# Patient Record
Sex: FEMALE | Race: WHITE | Employment: OTHER | URBAN - METROPOLITAN AREA
[De-identification: names, ages, dates, MRNs, and addresses within clinical notes are randomized per-mention and may not be internally consistent; named-entity substitution may affect disease eponyms.]

---

## 2017-02-08 ENCOUNTER — HOSPITAL ENCOUNTER (OUTPATIENT)
Dept: WOMENS IMAGING | Age: 82
Discharge: HOME | End: 2017-02-08
Payer: MEDICARE

## 2017-02-08 DIAGNOSIS — Z13.820 OSTEOPOROSIS SCREENING: ICD-10-CM

## 2017-02-08 PROCEDURE — 77081 DXA BONE DENSITY APPENDICULR: CPT

## 2017-02-08 PROCEDURE — 77080 DXA BONE DENSITY AXIAL: CPT | Performed by: RADIOLOGY

## 2017-02-15 ENCOUNTER — HOSPITAL ENCOUNTER (OUTPATIENT)
Age: 82
Discharge: HOME | End: 2017-02-15
Payer: MEDICARE

## 2017-02-15 DIAGNOSIS — N18.2 CKD (CHRONIC KIDNEY DISEASE) STAGE 2, GFR 60-89 ML/MIN: ICD-10-CM

## 2017-02-15 DIAGNOSIS — I12.9 BENIGN HYPERTENSION WITH CKD (CHRONIC KIDNEY DISEASE), STAGE II: ICD-10-CM

## 2017-02-15 DIAGNOSIS — E55.9 VITAMIN D DEFICIENCY: ICD-10-CM

## 2017-02-15 DIAGNOSIS — N18.2 BENIGN HYPERTENSION WITH CKD (CHRONIC KIDNEY DISEASE), STAGE II: ICD-10-CM

## 2017-02-15 DIAGNOSIS — N20.0 NEPHROLITHIASIS: ICD-10-CM

## 2017-02-15 LAB
ABSOLUTE EOS #: 0.1 K/UL (ref 0–0.4)
ABSOLUTE LYMPH #: 1.1 K/UL (ref 1–4.8)
ABSOLUTE MONO #: 0.5 K/UL (ref 0.1–1.3)
ANION GAP SERPL CALCULATED.3IONS-SCNC: 15 MMOL/L (ref 9–17)
BASOPHILS # BLD: 1 % (ref 0–2)
BASOPHILS ABSOLUTE: 0 K/UL (ref 0–0.2)
BUN BLDV-MCNC: 24 MG/DL (ref 8–23)
BUN/CREAT BLD: ABNORMAL (ref 9–20)
CALCIUM SERPL-MCNC: 9.5 MG/DL (ref 8.6–10.4)
CHLORIDE BLD-SCNC: 102 MMOL/L (ref 98–107)
CO2: 26 MMOL/L (ref 20–31)
CREAT SERPL-MCNC: 0.96 MG/DL (ref 0.5–0.9)
DIFFERENTIAL TYPE: ABNORMAL
EOSINOPHILS RELATIVE PERCENT: 2 % (ref 0–4)
GFR AFRICAN AMERICAN: >60 ML/MIN
GFR NON-AFRICAN AMERICAN: 56 ML/MIN
GFR SERPL CREATININE-BSD FRML MDRD: ABNORMAL ML/MIN/{1.73_M2}
GFR SERPL CREATININE-BSD FRML MDRD: ABNORMAL ML/MIN/{1.73_M2}
GLUCOSE BLD-MCNC: 91 MG/DL (ref 70–99)
HCT VFR BLD CALC: 39.1 % (ref 36–46)
HEMOGLOBIN: 13.4 G/DL (ref 12–16)
LYMPHOCYTES # BLD: 22 % (ref 24–44)
MAGNESIUM: 1.9 MG/DL (ref 1.6–2.6)
MCH RBC QN AUTO: 28.4 PG (ref 26–34)
MCHC RBC AUTO-ENTMCNC: 34.4 G/DL (ref 31–37)
MCV RBC AUTO: 82.6 FL (ref 80–100)
MONOCYTES # BLD: 10 % (ref 1–7)
PDW BLD-RTO: 16.4 % (ref 11.5–14.9)
PHOSPHORUS: 3.5 MG/DL (ref 2.6–4.5)
PLATELET # BLD: 259 K/UL (ref 150–450)
PLATELET ESTIMATE: ABNORMAL
PMV BLD AUTO: 8 FL (ref 6–12)
POTASSIUM SERPL-SCNC: 3.7 MMOL/L (ref 3.7–5.3)
RBC # BLD: 4.73 M/UL (ref 4–5.2)
RBC # BLD: ABNORMAL 10*6/UL
SEG NEUTROPHILS: 65 % (ref 36–66)
SEGMENTED NEUTROPHILS ABSOLUTE COUNT: 3.2 K/UL (ref 1.3–9.1)
SODIUM BLD-SCNC: 143 MMOL/L (ref 135–144)
VITAMIN D 25-HYDROXY: 32.9 NG/ML (ref 30–100)
WBC # BLD: 4.9 K/UL (ref 3.5–11)
WBC # BLD: ABNORMAL 10*3/UL

## 2017-02-15 PROCEDURE — 84100 ASSAY OF PHOSPHORUS: CPT

## 2017-02-15 PROCEDURE — 80048 BASIC METABOLIC PNL TOTAL CA: CPT

## 2017-02-15 PROCEDURE — 36415 COLL VENOUS BLD VENIPUNCTURE: CPT

## 2017-02-15 PROCEDURE — 83735 ASSAY OF MAGNESIUM: CPT

## 2017-02-15 PROCEDURE — 82306 VITAMIN D 25 HYDROXY: CPT

## 2017-02-15 PROCEDURE — 85025 COMPLETE CBC W/AUTO DIFF WBC: CPT

## 2017-03-08 ENCOUNTER — HOSPITAL ENCOUNTER (OUTPATIENT)
Age: 82
Discharge: HOME | End: 2017-03-08
Payer: MEDICARE

## 2017-03-08 LAB
ALT SERPL-CCNC: 17 U/L (ref 5–33)
AST SERPL-CCNC: 23 U/L
BUN BLDV-MCNC: 19 MG/DL (ref 8–23)
C-REACTIVE PROTEIN: <0.3 MG/L (ref 0–5)
CREAT SERPL-MCNC: 0.95 MG/DL (ref 0.5–0.9)
GFR AFRICAN AMERICAN: >60 ML/MIN
GFR NON-AFRICAN AMERICAN: 56 ML/MIN
GFR SERPL CREATININE-BSD FRML MDRD: ABNORMAL ML/MIN/{1.73_M2}
GFR SERPL CREATININE-BSD FRML MDRD: ABNORMAL ML/MIN/{1.73_M2}
HCT VFR BLD CALC: 39.5 % (ref 36–46)
HEMOGLOBIN: 13.5 G/DL (ref 12–16)
MCH RBC QN AUTO: 27.8 PG (ref 26–34)
MCHC RBC AUTO-ENTMCNC: 34.3 G/DL (ref 31–37)
MCV RBC AUTO: 81.2 FL (ref 80–100)
PDW BLD-RTO: 16 % (ref 11.5–14.9)
PLATELET # BLD: 248 K/UL (ref 150–450)
PMV BLD AUTO: 8.7 FL (ref 6–12)
RBC # BLD: 4.86 M/UL (ref 4–5.2)
SEDIMENTATION RATE, ERYTHROCYTE: 6 MM (ref 0–20)
WBC # BLD: 6.1 K/UL (ref 3.5–11)

## 2017-03-08 PROCEDURE — 82565 ASSAY OF CREATININE: CPT

## 2017-03-08 PROCEDURE — 86140 C-REACTIVE PROTEIN: CPT

## 2017-03-08 PROCEDURE — 85651 RBC SED RATE NONAUTOMATED: CPT

## 2017-03-08 PROCEDURE — 84460 ALANINE AMINO (ALT) (SGPT): CPT

## 2017-03-08 PROCEDURE — 84520 ASSAY OF UREA NITROGEN: CPT

## 2017-03-08 PROCEDURE — 36415 COLL VENOUS BLD VENIPUNCTURE: CPT

## 2017-03-08 PROCEDURE — 84450 TRANSFERASE (AST) (SGOT): CPT

## 2017-03-08 PROCEDURE — 85027 COMPLETE CBC AUTOMATED: CPT

## 2017-04-13 ENCOUNTER — HOSPITAL ENCOUNTER (OUTPATIENT)
Age: 82
Discharge: HOME | End: 2017-04-13
Payer: MEDICARE

## 2017-04-13 ENCOUNTER — HOSPITAL ENCOUNTER (OUTPATIENT)
Dept: WOMENS IMAGING | Age: 82
Discharge: HOME | End: 2017-04-13
Payer: MEDICARE

## 2017-04-13 DIAGNOSIS — Z13.9 SCREENING: ICD-10-CM

## 2017-04-13 LAB
ABSOLUTE BANDS #: 0.04 K/UL (ref 0–1)
ABSOLUTE EOS #: 0.07 K/UL (ref 0–0.4)
ABSOLUTE LYMPH #: 1.18 K/UL (ref 1–4.8)
ABSOLUTE MONO #: 0.22 K/UL (ref 0.1–1.3)
ALBUMIN SERPL-MCNC: 4.1 G/DL (ref 3.5–5.2)
ALBUMIN/GLOBULIN RATIO: ABNORMAL (ref 1–2.5)
ALP BLD-CCNC: 78 U/L (ref 35–104)
ALT SERPL-CCNC: 15 U/L (ref 5–33)
ANION GAP SERPL CALCULATED.3IONS-SCNC: 12 MMOL/L (ref 9–17)
AST SERPL-CCNC: 20 U/L
BANDS: 1 % (ref 0–10)
BASOPHILS # BLD: 1 % (ref 0–2)
BASOPHILS ABSOLUTE: 0.04 K/UL (ref 0–0.2)
BILIRUB SERPL-MCNC: 0.79 MG/DL (ref 0.3–1.2)
BUN BLDV-MCNC: 23 MG/DL (ref 8–23)
BUN/CREAT BLD: ABNORMAL (ref 9–20)
CALCIUM SERPL-MCNC: 9.6 MG/DL (ref 8.6–10.4)
CHLORIDE BLD-SCNC: 105 MMOL/L (ref 98–107)
CHOLESTEROL/HDL RATIO: 2.6
CHOLESTEROL: 191 MG/DL
CO2: 28 MMOL/L (ref 20–31)
CREAT SERPL-MCNC: 0.83 MG/DL (ref 0.5–0.9)
DIFFERENTIAL TYPE: ABNORMAL
EOSINOPHILS RELATIVE PERCENT: 2 % (ref 0–4)
GFR AFRICAN AMERICAN: >60 ML/MIN
GFR NON-AFRICAN AMERICAN: >60 ML/MIN
GFR SERPL CREATININE-BSD FRML MDRD: ABNORMAL ML/MIN/{1.73_M2}
GFR SERPL CREATININE-BSD FRML MDRD: ABNORMAL ML/MIN/{1.73_M2}
GLUCOSE BLD-MCNC: 87 MG/DL (ref 70–99)
HCT VFR BLD CALC: 41.5 % (ref 36–46)
HDLC SERPL-MCNC: 73 MG/DL
HEMOGLOBIN: 13.4 G/DL (ref 12–16)
LDL CHOLESTEROL: 99 MG/DL (ref 0–130)
LYMPHOCYTES # BLD: 32 % (ref 24–44)
MAGNESIUM: 2.1 MG/DL (ref 1.6–2.6)
MCH RBC QN AUTO: 26.5 PG (ref 26–34)
MCHC RBC AUTO-ENTMCNC: 32.4 G/DL (ref 31–37)
MCV RBC AUTO: 81.8 FL (ref 80–100)
MONOCYTES # BLD: 6 % (ref 1–7)
MORPHOLOGY: ABNORMAL
PDW BLD-RTO: 16 % (ref 11.5–14.9)
PLATELET # BLD: 214 K/UL (ref 150–450)
PLATELET ESTIMATE: ABNORMAL
PMV BLD AUTO: 8.5 FL (ref 6–12)
POTASSIUM SERPL-SCNC: 4 MMOL/L (ref 3.7–5.3)
RBC # BLD: 5.07 M/UL (ref 4–5.2)
RBC # BLD: ABNORMAL 10*6/UL
SEG NEUTROPHILS: 58 % (ref 36–66)
SEGMENTED NEUTROPHILS ABSOLUTE COUNT: 2.15 K/UL (ref 1.3–9.1)
SODIUM BLD-SCNC: 145 MMOL/L (ref 135–144)
TOTAL PROTEIN: 7.4 G/DL (ref 6.4–8.3)
TRIGL SERPL-MCNC: 94 MG/DL
VLDLC SERPL CALC-MCNC: NORMAL MG/DL (ref 1–30)
WBC # BLD: 3.7 K/UL (ref 3.5–11)
WBC # BLD: ABNORMAL 10*3/UL

## 2017-04-13 PROCEDURE — 36415 COLL VENOUS BLD VENIPUNCTURE: CPT

## 2017-04-13 PROCEDURE — 77063 BREAST TOMOSYNTHESIS BI: CPT

## 2017-04-13 PROCEDURE — 80053 COMPREHEN METABOLIC PANEL: CPT

## 2017-04-13 PROCEDURE — 85025 COMPLETE CBC W/AUTO DIFF WBC: CPT

## 2017-04-13 PROCEDURE — 80061 LIPID PANEL: CPT

## 2017-04-13 PROCEDURE — 83735 ASSAY OF MAGNESIUM: CPT

## 2017-04-19 ENCOUNTER — HOSPITAL ENCOUNTER (OUTPATIENT)
Dept: WOMENS IMAGING | Age: 82
Discharge: HOME | End: 2017-04-19
Payer: MEDICARE

## 2017-04-19 DIAGNOSIS — R92.8 ABNORMAL MAMMOGRAM: ICD-10-CM

## 2017-04-19 PROCEDURE — 76642 ULTRASOUND BREAST LIMITED: CPT

## 2017-04-19 PROCEDURE — G0279 TOMOSYNTHESIS, MAMMO: HCPCS

## 2017-05-31 ENCOUNTER — HOSPITAL ENCOUNTER (OUTPATIENT)
Age: 82
Discharge: HOME | End: 2017-05-31
Payer: MEDICARE

## 2017-05-31 DIAGNOSIS — N20.0 NEPHROLITHIASIS: ICD-10-CM

## 2017-05-31 DIAGNOSIS — N18.30 BENIGN HYPERTENSION WITH CKD (CHRONIC KIDNEY DISEASE) STAGE III (HCC): ICD-10-CM

## 2017-05-31 DIAGNOSIS — I12.9 BENIGN HYPERTENSION WITH CKD (CHRONIC KIDNEY DISEASE) STAGE III (HCC): ICD-10-CM

## 2017-05-31 DIAGNOSIS — N18.30 CKD (CHRONIC KIDNEY DISEASE) STAGE 3, GFR 30-59 ML/MIN (HCC): ICD-10-CM

## 2017-05-31 LAB
ABSOLUTE EOS #: 0.1 K/UL (ref 0–0.4)
ABSOLUTE LYMPH #: 1.4 K/UL (ref 1–4.8)
ABSOLUTE MONO #: 0.4 K/UL (ref 0.1–1.3)
ANION GAP SERPL CALCULATED.3IONS-SCNC: 14 MMOL/L (ref 9–17)
BASOPHILS # BLD: 1 %
BASOPHILS ABSOLUTE: 0 K/UL (ref 0–0.2)
BUN BLDV-MCNC: 29 MG/DL (ref 8–23)
BUN/CREAT BLD: ABNORMAL (ref 9–20)
CALCIUM SERPL-MCNC: 9.4 MG/DL (ref 8.6–10.4)
CHLORIDE BLD-SCNC: 103 MMOL/L (ref 98–107)
CO2: 25 MMOL/L (ref 20–31)
CREAT SERPL-MCNC: 0.93 MG/DL (ref 0.5–0.9)
DIFFERENTIAL TYPE: ABNORMAL
EOSINOPHILS RELATIVE PERCENT: 1 %
GFR AFRICAN AMERICAN: >60 ML/MIN
GFR NON-AFRICAN AMERICAN: 58 ML/MIN
GFR SERPL CREATININE-BSD FRML MDRD: ABNORMAL ML/MIN/{1.73_M2}
GFR SERPL CREATININE-BSD FRML MDRD: ABNORMAL ML/MIN/{1.73_M2}
GLUCOSE BLD-MCNC: 93 MG/DL (ref 70–99)
HCT VFR BLD CALC: 39.3 % (ref 36–46)
HEMOGLOBIN: 13.5 G/DL (ref 12–16)
LYMPHOCYTES # BLD: 24 %
MAGNESIUM: 1.9 MG/DL (ref 1.6–2.6)
MCH RBC QN AUTO: 28 PG (ref 26–34)
MCHC RBC AUTO-ENTMCNC: 34.3 G/DL (ref 31–37)
MCV RBC AUTO: 81.8 FL (ref 80–100)
MONOCYTES # BLD: 8 %
PDW BLD-RTO: 17.8 % (ref 11.5–14.9)
PHOSPHORUS: 3.4 MG/DL (ref 2.6–4.5)
PLATELET # BLD: 232 K/UL (ref 150–450)
PLATELET ESTIMATE: ABNORMAL
PMV BLD AUTO: 7.9 FL (ref 6–12)
POTASSIUM SERPL-SCNC: 3.6 MMOL/L (ref 3.7–5.3)
RBC # BLD: 4.81 M/UL (ref 4–5.2)
RBC # BLD: ABNORMAL 10*6/UL
SEG NEUTROPHILS: 66 %
SEGMENTED NEUTROPHILS ABSOLUTE COUNT: 3.8 K/UL (ref 1.3–9.1)
SODIUM BLD-SCNC: 142 MMOL/L (ref 135–144)
VITAMIN D 25-HYDROXY: 35 NG/ML (ref 30–100)
WBC # BLD: 5.8 K/UL (ref 3.5–11)
WBC # BLD: ABNORMAL 10*3/UL

## 2017-05-31 PROCEDURE — 80048 BASIC METABOLIC PNL TOTAL CA: CPT

## 2017-05-31 PROCEDURE — 85025 COMPLETE CBC W/AUTO DIFF WBC: CPT

## 2017-05-31 PROCEDURE — 83735 ASSAY OF MAGNESIUM: CPT

## 2017-05-31 PROCEDURE — 84100 ASSAY OF PHOSPHORUS: CPT

## 2017-05-31 PROCEDURE — 36415 COLL VENOUS BLD VENIPUNCTURE: CPT

## 2017-05-31 PROCEDURE — 82306 VITAMIN D 25 HYDROXY: CPT

## 2017-08-02 ENCOUNTER — HOSPITAL ENCOUNTER (OUTPATIENT)
Age: 82
Discharge: HOME | End: 2017-08-02
Payer: MEDICARE

## 2017-08-02 LAB
ALBUMIN SERPL-MCNC: 4.3 G/DL (ref 3.5–5.2)
ALBUMIN/GLOBULIN RATIO: ABNORMAL (ref 1–2.5)
ALP BLD-CCNC: 73 U/L (ref 35–104)
ALT SERPL-CCNC: 18 U/L (ref 5–33)
ANION GAP SERPL CALCULATED.3IONS-SCNC: 14 MMOL/L (ref 9–17)
AST SERPL-CCNC: 20 U/L
BILIRUB SERPL-MCNC: 0.8 MG/DL (ref 0.3–1.2)
BUN BLDV-MCNC: 27 MG/DL (ref 8–23)
BUN/CREAT BLD: ABNORMAL (ref 9–20)
C-REACTIVE PROTEIN: 0.3 MG/L (ref 0–5)
CALCIUM SERPL-MCNC: 9.3 MG/DL (ref 8.6–10.4)
CHLORIDE BLD-SCNC: 102 MMOL/L (ref 98–107)
CO2: 27 MMOL/L (ref 20–31)
CREAT SERPL-MCNC: 0.91 MG/DL (ref 0.5–0.9)
GFR AFRICAN AMERICAN: >60 ML/MIN
GFR NON-AFRICAN AMERICAN: 59 ML/MIN
GFR SERPL CREATININE-BSD FRML MDRD: ABNORMAL ML/MIN/{1.73_M2}
GFR SERPL CREATININE-BSD FRML MDRD: ABNORMAL ML/MIN/{1.73_M2}
GLUCOSE BLD-MCNC: 94 MG/DL (ref 70–99)
HCT VFR BLD CALC: 39.9 % (ref 36–46)
HEMOGLOBIN: 13.4 G/DL (ref 12–16)
MCH RBC QN AUTO: 29.5 PG (ref 26–34)
MCHC RBC AUTO-ENTMCNC: 33.6 G/DL (ref 31–37)
MCV RBC AUTO: 87.8 FL (ref 80–100)
PDW BLD-RTO: 19.9 % (ref 11.5–14.9)
PLATELET # BLD: 238 K/UL (ref 150–450)
PMV BLD AUTO: 8.4 FL (ref 6–12)
POTASSIUM SERPL-SCNC: 3.6 MMOL/L (ref 3.7–5.3)
RBC # BLD: 4.55 M/UL (ref 4–5.2)
SODIUM BLD-SCNC: 143 MMOL/L (ref 135–144)
TOTAL PROTEIN: 7.4 G/DL (ref 6.4–8.3)
WBC # BLD: 5.2 K/UL (ref 3.5–11)

## 2017-08-02 PROCEDURE — 80053 COMPREHEN METABOLIC PANEL: CPT

## 2017-08-02 PROCEDURE — 85027 COMPLETE CBC AUTOMATED: CPT

## 2017-08-02 PROCEDURE — 86140 C-REACTIVE PROTEIN: CPT

## 2017-08-02 PROCEDURE — 36415 COLL VENOUS BLD VENIPUNCTURE: CPT

## 2017-08-21 ENCOUNTER — HOSPITAL ENCOUNTER (OUTPATIENT)
Dept: MRI IMAGING | Age: 82
Discharge: HOME | End: 2017-08-21
Payer: MEDICARE

## 2017-08-21 DIAGNOSIS — M54.2 NECK PAIN: ICD-10-CM

## 2017-08-21 DIAGNOSIS — M47.812 ARTHROPATHY OF CERVICAL SPINE: ICD-10-CM

## 2017-08-21 PROCEDURE — 72141 MRI NECK SPINE W/O DYE: CPT

## 2017-08-31 ENCOUNTER — HOSPITAL ENCOUNTER (OUTPATIENT)
Age: 82
Discharge: HOME | End: 2017-08-31
Payer: MEDICARE

## 2017-08-31 DIAGNOSIS — N20.0 NEPHROLITHIASIS: ICD-10-CM

## 2017-08-31 DIAGNOSIS — I12.9 BENIGN HYPERTENSION WITH CKD (CHRONIC KIDNEY DISEASE) STAGE III (HCC): ICD-10-CM

## 2017-08-31 DIAGNOSIS — N18.30 CKD (CHRONIC KIDNEY DISEASE), STAGE III (HCC): ICD-10-CM

## 2017-08-31 DIAGNOSIS — N18.30 BENIGN HYPERTENSION WITH CKD (CHRONIC KIDNEY DISEASE) STAGE III (HCC): ICD-10-CM

## 2017-08-31 DIAGNOSIS — E55.9 VITAMIN D DEFICIENCY: ICD-10-CM

## 2017-08-31 LAB
ANION GAP SERPL CALCULATED.3IONS-SCNC: 13 MMOL/L (ref 9–17)
CHLORIDE BLD-SCNC: 102 MMOL/L (ref 98–107)
CO2: 27 MMOL/L (ref 20–31)
POTASSIUM SERPL-SCNC: 3.9 MMOL/L (ref 3.7–5.3)
SODIUM BLD-SCNC: 142 MMOL/L (ref 135–144)

## 2017-08-31 PROCEDURE — 36415 COLL VENOUS BLD VENIPUNCTURE: CPT

## 2017-08-31 PROCEDURE — 80051 ELECTROLYTE PANEL: CPT

## 2018-02-13 ENCOUNTER — HOSPITAL ENCOUNTER (OUTPATIENT)
Dept: GENERAL RADIOLOGY | Age: 83
Discharge: HOME | End: 2018-02-15
Payer: MEDICARE

## 2018-02-13 ENCOUNTER — HOSPITAL ENCOUNTER (OUTPATIENT)
Age: 83
Discharge: HOME | End: 2018-02-15
Payer: MEDICARE

## 2018-02-13 DIAGNOSIS — Z77.090 ASBESTOS EXPOSURE: ICD-10-CM

## 2018-02-13 PROCEDURE — 71046 X-RAY EXAM CHEST 2 VIEWS: CPT

## 2018-03-14 ENCOUNTER — HOSPITAL ENCOUNTER (OUTPATIENT)
Age: 83
Discharge: HOME | End: 2018-03-14
Payer: MEDICARE

## 2018-03-14 DIAGNOSIS — N18.30 BENIGN HYPERTENSION WITH CKD (CHRONIC KIDNEY DISEASE) STAGE III (HCC): ICD-10-CM

## 2018-03-14 DIAGNOSIS — I12.9 BENIGN HYPERTENSION WITH CKD (CHRONIC KIDNEY DISEASE) STAGE III (HCC): ICD-10-CM

## 2018-03-14 DIAGNOSIS — N18.30 CKD (CHRONIC KIDNEY DISEASE), STAGE III (HCC): ICD-10-CM

## 2018-03-14 DIAGNOSIS — E55.9 VITAMIN D DEFICIENCY: ICD-10-CM

## 2018-03-14 DIAGNOSIS — N20.0 NEPHROLITHIASIS: ICD-10-CM

## 2018-03-14 LAB
ALBUMIN SERPL-MCNC: 4.2 G/DL (ref 3.5–5.2)
ALBUMIN/GLOBULIN RATIO: ABNORMAL (ref 1–2.5)
ALP BLD-CCNC: 74 U/L (ref 35–104)
ALT SERPL-CCNC: 19 U/L (ref 5–33)
ANION GAP SERPL CALCULATED.3IONS-SCNC: 12 MMOL/L (ref 9–17)
ANION GAP SERPL CALCULATED.3IONS-SCNC: 12 MMOL/L (ref 9–17)
AST SERPL-CCNC: 23 U/L
BILIRUB SERPL-MCNC: 0.69 MG/DL (ref 0.3–1.2)
BUN BLDV-MCNC: 24 MG/DL (ref 8–23)
BUN BLDV-MCNC: 24 MG/DL (ref 8–23)
BUN/CREAT BLD: ABNORMAL (ref 9–20)
BUN/CREAT BLD: ABNORMAL (ref 9–20)
C-REACTIVE PROTEIN: <0.3 MG/L (ref 0–5)
CALCIUM SERPL-MCNC: 9.5 MG/DL (ref 8.6–10.4)
CALCIUM SERPL-MCNC: 9.5 MG/DL (ref 8.6–10.4)
CHLORIDE BLD-SCNC: 101 MMOL/L (ref 98–107)
CHLORIDE BLD-SCNC: 101 MMOL/L (ref 98–107)
CO2: 28 MMOL/L (ref 20–31)
CO2: 28 MMOL/L (ref 20–31)
CREAT SERPL-MCNC: 0.9 MG/DL (ref 0.5–0.9)
CREAT SERPL-MCNC: 0.9 MG/DL (ref 0.5–0.9)
GFR AFRICAN AMERICAN: >60 ML/MIN
GFR AFRICAN AMERICAN: >60 ML/MIN
GFR NON-AFRICAN AMERICAN: 60 ML/MIN
GFR NON-AFRICAN AMERICAN: 60 ML/MIN
GFR SERPL CREATININE-BSD FRML MDRD: ABNORMAL ML/MIN/{1.73_M2}
GLUCOSE BLD-MCNC: 105 MG/DL (ref 70–99)
GLUCOSE BLD-MCNC: 105 MG/DL (ref 70–99)
HCT VFR BLD CALC: 40.4 % (ref 36–46)
HEMOGLOBIN: 13.3 G/DL (ref 12–16)
MAGNESIUM: 2.1 MG/DL (ref 1.6–2.6)
MCH RBC QN AUTO: 26.8 PG (ref 26–34)
MCHC RBC AUTO-ENTMCNC: 33.1 G/DL (ref 31–37)
MCV RBC AUTO: 81.1 FL (ref 80–100)
NRBC AUTOMATED: ABNORMAL PER 100 WBC
PDW BLD-RTO: 17 % (ref 11.5–14.9)
PHOSPHORUS: 3.7 MG/DL (ref 2.6–4.5)
PLATELET # BLD: 232 K/UL (ref 150–450)
PMV BLD AUTO: 8.6 FL (ref 6–12)
POTASSIUM SERPL-SCNC: 3.9 MMOL/L (ref 3.7–5.3)
POTASSIUM SERPL-SCNC: 3.9 MMOL/L (ref 3.7–5.3)
RBC # BLD: 4.98 M/UL (ref 4–5.2)
SEDIMENTATION RATE, ERYTHROCYTE: 5 MM (ref 0–20)
SODIUM BLD-SCNC: 141 MMOL/L (ref 135–144)
SODIUM BLD-SCNC: 141 MMOL/L (ref 135–144)
TOTAL PROTEIN: 7.3 G/DL (ref 6.4–8.3)
VITAMIN D 25-HYDROXY: 32.7 NG/ML (ref 30–100)
WBC # BLD: 8 K/UL (ref 3.5–11)

## 2018-03-14 PROCEDURE — 36415 COLL VENOUS BLD VENIPUNCTURE: CPT

## 2018-03-14 PROCEDURE — 83735 ASSAY OF MAGNESIUM: CPT

## 2018-03-14 PROCEDURE — 84100 ASSAY OF PHOSPHORUS: CPT

## 2018-03-14 PROCEDURE — 85651 RBC SED RATE NONAUTOMATED: CPT

## 2018-03-14 PROCEDURE — 80053 COMPREHEN METABOLIC PANEL: CPT

## 2018-03-14 PROCEDURE — 85027 COMPLETE CBC AUTOMATED: CPT

## 2018-03-14 PROCEDURE — 80048 BASIC METABOLIC PNL TOTAL CA: CPT

## 2018-03-14 PROCEDURE — 82306 VITAMIN D 25 HYDROXY: CPT

## 2018-03-14 PROCEDURE — 86140 C-REACTIVE PROTEIN: CPT

## 2018-04-05 ENCOUNTER — HOSPITAL ENCOUNTER (OUTPATIENT)
Dept: CT IMAGING | Age: 83
Discharge: HOME | End: 2018-04-07
Payer: MEDICARE

## 2018-04-05 ENCOUNTER — HOSPITAL ENCOUNTER (OUTPATIENT)
Age: 83
Discharge: HOME | End: 2018-04-05
Payer: MEDICARE

## 2018-04-05 DIAGNOSIS — N18.30 BENIGN HYPERTENSION WITH CKD (CHRONIC KIDNEY DISEASE) STAGE III (HCC): ICD-10-CM

## 2018-04-05 DIAGNOSIS — C45.1 MESOTHELIOMA OF PERITONEUM (HCC): ICD-10-CM

## 2018-04-05 DIAGNOSIS — E55.9 VITAMIN D DEFICIENCY: ICD-10-CM

## 2018-04-05 DIAGNOSIS — I12.9 BENIGN HYPERTENSION WITH CKD (CHRONIC KIDNEY DISEASE) STAGE III (HCC): ICD-10-CM

## 2018-04-05 DIAGNOSIS — N18.30 CKD (CHRONIC KIDNEY DISEASE) STAGE 3, GFR 30-59 ML/MIN (HCC): ICD-10-CM

## 2018-04-05 DIAGNOSIS — N20.0 NEPHROLITHIASIS: ICD-10-CM

## 2018-04-05 LAB
ABSOLUTE EOS #: 0 K/UL (ref 0–0.4)
ABSOLUTE IMMATURE GRANULOCYTE: ABNORMAL K/UL (ref 0–0.3)
ABSOLUTE LYMPH #: 1.7 K/UL (ref 1–4.8)
ABSOLUTE MONO #: 0.4 K/UL (ref 0.1–1.3)
ALBUMIN SERPL-MCNC: 4.3 G/DL (ref 3.5–5.2)
ALBUMIN/GLOBULIN RATIO: ABNORMAL (ref 1–2.5)
ALP BLD-CCNC: 78 U/L (ref 35–104)
ALT SERPL-CCNC: 17 U/L (ref 5–33)
ANION GAP SERPL CALCULATED.3IONS-SCNC: 11 MMOL/L (ref 9–17)
ANION GAP SERPL CALCULATED.3IONS-SCNC: 11 MMOL/L (ref 9–17)
AST SERPL-CCNC: 19 U/L
BASOPHILS # BLD: 1 % (ref 0–2)
BASOPHILS ABSOLUTE: 0 K/UL (ref 0–0.2)
BILIRUB SERPL-MCNC: 0.8 MG/DL (ref 0.3–1.2)
BUN BLDV-MCNC: 26 MG/DL (ref 8–23)
BUN BLDV-MCNC: 26 MG/DL (ref 8–23)
BUN/CREAT BLD: ABNORMAL (ref 9–20)
BUN/CREAT BLD: ABNORMAL (ref 9–20)
CALCIUM SERPL-MCNC: 9.4 MG/DL (ref 8.6–10.4)
CALCIUM SERPL-MCNC: 9.4 MG/DL (ref 8.6–10.4)
CHLORIDE BLD-SCNC: 101 MMOL/L (ref 98–107)
CHLORIDE BLD-SCNC: 101 MMOL/L (ref 98–107)
CO2: 30 MMOL/L (ref 20–31)
CO2: 30 MMOL/L (ref 20–31)
CREAT SERPL-MCNC: 0.94 MG/DL (ref 0.5–0.9)
CREAT SERPL-MCNC: 0.94 MG/DL (ref 0.5–0.9)
DIFFERENTIAL TYPE: ABNORMAL
EOSINOPHILS RELATIVE PERCENT: 1 % (ref 0–4)
GFR AFRICAN AMERICAN: >60 ML/MIN
GFR AFRICAN AMERICAN: >60 ML/MIN
GFR NON-AFRICAN AMERICAN: 57 ML/MIN
GFR NON-AFRICAN AMERICAN: 57 ML/MIN
GFR SERPL CREATININE-BSD FRML MDRD: ABNORMAL ML/MIN/{1.73_M2}
GLUCOSE BLD-MCNC: 93 MG/DL (ref 70–99)
GLUCOSE BLD-MCNC: 93 MG/DL (ref 70–99)
HCT VFR BLD CALC: 40.7 % (ref 36–46)
HEMOGLOBIN: 13.7 G/DL (ref 12–16)
IMMATURE GRANULOCYTES: ABNORMAL %
LYMPHOCYTES # BLD: 35 % (ref 24–44)
MCH RBC QN AUTO: 27.1 PG (ref 26–34)
MCHC RBC AUTO-ENTMCNC: 33.5 G/DL (ref 31–37)
MCV RBC AUTO: 80.9 FL (ref 80–100)
MONOCYTES # BLD: 8 % (ref 1–7)
NRBC AUTOMATED: ABNORMAL PER 100 WBC
PDW BLD-RTO: 17.3 % (ref 11.5–14.9)
PLATELET # BLD: 223 K/UL (ref 150–450)
PLATELET ESTIMATE: ABNORMAL
PMV BLD AUTO: 8.3 FL (ref 6–12)
POTASSIUM SERPL-SCNC: 3.7 MMOL/L (ref 3.7–5.3)
POTASSIUM SERPL-SCNC: 3.7 MMOL/L (ref 3.7–5.3)
RBC # BLD: 5.03 M/UL (ref 4–5.2)
RBC # BLD: ABNORMAL 10*6/UL
SEG NEUTROPHILS: 55 % (ref 36–66)
SEGMENTED NEUTROPHILS ABSOLUTE COUNT: 2.7 K/UL (ref 1.3–9.1)
SODIUM BLD-SCNC: 142 MMOL/L (ref 135–144)
SODIUM BLD-SCNC: 142 MMOL/L (ref 135–144)
TOTAL PROTEIN: 7.3 G/DL (ref 6.4–8.3)
WBC # BLD: 4.9 K/UL (ref 3.5–11)
WBC # BLD: ABNORMAL 10*3/UL

## 2018-04-05 PROCEDURE — 74177 CT ABD & PELVIS W/CONTRAST: CPT

## 2018-04-05 PROCEDURE — 36415 COLL VENOUS BLD VENIPUNCTURE: CPT

## 2018-04-05 PROCEDURE — 71260 CT THORAX DX C+: CPT

## 2018-04-05 PROCEDURE — 80053 COMPREHEN METABOLIC PANEL: CPT

## 2018-04-05 PROCEDURE — 6360000004 HC RX CONTRAST MEDICATION: Performed by: INTERNAL MEDICINE

## 2018-04-05 PROCEDURE — 2580000003 HC RX 258: Performed by: INTERNAL MEDICINE

## 2018-04-05 PROCEDURE — 80048 BASIC METABOLIC PNL TOTAL CA: CPT

## 2018-04-05 PROCEDURE — 85025 COMPLETE CBC W/AUTO DIFF WBC: CPT

## 2018-04-05 RX ORDER — 0.9 % SODIUM CHLORIDE 0.9 %
100 INTRAVENOUS SOLUTION INTRAVENOUS ONCE
Status: COMPLETED | OUTPATIENT
Start: 2018-04-05 | End: 2018-04-05

## 2018-04-05 RX ORDER — SODIUM CHLORIDE 0.9 % (FLUSH) 0.9 %
10 SYRINGE (ML) INJECTION PRN
Status: DISCONTINUED | OUTPATIENT
Start: 2018-04-05 | End: 2018-04-08 | Stop reason: HOSPADM

## 2018-04-05 RX ADMIN — IOHEXOL 50 ML: 240 INJECTION, SOLUTION INTRATHECAL; INTRAVASCULAR; INTRAVENOUS; ORAL at 09:55

## 2018-04-05 RX ADMIN — Medication 10 ML: at 09:55

## 2018-04-05 RX ADMIN — IOPAMIDOL 75 ML: 755 INJECTION, SOLUTION INTRAVENOUS at 09:55

## 2018-04-05 RX ADMIN — SODIUM CHLORIDE 100 ML: 9 INJECTION, SOLUTION INTRAVENOUS at 09:55

## 2018-05-09 ENCOUNTER — HOSPITAL ENCOUNTER (OUTPATIENT)
Dept: ULTRASOUND IMAGING | Age: 83
Discharge: HOME | End: 2018-05-11
Payer: MEDICARE

## 2018-05-09 DIAGNOSIS — C45.1 MESOTHELIOMA OF PERITONEUM (HCC): ICD-10-CM

## 2018-05-09 PROCEDURE — 76536 US EXAM OF HEAD AND NECK: CPT

## 2018-05-29 ENCOUNTER — HOSPITAL ENCOUNTER (OUTPATIENT)
Age: 83
Discharge: HOME | End: 2018-05-29
Payer: MEDICARE

## 2018-05-29 LAB
C-REACTIVE PROTEIN: 0.4 MG/L (ref 0–5)
SEDIMENTATION RATE, ERYTHROCYTE: 9 MM (ref 0–20)

## 2018-05-29 PROCEDURE — 85651 RBC SED RATE NONAUTOMATED: CPT

## 2018-05-29 PROCEDURE — 36415 COLL VENOUS BLD VENIPUNCTURE: CPT

## 2018-05-29 PROCEDURE — 86140 C-REACTIVE PROTEIN: CPT

## 2018-06-20 ENCOUNTER — HOSPITAL ENCOUNTER (OUTPATIENT)
Age: 83
Discharge: HOME | End: 2018-06-20
Payer: MEDICARE

## 2018-06-20 LAB
ABSOLUTE EOS #: 0 K/UL (ref 0–0.4)
ABSOLUTE IMMATURE GRANULOCYTE: ABNORMAL K/UL (ref 0–0.3)
ABSOLUTE LYMPH #: 0.8 K/UL (ref 1–4.8)
ABSOLUTE MONO #: 0.5 K/UL (ref 0.1–1.3)
ANION GAP SERPL CALCULATED.3IONS-SCNC: 13 MMOL/L (ref 9–17)
BASOPHILS # BLD: 1 % (ref 0–2)
BASOPHILS ABSOLUTE: 0 K/UL (ref 0–0.2)
BUN BLDV-MCNC: 23 MG/DL (ref 8–23)
BUN/CREAT BLD: ABNORMAL (ref 9–20)
CALCIUM SERPL-MCNC: 9.4 MG/DL (ref 8.6–10.4)
CHLORIDE BLD-SCNC: 101 MMOL/L (ref 98–107)
CO2: 28 MMOL/L (ref 20–31)
CREAT SERPL-MCNC: 0.94 MG/DL (ref 0.5–0.9)
DIFFERENTIAL TYPE: ABNORMAL
EOSINOPHILS RELATIVE PERCENT: 1 % (ref 0–4)
GFR AFRICAN AMERICAN: >60 ML/MIN
GFR NON-AFRICAN AMERICAN: 57 ML/MIN
GFR SERPL CREATININE-BSD FRML MDRD: ABNORMAL ML/MIN/{1.73_M2}
GFR SERPL CREATININE-BSD FRML MDRD: ABNORMAL ML/MIN/{1.73_M2}
GLUCOSE BLD-MCNC: 96 MG/DL (ref 70–99)
HCT VFR BLD CALC: 42.8 % (ref 36–46)
HEMOGLOBIN: 14.2 G/DL (ref 12–16)
IMMATURE GRANULOCYTES: ABNORMAL %
LYMPHOCYTES # BLD: 10 % (ref 24–44)
MAGNESIUM: 1.9 MG/DL (ref 1.6–2.6)
MCH RBC QN AUTO: 27.3 PG (ref 26–34)
MCHC RBC AUTO-ENTMCNC: 33.2 G/DL (ref 31–37)
MCV RBC AUTO: 82.2 FL (ref 80–100)
MONOCYTES # BLD: 6 % (ref 1–7)
NRBC AUTOMATED: ABNORMAL PER 100 WBC
PDW BLD-RTO: 17.3 % (ref 11.5–14.9)
PHOSPHORUS: 3.1 MG/DL (ref 2.6–4.5)
PLATELET # BLD: 237 K/UL (ref 150–450)
PLATELET ESTIMATE: ABNORMAL
PMV BLD AUTO: 8.4 FL (ref 6–12)
POTASSIUM SERPL-SCNC: 3.5 MMOL/L (ref 3.7–5.3)
RBC # BLD: 5.2 M/UL (ref 4–5.2)
RBC # BLD: ABNORMAL 10*6/UL
SEG NEUTROPHILS: 82 % (ref 36–66)
SEGMENTED NEUTROPHILS ABSOLUTE COUNT: 6.7 K/UL (ref 1.3–9.1)
SODIUM BLD-SCNC: 142 MMOL/L (ref 135–144)
VITAMIN D 25-HYDROXY: 36.3 NG/ML (ref 30–100)
WBC # BLD: 8.1 K/UL (ref 3.5–11)
WBC # BLD: ABNORMAL 10*3/UL

## 2018-06-20 PROCEDURE — 82306 VITAMIN D 25 HYDROXY: CPT

## 2018-06-20 PROCEDURE — 36415 COLL VENOUS BLD VENIPUNCTURE: CPT

## 2018-06-20 PROCEDURE — 83735 ASSAY OF MAGNESIUM: CPT

## 2018-06-20 PROCEDURE — 80048 BASIC METABOLIC PNL TOTAL CA: CPT

## 2018-06-20 PROCEDURE — 85025 COMPLETE CBC W/AUTO DIFF WBC: CPT

## 2018-06-20 PROCEDURE — 84100 ASSAY OF PHOSPHORUS: CPT

## 2019-03-18 ENCOUNTER — HOSPITAL ENCOUNTER (OUTPATIENT)
Dept: CT IMAGING | Age: 84
End: 2019-03-18
Payer: MEDICARE

## 2019-03-18 ENCOUNTER — HOSPITAL ENCOUNTER (OUTPATIENT)
Dept: CT IMAGING | Age: 84
Discharge: HOME | End: 2019-03-20
Payer: MEDICARE

## 2019-03-18 ENCOUNTER — HOSPITAL ENCOUNTER (OUTPATIENT)
Age: 84
Discharge: HOME | End: 2019-03-18
Payer: MEDICARE

## 2019-03-18 DIAGNOSIS — C45.1 MALIGNANT MESOTHELIOMA OF PERITONEUM (HCC): ICD-10-CM

## 2019-03-18 LAB
ABSOLUTE EOS #: 0.1 K/UL (ref 0–0.4)
ABSOLUTE IMMATURE GRANULOCYTE: ABNORMAL K/UL (ref 0–0.3)
ABSOLUTE LYMPH #: 1.3 K/UL (ref 1–4.8)
ABSOLUTE MONO #: 0.4 K/UL (ref 0.1–1.3)
ALBUMIN SERPL-MCNC: 3.9 G/DL (ref 3.5–5.2)
ALBUMIN/GLOBULIN RATIO: ABNORMAL (ref 1–2.5)
ALP BLD-CCNC: 117 U/L (ref 35–104)
ALT SERPL-CCNC: 17 U/L (ref 5–33)
ANION GAP SERPL CALCULATED.3IONS-SCNC: 9 MMOL/L (ref 9–17)
AST SERPL-CCNC: 24 U/L
BASOPHILS # BLD: 1 % (ref 0–2)
BASOPHILS ABSOLUTE: 0 K/UL (ref 0–0.2)
BILIRUB SERPL-MCNC: 0.63 MG/DL (ref 0.3–1.2)
BUN BLDV-MCNC: 22 MG/DL (ref 8–23)
BUN/CREAT BLD: ABNORMAL (ref 9–20)
CALCIUM SERPL-MCNC: 9.6 MG/DL (ref 8.6–10.4)
CHLORIDE BLD-SCNC: 104 MMOL/L (ref 98–107)
CO2: 32 MMOL/L (ref 20–31)
CREAT SERPL-MCNC: 0.92 MG/DL (ref 0.5–0.9)
DIFFERENTIAL TYPE: ABNORMAL
EOSINOPHILS RELATIVE PERCENT: 2 % (ref 0–4)
GFR AFRICAN AMERICAN: >60 ML/MIN
GFR NON-AFRICAN AMERICAN: 58 ML/MIN
GFR SERPL CREATININE-BSD FRML MDRD: ABNORMAL ML/MIN/{1.73_M2}
GFR SERPL CREATININE-BSD FRML MDRD: ABNORMAL ML/MIN/{1.73_M2}
GLUCOSE BLD-MCNC: 99 MG/DL (ref 70–99)
HCT VFR BLD CALC: 40.7 % (ref 36–46)
HEMOGLOBIN: 13.7 G/DL (ref 12–16)
IMMATURE GRANULOCYTES: ABNORMAL %
LYMPHOCYTES # BLD: 34 % (ref 24–44)
MCH RBC QN AUTO: 29.9 PG (ref 26–34)
MCHC RBC AUTO-ENTMCNC: 33.7 G/DL (ref 31–37)
MCV RBC AUTO: 88.7 FL (ref 80–100)
MONOCYTES # BLD: 11 % (ref 1–7)
NRBC AUTOMATED: ABNORMAL PER 100 WBC
PDW BLD-RTO: 16.4 % (ref 11.5–14.9)
PLATELET # BLD: 224 K/UL (ref 150–450)
PLATELET ESTIMATE: ABNORMAL
PMV BLD AUTO: 8.1 FL (ref 6–12)
POTASSIUM SERPL-SCNC: 3.2 MMOL/L (ref 3.7–5.3)
RBC # BLD: 4.59 M/UL (ref 4–5.2)
RBC # BLD: ABNORMAL 10*6/UL
SEG NEUTROPHILS: 52 % (ref 36–66)
SEGMENTED NEUTROPHILS ABSOLUTE COUNT: 2 K/UL (ref 1.3–9.1)
SODIUM BLD-SCNC: 145 MMOL/L (ref 135–144)
TOTAL PROTEIN: 7.1 G/DL (ref 6.4–8.3)
WBC # BLD: 3.9 K/UL (ref 3.5–11)
WBC # BLD: ABNORMAL 10*3/UL

## 2019-03-18 PROCEDURE — 2580000003 HC RX 258: Performed by: INTERNAL MEDICINE

## 2019-03-18 PROCEDURE — 6360000004 HC RX CONTRAST MEDICATION: Performed by: INTERNAL MEDICINE

## 2019-03-18 PROCEDURE — 36415 COLL VENOUS BLD VENIPUNCTURE: CPT

## 2019-03-18 PROCEDURE — 85025 COMPLETE CBC W/AUTO DIFF WBC: CPT

## 2019-03-18 PROCEDURE — 80053 COMPREHEN METABOLIC PANEL: CPT

## 2019-03-18 PROCEDURE — 74177 CT ABD & PELVIS W/CONTRAST: CPT

## 2019-03-18 RX ORDER — 0.9 % SODIUM CHLORIDE 0.9 %
80 INTRAVENOUS SOLUTION INTRAVENOUS ONCE
Status: COMPLETED | OUTPATIENT
Start: 2019-03-18 | End: 2019-03-18

## 2019-03-18 RX ORDER — SODIUM CHLORIDE 0.9 % (FLUSH) 0.9 %
10 SYRINGE (ML) INJECTION PRN
Status: DISCONTINUED | OUTPATIENT
Start: 2019-03-18 | End: 2019-03-21 | Stop reason: HOSPADM

## 2019-03-18 RX ADMIN — SODIUM CHLORIDE 80 ML: 9 INJECTION, SOLUTION INTRAVENOUS at 11:04

## 2019-03-18 RX ADMIN — IOVERSOL 100 ML: 741 INJECTION INTRA-ARTERIAL; INTRAVENOUS at 11:04

## 2019-03-18 RX ADMIN — Medication 10 ML: at 11:04

## 2019-05-10 ENCOUNTER — HOSPITAL ENCOUNTER (OUTPATIENT)
Age: 84
Discharge: HOME | End: 2019-05-10
Payer: MEDICARE

## 2019-05-10 LAB
ABSOLUTE EOS #: 0 K/UL (ref 0–0.4)
ABSOLUTE IMMATURE GRANULOCYTE: ABNORMAL K/UL (ref 0–0.3)
ABSOLUTE LYMPH #: 0.9 K/UL (ref 1–4.8)
ABSOLUTE MONO #: 0.3 K/UL (ref 0.1–1.3)
BASOPHILS # BLD: 0 % (ref 0–2)
BASOPHILS ABSOLUTE: 0 K/UL (ref 0–0.2)
DIFFERENTIAL TYPE: ABNORMAL
EOSINOPHILS RELATIVE PERCENT: 0 % (ref 0–4)
HCT VFR BLD CALC: 42.1 % (ref 36–46)
HEMOGLOBIN: 14.7 G/DL (ref 12–16)
IMMATURE GRANULOCYTES: ABNORMAL %
IRON SATURATION: 46 % (ref 20–55)
IRON: 120 UG/DL (ref 37–145)
LYMPHOCYTES # BLD: 13 % (ref 24–44)
MCH RBC QN AUTO: 31.4 PG (ref 26–34)
MCHC RBC AUTO-ENTMCNC: 34.9 G/DL (ref 31–37)
MCV RBC AUTO: 90.1 FL (ref 80–100)
MONOCYTES # BLD: 4 % (ref 1–7)
NRBC AUTOMATED: ABNORMAL PER 100 WBC
PDW BLD-RTO: 15.8 % (ref 11.5–14.9)
PLATELET # BLD: 228 K/UL (ref 150–450)
PLATELET ESTIMATE: ABNORMAL
PMV BLD AUTO: 8 FL (ref 6–12)
RBC # BLD: 4.67 M/UL (ref 4–5.2)
RBC # BLD: ABNORMAL 10*6/UL
SEG NEUTROPHILS: 83 % (ref 36–66)
SEGMENTED NEUTROPHILS ABSOLUTE COUNT: 5.9 K/UL (ref 1.3–9.1)
TOTAL IRON BINDING CAPACITY: 259 UG/DL (ref 250–450)
UNSATURATED IRON BINDING CAPACITY: 139 UG/DL (ref 112–347)
WBC # BLD: 7.2 K/UL (ref 3.5–11)
WBC # BLD: ABNORMAL 10*3/UL

## 2019-05-10 PROCEDURE — 85025 COMPLETE CBC W/AUTO DIFF WBC: CPT

## 2019-05-10 PROCEDURE — 83550 IRON BINDING TEST: CPT

## 2019-05-10 PROCEDURE — 83036 HEMOGLOBIN GLYCOSYLATED A1C: CPT

## 2019-05-10 PROCEDURE — 83540 ASSAY OF IRON: CPT

## 2019-05-10 PROCEDURE — 36415 COLL VENOUS BLD VENIPUNCTURE: CPT

## 2019-05-12 LAB
ESTIMATED AVERAGE GLUCOSE: 100 MG/DL
HBA1C MFR BLD: 5.1 % (ref 4–6)

## 2019-05-22 ENCOUNTER — HOSPITAL ENCOUNTER (OUTPATIENT)
Age: 84
Discharge: HOME | End: 2019-05-22
Payer: MEDICARE

## 2019-05-22 DIAGNOSIS — I12.9 BENIGN HYPERTENSION WITH CKD (CHRONIC KIDNEY DISEASE) STAGE III (HCC): ICD-10-CM

## 2019-05-22 DIAGNOSIS — N18.30 BENIGN HYPERTENSION WITH CKD (CHRONIC KIDNEY DISEASE) STAGE III (HCC): ICD-10-CM

## 2019-05-22 DIAGNOSIS — N18.30 CKD (CHRONIC KIDNEY DISEASE) STAGE 3, GFR 30-59 ML/MIN (HCC): ICD-10-CM

## 2019-05-22 DIAGNOSIS — N20.0 NEPHROLITHIASIS: ICD-10-CM

## 2019-05-22 LAB
ABSOLUTE EOS #: 0.1 K/UL (ref 0–0.4)
ABSOLUTE IMMATURE GRANULOCYTE: ABNORMAL K/UL (ref 0–0.3)
ABSOLUTE LYMPH #: 1.4 K/UL (ref 1–4.8)
ABSOLUTE MONO #: 0.6 K/UL (ref 0.1–1.3)
ANION GAP SERPL CALCULATED.3IONS-SCNC: 13 MMOL/L (ref 9–17)
BASOPHILS # BLD: 1 % (ref 0–2)
BASOPHILS ABSOLUTE: 0 K/UL (ref 0–0.2)
BUN BLDV-MCNC: 27 MG/DL (ref 8–23)
CALCIUM SERPL-MCNC: 9.6 MG/DL (ref 8.6–10.4)
CHLORIDE BLD-SCNC: 102 MMOL/L (ref 98–107)
CHOLESTEROL/HDL RATIO: 3.1
CHOLESTEROL: 172 MG/DL
CO2: 27 MMOL/L (ref 20–31)
CREAT SERPL-MCNC: 0.88 MG/DL (ref 0.5–0.9)
DIFFERENTIAL TYPE: ABNORMAL
EOSINOPHILS RELATIVE PERCENT: 2 % (ref 0–4)
GFR AFRICAN AMERICAN: >60 ML/MIN
GFR NON-AFRICAN AMERICAN: >60 ML/MIN
GFR SERPL CREATININE-BSD FRML MDRD: ABNORMAL ML/MIN/{1.73_M2}
GFR SERPL CREATININE-BSD FRML MDRD: ABNORMAL ML/MIN/{1.73_M2}
HCT VFR BLD CALC: 42.6 % (ref 36–46)
HDLC SERPL-MCNC: 56 MG/DL
HEMOGLOBIN: 14.7 G/DL (ref 12–16)
IMMATURE GRANULOCYTES: ABNORMAL %
LDL CHOLESTEROL: 99 MG/DL (ref 0–130)
LYMPHOCYTES # BLD: 26 % (ref 24–44)
MAGNESIUM: 2 MG/DL (ref 1.6–2.6)
MCH RBC QN AUTO: 31.4 PG (ref 26–34)
MCHC RBC AUTO-ENTMCNC: 34.5 G/DL (ref 31–37)
MCV RBC AUTO: 91.2 FL (ref 80–100)
MONOCYTES # BLD: 11 % (ref 1–7)
NRBC AUTOMATED: ABNORMAL PER 100 WBC
PDW BLD-RTO: 15.8 % (ref 11.5–14.9)
PHOSPHORUS: 4 MG/DL (ref 2.6–4.5)
PLATELET # BLD: 220 K/UL (ref 150–450)
PLATELET ESTIMATE: ABNORMAL
PMV BLD AUTO: 8.6 FL (ref 6–12)
POTASSIUM SERPL-SCNC: 3.5 MMOL/L (ref 3.7–5.3)
RBC # BLD: 4.67 M/UL (ref 4–5.2)
RBC # BLD: ABNORMAL 10*6/UL
SEG NEUTROPHILS: 60 % (ref 36–66)
SEGMENTED NEUTROPHILS ABSOLUTE COUNT: 3.3 K/UL (ref 1.3–9.1)
SODIUM BLD-SCNC: 142 MMOL/L (ref 135–144)
TRIGL SERPL-MCNC: 87 MG/DL
VLDLC SERPL CALC-MCNC: NORMAL MG/DL (ref 1–30)
WBC # BLD: 5.4 K/UL (ref 3.5–11)
WBC # BLD: ABNORMAL 10*3/UL

## 2019-05-22 PROCEDURE — 80051 ELECTROLYTE PANEL: CPT

## 2019-05-22 PROCEDURE — 80061 LIPID PANEL: CPT

## 2019-05-22 PROCEDURE — 36415 COLL VENOUS BLD VENIPUNCTURE: CPT

## 2019-05-22 PROCEDURE — 84520 ASSAY OF UREA NITROGEN: CPT

## 2019-05-22 PROCEDURE — 82310 ASSAY OF CALCIUM: CPT

## 2019-05-22 PROCEDURE — 84100 ASSAY OF PHOSPHORUS: CPT

## 2019-05-22 PROCEDURE — 82565 ASSAY OF CREATININE: CPT

## 2019-05-22 PROCEDURE — 83735 ASSAY OF MAGNESIUM: CPT

## 2019-05-22 PROCEDURE — 85025 COMPLETE CBC W/AUTO DIFF WBC: CPT

## 2019-05-29 ENCOUNTER — HOSPITAL ENCOUNTER (OUTPATIENT)
Age: 84
Discharge: HOME | End: 2019-05-29
Payer: MEDICARE

## 2019-05-29 LAB — POTASSIUM SERPL-SCNC: 3.6 MMOL/L (ref 3.7–5.3)

## 2019-05-29 PROCEDURE — 36415 COLL VENOUS BLD VENIPUNCTURE: CPT

## 2019-05-29 PROCEDURE — 84132 ASSAY OF SERUM POTASSIUM: CPT

## 2019-06-19 ENCOUNTER — HOSPITAL ENCOUNTER (OUTPATIENT)
Age: 84
Discharge: HOME | End: 2019-06-19
Payer: MEDICARE

## 2019-06-19 DIAGNOSIS — N18.30 CKD (CHRONIC KIDNEY DISEASE) STAGE 3, GFR 30-59 ML/MIN (HCC): ICD-10-CM

## 2019-06-19 DIAGNOSIS — E87.6 HYPOKALEMIA: ICD-10-CM

## 2019-06-19 LAB — POTASSIUM SERPL-SCNC: 3.8 MMOL/L (ref 3.7–5.3)

## 2019-06-19 PROCEDURE — 36415 COLL VENOUS BLD VENIPUNCTURE: CPT

## 2019-06-19 PROCEDURE — 84132 ASSAY OF SERUM POTASSIUM: CPT

## 2020-01-01 ENCOUNTER — TELEPHONE (OUTPATIENT)
Dept: PAIN MANAGEMENT | Age: 85
End: 2020-01-01

## 2020-01-01 ENCOUNTER — HOSPITAL ENCOUNTER (OUTPATIENT)
Dept: PAIN MANAGEMENT | Age: 85
Discharge: HOME | End: 2020-09-08
Payer: MEDICARE

## 2020-01-01 ENCOUNTER — TELEPHONE (OUTPATIENT)
Dept: PRIMARY CARE CLINIC | Age: 85
End: 2020-01-01

## 2020-01-01 ENCOUNTER — HOSPITAL ENCOUNTER (OUTPATIENT)
Dept: PAIN MANAGEMENT | Age: 85
Discharge: HOME | End: 2020-06-24
Payer: MEDICARE

## 2020-01-01 ENCOUNTER — HOSPITAL ENCOUNTER (OUTPATIENT)
Dept: PAIN MANAGEMENT | Age: 85
Discharge: HOME | End: 2020-12-07
Payer: MEDICARE

## 2020-01-01 ENCOUNTER — HOSPITAL ENCOUNTER (OUTPATIENT)
Age: 85
Discharge: HOME | End: 2020-10-31
Payer: MEDICARE

## 2020-01-01 ENCOUNTER — HOSPITAL ENCOUNTER (OUTPATIENT)
Dept: GENERAL RADIOLOGY | Age: 85
Discharge: HOME | End: 2020-07-23
Payer: MEDICARE

## 2020-01-01 ENCOUNTER — HOSPITAL ENCOUNTER (OUTPATIENT)
Dept: PAIN MANAGEMENT | Age: 85
Discharge: HOME | End: 2020-08-04
Payer: MEDICARE

## 2020-01-01 ENCOUNTER — HOSPITAL ENCOUNTER (OUTPATIENT)
Dept: PAIN MANAGEMENT | Age: 85
Discharge: HOME | End: 2020-07-21
Payer: MEDICARE

## 2020-01-01 ENCOUNTER — HOSPITAL ENCOUNTER (OUTPATIENT)
Dept: PREADMISSION TESTING | Age: 85
Discharge: HOME | End: 2020-07-21
Payer: MEDICARE

## 2020-01-01 ENCOUNTER — HOSPITAL ENCOUNTER (OUTPATIENT)
Dept: PAIN MANAGEMENT | Age: 85
Discharge: HOME | End: 2020-11-19
Payer: MEDICARE

## 2020-01-01 ENCOUNTER — HOSPITAL ENCOUNTER (OUTPATIENT)
Dept: CT IMAGING | Age: 85
Discharge: HOME | End: 2020-10-25
Payer: MEDICARE

## 2020-01-01 ENCOUNTER — HOSPITAL ENCOUNTER (OUTPATIENT)
Dept: GENERAL RADIOLOGY | Age: 85
Discharge: HOME | End: 2020-11-21
Payer: MEDICARE

## 2020-01-01 ENCOUNTER — HOSPITAL ENCOUNTER (OUTPATIENT)
Dept: PAIN MANAGEMENT | Age: 85
Discharge: HOME | End: 2020-08-24
Payer: MEDICARE

## 2020-01-01 ENCOUNTER — HOSPITAL ENCOUNTER (OUTPATIENT)
Dept: PAIN MANAGEMENT | Age: 85
Discharge: HOME | End: 2020-11-10
Payer: MEDICARE

## 2020-01-01 ENCOUNTER — HOSPITAL ENCOUNTER (OUTPATIENT)
Dept: GENERAL RADIOLOGY | Age: 85
Discharge: HOME | End: 2020-08-26
Payer: MEDICARE

## 2020-01-01 VITALS
SYSTOLIC BLOOD PRESSURE: 136 MMHG | DIASTOLIC BLOOD PRESSURE: 75 MMHG | WEIGHT: 160 LBS | OXYGEN SATURATION: 98 % | HEART RATE: 75 BPM | BODY MASS INDEX: 30.21 KG/M2 | RESPIRATION RATE: 18 BRPM | TEMPERATURE: 99.4 F | HEIGHT: 61 IN

## 2020-01-01 VITALS
BODY MASS INDEX: 30.21 KG/M2 | DIASTOLIC BLOOD PRESSURE: 75 MMHG | RESPIRATION RATE: 14 BRPM | HEART RATE: 68 BPM | WEIGHT: 160 LBS | OXYGEN SATURATION: 94 % | SYSTOLIC BLOOD PRESSURE: 136 MMHG | HEIGHT: 61 IN | TEMPERATURE: 98.4 F

## 2020-01-01 VITALS
HEART RATE: 61 BPM | RESPIRATION RATE: 16 BRPM | DIASTOLIC BLOOD PRESSURE: 70 MMHG | SYSTOLIC BLOOD PRESSURE: 134 MMHG | OXYGEN SATURATION: 96 %

## 2020-01-01 LAB
ABSOLUTE EOS #: 0.1 K/UL (ref 0–0.4)
ABSOLUTE IMMATURE GRANULOCYTE: ABNORMAL K/UL (ref 0–0.3)
ABSOLUTE LYMPH #: 1.2 K/UL (ref 1–4.8)
ABSOLUTE MONO #: 0.4 K/UL (ref 0.1–1.3)
ALBUMIN SERPL-MCNC: 4.1 G/DL (ref 3.5–5.2)
ALBUMIN/GLOBULIN RATIO: NORMAL (ref 1–2.5)
ALP BLD-CCNC: 102 U/L (ref 35–104)
ALT SERPL-CCNC: 19 U/L (ref 5–33)
ANION GAP SERPL CALCULATED.3IONS-SCNC: 10 MMOL/L (ref 9–17)
AST SERPL-CCNC: 27 U/L
BASOPHILS # BLD: 1 % (ref 0–2)
BASOPHILS ABSOLUTE: 0 K/UL (ref 0–0.2)
BILIRUB SERPL-MCNC: 0.85 MG/DL (ref 0.3–1.2)
BNP INTERPRETATION: NORMAL
BUN BLDV-MCNC: 22 MG/DL (ref 8–23)
BUN/CREAT BLD: NORMAL (ref 9–20)
C-REACTIVE PROTEIN: <0.3 MG/L (ref 0–5)
CALCIUM SERPL-MCNC: 9.8 MG/DL (ref 8.6–10.4)
CHLORIDE BLD-SCNC: 105 MMOL/L (ref 98–107)
CO2: 26 MMOL/L (ref 20–31)
CREAT SERPL-MCNC: 0.8 MG/DL (ref 0.5–0.9)
DIFFERENTIAL TYPE: ABNORMAL
EOSINOPHILS RELATIVE PERCENT: 3 % (ref 0–4)
GFR AFRICAN AMERICAN: >60 ML/MIN
GFR NON-AFRICAN AMERICAN: >60 ML/MIN
GFR SERPL CREATININE-BSD FRML MDRD: NORMAL ML/MIN/{1.73_M2}
GFR SERPL CREATININE-BSD FRML MDRD: NORMAL ML/MIN/{1.73_M2}
GLUCOSE BLD-MCNC: 98 MG/DL (ref 70–99)
HCT VFR BLD CALC: 42.9 % (ref 36–46)
HEMOGLOBIN: 14.9 G/DL (ref 12–16)
IMMATURE GRANULOCYTES: ABNORMAL %
IRON SATURATION: 37 % (ref 20–55)
IRON: 88 UG/DL (ref 37–145)
LYMPHOCYTES # BLD: 30 % (ref 24–44)
MCH RBC QN AUTO: 33.1 PG (ref 26–34)
MCHC RBC AUTO-ENTMCNC: 34.8 G/DL (ref 31–37)
MCV RBC AUTO: 95 FL (ref 80–100)
MONOCYTES # BLD: 10 % (ref 1–7)
NRBC AUTOMATED: ABNORMAL PER 100 WBC
PDW BLD-RTO: 14.4 % (ref 11.5–14.9)
PLATELET # BLD: 221 K/UL (ref 150–450)
PLATELET ESTIMATE: ABNORMAL
PMV BLD AUTO: 8.6 FL (ref 6–12)
POTASSIUM SERPL-SCNC: 4.4 MMOL/L (ref 3.7–5.3)
PRO-BNP: 80 PG/ML
RBC # BLD: 4.52 M/UL (ref 4–5.2)
RBC # BLD: ABNORMAL 10*6/UL
SARS-COV-2, PCR: NOT DETECTED
SARS-COV-2, RAPID: NORMAL
SARS-COV-2: NORMAL
SEDIMENTATION RATE, ERYTHROCYTE: 13 MM (ref 0–30)
SEG NEUTROPHILS: 56 % (ref 36–66)
SEGMENTED NEUTROPHILS ABSOLUTE COUNT: 2.2 K/UL (ref 1.3–9.1)
SODIUM BLD-SCNC: 141 MMOL/L (ref 135–144)
SOURCE: NORMAL
T3 FREE: 2.89 PG/ML (ref 2.02–4.43)
THYROXINE, FREE: 1.12 NG/DL (ref 0.93–1.7)
TOTAL IRON BINDING CAPACITY: 240 UG/DL (ref 250–450)
TOTAL PROTEIN: 7.1 G/DL (ref 6.4–8.3)
TSH SERPL DL<=0.05 MIU/L-ACNC: 1.47 MIU/L (ref 0.3–5)
UNSATURATED IRON BINDING CAPACITY: 152 UG/DL (ref 112–347)
VITAMIN B-12: 846 PG/ML (ref 232–1245)
WBC # BLD: 3.9 K/UL (ref 3.5–11)
WBC # BLD: ABNORMAL 10*3/UL

## 2020-01-01 PROCEDURE — 64491 INJ PARAVERT F JNT C/T 2 LEV: CPT | Performed by: PAIN MEDICINE

## 2020-01-01 PROCEDURE — 84439 ASSAY OF FREE THYROXINE: CPT

## 2020-01-01 PROCEDURE — 64491 INJ PARAVERT F JNT C/T 2 LEV: CPT

## 2020-01-01 PROCEDURE — 99213 OFFICE O/P EST LOW 20 MIN: CPT

## 2020-01-01 PROCEDURE — 99441 PR PHYS/QHP TELEPHONE EVALUATION 5-10 MIN: CPT | Performed by: PAIN MEDICINE

## 2020-01-01 PROCEDURE — 71250 CT THORAX DX C-: CPT

## 2020-01-01 PROCEDURE — 85652 RBC SED RATE AUTOMATED: CPT

## 2020-01-01 PROCEDURE — 6360000002 HC RX W HCPCS: Performed by: PAIN MEDICINE

## 2020-01-01 PROCEDURE — 64492 INJ PARAVERT F JNT C/T 3 LEV: CPT | Performed by: PAIN MEDICINE

## 2020-01-01 PROCEDURE — 83550 IRON BINDING TEST: CPT

## 2020-01-01 PROCEDURE — 6360000004 HC RX CONTRAST MEDICATION: Performed by: PAIN MEDICINE

## 2020-01-01 PROCEDURE — 64490 INJ PARAVERT F JNT C/T 1 LEV: CPT

## 2020-01-01 PROCEDURE — 99441 PR PHYS/QHP TELEPHONE EVALUATION 5-10 MIN: CPT | Performed by: NURSE PRACTITIONER

## 2020-01-01 PROCEDURE — 3209999900 FLUORO FOR SURGICAL PROCEDURES

## 2020-01-01 PROCEDURE — 99443 PR PHYS/QHP TELEPHONE EVALUATION 21-30 MIN: CPT | Performed by: PAIN MEDICINE

## 2020-01-01 PROCEDURE — 83880 ASSAY OF NATRIURETIC PEPTIDE: CPT

## 2020-01-01 PROCEDURE — 36415 COLL VENOUS BLD VENIPUNCTURE: CPT

## 2020-01-01 PROCEDURE — U0004 COV-19 TEST NON-CDC HGH THRU: HCPCS

## 2020-01-01 PROCEDURE — 80053 COMPREHEN METABOLIC PANEL: CPT

## 2020-01-01 PROCEDURE — 82607 VITAMIN B-12: CPT

## 2020-01-01 PROCEDURE — 85025 COMPLETE CBC W/AUTO DIFF WBC: CPT

## 2020-01-01 PROCEDURE — 64490 INJ PARAVERT F JNT C/T 1 LEV: CPT | Performed by: PAIN MEDICINE

## 2020-01-01 PROCEDURE — 64492 INJ PARAVERT F JNT C/T 3 LEV: CPT

## 2020-01-01 PROCEDURE — 99203 OFFICE O/P NEW LOW 30 MIN: CPT

## 2020-01-01 PROCEDURE — 84443 ASSAY THYROID STIM HORMONE: CPT

## 2020-01-01 PROCEDURE — 83540 ASSAY OF IRON: CPT

## 2020-01-01 PROCEDURE — 2500000003 HC RX 250 WO HCPCS: Performed by: PAIN MEDICINE

## 2020-01-01 PROCEDURE — 86140 C-REACTIVE PROTEIN: CPT

## 2020-01-01 PROCEDURE — 84481 FREE ASSAY (FT-3): CPT

## 2020-01-01 RX ORDER — AMOXICILLIN 500 MG/1
2000 CAPSULE ORAL PRN
COMMUNITY
Start: 2020-01-01

## 2020-01-01 RX ORDER — ALBUTEROL SULFATE 2.5 MG/3ML
2.5 SOLUTION RESPIRATORY (INHALATION) PRN
Status: ON HOLD | COMMUNITY
Start: 2020-01-01 | End: 2021-01-01

## 2020-01-01 RX ORDER — POLYETHYLENE GLYCOL 3350 17 G/17G
17 POWDER, FOR SOLUTION ORAL DAILY PRN
COMMUNITY
Start: 2020-01-01

## 2020-01-01 RX ORDER — OXYBUTYNIN CHLORIDE 15 MG/1
15 TABLET, EXTENDED RELEASE ORAL DAILY
COMMUNITY
Start: 2020-01-01

## 2020-01-01 RX ORDER — MIDAZOLAM HYDROCHLORIDE 1 MG/ML
INJECTION INTRAMUSCULAR; INTRAVENOUS
Status: COMPLETED | OUTPATIENT
Start: 2020-01-01 | End: 2020-01-01

## 2020-01-01 RX ORDER — BUPIVACAINE HYDROCHLORIDE 5 MG/ML
INJECTION, SOLUTION EPIDURAL; INTRACAUDAL
Status: COMPLETED | OUTPATIENT
Start: 2020-01-01 | End: 2020-01-01

## 2020-01-01 RX ORDER — LOSARTAN POTASSIUM 100 MG/1
100 TABLET ORAL DAILY
Status: ON HOLD | COMMUNITY
Start: 2020-01-01 | End: 2021-01-01 | Stop reason: HOSPADM

## 2020-01-01 RX ORDER — CELECOXIB 200 MG/1
200 CAPSULE ORAL DAILY
Qty: 60 CAPSULE | Refills: 3 | Status: SHIPPED | OUTPATIENT
Start: 2020-01-01 | End: 2021-01-01 | Stop reason: SDUPTHER

## 2020-01-01 RX ORDER — TRIAMCINOLONE ACETONIDE 40 MG/ML
INJECTION, SUSPENSION INTRA-ARTICULAR; INTRAMUSCULAR
Status: COMPLETED | OUTPATIENT
Start: 2020-01-01 | End: 2020-01-01

## 2020-01-01 RX ORDER — ALBUTEROL SULFATE 90 UG/1
AEROSOL, METERED RESPIRATORY (INHALATION)
Status: ON HOLD | COMMUNITY
Start: 2020-01-01 | End: 2021-01-01

## 2020-01-01 RX ADMIN — TRIAMCINOLONE ACETONIDE 80 MG: 40 INJECTION, SUSPENSION INTRA-ARTICULAR; INTRAMUSCULAR at 11:27

## 2020-01-01 RX ADMIN — IOHEXOL 3 ML: 180 INJECTION INTRAVENOUS at 10:39

## 2020-01-01 RX ADMIN — MIDAZOLAM 1 MG: 1 INJECTION INTRAMUSCULAR; INTRAVENOUS at 11:12

## 2020-01-01 RX ADMIN — MIDAZOLAM 1 MG: 1 INJECTION INTRAMUSCULAR; INTRAVENOUS at 10:25

## 2020-01-01 RX ADMIN — IOHEXOL 3 ML: 180 INJECTION INTRAVENOUS at 10:54

## 2020-01-01 RX ADMIN — TRIAMCINOLONE ACETONIDE 80 MG: 40 INJECTION, SUSPENSION INTRA-ARTICULAR; INTRAMUSCULAR at 10:54

## 2020-01-01 RX ADMIN — TRIAMCINOLONE ACETONIDE 80 MG: 40 INJECTION, SUSPENSION INTRA-ARTICULAR; INTRAMUSCULAR at 10:40

## 2020-01-01 RX ADMIN — BUPIVACAINE HYDROCHLORIDE 12 ML: 5 INJECTION, SOLUTION EPIDURAL; INTRACAUDAL; PERINEURAL at 11:26

## 2020-01-01 RX ADMIN — BUPIVACAINE HYDROCHLORIDE 12 ML: 5 INJECTION, SOLUTION EPIDURAL; INTRACAUDAL; PERINEURAL at 10:39

## 2020-01-01 RX ADMIN — IOHEXOL 3 ML: 180 INJECTION INTRAVENOUS at 11:27

## 2020-01-01 RX ADMIN — BUPIVACAINE HYDROCHLORIDE 12 ML: 5 INJECTION, SOLUTION EPIDURAL; INTRACAUDAL; PERINEURAL at 10:54

## 2020-01-01 RX ADMIN — MIDAZOLAM 1 MG: 1 INJECTION INTRAMUSCULAR; INTRAVENOUS at 10:37

## 2020-01-01 ASSESSMENT — PAIN DESCRIPTION - LOCATION
LOCATION: NECK
LOCATION: ARM;NECK;SHOULDER

## 2020-01-01 ASSESSMENT — ENCOUNTER SYMPTOMS
RECTAL PAIN: 0
TROUBLE SWALLOWING: 0
SHORTNESS OF BREATH: 1
RHINORRHEA: 0
VOICE CHANGE: 0
ALLERGIC/IMMUNOLOGIC NEGATIVE: 1
CONSTIPATION: 0
SHORTNESS OF BREATH: 1
COLOR CHANGE: 0
APNEA: 0
APNEA: 0
ABDOMINAL DISTENTION: 0
DIARRHEA: 0
SINUS PAIN: 1
SHORTNESS OF BREATH: 1
COUGH: 1
GASTROINTESTINAL NEGATIVE: 1
CONSTIPATION: 0
WHEEZING: 0
CHOKING: 0
EYE REDNESS: 0
PHOTOPHOBIA: 0
EYES NEGATIVE: 1
EYE PAIN: 0
VOMITING: 0
EYE REDNESS: 0
EYES NEGATIVE: 1
PHOTOPHOBIA: 0
EYES NEGATIVE: 1
EYE DISCHARGE: 0
ABDOMINAL PAIN: 0
NAUSEA: 0
RHINORRHEA: 0
SHORTNESS OF BREATH: 1
STRIDOR: 0
COUGH: 0
SINUS PRESSURE: 0
BACK PAIN: 0
CHEST TIGHTNESS: 0
SORE THROAT: 0
FACIAL SWELLING: 0
BLOOD IN STOOL: 0
GASTROINTESTINAL NEGATIVE: 1
BACK PAIN: 0
CONSTIPATION: 1
CHEST TIGHTNESS: 0
ABDOMINAL PAIN: 0
STRIDOR: 0
SINUS PAIN: 0
COLOR CHANGE: 0
SINUS PRESSURE: 1
CHOKING: 0
FACIAL SWELLING: 0
VOMITING: 0
COUGH: 0
WHEEZING: 0
EYE ITCHING: 0
SORE THROAT: 0
NAUSEA: 0
ALLERGIC/IMMUNOLOGIC NEGATIVE: 1
HEARTBURN: 1
ANAL BLEEDING: 0
EYES NEGATIVE: 1

## 2020-01-01 ASSESSMENT — PAIN DESCRIPTION - DIRECTION: RADIATING_TOWARDS: BILAT UPPER ARMS

## 2020-01-01 ASSESSMENT — PAIN - FUNCTIONAL ASSESSMENT
PAIN_FUNCTIONAL_ASSESSMENT: 0-10
PAIN_FUNCTIONAL_ASSESSMENT: PREVENTS OR INTERFERES SOME ACTIVE ACTIVITIES AND ADLS
PAIN_FUNCTIONAL_ASSESSMENT: 0-10
PAIN_FUNCTIONAL_ASSESSMENT: PREVENTS OR INTERFERES SOME ACTIVE ACTIVITIES AND ADLS

## 2020-01-01 ASSESSMENT — PAIN DESCRIPTION - DESCRIPTORS
DESCRIPTORS: ACHING;CONSTANT
DESCRIPTORS: ACHING;CONSTANT

## 2020-01-01 ASSESSMENT — PAIN DESCRIPTION - ONSET
ONSET: ON-GOING
ONSET: ON-GOING

## 2020-01-01 ASSESSMENT — PAIN DESCRIPTION - PAIN TYPE
TYPE: CHRONIC PAIN
TYPE: CHRONIC PAIN

## 2020-01-01 ASSESSMENT — PAIN DESCRIPTION - PROGRESSION
CLINICAL_PROGRESSION: GRADUALLY WORSENING
CLINICAL_PROGRESSION: GRADUALLY WORSENING

## 2020-01-01 ASSESSMENT — PAIN DESCRIPTION - ORIENTATION
ORIENTATION: RIGHT
ORIENTATION: RIGHT;LEFT

## 2020-01-01 ASSESSMENT — PAIN SCALES - GENERAL
PAINLEVEL_OUTOF10: 3
PAINLEVEL_OUTOF10: 2

## 2020-01-01 ASSESSMENT — PAIN DESCRIPTION - FREQUENCY
FREQUENCY: CONTINUOUS
FREQUENCY: CONTINUOUS

## 2020-02-12 ENCOUNTER — HOSPITAL ENCOUNTER (OUTPATIENT)
Age: 85
Discharge: HOME | End: 2020-02-12
Payer: MEDICARE

## 2020-02-12 ENCOUNTER — HOSPITAL ENCOUNTER (OUTPATIENT)
Age: 85
Discharge: HOME | End: 2020-02-14
Payer: MEDICARE

## 2020-02-12 ENCOUNTER — HOSPITAL ENCOUNTER (OUTPATIENT)
Dept: GENERAL RADIOLOGY | Age: 85
Discharge: HOME | End: 2020-02-14
Payer: MEDICARE

## 2020-02-12 LAB
ABSOLUTE EOS #: 0.1 K/UL (ref 0–0.4)
ABSOLUTE IMMATURE GRANULOCYTE: ABNORMAL K/UL (ref 0–0.3)
ABSOLUTE LYMPH #: 1.5 K/UL (ref 1–4.8)
ABSOLUTE MONO #: 0.5 K/UL (ref 0.1–1.3)
ALBUMIN SERPL-MCNC: 4.5 G/DL (ref 3.5–5.2)
ALBUMIN/GLOBULIN RATIO: ABNORMAL (ref 1–2.5)
ALP BLD-CCNC: 84 U/L (ref 35–104)
ALT SERPL-CCNC: 17 U/L (ref 5–33)
ANION GAP SERPL CALCULATED.3IONS-SCNC: 14 MMOL/L (ref 9–17)
AST SERPL-CCNC: 23 U/L
BASOPHILS # BLD: 1 % (ref 0–2)
BASOPHILS ABSOLUTE: 0 K/UL (ref 0–0.2)
BILIRUB SERPL-MCNC: 0.79 MG/DL (ref 0.3–1.2)
BUN BLDV-MCNC: 25 MG/DL (ref 8–23)
BUN/CREAT BLD: ABNORMAL (ref 9–20)
C-REACTIVE PROTEIN: <0.3 MG/L (ref 0–5)
CALCIUM SERPL-MCNC: 9.6 MG/DL (ref 8.6–10.4)
CHLORIDE BLD-SCNC: 105 MMOL/L (ref 98–107)
CO2: 23 MMOL/L (ref 20–31)
CREAT SERPL-MCNC: 0.89 MG/DL (ref 0.5–0.9)
DIFFERENTIAL TYPE: ABNORMAL
EOSINOPHILS RELATIVE PERCENT: 1 % (ref 0–4)
GFR AFRICAN AMERICAN: >60 ML/MIN
GFR NON-AFRICAN AMERICAN: >60 ML/MIN
GFR SERPL CREATININE-BSD FRML MDRD: ABNORMAL ML/MIN/{1.73_M2}
GFR SERPL CREATININE-BSD FRML MDRD: ABNORMAL ML/MIN/{1.73_M2}
GLUCOSE BLD-MCNC: 128 MG/DL (ref 70–99)
HCT VFR BLD CALC: 43.6 % (ref 36–46)
HEMOGLOBIN: 14.8 G/DL (ref 12–16)
IMMATURE GRANULOCYTES: ABNORMAL %
LYMPHOCYTES # BLD: 29 % (ref 24–44)
MCH RBC QN AUTO: 32.3 PG (ref 26–34)
MCHC RBC AUTO-ENTMCNC: 34 G/DL (ref 31–37)
MCV RBC AUTO: 95.2 FL (ref 80–100)
MONOCYTES # BLD: 10 % (ref 1–7)
NRBC AUTOMATED: ABNORMAL PER 100 WBC
PDW BLD-RTO: 14.5 % (ref 11.5–14.9)
PLATELET # BLD: 206 K/UL (ref 150–450)
PLATELET ESTIMATE: ABNORMAL
PMV BLD AUTO: 8.5 FL (ref 6–12)
POTASSIUM SERPL-SCNC: 3.7 MMOL/L (ref 3.7–5.3)
RBC # BLD: 4.58 M/UL (ref 4–5.2)
RBC # BLD: ABNORMAL 10*6/UL
SEDIMENTATION RATE, ERYTHROCYTE: 9 MM (ref 0–20)
SEG NEUTROPHILS: 59 % (ref 36–66)
SEGMENTED NEUTROPHILS ABSOLUTE COUNT: 3 K/UL (ref 1.3–9.1)
SODIUM BLD-SCNC: 142 MMOL/L (ref 135–144)
TOTAL PROTEIN: 7.6 G/DL (ref 6.4–8.3)
WBC # BLD: 5.2 K/UL (ref 3.5–11)
WBC # BLD: ABNORMAL 10*3/UL

## 2020-02-12 PROCEDURE — 36415 COLL VENOUS BLD VENIPUNCTURE: CPT

## 2020-02-12 PROCEDURE — 85025 COMPLETE CBC W/AUTO DIFF WBC: CPT

## 2020-02-12 PROCEDURE — 85651 RBC SED RATE NONAUTOMATED: CPT

## 2020-02-12 PROCEDURE — 80053 COMPREHEN METABOLIC PANEL: CPT

## 2020-02-12 PROCEDURE — 71046 X-RAY EXAM CHEST 2 VIEWS: CPT

## 2020-02-12 PROCEDURE — 86140 C-REACTIVE PROTEIN: CPT

## 2020-02-24 ENCOUNTER — HOSPITAL ENCOUNTER (OUTPATIENT)
Dept: WOMENS IMAGING | Age: 85
Discharge: HOME | End: 2020-02-26
Payer: MEDICARE

## 2020-02-24 PROCEDURE — 77080 DXA BONE DENSITY AXIAL: CPT

## 2020-04-24 ENCOUNTER — HOSPITAL ENCOUNTER (OUTPATIENT)
Dept: CT IMAGING | Age: 85
Discharge: HOME | End: 2020-04-26
Payer: MEDICARE

## 2020-04-24 ENCOUNTER — HOSPITAL ENCOUNTER (OUTPATIENT)
Age: 85
Discharge: HOME | End: 2020-04-24
Payer: MEDICARE

## 2020-04-24 LAB
ABSOLUTE EOS #: 0 K/UL (ref 0–0.4)
ABSOLUTE IMMATURE GRANULOCYTE: ABNORMAL K/UL (ref 0–0.3)
ABSOLUTE LYMPH #: 1.5 K/UL (ref 1–4.8)
ABSOLUTE MONO #: 0.5 K/UL (ref 0.1–1.3)
ALBUMIN SERPL-MCNC: 4.1 G/DL (ref 3.5–5.2)
ALBUMIN/GLOBULIN RATIO: ABNORMAL (ref 1–2.5)
ALP BLD-CCNC: 74 U/L (ref 35–104)
ALT SERPL-CCNC: 23 U/L (ref 5–33)
ANION GAP SERPL CALCULATED.3IONS-SCNC: 13 MMOL/L (ref 9–17)
AST SERPL-CCNC: 23 U/L
BASOPHILS # BLD: 1 % (ref 0–2)
BASOPHILS ABSOLUTE: 0 K/UL (ref 0–0.2)
BILIRUB SERPL-MCNC: 0.89 MG/DL (ref 0.3–1.2)
BUN BLDV-MCNC: 29 MG/DL (ref 8–23)
BUN/CREAT BLD: ABNORMAL (ref 9–20)
CALCIUM SERPL-MCNC: 9.7 MG/DL (ref 8.6–10.4)
CHLORIDE BLD-SCNC: 106 MMOL/L (ref 98–107)
CO2: 25 MMOL/L (ref 20–31)
CREAT SERPL-MCNC: 0.92 MG/DL (ref 0.5–0.9)
DIFFERENTIAL TYPE: ABNORMAL
EOSINOPHILS RELATIVE PERCENT: 1 % (ref 0–4)
GFR AFRICAN AMERICAN: >60 ML/MIN
GFR NON-AFRICAN AMERICAN: 58 ML/MIN
GFR SERPL CREATININE-BSD FRML MDRD: ABNORMAL ML/MIN/{1.73_M2}
GFR SERPL CREATININE-BSD FRML MDRD: ABNORMAL ML/MIN/{1.73_M2}
GLUCOSE BLD-MCNC: 90 MG/DL (ref 70–99)
HCT VFR BLD CALC: 43.9 % (ref 36–46)
HEMOGLOBIN: 15.3 G/DL (ref 12–16)
IMMATURE GRANULOCYTES: ABNORMAL %
LYMPHOCYTES # BLD: 34 % (ref 24–44)
MCH RBC QN AUTO: 33.3 PG (ref 26–34)
MCHC RBC AUTO-ENTMCNC: 34.9 G/DL (ref 31–37)
MCV RBC AUTO: 95.6 FL (ref 80–100)
MONOCYTES # BLD: 12 % (ref 1–7)
NRBC AUTOMATED: ABNORMAL PER 100 WBC
PDW BLD-RTO: 14.7 % (ref 11.5–14.9)
PLATELET # BLD: 201 K/UL (ref 150–450)
PLATELET ESTIMATE: ABNORMAL
PMV BLD AUTO: 8.1 FL (ref 6–12)
POTASSIUM SERPL-SCNC: 4.3 MMOL/L (ref 3.7–5.3)
RBC # BLD: 4.59 M/UL (ref 4–5.2)
RBC # BLD: ABNORMAL 10*6/UL
SEG NEUTROPHILS: 52 % (ref 36–66)
SEGMENTED NEUTROPHILS ABSOLUTE COUNT: 2.4 K/UL (ref 1.3–9.1)
SODIUM BLD-SCNC: 144 MMOL/L (ref 135–144)
TOTAL PROTEIN: 7.4 G/DL (ref 6.4–8.3)
WBC # BLD: 4.6 K/UL (ref 3.5–11)
WBC # BLD: ABNORMAL 10*3/UL

## 2020-04-24 PROCEDURE — 6360000004 HC RX CONTRAST MEDICATION: Performed by: INTERNAL MEDICINE

## 2020-04-24 PROCEDURE — 85025 COMPLETE CBC W/AUTO DIFF WBC: CPT

## 2020-04-24 PROCEDURE — 74177 CT ABD & PELVIS W/CONTRAST: CPT

## 2020-04-24 PROCEDURE — 2580000003 HC RX 258: Performed by: INTERNAL MEDICINE

## 2020-04-24 PROCEDURE — 80053 COMPREHEN METABOLIC PANEL: CPT

## 2020-04-24 PROCEDURE — 36415 COLL VENOUS BLD VENIPUNCTURE: CPT

## 2020-04-24 RX ORDER — 0.9 % SODIUM CHLORIDE 0.9 %
80 INTRAVENOUS SOLUTION INTRAVENOUS ONCE
Status: COMPLETED | OUTPATIENT
Start: 2020-04-24 | End: 2020-04-24

## 2020-04-24 RX ORDER — SODIUM CHLORIDE 0.9 % (FLUSH) 0.9 %
10 SYRINGE (ML) INJECTION PRN
Status: DISCONTINUED | OUTPATIENT
Start: 2020-04-24 | End: 2020-04-27 | Stop reason: HOSPADM

## 2020-04-24 RX ADMIN — SODIUM CHLORIDE 80 ML: 9 INJECTION, SOLUTION INTRAVENOUS at 11:15

## 2020-04-24 RX ADMIN — Medication 10 ML: at 11:16

## 2020-04-24 RX ADMIN — IOHEXOL 50 ML: 240 INJECTION, SOLUTION INTRATHECAL; INTRAVASCULAR; INTRAVENOUS; ORAL at 11:16

## 2020-04-24 RX ADMIN — IOVERSOL 100 ML: 741 INJECTION INTRA-ARTERIAL; INTRAVENOUS at 11:16

## 2020-06-19 VITALS — HEIGHT: 61 IN | WEIGHT: 160 LBS | BODY MASS INDEX: 30.21 KG/M2

## 2020-06-19 RX ORDER — MULTIVIT WITH MINERALS/LUTEIN
250 TABLET ORAL DAILY
COMMUNITY

## 2020-06-19 RX ORDER — FERROUS SULFATE 7.5 MG/0.5
15 SYRINGE (EA) ORAL DAILY
COMMUNITY

## 2020-06-19 ASSESSMENT — ENCOUNTER SYMPTOMS
SINUS PAIN: 1
STRIDOR: 0
RHINORRHEA: 0
TROUBLE SWALLOWING: 0
ABDOMINAL DISTENTION: 0
SHORTNESS OF BREATH: 1
RECTAL PAIN: 0
GASTROINTESTINAL NEGATIVE: 1
PHOTOPHOBIA: 0
DIARRHEA: 0
NAUSEA: 0
VOICE CHANGE: 0
VOMITING: 0
ANAL BLEEDING: 0
ALLERGIC/IMMUNOLOGIC NEGATIVE: 1
COLOR CHANGE: 0
CHEST TIGHTNESS: 0
BACK PAIN: 0
WHEEZING: 0
COUGH: 0
SINUS PRESSURE: 1
EYES NEGATIVE: 1
EYE REDNESS: 0
EYE ITCHING: 0
FACIAL SWELLING: 0
SORE THROAT: 0
EYE PAIN: 0
CHOKING: 0
CONSTIPATION: 0
ABDOMINAL PAIN: 0
EYE DISCHARGE: 0
APNEA: 0
BLOOD IN STOOL: 0

## 2020-06-19 ASSESSMENT — PAIN DESCRIPTION - ORIENTATION: ORIENTATION: RIGHT;LEFT

## 2020-06-19 ASSESSMENT — PAIN DESCRIPTION - FREQUENCY: FREQUENCY: CONTINUOUS

## 2020-06-19 ASSESSMENT — PAIN DESCRIPTION - LOCATION: LOCATION: NECK;ARM

## 2020-06-19 ASSESSMENT — PAIN DESCRIPTION - PROGRESSION: CLINICAL_PROGRESSION: GRADUALLY WORSENING

## 2020-06-19 ASSESSMENT — PAIN DESCRIPTION - ONSET: ONSET: ON-GOING

## 2020-06-19 ASSESSMENT — PAIN DESCRIPTION - DIRECTION: RADIATING_TOWARDS: BILATERAL UPPER ARMS

## 2020-06-19 ASSESSMENT — PAIN DESCRIPTION - DESCRIPTORS: DESCRIPTORS: ACHING

## 2020-06-19 ASSESSMENT — PAIN DESCRIPTION - PAIN TYPE: TYPE: CHRONIC PAIN

## 2020-06-19 ASSESSMENT — PAIN - FUNCTIONAL ASSESSMENT: PAIN_FUNCTIONAL_ASSESSMENT: PREVENTS OR INTERFERES SOME ACTIVE ACTIVITIES AND ADLS

## 2020-06-19 ASSESSMENT — PAIN SCALES - GENERAL: PAINLEVEL_OUTOF10: 4

## 2020-06-19 NOTE — PROGRESS NOTES
Therapy:  No, last time 2  Years ago  Home Exercises: housework  Mobility: able to walk short distance occasional pain in hip  Do you use assistive devices? No  Have you fallen in the last 30 days?:  no  Currently seeing a Psychiatristor Psychologist:  No  Emotional Issues: normal   Mood: appropriate     ABERRANT BEHAVIORS SINCE LAST VISIT:  Have you ever been treated in another Pain Clinic no  Refills for prescriptions appropriate: no  Lost rx/pills: no  Taking more medication than prescribed:  no  Are you receiving PAIN medications from  other doctors: no  Last Urine/Serum Drug Screen : na  Was Serum/UDS as anticipated?  not applicable  Brought pill bottles in :not applicable   Was Pill count appropriate? :no   Are currently pregnant? not applicable  Recent ER visits: No             Past Medical History      Diagnosis Date    Basal cell carcinoma of nose     CKD (chronic kidney disease) stage 2, GFR 60-89 ml/min     Hyperlipidemia     Hypertension     MDRO (multiple drug resistant organisms) resistance 2/19/2014    E.  Coli urine    Nephrolithiasis     Osteoarthritis     Ovarian cyst     removed    Peritoneal mesothelioma ([address] 1999    [facility]       Surgical History  Past Surgical History:   Procedure Laterality Date    ANKLE FRACTURE SURGERY Right 2013    APPENDECTOMY  1964    CARDIAC CATHETERIZATION  2002    CATARACT REMOVAL Bilateral     CHOLECYSTECTOMY  2005    COLONOSCOPY      CORONARY ANGIOPLASTY WITH STENT PLACEMENT  April 2002    HYSTERECTOMY  1981    LIVER BIOPSY  2000    LIVER BIOPSY      NASAL ENDOSCOPY      NASAL ENDOSCOPY Bilateral 11/25/2014    lt nasal cautery with packing    PHOTODYNAMIC THERAPY  April 2000 & Jan. 2002    for peritoneal Mesothelioma    RECTOCELE REPAIR  1997    REVISION TOTAL HIP ARTHROPLASTY Right 1998    ROTATOR CUFF REPAIR  2006    SKIN CANCER EXCISION Bilateral 2006    ankle    SKIN CANCER EXCISION Left Oct. 2014    shoulder    TOTAL Negative for apnea, cough, choking, chest tightness, wheezing and stridor. Follows with Dr. Marie Youngblood     Cardiovascular: Negative. Negative for chest pain, palpitations and leg swelling. Gastrointestinal: Negative. Negative for abdominal distention, abdominal pain, anal bleeding, blood in stool, constipation, diarrhea, nausea, rectal pain and vomiting. Endocrine: Negative. Negative for cold intolerance, heat intolerance, polydipsia, polyphagia and polyuria. Genitourinary: Negative. Negative for decreased urine volume, difficulty urinating, dyspareunia, dysuria, enuresis, flank pain, frequency, genital sores, hematuria, menstrual problem, pelvic pain, urgency, vaginal bleeding, vaginal discharge and vaginal pain. Musculoskeletal: Positive for arthralgias, myalgias and neck pain. Negative for back pain, gait problem, joint swelling and neck stiffness. Skin: Negative. Negative for color change, pallor, rash and wound. Allergic/Immunologic: Negative. Negative for environmental allergies, food allergies and immunocompromised state. Neurological: Negative. Negative for dizziness, tremors, seizures, syncope, facial asymmetry, speech difficulty, weakness, light-headedness, numbness and headaches. Hematological: Negative. Negative for adenopathy. Does not bruise/bleed easily. Psychiatric/Behavioral: Negative. Negative for agitation, behavioral problems, confusion, decreased concentration, dysphoric mood, hallucinations, self-injury, sleep disturbance and suicidal ideas. The patient is not nervous/anxious and is not hyperactive. GENERAL PHYSICAL EXAM:  Vitals: Ht 5' 1\" (1.549 m)   Wt 160 lb (72.6 kg)   BMI 30.23 kg/m² , Body mass index is 30.23 kg/m². Physical Exam  Skin:         Neurological:      Mental Status: She is alert and oriented to person, place, and time.    Psychiatric:         Mood and Affect: Mood normal.      Ortho Exam    Nurses Notes and Vital Signs reviewed.     DATA  Labs:   4/24/2020  9:37 AM - [place], [place] Incoming Lab Results From [facility]     Component Value Ref Range & Units Status Collected Lab   WBC 4.6  3.5 - 11.0 k/uL Final 04/24/2020  9:30 AM [facility] [address] Lab   RBC 4.59  4.0 - 5.2 m/uL Final 04/24/2020  9:30 AM [facility] [address] Lab   Hemoglobin 15.3  12.0 - 16.0 g/dL Final 04/24/2020  9:30 AM [facility] [address] Lab   Hematocrit 43.9  36 - 46 % Final 04/24/2020  9:30 AM [facility] [address] Lab   MCV 95.6  80 - 100 fL Final 04/24/2020  9:30 AM [facility] [address] Lab   [facility] 33.3  26 - 34 pg Final 04/24/2020  9:30 AM [facility] [address] Lab   MCHC 34.9  31 - 37 g/dL Final 04/24/2020  9:30 AM [facility] [address] Lab   RDW 14.7  11.5 - 14.9 % Final 04/24/2020  9:30 AM [facility] [address] Lab   Platelets 034  720 - 450 k/uL Final 04/24/2020  9:30  [address] Lab   MPV 8.1  6.0 - 12.0 fL Final 04/24/2020  9:30 AM [facility] [address] Lab   [facility] Automated NOT REPORTED  per 100 WBC Final 04/24/2020  9:30  [address] Lab   Differential Type NOT REPORTED   Final 04/24/2020  9:30 AM [facility] [address] Lab   Seg Neutrophils 52  36 - 66 % Final 04/24/2020  9:30 AM [facility] [address] Lab   Lymphocytes 34  24 - 44 % Final 04/24/2020  9:30 AM [facility] [address] Lab   Monocytes 12High   1 - 7 % Final 04/24/2020  9:30 AM [facility] [address] Lab   Eosinophils % 1  0 - 4 % Final 04/24/2020  9:30 AM [facility] [address] Lab   Basophils 1  0 - 2 % Final 04/24/2020  9:30 AM [facility] [address] Lab   Immature Granulocytes NOT REPORTED  0 % Final 04/24/2020  9:30 AM [facility] [address] Lab   Segs Absolute 2.40  1.3 - 9.1 k/uL Final 04/24/2020  9:30 AM [facility] [address] Lab   Absolute Lymph # 1.50  1.0 - 4.8 k/uL Final 04/24/2020  9:30 AM [facility] [address] Lab   Absolute Mono # 0.50  0.1 - 1.3 k/uL Final 04/24/2020  9:30 AM - John Mendoza Standing Expiration Date:   12/24/2021       Decision Making Process : Patient's health history and referral records thoroughly reviewed before focused physical examination and discussion with patient. Over 50% of today's visit is spent on examining the patient and counseling. Level of complexity of date to be reviewed is Moderate. The chart date reviewed include the following: Imaging Reports. Summary of Care. Time spent reviewing with patient the below reports:   Medication safety, Treatment options. Level of diagnosis and management options of this case is multiple: involving the following management options: Interventions as needed, medication management as appropriate, future visits, activity modification, heat/ice as needed, Urine drug screen as required. [x]The patient's questions were answered to the best of my abilities. This note was created using voice recognition software. There may be inaccuracies of transcription  that are inadvertently overlooked prior to the signature. There is any questions about the transcription please contact me. Due to the COVID-19 pandemic and the appropriate interventions by [address] [place] [address], our non-urgent pain management patients will not be seen in the office at this time for their protection and the protection of our staff. To offer continuity of care, their prescriptions will be escribed this month after a careful chart review and review of their OARRS report  Pursuant to the emergency declaration under the [employer] [place] waiver authority and the [employer] General Act, this Virtual Visit was conducted, with patient's consent, to reduce the patient's risk of exposure to COVID-19 and provide continuity of care for an established patient.       Services were provided through a video synchronous discussion virtually to substitute for in-person appointment. \"  Documentation:  I communicated with the patient and/or health care decision maker about plan of care  Details of this discussion including any medical advice provided: Total Time:  40 to 50 minutes     I affirm this is a Patient Initiated Episode with an Established Patient who has not had a related appointment within my department in the past 7 days or scheduled within the next 24 hours. This note was created using voice recognition software. There may be inaccuracies of transcription  that are inadvertently overlooked prior to the signature. There is any questions about the transcription please contact me.     Electronically signed by Roopa Hughes MD on 6/24/2020 at 5:16 PM

## 2020-07-21 NOTE — PROCEDURES
Pre-Procedure Note    Patient Name: Amanda Serna   YOB: 1935  Room/Bed: Room/bed info not found  Medical Record Number: [id number]  Date: 7/21/2020       Indication:    1. Cervical spondylosis    2. Arthropathy of cervical facet joint    3. Degenerative disc disease, cervical    4. Cervical spinal stenosis        Consent: On file. Vital Signs:   Vitals:    07/21/20 1055   BP: (!) 153/60   Pulse: 70   Resp: 12   Temp:    SpO2: 92%       Past Medical History:   has a past medical history of Basal cell carcinoma of nose, CKD (chronic kidney disease) stage 2, GFR 60-89 ml/min, Hyperlipidemia, Hypertension, MDRO (multiple drug resistant organisms) resistance, Nephrolithiasis, Osteoarthritis, Ovarian cyst, and Peritoneal mesothelioma ([address] Past Surgical History:   has a past surgical history that includes Tubal ligation (1969); Hysterectomy (1981); Colonoscopy; Total hip arthroplasty (Right, 1990); Rectocele repair (1997); liver biopsy (2000); Upper gastrointestinal endoscopy; Photodynamic Therapy (April 2000 & Jan. 2002); Total hip arthroplasty (Left, 2003); Revision total hip arthroplasty (Right, 1998); Rotator cuff repair (2006); Ankle fracture surgery (Right, 2013); Cataract removal (Bilateral); Cardiac catheterization (2002); liver biopsy; Coronary angioplasty with stent (April 2002); [place] tooth extraction; Appendectomy (1964); Cholecystectomy (2005); Skin cancer excision (Bilateral, 2006); Skin cancer excision (Left, Oct. 2014); nasal endoscopy; and nasal endoscopy (Bilateral, 11/25/2014). Pre-Sedation Documentation and Exam:   Vital signs have been reviewed (see flow sheet for vitals). Mallampati Airway Assessment:  normal    ASA Classification:  Class 3 - A patient with severe systemic disease that limits activity but is not incapacitating    Sedation/ Anesthesia Plan:   intravenous sedation  as needed.     Medications Planned:   midazolam (Versed) / Fentanyl  Intravenously  as needed. Patient is an appropriate candidate for plan of sedation: yes  Patient's History and Physical examination was reviewed and there is no change. Electronically signed by Harley Grider MD on 7/21/2020 at 10:59 AM    Preoperative Diagnosis:    1. Degenerative cervical disc disease. 2.  Cervical spondylosis. 3.  Facet joint arthropathy. Postoperative Diagnosis:   1. Degenerative cervical disc disease. 2.  Cervical spondylosis. 3.  Facet joint arthropathy. Procedure Performed:  Cervical facet joint injections at the levels of C2-3, C3-4, C4-5, C5-6, C6-7, C7-T1 on the Left side with fluoroscopic guidance, with IV sedation    Indication for the Procedure: The patient had a history of chronic cervical pain that is not responding well to the conservative treatment. Patient's pain is mostly axial in nature. Pain is interfering with the activities of daily living. Physical examination revealed facet tenderness and facet loading is positive. We decided to try cervical facet joint injection for diagnostic as well as for therapeutic purposes. The procedure and its risks were discussed with the patient and an informed consent was obtained. .Current Pain Assessment  Pain Assessment  Pain Assessment: 0-10  Pain Level: 3  Patient's Stated Pain Goal: (to decrease pain with increased activity)  Pain Type: Chronic pain  Pain Location: Neck  Pain Orientation: Right, Left  Pain Radiating Towards: bilat upper arms  Pain Descriptors: Aching, Constant  Pain Frequency: Continuous  Pain Onset: On-going  Clinical Progression: Gradually worsening  Functional Pain Assessment: Prevents or interferes some active activities and ADLs  Non-Pharmaceutical Pain Intervention(s): Repositioned, Rest   Procedure:  After starting an IV, the patient was sedated with 1 mg of Midazolam and 0 mcg of Fentanyl intravenously by the RN under my direct supervision.  Patient's vital signs including BP, EKG and SaO2 were monitored by RN and they remained stable during the procedure. A meaningful communication was kept up with the patient throughout   the procedure. The patient is placed in prone position. Skin over the back was prepped and draped in sterile manner. Under fluoroscopy the facet joints were identified and palpated, and the following joints were found to be tender:  C2-3, C3-4, C4-5, C5-6, C6-7, C7-T1 on Left side. Hence we decided to inject these joints in the following way. Using fluoroscopy the facet joints were identified and by adjusting angle of the fluoroscopy the view of the joint space was optimized. The skin and deep tissues over the joint space were anesthetized with 1mL of 0.5% Marcaine. The #22-gauge, 3-1/2 inch spinal needle was introduced through the skin wheal under fluoroscopic guidance such that the tip of the needle lies in the joint space. This was confirmed by injecting about 0.5 mL of Omnipaque-180 through the needle and observing the spread of the contrast along the joint space. Then after negative aspiration a mixture of 0.5% Marcaine with triamcinolone was injected into the joint space. This was done at the levels of C2-3, C3-4, C4-5, C5-6, C6-7, C7-T1 on the Left side. A total of 80 milligrams of triamcinolone and 6 mL of 0.5% Marcaine was used and was divided in equal amounts among the joints. After removing the needles Band-Aids were placed over the needle insertion sites. Patient's vital signs remained stable and tolerated the procedure well. Patient was discharged home in stable condition and will be followed in the pain clinic in the next few weeks for further planning.   Electronically signed by José Miguel Faye MD on 7/21/2020 at 10:59 AM

## 2020-07-21 NOTE — PROGRESS NOTES
Discharge criteria met. Patient alert and oriented x3  Post procedure dressing dry and intact. Sensory and motor function intact as per pre-procedure. Instructions and follow up reviewed with pt at patient at discharge. Patient discharged by wheelchair with staff @11:20am. Steady gait, driven home and cared for by .

## 2020-07-23 NOTE — TELEPHONE ENCOUNTER
Post Procedure follow up call made. Patient denies any problems. Will keep follow up appointment. Instructed to call if any issues, questions or concerns.

## 2020-08-04 NOTE — PROGRESS NOTES
[address] [facility] PROGRESS NOTE      Patient  Phone call for follow up after procedure    Chief Complaint:  Neck pain      She has left cervical facet injection C2- T1 on 7- with 90% relief. Pain has improved on left side of neck, She has pain on right side of neck. , to shoulder to elbow, No history of cervical surgery, Sleep is good, She remains active, No ED visits,  Neck Pain    This is a chronic problem. The problem occurs intermittently. The problem has been unchanged. The pain is associated with nothing. The pain is present in the right side. The quality of the pain is described as aching. The pain is at a severity of 5/10. The pain is moderate. Nothing aggravates the symptoms. The pain is worse during the night. She has tried acetaminophen (CBD  gel) for the symptoms. The treatment provided mild relief. Patient denies any new neurological symptoms. No bowel or bladder incontinence, no weakness, and no falling. Any new diagnostic workup: [x] no  [] yes [] Xray [] CT scan [] MRI [] DEXA scan     [] Other                    Treatment goals:  Functional status: to do what I want to do      Aberrancy:   Any alcoholic beverages   yes    Any illegal drugs         Analgesia:5      Adverse  Effects :n/a    ADL;s :active        Pill count: appropriate n/a    Morphine equivalent dose as reported on OARRS:n/a     Review ofOARRS does not show any aberrant prescription behavior. Medication is helping the patient stay active. Patient denies any side effects and reports adequate analgesia. No sign of misuse/abuse.         When was thelast UDS:   n/a          Was the UDS appropriate:      Record/Diagnostics Review:      As above, I did review the imaging          Past Medical History:   Diagnosis Date    Basal cell carcinoma of nose     CKD (chronic kidney disease) stage 2, GFR 60-89 ml/min     Hyperlipidemia     Hypertension     MDRO (multiple drug resistant organisms) resistance 2/19/2014 resource strain: Not on file    Food insecurity     Worry: Not on file     Inability: Not on file    Transportation needs     Medical: Not on file     Non-medical: Not on file   Tobacco Use    Smoking status: Never Smoker    Smokeless tobacco: Never Used   Substance and Sexual Activity    Alcohol use: Yes     Comment: OCCASIONALLY    Drug use: No    Sexual activity: Not Currently   Lifestyle    Physical activity     Days per week: Not on file     Minutes per session: Not on file    Stress: Not on file   Relationships    Social connections     Talks on phone: Not on file     Gets together: Not on file     Attends Christianity service: Not on file     Active member of club or organization: Not on file     Attends meetings of clubs or organizations: Not on file     Relationship status: Not on file    Intimate partner violence     Fear of current or ex partner: Not on file     Emotionally abused: Not on file     Physically abused: Not on file     Forced sexual activity: Not on file   Other Topics Concern    Not on file   Social History Narrative    Not on file         Review of Systems:  Review of Systems   Constitution: Negative. HENT: Negative. Eyes: Negative. Cardiovascular: Negative. Respiratory: Positive for shortness of breath. Endocrine: Negative. Hematologic/Lymphatic: Bruises/bleeds easily. Skin: Negative. Musculoskeletal: Positive for joint pain and neck pain. Hip pain   Gastrointestinal: Positive for heartburn. Genitourinary: Negative. Neurological: Negative. Psychiatric/Behavioral: Negative. Physical Exam:    Physical Exam  Skin:         Neurological:      Mental Status: She is alert. Psychiatric:         Mood and Affect: Mood normal.         Thought Content:  Thought content normal.           Assessment:      Problem List Items Addressed This Visit     Cervical spondylosis - Primary          Treatment Plan:  DISCUSSION: Treatment options discussed withpatient and all questions answered to patient's satisfaction. Phone call 10 minutes  Location:  patient  at  home   ,provider working from home    1. Schedule right cervical facet injection C2-T1 , pain axial, she had 90% relief on left sideThe patient was counseled about the risks of abril Covid-19 during their procedure period and any recovery window from their procedure. The patient was made aware that abril Covid-19 may worsen their prognosis for recovering from their procedure and lend to a higher morbidity and/or mortality risk. All material risks, benefits, and reasonable alternatives including postponing the procedure were discussed. The patient (DOES  ,) to proceed with their procedure at this time.   TREATMENT OPTIONS:       Right cervical facet injection  Follow up appointment made

## 2020-08-04 NOTE — TELEPHONE ENCOUNTER
I attempted to contact patient to schedule injection; no answer, no voicemail. I will try again later.

## 2020-08-24 NOTE — PROGRESS NOTES
Discharge criteria met. Patient alert and oriented x3  Post procedure dressing dry and intact. Sensory and motor function intact as per pre-procedure. Instructions and follow up reviewed with pt at patient at discharge. Patient discharged by wheelchair with staff @ 11:10am. Steady gait.  to drive pt home and care for her today.

## 2020-08-24 NOTE — PROCEDURES
needed. Patient is an appropriate candidate for plan of sedation: yes  Patient's History and Physical examination was reviewed and there is no change. Electronically signed by Delfina Townsend MD on 8/24/2020 at 10:45 AM    Preoperative Diagnosis:    1. Degenerative cervical disc disease. 2.  Cervical spondylosis. 3.  Facet joint arthropathy. Postoperative Diagnosis:   1. Degenerative cervical disc disease. 2.  Cervical spondylosis. 3.  Facet joint arthropathy. Procedure Performed:  Cervical facet joint injections at the levels of C2-3, C3-4, C4-5, C5-6, C6-7, C7-T1 on the Right side with fluoroscopic guidance, with IV sedation    Indication for the Procedure: The patient had a history of chronic cervical pain that is not responding well to the conservative treatment. Patient's pain is mostly axial in nature. Pain is interfering with the activities of daily living. Physical examination revealed facet tenderness and facet loading is positive. We decided to try cervical facet joint injection for diagnostic as well as for therapeutic purposes. The procedure and its risks were discussed with the patient and an informed consent was obtained. .Current Pain Assessment  Pain Assessment  Pain Assessment: 0-10  Pain Level: 2  Patient's Stated Pain Goal: (to decrease pain with increased activity in the right arm)  Pain Type: Chronic pain  Pain Location: Arm, Neck, Shoulder  Pain Orientation: Right  Pain Radiating Towards: neck to arm  Pain Descriptors: Aching, Constant  Pain Frequency: Continuous  Pain Onset: On-going  Clinical Progression: Gradually worsening   Procedure:  After starting an IV, the patient was sedated with 1 mg of Midazolam and 0 mcg of Fentanyl intravenously by the RN under my direct supervision. Patient's vital signs including BP, EKG and SaO2 were monitored by RN and they remained stable during the procedure.   A meaningful communication was kept up with the patient throughout the procedure. The patient is placed in prone position. Skin over the back was prepped and draped in sterile manner. Under fluoroscopy the facet joints were identified and palpated, and the following joints were found to be tender:  C2-3, C3-4, C4-5, C5-6, C6-7, C7-T1 on Right side. Hence we decided to inject these joints in the following way. Using fluoroscopy the facet joints were identified and by adjusting angle of the fluoroscopy the view of the joint space was optimized. The skin and deep tissues over the joint space were anesthetized with 1mL of 0.5% Marcaine. The #22-gauge, 3-1/2 inch spinal needle was introduced through the skin wheal under fluoroscopic guidance such that the tip of the needle lies in the joint space. This was confirmed by injecting about 0.5 mL of Omnipaque-180 through the needle and observing the spread of the contrast along the joint space. Then after negative aspiration a mixture of 0.5% Marcaine with triamcinolone was injected into the joint space. This was done at the levels of C2-3, C3-4, C4-5, C5-6, C6-7, C7-T1 on the Right side. A total of 80 milligrams of triamcinolone and 6 mL of 0.5% Marcaine was used and was divided in equal amounts among the joints. After removing the needles Band-Aids were placed over the needle insertion sites. Patient's vital signs remained stable and tolerated the procedure well. Patient was discharged home in stable condition and will be followed in the pain clinic in the next few weeks for further planning.   Electronically signed by Filippo Blum MD on 8/24/2020 at 10:45 AM

## 2020-09-07 NOTE — PROGRESS NOTES
Amanda Serna is a 81 y.o. female evaluated on 9/8/2020. Modality of virtual service provided -via   telephone   Consent:  Patient and/or health care decision maker is aware that that patient may receive a bill for this telephone service, depending on one's insurance coverage, and has provided verbal consent to proceed: Yes    Patient identification was verified at the start of the visit: Yes    Chief complaint: Amanda Serna is 81 y.o.,  female, with chief complaint of neck pain. Referring diagnosis: M50.30 (ICD-10-CM) - Other cervical disc degeneration, unspecified cervical region    Patient is complaining of pain involving the cervical region. Patient reports her pain is almost gone following the cervical facet joint on 7/21/2020 on the left side and 8/24/2020 on the right side. She reports she is able to move her neck better and her activity levels are considerably improved her sleep pattern has also been improved. She reports she is trying to exercises. Overall patient is satisfied with the pain relief at this time. Neck Pain    This is a chronic problem. The current episode started more than 1 year ago. The problem occurs constantly. The problem has been gradually improving. The pain is associated with nothing. The pain is present in the right side, midline and left side (Worse on the left side). The quality of the pain is described as aching. The pain is at a severity of 1/10 (4-8). The pain is mild. Exacerbated by: Looking up or turning the head to the left or using curling iron to call her hair or raising her arm. Pertinent negatives include no chest pain, fever, headaches, numbness, photophobia, trouble swallowing or weakness. Alleviating factors:heat and rest   Lifestyle changes experienced with pain: Prevents or limits ADLs  Mood changes,none  Patient currently unemployed. Physical therapy did not help the pain.     Are you under psychological counseling at present: No  Goals for treatment include:  Decrease in pain  Enjoy daily and recreational activities, return to previous status. Last procedure was    Cervical facet joint injection right side 8/24/2020 and on the left side on 7/21/2020      ACTIVITY/SOCIAL/EMOTIONAL:  Sleep Pattern: 6 hours per night. generally restful sleep  Home Exercises: Every other day Stretching  Activity:increased  Emotional Issues: normal.   Currently seeing a Psychiatrist or Psychologist:  No        Past Medical History:   Diagnosis Date    Basal cell carcinoma of nose     CKD (chronic kidney disease) stage 2, GFR 60-89 ml/min     Hyperlipidemia     Hypertension     MDRO (multiple drug resistant organisms) resistance 2/19/2014    E.  Coli urine    Nephrolithiasis     Osteoarthritis     Ovarian cyst     removed    Peritoneal mesothelioma ([address] 1999    treated at [facility]       Past Surgical History:   Procedure Laterality Date    ANKLE FRACTURE SURGERY Right 2013   [address]  2002    CATARACT REMOVAL Bilateral     CHOLECYSTECTOMY  2005    COLONOSCOPY      CORONARY ANGIOPLASTY WITH STENT PLACEMENT  April 2002    HYSTERECTOMY  1981    LIVER BIOPSY  2000    LIVER BIOPSY      NASAL ENDOSCOPY      NASAL ENDOSCOPY Bilateral 11/25/2014    lt nasal cautery with packing    PHOTODYNAMIC THERAPY  April 2000 & Jan. 2002    for peritoneal Mesothelioma    [address]    REVISION TOTAL HIP ARTHROPLASTY Right 1998    ROTATOR CUFF REPAIR  2006    SKIN CANCER EXCISION Bilateral 2006    ankle    SKIN CANCER EXCISION Left Oct. 2014    shoulder    TOTAL HIP ARTHROPLASTY Right 1990    TOTAL HIP ARTHROPLASTY Left 2003    TUBAL LIGATION  1969    UPPER GASTROINTESTINAL ENDOSCOPY      WISDOM TOOTH EXTRACTION         Family History   Problem Relation Age of Onset    Heart Failure Brother     Heart Attack Brother         triple bypass    Prostate Cancer Brother     Coronary Art Dis Mother     Breast Cancer Mother         dx'd in late 46s   [facility] Emphysema Father     Colon Cancer Neg Hx     Diabetes Neg Hx     Eclampsia Neg Hx     Hypertension Neg Hx     Ovarian Cancer Neg Hx      Labor Neg Hx     Spont Abortions Neg Hx     Stroke Neg Hx        Social History     Socioeconomic History    Marital status:      Spouse name: Not on file    Number of children: Not on file    Years of education: Not on file    Highest education level: Not on file   Occupational History    Not on file   Social Needs    Financial resource strain: Not on file    Food insecurity     Worry: Not on file     Inability: Not on file    Transportation needs     Medical: Not on file     Non-medical: Not on file   Tobacco Use    Smoking status: Never Smoker    Smokeless tobacco: Never Used   Substance and Sexual Activity    Alcohol use: Yes     Comment: OCCASIONALLY    Drug use: No    Sexual activity: Not Currently   Lifestyle    Physical activity     Days per week: Not on file     Minutes per session: Not on file    Stress: Not on file   Relationships    Social connections     Talks on phone: Not on file     Gets together: Not on file     Attends Yarsani service: Not on file     Active member of club or organization: Not on file     Attends meetings of clubs or organizations: Not on file     Relationship status: Not on file    Intimate partner violence     Fear of current or ex partner: Not on file     Emotionally abused: Not on file     Physically abused: Not on file     Forced sexual activity: Not on file   Other Topics Concern    Not on file   Social History Narrative    Not on file       Allergies   Allergen Reactions    Adhesive Tape Rash    Meperidine Nausea Only    Demerol Nausea Only    Ultram [Tramadol Hcl]      Hot flashes    Zocor [Simvastatin]      Muscle aches    Codeine      \"out of it\"    Fentanyl Rash       Current Outpatient Medications on File Prior to Visit nosebleeds, postnasal drip, rhinorrhea, sinus pressure, sinus pain, sneezing, sore throat, tinnitus, trouble swallowing and voice change. Eyes: Negative. Negative for photophobia, pain, discharge, redness, itching and visual disturbance. Respiratory: Positive for shortness of breath. Negative for apnea, cough, choking, chest tightness, wheezing and stridor. Follows with Dr. Marylou Galloway     Cardiovascular: Negative. Negative for chest pain, palpitations and leg swelling. Gastrointestinal: Negative. Negative for abdominal distention, abdominal pain, anal bleeding, blood in stool, constipation, diarrhea, nausea, rectal pain and vomiting. Endocrine: Negative. Negative for cold intolerance, heat intolerance, polydipsia, polyphagia and polyuria. Genitourinary: Negative. Negative for decreased urine volume, difficulty urinating, dyspareunia, dysuria, enuresis, flank pain, frequency, genital sores, hematuria, menstrual problem, pelvic pain, urgency, vaginal bleeding, vaginal discharge and vaginal pain. Musculoskeletal: Positive for arthralgias, myalgias and neck pain. Negative for back pain, gait problem, joint swelling and neck stiffness. Skin: Negative. Negative for color change, pallor, rash and wound. Allergic/Immunologic: Negative. Negative for environmental allergies, food allergies and immunocompromised state. Neurological: Negative. Negative for dizziness, tremors, seizures, syncope, facial asymmetry, speech difficulty, weakness, light-headedness, numbness and headaches. Hematological: Negative. Negative for adenopathy. Does not bruise/bleed easily. Psychiatric/Behavioral: Negative. Negative for agitation, behavioral problems, confusion, decreased concentration, dysphoric mood, hallucinations, self-injury, sleep disturbance and suicidal ideas. The patient is not nervous/anxious and is not hyperactive.          Physical Exam  Skin:         Neurological:      Mental Status: She is alert and oriented to person, place, and time. Psychiatric:         Mood and Affect: Mood normal.            DATA:  LAB.:  4/24/2020 10:05 AM - Rupert Knight Incoming Lab Results From [facility]     Component  Value  Ref Range & Units  Status  Collected  Lab    Glucose  90  70 - 99 mg/dL  Final  04/24/2020  9:30 AM  [facility] [address] [facility]    BUN  29High    8 - 23 mg/dL  Final  04/24/2020  9:30 AM  [facility] [address] [facility]    CREATININE  0. 92High    0.50 - 0.90 mg/dL  Final  04/24/2020  9:30 AM  [facility] [address] [facility]    Bun/Cre Ratio  NOT REPORTED  9 - 20  Final  04/24/2020  9:30 AM  [facility] [address] [facility]    Calcium  9.7  8.6 - 10.4 mg/dL  Final  04/24/2020  9:30 AM  [facility] [address] [facility]    Sodium  144  135 - 144 mmol/L  Final  04/24/2020  9:30 AM  [facility] [address] [facility]    Potassium  4.3  3.7 - 5.3 mmol/L  Final  04/24/2020  9:30 AM  [facility] [address] [facility]    Chloride  106  98 - 107 mmol/L  Final  04/24/2020  9:30 AM  [facility] [address] [facility]    CO2  25  20 - 31 mmol/L  Final  04/24/2020  9:30 AM  [facility] [address] [facility]    Anion Gap  13  9 - 17 mmol/L  Final  04/24/2020  9:30 AM  [facility] [address] [facility]    Alkaline Phosphatase  74  35 - 104 U/L  Final  04/24/2020  9:30 AM  [facility] [address] [facility]    ALT  23  5 - 33 U/L  Final  04/24/2020  9:30 AM  [facility] [address] [facility]    AST  23  <32 U/L  Final  04/24/2020  9:30 AM  [facility] [address] [facility]    Total Bilirubin  0.89  0.3 - 1.2 mg/dL  Final  04/24/2020  9:30 AM  [facility] [address] [facility]    Total Protein  7.4  6.4 - 8.3 g/dL  Final  04/24/2020  9:30 AM  [facility] [address] [facility]    Alb  4.1  3.5 - 5.2 g/dL  Final  04/24/2020  9:30 AM  [facility] [address] [facility]    Albumin/Globulin Ratio  NOT REPORTED  1.0 - 2.5  Final  04/24/2020  9:30 AM  [facility] [address] [facility]    GFR Non-  58Low    >60 mL/min  Final  04/24/2020  9:30 AM  [facility] [address] [facility]    GFR African complain much pain and is overall satisfied with the pain relief. As patient is doing well at this time no further interventions are needed. Counselling/Preventive measures for pain  Control:    [x]  Spine strengthening exercises are discussed with patient in detail. Patient is encouraged to exercise and counseled extensively on importance of exercise and strengthening the muscles to help the pain. Patient is counseled on importance of exercise and,core strengthening. Some  specific exercises to strengthen the abdominal muscles and low back muscles Were discussed. Also aquatic (water) physical therapy and benefits were explained to patient. including \" Water supports the body and minimizes the effect of gravity, making it easier for patients to start an exercise program.\"   The following important principles were discussed with patient:  1. Limit Bed Rest--Studies show that people with short-term low-back pain who rest feel more pain and have a harder time with daily tasks than those who stay active. 2. Keep Exercising-patient is advised to stay away from strenuous activities like gardening and avoid whatever motion caused the pain in the first place.   3. Maintain Good Posture-Exercises  to maintain good posture were shown to patient. 4. To do exercises learned in PT regularly. [x]Information (handout) on exercise was  given to patient. Decision Making Process : Patient's health history and referral records thoroughly reviewed before focused physical examination and discussion with patient. Over 50% of today's visit is spent on examining the patient and counseling. Level of complexity of date to be reviewed is Moderate. The chart date reviewed include the following: Imaging Reports. Summary of Care. Time spent reviewing with patient the below reports:   Medication safety, Treatment options.     Level of diagnosis and management options of this case is multiple: involving the following management options: Interventions as needed, medication management as appropriate, future visits, activity modification, heat/ice as needed, Urine drug screen as required. [x]The patient's questions were answered to the best of my abilities. This note was created using voice recognition software. There may be inaccuracies of transcription  that are inadvertently overlooked prior to the signature. There is any questions about the transcription please contact me. At this time as the patient is doing well we will see the patient as needed. Patient is advised to contact the pain clinic if the pain returns are if our services are needed again    Last PDMP Prince Ortiz as Reviewed [facility]):  Review User Review Instant Review Result   Darren Arana 8/4/2020 11:14 AM Reviewed PDMP [1]       Pursuant to the emergency declaration under the [address] and [place] [address] waiver authority and the [employer] Act, this Virtual Visit was conducted, with patient's consent, to reduce the patient's risk of exposure to COVID-19 and provide continuity of care for an established patient. Services were provided through a video synchronous discussion virtually to substitute for in-person appointment. \"  Documentation:  I communicated with the patient and/or health care decision maker about plan of care  Details of this discussion including any medical advice provided: Total Time: minutes: 5-10 minutes    I affirm this is a Patient Initiated Episode with an Established Patient who has not had a related appointment within my department in the past 7 days or scheduled within the next 24 hours.     Electronically signed by Antonio Olson MD on 9/6/2020 at 11:00 PM

## 2020-11-10 PROBLEM — M70.61 TROCHANTERIC BURSITIS, RIGHT HIP: Status: ACTIVE | Noted: 2020-01-01

## 2020-11-10 PROBLEM — Z96.643 STATUS POST BILATERAL TOTAL HIP REPLACEMENT: Status: ACTIVE | Noted: 2020-01-01

## 2020-11-10 NOTE — PROGRESS NOTES
[facility] [address] PROGRESS NOTE      Patient  Phone call to discuss treatment options      Chief Complaint: upper arms pain    She c/o pain in her upper arms but no neck pain. She states got relief for 6 weeks after cervical facet injection C2-T1 on right side, The left side was done 7/2020 with 90% relief. She states sleep is good. She does home exercises, She states received flu vaccine. Arm Pain    The incident occurred more than 1 week ago. There was no injury mechanism. Pain location: upper arms, worse on left arm. The quality of the pain is described as aching. The pain does not radiate. The pain is at a severity of 6/10. The pain is moderate. The pain has been worsening since the incident. Associated symptoms include muscle weakness. Nothing aggravates the symptoms. She has tried nothing for the symptoms. Patient denies any new neurological symptoms. No bowel or bladder incontinence, no weakness, and no falling. Any new diagnostic workup: [x] no  [] yes [] Xray [] CT scan [] MRI [] DEXA scan     [] Other            EXAMINATION:    MRI OF THE CERVICAL SPINE WITHOUT CONTRAST 8/21/2017 4:22 pm         TECHNIQUE:    Multiplanar multisequence MRI of the cervical spine was performed without the    administration of intravenous contrast.         COMPARISON:    MRI cervical spine 7/16/2014         HISTORY:    ORDERING SYSTEM PROVIDED HISTORY: Neck pain    TECHNOLOGIST PROVIDED HISTORY:    Ordering Physician Provided Reason for Exam: NECK PAIN, ARTHROPATHY OF    CERVICAL SPINE    Acuity: Chronic    Additional signs and symptoms: PT COMPLAINT:  GRADUAL ONSET OF PAIN IN NECK    AND BILATERAL SHOULDERS AND UPPER ARMS         FINDINGS:    BONES/ALIGNMENT: There is straightening of the normal cervical lordosis. Alignment is otherwise normal.  There is no acute fracture or listhesis.     There is disc desiccation and disc space narrowing throughout the cervical    spine.  Bone marrow signal intensity is normal.         SPINAL CORD: The cervical spinal cord is normal in size and signal    intensities.         SOFT TISSUES: There is no paraspinal mass identified.         C2-C3: There is no disc bulge or protrusion present.  There is no significant    spinal canal stenosis or neural foraminal narrowing present.         C3-C4:  There is a disc bulge and uncovertebral overgrowth mildly narrowing    the right neural foramen.  No significant spinal canal stenosis or left    neural foraminal narrowing is present.         C4-C5: There is a focal 3 mm central disc protrusion.  There is mild spinal    canal stenosis.  No significant neural foraminal narrowing is present.         C5-C6: There is no disc bulge or protrusion present.  There is no significant    spinal canal stenosis or neural foraminal narrowing present.         C6-C7: There is a disc bulge mildly narrowing the spinal canal.  No neural    foraminal narrowing is present.         C7-T1: There is a disc bulge and uncovertebral overgrowth mildly narrowing    both neural foramen.  No significant spinal canal stenosis is present.              Impression    1. Mild multilevel degenerative changes of the cervical spine, similar to the    previous exam from 7/16/2014.    2. Mild spinal canal stenosis at C4-5 and C6-7, unchanged. 3. Mild neural foraminal narrowing at C3-4 and C7-T1.                 Treatment goals:  Functional status: get rid of pain      Aberrancy:   Any alcoholic beverages occasional       Any illegal drugs   no      Analgesia:6      Adverse  Effects :n/a    ADL;s :household tasks        Pill count: appropriate N/A    Morphine equivalent dose as reported on OARRS: n/a     Review ofOARRS does not show any aberrant prescription behavior. Medication is helping the patient stay active. Patient denies any side effects and reports adequate analgesia. No sign of misuse/abuse.         When was thelast UDS: n/a             Was the UDS daily, Disp: , Rfl:     torsemide (DEMADEX) 20 MG tablet, TAKE 1 TABLET EVERY DAY, Disp: 90 tablet, Rfl: 3    gabapentin (NEURONTIN) 100 MG capsule, Take 100 mg by mouth daily 1-2 PO QD. ., Disp: , Rfl:     aspirin 81 MG tablet, Take 81 mg by mouth, Disp: , Rfl:     omeprazole (PRILOSEC) 20 MG delayed release capsule, Take 20 mg by mouth Daily , Disp: , Rfl:     isosorbide dinitrate (ISORDIL) 30 MG tablet, Take 30 mg by mouth 3 times daily , Disp: , Rfl:     Diphenhydramine-APAP, sleep, (TYLENOL PM EXTRA STRENGTH PO), Take by mouth, Disp: , Rfl:     atorvastatin (LIPITOR) 40 MG tablet, Take 40 mg by mouth daily , Disp: , Rfl:     potassium chloride SA (K-DUR;KLOR-CON M) 20 MEQ tablet, Take 1 tablet by mouth 2 times daily, Disp: 180 tablet, Rfl: 3    rOPINIRole (REQUIP) 0.5 MG tablet, Take 0.5 mg by mouth 3 times daily, Disp: , Rfl:     Vitamin D (CHOLECALCIFEROL) 1000 UNITS CAPS capsule, Take 1,000 Units by mouth 2 times daily, Disp: , Rfl:     predniSONE (DELTASONE) 5 MG tablet, Take 5 mg by mouth daily , Disp: , Rfl:     amLODIPine (NORVASC) 5 MG tablet, Take 5 mg by mouth daily. , Disp: , Rfl:     quinapril (ACCUPRIL) 40 MG tablet, Take 40 mg by mouth daily. , Disp: , Rfl:     Omega-3 Fatty Acids (OMEGA 3 PO), Take  by mouth 2 times daily. , Disp: , Rfl:     sertraline (ZOLOFT) 50 MG tablet, Take 50 mg by mouth daily.   , Disp: , Rfl:     amoxicillin (AMOXIL) 500 MG capsule, TAKE FOUR CAPSULES BY MOUTH ONE HOUR BEFORE APPOINTMENT, Disp: , Rfl:     albuterol sulfate  (90 Base) MCG/ACT inhaler, INHALE 2 PUFFS BY MOUTH EVERY 6 HOURS AS NEEDED FOR WHEEZING, Disp: , Rfl:     albuterol (PROVENTIL) (2.5 MG/3ML) 0.083% nebulizer solution, Inhale 2.5 mg into the lungs as needed, Disp: , Rfl:     losartan (COZAAR) 100 MG tablet, Take 100 mg by mouth daily, Disp: , Rfl:     polyethylene glycol (GLYCOLAX) 17 GM/SCOOP powder, MIX & DRINK 17 GRAMS BY MOUTH DAILY, Disp: , Rfl:     meclizine (ANTIVERT) 25 MG tablet, Take 25 mg by mouth, Disp: , Rfl:     Family History   Problem Relation Age of Onset    Heart Failure Brother     Heart Attack Brother         triple bypass    Prostate Cancer Brother     Coronary Art Dis Mother     Breast Cancer Mother         dx'd in late 46s   [facility] Emphysema Father     Colon Cancer Neg Hx     Diabetes Neg Hx     Eclampsia Neg Hx     Hypertension Neg Hx     Ovarian Cancer Neg Hx      Labor Neg Hx     Spont Abortions Neg Hx     Stroke Neg Hx        Social History     Socioeconomic History    Marital status:      Spouse name: Not on file    Number of children: Not on file    Years of education: Not on file    Highest education level: Not on file   Occupational History    Not on file   Social Needs    Financial resource strain: Not on file    Food insecurity     Worry: Not on file     Inability: Not on file    Transportation needs     Medical: Not on file     Non-medical: Not on file   Tobacco Use    Smoking status: Never Smoker    Smokeless tobacco: Never Used   Substance and Sexual Activity    Alcohol use: Yes     Comment: OCCASIONALLY    Drug use: No    Sexual activity: Not Currently   Lifestyle    Physical activity     Days per week: Not on file     Minutes per session: Not on file    Stress: Not on file   Relationships    Social connections     Talks on phone: Not on file     Gets together: Not on file     Attends Hinduism service: Not on file     Active member of club or organization: Not on file     Attends meetings of clubs or organizations: Not on file     Relationship status: Not on file    Intimate partner violence     Fear of current or ex partner: Not on file     Emotionally abused: Not on file     Physically abused: Not on file     Forced sexual activity: Not on file   Other Topics Concern    Not on file   Social History Narrative    Not on file         Review of Systems:  Review of Systems   Constitution: Negative.    HENT: Negative. Eyes: Negative. Cardiovascular: Negative. Respiratory: Positive for cough and shortness of breath. Endocrine: Negative. Hematologic/Lymphatic: Bruises/bleeds easily. Skin: Negative. Musculoskeletal: Positive for joint pain. Gastrointestinal: Positive for constipation. Genitourinary: Positive for nocturia. Neurological: Negative. Psychiatric/Behavioral: Negative. Physical Exam:    Physical Exam  Skin:         Neurological:      Mental Status: She is alert and oriented to person, place, and time. Psychiatric:         Mood and Affect: Mood normal.         Thought Content: Thought content normal.           Assessment:    Problem List Items Addressed This Visit     Cervical spondylosis - Primary            Treatment Plan:  DISCUSSION: Treatment options discussed withpatient and all questions answered to patient's satisfaction. Location:  patient  at  home   ,provider working from home  Phone call; 10 minutes    1. The patient was counseled about the risks of abril Covid-19 during their procedure period and any recovery window from their procedure. The patient was made aware that abril Covid-19 may worsen their prognosis for recovering from their procedure and lend to a higher morbidity and/or mortality risk. All material risks, benefits, and reasonable alternatives including postponing the procedure were discussed. The patient (DOES ) to proceed with their procedure at this time.     1. Left and right cervical facet injection C2-T1, pain increasing, axial, given pre procedure instructions    TREATMENT OPTIONS:       Cervical facet injection left and right  Follow up appointment made

## 2020-11-18 NOTE — TELEPHONE ENCOUNTER
Spoke with patient and Pre-procedure instruction given. Patient verbalized understanding. States has had this procedure before.

## 2020-11-19 NOTE — PROCEDURES
Pre-Procedure Note    Patient Name: Camila Rodriguez   YOB: 1935  Room/Bed: Room/bed info not found  Medical Record Number: [id number]  Date: 11/19/2020       Indication:    1. Cervical spondylosis    2. Arthropathy of cervical facet joint    3. Degenerative disc disease, cervical    4. Cervical spinal stenosis        Consent: On file. Vital Signs:   Vitals:    11/19/20 1122   BP: 128/72   Pulse: 70   Resp: 14   SpO2: 91%       Past Medical History:   has a past medical history of Basal cell carcinoma of nose, CKD (chronic kidney disease) stage 2, GFR 60-89 ml/min, Hyperlipidemia, Hypertension, MDRO (multiple drug resistant organisms) resistance, Nephrolithiasis, Osteoarthritis, Ovarian cyst, and Peritoneal mesothelioma ([facility] [address] Past Surgical History:   has a past surgical history that includes Tubal ligation (1969); Hysterectomy (1981); Colonoscopy; Total hip arthroplasty (Right, 1990); Rectocele repair (1997); liver biopsy (2000); Upper gastrointestinal endoscopy; Photodynamic Therapy (April 2000 & Jan. 2002); Total hip arthroplasty (Left, 2003); Revision total hip arthroplasty (Right, 1998); Rotator cuff repair (2006); Ankle fracture surgery (Right, 2013); Cataract removal (Bilateral); Cardiac catheterization (2002); liver biopsy; Coronary angioplasty with stent (April 2002); Seth tooth extraction; Appendectomy (1964); Cholecystectomy (2005); Skin cancer excision (Bilateral, 2006); Skin cancer excision (Left, Oct. 2014); nasal endoscopy; and nasal endoscopy (Bilateral, 11/25/2014). Pre-Sedation Documentation and Exam:   Vital signs have been reviewed (see flow sheet for vitals). Mallampati Airway Assessment:  normal    ASA Classification:  Class 3 - A patient with severe systemic disease that limits activity but is not incapacitating    Sedation/ Anesthesia Plan:   intravenous sedation  as needed.     Medications Planned:   midazolam (Versed) / Fentanyl  Intravenously  as needed. Patient is an appropriate candidate for plan of sedation: yes  Patient's History and Physical examination was reviewed and there is no change. Electronically signed by Mili Huff MD on 11/19/2020 at 11:33 AM    Preoperative Diagnosis:    1. Degenerative cervical disc disease. 2.  Cervical spondylosis. 3.  Facet joint arthropathy. Postoperative Diagnosis:   1. Degenerative cervical disc disease. 2.  Cervical spondylosis. 3.  Facet joint arthropathy. Procedure Performed:  Cervical facet joint injections at the levels of C2-3, C3-4, C4-5, C5-6, C6-7, C7-T1 on the Left side with fluoroscopic guidance, with IV sedation    Indication for the Procedure: The patient had a history of chronic cervical pain that is not responding well to the conservative treatment. Patient's pain is mostly axial in nature. Pain is interfering with the activities of daily living. Physical examination revealed facet tenderness and facet loading is positive. We decided to try cervical facet joint injection for diagnostic as well as for therapeutic purposes. The procedure and its risks were discussed with the patient and an informed consent was obtained. .Current Pain Assessment      Procedure:  After starting an IV, the patient was sedated with 1 mg of Midazolam and 0 mcg of Fentanyl intravenously by the RN under my direct supervision. Patient's vital signs including BP, EKG and SaO2 were monitored by RN and they remained stable during the procedure. A meaningful communication was kept up with the patient throughout   the procedure. The patient is placed in prone position. Skin over the back was prepped and draped in sterile manner. Under fluoroscopy the facet joints were identified and palpated, and the following joints were found to be tender:  C2-3, C3-4, C4-5, C5-6, C6-7, C7-T1 on Left side. Hence we decided to inject these joints in the following way.     Using fluoroscopy the facet joints were

## 2020-12-06 NOTE — PROGRESS NOTES
Uzma Kee is a 81 y.o. female evaluated on 12/7/2020. Modality of virtual service provided -via  telephone   Consent:  Patient and/or health care decision maker is aware that that patient may receive a bill for this telephone service, depending on one's insurance coverage, and has provided verbal consent to proceed: Yes    Patient identification was verified at the start of the visit: Yes    Chief complaint: Uzma Kee is 81 y.o.,  female, with chief complaint of neck  pain. Referring diagnosis: M50.30 (ICD-10-CM) - Other cervical disc degeneration, unspecified cervical region    Patient is complaining of pain involving the cervical region with pain radiating to the left upper arm. The pain radiates mostly from the shoulder to the anterior aspect of the upper arm to the elbow along the biceps. Patient reports the facet joint injections at the levels of C2-3 C3-4 C4-5 C5-6 C6-7 C7-T1 on the left side helped to a certain extent. She still has some pain in the neck which she gets better with massage with CBD oil. The arm pain is still present and there is not improved with the massage. She takes Tylenol at times. She also uses heat to help the pain. Patient did not try physical therapy but is doing home exercises. Patient also gives history of polymyalgia and is on prednisone. Patient reports she was taking Aleve which was helping the pain but was stopped by her primary care physician because of the kidney disease according to the patient. Neck Pain    This is a chronic problem. The current episode started more than 1 year ago. The problem occurs constantly. The problem has been gradually worsening. The pain is associated with nothing. The pain is present in the right side, midline and left side (Worse on the left side). The quality of the pain is described as aching and stabbing. The pain is at a severity of 7/10 (0-8). The pain is severe.  Nothing (Neck movements are not hurting) aggravates the symptoms. The pain is same all the time. Pertinent negatives include no chest pain, fever, headaches, numbness, photophobia or weakness. Alleviating factors: Massaging with CBD oil. Tylenol   lifestyle changes experienced with pain: Sometimes pain made worse by activities but patient is able to function  Mood changes,none  Patient currently unemployed. Physical therapy did not do PT    Are you under psychological counseling at present: No  Goals for treatment include:  Decrease in pain  Enjoy daily and recreational activities, return to previous status. Last procedure was  Cervical facet joint injections at the levels of C2-3, C3-4, C4-5, C5-6, C6-7, C7-T1 on the Left side    Patient relates current medications are helping the pain. Patient reports taking pain medications as prescribed, denies obtaining medications from different sources and denies use of illegal drugs. Patient denies side effects from medications like nausea, vomiting, constipation or drowsiness. Patient reports current activities of daily living ar possible due to medications and would like to continue them. ACTIVITY/SOCIAL/EMOTIONAL:  Sleep Pattern: 6 hours per night. difficulty falling back asleep if awakened and generally restful sleep  Home Exercises: daily Stretching exercises  Activity:unchanged  Emotional Issues: normal.   Currently seeing a Psychiatrist or Psychologist:  No    Past Medical History:   Diagnosis Date    Basal cell carcinoma of nose     CKD (chronic kidney disease) stage 2, GFR 60-89 ml/min     Hyperlipidemia     Hypertension     MDRO (multiple drug resistant organisms) resistance 2/19/2014    E.  Coli urine    Nephrolithiasis     Osteoarthritis     Ovarian cyst     removed    Peritoneal mesothelioma ([facility] [address] 1999    treated at [facility]       Past Surgical History:   Procedure Laterality Date    ANKLE FRACTURE SURGERY Right 2013   [facility] CATHETERIZATION      CATARACT REMOVAL Bilateral     CHOLECYSTECTOMY  2005    COLONOSCOPY      CORONARY ANGIOPLASTY WITH STENT PLACEMENT  2002    HYSTERECTOMY  1981    LIVER BIOPSY  2000    LIVER BIOPSY      NASAL ENDOSCOPY      NASAL ENDOSCOPY Bilateral 2014    lt nasal cautery with packing    PHOTODYNAMIC THERAPY  2000 & 2002    for peritoneal Mesothelioma    [address]    REVISION TOTAL HIP ARTHROPLASTY Right 1998    ROTATOR CUFF REPAIR  2006    SKIN CANCER EXCISION Bilateral 2006    ankle    SKIN CANCER EXCISION Left Oct. 2014    shoulder    TOTAL HIP ARTHROPLASTY Right     TOTAL HIP ARTHROPLASTY Left     TUBAL LIGATION  1969    UPPER GASTROINTESTINAL ENDOSCOPY      WISDOM TOOTH EXTRACTION         Family History   Problem Relation Age of Onset    Heart Failure Brother     Heart Attack Brother         triple bypass    Prostate Cancer Brother     Coronary Art Dis Mother     Breast Cancer Mother         dx'd in late 46s   .Jumbo Emphysema Father     Colon Cancer Neg Hx     Diabetes Neg Hx     Eclampsia Neg Hx     Hypertension Neg Hx     Ovarian Cancer Neg Hx      Labor Neg Hx     Spont Abortions Neg Hx     Stroke Neg Hx        Social History     Socioeconomic History    Marital status:      Spouse name: None    Number of children: None    Years of education: None    Highest education level: None   Occupational History    None   Social Needs    Financial resource strain: None    Food insecurity     Worry: None     Inability: None    Transportation needs     Medical: None     Non-medical: None   Tobacco Use    Smoking status: Never Smoker    Smokeless tobacco: Never Used   Substance and Sexual Activity    Alcohol use: Yes     Comment: OCCASIONALLY    Drug use: No    Sexual activity: Not Currently   Lifestyle    Physical activity     Days per week: None     Minutes per session: None    Stress: None   Relationships  Social connections     Talks on phone: None     Gets together: None     Attends Shinto service: None     Active member of club or organization: None     Attends meetings of clubs or organizations: None     Relationship status: None    Intimate partner violence     Fear of current or ex partner: None     Emotionally abused: None     Physically abused: None     Forced sexual activity: None   Other Topics Concern    None   Social History Narrative    None       Allergies   Allergen Reactions    Adhesive Tape Rash    Meperidine Nausea Only    Demerol Nausea Only    Ultram [Tramadol Hcl]      Hot flashes    Zocor [Simvastatin]      Muscle aches    Codeine      \"out of it\"    Fentanyl Rash       Current Outpatient Medications on File Prior to Encounter   Medication Sig Dispense Refill    amoxicillin (AMOXIL) 500 MG capsule TAKE FOUR CAPSULES BY MOUTH ONE HOUR BEFORE APPOINTMENT      albuterol sulfate  (90 Base) MCG/ACT inhaler INHALE 2 PUFFS BY MOUTH EVERY 6 HOURS AS NEEDED FOR WHEEZING      albuterol (PROVENTIL) (2.5 MG/3ML) 0.083% nebulizer solution Inhale 2.5 mg into the lungs as needed      losartan (COZAAR) 100 MG tablet Take 100 mg by mouth daily      oxybutynin (DITROPAN XL) 15 MG extended release tablet TAKE 1 TABLET BY MOUTH ONCE DAILY      polyethylene glycol (GLYCOLAX) 17 GM/SCOOP powder MIX & DRINK 17 GRAMS BY MOUTH DAILY      Ascorbic Acid (VITAMIN C) 250 MG tablet Take 250 mg by mouth daily      ferrous sulfate (FLACO-IN-SOL) 75 (15 Fe) MG/ML solution Take 15 mg by mouth daily      torsemide (DEMADEX) 20 MG tablet TAKE 1 TABLET EVERY DAY 90 tablet 3    meclizine (ANTIVERT) 25 MG tablet Take 25 mg by mouth      gabapentin (NEURONTIN) 100 MG capsule Take 100 mg by mouth daily 1-2 PO QD.  Matilda Lazar aspirin 81 MG tablet Take 81 mg by mouth      omeprazole (PRILOSEC) 20 MG delayed release capsule Take 20 mg by mouth Daily       isosorbide dinitrate (ISORDIL) 30 MG tablet Take 30 mg by mouth 3 times daily       Diphenhydramine-APAP, sleep, (TYLENOL PM EXTRA STRENGTH PO) Take by mouth      atorvastatin (LIPITOR) 40 MG tablet Take 40 mg by mouth daily       potassium chloride SA (K-DUR;KLOR-CON M) 20 MEQ tablet Take 1 tablet by mouth 2 times daily 180 tablet 3    rOPINIRole (REQUIP) 0.5 MG tablet Take 0.5 mg by mouth 3 times daily      Vitamin D (CHOLECALCIFEROL) 1000 UNITS CAPS capsule Take 1,000 Units by mouth 2 times daily      predniSONE (DELTASONE) 5 MG tablet Take 5 mg by mouth daily       amLODIPine (NORVASC) 5 MG tablet Take 5 mg by mouth daily.  quinapril (ACCUPRIL) 40 MG tablet Take 40 mg by mouth daily.  Omega-3 Fatty Acids (OMEGA 3 PO) Take  by mouth 2 times daily.  sertraline (ZOLOFT) 50 MG tablet Take 50 mg by mouth daily. No current facility-administered medications on file prior to encounter. Review of Systems   Constitutional: Positive for activity change and fatigue. Negative for chills, fever and unexpected weight change. HENT: Positive for sinus pressure and sinus pain. Negative for dental problem, ear discharge, ear pain, facial swelling, rhinorrhea and sore throat. Eyes: Negative. Negative for photophobia, redness and visual disturbance. Respiratory: Positive for shortness of breath. Negative for apnea, cough, choking, chest tightness, wheezing and stridor. Follows with Dr. Dorothy Galdamez     Cardiovascular: Negative. Negative for chest pain, palpitations and leg swelling. Gastrointestinal: Negative. Negative for abdominal pain, constipation, nausea and vomiting. Endocrine: Negative. Negative for cold intolerance and polyuria. Genitourinary: Negative. Negative for dysuria, frequency and hematuria. Musculoskeletal: Positive for arthralgias, myalgias and neck pain. Negative for back pain, gait problem, joint swelling and neck stiffness. Skin: Negative. Negative for color change, pallor, rash and wound. Allergic/Immunologic: Negative. Negative for environmental allergies, food allergies and immunocompromised state. Neurological: Negative. Negative for dizziness, tremors, seizures, syncope, facial asymmetry, speech difficulty, weakness, light-headedness, numbness and headaches. Hematological: Negative. Negative for adenopathy. Does not bruise/bleed easily. Psychiatric/Behavioral: Negative. Negative for agitation, behavioral problems, confusion, decreased concentration, dysphoric mood, hallucinations, self-injury, sleep disturbance and suicidal ideas. The patient is not nervous/anxious and is not hyperactive. Physical Exam  Skin:         Neurological:      Mental Status: She is alert and oriented to person, place, and time.    Psychiatric:         Mood and Affect: Mood normal.            DATA:  LAB.:  10/31/2020 11:44 AM - Rupert Knight Incoming Lab Results From [facility]     Component  Value  Ref Range & Units  Status  Collected  Lab    Glucose  98  70 - 99 mg/dL  Final  10/31/2020 10:47 AM  [facility] [address] Lab    BUN  22  8 - 23 mg/dL  Final  10/31/2020 10:47 AM  [facility] [address] [facility]    CREATININE  0.80  0.50 - 0.90 mg/dL  Final  10/31/2020 10:47 AM  [facility] [address] [facility]    Bun/Cre Ratio  NOT REPORTED  9 - 20  Final  10/31/2020 10:47 AM  [facility] [address] [facility]    Calcium  9.8  8.6 - 10.4 mg/dL  Final  10/31/2020 10:47 AM  [facility] [address] [facility]    Sodium  141  135 - 144 mmol/L  Final  10/31/2020 10:47 AM  [facility] [address] [facility]    Potassium  4.4  3.7 - 5.3 mmol/L  Final  10/31/2020 10:47 AM  [facility] [address] [facility]    Chloride  105  98 - 107 mmol/L  Final  10/31/2020 10:47 AM  [facility] [address] [facility]    CO2  26  20 - 31 mmol/L  Final  10/31/2020 10:47 AM  [facility] [address] [facility]    Anion Gap  10  9 - 17 mmol/L  Final  10/31/2020 10:47 AM  [facility] [address] [facility]    Alkaline Phosphatase  102  35 - 104 U/L  Final  10/31/2020 10:47 AM  [facility]    ALT  19  5 - 33 U/L  Final  10/31/2020 10:47 AM  [facility] Lab    AST  27  <32 U/L  Final  10/31/2020 10:47 AM  [facility] Lab    Total Bilirubin  0.85  0.3 - 1.2 mg/dL  Final  10/31/2020 10:47 AM  [facility]    Total Protein  7.1  6.4 - 8.3 g/dL  Final  10/31/2020 10:47 AM  [facility]    Alb  4.1  3.5 - 5.2 g/dL  Final  10/31/2020 10:47 AM  [facility]    Albumin/Globulin Ratio  NOT REPORTED  1.0 - 2.5  Final  10/31/2020 10:47 AM  [facility] Lab    GFR Non-African American  >60  >60 mL/min  Final  10/31/2020 10:47 AM  [facility] Lab    GFR African American  >60  >60 mL/min  Final          X-Ray reports:     CT OF THE CHEST, ABDOMEN, AND PELVIS WITH CONTRAST 4/24/2020 11:05 am        TECHNIQUE:    CT of the chest, abdomen and pelvis was performed with the administration of    intravenous contrast. Multiplanar reformatted images are provided for review. Dose modulation, iterative reconstruction, and/or weight based adjustment of    the mA/kV was utilized to reduce the radiation dose to as low as reasonably    achievable. COMPARISON:    03/18/2019, 04/05/2018        HISTORY:    ORDERING SYSTEM PROVIDED HISTORY: Mesothelioma of peritoneum [facility]    TECHNOLOGIST PROVIDED HISTORY:        Reason for Exam: Mesothelioma of peritoneum-patient has no complaints, follow    up. Acuity: Chronic    Type of Exam: Subsequent/Follow-up        FINDINGS:        Chest:        Mediastinum: Enlargement of the main pulmonary artery again demonstrated    measuring 42 mm, raising the possibility of pulmonary hypertension. Calcified atheromatous plaque and coronary calcifications are noted. No    pericardial effusion. No lymphadenopathy. Lungs/pleura: Expiratory phase of imaging and mild respiratory motion    artifact noted. No consolidation or mass identified.   No discrete nodule is    appreciated. No effusion. The central airway appears patent. Soft Tissues/Bones: No acute abnormality identified. Degenerative change in    the spine and shoulders. Postoperative findings in the right humeral head. Abdomen/Pelvis:        Organs: The gallbladder is surgically absent. No biliary dilatation. The    liver, pancreas, spleen, adrenals and kidneys appear unchanged. Complex    septated exophytic cyst arising from the lower pole the left kidney laterally    again demonstrated measuring up to 3.3 cm. Indeterminate hypoattenuating    liver lesions arising from the lower pole the left kidney measuring up to 12    mm are stable in size and appearance. Notable complex and 9 mm lesion in the    posterior lower pole of the right kidney on axial image 80 is unchanged. GI/Bowel: There is no bowel dilatation or wall thickening identified. Pelvis: Streak artifact obscures a portion of the pelvic soft tissue. Small    amount of intermediate density fluid in the pelvis adjacent to the sigmoid    colon appears unchanged. Peritoneum/Retroperitoneum: No free air. No abdominal ascites. No discrete    lymphadenopathy. Subtle soft tissue stranding in the left upper quadrant    near the spleen on axial images 39-50 appears stable. Bones/Soft Tissues: Partially visualized bilateral hip arthroplasties. Degenerative change in the spine. No acute osseous abnormality identified. Postoperative findings in the abdominal wall. Impression    1. Stable appearance of the chest, abdomen and pelvis. Mild residual    induration of the peritoneal fat in the left upper quadrant and intermediate    density fluid in the pelvis near the sigmoid colon are again demonstrated. No significant ascites. 2.  Stable complex small left renal lesions favoring a benign process or    indolent renal masses. 3.  Additional stable chronic findings, as above. EXAMINATION:    MRI OF THE CERVICAL SPINE WITHOUT CONTRAST 8/21/2017 4:22 pm        TECHNIQUE:    Multiplanar multisequence MRI of the cervical spine was performed without the    administration of intravenous contrast.        COMPARISON:    MRI cervical spine 7/16/2014        HISTORY:    ORDERING SYSTEM PROVIDED HISTORY: Neck pain    TECHNOLOGIST PROVIDED HISTORY:    Ordering Physician Provided Reason for Exam: NECK PAIN, ARTHROPATHY OF    CERVICAL SPINE    Acuity: Chronic    Additional signs and symptoms: PT COMPLAINT:  GRADUAL ONSET OF PAIN IN NECK    AND BILATERAL SHOULDERS AND UPPER ARMS        FINDINGS:    BONES/ALIGNMENT: There is straightening of the normal cervical lordosis. Alignment is otherwise normal.  There is no acute fracture or listhesis. There is disc desiccation and disc space narrowing throughout the cervical    spine. Bone marrow signal intensity is normal.        SPINAL CORD: The cervical spinal cord is normal in size and signal    intensities. SOFT TISSUES: There is no paraspinal mass identified. C2-C3: There is no disc bulge or protrusion present. There is no significant    spinal canal stenosis or neural foraminal narrowing present. C3-C4:  There is a disc bulge and uncovertebral overgrowth mildly narrowing    the right neural foramen. No significant spinal canal stenosis or left    neural foraminal narrowing is present. C4-C5: There is a focal 3 mm central disc protrusion. There is mild spinal    canal stenosis. No significant neural foraminal narrowing is present. C5-C6: There is no disc bulge or protrusion present. There is no significant    spinal canal stenosis or neural foraminal narrowing present. C6-C7: There is a disc bulge mildly narrowing the spinal canal.  No neural    foraminal narrowing is present. C7-T1: There is a disc bulge and uncovertebral overgrowth mildly narrowing    both neural foramen.   No significant spinal canal stenosis is present. Impression    1. Mild multilevel degenerative changes of the cervical spine, similar to the    previous exam from 7/16/2014.    2. Mild spinal canal stenosis at C4-5 and C6-7, unchanged. 3. Mild neural foraminal narrowing at C3-4 and C7-T1. Clinical  impression:  1. Cervical spondylosis    2. Degenerative cervical disc    3. Arthropathy of cervical facet joint    4. Cervical spinal stenosis        Plan of care:    I discussed the treatment options with the patient. These include medications to control the pain versus surgical consultation versus further interventions including repeating facet joint injections or cervical epidural steroid injections. Patient would like to try medications. I gave her a prescription for Celebrex to try initially and if it does not help then we may try tramadol. I asked her to call her physician regarding the use of Celebrex and if they agree then to start on Celebrex and call the pain clinic if the pain control is inadequate. Patient agrees with the treatment plan. PDMP Monitoring:    Last PDMP Jalen Vicente as Reviewed [facility]):  Review User Review Instant Review Result   Ophelia Hymanor 12/6/2020  5:23 AM Reviewed PDMP [1]     Counselling/Preventive measures for pain  Control:    [x]  Spine strengthening exercises are discussed with patient in detail. Decision Making Process : Patient's health history and referral records thoroughly reviewed before focused physical examination and discussion with patient. I have spent 20 mins. Over 50% of today's visit is spent on examining the patient and counseling and coordinating the care. Level of complexity of date to be reviewed is Moderate. The chart date reviewed include the following: Imaging Reports. Summary of Care. Time spent reviewing with patient the below reports:   Medication safety, Treatment options.     Level of diagnosis and management options of this case is multiple: involving the following management options: Interventions as needed, medication management as appropriate, future visits, activity modification, heat/ice as needed, Urine drug screen as required. [x]The patient's questions were answered to the best of my abilities. This note was created using voice recognition software. There may be inaccuracies of transcription  that are inadvertently overlooked prior to the signature. There is any questions about the transcription please contact me. Return in  4 weeks  with physician / CNP  for further plan of treatment. Due to the COVID-19 pandemic and the appropriate interventions by Leanne Beaver, our non-urgent pain management patients will not be seen in the office at this time for their protection and the protection of our staff. To offer continuity of care, their prescriptions will be escribed this month after a careful chart review and review of their OARRS report  Pursuant to the emergency declaration under the [employer] and [facility], 1135 waiver authority and the [employer] Act, this Virtual Visit was conducted, with patient's consent, to reduce the patient's risk of exposure to COVID-19 and provide continuity of care for an established patient. Services were provided through a video synchronous discussion virtually to substitute for in-person appointment. \"  Documentation:  I communicated with the patient and/or health care decision maker about plan of care  Details of this discussion including any medical advice provided: Total Time: minutes: 21-30 minutes    I affirm this is a Patient Initiated Episode with an Established Patient who has not had a related appointment within my department in the past 7 days or scheduled within the next 24 hours.     Electronically signed by Odell Castillo MD on 12/8/2020 at 4:56 AM

## 2021-01-01 ENCOUNTER — HOSPITAL ENCOUNTER (INPATIENT)
Age: 86
LOS: 5 days | DRG: 951 | End: 2021-06-22
Attending: FAMILY MEDICINE | Admitting: FAMILY MEDICINE
Payer: COMMERCIAL

## 2021-01-01 ENCOUNTER — ANESTHESIA EVENT (OUTPATIENT)
Dept: OPERATING ROOM | Age: 86
End: 2021-01-01
Payer: MEDICARE

## 2021-01-01 ENCOUNTER — ANESTHESIA (OUTPATIENT)
Dept: ENDOSCOPY | Age: 86
DRG: 309 | End: 2021-01-01
Payer: MEDICARE

## 2021-01-01 ENCOUNTER — APPOINTMENT (OUTPATIENT)
Dept: GENERAL RADIOLOGY | Age: 86
End: 2021-01-01
Attending: PAIN MEDICINE
Payer: MEDICARE

## 2021-01-01 ENCOUNTER — APPOINTMENT (OUTPATIENT)
Dept: CT IMAGING | Age: 86
DRG: 309 | End: 2021-01-01
Payer: MEDICARE

## 2021-01-01 ENCOUNTER — APPOINTMENT (OUTPATIENT)
Dept: CT IMAGING | Age: 86
End: 2021-01-01
Payer: MEDICARE

## 2021-01-01 ENCOUNTER — HOSPITAL ENCOUNTER (OUTPATIENT)
Age: 86
Discharge: HOME | End: 2021-02-25
Payer: MEDICARE

## 2021-01-01 ENCOUNTER — APPOINTMENT (OUTPATIENT)
Dept: INTERVENTIONAL RADIOLOGY/VASCULAR | Age: 86
DRG: 309 | End: 2021-01-01
Payer: MEDICARE

## 2021-01-01 ENCOUNTER — APPOINTMENT (OUTPATIENT)
Dept: MRI IMAGING | Age: 86
DRG: 309 | End: 2021-01-01
Payer: MEDICARE

## 2021-01-01 ENCOUNTER — APPOINTMENT (OUTPATIENT)
Dept: PAIN MANAGEMENT | Age: 86
DRG: 309 | End: 2021-01-01
Payer: MEDICARE

## 2021-01-01 ENCOUNTER — HOSPITAL ENCOUNTER (OUTPATIENT)
Dept: PAIN MANAGEMENT | Age: 86
Discharge: HOME | DRG: 309 | End: 2021-05-18
Payer: MEDICARE

## 2021-01-01 ENCOUNTER — APPOINTMENT (OUTPATIENT)
Dept: GENERAL RADIOLOGY | Age: 86
DRG: 871 | End: 2021-01-01
Attending: FAMILY MEDICINE
Payer: MEDICARE

## 2021-01-01 ENCOUNTER — HOSPITAL ENCOUNTER (OUTPATIENT)
Dept: GENERAL RADIOLOGY | Age: 86
Setting detail: OUTPATIENT SURGERY
Discharge: HOME | End: 2021-04-17
Attending: PAIN MEDICINE
Payer: MEDICARE

## 2021-01-01 ENCOUNTER — HOSPITAL ENCOUNTER (INPATIENT)
Age: 86
LOS: 7 days | Discharge: ANOTHER HOSPITAL | DRG: 552 | End: 2021-06-15
Attending: PHYSICAL MEDICINE & REHABILITATION | Admitting: PHYSICAL MEDICINE & REHABILITATION
Payer: MEDICARE

## 2021-01-01 ENCOUNTER — HOSPITAL ENCOUNTER (OUTPATIENT)
Dept: PAIN MANAGEMENT | Age: 86
Discharge: HOME | End: 2021-02-16
Payer: MEDICARE

## 2021-01-01 ENCOUNTER — ANESTHESIA (OUTPATIENT)
Dept: OPERATING ROOM | Age: 86
End: 2021-01-01

## 2021-01-01 ENCOUNTER — HOSPITAL ENCOUNTER (OUTPATIENT)
Dept: PAIN MANAGEMENT | Age: 86
Discharge: HOME | End: 2021-04-07
Payer: MEDICARE

## 2021-01-01 ENCOUNTER — ANESTHESIA (OUTPATIENT)
Dept: OPERATING ROOM | Age: 86
End: 2021-01-01
Payer: MEDICARE

## 2021-01-01 ENCOUNTER — APPOINTMENT (OUTPATIENT)
Dept: GENERAL RADIOLOGY | Age: 86
DRG: 309 | End: 2021-01-01
Payer: MEDICARE

## 2021-01-01 ENCOUNTER — ANESTHESIA EVENT (OUTPATIENT)
Dept: OPERATING ROOM | Age: 86
End: 2021-01-01

## 2021-01-01 ENCOUNTER — ANESTHESIA EVENT (OUTPATIENT)
Dept: ENDOSCOPY | Age: 86
DRG: 309 | End: 2021-01-01
Payer: MEDICARE

## 2021-01-01 ENCOUNTER — HOSPITAL ENCOUNTER (OUTPATIENT)
Age: 86
Setting detail: SPECIMEN
Discharge: HOME | End: 2021-05-14
Payer: MEDICARE

## 2021-01-01 ENCOUNTER — TELEPHONE (OUTPATIENT)
Dept: PRIMARY CARE CLINIC | Age: 86
End: 2021-01-01

## 2021-01-01 ENCOUNTER — TELEPHONE (OUTPATIENT)
Dept: PAIN MANAGEMENT | Age: 86
End: 2021-01-01

## 2021-01-01 ENCOUNTER — HOSPITAL ENCOUNTER (OUTPATIENT)
Age: 86
Discharge: HOME | End: 2021-01-21
Payer: MEDICARE

## 2021-01-01 ENCOUNTER — APPOINTMENT (OUTPATIENT)
Dept: ULTRASOUND IMAGING | Age: 86
DRG: 871 | End: 2021-01-01
Attending: FAMILY MEDICINE
Payer: MEDICARE

## 2021-01-01 ENCOUNTER — HOSPITAL ENCOUNTER (EMERGENCY)
Age: 86
Discharge: HOME | End: 2021-04-04
Attending: EMERGENCY MEDICINE
Payer: MEDICARE

## 2021-01-01 ENCOUNTER — HOSPITAL ENCOUNTER (OUTPATIENT)
Age: 86
Setting detail: OUTPATIENT SURGERY
Discharge: HOME HEALTH | End: 2021-02-12
Attending: SURGERY | Admitting: SURGERY
Payer: MEDICARE

## 2021-01-01 ENCOUNTER — HOSPITAL ENCOUNTER (INPATIENT)
Age: 86
LOS: 2 days | Discharge: HOSPICE | DRG: 871 | End: 2021-06-17
Attending: FAMILY MEDICINE | Admitting: FAMILY MEDICINE
Payer: MEDICARE

## 2021-01-01 ENCOUNTER — HOSPITAL ENCOUNTER (OUTPATIENT)
Age: 86
Setting detail: SPECIMEN
Discharge: HOME | End: 2021-04-08
Payer: MEDICARE

## 2021-01-01 ENCOUNTER — HOSPITAL ENCOUNTER (INPATIENT)
Age: 86
LOS: 2 days | Discharge: HOME | DRG: 309 | End: 2021-04-23
Attending: EMERGENCY MEDICINE | Admitting: FAMILY MEDICINE
Payer: MEDICARE

## 2021-01-01 ENCOUNTER — HOSPITAL ENCOUNTER (OUTPATIENT)
Dept: PREADMISSION TESTING | Age: 86
Setting detail: OUTPATIENT SURGERY
Discharge: HOME | End: 2021-04-18
Payer: MEDICARE

## 2021-01-01 ENCOUNTER — HOSPITAL ENCOUNTER (OUTPATIENT)
Dept: GENERAL RADIOLOGY | Age: 86
Discharge: HOME | DRG: 309 | End: 2021-04-21
Payer: MEDICARE

## 2021-01-01 ENCOUNTER — HOSPITAL ENCOUNTER (OUTPATIENT)
Dept: PAIN MANAGEMENT | Age: 86
Discharge: HOME | DRG: 309 | End: 2021-04-21
Payer: MEDICARE

## 2021-01-01 ENCOUNTER — TELEPHONE (OUTPATIENT)
Dept: PHARMACY | Age: 86
End: 2021-01-01

## 2021-01-01 ENCOUNTER — HOSPITAL ENCOUNTER (OUTPATIENT)
Dept: CT IMAGING | Facility: CLINIC | Age: 86
Discharge: HOME | End: 2021-05-16
Payer: MEDICARE

## 2021-01-01 ENCOUNTER — HOSPITAL ENCOUNTER (OUTPATIENT)
Age: 86
Discharge: HOME | DRG: 309 | End: 2021-04-21
Payer: MEDICARE

## 2021-01-01 ENCOUNTER — HOSPITAL ENCOUNTER (OUTPATIENT)
Age: 86
Setting detail: OUTPATIENT SURGERY
Discharge: HOME | End: 2021-04-15
Attending: PAIN MEDICINE | Admitting: PAIN MEDICINE
Payer: MEDICARE

## 2021-01-01 ENCOUNTER — HOSPITAL ENCOUNTER (INPATIENT)
Age: 86
LOS: 20 days | Discharge: NURSING, REHAB OR LONG-TERM CARE | DRG: 309 | End: 2021-06-08
Attending: EMERGENCY MEDICINE | Admitting: FAMILY MEDICINE
Payer: MEDICARE

## 2021-01-01 ENCOUNTER — HOSPITAL ENCOUNTER (OUTPATIENT)
Dept: LAB | Age: 86
Setting detail: SPECIMEN
Discharge: HOME | End: 2021-02-08
Payer: MEDICARE

## 2021-01-01 ENCOUNTER — HOSPITAL ENCOUNTER (OUTPATIENT)
Dept: LAB | Age: 86
Setting detail: SPECIMEN
Discharge: HOME | End: 2021-05-22
Payer: MEDICARE

## 2021-01-01 VITALS
HEART RATE: 106 BPM | HEIGHT: 65 IN | OXYGEN SATURATION: 97 % | TEMPERATURE: 98.4 F | BODY MASS INDEX: 27.84 KG/M2 | WEIGHT: 167.11 LBS | RESPIRATION RATE: 18 BRPM | DIASTOLIC BLOOD PRESSURE: 75 MMHG | SYSTOLIC BLOOD PRESSURE: 103 MMHG

## 2021-01-01 VITALS
RESPIRATION RATE: 16 BRPM | OXYGEN SATURATION: 94 % | DIASTOLIC BLOOD PRESSURE: 63 MMHG | BODY MASS INDEX: 28.52 KG/M2 | HEIGHT: 62 IN | WEIGHT: 155 LBS | HEART RATE: 90 BPM | SYSTOLIC BLOOD PRESSURE: 104 MMHG | TEMPERATURE: 97.3 F

## 2021-01-01 VITALS
OXYGEN SATURATION: 100 % | OXYGEN SATURATION: 95 % | SYSTOLIC BLOOD PRESSURE: 104 MMHG | BODY MASS INDEX: 28.32 KG/M2 | DIASTOLIC BLOOD PRESSURE: 63 MMHG | DIASTOLIC BLOOD PRESSURE: 68 MMHG | TEMPERATURE: 97.9 F | WEIGHT: 153.88 LBS | RESPIRATION RATE: 18 BRPM | HEIGHT: 62 IN | SYSTOLIC BLOOD PRESSURE: 98 MMHG | HEART RATE: 65 BPM

## 2021-01-01 VITALS
HEIGHT: 61 IN | OXYGEN SATURATION: 97 % | WEIGHT: 160 LBS | BODY MASS INDEX: 30.21 KG/M2 | RESPIRATION RATE: 25 BRPM | HEART RATE: 82 BPM | TEMPERATURE: 98.3 F | DIASTOLIC BLOOD PRESSURE: 64 MMHG | SYSTOLIC BLOOD PRESSURE: 140 MMHG

## 2021-01-01 VITALS
RESPIRATION RATE: 14 BRPM | OXYGEN SATURATION: 78 % | SYSTOLIC BLOOD PRESSURE: 70 MMHG | BODY MASS INDEX: 29.06 KG/M2 | DIASTOLIC BLOOD PRESSURE: 33 MMHG | WEIGHT: 174.6 LBS | HEART RATE: 115 BPM | TEMPERATURE: 97.6 F

## 2021-01-01 VITALS
HEIGHT: 65 IN | TEMPERATURE: 98.5 F | HEART RATE: 145 BPM | SYSTOLIC BLOOD PRESSURE: 71 MMHG | OXYGEN SATURATION: 98 % | DIASTOLIC BLOOD PRESSURE: 45 MMHG | RESPIRATION RATE: 20 BRPM | WEIGHT: 167.11 LBS | BODY MASS INDEX: 27.84 KG/M2

## 2021-01-01 VITALS
HEART RATE: 72 BPM | SYSTOLIC BLOOD PRESSURE: 133 MMHG | HEIGHT: 62 IN | BODY MASS INDEX: 28.52 KG/M2 | DIASTOLIC BLOOD PRESSURE: 67 MMHG | RESPIRATION RATE: 12 BRPM | WEIGHT: 155 LBS | OXYGEN SATURATION: 92 % | TEMPERATURE: 98.2 F

## 2021-01-01 VITALS
HEART RATE: 51 BPM | DIASTOLIC BLOOD PRESSURE: 59 MMHG | OXYGEN SATURATION: 91 % | TEMPERATURE: 97.2 F | WEIGHT: 157.19 LBS | RESPIRATION RATE: 12 BRPM | SYSTOLIC BLOOD PRESSURE: 95 MMHG | BODY MASS INDEX: 26.19 KG/M2 | HEIGHT: 65 IN

## 2021-01-01 VITALS
SYSTOLIC BLOOD PRESSURE: 92 MMHG | RESPIRATION RATE: 10 BRPM | TEMPERATURE: 97.5 F | OXYGEN SATURATION: 96 % | DIASTOLIC BLOOD PRESSURE: 51 MMHG

## 2021-01-01 VITALS
OXYGEN SATURATION: 94 % | DIASTOLIC BLOOD PRESSURE: 61 MMHG | RESPIRATION RATE: 16 BRPM | HEIGHT: 62 IN | HEART RATE: 136 BPM | SYSTOLIC BLOOD PRESSURE: 130 MMHG | WEIGHT: 155 LBS | BODY MASS INDEX: 28.52 KG/M2 | TEMPERATURE: 98.2 F

## 2021-01-01 VITALS — WEIGHT: 155 LBS | HEIGHT: 62 IN | BODY MASS INDEX: 28.52 KG/M2

## 2021-01-01 VITALS — DIASTOLIC BLOOD PRESSURE: 75 MMHG | OXYGEN SATURATION: 100 % | SYSTOLIC BLOOD PRESSURE: 101 MMHG

## 2021-01-01 DIAGNOSIS — S32.010S COMPRESSION FRACTURE OF L1 VERTEBRA, SEQUELA: ICD-10-CM

## 2021-01-01 DIAGNOSIS — Z01.818 PREOP TESTING: Primary | ICD-10-CM

## 2021-01-01 DIAGNOSIS — N18.2 CKD (CHRONIC KIDNEY DISEASE), STAGE II: ICD-10-CM

## 2021-01-01 DIAGNOSIS — I48.91 ATRIAL FIBRILLATION WITH RVR (HCC): Primary | ICD-10-CM

## 2021-01-01 DIAGNOSIS — M51.36 DDD (DEGENERATIVE DISC DISEASE), LUMBAR: ICD-10-CM

## 2021-01-01 DIAGNOSIS — M48.54XA: ICD-10-CM

## 2021-01-01 DIAGNOSIS — S32.010A CLOSED COMPRESSION FRACTURE OF BODY OF L1 VERTEBRA (HCC): ICD-10-CM

## 2021-01-01 DIAGNOSIS — I48.0 PAROXYSMAL ATRIAL FIBRILLATION (HCC): ICD-10-CM

## 2021-01-01 DIAGNOSIS — M80.00XA AGE-RELATED OSTEOPOROSIS WITH CURRENT PATHOLOGICAL FRACTURE, INITIAL ENCOUNTER: ICD-10-CM

## 2021-01-01 DIAGNOSIS — M25.552 LEFT HIP PAIN: Primary | ICD-10-CM

## 2021-01-01 DIAGNOSIS — M54.50 CHRONIC MIDLINE LOW BACK PAIN WITHOUT SCIATICA: ICD-10-CM

## 2021-01-01 DIAGNOSIS — S32.010A COMPRESSION FRACTURE OF L1 VERTEBRA, INITIAL ENCOUNTER (HCC): ICD-10-CM

## 2021-01-01 DIAGNOSIS — M80.00XG AGE-RELATED OSTEOPOROSIS WITH CURRENT PATHOLOGICAL FRACTURE WITH DELAYED HEALING, SUBSEQUENT ENCOUNTER: Primary | ICD-10-CM

## 2021-01-01 DIAGNOSIS — M47.812 ARTHROPATHY OF CERVICAL FACET JOINT: ICD-10-CM

## 2021-01-01 DIAGNOSIS — M46.24 OSTEOMYELITIS OF THORACIC SPINE (HCC): ICD-10-CM

## 2021-01-01 DIAGNOSIS — M47.816 LUMBAR SPONDYLOSIS: ICD-10-CM

## 2021-01-01 DIAGNOSIS — N28.1 RENAL CYST: ICD-10-CM

## 2021-01-01 DIAGNOSIS — M47.817 LUMBOSACRAL SPONDYLOSIS WITHOUT MYELOPATHY: Primary | ICD-10-CM

## 2021-01-01 DIAGNOSIS — M47.812 CERVICAL SPONDYLOSIS: ICD-10-CM

## 2021-01-01 DIAGNOSIS — M50.30 DEGENERATIVE CERVICAL DISC: ICD-10-CM

## 2021-01-01 DIAGNOSIS — N18.2 BENIGN HYPERTENSION WITH CKD (CHRONIC KIDNEY DISEASE), STAGE II: ICD-10-CM

## 2021-01-01 DIAGNOSIS — M80.00XS AGE-RELATED OSTEOPOROSIS WITH CURRENT PATHOLOGICAL FRACTURE, SEQUELA: ICD-10-CM

## 2021-01-01 DIAGNOSIS — M46.45 DISCITIS OF THORACOLUMBAR REGION: ICD-10-CM

## 2021-01-01 DIAGNOSIS — M54.50 ACUTE EXACERBATION OF CHRONIC LOW BACK PAIN: Primary | ICD-10-CM

## 2021-01-01 DIAGNOSIS — S32.010G COMPRESSION FRACTURE OF L1 VERTEBRA WITH DELAYED HEALING, SUBSEQUENT ENCOUNTER: ICD-10-CM

## 2021-01-01 DIAGNOSIS — M80.00XA AGE-RELATED OSTEOPOROSIS WITH CURRENT PATHOLOGICAL FRACTURE, INITIAL ENCOUNTER: Primary | ICD-10-CM

## 2021-01-01 DIAGNOSIS — M48.54XA NON-TRAUMATIC COMPRESSION FRACTURE OF T10 THORACIC VERTEBRA, INITIAL ENCOUNTER (HCC): ICD-10-CM

## 2021-01-01 DIAGNOSIS — M54.16 LUMBAR RADICULOPATHY: ICD-10-CM

## 2021-01-01 DIAGNOSIS — Z51.81 MEDICATION MONITORING ENCOUNTER: Primary | ICD-10-CM

## 2021-01-01 DIAGNOSIS — M54.50 CHRONIC MIDLINE LOW BACK PAIN WITHOUT SCIATICA: Primary | ICD-10-CM

## 2021-01-01 DIAGNOSIS — Z51.81 MEDICATION MONITORING ENCOUNTER: ICD-10-CM

## 2021-01-01 DIAGNOSIS — G89.29 CHRONIC MIDLINE LOW BACK PAIN WITHOUT SCIATICA: ICD-10-CM

## 2021-01-01 DIAGNOSIS — C45.1 MESOTHELIOMA OF PERITONEUM (HCC): ICD-10-CM

## 2021-01-01 DIAGNOSIS — M46.1 SACROILIITIS (HCC): ICD-10-CM

## 2021-01-01 DIAGNOSIS — G89.29 CHRONIC MIDLINE LOW BACK PAIN WITHOUT SCIATICA: Primary | ICD-10-CM

## 2021-01-01 DIAGNOSIS — M47.817 LUMBOSACRAL SPONDYLOSIS WITHOUT MYELOPATHY: ICD-10-CM

## 2021-01-01 DIAGNOSIS — N20.0 NEPHROLITHIASIS: ICD-10-CM

## 2021-01-01 DIAGNOSIS — M48.54XA NONTRAUMATIC COMPRESSION FRACTURE OF T5 VERTEBRA, INITIAL ENCOUNTER (HCC): ICD-10-CM

## 2021-01-01 DIAGNOSIS — I12.9 BENIGN HYPERTENSION WITH CKD (CHRONIC KIDNEY DISEASE), STAGE II: ICD-10-CM

## 2021-01-01 DIAGNOSIS — E04.1 NONTOXIC SINGLE THYROID NODULE: ICD-10-CM

## 2021-01-01 DIAGNOSIS — M48.54XA NON-TRAUMATIC COMPRESSION FRACTURE OF T6 THORACIC VERTEBRA, INITIAL ENCOUNTER (HCC): ICD-10-CM

## 2021-01-01 DIAGNOSIS — G89.29 ACUTE EXACERBATION OF CHRONIC LOW BACK PAIN: Primary | ICD-10-CM

## 2021-01-01 LAB
-: ABNORMAL
ABO/RH: NORMAL
ABSOLUTE BANDS #: 0.18 K/UL (ref 0–1)
ABSOLUTE BANDS #: 0.89 K/UL (ref 0–1)
ABSOLUTE BANDS #: 0.89 K/UL (ref 0–1)
ABSOLUTE BANDS #: 2.55 K/UL (ref 0–1)
ABSOLUTE EOS #: 0 K/UL (ref 0–0.4)
ABSOLUTE EOS #: 0.03 K/UL (ref 0–0.44)
ABSOLUTE EOS #: <0.03 K/UL (ref 0–0.44)
ABSOLUTE IMMATURE GRANULOCYTE: 0.06 K/UL (ref 0–0.3)
ABSOLUTE IMMATURE GRANULOCYTE: 0.2 K/UL (ref 0–0.3)
ABSOLUTE IMMATURE GRANULOCYTE: ABNORMAL K/UL (ref 0–0.3)
ABSOLUTE LYMPH #: 0.35 K/UL (ref 1–4.8)
ABSOLUTE LYMPH #: 0.4 K/UL (ref 1–4.8)
ABSOLUTE LYMPH #: 0.44 K/UL (ref 1–4.8)
ABSOLUTE LYMPH #: 0.5 K/UL (ref 1–4.8)
ABSOLUTE LYMPH #: 0.53 K/UL (ref 1–4.8)
ABSOLUTE LYMPH #: 0.56 K/UL (ref 1–4.8)
ABSOLUTE LYMPH #: 0.58 K/UL (ref 1–4.8)
ABSOLUTE LYMPH #: 0.6 K/UL (ref 1–4.8)
ABSOLUTE LYMPH #: 0.7 K/UL (ref 1–4.8)
ABSOLUTE LYMPH #: 0.8 K/UL (ref 1–4.8)
ABSOLUTE LYMPH #: 0.81 K/UL (ref 1.1–3.7)
ABSOLUTE LYMPH #: 1.1 K/UL (ref 1–4.8)
ABSOLUTE LYMPH #: 1.2 K/UL (ref 1.1–3.7)
ABSOLUTE LYMPH #: 1.4 K/UL (ref 1–4.8)
ABSOLUTE MONO #: 0.18 K/UL (ref 0.1–1.3)
ABSOLUTE MONO #: 0.2 K/UL (ref 0.1–1.3)
ABSOLUTE MONO #: 0.3 K/UL (ref 0.1–1.3)
ABSOLUTE MONO #: 0.33 K/UL (ref 0.1–1.3)
ABSOLUTE MONO #: 0.33 K/UL (ref 0.1–1.3)
ABSOLUTE MONO #: 0.4 K/UL (ref 0.1–1.3)
ABSOLUTE MONO #: 0.41 K/UL (ref 0.1–1.3)
ABSOLUTE MONO #: 0.45 K/UL (ref 0.1–1.2)
ABSOLUTE MONO #: 0.5 K/UL (ref 0.1–1.3)
ABSOLUTE MONO #: 0.6 K/UL (ref 0.1–1.3)
ABSOLUTE MONO #: 0.64 K/UL (ref 0.1–1.3)
ABSOLUTE MONO #: 0.7 K/UL (ref 0.1–1.3)
ABSOLUTE MONO #: 0.77 K/UL (ref 0.1–1.2)
ABSOLUTE MONO #: 0.8 K/UL (ref 0.1–1.3)
ABSOLUTE MONO #: 0.8 K/UL (ref 0.1–1.3)
ABSOLUTE MONO #: 0.9 K/UL (ref 0.1–1.3)
ALBUMIN (CALCULATED): 3.4 G/DL (ref 3.2–5.2)
ALBUMIN PERCENT: 70 % (ref 45–65)
ALBUMIN SERPL-MCNC: 1.8 G/DL (ref 3.5–5.2)
ALBUMIN SERPL-MCNC: 2.3 G/DL (ref 3.5–5.2)
ALBUMIN SERPL-MCNC: 2.4 G/DL (ref 3.5–5.2)
ALBUMIN SERPL-MCNC: 2.5 G/DL (ref 3.5–5.2)
ALBUMIN SERPL-MCNC: 2.5 G/DL (ref 3.5–5.2)
ALBUMIN SERPL-MCNC: 2.6 G/DL (ref 3.5–5.2)
ALBUMIN SERPL-MCNC: 2.7 G/DL (ref 3.5–5.2)
ALBUMIN SERPL-MCNC: 2.8 G/DL (ref 3.5–5.2)
ALBUMIN SERPL-MCNC: 2.9 G/DL (ref 3.5–5.2)
ALBUMIN SERPL-MCNC: 3 G/DL (ref 3.5–5.2)
ALBUMIN SERPL-MCNC: 3.2 G/DL (ref 3.5–5.2)
ALBUMIN SERPL-MCNC: 3.5 G/DL (ref 3.5–5.2)
ALBUMIN SERPL-MCNC: 3.6 G/DL (ref 3.5–5.2)
ALBUMIN SERPL-MCNC: 3.6 G/DL (ref 3.5–5.2)
ALBUMIN/GLOBULIN RATIO: 1.2 (ref 1–2.5)
ALBUMIN/GLOBULIN RATIO: 1.7 (ref 1–2.5)
ALBUMIN/GLOBULIN RATIO: ABNORMAL (ref 1–2.5)
ALK PHOS BONE SPECIFIC: 37 U/L (ref 0–55)
ALK PHOS OTHER CALC: 0 U/L
ALK PHOSPHATASE: 127 U/L (ref 40–120)
ALKALINE PHOSPHATASE LIVER FRACTION: 90 U/L (ref 0–94)
ALLEN TEST: ABNORMAL
ALP BLD-CCNC: 100 U/L (ref 35–104)
ALP BLD-CCNC: 103 U/L (ref 35–104)
ALP BLD-CCNC: 105 U/L (ref 35–104)
ALP BLD-CCNC: 106 U/L (ref 35–104)
ALP BLD-CCNC: 107 U/L (ref 35–104)
ALP BLD-CCNC: 115 U/L (ref 35–104)
ALP BLD-CCNC: 118 U/L (ref 35–104)
ALP BLD-CCNC: 119 U/L (ref 35–104)
ALP BLD-CCNC: 121 U/L (ref 35–104)
ALP BLD-CCNC: 122 U/L (ref 35–104)
ALP BLD-CCNC: 124 U/L (ref 35–104)
ALP BLD-CCNC: 124 U/L (ref 35–104)
ALP BLD-CCNC: 127 U/L (ref 35–104)
ALP BLD-CCNC: 133 U/L (ref 35–104)
ALP BLD-CCNC: 136 U/L (ref 35–104)
ALP BLD-CCNC: 138 U/L (ref 35–104)
ALP BLD-CCNC: 139 U/L (ref 35–104)
ALP BLD-CCNC: 141 U/L (ref 35–104)
ALP BLD-CCNC: 144 U/L (ref 35–104)
ALP BLD-CCNC: 150 U/L (ref 35–104)
ALP BLD-CCNC: 154 U/L (ref 35–104)
ALP BLD-CCNC: 180 U/L (ref 35–104)
ALP BLD-CCNC: 384 U/L (ref 35–104)
ALP BLD-CCNC: 86 U/L (ref 35–104)
ALP BLD-CCNC: 90 U/L (ref 35–104)
ALP BLD-CCNC: 95 U/L (ref 35–104)
ALP BLD-CCNC: 96 U/L (ref 35–104)
ALP BLD-CCNC: 98 U/L (ref 35–104)
ALP BLD-CCNC: 98 U/L (ref 35–104)
ALP BLD-CCNC: 99 U/L (ref 35–104)
ALP BLD-CCNC: 99 U/L (ref 35–104)
ALPHA 1 PERCENT: 3 % (ref 3–6)
ALPHA 2 PERCENT: 8 % (ref 6–13)
ALPHA-1-GLOBULIN: 0.1 G/DL (ref 0.1–0.4)
ALPHA-2-GLOBULIN: 0.4 G/DL (ref 0.5–0.9)
ALT SERPL-CCNC: 103 U/L (ref 5–33)
ALT SERPL-CCNC: 21 U/L (ref 5–33)
ALT SERPL-CCNC: 22 U/L (ref 5–33)
ALT SERPL-CCNC: 22 U/L (ref 5–33)
ALT SERPL-CCNC: 23 U/L (ref 5–33)
ALT SERPL-CCNC: 23 U/L (ref 5–33)
ALT SERPL-CCNC: 25 U/L (ref 5–33)
ALT SERPL-CCNC: 26 U/L (ref 5–33)
ALT SERPL-CCNC: 27 U/L (ref 5–33)
ALT SERPL-CCNC: 28 U/L (ref 5–33)
ALT SERPL-CCNC: 30 U/L (ref 5–33)
ALT SERPL-CCNC: 32 U/L (ref 5–33)
ALT SERPL-CCNC: 32 U/L (ref 5–33)
ALT SERPL-CCNC: 34 U/L (ref 5–33)
ALT SERPL-CCNC: 41 U/L (ref 5–33)
ALT SERPL-CCNC: 41 U/L (ref 5–33)
ALT SERPL-CCNC: 46 U/L (ref 5–33)
ALT SERPL-CCNC: 52 U/L (ref 5–33)
ALT SERPL-CCNC: 59 U/L (ref 5–33)
ALT SERPL-CCNC: 61 U/L (ref 5–33)
ALT SERPL-CCNC: 62 U/L (ref 5–33)
ALT SERPL-CCNC: 85 U/L (ref 5–33)
AMMONIA: 137 UMOL/L (ref 11–51)
AMMONIA: 137 UMOL/L (ref 11–51)
AMMONIA: 54 UMOL/L (ref 11–51)
AMMONIA: 76 UMOL/L (ref 11–51)
AMORPHOUS: ABNORMAL
ANCA MYELOPEROXIDASE: <0.3 AU/ML (ref 0–3.5)
ANCA PROTEINASE 3: <0.7 AU/ML (ref 0–2)
ANION GAP SERPL CALCULATED.3IONS-SCNC: 10 MMOL/L (ref 9–17)
ANION GAP SERPL CALCULATED.3IONS-SCNC: 11 MMOL/L (ref 9–17)
ANION GAP SERPL CALCULATED.3IONS-SCNC: 12 MMOL/L (ref 9–17)
ANION GAP SERPL CALCULATED.3IONS-SCNC: 14 MMOL/L (ref 9–17)
ANION GAP SERPL CALCULATED.3IONS-SCNC: 15 MMOL/L (ref 9–17)
ANION GAP SERPL CALCULATED.3IONS-SCNC: 4 MMOL/L (ref 9–17)
ANION GAP SERPL CALCULATED.3IONS-SCNC: 5 MMOL/L (ref 9–17)
ANION GAP SERPL CALCULATED.3IONS-SCNC: 6 MMOL/L (ref 9–17)
ANION GAP SERPL CALCULATED.3IONS-SCNC: 7 MMOL/L (ref 9–17)
ANION GAP SERPL CALCULATED.3IONS-SCNC: 8 MMOL/L (ref 9–17)
ANION GAP SERPL CALCULATED.3IONS-SCNC: 9 MMOL/L (ref 9–17)
ANTI DNA DOUBLE STRANDED: 0.8 IU/ML
ANTI-NUCLEAR ANTIBODY (ANA): NEGATIVE
ANTI-XA UNFRAC HEPARIN: 0.16 IU/L (ref 0.3–0.7)
ANTI-XA UNFRAC HEPARIN: 0.41 IU/L (ref 0.3–0.7)
ANTI-XA UNFRAC HEPARIN: 0.74 IU/L (ref 0.3–0.7)
ANTI-XA UNFRAC HEPARIN: 0.81 IU/L (ref 0.3–0.7)
ANTIBODY SCREEN: NEGATIVE
ARM BAND NUMBER: NORMAL
AST SERPL-CCNC: 16 U/L
AST SERPL-CCNC: 16 U/L
AST SERPL-CCNC: 17 U/L
AST SERPL-CCNC: 18 U/L
AST SERPL-CCNC: 19 U/L
AST SERPL-CCNC: 19 U/L
AST SERPL-CCNC: 20 U/L
AST SERPL-CCNC: 21 U/L
AST SERPL-CCNC: 21 U/L
AST SERPL-CCNC: 25 U/L
AST SERPL-CCNC: 26 U/L
AST SERPL-CCNC: 26 U/L
AST SERPL-CCNC: 33 U/L
AST SERPL-CCNC: 35 U/L
AST SERPL-CCNC: 37 U/L
AST SERPL-CCNC: 37 U/L
AST SERPL-CCNC: 54 U/L
AST SERPL-CCNC: 70 U/L
AST SERPL-CCNC: 90 U/L
BACTERIA: ABNORMAL
BANDS: 15 % (ref 0–10)
BANDS: 23 % (ref 0–10)
BANDS: 3 % (ref 0–10)
BANDS: 8 % (ref 0–10)
BASOPHILS # BLD: 0 % (ref 0–2)
BASOPHILS # BLD: 1 % (ref 0–2)
BASOPHILS # BLD: 1 % (ref 0–2)
BASOPHILS ABSOLUTE: 0 K/UL (ref 0–0.2)
BASOPHILS ABSOLUTE: 0.1 K/UL (ref 0–0.2)
BASOPHILS ABSOLUTE: <0.03 K/UL (ref 0–0.2)
BASOPHILS ABSOLUTE: <0.03 K/UL (ref 0–0.2)
BETA GLOBULIN: 0.4 G/DL (ref 0.5–1.1)
BETA PERCENT: 7 % (ref 11–19)
BILIRUB SERPL-MCNC: 0.27 MG/DL (ref 0.3–1.2)
BILIRUB SERPL-MCNC: 0.32 MG/DL (ref 0.3–1.2)
BILIRUB SERPL-MCNC: 0.32 MG/DL (ref 0.3–1.2)
BILIRUB SERPL-MCNC: 0.33 MG/DL (ref 0.3–1.2)
BILIRUB SERPL-MCNC: 0.35 MG/DL (ref 0.3–1.2)
BILIRUB SERPL-MCNC: 0.36 MG/DL (ref 0.3–1.2)
BILIRUB SERPL-MCNC: 0.36 MG/DL (ref 0.3–1.2)
BILIRUB SERPL-MCNC: 0.39 MG/DL (ref 0.3–1.2)
BILIRUB SERPL-MCNC: 0.4 MG/DL (ref 0.3–1.2)
BILIRUB SERPL-MCNC: 0.42 MG/DL (ref 0.3–1.2)
BILIRUB SERPL-MCNC: 0.44 MG/DL (ref 0.3–1.2)
BILIRUB SERPL-MCNC: 0.44 MG/DL (ref 0.3–1.2)
BILIRUB SERPL-MCNC: 0.46 MG/DL (ref 0.3–1.2)
BILIRUB SERPL-MCNC: 0.46 MG/DL (ref 0.3–1.2)
BILIRUB SERPL-MCNC: 0.49 MG/DL (ref 0.3–1.2)
BILIRUB SERPL-MCNC: 0.51 MG/DL (ref 0.3–1.2)
BILIRUB SERPL-MCNC: 0.51 MG/DL (ref 0.3–1.2)
BILIRUB SERPL-MCNC: 0.56 MG/DL (ref 0.3–1.2)
BILIRUB SERPL-MCNC: 0.58 MG/DL (ref 0.3–1.2)
BILIRUB SERPL-MCNC: 0.6 MG/DL (ref 0.3–1.2)
BILIRUB SERPL-MCNC: 0.6 MG/DL (ref 0.3–1.2)
BILIRUB SERPL-MCNC: 0.61 MG/DL (ref 0.3–1.2)
BILIRUB SERPL-MCNC: 0.63 MG/DL (ref 0.3–1.2)
BILIRUB SERPL-MCNC: 0.68 MG/DL (ref 0.3–1.2)
BILIRUB SERPL-MCNC: 0.71 MG/DL (ref 0.3–1.2)
BILIRUB SERPL-MCNC: 0.74 MG/DL (ref 0.3–1.2)
BILIRUB SERPL-MCNC: 1.34 MG/DL (ref 0.3–1.2)
BILIRUBIN DIRECT: 0.18 MG/DL
BILIRUBIN URINE: ABNORMAL
BILIRUBIN URINE: NEGATIVE
BNP INTERPRETATION: ABNORMAL
BNP INTERPRETATION: ABNORMAL
BUN BLDV-MCNC: 18 MG/DL (ref 8–23)
BUN BLDV-MCNC: 19 MG/DL (ref 8–23)
BUN BLDV-MCNC: 20 MG/DL (ref 8–23)
BUN BLDV-MCNC: 21 MG/DL (ref 8–23)
BUN BLDV-MCNC: 21 MG/DL (ref 8–23)
BUN BLDV-MCNC: 22 MG/DL (ref 8–23)
BUN BLDV-MCNC: 23 MG/DL (ref 8–23)
BUN BLDV-MCNC: 24 MG/DL (ref 8–23)
BUN BLDV-MCNC: 24 MG/DL (ref 8–23)
BUN BLDV-MCNC: 25 MG/DL (ref 8–23)
BUN BLDV-MCNC: 26 MG/DL (ref 8–23)
BUN BLDV-MCNC: 27 MG/DL (ref 8–23)
BUN BLDV-MCNC: 28 MG/DL (ref 8–23)
BUN BLDV-MCNC: 30 MG/DL (ref 8–23)
BUN BLDV-MCNC: 32 MG/DL (ref 8–23)
BUN BLDV-MCNC: 35 MG/DL (ref 8–23)
BUN BLDV-MCNC: 36 MG/DL (ref 8–23)
BUN BLDV-MCNC: 45 MG/DL (ref 8–23)
BUN BLDV-MCNC: 50 MG/DL (ref 8–23)
BUN BLDV-MCNC: 55 MG/DL (ref 8–23)
BUN BLDV-MCNC: 60 MG/DL (ref 8–23)
BUN BLDV-MCNC: 61 MG/DL (ref 8–23)
BUN/CREAT BLD: ABNORMAL (ref 9–20)
C-REACTIVE PROTEIN: 21.5 MG/L (ref 0–5)
C-REACTIVE PROTEIN: <3 MG/L (ref 0–5)
CALCIUM SERPL-MCNC: 7.6 MG/DL (ref 8.6–10.4)
CALCIUM SERPL-MCNC: 7.6 MG/DL (ref 8.6–10.4)
CALCIUM SERPL-MCNC: 7.9 MG/DL (ref 8.6–10.4)
CALCIUM SERPL-MCNC: 7.9 MG/DL (ref 8.6–10.4)
CALCIUM SERPL-MCNC: 8 MG/DL (ref 8.6–10.4)
CALCIUM SERPL-MCNC: 8.1 MG/DL (ref 8.6–10.4)
CALCIUM SERPL-MCNC: 8.2 MG/DL (ref 8.6–10.4)
CALCIUM SERPL-MCNC: 8.4 MG/DL (ref 8.6–10.4)
CALCIUM SERPL-MCNC: 8.5 MG/DL (ref 8.6–10.4)
CALCIUM SERPL-MCNC: 8.6 MG/DL (ref 8.6–10.4)
CALCIUM SERPL-MCNC: 8.7 MG/DL (ref 8.6–10.4)
CALCIUM SERPL-MCNC: 8.7 MG/DL (ref 8.6–10.4)
CALCIUM SERPL-MCNC: 8.8 MG/DL (ref 8.6–10.4)
CALCIUM SERPL-MCNC: 9 MG/DL (ref 8.6–10.4)
CALCIUM SERPL-MCNC: 9.1 MG/DL (ref 8.6–10.4)
CALCIUM SERPL-MCNC: 9.3 MG/DL (ref 8.6–10.4)
CALCIUM SERPL-MCNC: 9.4 MG/DL (ref 8.6–10.4)
CALCIUM SERPL-MCNC: 9.6 MG/DL (ref 8.6–10.4)
CARBOXYHEMOGLOBIN: 1.6 % (ref 0–5)
CASTS UA: ABNORMAL /LPF
CHLORIDE BLD-SCNC: 102 MMOL/L (ref 98–107)
CHLORIDE BLD-SCNC: 102 MMOL/L (ref 98–107)
CHLORIDE BLD-SCNC: 103 MMOL/L (ref 98–107)
CHLORIDE BLD-SCNC: 104 MMOL/L (ref 98–107)
CHLORIDE BLD-SCNC: 105 MMOL/L (ref 98–107)
CHLORIDE BLD-SCNC: 105 MMOL/L (ref 98–107)
CHLORIDE BLD-SCNC: 106 MMOL/L (ref 98–107)
CHLORIDE BLD-SCNC: 107 MMOL/L (ref 98–107)
CHLORIDE BLD-SCNC: 108 MMOL/L (ref 98–107)
CHLORIDE BLD-SCNC: 109 MMOL/L (ref 98–107)
CHLORIDE BLD-SCNC: 110 MMOL/L (ref 98–107)
CHOLESTEROL/HDL RATIO: 4.6
CHOLESTEROL: 198 MG/DL
CO2: 22 MMOL/L (ref 20–31)
CO2: 23 MMOL/L (ref 20–31)
CO2: 24 MMOL/L (ref 20–31)
CO2: 25 MMOL/L (ref 20–31)
CO2: 26 MMOL/L (ref 20–31)
CO2: 27 MMOL/L (ref 20–31)
CO2: 28 MMOL/L (ref 20–31)
CO2: 29 MMOL/L (ref 20–31)
CO2: 29 MMOL/L (ref 20–31)
CO2: 30 MMOL/L (ref 20–31)
CO2: 31 MMOL/L (ref 20–31)
COLOR: YELLOW
COMMENT UA: ABNORMAL
COMMENT UA: NORMAL
COMPLEMENT C3: 48 MG/DL (ref 90–180)
COMPLEMENT C4: 11 MG/DL (ref 10–40)
CREAT SERPL-MCNC: 0.62 MG/DL (ref 0.5–0.9)
CREAT SERPL-MCNC: 0.66 MG/DL (ref 0.5–0.9)
CREAT SERPL-MCNC: 0.69 MG/DL (ref 0.5–0.9)
CREAT SERPL-MCNC: 0.71 MG/DL (ref 0.5–0.9)
CREAT SERPL-MCNC: 0.74 MG/DL (ref 0.5–0.9)
CREAT SERPL-MCNC: 0.74 MG/DL (ref 0.5–0.9)
CREAT SERPL-MCNC: 0.75 MG/DL (ref 0.5–0.9)
CREAT SERPL-MCNC: 0.76 MG/DL (ref 0.5–0.9)
CREAT SERPL-MCNC: 0.77 MG/DL (ref 0.5–0.9)
CREAT SERPL-MCNC: 0.78 MG/DL (ref 0.5–0.9)
CREAT SERPL-MCNC: 0.79 MG/DL (ref 0.5–0.9)
CREAT SERPL-MCNC: 0.79 MG/DL (ref 0.5–0.9)
CREAT SERPL-MCNC: 0.85 MG/DL (ref 0.5–0.9)
CREAT SERPL-MCNC: 0.88 MG/DL (ref 0.5–0.9)
CREAT SERPL-MCNC: 0.91 MG/DL (ref 0.5–0.9)
CREAT SERPL-MCNC: 0.91 MG/DL (ref 0.5–0.9)
CREAT SERPL-MCNC: 0.92 MG/DL (ref 0.5–0.9)
CREAT SERPL-MCNC: 0.97 MG/DL (ref 0.5–0.9)
CREAT SERPL-MCNC: 0.98 MG/DL (ref 0.5–0.9)
CREAT SERPL-MCNC: 1 MG/DL (ref 0.5–0.9)
CREAT SERPL-MCNC: 1.14 MG/DL (ref 0.5–0.9)
CREAT SERPL-MCNC: 1.19 MG/DL (ref 0.5–0.9)
CREAT SERPL-MCNC: 1.33 MG/DL (ref 0.5–0.9)
CREAT SERPL-MCNC: 1.42 MG/DL (ref 0.5–0.9)
CREAT SERPL-MCNC: 1.43 MG/DL (ref 0.5–0.9)
CREAT SERPL-MCNC: 1.48 MG/DL (ref 0.5–0.9)
CREAT SERPL-MCNC: 1.6 MG/DL (ref 0.5–0.9)
CREAT SERPL-MCNC: 1.73 MG/DL (ref 0.5–0.9)
CREAT SERPL-MCNC: 1.77 MG/DL (ref 0.5–0.9)
CREAT SERPL-MCNC: 1.9 MG/DL (ref 0.5–0.9)
CREAT SERPL-MCNC: 1.91 MG/DL (ref 0.5–0.9)
CREATININE URINE: 128.8 MG/DL (ref 28–217)
CREATININE URINE: 60.1 MG/DL (ref 28–217)
CRYSTALS, UA: ABNORMAL /HPF
CULTURE: ABNORMAL
CULTURE: NO GROWTH
CULTURE: NORMAL
DIFFERENTIAL TYPE: ABNORMAL
DIRECT EXAM: ABNORMAL
DIRECT EXAM: ABNORMAL
DIRECT EXAM: NORMAL
DIRECT EXAM: NORMAL
EKG ATRIAL RATE: 100 BPM
EKG ATRIAL RATE: 150 BPM
EKG ATRIAL RATE: 337 BPM
EKG ATRIAL RATE: 58 BPM
EKG P AXIS: 76 DEGREES
EKG P-R INTERVAL: 182 MS
EKG Q-T INTERVAL: 296 MS
EKG Q-T INTERVAL: 304 MS
EKG Q-T INTERVAL: 342 MS
EKG Q-T INTERVAL: 424 MS
EKG QRS DURATION: 74 MS
EKG QRS DURATION: 80 MS
EKG QRS DURATION: 82 MS
EKG QRS DURATION: 90 MS
EKG QTC CALCULATION (BAZETT): 413 MS
EKG QTC CALCULATION (BAZETT): 416 MS
EKG QTC CALCULATION (BAZETT): 481 MS
EKG QTC CALCULATION (BAZETT): 503 MS
EKG R AXIS: -6 DEGREES
EKG R AXIS: 1 DEGREES
EKG R AXIS: 16 DEGREES
EKG R AXIS: 31 DEGREES
EKG T AXIS: -134 DEGREES
EKG T AXIS: 12 DEGREES
EKG T AXIS: 24 DEGREES
EKG T AXIS: 26 DEGREES
EKG VENTRICULAR RATE: 111 BPM
EKG VENTRICULAR RATE: 130 BPM
EKG VENTRICULAR RATE: 159 BPM
EKG VENTRICULAR RATE: 58 BPM
ENA ANTIBODIES SCREEN: 0.4 U/ML
EOSINOPHILS RELATIVE PERCENT: 0 % (ref 0–4)
EOSINOPHILS RELATIVE PERCENT: 0 % (ref 1–4)
EOSINOPHILS RELATIVE PERCENT: 1 % (ref 0–4)
EOSINOPHILS RELATIVE PERCENT: 1 % (ref 1–4)
EPITHELIAL CELLS UA: ABNORMAL /HPF
EXPIRATION DATE: NORMAL
FERRITIN: 329 UG/L (ref 13–150)
FIO2: ABNORMAL
FOLATE: 17.6 NG/ML
FREE KAPPA/LAMBDA RATIO: 1.13 (ref 0.26–1.65)
GAMMA GLOBULIN %: 12 % (ref 9–20)
GAMMA GLOBULIN: 0.6 G/DL (ref 0.5–1.5)
GFR AFRICAN AMERICAN: 30 ML/MIN
GFR AFRICAN AMERICAN: 30 ML/MIN
GFR AFRICAN AMERICAN: 33 ML/MIN
GFR AFRICAN AMERICAN: 34 ML/MIN
GFR AFRICAN AMERICAN: 37 ML/MIN
GFR AFRICAN AMERICAN: 41 ML/MIN
GFR AFRICAN AMERICAN: 42 ML/MIN
GFR AFRICAN AMERICAN: 43 ML/MIN
GFR AFRICAN AMERICAN: 46 ML/MIN
GFR AFRICAN AMERICAN: 52 ML/MIN
GFR AFRICAN AMERICAN: 55 ML/MIN
GFR AFRICAN AMERICAN: >60 ML/MIN
GFR NON-AFRICAN AMERICAN: 25 ML/MIN
GFR NON-AFRICAN AMERICAN: 25 ML/MIN
GFR NON-AFRICAN AMERICAN: 27 ML/MIN
GFR NON-AFRICAN AMERICAN: 28 ML/MIN
GFR NON-AFRICAN AMERICAN: 31 ML/MIN
GFR NON-AFRICAN AMERICAN: 34 ML/MIN
GFR NON-AFRICAN AMERICAN: 35 ML/MIN
GFR NON-AFRICAN AMERICAN: 35 ML/MIN
GFR NON-AFRICAN AMERICAN: 38 ML/MIN
GFR NON-AFRICAN AMERICAN: 43 ML/MIN
GFR NON-AFRICAN AMERICAN: 45 ML/MIN
GFR NON-AFRICAN AMERICAN: 50 ML/MIN
GFR NON-AFRICAN AMERICAN: 53 ML/MIN
GFR NON-AFRICAN AMERICAN: 54 ML/MIN
GFR NON-AFRICAN AMERICAN: 55 ML/MIN
GFR NON-AFRICAN AMERICAN: 58 ML/MIN
GFR NON-AFRICAN AMERICAN: 59 ML/MIN
GFR NON-AFRICAN AMERICAN: 59 ML/MIN
GFR NON-AFRICAN AMERICAN: >60 ML/MIN
GFR SERPL CREATININE-BSD FRML MDRD: >60 ML/MIN
GFR SERPL CREATININE-BSD FRML MDRD: ABNORMAL ML/MIN/{1.73_M2}
GLUCOSE BLD-MCNC: 105 MG/DL (ref 70–99)
GLUCOSE BLD-MCNC: 108 MG/DL (ref 70–99)
GLUCOSE BLD-MCNC: 110 MG/DL (ref 74–100)
GLUCOSE BLD-MCNC: 111 MG/DL (ref 70–99)
GLUCOSE BLD-MCNC: 113 MG/DL (ref 70–99)
GLUCOSE BLD-MCNC: 115 MG/DL (ref 70–99)
GLUCOSE BLD-MCNC: 116 MG/DL (ref 70–99)
GLUCOSE BLD-MCNC: 119 MG/DL (ref 70–99)
GLUCOSE BLD-MCNC: 120 MG/DL (ref 70–99)
GLUCOSE BLD-MCNC: 121 MG/DL (ref 70–99)
GLUCOSE BLD-MCNC: 122 MG/DL (ref 70–99)
GLUCOSE BLD-MCNC: 123 MG/DL (ref 70–99)
GLUCOSE BLD-MCNC: 123 MG/DL (ref 70–99)
GLUCOSE BLD-MCNC: 124 MG/DL (ref 70–99)
GLUCOSE BLD-MCNC: 124 MG/DL (ref 70–99)
GLUCOSE BLD-MCNC: 126 MG/DL (ref 70–99)
GLUCOSE BLD-MCNC: 128 MG/DL (ref 70–99)
GLUCOSE BLD-MCNC: 128 MG/DL (ref 70–99)
GLUCOSE BLD-MCNC: 132 MG/DL (ref 70–99)
GLUCOSE BLD-MCNC: 134 MG/DL (ref 70–99)
GLUCOSE BLD-MCNC: 144 MG/DL (ref 70–99)
GLUCOSE BLD-MCNC: 146 MG/DL (ref 65–105)
GLUCOSE BLD-MCNC: 147 MG/DL (ref 70–99)
GLUCOSE BLD-MCNC: 151 MG/DL (ref 70–99)
GLUCOSE BLD-MCNC: 153 MG/DL (ref 70–99)
GLUCOSE BLD-MCNC: 154 MG/DL (ref 70–99)
GLUCOSE BLD-MCNC: 155 MG/DL (ref 70–99)
GLUCOSE BLD-MCNC: 158 MG/DL (ref 70–99)
GLUCOSE BLD-MCNC: 159 MG/DL (ref 70–99)
GLUCOSE BLD-MCNC: 168 MG/DL (ref 70–99)
GLUCOSE BLD-MCNC: 175 MG/DL (ref 70–99)
GLUCOSE BLD-MCNC: 184 MG/DL (ref 70–99)
GLUCOSE BLD-MCNC: 193 MG/DL (ref 70–99)
GLUCOSE BLD-MCNC: 225 MG/DL (ref 70–99)
GLUCOSE BLD-MCNC: 256 MG/DL (ref 65–105)
GLUCOSE BLD-MCNC: 85 MG/DL (ref 70–99)
GLUCOSE BLD-MCNC: 98 MG/DL (ref 70–99)
GLUCOSE URINE: NEGATIVE
HAV IGM SER IA-ACNC: NONREACTIVE
HCO3 VENOUS: 23.4 MMOL/L (ref 24–30)
HCT VFR BLD CALC: 18.9 % (ref 36–46)
HCT VFR BLD CALC: 19.9 % (ref 36–46)
HCT VFR BLD CALC: 22.1 % (ref 36–46)
HCT VFR BLD CALC: 24.1 % (ref 36–46)
HCT VFR BLD CALC: 24.7 % (ref 36–46)
HCT VFR BLD CALC: 25.1 % (ref 36–46)
HCT VFR BLD CALC: 25.2 % (ref 36–46)
HCT VFR BLD CALC: 25.8 % (ref 36–46)
HCT VFR BLD CALC: 25.8 % (ref 36–46)
HCT VFR BLD CALC: 25.9 % (ref 36–46)
HCT VFR BLD CALC: 26.4 % (ref 36–46)
HCT VFR BLD CALC: 26.5 % (ref 36–46)
HCT VFR BLD CALC: 26.5 % (ref 36–46)
HCT VFR BLD CALC: 27 % (ref 36–46)
HCT VFR BLD CALC: 27.1 % (ref 36–46)
HCT VFR BLD CALC: 27.3 % (ref 36–46)
HCT VFR BLD CALC: 27.4 % (ref 36–46)
HCT VFR BLD CALC: 27.8 % (ref 36–46)
HCT VFR BLD CALC: 28.2 % (ref 36–46)
HCT VFR BLD CALC: 28.2 % (ref 36–46)
HCT VFR BLD CALC: 28.6 % (ref 36–46)
HCT VFR BLD CALC: 28.7 % (ref 36–46)
HCT VFR BLD CALC: 28.8 % (ref 36–46)
HCT VFR BLD CALC: 29.8 % (ref 36–46)
HCT VFR BLD CALC: 30.4 % (ref 36–46)
HCT VFR BLD CALC: 30.6 % (ref 36–46)
HCT VFR BLD CALC: 31.3 % (ref 36.3–47.1)
HCT VFR BLD CALC: 31.5 % (ref 36–46)
HCT VFR BLD CALC: 31.6 % (ref 36–46)
HCT VFR BLD CALC: 32.3 % (ref 36–46)
HCT VFR BLD CALC: 33 % (ref 36–46)
HCT VFR BLD CALC: 37.5 % (ref 36.3–47.1)
HCT VFR BLD CALC: 42.1 % (ref 36–46)
HDLC SERPL-MCNC: 43 MG/DL
HEMOGLOBIN: 10.1 G/DL (ref 12–16)
HEMOGLOBIN: 10.2 G/DL (ref 12–16)
HEMOGLOBIN: 10.3 G/DL (ref 12–16)
HEMOGLOBIN: 10.5 G/DL (ref 12–16)
HEMOGLOBIN: 10.9 G/DL (ref 12–16)
HEMOGLOBIN: 11 G/DL (ref 11.9–15.1)
HEMOGLOBIN: 14 G/DL (ref 12–16)
HEMOGLOBIN: 6 G/DL (ref 12–16)
HEMOGLOBIN: 6.4 G/DL (ref 12–16)
HEMOGLOBIN: 7.2 G/DL (ref 12–16)
HEMOGLOBIN: 7.8 G/DL (ref 12–16)
HEMOGLOBIN: 8 G/DL (ref 12–16)
HEMOGLOBIN: 8.1 G/DL (ref 12–16)
HEMOGLOBIN: 8.3 G/DL (ref 12–16)
HEMOGLOBIN: 8.3 G/DL (ref 12–16)
HEMOGLOBIN: 8.4 G/DL (ref 12–16)
HEMOGLOBIN: 8.6 G/DL (ref 12–16)
HEMOGLOBIN: 8.8 G/DL (ref 12–16)
HEMOGLOBIN: 8.9 G/DL (ref 12–16)
HEMOGLOBIN: 8.9 G/DL (ref 12–16)
HEMOGLOBIN: 9.2 G/DL (ref 12–16)
HEMOGLOBIN: 9.2 G/DL (ref 12–16)
HEMOGLOBIN: 9.3 G/DL (ref 12–16)
HEMOGLOBIN: 9.5 G/DL (ref 12–16)
HEMOGLOBIN: 9.5 G/DL (ref 12–16)
HEMOGLOBIN: 9.7 G/DL (ref 11.9–15.1)
HEMOGLOBIN: 9.7 G/DL (ref 12–16)
HEMOGLOBIN: 9.9 G/DL (ref 12–16)
HEPATITIS B CORE IGM ANTIBODY: NONREACTIVE
HEPATITIS B CORE TOTAL ANTIBODY: NONREACTIVE
HEPATITIS B SURFACE ANTIGEN: NONREACTIVE
HEPATITIS B SURFACE ANTIGEN: NONREACTIVE
HEPATITIS BE ANTIGEN: NEGATIVE
HEPATITIS C ANTIBODY: NONREACTIVE
IMMATURE GRANULOCYTES: 1 %
IMMATURE GRANULOCYTES: 2 %
IMMATURE GRANULOCYTES: ABNORMAL %
INR BLD: 1
INR BLD: 1.1
INR BLD: 1.2
INR BLD: 1.2
INR BLD: 1.4
INR BLD: 1.4
INR BLD: 1.5
INR BLD: 1.7
INR BLD: 2
INR BLD: 2.4
INR BLD: 2.6
INR BLD: 2.6
INR BLD: 3
INR BLD: 3.3
INR BLD: 3.7
INR BLD: 4.2
INR BLD: 4.4
INR BLD: 4.5
INR BLD: 4.6
INR BLD: 4.7
INR BLD: 4.9
IRON SATURATION: 21 % (ref 20–55)
IRON: 49 UG/DL (ref 37–145)
KAPPA FREE LIGHT CHAINS QNT: 3.55 MG/DL (ref 0.37–1.94)
KETONES, URINE: NEGATIVE
LACTATE DEHYDROGENASE: 383 U/L (ref 135–214)
LACTIC ACID: 1.9 MMOL/L (ref 0.5–2.2)
LACTIC ACID: 2 MMOL/L (ref 0.5–2.2)
LACTIC ACID: 2.8 MMOL/L (ref 0.5–2.2)
LACTIC ACID: 3.7 MMOL/L (ref 0.5–2.2)
LACTIC ACID: 4.1 MMOL/L (ref 0.5–2.2)
LAMBDA FREE LIGHT CHAINS QNT: 3.15 MG/DL (ref 0.57–2.63)
LDL CHOLESTEROL: 109 MG/DL (ref 0–130)
LEUKOCYTE ESTERASE, URINE: ABNORMAL
LEUKOCYTE ESTERASE, URINE: ABNORMAL
LEUKOCYTE ESTERASE, URINE: NEGATIVE
LEUKOCYTE ESTERASE, URINE: NEGATIVE
LIPASE: 54 U/L (ref 13–60)
LV EF: 55 %
LVEF MODALITY: NORMAL
LYMPHOCYTES # BLD: 10 % (ref 24–44)
LYMPHOCYTES # BLD: 10 % (ref 24–44)
LYMPHOCYTES # BLD: 11 % (ref 24–44)
LYMPHOCYTES # BLD: 11 % (ref 24–44)
LYMPHOCYTES # BLD: 14 % (ref 24–44)
LYMPHOCYTES # BLD: 15 % (ref 24–44)
LYMPHOCYTES # BLD: 18 % (ref 24–44)
LYMPHOCYTES # BLD: 25 % (ref 24–43)
LYMPHOCYTES # BLD: 4 % (ref 24–44)
LYMPHOCYTES # BLD: 5 % (ref 24–44)
LYMPHOCYTES # BLD: 6 % (ref 24–44)
LYMPHOCYTES # BLD: 7 % (ref 24–44)
LYMPHOCYTES # BLD: 8 % (ref 24–43)
LYMPHOCYTES # BLD: 8 % (ref 24–44)
LYMPHOCYTES # BLD: 9 % (ref 24–44)
LYMPHOCYTES # BLD: 9 % (ref 24–44)
Lab: ABNORMAL
Lab: NORMAL
MAGNESIUM: 1.8 MG/DL (ref 1.6–2.6)
MAGNESIUM: 1.9 MG/DL (ref 1.6–2.6)
MAGNESIUM: 2.2 MG/DL (ref 1.6–2.6)
MAGNESIUM: 2.4 MG/DL (ref 1.6–2.6)
MAGNESIUM: 2.4 MG/DL (ref 1.6–2.6)
MCH RBC QN AUTO: 28.6 PG (ref 26–34)
MCH RBC QN AUTO: 28.8 PG (ref 26–34)
MCH RBC QN AUTO: 28.8 PG (ref 26–34)
MCH RBC QN AUTO: 28.9 PG (ref 26–34)
MCH RBC QN AUTO: 29 PG (ref 26–34)
MCH RBC QN AUTO: 29 PG (ref 26–34)
MCH RBC QN AUTO: 29.2 PG (ref 26–34)
MCH RBC QN AUTO: 29.2 PG (ref 26–34)
MCH RBC QN AUTO: 29.4 PG (ref 26–34)
MCH RBC QN AUTO: 29.5 PG (ref 26–34)
MCH RBC QN AUTO: 29.6 PG (ref 26–34)
MCH RBC QN AUTO: 29.7 PG (ref 26–34)
MCH RBC QN AUTO: 29.7 PG (ref 26–34)
MCH RBC QN AUTO: 29.8 PG (ref 26–34)
MCH RBC QN AUTO: 29.8 PG (ref 26–34)
MCH RBC QN AUTO: 29.9 PG (ref 26–34)
MCH RBC QN AUTO: 30 PG (ref 26–34)
MCH RBC QN AUTO: 30 PG (ref 26–34)
MCH RBC QN AUTO: 30.2 PG (ref 26–34)
MCH RBC QN AUTO: 30.3 PG (ref 25.2–33.5)
MCH RBC QN AUTO: 30.7 PG (ref 26–34)
MCH RBC QN AUTO: 31 PG (ref 26–34)
MCH RBC QN AUTO: 31.6 PG (ref 26–34)
MCH RBC QN AUTO: 31.9 PG (ref 26–34)
MCH RBC QN AUTO: 32.4 PG (ref 26–34)
MCH RBC QN AUTO: 32.5 PG (ref 26–34)
MCH RBC QN AUTO: 33.4 PG (ref 25.2–33.5)
MCHC RBC AUTO-ENTMCNC: 29.3 G/DL (ref 28.4–34.8)
MCHC RBC AUTO-ENTMCNC: 31 G/DL (ref 28.4–34.8)
MCHC RBC AUTO-ENTMCNC: 31.1 G/DL (ref 31–37)
MCHC RBC AUTO-ENTMCNC: 31.6 G/DL (ref 31–37)
MCHC RBC AUTO-ENTMCNC: 31.7 G/DL (ref 31–37)
MCHC RBC AUTO-ENTMCNC: 32 G/DL (ref 31–37)
MCHC RBC AUTO-ENTMCNC: 32 G/DL (ref 31–37)
MCHC RBC AUTO-ENTMCNC: 32.1 G/DL (ref 31–37)
MCHC RBC AUTO-ENTMCNC: 32.3 G/DL (ref 31–37)
MCHC RBC AUTO-ENTMCNC: 32.4 G/DL (ref 31–37)
MCHC RBC AUTO-ENTMCNC: 32.4 G/DL (ref 31–37)
MCHC RBC AUTO-ENTMCNC: 32.5 G/DL (ref 31–37)
MCHC RBC AUTO-ENTMCNC: 32.6 G/DL (ref 31–37)
MCHC RBC AUTO-ENTMCNC: 32.9 G/DL (ref 31–37)
MCHC RBC AUTO-ENTMCNC: 32.9 G/DL (ref 31–37)
MCHC RBC AUTO-ENTMCNC: 33.1 G/DL (ref 31–37)
MCHC RBC AUTO-ENTMCNC: 33.3 G/DL (ref 31–37)
MCHC RBC AUTO-ENTMCNC: 33.3 G/DL (ref 31–37)
MCHC RBC AUTO-ENTMCNC: 33.5 G/DL (ref 31–37)
MCHC RBC AUTO-ENTMCNC: 33.6 G/DL (ref 31–37)
MCHC RBC AUTO-ENTMCNC: 33.8 G/DL (ref 31–37)
MCV RBC AUTO: 103.3 FL (ref 82.6–102.9)
MCV RBC AUTO: 107.9 FL (ref 82.6–102.9)
MCV RBC AUTO: 87.6 FL (ref 80–100)
MCV RBC AUTO: 88.5 FL (ref 80–100)
MCV RBC AUTO: 88.5 FL (ref 80–100)
MCV RBC AUTO: 89 FL (ref 80–100)
MCV RBC AUTO: 89.1 FL (ref 80–100)
MCV RBC AUTO: 89.5 FL (ref 80–100)
MCV RBC AUTO: 89.8 FL (ref 80–100)
MCV RBC AUTO: 89.9 FL (ref 80–100)
MCV RBC AUTO: 90.1 FL (ref 80–100)
MCV RBC AUTO: 90.4 FL (ref 80–100)
MCV RBC AUTO: 90.4 FL (ref 80–100)
MCV RBC AUTO: 90.5 FL (ref 80–100)
MCV RBC AUTO: 90.6 FL (ref 80–100)
MCV RBC AUTO: 90.7 FL (ref 80–100)
MCV RBC AUTO: 91.2 FL (ref 80–100)
MCV RBC AUTO: 91.2 FL (ref 80–100)
MCV RBC AUTO: 91.4 FL (ref 80–100)
MCV RBC AUTO: 91.4 FL (ref 80–100)
MCV RBC AUTO: 91.5 FL (ref 80–100)
MCV RBC AUTO: 92 FL (ref 80–100)
MCV RBC AUTO: 92 FL (ref 80–100)
MCV RBC AUTO: 92.6 FL (ref 80–100)
MCV RBC AUTO: 92.9 FL (ref 80–100)
MCV RBC AUTO: 93.2 FL (ref 80–100)
MCV RBC AUTO: 93.7 FL (ref 80–100)
MCV RBC AUTO: 94.7 FL (ref 80–100)
MCV RBC AUTO: 96.1 FL (ref 80–100)
MCV RBC AUTO: 96.8 FL (ref 80–100)
MCV RBC AUTO: 97.6 FL (ref 80–100)
MCV RBC AUTO: 98.2 FL (ref 80–100)
METAMYELOCYTES ABSOLUTE COUNT: 0.06 K/UL
METAMYELOCYTES: 1 %
METHEMOGLOBIN: 1.2 % (ref 0–1.9)
MICROALBUMIN/CREAT 24H UR: 21 MG/L
MICROALBUMIN/CREAT UR-RTO: 16 MCG/MG CREAT
MODE: ABNORMAL
MONOCYTES # BLD: 10 % (ref 1–7)
MONOCYTES # BLD: 11 % (ref 1–7)
MONOCYTES # BLD: 3 % (ref 1–7)
MONOCYTES # BLD: 4 % (ref 1–7)
MONOCYTES # BLD: 6 % (ref 1–7)
MONOCYTES # BLD: 7 % (ref 1–7)
MONOCYTES # BLD: 8 % (ref 1–7)
MONOCYTES # BLD: 8 % (ref 3–12)
MONOCYTES # BLD: 9 % (ref 1–7)
MONOCYTES # BLD: 9 % (ref 1–7)
MONOCYTES # BLD: 9 % (ref 3–12)
MORPHOLOGY: ABNORMAL
MUCUS: ABNORMAL
MYELOCYTES ABSOLUTE COUNT: 0.06 K/UL
MYELOCYTES: 1 %
MYOGLOBIN: 28 NG/ML (ref 25–58)
MYOGLOBIN: 29 NG/ML (ref 25–58)
NEGATIVE BASE EXCESS, VEN: 0.4 MMOL/L (ref 0–2)
NITRITE, URINE: NEGATIVE
NITRITE, URINE: POSITIVE
NOTIFICATION TIME: ABNORMAL
NOTIFICATION: ABNORMAL
NRBC AUTOMATED: 0 PER 100 WBC
NRBC AUTOMATED: 0.2 PER 100 WBC
NRBC AUTOMATED: ABNORMAL PER 100 WBC
O2 DEVICE/FLOW/%: ABNORMAL
O2 SAT, VEN: 75.5 % (ref 60–85)
OSMOLALITY URINE: 492 MOSM/KG (ref 80–1300)
OTHER OBSERVATIONS UA: ABNORMAL
OXYHEMOGLOBIN: ABNORMAL % (ref 95–98)
PARTIAL THROMBOPLASTIN TIME: 120.1 SEC (ref 24–36)
PARTIAL THROMBOPLASTIN TIME: 29 SEC (ref 24–36)
PARTIAL THROMBOPLASTIN TIME: 29.9 SEC (ref 24–36)
PARTIAL THROMBOPLASTIN TIME: 31 SEC (ref 24–36)
PARTIAL THROMBOPLASTIN TIME: 37.3 SEC (ref 24–36)
PARTIAL THROMBOPLASTIN TIME: 38.7 SEC (ref 24–36)
PARTIAL THROMBOPLASTIN TIME: 39.5 SEC (ref 24–36)
PARTIAL THROMBOPLASTIN TIME: 40.8 SEC (ref 24–36)
PARTIAL THROMBOPLASTIN TIME: 57.7 SEC (ref 24–36)
PARTIAL THROMBOPLASTIN TIME: 69.7 SEC (ref 24–36)
PARTIAL THROMBOPLASTIN TIME: 87.1 SEC (ref 24–36)
PARTIAL THROMBOPLASTIN TIME: >150 SEC (ref 24–36)
PATHOLOGIST: ABNORMAL
PATHOLOGIST: NORMAL
PATIENT TEMP: 37
PCO2, VEN, TEMP ADJ: ABNORMAL MMHG (ref 39–55)
PCO2, VEN: 32.8 (ref 39–55)
PDW BLD-RTO: 15.2 % (ref 11.5–14.9)
PDW BLD-RTO: 16.7 % (ref 11.5–14.9)
PDW BLD-RTO: 16.9 % (ref 11.5–14.9)
PDW BLD-RTO: 17 % (ref 11.5–14.9)
PDW BLD-RTO: 17.1 % (ref 11.5–14.9)
PDW BLD-RTO: 17.1 % (ref 11.5–14.9)
PDW BLD-RTO: 17.2 % (ref 11.5–14.9)
PDW BLD-RTO: 17.3 % (ref 11.5–14.9)
PDW BLD-RTO: 17.3 % (ref 11.8–14.4)
PDW BLD-RTO: 17.4 % (ref 11.5–14.9)
PDW BLD-RTO: 17.5 % (ref 11.5–14.9)
PDW BLD-RTO: 17.5 % (ref 11.5–14.9)
PDW BLD-RTO: 17.6 % (ref 11.5–14.9)
PDW BLD-RTO: 17.7 % (ref 11.5–14.9)
PDW BLD-RTO: 17.7 % (ref 11.5–14.9)
PDW BLD-RTO: 17.8 % (ref 11.5–14.9)
PDW BLD-RTO: 18 % (ref 11.5–14.9)
PDW BLD-RTO: 18.1 % (ref 11.5–14.9)
PDW BLD-RTO: 18.3 % (ref 11.5–14.9)
PDW BLD-RTO: 18.4 % (ref 11.8–14.4)
PDW BLD-RTO: 18.6 % (ref 11.5–14.9)
PEEP/CPAP: ABNORMAL
PH UA: 5.5 (ref 5–8)
PH UA: 6 (ref 5–8)
PH VENOUS: 7.46 (ref 7.32–7.42)
PH, VEN, TEMP ADJ: ABNORMAL (ref 7.32–7.42)
PHOSPHORUS: 3.1 MG/DL (ref 2.6–4.5)
PHOSPHORUS: 3.8 MG/DL (ref 2.6–4.5)
PLATELET # BLD: 132 K/UL (ref 150–450)
PLATELET # BLD: 181 K/UL (ref 150–450)
PLATELET # BLD: 183 K/UL (ref 150–450)
PLATELET # BLD: 184 K/UL (ref 150–450)
PLATELET # BLD: 201 K/UL (ref 150–450)
PLATELET # BLD: 204 K/UL (ref 150–450)
PLATELET # BLD: 206 K/UL (ref 150–450)
PLATELET # BLD: 210 K/UL (ref 150–450)
PLATELET # BLD: 212 K/UL (ref 150–450)
PLATELET # BLD: 213 K/UL (ref 150–450)
PLATELET # BLD: 215 K/UL (ref 150–450)
PLATELET # BLD: 222 K/UL (ref 150–450)
PLATELET # BLD: 224 K/UL (ref 150–450)
PLATELET # BLD: 236 K/UL (ref 150–450)
PLATELET # BLD: 236 K/UL (ref 150–450)
PLATELET # BLD: 239 K/UL (ref 150–450)
PLATELET # BLD: 240 K/UL (ref 150–450)
PLATELET # BLD: 241 K/UL (ref 150–450)
PLATELET # BLD: 242 K/UL (ref 150–450)
PLATELET # BLD: 243 K/UL (ref 138–453)
PLATELET # BLD: 243 K/UL (ref 150–450)
PLATELET # BLD: 247 K/UL (ref 150–450)
PLATELET # BLD: 252 K/UL (ref 150–450)
PLATELET # BLD: 254 K/UL (ref 150–450)
PLATELET # BLD: 262 K/UL (ref 150–450)
PLATELET # BLD: 263 K/UL (ref 150–450)
PLATELET # BLD: 265 K/UL (ref 150–450)
PLATELET # BLD: 279 K/UL (ref 138–453)
PLATELET # BLD: 295 K/UL (ref 150–450)
PLATELET # BLD: 307 K/UL (ref 150–450)
PLATELET ESTIMATE: ABNORMAL
PMV BLD AUTO: 10.8 FL (ref 8.1–13.5)
PMV BLD AUTO: 10.9 FL (ref 8.1–13.5)
PMV BLD AUTO: 7.5 FL (ref 6–12)
PMV BLD AUTO: 7.6 FL (ref 6–12)
PMV BLD AUTO: 7.7 FL (ref 6–12)
PMV BLD AUTO: 7.8 FL (ref 6–12)
PMV BLD AUTO: 7.9 FL (ref 6–12)
PMV BLD AUTO: 7.9 FL (ref 6–12)
PMV BLD AUTO: 8 FL (ref 6–12)
PMV BLD AUTO: 8 FL (ref 6–12)
PMV BLD AUTO: 8.1 FL (ref 6–12)
PMV BLD AUTO: 8.2 FL (ref 6–12)
PMV BLD AUTO: 8.3 FL (ref 6–12)
PMV BLD AUTO: 8.4 FL (ref 6–12)
PMV BLD AUTO: 8.4 FL (ref 6–12)
PMV BLD AUTO: 8.5 FL (ref 6–12)
PMV BLD AUTO: 8.6 FL (ref 6–12)
PMV BLD AUTO: 8.7 FL (ref 6–12)
PMV BLD AUTO: 8.7 FL (ref 6–12)
PO2, VEN, TEMP ADJ: ABNORMAL MMHG (ref 30–50)
PO2, VEN: 41.2 (ref 30–50)
POC CHLORIDE: 105 MMOL/L (ref 98–107)
POC CREATININE: 1.06 MG/DL (ref 0.51–1.19)
POC CREATININE: 1.8 MG/DL (ref 0.6–1.4)
POC HEMATOCRIT: 43 % (ref 36–46)
POC HEMOGLOBIN: 14.5 G/DL (ref 12–16)
POC POTASSIUM: 3.4 MMOL/L (ref 3.5–4.5)
POC SODIUM: 144 MMOL/L (ref 138–146)
POSITIVE BASE EXCESS, VEN: ABNORMAL MMOL/L (ref 0–2)
POTASSIUM SERPL-SCNC: 3.4 MMOL/L (ref 3.7–5.3)
POTASSIUM SERPL-SCNC: 3.5 MMOL/L (ref 3.7–5.3)
POTASSIUM SERPL-SCNC: 3.6 MMOL/L (ref 3.7–5.3)
POTASSIUM SERPL-SCNC: 3.6 MMOL/L (ref 3.7–5.3)
POTASSIUM SERPL-SCNC: 3.8 MMOL/L (ref 3.7–5.3)
POTASSIUM SERPL-SCNC: 3.9 MMOL/L (ref 3.7–5.3)
POTASSIUM SERPL-SCNC: 3.9 MMOL/L (ref 3.7–5.3)
POTASSIUM SERPL-SCNC: 4 MMOL/L (ref 3.7–5.3)
POTASSIUM SERPL-SCNC: 4.1 MMOL/L (ref 3.7–5.3)
POTASSIUM SERPL-SCNC: 4.2 MMOL/L (ref 3.7–5.3)
POTASSIUM SERPL-SCNC: 4.2 MMOL/L (ref 3.7–5.3)
POTASSIUM SERPL-SCNC: 4.3 MMOL/L (ref 3.7–5.3)
POTASSIUM SERPL-SCNC: 4.4 MMOL/L (ref 3.7–5.3)
POTASSIUM SERPL-SCNC: 4.4 MMOL/L (ref 3.7–5.3)
POTASSIUM SERPL-SCNC: 4.7 MMOL/L (ref 3.7–5.3)
POTASSIUM SERPL-SCNC: 4.7 MMOL/L (ref 3.7–5.3)
POTASSIUM SERPL-SCNC: 4.8 MMOL/L (ref 3.7–5.3)
POTASSIUM SERPL-SCNC: 4.9 MMOL/L (ref 3.7–5.3)
POTASSIUM SERPL-SCNC: 5 MMOL/L (ref 3.7–5.3)
POTASSIUM SERPL-SCNC: 5.2 MMOL/L (ref 3.7–5.3)
POTASSIUM SERPL-SCNC: 5.3 MMOL/L (ref 3.7–5.3)
POTASSIUM SERPL-SCNC: 5.4 MMOL/L (ref 3.7–5.3)
POTASSIUM SERPL-SCNC: 5.4 MMOL/L (ref 3.7–5.3)
POTASSIUM SERPL-SCNC: 5.7 MMOL/L (ref 3.7–5.3)
POTASSIUM SERPL-SCNC: 6.2 MMOL/L (ref 3.7–5.3)
PRO-BNP: 1324 PG/ML
PRO-BNP: 5761 PG/ML
PROCALCITONIN: 5.47 NG/ML
PROTEIN ELECTROPHORESIS, SERUM: ABNORMAL
PROTEIN UA: ABNORMAL
PROTEIN UA: NEGATIVE
PROTHROMBIN TIME: 13.4 SEC (ref 11.8–14.6)
PROTHROMBIN TIME: 14.1 SEC (ref 11.8–14.6)
PROTHROMBIN TIME: 15.2 SEC (ref 11.8–14.6)
PROTHROMBIN TIME: 15.4 SEC (ref 11.8–14.6)
PROTHROMBIN TIME: 16.9 SEC (ref 11.8–14.6)
PROTHROMBIN TIME: 17.4 SEC (ref 11.8–14.6)
PROTHROMBIN TIME: 17.8 SEC (ref 11.8–14.6)
PROTHROMBIN TIME: 18 SEC (ref 11.8–14.6)
PROTHROMBIN TIME: 18.5 SEC (ref 11.8–14.6)
PROTHROMBIN TIME: 19.9 SEC (ref 11.8–14.6)
PROTHROMBIN TIME: 20.1 SEC (ref 11.8–14.6)
PROTHROMBIN TIME: 20.2 SEC (ref 11.8–14.6)
PROTHROMBIN TIME: 22.1 SEC (ref 11.8–14.6)
PROTHROMBIN TIME: 22.2 SEC (ref 11.8–14.6)
PROTHROMBIN TIME: 22.4 SEC (ref 11.8–14.6)
PROTHROMBIN TIME: 25.9 SEC (ref 11.8–14.6)
PROTHROMBIN TIME: 27.6 SEC (ref 11.8–14.6)
PROTHROMBIN TIME: 27.8 SEC (ref 11.8–14.6)
PROTHROMBIN TIME: 31 SEC (ref 11.8–14.6)
PROTHROMBIN TIME: 33 SEC (ref 11.8–14.6)
PROTHROMBIN TIME: 35.7 SEC (ref 11.8–14.6)
PROTHROMBIN TIME: 35.9 SEC (ref 11.8–14.6)
PROTHROMBIN TIME: 36 SEC (ref 11.8–14.6)
PROTHROMBIN TIME: 39.4 SEC (ref 11.8–14.6)
PROTHROMBIN TIME: 40.9 SEC (ref 11.8–14.6)
PROTHROMBIN TIME: 42.2 SEC (ref 11.8–14.6)
PROTHROMBIN TIME: 42.6 SEC (ref 11.8–14.6)
PROTHROMBIN TIME: 43.1 SEC (ref 11.8–14.6)
PROTHROMBIN TIME: 44.9 SEC (ref 11.8–14.6)
PSV: ABNORMAL
PT. POSITION: ABNORMAL
RBC # BLD: 2.22 M/UL (ref 4–5.2)
RBC # BLD: 2.5 M/UL (ref 4–5.2)
RBC # BLD: 2.66 M/UL (ref 4–5.2)
RBC # BLD: 2.67 M/UL (ref 4–5.2)
RBC # BLD: 2.75 M/UL (ref 4–5.2)
RBC # BLD: 2.8 M/UL (ref 4–5.2)
RBC # BLD: 2.82 M/UL (ref 4–5.2)
RBC # BLD: 2.83 M/UL (ref 4–5.2)
RBC # BLD: 2.83 M/UL (ref 4–5.2)
RBC # BLD: 2.87 M/UL (ref 4–5.2)
RBC # BLD: 2.89 M/UL (ref 4–5.2)
RBC # BLD: 2.9 M/UL (ref 3.95–5.11)
RBC # BLD: 2.93 M/UL (ref 4–5.2)
RBC # BLD: 2.97 M/UL (ref 4–5.2)
RBC # BLD: 2.97 M/UL (ref 4–5.2)
RBC # BLD: 2.99 M/UL (ref 4–5.2)
RBC # BLD: 3.02 M/UL (ref 4–5.2)
RBC # BLD: 3.02 M/UL (ref 4–5.2)
RBC # BLD: 3.08 M/UL (ref 4–5.2)
RBC # BLD: 3.12 M/UL (ref 4–5.2)
RBC # BLD: 3.13 M/UL (ref 4–5.2)
RBC # BLD: 3.18 M/UL (ref 4–5.2)
RBC # BLD: 3.21 M/UL (ref 4–5.2)
RBC # BLD: 3.26 M/UL (ref 4–5.2)
RBC # BLD: 3.27 M/UL (ref 4–5.2)
RBC # BLD: 3.29 M/UL (ref 4–5.2)
RBC # BLD: 3.33 M/UL (ref 4–5.2)
RBC # BLD: 3.4 M/UL (ref 4–5.2)
RBC # BLD: 3.4 M/UL (ref 4–5.2)
RBC # BLD: 3.54 M/UL (ref 4–5.2)
RBC # BLD: 3.63 M/UL (ref 3.95–5.11)
RBC # BLD: 4.31 M/UL (ref 4–5.2)
RBC # BLD: ABNORMAL 10*6/UL
RBC UA: ABNORMAL /HPF
RENAL EPITHELIAL, UA: ABNORMAL /HPF
RESPIRATORY RATE: 24
SAMPLE SITE: ABNORMAL
SARS-COV-2, RAPID: NORMAL
SARS-COV-2, RAPID: NOT DETECTED
SARS-COV-2: NORMAL
SARS-COV-2: NOT DETECTED
SEDIMENTATION RATE, ERYTHROCYTE: 1 MM (ref 0–30)
SEDIMENTATION RATE, ERYTHROCYTE: 5 MM (ref 0–30)
SEG NEUTROPHILS: 64 % (ref 36–65)
SEG NEUTROPHILS: 70 % (ref 36–66)
SEG NEUTROPHILS: 71 % (ref 36–66)
SEG NEUTROPHILS: 73 % (ref 36–66)
SEG NEUTROPHILS: 75 % (ref 36–66)
SEG NEUTROPHILS: 78 % (ref 36–66)
SEG NEUTROPHILS: 81 % (ref 36–66)
SEG NEUTROPHILS: 81 % (ref 36–66)
SEG NEUTROPHILS: 82 % (ref 36–65)
SEG NEUTROPHILS: 82 % (ref 36–66)
SEG NEUTROPHILS: 83 % (ref 36–66)
SEG NEUTROPHILS: 84 % (ref 36–66)
SEG NEUTROPHILS: 85 % (ref 36–66)
SEG NEUTROPHILS: 86 % (ref 36–66)
SEG NEUTROPHILS: 87 % (ref 36–66)
SEG NEUTROPHILS: 87 % (ref 36–66)
SEG NEUTROPHILS: 89 % (ref 36–66)
SEG NEUTROPHILS: 89 % (ref 36–66)
SEG NEUTROPHILS: 90 % (ref 36–66)
SEGMENTED NEUTROPHILS ABSOLUTE COUNT: 3.13 K/UL (ref 1.5–8.1)
SEGMENTED NEUTROPHILS ABSOLUTE COUNT: 3.9 K/UL (ref 1.3–9.1)
SEGMENTED NEUTROPHILS ABSOLUTE COUNT: 4.18 K/UL (ref 1.3–9.1)
SEGMENTED NEUTROPHILS ABSOLUTE COUNT: 4.5 K/UL (ref 1.3–9.1)
SEGMENTED NEUTROPHILS ABSOLUTE COUNT: 4.9 K/UL (ref 1.3–9.1)
SEGMENTED NEUTROPHILS ABSOLUTE COUNT: 4.96 K/UL (ref 1.3–9.1)
SEGMENTED NEUTROPHILS ABSOLUTE COUNT: 5.1 K/UL (ref 1.3–9.1)
SEGMENTED NEUTROPHILS ABSOLUTE COUNT: 5.18 K/UL (ref 1.3–9.1)
SEGMENTED NEUTROPHILS ABSOLUTE COUNT: 5.2 K/UL (ref 1.3–9.1)
SEGMENTED NEUTROPHILS ABSOLUTE COUNT: 5.4 K/UL (ref 1.3–9.1)
SEGMENTED NEUTROPHILS ABSOLUTE COUNT: 5.4 K/UL (ref 1.3–9.1)
SEGMENTED NEUTROPHILS ABSOLUTE COUNT: 5.6 K/UL (ref 1.3–9.1)
SEGMENTED NEUTROPHILS ABSOLUTE COUNT: 5.7 K/UL (ref 1.3–9.1)
SEGMENTED NEUTROPHILS ABSOLUTE COUNT: 5.9 K/UL (ref 1.3–9.1)
SEGMENTED NEUTROPHILS ABSOLUTE COUNT: 6.1 K/UL (ref 1.3–9.1)
SEGMENTED NEUTROPHILS ABSOLUTE COUNT: 6.1 K/UL (ref 1.3–9.1)
SEGMENTED NEUTROPHILS ABSOLUTE COUNT: 6.3 K/UL (ref 1.3–9.1)
SEGMENTED NEUTROPHILS ABSOLUTE COUNT: 6.6 K/UL (ref 1.3–9.1)
SEGMENTED NEUTROPHILS ABSOLUTE COUNT: 6.7 K/UL (ref 1.3–9.1)
SEGMENTED NEUTROPHILS ABSOLUTE COUNT: 6.9 K/UL (ref 1.3–9.1)
SEGMENTED NEUTROPHILS ABSOLUTE COUNT: 6.9 K/UL (ref 1.3–9.1)
SEGMENTED NEUTROPHILS ABSOLUTE COUNT: 7.2 K/UL (ref 1.3–9.1)
SEGMENTED NEUTROPHILS ABSOLUTE COUNT: 7.3 K/UL (ref 1.3–9.1)
SEGMENTED NEUTROPHILS ABSOLUTE COUNT: 7.78 K/UL (ref 1.3–9.1)
SEGMENTED NEUTROPHILS ABSOLUTE COUNT: 8.06 K/UL (ref 1.5–8.1)
SEGMENTED NEUTROPHILS ABSOLUTE COUNT: 8.3 K/UL (ref 1.3–9.1)
SEGMENTED NEUTROPHILS ABSOLUTE COUNT: 9.3 K/UL (ref 1.3–9.1)
SEGMENTED NEUTROPHILS ABSOLUTE COUNT: 9.32 K/UL (ref 1.3–9.1)
SERUM IFX INTERP: NORMAL
SET RATE: ABNORMAL
SODIUM BLD-SCNC: 135 MMOL/L (ref 135–144)
SODIUM BLD-SCNC: 136 MMOL/L (ref 135–144)
SODIUM BLD-SCNC: 136 MMOL/L (ref 135–144)
SODIUM BLD-SCNC: 137 MMOL/L (ref 135–144)
SODIUM BLD-SCNC: 138 MMOL/L (ref 135–144)
SODIUM BLD-SCNC: 139 MMOL/L (ref 135–144)
SODIUM BLD-SCNC: 140 MMOL/L (ref 135–144)
SODIUM BLD-SCNC: 140 MMOL/L (ref 135–144)
SODIUM BLD-SCNC: 141 MMOL/L (ref 135–144)
SODIUM BLD-SCNC: 142 MMOL/L (ref 135–144)
SODIUM BLD-SCNC: 143 MMOL/L (ref 135–144)
SODIUM BLD-SCNC: 144 MMOL/L (ref 135–144)
SODIUM BLD-SCNC: 145 MMOL/L (ref 135–144)
SODIUM,UR: <20 MMOL/L
SODIUM,UR: <20 MMOL/L
SOURCE: NORMAL
SPECIFIC GRAVITY UA: 1.02 (ref 1–1.03)
SPECIFIC GRAVITY UA: 1.03 (ref 1–1.03)
SPECIMEN DESCRIPTION: ABNORMAL
SPECIMEN DESCRIPTION: NORMAL
SURGICAL PATHOLOGY REPORT: NORMAL
TEXT FOR RESPIRATORY: ABNORMAL
TOTAL CK: 22 U/L (ref 26–192)
TOTAL HB: ABNORMAL G/DL (ref 12–16)
TOTAL IRON BINDING CAPACITY: 229 UG/DL (ref 250–450)
TOTAL PROT. SUM,%: 100 % (ref 98–102)
TOTAL PROT. SUM: 4.9 G/DL (ref 6.3–8.2)
TOTAL PROTEIN: 4.3 G/DL (ref 6.4–8.3)
TOTAL PROTEIN: 4.4 G/DL (ref 6.4–8.3)
TOTAL PROTEIN: 4.4 G/DL (ref 6.4–8.3)
TOTAL PROTEIN: 4.5 G/DL (ref 6.4–8.3)
TOTAL PROTEIN: 4.7 G/DL (ref 6.4–8.3)
TOTAL PROTEIN: 4.7 G/DL (ref 6.4–8.3)
TOTAL PROTEIN: 4.8 G/DL (ref 6.4–8.3)
TOTAL PROTEIN: 4.9 G/DL (ref 6.4–8.3)
TOTAL PROTEIN: 5 G/DL (ref 6.4–8.3)
TOTAL PROTEIN: 5.1 G/DL (ref 6.4–8.3)
TOTAL PROTEIN: 5.2 G/DL (ref 6.4–8.3)
TOTAL PROTEIN: 5.3 G/DL (ref 6.4–8.3)
TOTAL PROTEIN: 5.5 G/DL (ref 6.4–8.3)
TOTAL PROTEIN: 5.6 G/DL (ref 6.4–8.3)
TOTAL PROTEIN: 6.1 G/DL (ref 6.4–8.3)
TOTAL PROTEIN: 6.1 G/DL (ref 6.4–8.3)
TOTAL PROTEIN: 6.4 G/DL (ref 6.4–8.3)
TOTAL RATE: ABNORMAL
TRICHOMONAS: ABNORMAL
TRIGL SERPL-MCNC: 112 MG/DL
TRIGL SERPL-MCNC: 231 MG/DL
TROPONIN INTERP: ABNORMAL
TROPONIN T: ABNORMAL NG/ML
TROPONIN, HIGH SENSITIVITY: 17 NG/L (ref 0–14)
TROPONIN, HIGH SENSITIVITY: 18 NG/L (ref 0–14)
TROPONIN, HIGH SENSITIVITY: 21 NG/L (ref 0–14)
TROPONIN, HIGH SENSITIVITY: 23 NG/L (ref 0–14)
TROPONIN, HIGH SENSITIVITY: 30 NG/L (ref 0–14)
TROPONIN, HIGH SENSITIVITY: 30 NG/L (ref 0–14)
TSH SERPL DL<=0.05 MIU/L-ACNC: 0.55 MIU/L (ref 0.3–5)
TSH SERPL DL<=0.05 MIU/L-ACNC: 0.76 MIU/L (ref 0.3–5)
TURBIDITY: ABNORMAL
TURBIDITY: ABNORMAL
TURBIDITY: CLEAR
TURBIDITY: CLEAR
UNSATURATED IRON BINDING CAPACITY: 180 UG/DL (ref 112–347)
URINE HGB: NEGATIVE
UROBILINOGEN, URINE: NORMAL
VITAMIN B-12: 1644 PG/ML (ref 232–1245)
VITAMIN D 25-HYDROXY: 46.8 NG/ML (ref 30–100)
VLDLC SERPL CALC-MCNC: ABNORMAL MG/DL (ref 1–30)
VT: ABNORMAL
WBC # BLD: 10.7 K/UL (ref 3.5–11)
WBC # BLD: 11.1 K/UL (ref 3.5–11)
WBC # BLD: 11.1 K/UL (ref 3.5–11)
WBC # BLD: 4.9 K/UL (ref 3.5–11.3)
WBC # BLD: 5.3 K/UL (ref 3.5–11)
WBC # BLD: 5.5 K/UL (ref 3.5–11)
WBC # BLD: 5.9 K/UL (ref 3.5–11)
WBC # BLD: 6.1 K/UL (ref 3.5–11)
WBC # BLD: 6.3 K/UL (ref 3.5–11)
WBC # BLD: 6.3 K/UL (ref 3.5–11)
WBC # BLD: 6.4 K/UL (ref 3.5–11)
WBC # BLD: 6.5 K/UL (ref 3.5–11)
WBC # BLD: 6.8 K/UL (ref 3.5–11)
WBC # BLD: 6.8 K/UL (ref 3.5–11)
WBC # BLD: 6.9 K/UL (ref 3.5–11)
WBC # BLD: 6.9 K/UL (ref 3.5–11)
WBC # BLD: 7.5 K/UL (ref 3.5–11)
WBC # BLD: 7.6 K/UL (ref 3.5–11)
WBC # BLD: 7.6 K/UL (ref 3.5–11)
WBC # BLD: 7.7 K/UL (ref 3.5–11)
WBC # BLD: 7.7 K/UL (ref 3.5–11)
WBC # BLD: 7.8 K/UL (ref 3.5–11)
WBC # BLD: 8.3 K/UL (ref 3.5–11)
WBC # BLD: 8.4 K/UL (ref 3.5–11)
WBC # BLD: 8.4 K/UL (ref 3.5–11)
WBC # BLD: 8.6 K/UL (ref 3.5–11)
WBC # BLD: 9.4 K/UL (ref 3.5–11)
WBC # BLD: 9.9 K/UL (ref 3.5–11.3)
WBC # BLD: ABNORMAL 10*3/UL
WBC UA: ABNORMAL /HPF
YEAST: ABNORMAL

## 2021-01-01 PROCEDURE — 97116 GAIT TRAINING THERAPY: CPT

## 2021-01-01 PROCEDURE — 2580000003 HC RX 258: Performed by: FAMILY MEDICINE

## 2021-01-01 PROCEDURE — 6360000002 HC RX W HCPCS: Performed by: FAMILY MEDICINE

## 2021-01-01 PROCEDURE — 87086 URINE CULTURE/COLONY COUNT: CPT

## 2021-01-01 PROCEDURE — 99223 1ST HOSP IP/OBS HIGH 75: CPT | Performed by: PHYSICAL MEDICINE & REHABILITATION

## 2021-01-01 PROCEDURE — 6370000000 HC RX 637 (ALT 250 FOR IP): Performed by: FAMILY MEDICINE

## 2021-01-01 PROCEDURE — 84300 ASSAY OF URINE SODIUM: CPT

## 2021-01-01 PROCEDURE — 86140 C-REACTIVE PROTEIN: CPT

## 2021-01-01 PROCEDURE — 97542 WHEELCHAIR MNGMENT TRAINING: CPT

## 2021-01-01 PROCEDURE — 2580000003 HC RX 258: Performed by: INTERNAL MEDICINE

## 2021-01-01 PROCEDURE — 2700000000 HC OXYGEN THERAPY PER DAY

## 2021-01-01 PROCEDURE — 97162 PT EVAL MOD COMPLEX 30 MIN: CPT

## 2021-01-01 PROCEDURE — 6370000000 HC RX 637 (ALT 250 FOR IP): Performed by: INTERNAL MEDICINE

## 2021-01-01 PROCEDURE — 36415 COLL VENOUS BLD VENIPUNCTURE: CPT

## 2021-01-01 PROCEDURE — 99213 OFFICE O/P EST LOW 20 MIN: CPT

## 2021-01-01 PROCEDURE — 82800 BLOOD PH: CPT

## 2021-01-01 PROCEDURE — 94761 N-INVAS EAR/PLS OXIMETRY MLT: CPT

## 2021-01-01 PROCEDURE — 2060000000 HC ICU INTERMEDIATE R&B

## 2021-01-01 PROCEDURE — 85730 THROMBOPLASTIN TIME PARTIAL: CPT

## 2021-01-01 PROCEDURE — 80053 COMPREHEN METABOLIC PANEL: CPT

## 2021-01-01 PROCEDURE — C9113 INJ PANTOPRAZOLE SODIUM, VIA: HCPCS | Performed by: FAMILY MEDICINE

## 2021-01-01 PROCEDURE — 51798 US URINE CAPACITY MEASURE: CPT

## 2021-01-01 PROCEDURE — U0003 INFECTIOUS AGENT DETECTION BY NUCLEIC ACID (DNA OR RNA); SEVERE ACUTE RESPIRATORY SYNDROME CORONAVIRUS 2 (SARS-COV-2) (CORONAVIRUS DISEASE [COVID-19]), AMPLIFIED PROBE TECHNIQUE, MAKING USE OF HIGH THROUGHPUT TECHNOLOGIES AS DESCRIBED BY CMS-2020-01-R: HCPCS

## 2021-01-01 PROCEDURE — 84484 ASSAY OF TROPONIN QUANT: CPT

## 2021-01-01 PROCEDURE — 85610 PROTHROMBIN TIME: CPT

## 2021-01-01 PROCEDURE — 85025 COMPLETE CBC W/AUTO DIFF WBC: CPT

## 2021-01-01 PROCEDURE — 6360000002 HC RX W HCPCS: Performed by: INTERNAL MEDICINE

## 2021-01-01 PROCEDURE — 3609010600 HC COLONOSCOPY POLYPECTOMY SNARE/COLD BIOPSY: Performed by: INTERNAL MEDICINE

## 2021-01-01 PROCEDURE — 93010 ELECTROCARDIOGRAM REPORT: CPT | Performed by: INTERNAL MEDICINE

## 2021-01-01 PROCEDURE — 80048 BASIC METABOLIC PNL TOTAL CA: CPT

## 2021-01-01 PROCEDURE — 87040 BLOOD CULTURE FOR BACTERIA: CPT

## 2021-01-01 PROCEDURE — 97530 THERAPEUTIC ACTIVITIES: CPT

## 2021-01-01 PROCEDURE — 2580000003 HC RX 258: Performed by: SURGERY

## 2021-01-01 PROCEDURE — 6360000004 HC RX CONTRAST MEDICATION: Performed by: PAIN MEDICINE

## 2021-01-01 PROCEDURE — 83690 ASSAY OF LIPASE: CPT

## 2021-01-01 PROCEDURE — 72072 X-RAY EXAM THORAC SPINE 3VWS: CPT

## 2021-01-01 PROCEDURE — 2500000003 HC RX 250 WO HCPCS: Performed by: SURGERY

## 2021-01-01 PROCEDURE — P9047 ALBUMIN (HUMAN), 25%, 50ML: HCPCS | Performed by: INTERNAL MEDICINE

## 2021-01-01 PROCEDURE — 97166 OT EVAL MOD COMPLEX 45 MIN: CPT

## 2021-01-01 PROCEDURE — 97535 SELF CARE MNGMENT TRAINING: CPT

## 2021-01-01 PROCEDURE — 97110 THERAPEUTIC EXERCISES: CPT

## 2021-01-01 PROCEDURE — 62323 NJX INTERLAMINAR LMBR/SAC: CPT | Performed by: PAIN MEDICINE

## 2021-01-01 PROCEDURE — 82043 UR ALBUMIN QUANTITATIVE: CPT

## 2021-01-01 PROCEDURE — 82805 BLOOD GASES W/O2 SATURATION: CPT

## 2021-01-01 PROCEDURE — 99231 SBSQ HOSP IP/OBS SF/LOW 25: CPT | Performed by: PHYSICAL MEDICINE & REHABILITATION

## 2021-01-01 PROCEDURE — 3700000000 HC ANESTHESIA ATTENDED CARE: Performed by: PAIN MEDICINE

## 2021-01-01 PROCEDURE — 82570 ASSAY OF URINE CREATININE: CPT

## 2021-01-01 PROCEDURE — 2500000003 HC RX 250 WO HCPCS: Performed by: FAMILY MEDICINE

## 2021-01-01 PROCEDURE — 99221 1ST HOSP IP/OBS SF/LOW 40: CPT | Performed by: ORTHOPAEDIC SURGERY

## 2021-01-01 PROCEDURE — 85027 COMPLETE CBC AUTOMATED: CPT

## 2021-01-01 PROCEDURE — 99232 SBSQ HOSP IP/OBS MODERATE 35: CPT | Performed by: INTERNAL MEDICINE

## 2021-01-01 PROCEDURE — 6370000000 HC RX 637 (ALT 250 FOR IP): Performed by: EMERGENCY MEDICINE

## 2021-01-01 PROCEDURE — 6360000002 HC RX W HCPCS: Performed by: NURSE ANESTHETIST, CERTIFIED REGISTERED

## 2021-01-01 PROCEDURE — 99285 EMERGENCY DEPT VISIT HI MDM: CPT

## 2021-01-01 PROCEDURE — 2580000003 HC RX 258: Performed by: RADIOLOGY

## 2021-01-01 PROCEDURE — 86225 DNA ANTIBODY NATIVE: CPT

## 2021-01-01 PROCEDURE — 83735 ASSAY OF MAGNESIUM: CPT

## 2021-01-01 PROCEDURE — 72148 MRI LUMBAR SPINE W/O DYE: CPT

## 2021-01-01 PROCEDURE — 7100000010 HC PHASE II RECOVERY - FIRST 15 MIN: Performed by: PAIN MEDICINE

## 2021-01-01 PROCEDURE — 93005 ELECTROCARDIOGRAM TRACING: CPT | Performed by: ANESTHESIOLOGY

## 2021-01-01 PROCEDURE — 99233 SBSQ HOSP IP/OBS HIGH 50: CPT | Performed by: INTERNAL MEDICINE

## 2021-01-01 PROCEDURE — 2709999900 HC NON-CHARGEABLE SUPPLY: Performed by: INTERNAL MEDICINE

## 2021-01-01 PROCEDURE — 2580000003 HC RX 258: Performed by: ANESTHESIOLOGY

## 2021-01-01 PROCEDURE — 6370000000 HC RX 637 (ALT 250 FOR IP): Performed by: PHYSICAL MEDICINE & REHABILITATION

## 2021-01-01 PROCEDURE — 99442 PR PHYS/QHP TELEPHONE EVALUATION 11-20 MIN: CPT | Performed by: NURSE PRACTITIONER

## 2021-01-01 PROCEDURE — 0DJ08ZZ INSPECTION OF UPPER INTESTINAL TRACT, VIA NATURAL OR ARTIFICIAL OPENING ENDOSCOPIC: ICD-10-PCS | Performed by: INTERNAL MEDICINE

## 2021-01-01 PROCEDURE — 83605 ASSAY OF LACTIC ACID: CPT

## 2021-01-01 PROCEDURE — 93306 TTE W/DOPPLER COMPLETE: CPT

## 2021-01-01 PROCEDURE — 86803 HEPATITIS C AB TEST: CPT

## 2021-01-01 PROCEDURE — 88342 IMHCHEM/IMCYTCHM 1ST ANTB: CPT

## 2021-01-01 PROCEDURE — 87205 SMEAR GRAM STAIN: CPT

## 2021-01-01 PROCEDURE — 83935 ASSAY OF URINE OSMOLALITY: CPT

## 2021-01-01 PROCEDURE — 85018 HEMOGLOBIN: CPT

## 2021-01-01 PROCEDURE — 97164 PT RE-EVAL EST PLAN CARE: CPT

## 2021-01-01 PROCEDURE — 96372 THER/PROPH/DIAG INJ SC/IM: CPT

## 2021-01-01 PROCEDURE — 80074 ACUTE HEPATITIS PANEL: CPT

## 2021-01-01 PROCEDURE — 84075 ASSAY ALKALINE PHOSPHATASE: CPT

## 2021-01-01 PROCEDURE — 2580000003 HC RX 258: Performed by: EMERGENCY MEDICINE

## 2021-01-01 PROCEDURE — 86704 HEP B CORE ANTIBODY TOTAL: CPT

## 2021-01-01 PROCEDURE — 99284 EMERGENCY DEPT VISIT MOD MDM: CPT

## 2021-01-01 PROCEDURE — 87186 SC STD MICRODIL/AGAR DIL: CPT

## 2021-01-01 PROCEDURE — 86038 ANTINUCLEAR ANTIBODIES: CPT

## 2021-01-01 PROCEDURE — 84443 ASSAY THYROID STIM HORMONE: CPT

## 2021-01-01 PROCEDURE — 99215 OFFICE O/P EST HI 40 MIN: CPT | Performed by: PAIN MEDICINE

## 2021-01-01 PROCEDURE — 88305 TISSUE EXAM BY PATHOLOGIST: CPT

## 2021-01-01 PROCEDURE — 99238 HOSP IP/OBS DSCHRG MGMT 30/<: CPT | Performed by: STUDENT IN AN ORGANIZED HEALTH CARE EDUCATION/TRAINING PROGRAM

## 2021-01-01 PROCEDURE — 83615 LACTATE (LD) (LDH) ENZYME: CPT

## 2021-01-01 PROCEDURE — 86901 BLOOD TYPING SEROLOGIC RH(D): CPT

## 2021-01-01 PROCEDURE — 7100000011 HC PHASE II RECOVERY - ADDTL 15 MIN: Performed by: SURGERY

## 2021-01-01 PROCEDURE — 6360000002 HC RX W HCPCS: Performed by: PAIN MEDICINE

## 2021-01-01 PROCEDURE — 81001 URINALYSIS AUTO W/SCOPE: CPT

## 2021-01-01 PROCEDURE — 1180000000 HC REHAB R&B

## 2021-01-01 PROCEDURE — 83880 ASSAY OF NATRIURETIC PEPTIDE: CPT

## 2021-01-01 PROCEDURE — 86850 RBC ANTIBODY SCREEN: CPT

## 2021-01-01 PROCEDURE — 2500000003 HC RX 250 WO HCPCS: Performed by: NURSE ANESTHETIST, CERTIFIED REGISTERED

## 2021-01-01 PROCEDURE — 84100 ASSAY OF PHOSPHORUS: CPT

## 2021-01-01 PROCEDURE — 82607 VITAMIN B-12: CPT

## 2021-01-01 PROCEDURE — 71045 X-RAY EXAM CHEST 1 VIEW: CPT

## 2021-01-01 PROCEDURE — 84132 ASSAY OF SERUM POTASSIUM: CPT

## 2021-01-01 PROCEDURE — 3609017100 HC EGD: Performed by: INTERNAL MEDICINE

## 2021-01-01 PROCEDURE — 87075 CULTR BACTERIA EXCEPT BLOOD: CPT

## 2021-01-01 PROCEDURE — 85520 HEPARIN ASSAY: CPT

## 2021-01-01 PROCEDURE — 72146 MRI CHEST SPINE W/O DYE: CPT

## 2021-01-01 PROCEDURE — 84155 ASSAY OF PROTEIN SERUM: CPT

## 2021-01-01 PROCEDURE — 88307 TISSUE EXAM BY PATHOLOGIST: CPT

## 2021-01-01 PROCEDURE — 94640 AIRWAY INHALATION TREATMENT: CPT

## 2021-01-01 PROCEDURE — 82306 VITAMIN D 25 HYDROXY: CPT

## 2021-01-01 PROCEDURE — 88311 DECALCIFY TISSUE: CPT

## 2021-01-01 PROCEDURE — 6360000004 HC RX CONTRAST MEDICATION: Performed by: NURSE PRACTITIONER

## 2021-01-01 PROCEDURE — 3700000001 HC ADD 15 MINUTES (ANESTHESIA): Performed by: SURGERY

## 2021-01-01 PROCEDURE — 99233 SBSQ HOSP IP/OBS HIGH 50: CPT | Performed by: PAIN MEDICINE

## 2021-01-01 PROCEDURE — 82947 ASSAY GLUCOSE BLOOD QUANT: CPT

## 2021-01-01 PROCEDURE — 7100000000 HC PACU RECOVERY - FIRST 15 MIN: Performed by: PAIN MEDICINE

## 2021-01-01 PROCEDURE — 86160 COMPLEMENT ANTIGEN: CPT

## 2021-01-01 PROCEDURE — 2709999900 IR FLUORO GUIDED CVA DEVICE PLMT/REPLACE/REMOVAL

## 2021-01-01 PROCEDURE — 87070 CULTURE OTHR SPECIMN AEROBIC: CPT

## 2021-01-01 PROCEDURE — 1250000000 HC SEMI PRIVATE HOSPICE R&B

## 2021-01-01 PROCEDURE — 3700000000 HC ANESTHESIA ATTENDED CARE: Performed by: INTERNAL MEDICINE

## 2021-01-01 PROCEDURE — 45385 COLONOSCOPY W/LESION REMOVAL: CPT | Performed by: INTERNAL MEDICINE

## 2021-01-01 PROCEDURE — 82310 ASSAY OF CALCIUM: CPT

## 2021-01-01 PROCEDURE — 82728 ASSAY OF FERRITIN: CPT

## 2021-01-01 PROCEDURE — 64999 UNLISTED PX NERVOUS SYSTEM: CPT

## 2021-01-01 PROCEDURE — 6360000002 HC RX W HCPCS: Performed by: RADIOLOGY

## 2021-01-01 PROCEDURE — 2709999900 HC NON-CHARGEABLE SUPPLY: Performed by: PAIN MEDICINE

## 2021-01-01 PROCEDURE — 85652 RBC SED RATE AUTOMATED: CPT

## 2021-01-01 PROCEDURE — 72100 X-RAY EXAM L-S SPINE 2/3 VWS: CPT

## 2021-01-01 PROCEDURE — 2500000003 HC RX 250 WO HCPCS: Performed by: INTERNAL MEDICINE

## 2021-01-01 PROCEDURE — 99232 SBSQ HOSP IP/OBS MODERATE 35: CPT | Performed by: PHYSICAL MEDICINE & REHABILITATION

## 2021-01-01 PROCEDURE — 6360000004 HC RX CONTRAST MEDICATION: Performed by: EMERGENCY MEDICINE

## 2021-01-01 PROCEDURE — 7100000011 HC PHASE II RECOVERY - ADDTL 15 MIN: Performed by: PAIN MEDICINE

## 2021-01-01 PROCEDURE — 99232 SBSQ HOSP IP/OBS MODERATE 35: CPT | Performed by: PAIN MEDICINE

## 2021-01-01 PROCEDURE — 72131 CT LUMBAR SPINE W/O DYE: CPT

## 2021-01-01 PROCEDURE — 86900 BLOOD TYPING SEROLOGIC ABO: CPT

## 2021-01-01 PROCEDURE — 87088 URINE BACTERIA CULTURE: CPT

## 2021-01-01 PROCEDURE — 85014 HEMATOCRIT: CPT

## 2021-01-01 PROCEDURE — 02HV33Z INSERTION OF INFUSION DEVICE INTO SUPERIOR VENA CAVA, PERCUTANEOUS APPROACH: ICD-10-PCS | Performed by: RADIOLOGY

## 2021-01-01 PROCEDURE — 93005 ELECTROCARDIOGRAM TRACING: CPT | Performed by: EMERGENCY MEDICINE

## 2021-01-01 PROCEDURE — 83550 IRON BINDING TEST: CPT

## 2021-01-01 PROCEDURE — 3209999900 FLUORO FOR SURGICAL PROCEDURES

## 2021-01-01 PROCEDURE — 6360000002 HC RX W HCPCS: Performed by: EMERGENCY MEDICINE

## 2021-01-01 PROCEDURE — 6370000000 HC RX 637 (ALT 250 FOR IP): Performed by: NURSE PRACTITIONER

## 2021-01-01 PROCEDURE — 7100000031 HC ASPR PHASE II RECOVERY - ADDTL 15 MIN: Performed by: PAIN MEDICINE

## 2021-01-01 PROCEDURE — 82140 ASSAY OF AMMONIA: CPT

## 2021-01-01 PROCEDURE — 99214 OFFICE O/P EST MOD 30 MIN: CPT | Performed by: PAIN MEDICINE

## 2021-01-01 PROCEDURE — 36573 INSJ PICC RS&I 5 YR+: CPT | Performed by: RADIOLOGY

## 2021-01-01 PROCEDURE — 2709999900 HC NON-CHARGEABLE SUPPLY: Performed by: SURGERY

## 2021-01-01 PROCEDURE — 3700000001 HC ADD 15 MINUTES (ANESTHESIA): Performed by: PAIN MEDICINE

## 2021-01-01 PROCEDURE — 84080 ASSAY ALKALINE PHOSPHATASES: CPT

## 2021-01-01 PROCEDURE — 87076 CULTURE ANAEROBE IDENT EACH: CPT

## 2021-01-01 PROCEDURE — 6370000000 HC RX 637 (ALT 250 FOR IP): Performed by: STUDENT IN AN ORGANIZED HEALTH CARE EDUCATION/TRAINING PROGRAM

## 2021-01-01 PROCEDURE — 81003 URINALYSIS AUTO W/O SCOPE: CPT

## 2021-01-01 PROCEDURE — 74018 RADEX ABDOMEN 1 VIEW: CPT

## 2021-01-01 PROCEDURE — 99232 SBSQ HOSP IP/OBS MODERATE 35: CPT | Performed by: STUDENT IN AN ORGANIZED HEALTH CARE EDUCATION/TRAINING PROGRAM

## 2021-01-01 PROCEDURE — 82746 ASSAY OF FOLIC ACID SERUM: CPT

## 2021-01-01 PROCEDURE — 94660 CPAP INITIATION&MGMT: CPT

## 2021-01-01 PROCEDURE — 2720000010 HC SURG SUPPLY STERILE: Performed by: PAIN MEDICINE

## 2021-01-01 PROCEDURE — 43235 EGD DIAGNOSTIC BRUSH WASH: CPT | Performed by: INTERNAL MEDICINE

## 2021-01-01 PROCEDURE — C1894 INTRO/SHEATH, NON-LASER: HCPCS | Performed by: PAIN MEDICINE

## 2021-01-01 PROCEDURE — 82248 BILIRUBIN DIRECT: CPT

## 2021-01-01 PROCEDURE — 6370000000 HC RX 637 (ALT 250 FOR IP): Performed by: ANESTHESIOLOGY

## 2021-01-01 PROCEDURE — 99222 1ST HOSP IP/OBS MODERATE 55: CPT | Performed by: PAIN MEDICINE

## 2021-01-01 PROCEDURE — 3600000002 HC SURGERY LEVEL 2 BASE: Performed by: PAIN MEDICINE

## 2021-01-01 PROCEDURE — 73700 CT LOWER EXTREMITY W/O DYE: CPT

## 2021-01-01 PROCEDURE — 6370000000 HC RX 637 (ALT 250 FOR IP): Performed by: SURGERY

## 2021-01-01 PROCEDURE — 74176 CT ABD & PELVIS W/O CONTRAST: CPT

## 2021-01-01 PROCEDURE — 83516 IMMUNOASSAY NONANTIBODY: CPT

## 2021-01-01 PROCEDURE — 88360 TUMOR IMMUNOHISTOCHEM/MANUAL: CPT

## 2021-01-01 PROCEDURE — 82565 ASSAY OF CREATININE: CPT

## 2021-01-01 PROCEDURE — 99232 SBSQ HOSP IP/OBS MODERATE 35: CPT | Performed by: NURSE PRACTITIONER

## 2021-01-01 PROCEDURE — 96374 THER/PROPH/DIAG INJ IV PUSH: CPT

## 2021-01-01 PROCEDURE — 84165 PROTEIN E-PHORESIS SERUM: CPT

## 2021-01-01 PROCEDURE — 7100000000 HC PACU RECOVERY - FIRST 15 MIN

## 2021-01-01 PROCEDURE — 3600000012 HC SURGERY LEVEL 2 ADDTL 15MIN: Performed by: SURGERY

## 2021-01-01 PROCEDURE — U0005 INFEC AGEN DETEC AMPLI PROBE: HCPCS

## 2021-01-01 PROCEDURE — 3E0R33Z INTRODUCTION OF ANTI-INFLAMMATORY INTO SPINAL CANAL, PERCUTANEOUS APPROACH: ICD-10-PCS | Performed by: PAIN MEDICINE

## 2021-01-01 PROCEDURE — 3700000001 HC ADD 15 MINUTES (ANESTHESIA): Performed by: INTERNAL MEDICINE

## 2021-01-01 PROCEDURE — 80061 LIPID PANEL: CPT

## 2021-01-01 PROCEDURE — 84295 ASSAY OF SERUM SODIUM: CPT

## 2021-01-01 PROCEDURE — 3600000002 HC SURGERY LEVEL 2 BASE: Performed by: SURGERY

## 2021-01-01 PROCEDURE — APPSS30 APP SPLIT SHARED TIME 16-30 MINUTES: Performed by: NURSE PRACTITIONER

## 2021-01-01 PROCEDURE — 7100000001 HC PACU RECOVERY - ADDTL 15 MIN: Performed by: PAIN MEDICINE

## 2021-01-01 PROCEDURE — 88112 CYTOPATH CELL ENHANCE TECH: CPT

## 2021-01-01 PROCEDURE — C1713 ANCHOR/SCREW BN/BN,TIS/BN: HCPCS | Performed by: PAIN MEDICINE

## 2021-01-01 PROCEDURE — 0DBL8ZZ EXCISION OF TRANSVERSE COLON, VIA NATURAL OR ARTIFICIAL OPENING ENDOSCOPIC: ICD-10-PCS | Performed by: INTERNAL MEDICINE

## 2021-01-01 PROCEDURE — 6370000000 HC RX 637 (ALT 250 FOR IP): Performed by: PAIN MEDICINE

## 2021-01-01 PROCEDURE — 99215 OFFICE O/P EST HI 40 MIN: CPT

## 2021-01-01 PROCEDURE — 99222 1ST HOSP IP/OBS MODERATE 55: CPT | Performed by: INTERNAL MEDICINE

## 2021-01-01 PROCEDURE — 87635 SARS-COV-2 COVID-19 AMP PRB: CPT

## 2021-01-01 PROCEDURE — 80051 ELECTROLYTE PANEL: CPT

## 2021-01-01 PROCEDURE — 88313 SPECIAL STAINS GROUP 2: CPT

## 2021-01-01 PROCEDURE — 3700000000 HC ANESTHESIA ATTENDED CARE: Performed by: SURGERY

## 2021-01-01 PROCEDURE — 88341 IMHCHEM/IMCYTCHM EA ADD ANTB: CPT

## 2021-01-01 PROCEDURE — 7100000010 HC PHASE II RECOVERY - FIRST 15 MIN: Performed by: SURGERY

## 2021-01-01 PROCEDURE — 82550 ASSAY OF CK (CPK): CPT

## 2021-01-01 PROCEDURE — 96375 TX/PRO/DX INJ NEW DRUG ADDON: CPT

## 2021-01-01 PROCEDURE — 84520 ASSAY OF UREA NITROGEN: CPT

## 2021-01-01 PROCEDURE — 99283 EMERGENCY DEPT VISIT LOW MDM: CPT

## 2021-01-01 PROCEDURE — 51702 INSERT TEMP BLADDER CATH: CPT

## 2021-01-01 PROCEDURE — 82435 ASSAY OF BLOOD CHLORIDE: CPT

## 2021-01-01 PROCEDURE — 76770 US EXAM ABDO BACK WALL COMP: CPT

## 2021-01-01 PROCEDURE — 93005 ELECTROCARDIOGRAM TRACING: CPT | Performed by: FAMILY MEDICINE

## 2021-01-01 PROCEDURE — 83883 ASSAY NEPHELOMETRY NOT SPEC: CPT

## 2021-01-01 PROCEDURE — 7100000001 HC PACU RECOVERY - ADDTL 15 MIN: Performed by: INTERNAL MEDICINE

## 2021-01-01 PROCEDURE — 87340 HEPATITIS B SURFACE AG IA: CPT

## 2021-01-01 PROCEDURE — 7100000001 HC PACU RECOVERY - ADDTL 15 MIN

## 2021-01-01 PROCEDURE — 83540 ASSAY OF IRON: CPT

## 2021-01-01 PROCEDURE — 2000000000 HC ICU R&B

## 2021-01-01 PROCEDURE — 87077 CULTURE AEROBIC IDENTIFY: CPT

## 2021-01-01 PROCEDURE — 84145 PROCALCITONIN (PCT): CPT

## 2021-01-01 PROCEDURE — 83874 ASSAY OF MYOGLOBIN: CPT

## 2021-01-01 PROCEDURE — 2500000003 HC RX 250 WO HCPCS: Performed by: PAIN MEDICINE

## 2021-01-01 PROCEDURE — 86334 IMMUNOFIX E-PHORESIS SERUM: CPT

## 2021-01-01 PROCEDURE — 3600000012 HC SURGERY LEVEL 2 ADDTL 15MIN: Performed by: PAIN MEDICINE

## 2021-01-01 PROCEDURE — 72110 X-RAY EXAM L-2 SPINE 4/>VWS: CPT

## 2021-01-01 PROCEDURE — 7100000000 HC PACU RECOVERY - FIRST 15 MIN: Performed by: INTERNAL MEDICINE

## 2021-01-01 PROCEDURE — 62323 NJX INTERLAMINAR LMBR/SAC: CPT

## 2021-01-01 PROCEDURE — 7100000030 HC ASPR PHASE II RECOVERY - FIRST 15 MIN: Performed by: PAIN MEDICINE

## 2021-01-01 PROCEDURE — 2500000003 HC RX 250 WO HCPCS: Performed by: EMERGENCY MEDICINE

## 2021-01-01 PROCEDURE — 87350 HEPATITIS BE AG IA: CPT

## 2021-01-01 PROCEDURE — 84478 ASSAY OF TRIGLYCERIDES: CPT

## 2021-01-01 PROCEDURE — 71260 CT THORAX DX C+: CPT

## 2021-01-01 DEVICE — CEMENT C01A KYPHX HV-R BONE CEMENT EN
Type: IMPLANTABLE DEVICE | Site: SPINE LUMBAR | Status: FUNCTIONAL
Brand: KYPHON® HV-R® BONE CEMENT

## 2021-01-01 RX ORDER — ATORVASTATIN CALCIUM 40 MG/1
40 TABLET, FILM COATED ORAL DAILY
Status: DISCONTINUED | OUTPATIENT
Start: 2021-01-01 | End: 2021-01-01 | Stop reason: HOSPADM

## 2021-01-01 RX ORDER — ONDANSETRON 2 MG/ML
INJECTION INTRAMUSCULAR; INTRAVENOUS PRN
Status: DISCONTINUED | OUTPATIENT
Start: 2021-01-01 | End: 2021-01-01 | Stop reason: SDUPTHER

## 2021-01-01 RX ORDER — BISACODYL 10 MG
10 SUPPOSITORY, RECTAL RECTAL DAILY PRN
Status: CANCELLED | OUTPATIENT
Start: 2021-01-01

## 2021-01-01 RX ORDER — DIGOXIN 0.25 MG/ML
125 INJECTION INTRAMUSCULAR; INTRAVENOUS DAILY
Status: DISCONTINUED | OUTPATIENT
Start: 2021-01-01 | End: 2021-01-01 | Stop reason: HOSPADM

## 2021-01-01 RX ORDER — LIDOCAINE HYDROCHLORIDE 20 MG/ML
5 SOLUTION OROPHARYNGEAL
Status: DISPENSED | OUTPATIENT
Start: 2021-01-01 | End: 2021-01-01

## 2021-01-01 RX ORDER — ACETAMINOPHEN 325 MG/1
650 TABLET ORAL EVERY 4 HOURS PRN
Status: CANCELLED | OUTPATIENT
Start: 2021-01-01

## 2021-01-01 RX ORDER — HEPARIN SODIUM 1000 [USP'U]/ML
2000 INJECTION, SOLUTION INTRAVENOUS; SUBCUTANEOUS PRN
Status: DISCONTINUED | OUTPATIENT
Start: 2021-01-01 | End: 2021-01-01

## 2021-01-01 RX ORDER — ACETAMINOPHEN 325 MG/1
650 TABLET ORAL EVERY 6 HOURS PRN
Status: CANCELLED | OUTPATIENT
Start: 2021-01-01

## 2021-01-01 RX ORDER — MAGNESIUM SULFATE 1 G/100ML
1000 INJECTION INTRAVENOUS PRN
Status: DISCONTINUED | OUTPATIENT
Start: 2021-01-01 | End: 2021-01-01 | Stop reason: HOSPADM

## 2021-01-01 RX ORDER — MORPHINE SULFATE 2 MG/ML
2 INJECTION, SOLUTION INTRAMUSCULAR; INTRAVENOUS
Status: DISCONTINUED | OUTPATIENT
Start: 2021-01-01 | End: 2021-01-01 | Stop reason: HOSPADM

## 2021-01-01 RX ORDER — PROPOFOL 10 MG/ML
INJECTION, EMULSION INTRAVENOUS CONTINUOUS PRN
Status: DISCONTINUED | OUTPATIENT
Start: 2021-01-01 | End: 2021-01-01 | Stop reason: SDUPTHER

## 2021-01-01 RX ORDER — 0.9 % SODIUM CHLORIDE 0.9 %
1000 INTRAVENOUS SOLUTION INTRAVENOUS ONCE
Status: COMPLETED | OUTPATIENT
Start: 2021-01-01 | End: 2021-01-01

## 2021-01-01 RX ORDER — POLYETHYLENE GLYCOL 3350 17 G/17G
17 POWDER, FOR SOLUTION ORAL DAILY
Status: CANCELLED | OUTPATIENT
Start: 2021-01-01

## 2021-01-01 RX ORDER — ONDANSETRON 2 MG/ML
4 INJECTION INTRAMUSCULAR; INTRAVENOUS EVERY 6 HOURS PRN
Status: DISCONTINUED | OUTPATIENT
Start: 2021-01-01 | End: 2021-01-01 | Stop reason: HOSPADM

## 2021-01-01 RX ORDER — DILTIAZEM HYDROCHLORIDE 5 MG/ML
10 INJECTION INTRAVENOUS ONCE
Status: COMPLETED | OUTPATIENT
Start: 2021-01-01 | End: 2021-01-01

## 2021-01-01 RX ORDER — LEVALBUTEROL 1.25 MG/.5ML
1.25 SOLUTION, CONCENTRATE RESPIRATORY (INHALATION) EVERY 8 HOURS PRN
Status: DISCONTINUED | OUTPATIENT
Start: 2021-01-01 | End: 2021-01-01 | Stop reason: HOSPADM

## 2021-01-01 RX ORDER — LORAZEPAM 2 MG/ML
1 INJECTION INTRAMUSCULAR
Status: DISCONTINUED | OUTPATIENT
Start: 2021-01-01 | End: 2021-01-01

## 2021-01-01 RX ORDER — OXYCODONE HYDROCHLORIDE AND ACETAMINOPHEN 5; 325 MG/1; MG/1
1 TABLET ORAL EVERY 4 HOURS PRN
COMMUNITY
End: 2021-01-01 | Stop reason: SDUPTHER

## 2021-01-01 RX ORDER — MIDODRINE HYDROCHLORIDE 10 MG/1
10 TABLET ORAL
Status: CANCELLED | OUTPATIENT
Start: 2021-01-01

## 2021-01-01 RX ORDER — CELECOXIB 200 MG/1
200 CAPSULE ORAL DAILY
Qty: 90 CAPSULE | Refills: 0 | Status: SHIPPED | OUTPATIENT
Start: 2021-01-01 | End: 2021-01-01

## 2021-01-01 RX ORDER — SODIUM CHLORIDE FOR INHALATION 0.9 %
3 VIAL, NEBULIZER (ML) INHALATION EVERY 4 HOURS PRN
Status: DISCONTINUED | OUTPATIENT
Start: 2021-01-01 | End: 2021-01-01 | Stop reason: HOSPADM

## 2021-01-01 RX ORDER — HEPARIN SODIUM 10000 [USP'U]/100ML
356-1000 INJECTION, SOLUTION INTRAVENOUS CONTINUOUS
Status: DISCONTINUED | OUTPATIENT
Start: 2021-01-01 | End: 2021-01-01

## 2021-01-01 RX ORDER — FAMOTIDINE 20 MG/1
10 TABLET, FILM COATED ORAL NIGHTLY
Status: DISCONTINUED | OUTPATIENT
Start: 2021-01-01 | End: 2021-01-01 | Stop reason: HOSPADM

## 2021-01-01 RX ORDER — DIGOXIN 0.25 MG/ML
125 INJECTION INTRAMUSCULAR; INTRAVENOUS DAILY
Status: CANCELLED | OUTPATIENT
Start: 2021-01-01

## 2021-01-01 RX ORDER — ACETAMINOPHEN 325 MG/1
650 TABLET ORAL EVERY 6 HOURS PRN
Status: DISCONTINUED | OUTPATIENT
Start: 2021-01-01 | End: 2021-01-01 | Stop reason: HOSPADM

## 2021-01-01 RX ORDER — SODIUM CHLORIDE 9 MG/ML
INJECTION, SOLUTION INTRAVENOUS CONTINUOUS
Status: CANCELLED | OUTPATIENT
Start: 2021-01-01

## 2021-01-01 RX ORDER — ROPINIROLE 0.5 MG/1
0.5 TABLET, FILM COATED ORAL 4 TIMES DAILY PRN
Status: DISCONTINUED | OUTPATIENT
Start: 2021-01-01 | End: 2021-01-01 | Stop reason: HOSPADM

## 2021-01-01 RX ORDER — FUROSEMIDE 10 MG/ML
20 INJECTION INTRAMUSCULAR; INTRAVENOUS ONCE
Status: COMPLETED | OUTPATIENT
Start: 2021-01-01 | End: 2021-01-01

## 2021-01-01 RX ORDER — OXYCODONE HYDROCHLORIDE AND ACETAMINOPHEN 5; 325 MG/1; MG/1
1 TABLET ORAL EVERY 4 HOURS PRN
Status: CANCELLED | OUTPATIENT
Start: 2021-01-01

## 2021-01-01 RX ORDER — POLYETHYLENE GLYCOL 3350 17 G/17G
17 POWDER, FOR SOLUTION ORAL DAILY PRN
Status: DISCONTINUED | OUTPATIENT
Start: 2021-01-01 | End: 2021-01-01 | Stop reason: HOSPADM

## 2021-01-01 RX ORDER — FUROSEMIDE 10 MG/ML
20 INJECTION INTRAMUSCULAR; INTRAVENOUS DAILY
Status: DISCONTINUED | OUTPATIENT
Start: 2021-01-01 | End: 2021-01-01

## 2021-01-01 RX ORDER — LIDOCAINE HYDROCHLORIDE 20 MG/ML
5 SOLUTION OROPHARYNGEAL
Status: DISCONTINUED | OUTPATIENT
Start: 2021-01-01 | End: 2021-01-01

## 2021-01-01 RX ORDER — PANTOPRAZOLE SODIUM 40 MG/1
40 TABLET, DELAYED RELEASE ORAL
Status: DISCONTINUED | OUTPATIENT
Start: 2021-01-01 | End: 2021-01-01 | Stop reason: HOSPADM

## 2021-01-01 RX ORDER — CALCIUM CARBONATE 200(500)MG
500 TABLET,CHEWABLE ORAL 2 TIMES DAILY
Status: DISCONTINUED | OUTPATIENT
Start: 2021-01-01 | End: 2021-01-01 | Stop reason: HOSPADM

## 2021-01-01 RX ORDER — POLYETHYLENE GLYCOL 3350 17 G/17G
17 POWDER, FOR SOLUTION ORAL DAILY
Status: DISCONTINUED | OUTPATIENT
Start: 2021-01-01 | End: 2021-01-01 | Stop reason: HOSPADM

## 2021-01-01 RX ORDER — HYDRALAZINE HYDROCHLORIDE 20 MG/ML
5 INJECTION INTRAMUSCULAR; INTRAVENOUS EVERY 10 MIN PRN
Status: DISCONTINUED | OUTPATIENT
Start: 2021-01-01 | End: 2021-01-01 | Stop reason: HOSPADM

## 2021-01-01 RX ORDER — AMIODARONE HYDROCHLORIDE 200 MG/1
100 TABLET ORAL 2 TIMES DAILY
Status: CANCELLED | OUTPATIENT
Start: 2021-01-01

## 2021-01-01 RX ORDER — LIDOCAINE HYDROCHLORIDE 20 MG/ML
INJECTION, SOLUTION EPIDURAL; INFILTRATION; INTRACAUDAL; PERINEURAL PRN
Status: DISCONTINUED | OUTPATIENT
Start: 2021-01-01 | End: 2021-01-01 | Stop reason: SDUPTHER

## 2021-01-01 RX ORDER — FERROUS SULFATE 300 MG/5ML
300 LIQUID (ML) ORAL DAILY
Status: DISCONTINUED | OUTPATIENT
Start: 2021-01-01 | End: 2021-01-01 | Stop reason: HOSPADM

## 2021-01-01 RX ORDER — CARVEDILOL 6.25 MG/1
6.25 TABLET ORAL 2 TIMES DAILY
Status: CANCELLED | OUTPATIENT
Start: 2021-01-01

## 2021-01-01 RX ORDER — SODIUM CHLORIDE 0.9 % (FLUSH) 0.9 %
10 SYRINGE (ML) INJECTION PRN
Status: DISCONTINUED | OUTPATIENT
Start: 2021-01-01 | End: 2021-01-01 | Stop reason: HOSPADM

## 2021-01-01 RX ORDER — POTASSIUM CHLORIDE 20 MEQ/1
40 TABLET, EXTENDED RELEASE ORAL PRN
Status: DISCONTINUED | OUTPATIENT
Start: 2021-01-01 | End: 2021-01-01 | Stop reason: CLARIF

## 2021-01-01 RX ORDER — POTASSIUM CHLORIDE 20 MEQ/1
20 TABLET, EXTENDED RELEASE ORAL 2 TIMES DAILY
Status: DISCONTINUED | OUTPATIENT
Start: 2021-01-01 | End: 2021-01-01

## 2021-01-01 RX ORDER — SODIUM CHLORIDE, SODIUM LACTATE, POTASSIUM CHLORIDE, CALCIUM CHLORIDE 600; 310; 30; 20 MG/100ML; MG/100ML; MG/100ML; MG/100ML
INJECTION, SOLUTION INTRAVENOUS CONTINUOUS
Status: DISCONTINUED | OUTPATIENT
Start: 2021-01-01 | End: 2021-01-01 | Stop reason: HOSPADM

## 2021-01-01 RX ORDER — MAGNESIUM SULFATE 1 G/100ML
1000 INJECTION INTRAVENOUS PRN
Status: DISCONTINUED | OUTPATIENT
Start: 2021-01-01 | End: 2021-01-01 | Stop reason: CLARIF

## 2021-01-01 RX ORDER — OXYCODONE HYDROCHLORIDE AND ACETAMINOPHEN 5; 325 MG/1; MG/1
2 TABLET ORAL PRN
Status: DISCONTINUED | OUTPATIENT
Start: 2021-01-01 | End: 2021-01-01 | Stop reason: HOSPADM

## 2021-01-01 RX ORDER — CYCLOBENZAPRINE HCL 10 MG
10 TABLET ORAL 3 TIMES DAILY PRN
Qty: 10 TABLET | Refills: 0 | Status: SHIPPED | OUTPATIENT
Start: 2021-01-01 | End: 2021-01-01

## 2021-01-01 RX ORDER — ACETAMINOPHEN 325 MG/1
650 TABLET ORAL EVERY 4 HOURS PRN
Status: DISCONTINUED | OUTPATIENT
Start: 2021-01-01 | End: 2021-01-01 | Stop reason: HOSPADM

## 2021-01-01 RX ORDER — ACETAMINOPHEN 650 MG/1
650 SUPPOSITORY RECTAL EVERY 6 HOURS PRN
Status: DISCONTINUED | OUTPATIENT
Start: 2021-01-01 | End: 2021-01-01 | Stop reason: HOSPADM

## 2021-01-01 RX ORDER — FUROSEMIDE 20 MG/1
20 TABLET ORAL DAILY
Status: DISCONTINUED | OUTPATIENT
Start: 2021-01-01 | End: 2021-01-01

## 2021-01-01 RX ORDER — ALBUMIN (HUMAN) 12.5 G/50ML
25 SOLUTION INTRAVENOUS ONCE
Status: COMPLETED | OUTPATIENT
Start: 2021-01-01 | End: 2021-01-01

## 2021-01-01 RX ORDER — EPINEPHRINE 1 MG/ML
0.3 INJECTION, SOLUTION, CONCENTRATE INTRAVENOUS PRN
Status: CANCELLED | OUTPATIENT
Start: 2021-01-01

## 2021-01-01 RX ORDER — SODIUM CHLORIDE 0.9 % (FLUSH) 0.9 %
10 SYRINGE (ML) INJECTION EVERY 12 HOURS SCHEDULED
Status: DISCONTINUED | OUTPATIENT
Start: 2021-01-01 | End: 2021-01-01 | Stop reason: HOSPADM

## 2021-01-01 RX ORDER — DEXAMETHASONE SODIUM PHOSPHATE 4 MG/ML
INJECTION, SOLUTION INTRA-ARTICULAR; INTRALESIONAL; INTRAMUSCULAR; INTRAVENOUS; SOFT TISSUE PRN
Status: DISCONTINUED | OUTPATIENT
Start: 2021-01-01 | End: 2021-01-01 | Stop reason: SDUPTHER

## 2021-01-01 RX ORDER — LACTULOSE 10 G/15ML
10 SOLUTION ORAL 3 TIMES DAILY
Status: DISCONTINUED | OUTPATIENT
Start: 2021-01-01 | End: 2021-01-01

## 2021-01-01 RX ORDER — PANTOPRAZOLE SODIUM 40 MG/10ML
40 INJECTION, POWDER, LYOPHILIZED, FOR SOLUTION INTRAVENOUS DAILY
Status: DISCONTINUED | OUTPATIENT
Start: 2021-01-01 | End: 2021-01-01 | Stop reason: HOSPADM

## 2021-01-01 RX ORDER — PHYTONADIONE 5 MG/1
2.5 TABLET ORAL ONCE
Status: COMPLETED | OUTPATIENT
Start: 2021-01-01 | End: 2021-01-01

## 2021-01-01 RX ORDER — OXYCODONE HYDROCHLORIDE AND ACETAMINOPHEN 5; 325 MG/1; MG/1
1 TABLET ORAL PRN
Status: DISCONTINUED | OUTPATIENT
Start: 2021-01-01 | End: 2021-01-01 | Stop reason: HOSPADM

## 2021-01-01 RX ORDER — PREDNISONE 10 MG/1
10 TABLET ORAL DAILY
Status: CANCELLED | OUTPATIENT
Start: 2021-01-01

## 2021-01-01 RX ORDER — WARFARIN SODIUM 2 MG/1
2 TABLET ORAL
Status: COMPLETED | OUTPATIENT
Start: 2021-01-01 | End: 2021-01-01

## 2021-01-01 RX ORDER — MAGNESIUM HYDROXIDE 1200 MG/15ML
LIQUID ORAL CONTINUOUS PRN
Status: COMPLETED | OUTPATIENT
Start: 2021-01-01 | End: 2021-01-01

## 2021-01-01 RX ORDER — PROMETHAZINE HYDROCHLORIDE 25 MG/1
12.5 TABLET ORAL EVERY 6 HOURS PRN
Status: DISCONTINUED | OUTPATIENT
Start: 2021-01-01 | End: 2021-01-01 | Stop reason: HOSPADM

## 2021-01-01 RX ORDER — EPHEDRINE SULFATE/0.9% NACL/PF 50 MG/5 ML
SYRINGE (ML) INTRAVENOUS PRN
Status: DISCONTINUED | OUTPATIENT
Start: 2021-01-01 | End: 2021-01-01 | Stop reason: SDUPTHER

## 2021-01-01 RX ORDER — AMIODARONE HYDROCHLORIDE 200 MG/1
200 TABLET ORAL 2 TIMES DAILY
Qty: 30 TABLET | Refills: 0 | Status: SHIPPED | OUTPATIENT
Start: 2021-01-01

## 2021-01-01 RX ORDER — BENZOCAINE/MENTHOL 6 MG-10 MG
LOZENGE MUCOUS MEMBRANE 2 TIMES DAILY
Status: CANCELLED | OUTPATIENT
Start: 2021-01-01

## 2021-01-01 RX ORDER — HYDROMORPHONE HCL 110MG/55ML
PATIENT CONTROLLED ANALGESIA SYRINGE INTRAVENOUS PRN
Status: DISCONTINUED | OUTPATIENT
Start: 2021-01-01 | End: 2021-01-01 | Stop reason: SDUPTHER

## 2021-01-01 RX ORDER — FUROSEMIDE 20 MG/1
20 TABLET ORAL 2 TIMES DAILY
Status: DISCONTINUED | OUTPATIENT
Start: 2021-01-01 | End: 2021-01-01

## 2021-01-01 RX ORDER — SODIUM CHLORIDE 0.9 % (FLUSH) 0.9 %
5-40 SYRINGE (ML) INJECTION EVERY 12 HOURS SCHEDULED
Status: DISCONTINUED | OUTPATIENT
Start: 2021-01-01 | End: 2021-01-01 | Stop reason: HOSPADM

## 2021-01-01 RX ORDER — SODIUM CHLORIDE 9 MG/ML
25 INJECTION, SOLUTION INTRAVENOUS PRN
Status: CANCELLED | OUTPATIENT
Start: 2021-01-01

## 2021-01-01 RX ORDER — MORPHINE SULFATE 4 MG/ML
4 INJECTION, SOLUTION INTRAMUSCULAR; INTRAVENOUS ONCE
Status: COMPLETED | OUTPATIENT
Start: 2021-01-01 | End: 2021-01-01

## 2021-01-01 RX ORDER — 0.9 % SODIUM CHLORIDE 0.9 %
1000 INTRAVENOUS SOLUTION INTRAVENOUS ONCE
Status: DISCONTINUED | OUTPATIENT
Start: 2021-01-01 | End: 2021-01-01 | Stop reason: HOSPADM

## 2021-01-01 RX ORDER — WARFARIN SODIUM 3 MG/1
3 TABLET ORAL
Status: COMPLETED | OUTPATIENT
Start: 2021-01-01 | End: 2021-01-01

## 2021-01-01 RX ORDER — OXYCODONE HYDROCHLORIDE AND ACETAMINOPHEN 5; 325 MG/1; MG/1
1 TABLET ORAL EVERY 4 HOURS PRN
Status: DISCONTINUED | OUTPATIENT
Start: 2021-01-01 | End: 2021-01-01 | Stop reason: HOSPADM

## 2021-01-01 RX ORDER — MORPHINE SULFATE 2 MG/ML
1 INJECTION, SOLUTION INTRAMUSCULAR; INTRAVENOUS
Status: DISCONTINUED | OUTPATIENT
Start: 2021-01-01 | End: 2021-01-01

## 2021-01-01 RX ORDER — SODIUM CHLORIDE 9 MG/ML
INJECTION, SOLUTION INTRAVENOUS CONTINUOUS
Status: DISCONTINUED | OUTPATIENT
Start: 2021-01-01 | End: 2021-01-01 | Stop reason: HOSPADM

## 2021-01-01 RX ORDER — CARVEDILOL 6.25 MG/1
6.25 TABLET ORAL 2 TIMES DAILY
Status: DISCONTINUED | OUTPATIENT
Start: 2021-01-01 | End: 2021-01-01 | Stop reason: HOSPADM

## 2021-01-01 RX ORDER — BACLOFEN 10 MG/1
5 TABLET ORAL 3 TIMES DAILY PRN
Status: DISCONTINUED | OUTPATIENT
Start: 2021-01-01 | End: 2021-01-01 | Stop reason: HOSPADM

## 2021-01-01 RX ORDER — 0.9 % SODIUM CHLORIDE 0.9 %
500 INTRAVENOUS SOLUTION INTRAVENOUS ONCE
Status: COMPLETED | OUTPATIENT
Start: 2021-01-01 | End: 2021-01-01

## 2021-01-01 RX ORDER — VITAMIN B COMPLEX
2000 TABLET ORAL DAILY
Status: CANCELLED | OUTPATIENT
Start: 2021-01-01

## 2021-01-01 RX ORDER — SODIUM CHLORIDE 0.9 % (FLUSH) 0.9 %
5-40 SYRINGE (ML) INJECTION PRN
Status: DISCONTINUED | OUTPATIENT
Start: 2021-01-01 | End: 2021-01-01 | Stop reason: HOSPADM

## 2021-01-01 RX ORDER — PREDNISONE 10 MG/1
5 TABLET ORAL DAILY
Status: DISCONTINUED | OUTPATIENT
Start: 2021-01-01 | End: 2021-01-01 | Stop reason: HOSPADM

## 2021-01-01 RX ORDER — MORPHINE SULFATE 2 MG/ML
1 INJECTION, SOLUTION INTRAMUSCULAR; INTRAVENOUS
Status: CANCELLED | OUTPATIENT
Start: 2021-01-01

## 2021-01-01 RX ORDER — DEXTROSE MONOHYDRATE 25 G/50ML
25 INJECTION, SOLUTION INTRAVENOUS PRN
Status: DISCONTINUED | OUTPATIENT
Start: 2021-01-01 | End: 2021-01-01 | Stop reason: HOSPADM

## 2021-01-01 RX ORDER — OXYCODONE HYDROCHLORIDE AND ACETAMINOPHEN 5; 325 MG/1; MG/1
1 TABLET ORAL
Status: DISCONTINUED | OUTPATIENT
Start: 2021-01-01 | End: 2021-01-01 | Stop reason: HOSPADM

## 2021-01-01 RX ORDER — MIDAZOLAM HYDROCHLORIDE 1 MG/ML
INJECTION INTRAMUSCULAR; INTRAVENOUS
Status: COMPLETED | OUTPATIENT
Start: 2021-01-01 | End: 2021-01-01

## 2021-01-01 RX ORDER — CYCLOBENZAPRINE HCL 10 MG
10 TABLET ORAL ONCE
Status: COMPLETED | OUTPATIENT
Start: 2021-01-01 | End: 2021-01-01

## 2021-01-01 RX ORDER — SODIUM CHLORIDE 9 MG/ML
25 INJECTION, SOLUTION INTRAVENOUS PRN
Status: DISCONTINUED | OUTPATIENT
Start: 2021-01-01 | End: 2021-01-01 | Stop reason: HOSPADM

## 2021-01-01 RX ORDER — MORPHINE SULFATE 2 MG/ML
2 INJECTION, SOLUTION INTRAMUSCULAR; INTRAVENOUS
Status: DISCONTINUED | OUTPATIENT
Start: 2021-01-01 | End: 2021-06-23 | Stop reason: HOSPADM

## 2021-01-01 RX ORDER — DIPHENHYDRAMINE HYDROCHLORIDE 50 MG/ML
50 INJECTION INTRAMUSCULAR; INTRAVENOUS ONCE
Status: CANCELLED | OUTPATIENT
Start: 2021-01-01 | End: 2021-01-01

## 2021-01-01 RX ORDER — MIDODRINE HYDROCHLORIDE 5 MG/1
5 TABLET ORAL
Status: DISCONTINUED | OUTPATIENT
Start: 2021-01-01 | End: 2021-01-01 | Stop reason: HOSPADM

## 2021-01-01 RX ORDER — DIGOXIN 0.25 MG/ML
250 INJECTION INTRAMUSCULAR; INTRAVENOUS DAILY
Status: DISCONTINUED | OUTPATIENT
Start: 2021-01-01 | End: 2021-01-01

## 2021-01-01 RX ORDER — OXYCODONE HYDROCHLORIDE AND ACETAMINOPHEN 5; 325 MG/1; MG/1
1 TABLET ORAL EVERY 6 HOURS PRN
Qty: 10 TABLET | Refills: 0 | Status: SHIPPED | OUTPATIENT
Start: 2021-01-01 | End: 2021-01-01

## 2021-01-01 RX ORDER — LACTULOSE 10 G/15ML
20 SOLUTION ORAL 3 TIMES DAILY
Status: DISCONTINUED | OUTPATIENT
Start: 2021-01-01 | End: 2021-01-01 | Stop reason: HOSPADM

## 2021-01-01 RX ORDER — PROPOFOL 10 MG/ML
INJECTION, EMULSION INTRAVENOUS PRN
Status: DISCONTINUED | OUTPATIENT
Start: 2021-01-01 | End: 2021-01-01 | Stop reason: SDUPTHER

## 2021-01-01 RX ORDER — POTASSIUM CHLORIDE 7.45 MG/ML
10 INJECTION INTRAVENOUS PRN
Status: DISCONTINUED | OUTPATIENT
Start: 2021-01-01 | End: 2021-01-01 | Stop reason: HOSPADM

## 2021-01-01 RX ORDER — LIDOCAINE HYDROCHLORIDE 10 MG/ML
1 INJECTION, SOLUTION EPIDURAL; INFILTRATION; INTRACAUDAL; PERINEURAL
Status: CANCELLED | OUTPATIENT
Start: 2021-01-01 | End: 2021-01-01

## 2021-01-01 RX ORDER — DEXTROSE MONOHYDRATE 25 G/50ML
25 INJECTION, SOLUTION INTRAVENOUS PRN
Status: CANCELLED | OUTPATIENT
Start: 2021-01-01

## 2021-01-01 RX ORDER — PROMETHAZINE HYDROCHLORIDE 25 MG/ML
6.25 INJECTION, SOLUTION INTRAMUSCULAR; INTRAVENOUS
Status: DISCONTINUED | OUTPATIENT
Start: 2021-01-01 | End: 2021-01-01 | Stop reason: CLARIF

## 2021-01-01 RX ORDER — AMIODARONE HYDROCHLORIDE 200 MG/1
200 TABLET ORAL 2 TIMES DAILY
Status: DISCONTINUED | OUTPATIENT
Start: 2021-01-01 | End: 2021-01-01 | Stop reason: HOSPADM

## 2021-01-01 RX ORDER — BISACODYL 10 MG
10 SUPPOSITORY, RECTAL RECTAL DAILY PRN
Status: DISCONTINUED | OUTPATIENT
Start: 2021-01-01 | End: 2021-01-01 | Stop reason: HOSPADM

## 2021-01-01 RX ORDER — WARFARIN SODIUM 2 MG/1
2 TABLET ORAL ONCE
Status: COMPLETED | OUTPATIENT
Start: 2021-01-01 | End: 2021-01-01

## 2021-01-01 RX ORDER — SODIUM CHLORIDE 9 MG/ML
INJECTION, SOLUTION INTRAVENOUS PRN
Status: DISCONTINUED | OUTPATIENT
Start: 2021-01-01 | End: 2021-01-01 | Stop reason: HOSPADM

## 2021-01-01 RX ORDER — TORSEMIDE 20 MG/1
20 TABLET ORAL DAILY
Status: DISCONTINUED | OUTPATIENT
Start: 2021-01-01 | End: 2021-01-01 | Stop reason: HOSPADM

## 2021-01-01 RX ORDER — ISOSORBIDE MONONITRATE 30 MG/1
30 TABLET, EXTENDED RELEASE ORAL DAILY
Status: DISCONTINUED | OUTPATIENT
Start: 2021-01-01 | End: 2021-01-01 | Stop reason: HOSPADM

## 2021-01-01 RX ORDER — MIDODRINE HYDROCHLORIDE 10 MG/1
10 TABLET ORAL
Status: DISCONTINUED | OUTPATIENT
Start: 2021-01-01 | End: 2021-01-01 | Stop reason: HOSPADM

## 2021-01-01 RX ORDER — METHYLPREDNISOLONE SODIUM SUCCINATE 40 MG/ML
40 INJECTION, POWDER, LYOPHILIZED, FOR SOLUTION INTRAMUSCULAR; INTRAVENOUS EVERY 12 HOURS
Status: DISCONTINUED | OUTPATIENT
Start: 2021-01-01 | End: 2021-01-01 | Stop reason: HOSPADM

## 2021-01-01 RX ORDER — FUROSEMIDE 40 MG/1
40 TABLET ORAL DAILY
Status: DISCONTINUED | OUTPATIENT
Start: 2021-01-01 | End: 2021-01-01 | Stop reason: HOSPADM

## 2021-01-01 RX ORDER — FUROSEMIDE 10 MG/ML
20 INJECTION INTRAMUSCULAR; INTRAVENOUS 2 TIMES DAILY
Status: DISCONTINUED | OUTPATIENT
Start: 2021-01-01 | End: 2021-01-01 | Stop reason: HOSPADM

## 2021-01-01 RX ORDER — PANTOPRAZOLE SODIUM 40 MG/1
40 TABLET, DELAYED RELEASE ORAL
Status: CANCELLED | OUTPATIENT
Start: 2021-01-01

## 2021-01-01 RX ORDER — LIDOCAINE HYDROCHLORIDE 10 MG/ML
INJECTION, SOLUTION EPIDURAL; INFILTRATION; INTRACAUDAL; PERINEURAL PRN
Status: DISCONTINUED | OUTPATIENT
Start: 2021-01-01 | End: 2021-01-01 | Stop reason: SDUPTHER

## 2021-01-01 RX ORDER — WARFARIN SODIUM 2 MG/1
4 TABLET ORAL
Status: DISCONTINUED | OUTPATIENT
Start: 2021-01-01 | End: 2021-01-01

## 2021-01-01 RX ORDER — PREDNISONE 10 MG/1
10 TABLET ORAL DAILY
Status: DISCONTINUED | OUTPATIENT
Start: 2021-01-01 | End: 2021-01-01 | Stop reason: HOSPADM

## 2021-01-01 RX ORDER — BACLOFEN 10 MG/1
5 TABLET ORAL 3 TIMES DAILY
Status: DISCONTINUED | OUTPATIENT
Start: 2021-01-01 | End: 2021-01-01

## 2021-01-01 RX ORDER — HEPARIN SODIUM 1000 [USP'U]/ML
4000 INJECTION, SOLUTION INTRAVENOUS; SUBCUTANEOUS ONCE
Status: COMPLETED | OUTPATIENT
Start: 2021-01-01 | End: 2021-01-01

## 2021-01-01 RX ORDER — FUROSEMIDE 20 MG/1
20 TABLET ORAL ONCE
Status: COMPLETED | OUTPATIENT
Start: 2021-01-01 | End: 2021-01-01

## 2021-01-01 RX ORDER — AMIODARONE HYDROCHLORIDE 200 MG/1
100 TABLET ORAL 2 TIMES DAILY
Status: DISCONTINUED | OUTPATIENT
Start: 2021-01-01 | End: 2021-01-01 | Stop reason: HOSPADM

## 2021-01-01 RX ORDER — CYCLOBENZAPRINE HCL 10 MG
10 TABLET ORAL EVERY 8 HOURS PRN
COMMUNITY
Start: 2021-01-01 | End: 2021-01-01

## 2021-01-01 RX ORDER — 0.9 % SODIUM CHLORIDE 0.9 %
80 INTRAVENOUS SOLUTION INTRAVENOUS ONCE
Status: COMPLETED | OUTPATIENT
Start: 2021-01-01 | End: 2021-01-01

## 2021-01-01 RX ORDER — MIDODRINE HYDROCHLORIDE 5 MG/1
5 TABLET ORAL
Status: DISCONTINUED | OUTPATIENT
Start: 2021-01-01 | End: 2021-01-01

## 2021-01-01 RX ORDER — DIGOXIN 0.25 MG/ML
125 INJECTION INTRAMUSCULAR; INTRAVENOUS ONCE
Status: COMPLETED | OUTPATIENT
Start: 2021-01-01 | End: 2021-01-01

## 2021-01-01 RX ORDER — DEXTROSE AND SODIUM CHLORIDE 5; .9 G/100ML; G/100ML
INJECTION, SOLUTION INTRAVENOUS CONTINUOUS
Status: DISCONTINUED | OUTPATIENT
Start: 2021-01-01 | End: 2021-01-01

## 2021-01-01 RX ORDER — LABETALOL HYDROCHLORIDE 5 MG/ML
5 INJECTION, SOLUTION INTRAVENOUS EVERY 10 MIN PRN
Status: DISCONTINUED | OUTPATIENT
Start: 2021-01-01 | End: 2021-01-01 | Stop reason: HOSPADM

## 2021-01-01 RX ORDER — SODIUM CHLORIDE 0.9 % (FLUSH) 0.9 %
10 SYRINGE (ML) INJECTION PRN
Status: CANCELLED | OUTPATIENT
Start: 2021-01-01

## 2021-01-01 RX ORDER — SODIUM CHLORIDE, SODIUM LACTATE, POTASSIUM CHLORIDE, CALCIUM CHLORIDE 600; 310; 30; 20 MG/100ML; MG/100ML; MG/100ML; MG/100ML
INJECTION, SOLUTION INTRAVENOUS CONTINUOUS
Status: CANCELLED | OUTPATIENT
Start: 2021-01-01

## 2021-01-01 RX ORDER — ROPINIROLE 0.5 MG/1
0.5 TABLET, FILM COATED ORAL 4 TIMES DAILY
Status: DISCONTINUED | OUTPATIENT
Start: 2021-01-01 | End: 2021-01-01

## 2021-01-01 RX ORDER — WARFARIN SODIUM 3 MG/1
3 TABLET ORAL ONCE
Status: DISCONTINUED | OUTPATIENT
Start: 2021-01-01 | End: 2021-01-01

## 2021-01-01 RX ORDER — NALOXONE HYDROCHLORIDE 1 MG/ML
0.4 INJECTION INTRAMUSCULAR; INTRAVENOUS; SUBCUTANEOUS PRN
Status: DISCONTINUED | OUTPATIENT
Start: 2021-01-01 | End: 2021-01-01 | Stop reason: HOSPADM

## 2021-01-01 RX ORDER — ACETAMINOPHEN 650 MG/1
650 SUPPOSITORY RECTAL EVERY 6 HOURS PRN
Status: CANCELLED | OUTPATIENT
Start: 2021-01-01

## 2021-01-01 RX ORDER — GLYCOPYRROLATE 1 MG/5 ML
0.1 SYRINGE (ML) INTRAVENOUS
Status: DISCONTINUED | OUTPATIENT
Start: 2021-01-01 | End: 2021-06-23 | Stop reason: HOSPADM

## 2021-01-01 RX ORDER — HEPARIN SODIUM 1000 [USP'U]/ML
4000 INJECTION, SOLUTION INTRAVENOUS; SUBCUTANEOUS ONCE
Status: DISCONTINUED | OUTPATIENT
Start: 2021-01-01 | End: 2021-01-01

## 2021-01-01 RX ORDER — CHLORHEXIDINE GLUCONATE 0.12 MG/ML
15 RINSE ORAL 2 TIMES DAILY
Status: CANCELLED | OUTPATIENT
Start: 2021-01-01

## 2021-01-01 RX ORDER — MORPHINE SULFATE 4 MG/ML
2 INJECTION, SOLUTION INTRAMUSCULAR; INTRAVENOUS ONCE
Status: COMPLETED | OUTPATIENT
Start: 2021-01-01 | End: 2021-01-01

## 2021-01-01 RX ORDER — METOPROLOL SUCCINATE 25 MG/1
25 TABLET, EXTENDED RELEASE ORAL DAILY
Status: DISCONTINUED | OUTPATIENT
Start: 2021-01-01 | End: 2021-01-01

## 2021-01-01 RX ORDER — FAMOTIDINE 20 MG/1
10 TABLET, FILM COATED ORAL NIGHTLY
Status: CANCELLED | OUTPATIENT
Start: 2021-01-01

## 2021-01-01 RX ORDER — MIDODRINE HYDROCHLORIDE 2.5 MG/1
2.5 TABLET ORAL
Status: DISCONTINUED | OUTPATIENT
Start: 2021-01-01 | End: 2021-01-01

## 2021-01-01 RX ORDER — WARFARIN SODIUM 2 MG/1
4 TABLET ORAL ONCE
Status: COMPLETED | OUTPATIENT
Start: 2021-01-01 | End: 2021-01-01

## 2021-01-01 RX ORDER — CHLORHEXIDINE GLUCONATE 0.12 MG/ML
15 RINSE ORAL 2 TIMES DAILY
Status: DISCONTINUED | OUTPATIENT
Start: 2021-01-01 | End: 2021-01-01 | Stop reason: HOSPADM

## 2021-01-01 RX ORDER — ROPINIROLE 0.5 MG/1
0.5 TABLET, FILM COATED ORAL 4 TIMES DAILY
Status: DISCONTINUED | OUTPATIENT
Start: 2021-01-01 | End: 2021-01-01 | Stop reason: HOSPADM

## 2021-01-01 RX ORDER — FERROUS SULFATE 7.5 MG/0.5
15 SYRINGE (EA) ORAL DAILY
Status: DISCONTINUED | OUTPATIENT
Start: 2021-01-01 | End: 2021-01-01 | Stop reason: RX

## 2021-01-01 RX ORDER — POLYETHYLENE GLYCOL 3350 17 G/17G
17 POWDER, FOR SOLUTION ORAL DAILY PRN
Status: CANCELLED | OUTPATIENT
Start: 2021-01-01

## 2021-01-01 RX ORDER — LORAZEPAM 2 MG/ML
1 INJECTION INTRAMUSCULAR
Status: DISCONTINUED | OUTPATIENT
Start: 2021-01-01 | End: 2021-06-23 | Stop reason: HOSPADM

## 2021-01-01 RX ORDER — PREDNISONE 20 MG/1
20 TABLET ORAL DAILY
Status: DISCONTINUED | OUTPATIENT
Start: 2021-01-01 | End: 2021-01-01

## 2021-01-01 RX ORDER — FERROUS SULFATE 300 MG/5ML
300 LIQUID (ML) ORAL DAILY
Status: CANCELLED | OUTPATIENT
Start: 2021-01-01

## 2021-01-01 RX ORDER — PHENYLEPHRINE HYDROCHLORIDE 10 MG/ML
INJECTION INTRAVENOUS PRN
Status: DISCONTINUED | OUTPATIENT
Start: 2021-01-01 | End: 2021-01-01 | Stop reason: SDUPTHER

## 2021-01-01 RX ORDER — CALCIUM CARBONATE 200(500)MG
500 TABLET,CHEWABLE ORAL 2 TIMES DAILY
Status: CANCELLED | OUTPATIENT
Start: 2021-01-01

## 2021-01-01 RX ORDER — MORPHINE SULFATE/0.9% NACL/PF 1 MG/ML
SYRINGE (ML) INJECTION CONTINUOUS
Status: DISCONTINUED | OUTPATIENT
Start: 2021-01-01 | End: 2021-01-01

## 2021-01-01 RX ORDER — ATORVASTATIN CALCIUM 40 MG/1
40 TABLET, FILM COATED ORAL DAILY
Status: CANCELLED | OUTPATIENT
Start: 2021-01-01

## 2021-01-01 RX ORDER — POTASSIUM CHLORIDE 20 MEQ/1
20 TABLET, EXTENDED RELEASE ORAL 2 TIMES DAILY
Status: DISCONTINUED | OUTPATIENT
Start: 2021-01-01 | End: 2021-01-01 | Stop reason: HOSPADM

## 2021-01-01 RX ORDER — LIDOCAINE HYDROCHLORIDE 10 MG/ML
INJECTION, SOLUTION INFILTRATION; PERINEURAL PRN
Status: DISCONTINUED | OUTPATIENT
Start: 2021-01-01 | End: 2021-01-01 | Stop reason: ALTCHOICE

## 2021-01-01 RX ORDER — ALBUMIN (HUMAN) 12.5 G/50ML
50 SOLUTION INTRAVENOUS 2 TIMES DAILY
Status: DISCONTINUED | OUTPATIENT
Start: 2021-01-01 | End: 2021-01-01 | Stop reason: HOSPADM

## 2021-01-01 RX ORDER — ONDANSETRON 2 MG/ML
4 INJECTION INTRAMUSCULAR; INTRAVENOUS EVERY 6 HOURS PRN
Status: CANCELLED | OUTPATIENT
Start: 2021-01-01

## 2021-01-01 RX ORDER — ROPINIROLE 0.5 MG/1
0.5 TABLET, FILM COATED ORAL 3 TIMES DAILY
Status: DISCONTINUED | OUTPATIENT
Start: 2021-01-01 | End: 2021-01-01 | Stop reason: HOSPADM

## 2021-01-01 RX ORDER — SENNA PLUS 8.6 MG/1
2 TABLET ORAL DAILY PRN
Status: CANCELLED | OUTPATIENT
Start: 2021-01-01

## 2021-01-01 RX ORDER — POTASSIUM CHLORIDE 20 MEQ/1
40 TABLET, EXTENDED RELEASE ORAL PRN
Status: DISCONTINUED | OUTPATIENT
Start: 2021-01-01 | End: 2021-01-01 | Stop reason: HOSPADM

## 2021-01-01 RX ORDER — DIPHENHYDRAMINE HCL 25 MG
25 TABLET ORAL NIGHTLY PRN
Status: DISCONTINUED | OUTPATIENT
Start: 2021-01-01 | End: 2021-01-01 | Stop reason: HOSPADM

## 2021-01-01 RX ORDER — BUPIVACAINE HYDROCHLORIDE AND EPINEPHRINE 5; 5 MG/ML; UG/ML
INJECTION, SOLUTION EPIDURAL; INTRACAUDAL; PERINEURAL PRN
Status: DISCONTINUED | OUTPATIENT
Start: 2021-01-01 | End: 2021-01-01 | Stop reason: ALTCHOICE

## 2021-01-01 RX ORDER — ONDANSETRON 4 MG/1
4 TABLET, ORALLY DISINTEGRATING ORAL EVERY 8 HOURS PRN
Status: DISCONTINUED | OUTPATIENT
Start: 2021-01-01 | End: 2021-01-01 | Stop reason: HOSPADM

## 2021-01-01 RX ORDER — SCOLOPAMINE TRANSDERMAL SYSTEM 1 MG/1
1 PATCH, EXTENDED RELEASE TRANSDERMAL ONCE
Status: DISCONTINUED | OUTPATIENT
Start: 2021-01-01 | End: 2021-01-01 | Stop reason: HOSPADM

## 2021-01-01 RX ORDER — ACETAMINOPHEN 500 MG
500 TABLET ORAL NIGHTLY PRN
Status: DISCONTINUED | OUTPATIENT
Start: 2021-01-01 | End: 2021-01-01 | Stop reason: HOSPADM

## 2021-01-01 RX ORDER — OXYBUTYNIN CHLORIDE 5 MG/1
15 TABLET, EXTENDED RELEASE ORAL DAILY
Status: DISCONTINUED | OUTPATIENT
Start: 2021-01-01 | End: 2021-01-01 | Stop reason: HOSPADM

## 2021-01-01 RX ORDER — NALOXONE HYDROCHLORIDE 0.4 MG/ML
0.4 INJECTION, SOLUTION INTRAMUSCULAR; INTRAVENOUS; SUBCUTANEOUS PRN
Status: CANCELLED | OUTPATIENT
Start: 2021-01-01

## 2021-01-01 RX ORDER — ROPINIROLE 0.5 MG/1
0.5 TABLET, FILM COATED ORAL 3 TIMES DAILY
Status: CANCELLED | OUTPATIENT
Start: 2021-01-01

## 2021-01-01 RX ORDER — SENNA PLUS 8.6 MG/1
2 TABLET ORAL DAILY PRN
Status: DISCONTINUED | OUTPATIENT
Start: 2021-01-01 | End: 2021-01-01 | Stop reason: HOSPADM

## 2021-01-01 RX ORDER — WARFARIN SODIUM 3 MG/1
3 TABLET ORAL ONCE
Status: COMPLETED | OUTPATIENT
Start: 2021-01-01 | End: 2021-01-01

## 2021-01-01 RX ORDER — OXYCODONE HYDROCHLORIDE AND ACETAMINOPHEN 5; 325 MG/1; MG/1
1 TABLET ORAL EVERY 6 HOURS PRN
Qty: 40 TABLET | Refills: 0 | Status: SHIPPED | OUTPATIENT
Start: 2021-01-01 | End: 2021-01-01

## 2021-01-01 RX ORDER — DIPHENHYDRAMINE HYDROCHLORIDE 50 MG/ML
12.5 INJECTION INTRAMUSCULAR; INTRAVENOUS
Status: DISCONTINUED | OUTPATIENT
Start: 2021-01-01 | End: 2021-01-01 | Stop reason: HOSPADM

## 2021-01-01 RX ORDER — MELATONIN
1000 DAILY
Status: DISCONTINUED | OUTPATIENT
Start: 2021-01-01 | End: 2021-01-01

## 2021-01-01 RX ORDER — ACETAMINOPHEN 500 MG
500 TABLET ORAL EVERY 6 HOURS PRN
COMMUNITY

## 2021-01-01 RX ORDER — 0.9 % SODIUM CHLORIDE 0.9 %
250 INTRAVENOUS SOLUTION INTRAVENOUS ONCE
Status: COMPLETED | OUTPATIENT
Start: 2021-01-01 | End: 2021-01-01

## 2021-01-01 RX ORDER — AMIODARONE HYDROCHLORIDE 200 MG/1
200 TABLET ORAL 2 TIMES DAILY
Status: DISCONTINUED | OUTPATIENT
Start: 2021-01-01 | End: 2021-01-01

## 2021-01-01 RX ORDER — GLYCOPYRROLATE 1 MG/5 ML
0.1 SYRINGE (ML) INTRAVENOUS
Status: CANCELLED | OUTPATIENT
Start: 2021-01-01

## 2021-01-01 RX ORDER — SUCCINYLCHOLINE/SOD CL,ISO/PF 200MG/10ML
SYRINGE (ML) INTRAVENOUS PRN
Status: DISCONTINUED | OUTPATIENT
Start: 2021-01-01 | End: 2021-01-01 | Stop reason: SDUPTHER

## 2021-01-01 RX ORDER — VITAMIN B COMPLEX
2000 TABLET ORAL DAILY
Status: DISCONTINUED | OUTPATIENT
Start: 2021-01-01 | End: 2021-01-01 | Stop reason: HOSPADM

## 2021-01-01 RX ORDER — POTASSIUM CHLORIDE 7.45 MG/ML
10 INJECTION INTRAVENOUS PRN
Status: CANCELLED | OUTPATIENT
Start: 2021-01-01

## 2021-01-01 RX ORDER — WARFARIN SODIUM 1 MG/1
1 TABLET ORAL
Status: COMPLETED | OUTPATIENT
Start: 2021-01-01 | End: 2021-01-01

## 2021-01-01 RX ORDER — BENZOCAINE/MENTHOL 6 MG-10 MG
LOZENGE MUCOUS MEMBRANE 2 TIMES DAILY
Status: DISCONTINUED | OUTPATIENT
Start: 2021-01-01 | End: 2021-01-01 | Stop reason: HOSPADM

## 2021-01-01 RX ORDER — SODIUM PHOSPHATE, DIBASIC AND SODIUM PHOSPHATE, MONOBASIC 7; 19 G/133ML; G/133ML
1 ENEMA RECTAL
Status: COMPLETED | OUTPATIENT
Start: 2021-01-01 | End: 2021-01-01

## 2021-01-01 RX ORDER — BACLOFEN 10 MG/1
5 TABLET ORAL 3 TIMES DAILY PRN
Status: CANCELLED | OUTPATIENT
Start: 2021-01-01

## 2021-01-01 RX ORDER — OXYCODONE HYDROCHLORIDE AND ACETAMINOPHEN 5; 325 MG/1; MG/1
1 TABLET ORAL EVERY 6 HOURS PRN
Status: DISCONTINUED | OUTPATIENT
Start: 2021-01-01 | End: 2021-01-01 | Stop reason: HOSPADM

## 2021-01-01 RX ORDER — ONDANSETRON 2 MG/ML
4 INJECTION INTRAMUSCULAR; INTRAVENOUS
Status: DISCONTINUED | OUTPATIENT
Start: 2021-01-01 | End: 2021-01-01 | Stop reason: HOSPADM

## 2021-01-01 RX ORDER — LIDOCAINE HYDROCHLORIDE 10 MG/ML
1 INJECTION, SOLUTION EPIDURAL; INFILTRATION; INTRACAUDAL; PERINEURAL
Status: DISCONTINUED | OUTPATIENT
Start: 2021-01-01 | End: 2021-01-01 | Stop reason: HOSPADM

## 2021-01-01 RX ORDER — LORAZEPAM 2 MG/ML
1 INJECTION INTRAMUSCULAR
Status: CANCELLED | OUTPATIENT
Start: 2021-01-01

## 2021-01-01 RX ORDER — HEPARIN SODIUM 1000 [USP'U]/ML
4000 INJECTION, SOLUTION INTRAVENOUS; SUBCUTANEOUS PRN
Status: DISCONTINUED | OUTPATIENT
Start: 2021-01-01 | End: 2021-01-01

## 2021-01-01 RX ORDER — VITAMIN B COMPLEX
1000 TABLET ORAL 2 TIMES DAILY
Status: DISCONTINUED | OUTPATIENT
Start: 2021-01-01 | End: 2021-01-01 | Stop reason: HOSPADM

## 2021-01-01 RX ORDER — SODIUM CHLORIDE 9 MG/ML
INJECTION, SOLUTION INTRAVENOUS CONTINUOUS
Status: DISCONTINUED | OUTPATIENT
Start: 2021-01-01 | End: 2021-01-01

## 2021-01-01 RX ORDER — LINEZOLID 2 MG/ML
600 INJECTION, SOLUTION INTRAVENOUS EVERY 12 HOURS
Status: DISCONTINUED | OUTPATIENT
Start: 2021-01-01 | End: 2021-01-01 | Stop reason: HOSPADM

## 2021-01-01 RX ORDER — WARFARIN SODIUM 1 MG/1
1 TABLET ORAL ONCE
Status: COMPLETED | OUTPATIENT
Start: 2021-01-01 | End: 2021-01-01

## 2021-01-01 RX ORDER — FUROSEMIDE 10 MG/ML
20 INJECTION INTRAMUSCULAR; INTRAVENOUS DAILY
Status: CANCELLED | OUTPATIENT
Start: 2021-01-01 | End: 2021-01-01

## 2021-01-01 RX ORDER — SODIUM CHLORIDE 9 MG/ML
INJECTION, SOLUTION INTRAVENOUS ONCE
Status: COMPLETED | OUTPATIENT
Start: 2021-01-01 | End: 2021-01-01

## 2021-01-01 RX ORDER — LORAZEPAM 2 MG/ML
1 INJECTION INTRAMUSCULAR
Status: DISCONTINUED | OUTPATIENT
Start: 2021-01-01 | End: 2021-01-01 | Stop reason: HOSPADM

## 2021-01-01 RX ORDER — FERROUS SULFATE 325(65) MG
325 TABLET ORAL
Status: DISCONTINUED | OUTPATIENT
Start: 2021-01-01 | End: 2021-01-01 | Stop reason: HOSPADM

## 2021-01-01 RX ORDER — 0.9 % SODIUM CHLORIDE 0.9 %
1000 INTRAVENOUS SOLUTION INTRAVENOUS ONCE
Status: CANCELLED | OUTPATIENT
Start: 2021-01-01

## 2021-01-01 RX ORDER — LIDOCAINE HYDROCHLORIDE 20 MG/ML
5 SOLUTION OROPHARYNGEAL
Status: CANCELLED | OUTPATIENT
Start: 2021-01-01 | End: 2021-01-01

## 2021-01-01 RX ORDER — METHYLPREDNISOLONE SODIUM SUCCINATE 125 MG/2ML
125 INJECTION, POWDER, LYOPHILIZED, FOR SOLUTION INTRAMUSCULAR; INTRAVENOUS ONCE
Status: CANCELLED | OUTPATIENT
Start: 2021-01-01 | End: 2021-01-01

## 2021-01-01 RX ORDER — METHYLPREDNISOLONE SODIUM SUCCINATE 40 MG/ML
40 INJECTION, POWDER, LYOPHILIZED, FOR SOLUTION INTRAMUSCULAR; INTRAVENOUS DAILY
Status: DISCONTINUED | OUTPATIENT
Start: 2021-01-01 | End: 2021-01-01

## 2021-01-01 RX ORDER — MORPHINE SULFATE 2 MG/ML
1 INJECTION, SOLUTION INTRAMUSCULAR; INTRAVENOUS EVERY 4 HOURS PRN
Status: DISCONTINUED | OUTPATIENT
Start: 2021-01-01 | End: 2021-01-01 | Stop reason: HOSPADM

## 2021-01-01 RX ORDER — LACTULOSE 10 G/15ML
20 SOLUTION ORAL 3 TIMES DAILY
Status: CANCELLED | OUTPATIENT
Start: 2021-01-01 | End: 2021-01-01

## 2021-01-01 RX ORDER — SODIUM CHLORIDE 9 MG/ML
10 INJECTION INTRAVENOUS DAILY
Status: DISCONTINUED | OUTPATIENT
Start: 2021-01-01 | End: 2021-01-01 | Stop reason: HOSPADM

## 2021-01-01 RX ORDER — METOCLOPRAMIDE HYDROCHLORIDE 5 MG/ML
10 INJECTION INTRAMUSCULAR; INTRAVENOUS
Status: DISCONTINUED | OUTPATIENT
Start: 2021-01-01 | End: 2021-01-01 | Stop reason: HOSPADM

## 2021-01-01 RX ORDER — HEPARIN SODIUM 10000 [USP'U]/100ML
12 INJECTION, SOLUTION INTRAVENOUS CONTINUOUS
Status: DISCONTINUED | OUTPATIENT
Start: 2021-01-01 | End: 2021-01-01

## 2021-01-01 RX ORDER — ACETAMINOPHEN 325 MG/1
650 TABLET ORAL EVERY 4 HOURS PRN
Status: DISCONTINUED | OUTPATIENT
Start: 2021-01-01 | End: 2021-01-01 | Stop reason: SDUPTHER

## 2021-01-01 RX ORDER — POTASSIUM CHLORIDE 7.45 MG/ML
10 INJECTION INTRAVENOUS PRN
Status: DISCONTINUED | OUTPATIENT
Start: 2021-01-01 | End: 2021-01-01 | Stop reason: CLARIF

## 2021-01-01 RX ORDER — TRIAMCINOLONE ACETONIDE 40 MG/ML
INJECTION, SUSPENSION INTRA-ARTICULAR; INTRAMUSCULAR
Status: COMPLETED | OUTPATIENT
Start: 2021-01-01 | End: 2021-01-01

## 2021-01-01 RX ORDER — POTASSIUM CHLORIDE 20 MEQ/1
40 TABLET, EXTENDED RELEASE ORAL PRN
Status: CANCELLED | OUTPATIENT
Start: 2021-01-01

## 2021-01-01 RX ORDER — GLYCOPYRROLATE 1 MG/5 ML
0.1 SYRINGE (ML) INTRAVENOUS EVERY 6 HOURS
Status: DISCONTINUED | OUTPATIENT
Start: 2021-01-01 | End: 2021-01-01 | Stop reason: HOSPADM

## 2021-01-01 RX ORDER — SODIUM CHLORIDE 9 MG/ML
INJECTION, SOLUTION INTRAVENOUS CONTINUOUS
Status: CANCELLED | OUTPATIENT
Start: 2021-01-01 | End: 2021-01-01

## 2021-01-01 RX ORDER — ROPINIROLE 0.5 MG/1
0.5 TABLET, FILM COATED ORAL 4 TIMES DAILY
Status: CANCELLED | OUTPATIENT
Start: 2021-01-01

## 2021-01-01 RX ORDER — ACETAMINOPHEN 650 MG/1
650 SUPPOSITORY RECTAL EVERY 6 HOURS PRN
Status: DISCONTINUED | OUTPATIENT
Start: 2021-01-01 | End: 2021-06-23 | Stop reason: HOSPADM

## 2021-01-01 RX ORDER — DILTIAZEM HYDROCHLORIDE 120 MG/1
120 CAPSULE, COATED, EXTENDED RELEASE ORAL DAILY
Status: DISCONTINUED | OUTPATIENT
Start: 2021-01-01 | End: 2021-01-01

## 2021-01-01 RX ORDER — ACETAMINOPHEN 325 MG/1
650 TABLET ORAL EVERY 6 HOURS PRN
Status: DISCONTINUED | OUTPATIENT
Start: 2021-01-01 | End: 2021-06-23 | Stop reason: HOSPADM

## 2021-01-01 RX ORDER — ISOSORBIDE MONONITRATE 60 MG/1
60 TABLET, EXTENDED RELEASE ORAL DAILY
COMMUNITY

## 2021-01-01 RX ORDER — 0.9 % SODIUM CHLORIDE 0.9 %
500 INTRAVENOUS SOLUTION INTRAVENOUS
Status: DISCONTINUED | OUTPATIENT
Start: 2021-01-01 | End: 2021-01-01 | Stop reason: HOSPADM

## 2021-01-01 RX ORDER — ACETAMINOPHEN,DIPHENHYDRAMINE HCL 500; 25 MG/1; MG/1
1 TABLET, FILM COATED ORAL NIGHTLY PRN
Status: DISCONTINUED | OUTPATIENT
Start: 2021-01-01 | End: 2021-01-01 | Stop reason: CLARIF

## 2021-01-01 RX ORDER — DILTIAZEM HYDROCHLORIDE 5 MG/ML
20 INJECTION INTRAVENOUS ONCE
Status: COMPLETED | OUTPATIENT
Start: 2021-01-01 | End: 2021-01-01

## 2021-01-01 RX ORDER — TORSEMIDE 20 MG/1
20 TABLET ORAL DAILY
Status: CANCELLED | OUTPATIENT
Start: 2021-01-01

## 2021-01-01 RX ADMIN — ATORVASTATIN CALCIUM 40 MG: 40 TABLET, FILM COATED ORAL at 08:09

## 2021-01-01 RX ADMIN — OXYBUTYNIN CHLORIDE 15 MG: 10 TABLET, EXTENDED RELEASE ORAL at 09:44

## 2021-01-01 RX ADMIN — MIDODRINE HYDROCHLORIDE 10 MG: 10 TABLET ORAL at 12:46

## 2021-01-01 RX ADMIN — AMIODARONE HYDROCHLORIDE 200 MG: 200 TABLET ORAL at 10:02

## 2021-01-01 RX ADMIN — ROPINIROLE HYDROCHLORIDE 0.5 MG: 0.5 TABLET, FILM COATED ORAL at 13:07

## 2021-01-01 RX ADMIN — NYSTATIN 500000 UNITS: 100000 SUSPENSION ORAL at 19:33

## 2021-01-01 RX ADMIN — CARVEDILOL 6.25 MG: 6.25 TABLET, FILM COATED ORAL at 09:21

## 2021-01-01 RX ADMIN — MINERAL SUPPLEMENT IRON 300 MG / 5 ML STRENGTH LIQUID 100 PER BOX UNFLAVORED 300 MG: at 13:28

## 2021-01-01 RX ADMIN — SODIUM CHLORIDE: 900 INJECTION, SOLUTION INTRAVENOUS at 14:26

## 2021-01-01 RX ADMIN — AMIODARONE HYDROCHLORIDE 100 MG: 200 TABLET ORAL at 09:38

## 2021-01-01 RX ADMIN — ROPINIROLE 0.5 MG: 0.5 TABLET, FILM COATED ORAL at 20:23

## 2021-01-01 RX ADMIN — SODIUM CHLORIDE, POTASSIUM CHLORIDE, SODIUM LACTATE AND CALCIUM CHLORIDE: 600; 310; 30; 20 INJECTION, SOLUTION INTRAVENOUS at 12:36

## 2021-01-01 RX ADMIN — Medication 1 MG: at 13:06

## 2021-01-01 RX ADMIN — CARVEDILOL 6.25 MG: 6.25 TABLET, FILM COATED ORAL at 21:41

## 2021-01-01 RX ADMIN — ROPINIROLE HYDROCHLORIDE 0.5 MG: 0.5 TABLET, FILM COATED ORAL at 10:07

## 2021-01-01 RX ADMIN — POTASSIUM CHLORIDE 20 MEQ: 1500 TABLET, EXTENDED RELEASE ORAL at 08:50

## 2021-01-01 RX ADMIN — ROPINIROLE 0.5 MG: 0.5 TABLET, FILM COATED ORAL at 18:43

## 2021-01-01 RX ADMIN — FUROSEMIDE 20 MG: 20 TABLET ORAL at 08:32

## 2021-01-01 RX ADMIN — AMIODARONE HYDROCHLORIDE 100 MG: 200 TABLET ORAL at 09:55

## 2021-01-01 RX ADMIN — MORPHINE SULFATE 30 MG: 1 INJECTION INTRAVENOUS at 05:03

## 2021-01-01 RX ADMIN — ANTACID TABLETS 500 MG: 500 TABLET, CHEWABLE ORAL at 09:23

## 2021-01-01 RX ADMIN — CARVEDILOL 6.25 MG: 6.25 TABLET, FILM COATED ORAL at 20:54

## 2021-01-01 RX ADMIN — PREDNISONE 10 MG: 20 TABLET ORAL at 12:17

## 2021-01-01 RX ADMIN — AMIODARONE HYDROCHLORIDE 100 MG: 200 TABLET ORAL at 09:09

## 2021-01-01 RX ADMIN — Medication 1 MG: at 14:32

## 2021-01-01 RX ADMIN — ROPINIROLE 0.5 MG: 0.5 TABLET, FILM COATED ORAL at 18:08

## 2021-01-01 RX ADMIN — ALBUMIN (HUMAN) 50 G: 0.25 INJECTION, SOLUTION INTRAVENOUS at 09:29

## 2021-01-01 RX ADMIN — OXYCODONE HYDROCHLORIDE AND ACETAMINOPHEN 1 TABLET: 5; 325 TABLET ORAL at 09:19

## 2021-01-01 RX ADMIN — POLYETHYLENE GLYCOL 3350 17 G: 17 POWDER, FOR SOLUTION ORAL at 09:26

## 2021-01-01 RX ADMIN — MINERAL SUPPLEMENT IRON 300 MG / 5 ML STRENGTH LIQUID 100 PER BOX UNFLAVORED 300 MG: at 09:58

## 2021-01-01 RX ADMIN — HEPARIN SODIUM AND DEXTROSE 12 UNITS/KG/HR: 10000; 5 INJECTION INTRAVENOUS at 14:10

## 2021-01-01 RX ADMIN — CEFAZOLIN 2000 MG: 10 INJECTION, POWDER, FOR SOLUTION INTRAVENOUS at 13:27

## 2021-01-01 RX ADMIN — AMIODARONE HYDROCHLORIDE 100 MG: 200 TABLET ORAL at 20:41

## 2021-01-01 RX ADMIN — Medication 1 MG: at 15:18

## 2021-01-01 RX ADMIN — HYDROCORTISONE: 0.01 CREAM TOPICAL at 09:52

## 2021-01-01 RX ADMIN — HYDROCORTISONE: 0.01 CREAM TOPICAL at 20:46

## 2021-01-01 RX ADMIN — MINERAL SUPPLEMENT IRON 300 MG / 5 ML STRENGTH LIQUID 100 PER BOX UNFLAVORED 300 MG: at 07:46

## 2021-01-01 RX ADMIN — DILTIAZEM HYDROCHLORIDE 30 MG: 30 TABLET, FILM COATED ORAL at 21:50

## 2021-01-01 RX ADMIN — MINERAL SUPPLEMENT IRON 300 MG / 5 ML STRENGTH LIQUID 100 PER BOX UNFLAVORED 300 MG: at 09:44

## 2021-01-01 RX ADMIN — CHOLECALCIFEROL TAB 25 MCG (1000 UNIT) 2000 UNITS: 25 TAB at 09:10

## 2021-01-01 RX ADMIN — CEFTRIAXONE SODIUM 2000 MG: 2 INJECTION, POWDER, FOR SOLUTION INTRAMUSCULAR; INTRAVENOUS at 14:44

## 2021-01-01 RX ADMIN — AMIODARONE HYDROCHLORIDE 100 MG: 200 TABLET ORAL at 20:18

## 2021-01-01 RX ADMIN — CARVEDILOL 6.25 MG: 6.25 TABLET, FILM COATED ORAL at 22:29

## 2021-01-01 RX ADMIN — ROPINIROLE HYDROCHLORIDE 0.5 MG: 0.5 TABLET, FILM COATED ORAL at 22:05

## 2021-01-01 RX ADMIN — LIDOCAINE HYDROCHLORIDE 50 MG: 10 INJECTION, SOLUTION EPIDURAL; INFILTRATION; INTRACAUDAL; PERINEURAL at 13:12

## 2021-01-01 RX ADMIN — PREDNISONE 20 MG: 20 TABLET ORAL at 09:31

## 2021-01-01 RX ADMIN — LORAZEPAM 1 MG: 2 INJECTION INTRAMUSCULAR; INTRAVENOUS at 11:37

## 2021-01-01 RX ADMIN — ROPINIROLE 0.5 MG: 0.5 TABLET, FILM COATED ORAL at 09:02

## 2021-01-01 RX ADMIN — MINERAL SUPPLEMENT IRON 300 MG / 5 ML STRENGTH LIQUID 100 PER BOX UNFLAVORED 300 MG: at 10:00

## 2021-01-01 RX ADMIN — SODIUM CHLORIDE, PRESERVATIVE FREE 10 ML: 5 INJECTION INTRAVENOUS at 10:08

## 2021-01-01 RX ADMIN — BACLOFEN 5 MG: 10 TABLET ORAL at 00:40

## 2021-01-01 RX ADMIN — SODIUM CHLORIDE, PRESERVATIVE FREE 10 ML: 5 INJECTION INTRAVENOUS at 10:01

## 2021-01-01 RX ADMIN — VASOPRESSIN 0.01 UNITS/MIN: 20 INJECTION INTRAVENOUS at 10:13

## 2021-01-01 RX ADMIN — AMIODARONE HYDROCHLORIDE 100 MG: 200 TABLET ORAL at 21:36

## 2021-01-01 RX ADMIN — NYSTATIN 500000 UNITS: 100000 SUSPENSION ORAL at 18:37

## 2021-01-01 RX ADMIN — PREDNISONE 20 MG: 20 TABLET ORAL at 08:46

## 2021-01-01 RX ADMIN — NYSTATIN 500000 UNITS: 100000 SUSPENSION ORAL at 17:57

## 2021-01-01 RX ADMIN — OXYBUTYNIN CHLORIDE 15 MG: 10 TABLET, EXTENDED RELEASE ORAL at 08:35

## 2021-01-01 RX ADMIN — MINERAL SUPPLEMENT IRON 300 MG / 5 ML STRENGTH LIQUID 100 PER BOX UNFLAVORED 300 MG: at 08:34

## 2021-01-01 RX ADMIN — ROPINIROLE 0.5 MG: 0.5 TABLET, FILM COATED ORAL at 19:33

## 2021-01-01 RX ADMIN — OXYBUTYNIN CHLORIDE 15 MG: 10 TABLET, EXTENDED RELEASE ORAL at 09:22

## 2021-01-01 RX ADMIN — OXYCODONE HYDROCHLORIDE AND ACETAMINOPHEN 1 TABLET: 5; 325 TABLET ORAL at 06:31

## 2021-01-01 RX ADMIN — ANTACID TABLETS 500 MG: 500 TABLET, CHEWABLE ORAL at 09:56

## 2021-01-01 RX ADMIN — SERTRALINE HYDROCHLORIDE 50 MG: 50 TABLET ORAL at 09:22

## 2021-01-01 RX ADMIN — OXYBUTYNIN CHLORIDE 15 MG: 10 TABLET, EXTENDED RELEASE ORAL at 08:46

## 2021-01-01 RX ADMIN — LORAZEPAM 1 MG: 2 INJECTION INTRAMUSCULAR; INTRAVENOUS at 02:40

## 2021-01-01 RX ADMIN — PANTOPRAZOLE SODIUM 40 MG: 40 TABLET, DELAYED RELEASE ORAL at 05:11

## 2021-01-01 RX ADMIN — LACTULOSE 10 G: 20 SOLUTION ORAL at 21:03

## 2021-01-01 RX ADMIN — ROPINIROLE HYDROCHLORIDE 0.5 MG: 0.5 TABLET, FILM COATED ORAL at 08:39

## 2021-01-01 RX ADMIN — OXYCODONE HYDROCHLORIDE AND ACETAMINOPHEN 1 TABLET: 5; 325 TABLET ORAL at 18:37

## 2021-01-01 RX ADMIN — MINERAL SUPPLEMENT IRON 300 MG / 5 ML STRENGTH LIQUID 100 PER BOX UNFLAVORED 300 MG: at 09:19

## 2021-01-01 RX ADMIN — CARVEDILOL 6.25 MG: 6.25 TABLET, FILM COATED ORAL at 19:52

## 2021-01-01 RX ADMIN — ROPINIROLE 0.5 MG: 0.5 TABLET, FILM COATED ORAL at 15:09

## 2021-01-01 RX ADMIN — AMIODARONE HYDROCHLORIDE 100 MG: 200 TABLET ORAL at 09:43

## 2021-01-01 RX ADMIN — LORAZEPAM 1 MG: 2 INJECTION INTRAMUSCULAR; INTRAVENOUS at 06:10

## 2021-01-01 RX ADMIN — LACTULOSE 10 G: 20 SOLUTION ORAL at 09:23

## 2021-01-01 RX ADMIN — ROPINIROLE 0.5 MG: 0.5 TABLET, FILM COATED ORAL at 15:34

## 2021-01-01 RX ADMIN — LORAZEPAM 1 MG: 2 INJECTION INTRAMUSCULAR; INTRAVENOUS at 15:18

## 2021-01-01 RX ADMIN — FUROSEMIDE 20 MG: 10 INJECTION, SOLUTION INTRAMUSCULAR; INTRAVENOUS at 11:51

## 2021-01-01 RX ADMIN — PANTOPRAZOLE SODIUM 40 MG: 40 TABLET, DELAYED RELEASE ORAL at 05:48

## 2021-01-01 RX ADMIN — PANTOPRAZOLE SODIUM 40 MG: 40 TABLET, DELAYED RELEASE ORAL at 05:42

## 2021-01-01 RX ADMIN — ROPINIROLE 0.5 MG: 0.5 TABLET, FILM COATED ORAL at 07:53

## 2021-01-01 RX ADMIN — OXYCODONE HYDROCHLORIDE AND ACETAMINOPHEN 1 TABLET: 5; 325 TABLET ORAL at 09:37

## 2021-01-01 RX ADMIN — ROPINIROLE 0.5 MG: 0.5 TABLET, FILM COATED ORAL at 21:02

## 2021-01-01 RX ADMIN — Medication 2000 UNITS: at 09:02

## 2021-01-01 RX ADMIN — ROPINIROLE 0.5 MG: 0.5 TABLET, FILM COATED ORAL at 21:32

## 2021-01-01 RX ADMIN — OXYCODONE HYDROCHLORIDE AND ACETAMINOPHEN 1 TABLET: 5; 325 TABLET ORAL at 16:36

## 2021-01-01 RX ADMIN — MIDODRINE HYDROCHLORIDE 10 MG: 10 TABLET ORAL at 09:13

## 2021-01-01 RX ADMIN — ROPINIROLE 0.5 MG: 0.5 TABLET, FILM COATED ORAL at 20:41

## 2021-01-01 RX ADMIN — NYSTATIN 500000 UNITS: 500000 SUSPENSION ORAL at 17:20

## 2021-01-01 RX ADMIN — CHLORHEXIDINE GLUCONATE 0.12% ORAL RINSE 15 ML: 1.2 LIQUID ORAL at 22:28

## 2021-01-01 RX ADMIN — ATORVASTATIN CALCIUM 40 MG: 40 TABLET, FILM COATED ORAL at 09:37

## 2021-01-01 RX ADMIN — MIDODRINE HYDROCHLORIDE 5 MG: 5 TABLET ORAL at 13:24

## 2021-01-01 RX ADMIN — Medication 0.1 MG: at 18:09

## 2021-01-01 RX ADMIN — LORAZEPAM 1 MG: 2 INJECTION INTRAMUSCULAR; INTRAVENOUS at 14:37

## 2021-01-01 RX ADMIN — LORAZEPAM 1 MG: 2 INJECTION INTRAMUSCULAR; INTRAVENOUS at 13:14

## 2021-01-01 RX ADMIN — Medication 2000 UNITS: at 09:21

## 2021-01-01 RX ADMIN — ROPINIROLE 0.5 MG: 0.5 TABLET, FILM COATED ORAL at 17:10

## 2021-01-01 RX ADMIN — SODIUM ZIRCONIUM CYCLOSILICATE 10 G: 5 POWDER, FOR SUSPENSION ORAL at 21:13

## 2021-01-01 RX ADMIN — CARVEDILOL 6.25 MG: 6.25 TABLET, FILM COATED ORAL at 21:02

## 2021-01-01 RX ADMIN — MORPHINE SULFATE 1 MG: 2 INJECTION, SOLUTION INTRAMUSCULAR; INTRAVENOUS at 04:08

## 2021-01-01 RX ADMIN — OXYBUTYNIN CHLORIDE 15 MG: 10 TABLET, EXTENDED RELEASE ORAL at 11:05

## 2021-01-01 RX ADMIN — AMIODARONE HYDROCHLORIDE 100 MG: 200 TABLET ORAL at 21:09

## 2021-01-01 RX ADMIN — OXYBUTYNIN CHLORIDE 15 MG: 10 TABLET, EXTENDED RELEASE ORAL at 09:02

## 2021-01-01 RX ADMIN — SODIUM CHLORIDE, PRESERVATIVE FREE 10 ML: 5 INJECTION INTRAVENOUS at 08:43

## 2021-01-01 RX ADMIN — ATORVASTATIN CALCIUM 40 MG: 40 TABLET, FILM COATED ORAL at 08:46

## 2021-01-01 RX ADMIN — OXYCODONE HYDROCHLORIDE AND ACETAMINOPHEN 1 TABLET: 5; 325 TABLET ORAL at 06:58

## 2021-01-01 RX ADMIN — AMIODARONE HYDROCHLORIDE 100 MG: 200 TABLET ORAL at 10:29

## 2021-01-01 RX ADMIN — PREDNISONE 20 MG: 20 TABLET ORAL at 11:59

## 2021-01-01 RX ADMIN — MINERAL SUPPLEMENT IRON 300 MG / 5 ML STRENGTH LIQUID 100 PER BOX UNFLAVORED 300 MG: at 12:13

## 2021-01-01 RX ADMIN — MIDAZOLAM 1 MG: 1 INJECTION INTRAMUSCULAR; INTRAVENOUS at 09:53

## 2021-01-01 RX ADMIN — HYDROCORTISONE: 0.01 CREAM TOPICAL at 22:40

## 2021-01-01 RX ADMIN — LIDOCAINE HYDROCHLORIDE 5 ML: 20 SOLUTION ORAL; TOPICAL at 17:53

## 2021-01-01 RX ADMIN — NYSTATIN 500000 UNITS: 100000 SUSPENSION ORAL at 11:09

## 2021-01-01 RX ADMIN — LINEZOLID 600 MG: 600 INJECTION, SOLUTION INTRAVENOUS at 23:09

## 2021-01-01 RX ADMIN — AMIODARONE HYDROCHLORIDE 100 MG: 200 TABLET ORAL at 09:02

## 2021-01-01 RX ADMIN — HYDROCORTISONE: 0.01 CREAM TOPICAL at 21:02

## 2021-01-01 RX ADMIN — HYDROCORTISONE: 0.01 CREAM TOPICAL at 10:24

## 2021-01-01 RX ADMIN — WARFARIN SODIUM 2 MG: 2 TABLET ORAL at 17:11

## 2021-01-01 RX ADMIN — Medication 1 MG: at 17:49

## 2021-01-01 RX ADMIN — ACETAMINOPHEN 650 MG: 325 TABLET, FILM COATED ORAL at 05:06

## 2021-01-01 RX ADMIN — NYSTATIN 500000 UNITS: 500000 SUSPENSION ORAL at 22:11

## 2021-01-01 RX ADMIN — MINERAL SUPPLEMENT IRON 300 MG / 5 ML STRENGTH LIQUID 100 PER BOX UNFLAVORED 300 MG: at 09:32

## 2021-01-01 RX ADMIN — Medication 0.1 MG: at 11:37

## 2021-01-01 RX ADMIN — MINERAL SUPPLEMENT IRON 300 MG / 5 ML STRENGTH LIQUID 100 PER BOX UNFLAVORED 300 MG: at 09:42

## 2021-01-01 RX ADMIN — SODIUM CHLORIDE, PRESERVATIVE FREE 10 ML: 5 INJECTION INTRAVENOUS at 11:58

## 2021-01-01 RX ADMIN — PREDNISONE 10 MG: 20 TABLET ORAL at 09:37

## 2021-01-01 RX ADMIN — AMIODARONE HYDROCHLORIDE 100 MG: 200 TABLET ORAL at 12:17

## 2021-01-01 RX ADMIN — Medication 60 MG: at 13:12

## 2021-01-01 RX ADMIN — ROPINIROLE HYDROCHLORIDE 0.5 MG: 0.5 TABLET, FILM COATED ORAL at 22:31

## 2021-01-01 RX ADMIN — POTASSIUM CHLORIDE 20 MEQ: 1500 TABLET, EXTENDED RELEASE ORAL at 21:11

## 2021-01-01 RX ADMIN — MINERAL SUPPLEMENT IRON 300 MG / 5 ML STRENGTH LIQUID 100 PER BOX UNFLAVORED 300 MG: at 08:49

## 2021-01-01 RX ADMIN — Medication 1 MG: at 11:19

## 2021-01-01 RX ADMIN — OXYCODONE HYDROCHLORIDE AND ACETAMINOPHEN 1 TABLET: 5; 325 TABLET ORAL at 02:41

## 2021-01-01 RX ADMIN — Medication 1 MG: at 17:27

## 2021-01-01 RX ADMIN — POTASSIUM BICARBONATE 40 MEQ: 782 TABLET, EFFERVESCENT ORAL at 09:55

## 2021-01-01 RX ADMIN — LORAZEPAM 1 MG: 2 INJECTION INTRAMUSCULAR; INTRAVENOUS at 10:07

## 2021-01-01 RX ADMIN — PANTOPRAZOLE SODIUM 40 MG: 40 TABLET, DELAYED RELEASE ORAL at 06:16

## 2021-01-01 RX ADMIN — PREDNISONE 10 MG: 20 TABLET ORAL at 08:08

## 2021-01-01 RX ADMIN — POLYETHYLENE GLYCOL 3350 17 G: 17 POWDER, FOR SOLUTION ORAL at 12:18

## 2021-01-01 RX ADMIN — ROPINIROLE 0.5 MG: 0.5 TABLET, FILM COATED ORAL at 12:17

## 2021-01-01 RX ADMIN — SODIUM CHLORIDE, PRESERVATIVE FREE 10 ML: 5 INJECTION INTRAVENOUS at 20:48

## 2021-01-01 RX ADMIN — PREDNISONE 20 MG: 20 TABLET ORAL at 09:38

## 2021-01-01 RX ADMIN — LORAZEPAM 1 MG: 2 INJECTION INTRAMUSCULAR; INTRAVENOUS at 16:15

## 2021-01-01 RX ADMIN — Medication 1 MG: at 19:41

## 2021-01-01 RX ADMIN — HYDROCORTISONE: 0.01 CREAM TOPICAL at 08:16

## 2021-01-01 RX ADMIN — CEFTRIAXONE SODIUM 2000 MG: 2 INJECTION, POWDER, FOR SOLUTION INTRAMUSCULAR; INTRAVENOUS at 15:48

## 2021-01-01 RX ADMIN — PANTOPRAZOLE SODIUM 40 MG: 40 TABLET, DELAYED RELEASE ORAL at 07:21

## 2021-01-01 RX ADMIN — PROPOFOL 40 MG: 10 INJECTION, EMULSION INTRAVENOUS at 13:40

## 2021-01-01 RX ADMIN — HYDROCORTISONE: 0.01 CREAM TOPICAL at 08:36

## 2021-01-01 RX ADMIN — METHYLPREDNISOLONE SODIUM SUCCINATE 40 MG: 40 INJECTION, POWDER, FOR SOLUTION INTRAMUSCULAR; INTRAVENOUS at 09:29

## 2021-01-01 RX ADMIN — ROPINIROLE HYDROCHLORIDE 0.5 MG: 0.5 TABLET, FILM COATED ORAL at 16:32

## 2021-01-01 RX ADMIN — SODIUM CHLORIDE, PRESERVATIVE FREE 10 ML: 5 INJECTION INTRAVENOUS at 09:29

## 2021-01-01 RX ADMIN — AMIODARONE HYDROCHLORIDE 100 MG: 200 TABLET ORAL at 20:05

## 2021-01-01 RX ADMIN — SODIUM CHLORIDE, PRESERVATIVE FREE 10 ML: 5 INJECTION INTRAVENOUS at 20:44

## 2021-01-01 RX ADMIN — ATORVASTATIN CALCIUM 40 MG: 40 TABLET, FILM COATED ORAL at 09:56

## 2021-01-01 RX ADMIN — OXYCODONE HYDROCHLORIDE AND ACETAMINOPHEN 1 TABLET: 5; 325 TABLET ORAL at 04:08

## 2021-01-01 RX ADMIN — Medication 1 MG: at 15:07

## 2021-01-01 RX ADMIN — CARVEDILOL 6.25 MG: 6.25 TABLET, FILM COATED ORAL at 09:08

## 2021-01-01 RX ADMIN — ROPINIROLE 0.5 MG: 0.5 TABLET, FILM COATED ORAL at 12:43

## 2021-01-01 RX ADMIN — HEPARIN SODIUM 2000 UNITS: 1000 INJECTION INTRAVENOUS; SUBCUTANEOUS at 19:17

## 2021-01-01 RX ADMIN — PANTOPRAZOLE SODIUM 40 MG: 40 TABLET, DELAYED RELEASE ORAL at 05:31

## 2021-01-01 RX ADMIN — LACTULOSE: 10 SOLUTION ORAL at 10:14

## 2021-01-01 RX ADMIN — SODIUM CHLORIDE, PRESERVATIVE FREE 10 ML: 5 INJECTION INTRAVENOUS at 08:12

## 2021-01-01 RX ADMIN — HYDROCORTISONE: 0.01 CREAM TOPICAL at 21:25

## 2021-01-01 RX ADMIN — NYSTATIN 500000 UNITS: 500000 SUSPENSION ORAL at 14:23

## 2021-01-01 RX ADMIN — ROPINIROLE 0.5 MG: 0.5 TABLET, FILM COATED ORAL at 14:57

## 2021-01-01 RX ADMIN — ROPINIROLE 0.5 MG: 0.5 TABLET, FILM COATED ORAL at 07:50

## 2021-01-01 RX ADMIN — POLYETHYLENE GLYCOL 3350 17 G: 17 POWDER, FOR SOLUTION ORAL at 17:27

## 2021-01-01 RX ADMIN — CHLORHEXIDINE GLUCONATE 0.12% ORAL RINSE 15 ML: 1.2 LIQUID ORAL at 22:41

## 2021-01-01 RX ADMIN — CARVEDILOL 6.25 MG: 6.25 TABLET, FILM COATED ORAL at 22:19

## 2021-01-01 RX ADMIN — FAMOTIDINE 10 MG: 20 TABLET ORAL at 20:05

## 2021-01-01 RX ADMIN — CHLORHEXIDINE GLUCONATE 0.12% ORAL RINSE 15 ML: 1.2 LIQUID ORAL at 19:56

## 2021-01-01 RX ADMIN — TRIAMCINOLONE ACETONIDE 80 MG: 40 INJECTION, SUSPENSION INTRA-ARTICULAR; INTRAMUSCULAR at 15:51

## 2021-01-01 RX ADMIN — CARVEDILOL 6.25 MG: 6.25 TABLET, FILM COATED ORAL at 08:46

## 2021-01-01 RX ADMIN — AMIODARONE HYDROCHLORIDE 100 MG: 200 TABLET ORAL at 07:50

## 2021-01-01 RX ADMIN — AMIODARONE HYDROCHLORIDE 100 MG: 200 TABLET ORAL at 20:29

## 2021-01-01 RX ADMIN — ENOXAPARIN SODIUM 70 MG: 80 INJECTION SUBCUTANEOUS at 20:46

## 2021-01-01 RX ADMIN — CEFTRIAXONE SODIUM 2000 MG: 2 INJECTION, POWDER, FOR SOLUTION INTRAMUSCULAR; INTRAVENOUS at 14:25

## 2021-01-01 RX ADMIN — ROPINIROLE 0.5 MG: 0.5 TABLET, FILM COATED ORAL at 16:31

## 2021-01-01 RX ADMIN — OXYCODONE HYDROCHLORIDE AND ACETAMINOPHEN 1 TABLET: 5; 325 TABLET ORAL at 05:50

## 2021-01-01 RX ADMIN — Medication 0.1 MG: at 22:49

## 2021-01-01 RX ADMIN — ALBUMIN (HUMAN) 50 G: 0.25 INJECTION, SOLUTION INTRAVENOUS at 08:13

## 2021-01-01 RX ADMIN — POLYETHYLENE GLYCOL 3350, SODIUM SULFATE ANHYDROUS, SODIUM BICARBONATE, SODIUM CHLORIDE, POTASSIUM CHLORIDE 4000 ML: 236; 22.74; 6.74; 5.86; 2.97 POWDER, FOR SOLUTION ORAL at 15:35

## 2021-01-01 RX ADMIN — POTASSIUM CHLORIDE 20 MEQ: 1500 TABLET, EXTENDED RELEASE ORAL at 09:38

## 2021-01-01 RX ADMIN — Medication 1 MG: at 03:24

## 2021-01-01 RX ADMIN — SODIUM PHOSPHATE 1 ENEMA: 7; 19 ENEMA RECTAL at 10:41

## 2021-01-01 RX ADMIN — DEXAMETHASONE SODIUM PHOSPHATE 4 MG: 4 INJECTION, SOLUTION INTRAMUSCULAR; INTRAVENOUS at 13:12

## 2021-01-01 RX ADMIN — LORAZEPAM 1 MG: 2 INJECTION INTRAMUSCULAR; INTRAVENOUS at 06:34

## 2021-01-01 RX ADMIN — AMIODARONE HYDROCHLORIDE 100 MG: 200 TABLET ORAL at 22:07

## 2021-01-01 RX ADMIN — FUROSEMIDE 20 MG: 10 INJECTION, SOLUTION INTRAMUSCULAR; INTRAVENOUS at 09:03

## 2021-01-01 RX ADMIN — CHOLECALCIFEROL TAB 25 MCG (1000 UNIT) 2000 UNITS: 25 TAB at 08:31

## 2021-01-01 RX ADMIN — PANTOPRAZOLE SODIUM 40 MG: 40 TABLET, DELAYED RELEASE ORAL at 05:24

## 2021-01-01 RX ADMIN — PANTOPRAZOLE SODIUM 40 MG: 40 TABLET, DELAYED RELEASE ORAL at 05:36

## 2021-01-01 RX ADMIN — PHENYLEPHRINE HYDROCHLORIDE 200 MCG/MIN: 10 INJECTION INTRAVENOUS at 05:39

## 2021-01-01 RX ADMIN — DIGOXIN 125 MCG: 0.25 INJECTION INTRAMUSCULAR; INTRAVENOUS at 09:50

## 2021-01-01 RX ADMIN — MIDODRINE HYDROCHLORIDE 10 MG: 10 TABLET ORAL at 12:23

## 2021-01-01 RX ADMIN — Medication 1 MG: at 01:16

## 2021-01-01 RX ADMIN — POLYETHYLENE GLYCOL 3350 17 G: 17 POWDER, FOR SOLUTION ORAL at 09:28

## 2021-01-01 RX ADMIN — MORPHINE SULFATE 1 MG: 2 INJECTION, SOLUTION INTRAMUSCULAR; INTRAVENOUS at 11:51

## 2021-01-01 RX ADMIN — MIDODRINE HYDROCHLORIDE 10 MG: 10 TABLET ORAL at 16:54

## 2021-01-01 RX ADMIN — SODIUM CHLORIDE, PRESERVATIVE FREE 10 ML: 5 INJECTION INTRAVENOUS at 08:07

## 2021-01-01 RX ADMIN — ISOSORBIDE DINITRATE 30 MG: 10 TABLET ORAL at 17:26

## 2021-01-01 RX ADMIN — PANTOPRAZOLE SODIUM 40 MG: 40 TABLET, DELAYED RELEASE ORAL at 09:55

## 2021-01-01 RX ADMIN — MIDODRINE HYDROCHLORIDE 10 MG: 10 TABLET ORAL at 16:32

## 2021-01-01 RX ADMIN — AMIODARONE HYDROCHLORIDE 100 MG: 200 TABLET ORAL at 20:46

## 2021-01-01 RX ADMIN — LORAZEPAM 1 MG: 2 INJECTION INTRAMUSCULAR; INTRAVENOUS at 16:12

## 2021-01-01 RX ADMIN — PHENYLEPHRINE HYDROCHLORIDE 225 MCG/MIN: 10 INJECTION INTRAVENOUS at 22:15

## 2021-01-01 RX ADMIN — LORAZEPAM 1 MG: 2 INJECTION INTRAMUSCULAR; INTRAVENOUS at 13:35

## 2021-01-01 RX ADMIN — BACLOFEN 5 MG: 10 TABLET ORAL at 17:57

## 2021-01-01 RX ADMIN — NYSTATIN 500000 UNITS: 100000 SUSPENSION ORAL at 20:29

## 2021-01-01 RX ADMIN — NYSTATIN 500000 UNITS: 500000 SUSPENSION ORAL at 13:05

## 2021-01-01 RX ADMIN — CARVEDILOL 6.25 MG: 6.25 TABLET, FILM COATED ORAL at 21:31

## 2021-01-01 RX ADMIN — FAMOTIDINE 10 MG: 20 TABLET ORAL at 20:29

## 2021-01-01 RX ADMIN — AMIODARONE HYDROCHLORIDE 100 MG: 200 TABLET ORAL at 08:50

## 2021-01-01 RX ADMIN — CARVEDILOL 6.25 MG: 6.25 TABLET, FILM COATED ORAL at 20:47

## 2021-01-01 RX ADMIN — LORAZEPAM 1 MG: 2 INJECTION INTRAMUSCULAR; INTRAVENOUS at 20:01

## 2021-01-01 RX ADMIN — LACTULOSE 10 G: 20 SOLUTION ORAL at 07:53

## 2021-01-01 RX ADMIN — ROPINIROLE 0.5 MG: 0.5 TABLET, FILM COATED ORAL at 15:18

## 2021-01-01 RX ADMIN — OXYBUTYNIN CHLORIDE 15 MG: 10 TABLET, EXTENDED RELEASE ORAL at 09:28

## 2021-01-01 RX ADMIN — RIVAROXABAN 20 MG: 20 TABLET, FILM COATED ORAL at 17:26

## 2021-01-01 RX ADMIN — MINERAL SUPPLEMENT IRON 300 MG / 5 ML STRENGTH LIQUID 100 PER BOX UNFLAVORED 300 MG: at 09:12

## 2021-01-01 RX ADMIN — LIDOCAINE HYDROCHLORIDE 5 ML: 20 SOLUTION ORAL; TOPICAL at 17:57

## 2021-01-01 RX ADMIN — CARVEDILOL 6.25 MG: 6.25 TABLET, FILM COATED ORAL at 07:46

## 2021-01-01 RX ADMIN — Medication 1 MG: at 10:28

## 2021-01-01 RX ADMIN — ROPINIROLE 0.5 MG: 0.5 TABLET, FILM COATED ORAL at 08:08

## 2021-01-01 RX ADMIN — MIDODRINE HYDROCHLORIDE 10 MG: 10 TABLET ORAL at 11:19

## 2021-01-01 RX ADMIN — MINERAL SUPPLEMENT IRON 300 MG / 5 ML STRENGTH LIQUID 100 PER BOX UNFLAVORED 300 MG: at 08:37

## 2021-01-01 RX ADMIN — FUROSEMIDE 20 MG: 10 INJECTION, SOLUTION INTRAMUSCULAR; INTRAVENOUS at 18:37

## 2021-01-01 RX ADMIN — MIDODRINE HYDROCHLORIDE 10 MG: 10 TABLET ORAL at 15:33

## 2021-01-01 RX ADMIN — Medication 1 MG: at 02:12

## 2021-01-01 RX ADMIN — PREDNISONE 10 MG: 10 TABLET ORAL at 07:54

## 2021-01-01 RX ADMIN — Medication 20 MG: at 13:35

## 2021-01-01 RX ADMIN — ENOXAPARIN SODIUM 70 MG: 80 INJECTION SUBCUTANEOUS at 17:11

## 2021-01-01 RX ADMIN — ROPINIROLE 0.5 MG: 0.5 TABLET, FILM COATED ORAL at 09:21

## 2021-01-01 RX ADMIN — CEFTRIAXONE SODIUM 2000 MG: 2 INJECTION, POWDER, FOR SOLUTION INTRAMUSCULAR; INTRAVENOUS at 14:45

## 2021-01-01 RX ADMIN — POTASSIUM CHLORIDE 20 MEQ: 1500 TABLET, EXTENDED RELEASE ORAL at 21:50

## 2021-01-01 RX ADMIN — FUROSEMIDE 20 MG: 20 TABLET ORAL at 09:42

## 2021-01-01 RX ADMIN — HYDROCORTISONE: 0.01 CREAM TOPICAL at 20:56

## 2021-01-01 RX ADMIN — OXYCODONE HYDROCHLORIDE AND ACETAMINOPHEN 1 TABLET: 5; 325 TABLET ORAL at 18:01

## 2021-01-01 RX ADMIN — LORAZEPAM 1 MG: 2 INJECTION INTRAMUSCULAR; INTRAVENOUS at 03:50

## 2021-01-01 RX ADMIN — POTASSIUM CHLORIDE 20 MEQ: 1500 TABLET, EXTENDED RELEASE ORAL at 07:47

## 2021-01-01 RX ADMIN — Medication 0.1 MG: at 16:49

## 2021-01-01 RX ADMIN — CARVEDILOL 6.25 MG: 6.25 TABLET, FILM COATED ORAL at 09:04

## 2021-01-01 RX ADMIN — DIGOXIN 125 MCG: 0.25 INJECTION INTRAMUSCULAR; INTRAVENOUS at 12:46

## 2021-01-01 RX ADMIN — ROPINIROLE 0.5 MG: 0.5 TABLET, FILM COATED ORAL at 09:29

## 2021-01-01 RX ADMIN — POLYETHYLENE GLYCOL 3350 17 G: 17 POWDER, FOR SOLUTION ORAL at 09:42

## 2021-01-01 RX ADMIN — HYDROCORTISONE: 0.01 CREAM TOPICAL at 10:44

## 2021-01-01 RX ADMIN — AMIODARONE HYDROCHLORIDE 100 MG: 200 TABLET ORAL at 08:35

## 2021-01-01 RX ADMIN — Medication 1 MG: at 05:31

## 2021-01-01 RX ADMIN — POLYETHYLENE GLYCOL 3350 17 G: 17 POWDER, FOR SOLUTION ORAL at 07:45

## 2021-01-01 RX ADMIN — AMIODARONE HYDROCHLORIDE 100 MG: 200 TABLET ORAL at 21:49

## 2021-01-01 RX ADMIN — SODIUM CHLORIDE: 9 INJECTION, SOLUTION INTRAVENOUS at 19:54

## 2021-01-01 RX ADMIN — MIDODRINE HYDROCHLORIDE 10 MG: 10 TABLET ORAL at 10:01

## 2021-01-01 RX ADMIN — ACETAMINOPHEN 650 MG: 325 TABLET, FILM COATED ORAL at 17:53

## 2021-01-01 RX ADMIN — PREDNISONE 20 MG: 20 TABLET ORAL at 08:51

## 2021-01-01 RX ADMIN — POTASSIUM CHLORIDE 20 MEQ: 1500 TABLET, EXTENDED RELEASE ORAL at 21:32

## 2021-01-01 RX ADMIN — MINERAL SUPPLEMENT IRON 300 MG / 5 ML STRENGTH LIQUID 100 PER BOX UNFLAVORED 300 MG: at 10:08

## 2021-01-01 RX ADMIN — PHENYLEPHRINE HYDROCHLORIDE 100 MCG: 10 INJECTION INTRAVENOUS at 13:37

## 2021-01-01 RX ADMIN — ROPINIROLE 0.5 MG: 0.5 TABLET, FILM COATED ORAL at 17:57

## 2021-01-01 RX ADMIN — BACLOFEN 5 MG: 10 TABLET ORAL at 13:27

## 2021-01-01 RX ADMIN — HEPARIN SODIUM 2000 UNITS: 1000 INJECTION INTRAVENOUS; SUBCUTANEOUS at 19:13

## 2021-01-01 RX ADMIN — Medication 1 MG: at 20:23

## 2021-01-01 RX ADMIN — FUROSEMIDE 20 MG: 20 TABLET ORAL at 11:39

## 2021-01-01 RX ADMIN — PREDNISONE 10 MG: 20 TABLET ORAL at 09:23

## 2021-01-01 RX ADMIN — AMIODARONE HYDROCHLORIDE 100 MG: 200 TABLET ORAL at 08:09

## 2021-01-01 RX ADMIN — PANTOPRAZOLE SODIUM 40 MG: 40 TABLET, DELAYED RELEASE ORAL at 06:30

## 2021-01-01 RX ADMIN — SERTRALINE HYDROCHLORIDE 50 MG: 50 TABLET ORAL at 08:40

## 2021-01-01 RX ADMIN — POLYETHYLENE GLYCOL 3350 17 G: 17 POWDER, FOR SOLUTION ORAL at 10:09

## 2021-01-01 RX ADMIN — DILTIAZEM HYDROCHLORIDE 30 MG: 30 TABLET, FILM COATED ORAL at 21:11

## 2021-01-01 RX ADMIN — ATORVASTATIN CALCIUM 40 MG: 40 TABLET, FILM COATED ORAL at 10:28

## 2021-01-01 RX ADMIN — PREDNISONE 5 MG: 10 TABLET ORAL at 08:16

## 2021-01-01 RX ADMIN — LORAZEPAM 1 MG: 2 INJECTION INTRAMUSCULAR; INTRAVENOUS at 05:04

## 2021-01-01 RX ADMIN — MORPHINE SULFATE 2 MG: 4 INJECTION, SOLUTION INTRAMUSCULAR; INTRAVENOUS at 10:14

## 2021-01-01 RX ADMIN — OXYCODONE HYDROCHLORIDE AND ACETAMINOPHEN 1 TABLET: 5; 325 TABLET ORAL at 04:53

## 2021-01-01 RX ADMIN — OXYBUTYNIN CHLORIDE 15 MG: 10 TABLET, EXTENDED RELEASE ORAL at 09:54

## 2021-01-01 RX ADMIN — CARVEDILOL 6.25 MG: 6.25 TABLET, FILM COATED ORAL at 20:36

## 2021-01-01 RX ADMIN — CYCLOBENZAPRINE 10 MG: 10 TABLET, FILM COATED ORAL at 14:22

## 2021-01-01 RX ADMIN — PANTOPRAZOLE SODIUM 40 MG: 40 INJECTION, POWDER, FOR SOLUTION INTRAVENOUS at 08:12

## 2021-01-01 RX ADMIN — MIDODRINE HYDROCHLORIDE 10 MG: 10 TABLET ORAL at 11:15

## 2021-01-01 RX ADMIN — Medication 1 MG: at 02:26

## 2021-01-01 RX ADMIN — Medication 2000 UNITS: at 08:07

## 2021-01-01 RX ADMIN — PANTOPRAZOLE SODIUM 40 MG: 40 TABLET, DELAYED RELEASE ORAL at 05:45

## 2021-01-01 RX ADMIN — ENOXAPARIN SODIUM 70 MG: 80 INJECTION SUBCUTANEOUS at 08:14

## 2021-01-01 RX ADMIN — AMIODARONE HYDROCHLORIDE 100 MG: 200 TABLET ORAL at 21:31

## 2021-01-01 RX ADMIN — CARVEDILOL 6.25 MG: 6.25 TABLET, FILM COATED ORAL at 20:18

## 2021-01-01 RX ADMIN — HYDROCORTISONE: 0.01 CREAM TOPICAL at 09:42

## 2021-01-01 RX ADMIN — ALBUMIN (HUMAN) 50 G: 0.25 INJECTION, SOLUTION INTRAVENOUS at 20:47

## 2021-01-01 RX ADMIN — PROPOFOL 30 MG: 10 INJECTION, EMULSION INTRAVENOUS at 13:55

## 2021-01-01 RX ADMIN — ROPINIROLE HYDROCHLORIDE 0.5 MG: 0.5 TABLET, FILM COATED ORAL at 08:35

## 2021-01-01 RX ADMIN — SODIUM CHLORIDE, PRESERVATIVE FREE 10 ML: 5 INJECTION INTRAVENOUS at 08:15

## 2021-01-01 RX ADMIN — POLYETHYLENE GLYCOL 3350 17 G: 17 POWDER, FOR SOLUTION ORAL at 08:10

## 2021-01-01 RX ADMIN — MIDODRINE HYDROCHLORIDE 10 MG: 10 TABLET ORAL at 08:38

## 2021-01-01 RX ADMIN — CARVEDILOL 6.25 MG: 6.25 TABLET, FILM COATED ORAL at 20:53

## 2021-01-01 RX ADMIN — ROPINIROLE HYDROCHLORIDE 0.5 MG: 0.5 TABLET, FILM COATED ORAL at 10:45

## 2021-01-01 RX ADMIN — PHENYLEPHRINE HYDROCHLORIDE 25 MCG/MIN: 10 INJECTION INTRAVENOUS at 12:53

## 2021-01-01 RX ADMIN — NYSTATIN 500000 UNITS: 500000 SUSPENSION ORAL at 13:35

## 2021-01-01 RX ADMIN — Medication 1 MG: at 23:25

## 2021-01-01 RX ADMIN — SERTRALINE HYDROCHLORIDE 50 MG: 50 TABLET ORAL at 09:37

## 2021-01-01 RX ADMIN — CARVEDILOL 6.25 MG: 6.25 TABLET, FILM COATED ORAL at 09:54

## 2021-01-01 RX ADMIN — ANTACID TABLETS 500 MG: 500 TABLET, CHEWABLE ORAL at 19:35

## 2021-01-01 RX ADMIN — ATORVASTATIN CALCIUM 40 MG: 40 TABLET, FILM COATED ORAL at 08:40

## 2021-01-01 RX ADMIN — OXYCODONE HYDROCHLORIDE AND ACETAMINOPHEN 1 TABLET: 5; 325 TABLET ORAL at 22:27

## 2021-01-01 RX ADMIN — FUROSEMIDE 20 MG: 20 TABLET ORAL at 15:32

## 2021-01-01 RX ADMIN — DILTIAZEM HYDROCHLORIDE 5 MG/HR: 5 INJECTION INTRAVENOUS at 13:25

## 2021-01-01 RX ADMIN — Medication 1 MG: at 10:07

## 2021-01-01 RX ADMIN — FAMOTIDINE 10 MG: 20 TABLET ORAL at 19:53

## 2021-01-01 RX ADMIN — Medication 1000 UNITS: at 09:20

## 2021-01-01 RX ADMIN — SENNOSIDES 17.2 MG: 8.6 TABLET, FILM COATED ORAL at 11:08

## 2021-01-01 RX ADMIN — MORPHINE SULFATE 2 MG: 2 INJECTION, SOLUTION INTRAMUSCULAR; INTRAVENOUS at 11:31

## 2021-01-01 RX ADMIN — MIDODRINE HYDROCHLORIDE 10 MG: 10 TABLET ORAL at 13:07

## 2021-01-01 RX ADMIN — AMIODARONE HYDROCHLORIDE 100 MG: 200 TABLET ORAL at 07:46

## 2021-01-01 RX ADMIN — LORAZEPAM 1 MG: 2 INJECTION INTRAMUSCULAR; INTRAVENOUS at 18:20

## 2021-01-01 RX ADMIN — AMIODARONE HYDROCHLORIDE 100 MG: 200 TABLET ORAL at 21:02

## 2021-01-01 RX ADMIN — LORAZEPAM 1 MG: 2 INJECTION INTRAMUSCULAR; INTRAVENOUS at 11:14

## 2021-01-01 RX ADMIN — OXYBUTYNIN CHLORIDE 15 MG: 10 TABLET, EXTENDED RELEASE ORAL at 08:08

## 2021-01-01 RX ADMIN — AMIODARONE HYDROCHLORIDE 100 MG: 200 TABLET ORAL at 21:41

## 2021-01-01 RX ADMIN — ROPINIROLE 0.5 MG: 0.5 TABLET, FILM COATED ORAL at 10:03

## 2021-01-01 RX ADMIN — POTASSIUM CHLORIDE 20 MEQ: 1500 TABLET, EXTENDED RELEASE ORAL at 20:04

## 2021-01-01 RX ADMIN — Medication 1 MG: at 16:02

## 2021-01-01 RX ADMIN — RIVAROXABAN 20 MG: 20 TABLET, FILM COATED ORAL at 18:07

## 2021-01-01 RX ADMIN — SODIUM CHLORIDE, PRESERVATIVE FREE 10 ML: 5 INJECTION INTRAVENOUS at 21:27

## 2021-01-01 RX ADMIN — SODIUM CHLORIDE, PRESERVATIVE FREE 10 ML: 5 INJECTION INTRAVENOUS at 09:41

## 2021-01-01 RX ADMIN — OXYCODONE HYDROCHLORIDE AND ACETAMINOPHEN 1 TABLET: 5; 325 TABLET ORAL at 06:37

## 2021-01-01 RX ADMIN — OXYCODONE HYDROCHLORIDE AND ACETAMINOPHEN 1 TABLET: 5; 325 TABLET ORAL at 11:41

## 2021-01-01 RX ADMIN — OXYBUTYNIN CHLORIDE 15 MG: 5 TABLET, EXTENDED RELEASE ORAL at 09:55

## 2021-01-01 RX ADMIN — SODIUM CHLORIDE, PRESERVATIVE FREE 10 ML: 5 INJECTION INTRAVENOUS at 20:08

## 2021-01-01 RX ADMIN — MORPHINE SULFATE 2 MG: 2 INJECTION, SOLUTION INTRAMUSCULAR; INTRAVENOUS at 16:12

## 2021-01-01 RX ADMIN — SODIUM CHLORIDE, PRESERVATIVE FREE 10 ML: 5 INJECTION INTRAVENOUS at 20:54

## 2021-01-01 RX ADMIN — POLYETHYLENE GLYCOL 3350 17 G: 17 POWDER, FOR SOLUTION ORAL at 08:46

## 2021-01-01 RX ADMIN — Medication 1 MG: at 18:20

## 2021-01-01 RX ADMIN — NYSTATIN 500000 UNITS: 500000 SUSPENSION ORAL at 09:43

## 2021-01-01 RX ADMIN — ROPINIROLE 0.5 MG: 0.5 TABLET, FILM COATED ORAL at 11:58

## 2021-01-01 RX ADMIN — POLYETHYLENE GLYCOL 3350, SODIUM SULFATE ANHYDROUS, SODIUM BICARBONATE, SODIUM CHLORIDE, POTASSIUM CHLORIDE 4000 ML: 236; 22.74; 6.74; 5.86; 2.97 POWDER, FOR SOLUTION ORAL at 15:18

## 2021-01-01 RX ADMIN — NYSTATIN 500000 UNITS: 100000 SUSPENSION ORAL at 18:42

## 2021-01-01 RX ADMIN — HEPARIN SODIUM 4000 UNITS: 1000 INJECTION INTRAVENOUS; SUBCUTANEOUS at 09:59

## 2021-01-01 RX ADMIN — NYSTATIN 500000 UNITS: 500000 SUSPENSION ORAL at 08:30

## 2021-01-01 RX ADMIN — LORAZEPAM 1 MG: 2 INJECTION INTRAMUSCULAR; INTRAVENOUS at 19:16

## 2021-01-01 RX ADMIN — PANTOPRAZOLE SODIUM 40 MG: 40 TABLET, DELAYED RELEASE ORAL at 06:58

## 2021-01-01 RX ADMIN — ROPINIROLE 0.5 MG: 0.5 TABLET, FILM COATED ORAL at 13:24

## 2021-01-01 RX ADMIN — SERTRALINE 50 MG: 50 TABLET, FILM COATED ORAL at 08:34

## 2021-01-01 RX ADMIN — ROPINIROLE 0.5 MG: 0.5 TABLET, FILM COATED ORAL at 09:55

## 2021-01-01 RX ADMIN — ROPINIROLE HYDROCHLORIDE 0.5 MG: 0.5 TABLET, FILM COATED ORAL at 07:56

## 2021-01-01 RX ADMIN — OXYBUTYNIN CHLORIDE 15 MG: 10 TABLET, EXTENDED RELEASE ORAL at 09:06

## 2021-01-01 RX ADMIN — OXYCODONE HYDROCHLORIDE AND ACETAMINOPHEN 1 TABLET: 5; 325 TABLET ORAL at 05:24

## 2021-01-01 RX ADMIN — OXYBUTYNIN CHLORIDE 15 MG: 10 TABLET, EXTENDED RELEASE ORAL at 08:50

## 2021-01-01 RX ADMIN — LORAZEPAM 1 MG: 2 INJECTION INTRAMUSCULAR; INTRAVENOUS at 07:08

## 2021-01-01 RX ADMIN — ROPINIROLE 0.5 MG: 0.5 TABLET, FILM COATED ORAL at 21:25

## 2021-01-01 RX ADMIN — HYDROCORTISONE: 0.01 CREAM TOPICAL at 19:34

## 2021-01-01 RX ADMIN — SERTRALINE 50 MG: 50 TABLET, FILM COATED ORAL at 09:56

## 2021-01-01 RX ADMIN — ROPINIROLE 0.5 MG: 0.5 TABLET, FILM COATED ORAL at 14:08

## 2021-01-01 RX ADMIN — OXYCODONE HYDROCHLORIDE AND ACETAMINOPHEN 1 TABLET: 5; 325 TABLET ORAL at 00:40

## 2021-01-01 RX ADMIN — NYSTATIN 500000 UNITS: 100000 SUSPENSION ORAL at 12:42

## 2021-01-01 RX ADMIN — HYDROCORTISONE: 0.01 CREAM TOPICAL at 22:31

## 2021-01-01 RX ADMIN — LINEZOLID 600 MG: 600 INJECTION, SOLUTION INTRAVENOUS at 11:26

## 2021-01-01 RX ADMIN — PROPOFOL 30 MG: 10 INJECTION, EMULSION INTRAVENOUS at 13:42

## 2021-01-01 RX ADMIN — ANTACID TABLETS 500 MG: 500 TABLET, CHEWABLE ORAL at 21:48

## 2021-01-01 RX ADMIN — Medication 1 MG: at 17:22

## 2021-01-01 RX ADMIN — SERTRALINE HYDROCHLORIDE 50 MG: 50 TABLET ORAL at 10:02

## 2021-01-01 RX ADMIN — SERTRALINE HYDROCHLORIDE 50 MG: 50 TABLET ORAL at 11:59

## 2021-01-01 RX ADMIN — PANTOPRAZOLE SODIUM 40 MG: 40 TABLET, DELAYED RELEASE ORAL at 06:19

## 2021-01-01 RX ADMIN — SODIUM CHLORIDE, PRESERVATIVE FREE 10 ML: 5 INJECTION INTRAVENOUS at 19:35

## 2021-01-01 RX ADMIN — CARVEDILOL 6.25 MG: 6.25 TABLET, FILM COATED ORAL at 21:44

## 2021-01-01 RX ADMIN — OXYBUTYNIN CHLORIDE 15 MG: 10 TABLET, EXTENDED RELEASE ORAL at 09:37

## 2021-01-01 RX ADMIN — PHENYLEPHRINE HYDROCHLORIDE 200 MCG: 10 INJECTION INTRAVENOUS at 09:43

## 2021-01-01 RX ADMIN — ROPINIROLE 0.5 MG: 0.5 TABLET, FILM COATED ORAL at 21:44

## 2021-01-01 RX ADMIN — ROPINIROLE HYDROCHLORIDE 0.5 MG: 0.5 TABLET, FILM COATED ORAL at 14:00

## 2021-01-01 RX ADMIN — NYSTATIN 500000 UNITS: 500000 SUSPENSION ORAL at 10:28

## 2021-01-01 RX ADMIN — HYDROCORTISONE: 0.01 CREAM TOPICAL at 20:30

## 2021-01-01 RX ADMIN — NYSTATIN 500000 UNITS: 100000 SUSPENSION ORAL at 19:18

## 2021-01-01 RX ADMIN — ROPINIROLE HYDROCHLORIDE 0.5 MG: 0.5 TABLET, FILM COATED ORAL at 19:52

## 2021-01-01 RX ADMIN — POTASSIUM CHLORIDE 20 MEQ: 1500 TABLET, EXTENDED RELEASE ORAL at 21:31

## 2021-01-01 RX ADMIN — PANTOPRAZOLE SODIUM 40 MG: 40 TABLET, DELAYED RELEASE ORAL at 11:59

## 2021-01-01 RX ADMIN — FAMOTIDINE 10 MG: 20 TABLET ORAL at 23:12

## 2021-01-01 RX ADMIN — ROPINIROLE 0.5 MG: 0.5 TABLET, FILM COATED ORAL at 14:48

## 2021-01-01 RX ADMIN — CEFTRIAXONE SODIUM 2000 MG: 2 INJECTION, POWDER, FOR SOLUTION INTRAMUSCULAR; INTRAVENOUS at 14:22

## 2021-01-01 RX ADMIN — PREDNISONE 10 MG: 20 TABLET ORAL at 09:08

## 2021-01-01 RX ADMIN — CARVEDILOL 6.25 MG: 6.25 TABLET, FILM COATED ORAL at 09:30

## 2021-01-01 RX ADMIN — POTASSIUM CHLORIDE 20 MEQ: 1500 TABLET, EXTENDED RELEASE ORAL at 20:41

## 2021-01-01 RX ADMIN — ROPINIROLE 0.5 MG: 0.5 TABLET, FILM COATED ORAL at 21:41

## 2021-01-01 RX ADMIN — SERTRALINE HYDROCHLORIDE 50 MG: 50 TABLET ORAL at 07:50

## 2021-01-01 RX ADMIN — CARVEDILOL 6.25 MG: 6.25 TABLET, FILM COATED ORAL at 20:45

## 2021-01-01 RX ADMIN — ROPINIROLE 0.5 MG: 0.5 TABLET, FILM COATED ORAL at 17:43

## 2021-01-01 RX ADMIN — ATORVASTATIN CALCIUM 40 MG: 40 TABLET, FILM COATED ORAL at 10:03

## 2021-01-01 RX ADMIN — CARVEDILOL 6.25 MG: 6.25 TABLET, FILM COATED ORAL at 18:04

## 2021-01-01 RX ADMIN — FAMOTIDINE 10 MG: 20 TABLET ORAL at 21:25

## 2021-01-01 RX ADMIN — HEPARIN SODIUM AND DEXTROSE 7.03 UNITS/KG/HR: 10000; 5 INJECTION INTRAVENOUS at 13:58

## 2021-01-01 RX ADMIN — Medication 1000 UNITS: at 10:19

## 2021-01-01 RX ADMIN — DILTIAZEM HYDROCHLORIDE 10 MG: 5 INJECTION INTRAVENOUS at 11:00

## 2021-01-01 RX ADMIN — PANTOPRAZOLE SODIUM 40 MG: 40 TABLET, DELAYED RELEASE ORAL at 05:28

## 2021-01-01 RX ADMIN — Medication 10 ML: at 08:17

## 2021-01-01 RX ADMIN — LORAZEPAM 1 MG: 2 INJECTION INTRAMUSCULAR; INTRAVENOUS at 11:00

## 2021-01-01 RX ADMIN — SODIUM CHLORIDE 10 ML: 9 INJECTION, SOLUTION INTRAMUSCULAR; INTRAVENOUS; SUBCUTANEOUS at 08:12

## 2021-01-01 RX ADMIN — ANTACID TABLETS 500 MG: 500 TABLET, CHEWABLE ORAL at 22:28

## 2021-01-01 RX ADMIN — Medication 1 MG: at 20:01

## 2021-01-01 RX ADMIN — HYDROCORTISONE: 0.01 CREAM TOPICAL at 14:49

## 2021-01-01 RX ADMIN — Medication 1 MG: at 07:08

## 2021-01-01 RX ADMIN — AMIODARONE HYDROCHLORIDE 100 MG: 200 TABLET ORAL at 20:54

## 2021-01-01 RX ADMIN — MORPHINE SULFATE 1 MG: 2 INJECTION, SOLUTION INTRAMUSCULAR; INTRAVENOUS at 16:02

## 2021-01-01 RX ADMIN — SODIUM CHLORIDE, PRESERVATIVE FREE 10 ML: 5 INJECTION INTRAVENOUS at 19:33

## 2021-01-01 RX ADMIN — ROPINIROLE HYDROCHLORIDE 0.5 MG: 0.5 TABLET, FILM COATED ORAL at 21:07

## 2021-01-01 RX ADMIN — PROPOFOL 20 MG: 10 INJECTION, EMULSION INTRAVENOUS at 13:49

## 2021-01-01 RX ADMIN — ROPINIROLE 0.5 MG: 0.5 TABLET, FILM COATED ORAL at 09:37

## 2021-01-01 RX ADMIN — POLYETHYLENE GLYCOL 3350 17 G: 17 POWDER, FOR SOLUTION ORAL at 08:34

## 2021-01-01 RX ADMIN — ATORVASTATIN CALCIUM 40 MG: 40 TABLET, FILM COATED ORAL at 09:02

## 2021-01-01 RX ADMIN — CARVEDILOL 6.25 MG: 6.25 TABLET, FILM COATED ORAL at 19:33

## 2021-01-01 RX ADMIN — PANTOPRAZOLE SODIUM 40 MG: 40 INJECTION, POWDER, FOR SOLUTION INTRAVENOUS at 13:08

## 2021-01-01 RX ADMIN — NYSTATIN 500000 UNITS: 100000 SUSPENSION ORAL at 20:56

## 2021-01-01 RX ADMIN — FAMOTIDINE 10 MG: 20 TABLET ORAL at 21:05

## 2021-01-01 RX ADMIN — Medication 1 MG: at 13:36

## 2021-01-01 RX ADMIN — SODIUM CHLORIDE, PRESERVATIVE FREE 10 ML: 5 INJECTION INTRAVENOUS at 14:18

## 2021-01-01 RX ADMIN — PANTOPRAZOLE SODIUM 40 MG: 40 TABLET, DELAYED RELEASE ORAL at 06:08

## 2021-01-01 RX ADMIN — LORAZEPAM 1 MG: 2 INJECTION INTRAMUSCULAR; INTRAVENOUS at 20:27

## 2021-01-01 RX ADMIN — HYDROCORTISONE: 0.01 CREAM TOPICAL at 22:30

## 2021-01-01 RX ADMIN — SODIUM CHLORIDE: 9 INJECTION, SOLUTION INTRAVENOUS at 09:23

## 2021-01-01 RX ADMIN — OXYCODONE HYDROCHLORIDE AND ACETAMINOPHEN 1 TABLET: 5; 325 TABLET ORAL at 19:21

## 2021-01-01 RX ADMIN — MIDODRINE HYDROCHLORIDE 10 MG: 10 TABLET ORAL at 09:44

## 2021-01-01 RX ADMIN — SERTRALINE HYDROCHLORIDE 50 MG: 50 TABLET ORAL at 09:59

## 2021-01-01 RX ADMIN — FERROUS SULFATE TAB 325 MG (65 MG ELEMENTAL FE) 325 MG: 325 (65 FE) TAB at 10:07

## 2021-01-01 RX ADMIN — HYDROCORTISONE: 0.01 CREAM TOPICAL at 09:21

## 2021-01-01 RX ADMIN — POTASSIUM CHLORIDE 20 MEQ: 1500 TABLET, EXTENDED RELEASE ORAL at 09:21

## 2021-01-01 RX ADMIN — OXYCODONE HYDROCHLORIDE AND ACETAMINOPHEN 1 TABLET: 5; 325 TABLET ORAL at 16:31

## 2021-01-01 RX ADMIN — AMIODARONE HYDROCHLORIDE 100 MG: 200 TABLET ORAL at 22:19

## 2021-01-01 RX ADMIN — LORAZEPAM 1 MG: 2 INJECTION INTRAMUSCULAR; INTRAVENOUS at 15:32

## 2021-01-01 RX ADMIN — ROPINIROLE HYDROCHLORIDE 0.5 MG: 0.5 TABLET, FILM COATED ORAL at 13:35

## 2021-01-01 RX ADMIN — LORAZEPAM 1 MG: 2 INJECTION INTRAMUSCULAR; INTRAVENOUS at 17:27

## 2021-01-01 RX ADMIN — NYSTATIN 500000 UNITS: 100000 SUSPENSION ORAL at 15:18

## 2021-01-01 RX ADMIN — CHLORHEXIDINE GLUCONATE 0.12% ORAL RINSE 15 ML: 1.2 LIQUID ORAL at 08:34

## 2021-01-01 RX ADMIN — LORAZEPAM 1 MG: 2 INJECTION INTRAMUSCULAR; INTRAVENOUS at 20:24

## 2021-01-01 RX ADMIN — CARVEDILOL 6.25 MG: 6.25 TABLET, FILM COATED ORAL at 09:37

## 2021-01-01 RX ADMIN — ROPINIROLE 0.5 MG: 0.5 TABLET, FILM COATED ORAL at 20:04

## 2021-01-01 RX ADMIN — CARVEDILOL 6.25 MG: 6.25 TABLET, FILM COATED ORAL at 20:40

## 2021-01-01 RX ADMIN — HYDROCORTISONE: 0.01 CREAM TOPICAL at 09:57

## 2021-01-01 RX ADMIN — OXYCODONE HYDROCHLORIDE AND ACETAMINOPHEN 1 TABLET: 5; 325 TABLET ORAL at 18:14

## 2021-01-01 RX ADMIN — POLYETHYLENE GLYCOL 3350 17 G: 17 POWDER, FOR SOLUTION ORAL at 09:10

## 2021-01-01 RX ADMIN — NYSTATIN 500000 UNITS: 100000 SUSPENSION ORAL at 09:42

## 2021-01-01 RX ADMIN — OXYCODONE HYDROCHLORIDE AND ACETAMINOPHEN 1 TABLET: 5; 325 TABLET ORAL at 06:16

## 2021-01-01 RX ADMIN — PANTOPRAZOLE SODIUM 40 MG: 40 TABLET, DELAYED RELEASE ORAL at 05:06

## 2021-01-01 RX ADMIN — FUROSEMIDE 20 MG: 20 TABLET ORAL at 16:31

## 2021-01-01 RX ADMIN — PREDNISONE 10 MG: 20 TABLET ORAL at 18:42

## 2021-01-01 RX ADMIN — MINERAL SUPPLEMENT IRON 300 MG / 5 ML STRENGTH LIQUID 100 PER BOX UNFLAVORED 300 MG: at 10:45

## 2021-01-01 RX ADMIN — MINERAL SUPPLEMENT IRON 300 MG / 5 ML STRENGTH LIQUID 100 PER BOX UNFLAVORED 300 MG: at 08:08

## 2021-01-01 RX ADMIN — SODIUM CHLORIDE, POTASSIUM CHLORIDE, SODIUM LACTATE AND CALCIUM CHLORIDE: 600; 310; 30; 20 INJECTION, SOLUTION INTRAVENOUS at 09:48

## 2021-01-01 RX ADMIN — DEXTROSE AND SODIUM CHLORIDE: 5; 900 INJECTION, SOLUTION INTRAVENOUS at 04:10

## 2021-01-01 RX ADMIN — LORAZEPAM 1 MG: 2 INJECTION INTRAMUSCULAR; INTRAVENOUS at 00:13

## 2021-01-01 RX ADMIN — OXYBUTYNIN CHLORIDE 15 MG: 10 TABLET, EXTENDED RELEASE ORAL at 07:46

## 2021-01-01 RX ADMIN — AMIODARONE HYDROCHLORIDE 100 MG: 200 TABLET ORAL at 11:59

## 2021-01-01 RX ADMIN — PREDNISONE 10 MG: 10 TABLET ORAL at 10:29

## 2021-01-01 RX ADMIN — Medication 0.1 MG: at 16:12

## 2021-01-01 RX ADMIN — SERTRALINE HYDROCHLORIDE 50 MG: 50 TABLET ORAL at 09:08

## 2021-01-01 RX ADMIN — SODIUM CHLORIDE, PRESERVATIVE FREE 10 ML: 5 INJECTION INTRAVENOUS at 08:10

## 2021-01-01 RX ADMIN — MIDODRINE HYDROCHLORIDE 10 MG: 10 TABLET ORAL at 17:20

## 2021-01-01 RX ADMIN — ROPINIROLE 0.5 MG: 0.5 TABLET, FILM COATED ORAL at 20:05

## 2021-01-01 RX ADMIN — AMIODARONE HYDROCHLORIDE 100 MG: 200 TABLET ORAL at 08:46

## 2021-01-01 RX ADMIN — TORSEMIDE 20 MG: 20 TABLET ORAL at 14:35

## 2021-01-01 RX ADMIN — ROPINIROLE HYDROCHLORIDE 0.5 MG: 0.5 TABLET, FILM COATED ORAL at 14:04

## 2021-01-01 RX ADMIN — NYSTATIN 500000 UNITS: 100000 SUSPENSION ORAL at 21:28

## 2021-01-01 RX ADMIN — NYSTATIN 500000 UNITS: 100000 SUSPENSION ORAL at 07:46

## 2021-01-01 RX ADMIN — ROPINIROLE HYDROCHLORIDE 0.5 MG: 0.5 TABLET, FILM COATED ORAL at 09:44

## 2021-01-01 RX ADMIN — HYDROCORTISONE: 0.01 CREAM TOPICAL at 08:02

## 2021-01-01 RX ADMIN — ROPINIROLE 0.5 MG: 0.5 TABLET, FILM COATED ORAL at 08:46

## 2021-01-01 RX ADMIN — ROPINIROLE HYDROCHLORIDE 0.5 MG: 0.5 TABLET, FILM COATED ORAL at 21:55

## 2021-01-01 RX ADMIN — ROPINIROLE 0.5 MG: 0.5 TABLET, FILM COATED ORAL at 18:14

## 2021-01-01 RX ADMIN — CEFTRIAXONE SODIUM 2000 MG: 2 INJECTION, POWDER, FOR SOLUTION INTRAMUSCULAR; INTRAVENOUS at 17:10

## 2021-01-01 RX ADMIN — FUROSEMIDE 20 MG: 20 TABLET ORAL at 22:35

## 2021-01-01 RX ADMIN — MORPHINE SULFATE 1 MG: 2 INJECTION, SOLUTION INTRAMUSCULAR; INTRAVENOUS at 08:12

## 2021-01-01 RX ADMIN — LIDOCAINE HYDROCHLORIDE 50 MG: 10 INJECTION, SOLUTION EPIDURAL; INFILTRATION; INTRACAUDAL; PERINEURAL at 13:42

## 2021-01-01 RX ADMIN — DEXTROSE AND SODIUM CHLORIDE: 5; 900 INJECTION, SOLUTION INTRAVENOUS at 15:28

## 2021-01-01 RX ADMIN — HEPARIN SODIUM AND DEXTROSE 12 UNITS/KG/HR: 10000; 5 INJECTION INTRAVENOUS at 20:42

## 2021-01-01 RX ADMIN — CEFTRIAXONE SODIUM 1000 MG: 1 INJECTION, POWDER, FOR SOLUTION INTRAMUSCULAR; INTRAVENOUS at 02:41

## 2021-01-01 RX ADMIN — ROPINIROLE 0.5 MG: 0.5 TABLET, FILM COATED ORAL at 13:48

## 2021-01-01 RX ADMIN — LORAZEPAM 1 MG: 2 INJECTION INTRAMUSCULAR; INTRAVENOUS at 23:25

## 2021-01-01 RX ADMIN — NYSTATIN 500000 UNITS: 100000 SUSPENSION ORAL at 13:48

## 2021-01-01 RX ADMIN — OXYBUTYNIN CHLORIDE 15 MG: 10 TABLET, EXTENDED RELEASE ORAL at 08:40

## 2021-01-01 RX ADMIN — CEFTRIAXONE SODIUM 2000 MG: 2 INJECTION, POWDER, FOR SOLUTION INTRAMUSCULAR; INTRAVENOUS at 14:55

## 2021-01-01 RX ADMIN — CARVEDILOL 6.25 MG: 6.25 TABLET, FILM COATED ORAL at 20:22

## 2021-01-01 RX ADMIN — CHOLECALCIFEROL TAB 25 MCG (1000 UNIT) 2000 UNITS: 25 TAB at 08:35

## 2021-01-01 RX ADMIN — HYDROCORTISONE: 0.01 CREAM TOPICAL at 21:03

## 2021-01-01 RX ADMIN — NYSTATIN 500000 UNITS: 500000 SUSPENSION ORAL at 22:28

## 2021-01-01 RX ADMIN — BACLOFEN 5 MG: 10 TABLET ORAL at 12:43

## 2021-01-01 RX ADMIN — MIDODRINE HYDROCHLORIDE 10 MG: 10 TABLET ORAL at 13:30

## 2021-01-01 RX ADMIN — SERTRALINE HYDROCHLORIDE 50 MG: 50 TABLET ORAL at 07:47

## 2021-01-01 RX ADMIN — PANTOPRAZOLE SODIUM 40 MG: 40 TABLET, DELAYED RELEASE ORAL at 05:37

## 2021-01-01 RX ADMIN — ROPINIROLE 0.5 MG: 0.5 TABLET, FILM COATED ORAL at 12:55

## 2021-01-01 RX ADMIN — ROPINIROLE 0.5 MG: 0.5 TABLET, FILM COATED ORAL at 16:54

## 2021-01-01 RX ADMIN — LIDOCAINE HYDROCHLORIDE 5 ML: 20 SOLUTION ORAL; TOPICAL at 22:28

## 2021-01-01 RX ADMIN — BACLOFEN 5 MG: 10 TABLET ORAL at 09:16

## 2021-01-01 RX ADMIN — ROPINIROLE 0.5 MG: 0.5 TABLET, FILM COATED ORAL at 20:47

## 2021-01-01 RX ADMIN — MIDODRINE HYDROCHLORIDE 10 MG: 10 TABLET ORAL at 17:54

## 2021-01-01 RX ADMIN — PHYTONADIONE 2.5 MG: 5 TABLET ORAL at 15:56

## 2021-01-01 RX ADMIN — Medication 10 ML: at 10:18

## 2021-01-01 RX ADMIN — OXYBUTYNIN CHLORIDE 15 MG: 10 TABLET, EXTENDED RELEASE ORAL at 12:16

## 2021-01-01 RX ADMIN — ROPINIROLE HYDROCHLORIDE 0.5 MG: 0.5 TABLET, FILM COATED ORAL at 09:13

## 2021-01-01 RX ADMIN — ROPINIROLE HYDROCHLORIDE 0.5 MG: 0.5 TABLET, FILM COATED ORAL at 15:35

## 2021-01-01 RX ADMIN — ANTACID TABLETS 500 MG: 500 TABLET, CHEWABLE ORAL at 08:30

## 2021-01-01 RX ADMIN — AMIODARONE HYDROCHLORIDE 100 MG: 200 TABLET ORAL at 21:33

## 2021-01-01 RX ADMIN — OXYCODONE HYDROCHLORIDE AND ACETAMINOPHEN 1 TABLET: 5; 325 TABLET ORAL at 07:21

## 2021-01-01 RX ADMIN — FUROSEMIDE 20 MG: 20 TABLET ORAL at 17:20

## 2021-01-01 RX ADMIN — LORAZEPAM 1 MG: 2 INJECTION INTRAMUSCULAR; INTRAVENOUS at 07:28

## 2021-01-01 RX ADMIN — LORAZEPAM 1 MG: 2 INJECTION INTRAMUSCULAR; INTRAVENOUS at 12:32

## 2021-01-01 RX ADMIN — OXYCODONE HYDROCHLORIDE AND ACETAMINOPHEN 1 TABLET: 5; 325 TABLET ORAL at 19:52

## 2021-01-01 RX ADMIN — POTASSIUM CHLORIDE 20 MEQ: 1500 TABLET, EXTENDED RELEASE ORAL at 07:50

## 2021-01-01 RX ADMIN — PANTOPRAZOLE SODIUM 40 MG: 40 TABLET, DELAYED RELEASE ORAL at 06:35

## 2021-01-01 RX ADMIN — HYDROCORTISONE: 0.01 CREAM TOPICAL at 19:57

## 2021-01-01 RX ADMIN — IOPAMIDOL 75 ML: 755 INJECTION, SOLUTION INTRAVENOUS at 14:19

## 2021-01-01 RX ADMIN — SERTRALINE HYDROCHLORIDE 50 MG: 50 TABLET ORAL at 17:26

## 2021-01-01 RX ADMIN — SERTRALINE HYDROCHLORIDE 50 MG: 50 TABLET ORAL at 09:03

## 2021-01-01 RX ADMIN — MORPHINE SULFATE 2 MG: 2 INJECTION, SOLUTION INTRAMUSCULAR; INTRAVENOUS at 12:07

## 2021-01-01 RX ADMIN — ROPINIROLE 0.5 MG: 0.5 TABLET, FILM COATED ORAL at 13:30

## 2021-01-01 RX ADMIN — Medication 1 MG: at 08:28

## 2021-01-01 RX ADMIN — NYSTATIN 500000 UNITS: 100000 SUSPENSION ORAL at 20:46

## 2021-01-01 RX ADMIN — Medication 1 MG: at 16:15

## 2021-01-01 RX ADMIN — AMIODARONE HYDROCHLORIDE 100 MG: 200 TABLET ORAL at 20:44

## 2021-01-01 RX ADMIN — WARFARIN SODIUM 2 MG: 2 TABLET ORAL at 16:58

## 2021-01-01 RX ADMIN — PREDNISONE 20 MG: 20 TABLET ORAL at 09:26

## 2021-01-01 RX ADMIN — CHOLECALCIFEROL TAB 25 MCG (1000 UNIT) 2000 UNITS: 25 TAB at 09:56

## 2021-01-01 RX ADMIN — ROPINIROLE 0.5 MG: 0.5 TABLET, FILM COATED ORAL at 08:50

## 2021-01-01 RX ADMIN — HEPARIN SODIUM AND DEXTROSE 12 UNITS/KG/HR: 10000; 5 INJECTION INTRAVENOUS at 08:53

## 2021-01-01 RX ADMIN — POLYETHYLENE GLYCOL 3350 17 G: 17 POWDER, FOR SOLUTION ORAL at 08:30

## 2021-01-01 RX ADMIN — Medication 2000 UNITS: at 09:37

## 2021-01-01 RX ADMIN — POLYETHYLENE GLYCOL 3350 17 G: 17 POWDER, FOR SOLUTION ORAL at 12:13

## 2021-01-01 RX ADMIN — ROPINIROLE 0.5 MG: 0.5 TABLET, FILM COATED ORAL at 16:35

## 2021-01-01 RX ADMIN — POLYETHYLENE GLYCOL 3350 17 G: 17 POWDER, FOR SOLUTION ORAL at 07:51

## 2021-01-01 RX ADMIN — NYSTATIN 500000 UNITS: 100000 SUSPENSION ORAL at 13:30

## 2021-01-01 RX ADMIN — HYDROCORTISONE: 0.01 CREAM TOPICAL at 14:16

## 2021-01-01 RX ADMIN — SERTRALINE 50 MG: 50 TABLET, FILM COATED ORAL at 08:32

## 2021-01-01 RX ADMIN — NYSTATIN 500000 UNITS: 100000 SUSPENSION ORAL at 09:10

## 2021-01-01 RX ADMIN — AMIODARONE HYDROCHLORIDE 100 MG: 200 TABLET ORAL at 19:33

## 2021-01-01 RX ADMIN — CHLORHEXIDINE GLUCONATE 0.12% ORAL RINSE 15 ML: 1.2 LIQUID ORAL at 21:03

## 2021-01-01 RX ADMIN — OXYCODONE HYDROCHLORIDE AND ACETAMINOPHEN 1 TABLET: 5; 325 TABLET ORAL at 19:20

## 2021-01-01 RX ADMIN — OXYCODONE HYDROCHLORIDE AND ACETAMINOPHEN 1 TABLET: 5; 325 TABLET ORAL at 09:54

## 2021-01-01 RX ADMIN — Medication 1000 UNITS: at 21:11

## 2021-01-01 RX ADMIN — MINERAL SUPPLEMENT IRON 300 MG / 5 ML STRENGTH LIQUID 100 PER BOX UNFLAVORED 300 MG: at 08:43

## 2021-01-01 RX ADMIN — ROPINIROLE HYDROCHLORIDE 0.5 MG: 0.5 TABLET, FILM COATED ORAL at 17:20

## 2021-01-01 RX ADMIN — NYSTATIN 500000 UNITS: 100000 SUSPENSION ORAL at 13:28

## 2021-01-01 RX ADMIN — ROPINIROLE HYDROCHLORIDE 0.5 MG: 0.5 TABLET, FILM COATED ORAL at 08:33

## 2021-01-01 RX ADMIN — DILTIAZEM HYDROCHLORIDE 30 MG: 30 TABLET, FILM COATED ORAL at 09:55

## 2021-01-01 RX ADMIN — LORAZEPAM 1 MG: 2 INJECTION INTRAMUSCULAR; INTRAVENOUS at 11:31

## 2021-01-01 RX ADMIN — ATORVASTATIN CALCIUM 40 MG: 40 TABLET, FILM COATED ORAL at 12:00

## 2021-01-01 RX ADMIN — AMIODARONE HYDROCHLORIDE 100 MG: 200 TABLET ORAL at 09:19

## 2021-01-01 RX ADMIN — ROPINIROLE 0.5 MG: 0.5 TABLET, FILM COATED ORAL at 19:18

## 2021-01-01 RX ADMIN — MIDODRINE HYDROCHLORIDE 10 MG: 10 TABLET ORAL at 11:57

## 2021-01-01 RX ADMIN — SERTRALINE HYDROCHLORIDE 50 MG: 50 TABLET ORAL at 09:55

## 2021-01-01 RX ADMIN — CARVEDILOL 6.25 MG: 6.25 TABLET, FILM COATED ORAL at 09:38

## 2021-01-01 RX ADMIN — SODIUM CHLORIDE 500 ML: 0.9 INJECTION, SOLUTION INTRAVENOUS at 13:11

## 2021-01-01 RX ADMIN — POLYETHYLENE GLYCOL 3350 17 G: 17 POWDER, FOR SOLUTION ORAL at 09:25

## 2021-01-01 RX ADMIN — NYSTATIN 500000 UNITS: 100000 SUSPENSION ORAL at 09:19

## 2021-01-01 RX ADMIN — LORAZEPAM 1 MG: 2 INJECTION INTRAMUSCULAR; INTRAVENOUS at 05:30

## 2021-01-01 RX ADMIN — SODIUM CHLORIDE, PRESERVATIVE FREE 10 ML: 5 INJECTION INTRAVENOUS at 19:32

## 2021-01-01 RX ADMIN — ROPINIROLE 0.5 MG: 0.5 TABLET, FILM COATED ORAL at 16:56

## 2021-01-01 RX ADMIN — Medication 1000 UNITS: at 21:50

## 2021-01-01 RX ADMIN — HYDROMORPHONE HYDROCHLORIDE 0.5 MG: 2 INJECTION, SOLUTION INTRAMUSCULAR; INTRAVENOUS; SUBCUTANEOUS at 13:27

## 2021-01-01 RX ADMIN — Medication 20 MG: at 13:33

## 2021-01-01 RX ADMIN — PREDNISONE 10 MG: 10 TABLET ORAL at 09:42

## 2021-01-01 RX ADMIN — SODIUM CHLORIDE, PRESERVATIVE FREE 10 ML: 5 INJECTION INTRAVENOUS at 20:36

## 2021-01-01 RX ADMIN — MEROPENEM 1000 MG: 1 INJECTION, POWDER, FOR SOLUTION INTRAVENOUS at 10:25

## 2021-01-01 RX ADMIN — ATORVASTATIN CALCIUM 40 MG: 40 TABLET, FILM COATED ORAL at 07:47

## 2021-01-01 RX ADMIN — NYSTATIN 500000 UNITS: 100000 SUSPENSION ORAL at 13:24

## 2021-01-01 RX ADMIN — ROPINIROLE HYDROCHLORIDE 0.5 MG: 0.5 TABLET, FILM COATED ORAL at 13:17

## 2021-01-01 RX ADMIN — ATORVASTATIN CALCIUM 40 MG: 40 TABLET, FILM COATED ORAL at 09:10

## 2021-01-01 RX ADMIN — OXYBUTYNIN CHLORIDE 15 MG: 10 TABLET, EXTENDED RELEASE ORAL at 09:21

## 2021-01-01 RX ADMIN — LIDOCAINE HYDROCHLORIDE 5 ML: 20 SOLUTION ORAL; TOPICAL at 22:31

## 2021-01-01 RX ADMIN — SODIUM CHLORIDE, PRESERVATIVE FREE 10 ML: 5 INJECTION INTRAVENOUS at 09:38

## 2021-01-01 RX ADMIN — CARVEDILOL 6.25 MG: 6.25 TABLET, FILM COATED ORAL at 07:51

## 2021-01-01 RX ADMIN — Medication 1 MG: at 08:16

## 2021-01-01 RX ADMIN — MIDODRINE HYDROCHLORIDE 5 MG: 5 TABLET ORAL at 18:07

## 2021-01-01 RX ADMIN — SERTRALINE HYDROCHLORIDE 50 MG: 50 TABLET ORAL at 09:54

## 2021-01-01 RX ADMIN — AMIODARONE HYDROCHLORIDE 100 MG: 200 TABLET ORAL at 21:44

## 2021-01-01 RX ADMIN — LORAZEPAM 1 MG: 2 INJECTION INTRAMUSCULAR; INTRAVENOUS at 09:33

## 2021-01-01 RX ADMIN — Medication 1 MG: at 12:32

## 2021-01-01 RX ADMIN — Medication 0.1 MG: at 14:06

## 2021-01-01 RX ADMIN — OXYBUTYNIN CHLORIDE 15 MG: 5 TABLET, EXTENDED RELEASE ORAL at 08:12

## 2021-01-01 RX ADMIN — SODIUM CHLORIDE, PRESERVATIVE FREE 10 ML: 5 INJECTION INTRAVENOUS at 13:35

## 2021-01-01 RX ADMIN — OXYCODONE HYDROCHLORIDE AND ACETAMINOPHEN 1 TABLET: 5; 325 TABLET ORAL at 12:20

## 2021-01-01 RX ADMIN — METOPROLOL TARTRATE 25 MG: 25 TABLET, FILM COATED ORAL at 08:13

## 2021-01-01 RX ADMIN — CARVEDILOL 6.25 MG: 6.25 TABLET, FILM COATED ORAL at 21:25

## 2021-01-01 RX ADMIN — POLYETHYLENE GLYCOL 3350 17 G: 17 POWDER, FOR SOLUTION ORAL at 09:56

## 2021-01-01 RX ADMIN — HYDROCORTISONE: 0.01 CREAM TOPICAL at 08:14

## 2021-01-01 RX ADMIN — BACLOFEN 5 MG: 10 TABLET ORAL at 18:17

## 2021-01-01 RX ADMIN — CARVEDILOL 6.25 MG: 6.25 TABLET, FILM COATED ORAL at 21:03

## 2021-01-01 RX ADMIN — ENOXAPARIN SODIUM 70 MG: 80 INJECTION SUBCUTANEOUS at 09:42

## 2021-01-01 RX ADMIN — LORAZEPAM 1 MG: 2 INJECTION INTRAMUSCULAR; INTRAVENOUS at 10:34

## 2021-01-01 RX ADMIN — ANTACID TABLETS 500 MG: 500 TABLET, CHEWABLE ORAL at 22:06

## 2021-01-01 RX ADMIN — FAMOTIDINE 10 MG: 20 TABLET ORAL at 20:53

## 2021-01-01 RX ADMIN — ATORVASTATIN CALCIUM 40 MG: 40 TABLET, FILM COATED ORAL at 08:50

## 2021-01-01 RX ADMIN — SODIUM CHLORIDE, PRESERVATIVE FREE 10 ML: 5 INJECTION INTRAVENOUS at 20:22

## 2021-01-01 RX ADMIN — PROPOFOL 120 MG: 10 INJECTION, EMULSION INTRAVENOUS at 13:12

## 2021-01-01 RX ADMIN — Medication 0.1 MG: at 11:19

## 2021-01-01 RX ADMIN — ATORVASTATIN CALCIUM 40 MG: 40 TABLET, FILM COATED ORAL at 09:31

## 2021-01-01 RX ADMIN — NYSTATIN 500000 UNITS: 100000 SUSPENSION ORAL at 21:01

## 2021-01-01 RX ADMIN — OXYCODONE HYDROCHLORIDE AND ACETAMINOPHEN 1 TABLET: 5; 325 TABLET ORAL at 21:02

## 2021-01-01 RX ADMIN — PANTOPRAZOLE SODIUM 40 MG: 40 TABLET, DELAYED RELEASE ORAL at 06:41

## 2021-01-01 RX ADMIN — LORAZEPAM 1 MG: 2 INJECTION INTRAMUSCULAR; INTRAVENOUS at 22:46

## 2021-01-01 RX ADMIN — MINERAL SUPPLEMENT IRON 300 MG / 5 ML STRENGTH LIQUID 100 PER BOX UNFLAVORED 300 MG: at 07:56

## 2021-01-01 RX ADMIN — CARVEDILOL 6.25 MG: 6.25 TABLET, FILM COATED ORAL at 22:06

## 2021-01-01 RX ADMIN — CHOLECALCIFEROL TAB 25 MCG (1000 UNIT) 2000 UNITS: 25 TAB at 09:42

## 2021-01-01 RX ADMIN — OXYCODONE HYDROCHLORIDE AND ACETAMINOPHEN 1 TABLET: 5; 325 TABLET ORAL at 08:05

## 2021-01-01 RX ADMIN — POLYETHYLENE GLYCOL 3350 17 G: 17 POWDER, FOR SOLUTION ORAL at 21:51

## 2021-01-01 RX ADMIN — NYSTATIN 500000 UNITS: 100000 SUSPENSION ORAL at 16:31

## 2021-01-01 RX ADMIN — CARVEDILOL 6.25 MG: 6.25 TABLET, FILM COATED ORAL at 08:29

## 2021-01-01 RX ADMIN — OXYBUTYNIN CHLORIDE 15 MG: 10 TABLET, EXTENDED RELEASE ORAL at 09:13

## 2021-01-01 RX ADMIN — HYDROCORTISONE: 0.01 CREAM TOPICAL at 21:43

## 2021-01-01 RX ADMIN — HYDROCORTISONE: 0.01 CREAM TOPICAL at 22:29

## 2021-01-01 RX ADMIN — LACTULOSE: 10 SOLUTION ORAL at 23:09

## 2021-01-01 RX ADMIN — OXYCODONE HYDROCHLORIDE AND ACETAMINOPHEN 1 TABLET: 5; 325 TABLET ORAL at 15:32

## 2021-01-01 RX ADMIN — Medication 1 MG: at 22:45

## 2021-01-01 RX ADMIN — MIDODRINE HYDROCHLORIDE 5 MG: 5 TABLET ORAL at 10:06

## 2021-01-01 RX ADMIN — HYDROCORTISONE: 0.01 CREAM TOPICAL at 12:22

## 2021-01-01 RX ADMIN — LORAZEPAM 1 MG: 2 INJECTION INTRAMUSCULAR; INTRAVENOUS at 16:14

## 2021-01-01 RX ADMIN — OXYCODONE HYDROCHLORIDE AND ACETAMINOPHEN 1 TABLET: 5; 325 TABLET ORAL at 22:39

## 2021-01-01 RX ADMIN — OXYCODONE HYDROCHLORIDE AND ACETAMINOPHEN 1 TABLET: 5; 325 TABLET ORAL at 13:49

## 2021-01-01 RX ADMIN — ROPINIROLE 0.5 MG: 0.5 TABLET, FILM COATED ORAL at 20:18

## 2021-01-01 RX ADMIN — HYDROCORTISONE: 0.01 CREAM TOPICAL at 13:34

## 2021-01-01 RX ADMIN — NYSTATIN 500000 UNITS: 100000 SUSPENSION ORAL at 09:54

## 2021-01-01 RX ADMIN — CARVEDILOL 6.25 MG: 6.25 TABLET, FILM COATED ORAL at 13:32

## 2021-01-01 RX ADMIN — ROPINIROLE 0.5 MG: 0.5 TABLET, FILM COATED ORAL at 07:46

## 2021-01-01 RX ADMIN — OXYCODONE HYDROCHLORIDE AND ACETAMINOPHEN 1 TABLET: 5; 325 TABLET ORAL at 15:09

## 2021-01-01 RX ADMIN — ROPINIROLE HYDROCHLORIDE 0.5 MG: 0.5 TABLET, FILM COATED ORAL at 14:35

## 2021-01-01 RX ADMIN — PANTOPRAZOLE SODIUM 40 MG: 40 TABLET, DELAYED RELEASE ORAL at 06:06

## 2021-01-01 RX ADMIN — NYSTATIN 500000 UNITS: 100000 SUSPENSION ORAL at 14:57

## 2021-01-01 RX ADMIN — ROPINIROLE HYDROCHLORIDE 0.5 MG: 0.5 TABLET, FILM COATED ORAL at 17:03

## 2021-01-01 RX ADMIN — SODIUM CHLORIDE, PRESERVATIVE FREE 10 ML: 5 INJECTION INTRAVENOUS at 10:21

## 2021-01-01 RX ADMIN — MORPHINE SULFATE 4 MG: 4 INJECTION, SOLUTION INTRAMUSCULAR; INTRAVENOUS at 12:22

## 2021-01-01 RX ADMIN — ANTACID TABLETS 500 MG: 500 TABLET, CHEWABLE ORAL at 08:09

## 2021-01-01 RX ADMIN — SODIUM CHLORIDE, PRESERVATIVE FREE 10 ML: 5 INJECTION INTRAVENOUS at 20:58

## 2021-01-01 RX ADMIN — AMIODARONE HYDROCHLORIDE 100 MG: 200 TABLET ORAL at 20:06

## 2021-01-01 RX ADMIN — Medication 10 ML: at 16:38

## 2021-01-01 RX ADMIN — FAMOTIDINE 10 MG: 20 TABLET ORAL at 20:44

## 2021-01-01 RX ADMIN — TORSEMIDE 20 MG: 20 TABLET ORAL at 10:03

## 2021-01-01 RX ADMIN — ONDANSETRON 4 MG: 2 INJECTION INTRAMUSCULAR; INTRAVENOUS at 07:51

## 2021-01-01 RX ADMIN — DILTIAZEM HYDROCHLORIDE 20 MG: 5 INJECTION INTRAVENOUS at 13:12

## 2021-01-01 RX ADMIN — SODIUM CHLORIDE 80 ML: 9 INJECTION, SOLUTION INTRAVENOUS at 14:18

## 2021-01-01 RX ADMIN — SERTRALINE 50 MG: 50 TABLET, FILM COATED ORAL at 09:43

## 2021-01-01 RX ADMIN — INSULIN HUMAN 10 UNITS: 100 INJECTION, SOLUTION PARENTERAL at 14:30

## 2021-01-01 RX ADMIN — HYDROCORTISONE: 0.01 CREAM TOPICAL at 20:37

## 2021-01-01 RX ADMIN — PREDNISONE 20 MG: 20 TABLET ORAL at 07:46

## 2021-01-01 RX ADMIN — METHYLPREDNISOLONE SODIUM SUCCINATE 40 MG: 40 INJECTION, POWDER, FOR SOLUTION INTRAMUSCULAR; INTRAVENOUS at 08:12

## 2021-01-01 RX ADMIN — AMIODARONE HYDROCHLORIDE 100 MG: 200 TABLET ORAL at 21:25

## 2021-01-01 RX ADMIN — OXYCODONE HYDROCHLORIDE AND ACETAMINOPHEN 1 TABLET: 5; 325 TABLET ORAL at 20:54

## 2021-01-01 RX ADMIN — ROPINIROLE 0.5 MG: 0.5 TABLET, FILM COATED ORAL at 16:08

## 2021-01-01 RX ADMIN — Medication 1 MG: at 06:34

## 2021-01-01 RX ADMIN — SODIUM CHLORIDE 10 ML: 9 INJECTION, SOLUTION INTRAMUSCULAR; INTRAVENOUS; SUBCUTANEOUS at 09:29

## 2021-01-01 RX ADMIN — SERTRALINE 50 MG: 50 TABLET, FILM COATED ORAL at 09:10

## 2021-01-01 RX ADMIN — HYDROCORTISONE: 0.01 CREAM TOPICAL at 21:08

## 2021-01-01 RX ADMIN — LACTULOSE: 10 SOLUTION ORAL at 11:28

## 2021-01-01 RX ADMIN — LORAZEPAM 1 MG: 2 INJECTION INTRAMUSCULAR; INTRAVENOUS at 19:53

## 2021-01-01 RX ADMIN — CHLORHEXIDINE GLUCONATE 0.12% ORAL RINSE 15 ML: 1.2 LIQUID ORAL at 09:10

## 2021-01-01 RX ADMIN — Medication 1 MG: at 06:16

## 2021-01-01 RX ADMIN — ENOXAPARIN SODIUM 70 MG: 80 INJECTION SUBCUTANEOUS at 21:52

## 2021-01-01 RX ADMIN — LORAZEPAM 1 MG: 2 INJECTION INTRAMUSCULAR; INTRAVENOUS at 01:37

## 2021-01-01 RX ADMIN — POLYETHYLENE GLYCOL 3350 17 G: 17 POWDER, FOR SOLUTION ORAL at 09:04

## 2021-01-01 RX ADMIN — CEFTRIAXONE SODIUM 2000 MG: 2 INJECTION, POWDER, FOR SOLUTION INTRAMUSCULAR; INTRAVENOUS at 13:56

## 2021-01-01 RX ADMIN — Medication 1 MG: at 04:25

## 2021-01-01 RX ADMIN — ENOXAPARIN SODIUM 70 MG: 80 INJECTION SUBCUTANEOUS at 09:24

## 2021-01-01 RX ADMIN — ANTACID TABLETS 500 MG: 500 TABLET, CHEWABLE ORAL at 21:24

## 2021-01-01 RX ADMIN — LORAZEPAM 1 MG: 2 INJECTION INTRAMUSCULAR; INTRAVENOUS at 05:33

## 2021-01-01 RX ADMIN — LIDOCAINE HYDROCHLORIDE 100 MG: 20 INJECTION, SOLUTION EPIDURAL; INFILTRATION; INTRACAUDAL at 09:30

## 2021-01-01 RX ADMIN — Medication 1 MG: at 01:24

## 2021-01-01 RX ADMIN — Medication 1 MG: at 00:13

## 2021-01-01 RX ADMIN — CARVEDILOL 6.25 MG: 6.25 TABLET, FILM COATED ORAL at 08:31

## 2021-01-01 RX ADMIN — NYSTATIN 500000 UNITS: 100000 SUSPENSION ORAL at 09:05

## 2021-01-01 RX ADMIN — PREDNISONE 5 MG: 10 TABLET ORAL at 09:56

## 2021-01-01 RX ADMIN — FUROSEMIDE 20 MG: 10 INJECTION, SOLUTION INTRAMUSCULAR; INTRAVENOUS at 13:30

## 2021-01-01 RX ADMIN — ROPINIROLE 0.5 MG: 0.5 TABLET, FILM COATED ORAL at 09:08

## 2021-01-01 RX ADMIN — PROPOFOL 250 MG: 10 INJECTION, EMULSION INTRAVENOUS at 09:30

## 2021-01-01 RX ADMIN — LORAZEPAM 1 MG: 2 INJECTION INTRAMUSCULAR; INTRAVENOUS at 21:13

## 2021-01-01 RX ADMIN — Medication 0.1 MG: at 18:24

## 2021-01-01 RX ADMIN — POLYETHYLENE GLYCOL 3350 17 G: 17 POWDER, FOR SOLUTION ORAL at 10:30

## 2021-01-01 RX ADMIN — ROPINIROLE HYDROCHLORIDE 0.5 MG: 0.5 TABLET, FILM COATED ORAL at 22:37

## 2021-01-01 RX ADMIN — ATORVASTATIN CALCIUM 40 MG: 40 TABLET, FILM COATED ORAL at 08:31

## 2021-01-01 RX ADMIN — NYSTATIN 500000 UNITS: 100000 SUSPENSION ORAL at 22:16

## 2021-01-01 RX ADMIN — PANTOPRAZOLE SODIUM 40 MG: 40 TABLET, DELAYED RELEASE ORAL at 06:14

## 2021-01-01 RX ADMIN — SODIUM CHLORIDE, PRESERVATIVE FREE 10 ML: 5 INJECTION INTRAVENOUS at 08:46

## 2021-01-01 RX ADMIN — SERTRALINE HYDROCHLORIDE 50 MG: 50 TABLET ORAL at 09:24

## 2021-01-01 RX ADMIN — ATORVASTATIN CALCIUM 40 MG: 40 TABLET, FILM COATED ORAL at 09:43

## 2021-01-01 RX ADMIN — AMIODARONE HYDROCHLORIDE 100 MG: 200 TABLET ORAL at 09:10

## 2021-01-01 RX ADMIN — LACTULOSE 10 G: 20 SOLUTION ORAL at 15:55

## 2021-01-01 RX ADMIN — FAMOTIDINE 10 MG: 20 TABLET ORAL at 22:28

## 2021-01-01 RX ADMIN — SODIUM CHLORIDE, PRESERVATIVE FREE 10 ML: 5 INJECTION INTRAVENOUS at 20:18

## 2021-01-01 RX ADMIN — SODIUM CHLORIDE, PRESERVATIVE FREE 10 ML: 5 INJECTION INTRAVENOUS at 09:04

## 2021-01-01 RX ADMIN — MIDODRINE HYDROCHLORIDE 10 MG: 10 TABLET ORAL at 17:03

## 2021-01-01 RX ADMIN — Medication 1 MG: at 04:15

## 2021-01-01 RX ADMIN — AMIODARONE HYDROCHLORIDE 100 MG: 200 TABLET ORAL at 07:53

## 2021-01-01 RX ADMIN — MIDODRINE HYDROCHLORIDE 5 MG: 5 TABLET ORAL at 16:56

## 2021-01-01 RX ADMIN — SODIUM CHLORIDE, POTASSIUM CHLORIDE, SODIUM LACTATE AND CALCIUM CHLORIDE: 600; 310; 30; 20 INJECTION, SOLUTION INTRAVENOUS at 06:00

## 2021-01-01 RX ADMIN — NYSTATIN 500000 UNITS: 100000 SUSPENSION ORAL at 14:22

## 2021-01-01 RX ADMIN — CARVEDILOL 6.25 MG: 6.25 TABLET, FILM COATED ORAL at 21:33

## 2021-01-01 RX ADMIN — PANTOPRAZOLE SODIUM 40 MG: 40 TABLET, DELAYED RELEASE ORAL at 05:57

## 2021-01-01 RX ADMIN — HYDROMORPHONE HYDROCHLORIDE 1 MG: 1 INJECTION, SOLUTION INTRAMUSCULAR; INTRAVENOUS; SUBCUTANEOUS at 14:22

## 2021-01-01 RX ADMIN — Medication 1 MG: at 12:08

## 2021-01-01 RX ADMIN — ROPINIROLE 0.5 MG: 0.5 TABLET, FILM COATED ORAL at 09:23

## 2021-01-01 RX ADMIN — OXYBUTYNIN CHLORIDE 15 MG: 10 TABLET, EXTENDED RELEASE ORAL at 09:57

## 2021-01-01 RX ADMIN — Medication 10 ML: at 18:41

## 2021-01-01 RX ADMIN — SERTRALINE HYDROCHLORIDE 50 MG: 50 TABLET ORAL at 07:46

## 2021-01-01 RX ADMIN — LORAZEPAM 1 MG: 2 INJECTION INTRAMUSCULAR; INTRAVENOUS at 00:48

## 2021-01-01 RX ADMIN — LORAZEPAM 1 MG: 2 INJECTION INTRAMUSCULAR; INTRAVENOUS at 16:17

## 2021-01-01 RX ADMIN — LORAZEPAM 1 MG: 2 INJECTION INTRAMUSCULAR; INTRAVENOUS at 17:22

## 2021-01-01 RX ADMIN — Medication 1 MG: at 07:15

## 2021-01-01 RX ADMIN — ALBUMIN (HUMAN) 25 G: 0.25 INJECTION, SOLUTION INTRAVENOUS at 16:19

## 2021-01-01 RX ADMIN — Medication 0.1 MG: at 08:27

## 2021-01-01 RX ADMIN — Medication 1 MG: at 01:37

## 2021-01-01 RX ADMIN — LORAZEPAM 1 MG: 2 INJECTION INTRAMUSCULAR; INTRAVENOUS at 22:40

## 2021-01-01 RX ADMIN — OXYBUTYNIN CHLORIDE 15 MG: 10 TABLET, EXTENDED RELEASE ORAL at 08:37

## 2021-01-01 RX ADMIN — SERTRALINE HYDROCHLORIDE 50 MG: 50 TABLET ORAL at 09:29

## 2021-01-01 RX ADMIN — FUROSEMIDE 20 MG: 10 INJECTION, SOLUTION INTRAMUSCULAR; INTRAVENOUS at 16:54

## 2021-01-01 RX ADMIN — ATORVASTATIN CALCIUM 40 MG: 40 TABLET, FILM COATED ORAL at 09:55

## 2021-01-01 RX ADMIN — SODIUM CHLORIDE, PRESERVATIVE FREE 10 ML: 5 INJECTION INTRAVENOUS at 09:03

## 2021-01-01 RX ADMIN — METHYLPREDNISOLONE SODIUM SUCCINATE 40 MG: 40 INJECTION, POWDER, FOR SOLUTION INTRAMUSCULAR; INTRAVENOUS at 13:08

## 2021-01-01 RX ADMIN — ROPINIROLE HYDROCHLORIDE 0.5 MG: 0.5 TABLET, FILM COATED ORAL at 21:15

## 2021-01-01 RX ADMIN — CARVEDILOL 6.25 MG: 6.25 TABLET, FILM COATED ORAL at 08:40

## 2021-01-01 RX ADMIN — FUROSEMIDE 20 MG: 20 TABLET ORAL at 17:52

## 2021-01-01 RX ADMIN — SERTRALINE HYDROCHLORIDE 50 MG: 50 TABLET ORAL at 09:38

## 2021-01-01 RX ADMIN — ATORVASTATIN CALCIUM 40 MG: 40 TABLET, FILM COATED ORAL at 07:46

## 2021-01-01 RX ADMIN — SODIUM CHLORIDE, POTASSIUM CHLORIDE, SODIUM LACTATE AND CALCIUM CHLORIDE: 600; 310; 30; 20 INJECTION, SOLUTION INTRAVENOUS at 20:40

## 2021-01-01 RX ADMIN — CHLORHEXIDINE GLUCONATE 0.12% ORAL RINSE 15 ML: 1.2 LIQUID ORAL at 09:56

## 2021-01-01 RX ADMIN — LEVALBUTEROL 1.25 MG: 1.25 SOLUTION, CONCENTRATE RESPIRATORY (INHALATION) at 13:39

## 2021-01-01 RX ADMIN — NYSTATIN 500000 UNITS: 500000 SUSPENSION ORAL at 22:41

## 2021-01-01 RX ADMIN — PREDNISONE 10 MG: 20 TABLET ORAL at 07:52

## 2021-01-01 RX ADMIN — ATORVASTATIN CALCIUM 40 MG: 40 TABLET, FILM COATED ORAL at 09:23

## 2021-01-01 RX ADMIN — CEFTRIAXONE SODIUM 2000 MG: 2 INJECTION, POWDER, FOR SOLUTION INTRAMUSCULAR; INTRAVENOUS at 13:43

## 2021-01-01 RX ADMIN — Medication 1 MG: at 02:40

## 2021-01-01 RX ADMIN — POLYETHYLENE GLYCOL 3350 17 G: 17 POWDER, FOR SOLUTION ORAL at 08:09

## 2021-01-01 RX ADMIN — ROPINIROLE 0.5 MG: 0.5 TABLET, FILM COATED ORAL at 20:29

## 2021-01-01 RX ADMIN — Medication 1 MG: at 06:10

## 2021-01-01 RX ADMIN — POLYETHYLENE GLYCOL 3350 17 G: 17 POWDER, FOR SOLUTION ORAL at 08:49

## 2021-01-01 RX ADMIN — NYSTATIN 500000 UNITS: 100000 SUSPENSION ORAL at 08:10

## 2021-01-01 RX ADMIN — SODIUM CHLORIDE, PRESERVATIVE FREE 10 ML: 5 INJECTION INTRAVENOUS at 09:26

## 2021-01-01 RX ADMIN — Medication 1 MG: at 09:37

## 2021-01-01 RX ADMIN — NYSTATIN 500000 UNITS: 100000 SUSPENSION ORAL at 17:12

## 2021-01-01 RX ADMIN — LORAZEPAM 1 MG: 2 INJECTION INTRAMUSCULAR; INTRAVENOUS at 09:42

## 2021-01-01 RX ADMIN — Medication 0.1 MG: at 16:16

## 2021-01-01 RX ADMIN — NYSTATIN 500000 UNITS: 100000 SUSPENSION ORAL at 17:58

## 2021-01-01 RX ADMIN — ROPINIROLE 0.5 MG: 0.5 TABLET, FILM COATED ORAL at 16:48

## 2021-01-01 RX ADMIN — SERTRALINE HYDROCHLORIDE 50 MG: 50 TABLET ORAL at 14:22

## 2021-01-01 RX ADMIN — MORPHINE SULFATE 1 MG: 2 INJECTION, SOLUTION INTRAMUSCULAR; INTRAVENOUS at 23:46

## 2021-01-01 RX ADMIN — AMIODARONE HYDROCHLORIDE 100 MG: 200 TABLET ORAL at 09:37

## 2021-01-01 RX ADMIN — WARFARIN SODIUM 4 MG: 2 TABLET ORAL at 19:10

## 2021-01-01 RX ADMIN — CARVEDILOL 6.25 MG: 6.25 TABLET, FILM COATED ORAL at 10:03

## 2021-01-01 RX ADMIN — POTASSIUM CHLORIDE 20 MEQ: 1500 TABLET, EXTENDED RELEASE ORAL at 20:47

## 2021-01-01 RX ADMIN — ROPINIROLE 0.5 MG: 0.5 TABLET, FILM COATED ORAL at 14:15

## 2021-01-01 RX ADMIN — LORAZEPAM 1 MG: 2 INJECTION INTRAMUSCULAR; INTRAVENOUS at 03:17

## 2021-01-01 RX ADMIN — DILTIAZEM HYDROCHLORIDE 120 MG: 120 CAPSULE, COATED, EXTENDED RELEASE ORAL at 12:41

## 2021-01-01 RX ADMIN — AMIODARONE HYDROCHLORIDE 100 MG: 200 TABLET ORAL at 20:53

## 2021-01-01 RX ADMIN — CEFTRIAXONE SODIUM 2000 MG: 2 INJECTION, POWDER, FOR SOLUTION INTRAMUSCULAR; INTRAVENOUS at 16:10

## 2021-01-01 RX ADMIN — PROPOFOL 50 MCG/KG/MIN: 10 INJECTION, EMULSION INTRAVENOUS at 13:42

## 2021-01-01 RX ADMIN — MINERAL SUPPLEMENT IRON 300 MG / 5 ML STRENGTH LIQUID 100 PER BOX UNFLAVORED 300 MG: at 09:40

## 2021-01-01 RX ADMIN — CARVEDILOL 6.25 MG: 6.25 TABLET, FILM COATED ORAL at 20:29

## 2021-01-01 RX ADMIN — Medication 1 MG: at 05:04

## 2021-01-01 RX ADMIN — MINERAL SUPPLEMENT IRON 300 MG / 5 ML STRENGTH LIQUID 100 PER BOX UNFLAVORED 300 MG: at 11:58

## 2021-01-01 RX ADMIN — LORAZEPAM 1 MG: 2 INJECTION INTRAMUSCULAR; INTRAVENOUS at 21:26

## 2021-01-01 RX ADMIN — Medication 1 MG: at 08:47

## 2021-01-01 RX ADMIN — Medication 0.1 MG: at 13:38

## 2021-01-01 RX ADMIN — Medication 0.1 MG: at 14:05

## 2021-01-01 RX ADMIN — LORAZEPAM 1 MG: 2 INJECTION INTRAMUSCULAR; INTRAVENOUS at 01:24

## 2021-01-01 RX ADMIN — Medication 1 MG: at 12:23

## 2021-01-01 RX ADMIN — PANTOPRAZOLE SODIUM 40 MG: 40 TABLET, DELAYED RELEASE ORAL at 07:03

## 2021-01-01 RX ADMIN — AMIODARONE HYDROCHLORIDE 100 MG: 200 TABLET ORAL at 09:22

## 2021-01-01 RX ADMIN — POTASSIUM CHLORIDE 20 MEQ: 1500 TABLET, EXTENDED RELEASE ORAL at 09:56

## 2021-01-01 RX ADMIN — ROPINIROLE 0.5 MG: 0.5 TABLET, FILM COATED ORAL at 20:45

## 2021-01-01 RX ADMIN — ONDANSETRON 4 MG: 2 INJECTION INTRAMUSCULAR; INTRAVENOUS at 02:15

## 2021-01-01 RX ADMIN — CARVEDILOL 6.25 MG: 6.25 TABLET, FILM COATED ORAL at 09:10

## 2021-01-01 RX ADMIN — SODIUM CHLORIDE, PRESERVATIVE FREE 10 ML: 5 INJECTION INTRAVENOUS at 21:42

## 2021-01-01 RX ADMIN — OXYBUTYNIN CHLORIDE 15 MG: 10 TABLET, EXTENDED RELEASE ORAL at 07:50

## 2021-01-01 RX ADMIN — ROPINIROLE 0.5 MG: 0.5 TABLET, FILM COATED ORAL at 08:40

## 2021-01-01 RX ADMIN — ANTACID TABLETS 500 MG: 500 TABLET, CHEWABLE ORAL at 09:10

## 2021-01-01 RX ADMIN — PREDNISONE 10 MG: 10 TABLET ORAL at 08:35

## 2021-01-01 RX ADMIN — POTASSIUM CHLORIDE 20 MEQ: 1500 TABLET, EXTENDED RELEASE ORAL at 08:17

## 2021-01-01 RX ADMIN — PANTOPRAZOLE SODIUM 40 MG: 40 TABLET, DELAYED RELEASE ORAL at 05:04

## 2021-01-01 RX ADMIN — LORAZEPAM 1 MG: 2 INJECTION INTRAMUSCULAR; INTRAVENOUS at 17:52

## 2021-01-01 RX ADMIN — DEXTROSE MONOHYDRATE 25 G: 25 INJECTION, SOLUTION INTRAVENOUS at 14:31

## 2021-01-01 RX ADMIN — SODIUM CHLORIDE: 9 INJECTION, SOLUTION INTRAVENOUS at 08:46

## 2021-01-01 RX ADMIN — MIDODRINE HYDROCHLORIDE 10 MG: 10 TABLET ORAL at 10:45

## 2021-01-01 RX ADMIN — BACLOFEN 5 MG: 10 TABLET ORAL at 23:39

## 2021-01-01 RX ADMIN — POLYETHYLENE GLYCOL 3350 17 G: 17 POWDER, FOR SOLUTION ORAL at 09:37

## 2021-01-01 RX ADMIN — NYSTATIN 500000 UNITS: 100000 SUSPENSION ORAL at 08:08

## 2021-01-01 RX ADMIN — MINERAL SUPPLEMENT IRON 300 MG / 5 ML STRENGTH LIQUID 100 PER BOX UNFLAVORED 300 MG: at 08:10

## 2021-01-01 RX ADMIN — FUROSEMIDE 20 MG: 20 TABLET ORAL at 09:15

## 2021-01-01 RX ADMIN — AMIODARONE HYDROCHLORIDE 100 MG: 200 TABLET ORAL at 08:30

## 2021-01-01 RX ADMIN — MINERAL SUPPLEMENT IRON 300 MG / 5 ML STRENGTH LIQUID 100 PER BOX UNFLAVORED 300 MG: at 11:09

## 2021-01-01 RX ADMIN — Medication 1000 UNITS: at 08:26

## 2021-01-01 RX ADMIN — PREDNISONE 10 MG: 10 TABLET ORAL at 09:10

## 2021-01-01 RX ADMIN — OXYCODONE HYDROCHLORIDE AND ACETAMINOPHEN 1 TABLET: 5; 325 TABLET ORAL at 12:41

## 2021-01-01 RX ADMIN — SODIUM CHLORIDE 1000 ML: 9 INJECTION, SOLUTION INTRAVENOUS at 13:16

## 2021-01-01 RX ADMIN — PHENYLEPHRINE HYDROCHLORIDE 225 MCG/MIN: 10 INJECTION INTRAVENOUS at 02:01

## 2021-01-01 RX ADMIN — ROPINIROLE 0.5 MG: 0.5 TABLET, FILM COATED ORAL at 18:37

## 2021-01-01 RX ADMIN — NYSTATIN 500000 UNITS: 100000 SUSPENSION ORAL at 16:57

## 2021-01-01 RX ADMIN — ALBUMIN (HUMAN) 25 G: 0.25 INJECTION, SOLUTION INTRAVENOUS at 22:18

## 2021-01-01 RX ADMIN — POTASSIUM CHLORIDE 20 MEQ: 1500 TABLET, EXTENDED RELEASE ORAL at 21:44

## 2021-01-01 RX ADMIN — WARFARIN SODIUM 1 MG: 1 TABLET ORAL at 18:43

## 2021-01-01 RX ADMIN — IOHEXOL 50 ML: 240 INJECTION, SOLUTION INTRATHECAL; INTRAVASCULAR; INTRAVENOUS; ORAL at 11:59

## 2021-01-01 RX ADMIN — ACETAMINOPHEN 650 MG: 325 TABLET, FILM COATED ORAL at 09:22

## 2021-01-01 RX ADMIN — PANTOPRAZOLE SODIUM 40 MG: 40 TABLET, DELAYED RELEASE ORAL at 06:33

## 2021-01-01 RX ADMIN — PANTOPRAZOLE SODIUM 40 MG: 40 INJECTION, POWDER, FOR SOLUTION INTRAVENOUS at 09:29

## 2021-01-01 RX ADMIN — ATORVASTATIN CALCIUM 40 MG: 40 TABLET, FILM COATED ORAL at 09:09

## 2021-01-01 RX ADMIN — AMIODARONE HYDROCHLORIDE 100 MG: 200 TABLET ORAL at 20:23

## 2021-01-01 RX ADMIN — WARFARIN SODIUM 2 MG: 2 TABLET ORAL at 16:31

## 2021-01-01 RX ADMIN — LORAZEPAM 1 MG: 2 INJECTION INTRAMUSCULAR; INTRAVENOUS at 13:16

## 2021-01-01 RX ADMIN — POLYETHYLENE GLYCOL 3350 17 G: 17 POWDER, FOR SOLUTION ORAL at 08:12

## 2021-01-01 RX ADMIN — ATORVASTATIN CALCIUM 40 MG: 40 TABLET, FILM COATED ORAL at 08:35

## 2021-01-01 RX ADMIN — LINEZOLID 600 MG: 600 INJECTION, SOLUTION INTRAVENOUS at 10:14

## 2021-01-01 RX ADMIN — PREDNISONE 10 MG: 20 TABLET ORAL at 09:02

## 2021-01-01 RX ADMIN — Medication 1 MG: at 18:32

## 2021-01-01 RX ADMIN — ANTACID TABLETS 500 MG: 500 TABLET, CHEWABLE ORAL at 15:28

## 2021-01-01 RX ADMIN — AMIODARONE HYDROCHLORIDE 100 MG: 200 TABLET ORAL at 19:52

## 2021-01-01 RX ADMIN — PREDNISONE 10 MG: 20 TABLET ORAL at 09:57

## 2021-01-01 RX ADMIN — ROPINIROLE 0.5 MG: 0.5 TABLET, FILM COATED ORAL at 20:36

## 2021-01-01 RX ADMIN — ATORVASTATIN CALCIUM 40 MG: 40 TABLET, FILM COATED ORAL at 07:50

## 2021-01-01 RX ADMIN — SODIUM CHLORIDE 10 ML: 9 INJECTION, SOLUTION INTRAMUSCULAR; INTRAVENOUS; SUBCUTANEOUS at 13:08

## 2021-01-01 RX ADMIN — MORPHINE SULFATE 2 MG: 2 INJECTION, SOLUTION INTRAMUSCULAR; INTRAVENOUS at 10:57

## 2021-01-01 RX ADMIN — Medication 10 MG: at 13:32

## 2021-01-01 RX ADMIN — CARVEDILOL 6.25 MG: 6.25 TABLET, FILM COATED ORAL at 08:51

## 2021-01-01 RX ADMIN — ROPINIROLE 0.5 MG: 0.5 TABLET, FILM COATED ORAL at 12:56

## 2021-01-01 RX ADMIN — CARVEDILOL 6.25 MG: 6.25 TABLET, FILM COATED ORAL at 20:06

## 2021-01-01 RX ADMIN — FAMOTIDINE 10 MG: 20 TABLET ORAL at 21:41

## 2021-01-01 RX ADMIN — SERTRALINE 50 MG: 50 TABLET, FILM COATED ORAL at 10:28

## 2021-01-01 RX ADMIN — SODIUM CHLORIDE, PRESERVATIVE FREE 10 ML: 5 INJECTION INTRAVENOUS at 07:53

## 2021-01-01 RX ADMIN — ANTACID TABLETS 500 MG: 500 TABLET, CHEWABLE ORAL at 21:03

## 2021-01-01 RX ADMIN — SODIUM CHLORIDE 250 ML: 0.9 INJECTION, SOLUTION INTRAVENOUS at 12:44

## 2021-01-01 RX ADMIN — Medication 1 MG: at 09:42

## 2021-01-01 RX ADMIN — LORAZEPAM 1 MG: 2 INJECTION INTRAMUSCULAR; INTRAVENOUS at 11:19

## 2021-01-01 RX ADMIN — ROPINIROLE 0.5 MG: 0.5 TABLET, FILM COATED ORAL at 12:46

## 2021-01-01 RX ADMIN — OXYCODONE HYDROCHLORIDE AND ACETAMINOPHEN 1 TABLET: 5; 325 TABLET ORAL at 21:53

## 2021-01-01 RX ADMIN — SERTRALINE HYDROCHLORIDE 50 MG: 50 TABLET ORAL at 08:46

## 2021-01-01 RX ADMIN — CARVEDILOL 6.25 MG: 6.25 TABLET, FILM COATED ORAL at 08:09

## 2021-01-01 RX ADMIN — CHLORHEXIDINE GLUCONATE 0.12% ORAL RINSE 15 ML: 1.2 LIQUID ORAL at 20:54

## 2021-01-01 RX ADMIN — POLYETHYLENE GLYCOL 3350 17 G: 17 POWDER, FOR SOLUTION ORAL at 11:13

## 2021-01-01 RX ADMIN — HYDROCORTISONE: 0.01 CREAM TOPICAL at 09:11

## 2021-01-01 RX ADMIN — ROPINIROLE HYDROCHLORIDE 0.5 MG: 0.5 TABLET, FILM COATED ORAL at 17:53

## 2021-01-01 RX ADMIN — PANTOPRAZOLE SODIUM 40 MG: 40 TABLET, DELAYED RELEASE ORAL at 05:58

## 2021-01-01 RX ADMIN — FUROSEMIDE 40 MG: 40 TABLET ORAL at 08:35

## 2021-01-01 RX ADMIN — PANTOPRAZOLE SODIUM 40 MG: 40 TABLET, DELAYED RELEASE ORAL at 06:42

## 2021-01-01 RX ADMIN — WARFARIN SODIUM 2 MG: 2 TABLET ORAL at 18:38

## 2021-01-01 RX ADMIN — RIVAROXABAN 15 MG: 15 TABLET, FILM COATED ORAL at 07:47

## 2021-01-01 RX ADMIN — AMIODARONE HYDROCHLORIDE 100 MG: 200 TABLET ORAL at 20:36

## 2021-01-01 RX ADMIN — CARVEDILOL 6.25 MG: 6.25 TABLET, FILM COATED ORAL at 20:04

## 2021-01-01 RX ADMIN — HYDROCORTISONE: 0.01 CREAM TOPICAL at 09:06

## 2021-01-01 RX ADMIN — Medication 1 MG: at 21:16

## 2021-01-01 RX ADMIN — ROPINIROLE 0.5 MG: 0.5 TABLET, FILM COATED ORAL at 17:35

## 2021-01-01 RX ADMIN — NYSTATIN 500000 UNITS: 500000 SUSPENSION ORAL at 19:56

## 2021-01-01 RX ADMIN — LORAZEPAM 1 MG: 2 INJECTION INTRAMUSCULAR; INTRAVENOUS at 03:24

## 2021-01-01 RX ADMIN — ATORVASTATIN CALCIUM 40 MG: 40 TABLET, FILM COATED ORAL at 19:33

## 2021-01-01 RX ADMIN — SODIUM CHLORIDE, PRESERVATIVE FREE 10 ML: 5 INJECTION INTRAVENOUS at 20:45

## 2021-01-01 RX ADMIN — PREDNISONE 20 MG: 20 TABLET ORAL at 10:03

## 2021-01-01 RX ADMIN — MIDODRINE HYDROCHLORIDE 10 MG: 10 TABLET ORAL at 09:02

## 2021-01-01 RX ADMIN — CHOLECALCIFEROL TAB 25 MCG (1000 UNIT) 2000 UNITS: 25 TAB at 10:28

## 2021-01-01 RX ADMIN — Medication 1 MG: at 03:50

## 2021-01-01 RX ADMIN — OXYBUTYNIN CHLORIDE 15 MG: 10 TABLET, EXTENDED RELEASE ORAL at 10:02

## 2021-01-01 RX ADMIN — ROPINIROLE 0.5 MG: 0.5 TABLET, FILM COATED ORAL at 13:40

## 2021-01-01 RX ADMIN — SODIUM CHLORIDE: 900 INJECTION, SOLUTION INTRAVENOUS at 10:10

## 2021-01-01 RX ADMIN — AMIODARONE HYDROCHLORIDE 100 MG: 200 TABLET ORAL at 08:08

## 2021-01-01 RX ADMIN — Medication 1 MG: at 23:21

## 2021-01-01 RX ADMIN — POLYETHYLENE GLYCOL 3350 17 G: 17 POWDER, FOR SOLUTION ORAL at 08:40

## 2021-01-01 RX ADMIN — NYSTATIN 500000 UNITS: 100000 SUSPENSION ORAL at 21:04

## 2021-01-01 RX ADMIN — WARFARIN SODIUM 3 MG: 3 TABLET ORAL at 03:43

## 2021-01-01 RX ADMIN — ROPINIROLE 0.5 MG: 0.5 TABLET, FILM COATED ORAL at 17:34

## 2021-01-01 RX ADMIN — CHLORHEXIDINE GLUCONATE 0.12% ORAL RINSE 15 ML: 1.2 LIQUID ORAL at 09:42

## 2021-01-01 RX ADMIN — ATORVASTATIN CALCIUM 40 MG: 40 TABLET, FILM COATED ORAL at 09:26

## 2021-01-01 RX ADMIN — FAMOTIDINE 10 MG: 20 TABLET ORAL at 21:48

## 2021-01-01 RX ADMIN — Medication 1 MG: at 10:59

## 2021-01-01 RX ADMIN — FAMOTIDINE 10 MG: 20 TABLET ORAL at 22:06

## 2021-01-01 RX ADMIN — SODIUM CHLORIDE, PRESERVATIVE FREE 10 ML: 5 INJECTION INTRAVENOUS at 20:30

## 2021-01-01 RX ADMIN — ATORVASTATIN CALCIUM 40 MG: 40 TABLET, FILM COATED ORAL at 09:38

## 2021-01-01 RX ADMIN — LORAZEPAM 1 MG: 2 INJECTION INTRAMUSCULAR; INTRAVENOUS at 08:47

## 2021-01-01 RX ADMIN — SODIUM CHLORIDE, PRESERVATIVE FREE 10 ML: 5 INJECTION INTRAVENOUS at 21:02

## 2021-01-01 RX ADMIN — AMIODARONE HYDROCHLORIDE 100 MG: 200 TABLET ORAL at 20:40

## 2021-01-01 RX ADMIN — ATORVASTATIN CALCIUM 40 MG: 40 TABLET, FILM COATED ORAL at 09:54

## 2021-01-01 RX ADMIN — SERTRALINE HYDROCHLORIDE 50 MG: 50 TABLET ORAL at 08:08

## 2021-01-01 RX ADMIN — LORAZEPAM 1 MG: 2 INJECTION INTRAMUSCULAR; INTRAVENOUS at 13:06

## 2021-01-01 RX ADMIN — SODIUM CHLORIDE, PRESERVATIVE FREE 10 ML: 5 INJECTION INTRAVENOUS at 21:50

## 2021-01-01 RX ADMIN — OXYBUTYNIN CHLORIDE 15 MG: 10 TABLET, EXTENDED RELEASE ORAL at 09:36

## 2021-01-01 RX ADMIN — ATORVASTATIN CALCIUM 40 MG: 40 TABLET, FILM COATED ORAL at 08:08

## 2021-01-01 RX ADMIN — ROPINIROLE HYDROCHLORIDE 0.5 MG: 0.5 TABLET, FILM COATED ORAL at 20:58

## 2021-01-01 RX ADMIN — ENOXAPARIN SODIUM 70 MG: 80 INJECTION SUBCUTANEOUS at 21:02

## 2021-01-01 RX ADMIN — SODIUM CHLORIDE, PRESERVATIVE FREE 10 ML: 5 INJECTION INTRAVENOUS at 20:41

## 2021-01-01 RX ADMIN — PANTOPRAZOLE SODIUM 40 MG: 40 TABLET, DELAYED RELEASE ORAL at 05:23

## 2021-01-01 RX ADMIN — SERTRALINE HYDROCHLORIDE 50 MG: 50 TABLET ORAL at 08:09

## 2021-01-01 RX ADMIN — CARVEDILOL 6.25 MG: 6.25 TABLET, FILM COATED ORAL at 21:36

## 2021-01-01 RX ADMIN — FUROSEMIDE 20 MG: 20 TABLET ORAL at 10:28

## 2021-01-01 RX ADMIN — IOHEXOL 2 ML: 180 INJECTION INTRAVENOUS at 15:50

## 2021-01-01 RX ADMIN — MIDODRINE HYDROCHLORIDE 10 MG: 10 TABLET ORAL at 18:38

## 2021-01-01 RX ADMIN — WARFARIN SODIUM 3 MG: 3 TABLET ORAL at 18:41

## 2021-01-01 RX ADMIN — HEPARIN SODIUM 2000 UNITS: 1000 INJECTION INTRAVENOUS; SUBCUTANEOUS at 01:14

## 2021-01-01 RX ADMIN — POLYETHYLENE GLYCOL 3350 17 G: 17 POWDER, FOR SOLUTION ORAL at 09:00

## 2021-01-01 RX ADMIN — PREDNISONE 10 MG: 10 TABLET ORAL at 08:32

## 2021-01-01 RX ADMIN — CHLORHEXIDINE GLUCONATE 0.12% ORAL RINSE 15 ML: 1.2 LIQUID ORAL at 10:29

## 2021-01-01 RX ADMIN — ROPINIROLE 0.5 MG: 0.5 TABLET, FILM COATED ORAL at 16:50

## 2021-01-01 RX ADMIN — ROPINIROLE HYDROCHLORIDE 0.5 MG: 0.5 TABLET, FILM COATED ORAL at 09:57

## 2021-01-01 RX ADMIN — LORAZEPAM 1 MG: 2 INJECTION INTRAMUSCULAR; INTRAVENOUS at 17:49

## 2021-01-01 RX ADMIN — SODIUM CHLORIDE, PRESERVATIVE FREE 10 ML: 5 INJECTION INTRAVENOUS at 12:23

## 2021-01-01 RX ADMIN — MIDODRINE HYDROCHLORIDE 10 MG: 10 TABLET ORAL at 11:48

## 2021-01-01 RX ADMIN — OXYBUTYNIN CHLORIDE 15 MG: 10 TABLET, EXTENDED RELEASE ORAL at 11:59

## 2021-01-01 RX ADMIN — BACLOFEN 5 MG: 10 TABLET ORAL at 05:47

## 2021-01-01 RX ADMIN — BACLOFEN 5 MG: 10 TABLET ORAL at 22:17

## 2021-01-01 RX ADMIN — SODIUM CHLORIDE, PRESERVATIVE FREE 10 ML: 5 INJECTION INTRAVENOUS at 21:36

## 2021-01-01 RX ADMIN — PREDNISONE 5 MG: 10 TABLET ORAL at 17:26

## 2021-01-01 RX ADMIN — SODIUM CHLORIDE, PRESERVATIVE FREE 10 ML: 5 INJECTION INTRAVENOUS at 21:33

## 2021-01-01 RX ADMIN — ONDANSETRON 4 MG: 2 INJECTION, SOLUTION INTRAMUSCULAR; INTRAVENOUS at 13:43

## 2021-01-01 RX ADMIN — CEFTRIAXONE SODIUM 2000 MG: 2 INJECTION, POWDER, FOR SOLUTION INTRAMUSCULAR; INTRAVENOUS at 15:32

## 2021-01-01 RX ADMIN — MORPHINE SULFATE 2 MG: 2 INJECTION, SOLUTION INTRAMUSCULAR; INTRAVENOUS at 14:13

## 2021-01-01 RX ADMIN — Medication 1 MG: at 23:55

## 2021-01-01 RX ADMIN — NYSTATIN 500000 UNITS: 100000 SUSPENSION ORAL at 15:10

## 2021-01-01 RX ADMIN — MIDODRINE HYDROCHLORIDE 10 MG: 10 TABLET ORAL at 08:34

## 2021-01-01 RX ADMIN — MIDODRINE HYDROCHLORIDE 5 MG: 5 TABLET ORAL at 08:08

## 2021-01-01 RX ADMIN — DILTIAZEM HYDROCHLORIDE 30 MG: 30 TABLET, FILM COATED ORAL at 04:11

## 2021-01-01 RX ADMIN — MORPHINE SULFATE 30 MG: 1 INJECTION INTRAVENOUS at 21:28

## 2021-01-01 RX ADMIN — Medication 1 MG: at 16:14

## 2021-01-01 RX ADMIN — SODIUM CHLORIDE, PRESERVATIVE FREE 10 ML: 5 INJECTION INTRAVENOUS at 09:30

## 2021-01-01 RX ADMIN — WARFARIN SODIUM 1 MG: 2 TABLET ORAL at 21:58

## 2021-01-01 RX ADMIN — LORAZEPAM 1 MG: 2 INJECTION INTRAMUSCULAR; INTRAVENOUS at 08:28

## 2021-01-01 RX ADMIN — PHENYLEPHRINE HYDROCHLORIDE 100 MCG: 10 INJECTION INTRAVENOUS at 13:36

## 2021-01-01 RX ADMIN — CARVEDILOL 6.25 MG: 6.25 TABLET, FILM COATED ORAL at 07:54

## 2021-01-01 RX ADMIN — SODIUM CHLORIDE, PRESERVATIVE FREE 10 ML: 5 INJECTION INTRAVENOUS at 07:56

## 2021-01-01 RX ADMIN — AMIODARONE HYDROCHLORIDE 100 MG: 200 TABLET ORAL at 09:30

## 2021-01-01 RX ADMIN — LORAZEPAM 1 MG: 2 INJECTION INTRAMUSCULAR; INTRAVENOUS at 14:13

## 2021-01-01 RX ADMIN — CEFTRIAXONE SODIUM 2000 MG: 2 INJECTION, POWDER, FOR SOLUTION INTRAMUSCULAR; INTRAVENOUS at 13:35

## 2021-01-01 RX ADMIN — LORAZEPAM 1 MG: 2 INJECTION INTRAMUSCULAR; INTRAVENOUS at 15:07

## 2021-01-01 RX ADMIN — MORPHINE SULFATE 2 MG: 4 INJECTION, SOLUTION INTRAMUSCULAR; INTRAVENOUS at 15:35

## 2021-01-01 RX ADMIN — MORPHINE SULFATE 2 MG: 2 INJECTION, SOLUTION INTRAMUSCULAR; INTRAVENOUS at 13:14

## 2021-01-01 RX ADMIN — SERTRALINE 50 MG: 50 TABLET, FILM COATED ORAL at 07:54

## 2021-01-01 RX ADMIN — AMIODARONE HYDROCHLORIDE 100 MG: 200 TABLET ORAL at 10:01

## 2021-01-01 RX ADMIN — MIDODRINE HYDROCHLORIDE 10 MG: 10 TABLET ORAL at 18:03

## 2021-01-01 RX ADMIN — DIGOXIN 250 MCG: 0.25 INJECTION INTRAMUSCULAR; INTRAVENOUS at 15:37

## 2021-01-01 RX ADMIN — PREDNISONE 20 MG: 20 TABLET ORAL at 07:50

## 2021-01-01 RX ADMIN — OXYCODONE HYDROCHLORIDE AND ACETAMINOPHEN 1 TABLET: 5; 325 TABLET ORAL at 14:59

## 2021-01-01 RX ADMIN — PREDNISONE 10 MG: 10 TABLET ORAL at 09:56

## 2021-01-01 RX ADMIN — PREDNISONE 20 MG: 20 TABLET ORAL at 08:40

## 2021-01-01 RX ADMIN — PHENYLEPHRINE HYDROCHLORIDE 250 MCG/MIN: 10 INJECTION INTRAVENOUS at 17:10

## 2021-01-01 RX ADMIN — HYDROCORTISONE: 0.01 CREAM TOPICAL at 09:45

## 2021-01-01 RX ADMIN — Medication 1 MG: at 20:28

## 2021-01-01 RX ADMIN — LORAZEPAM 1 MG: 2 INJECTION INTRAMUSCULAR; INTRAVENOUS at 07:15

## 2021-01-01 RX ADMIN — PHYTONADIONE 2.5 MG: 5 TABLET ORAL at 16:50

## 2021-01-01 RX ADMIN — CHLORHEXIDINE GLUCONATE 0.12% ORAL RINSE 15 ML: 1.2 LIQUID ORAL at 22:11

## 2021-01-01 RX ADMIN — MINERAL SUPPLEMENT IRON 300 MG / 5 ML STRENGTH LIQUID 100 PER BOX UNFLAVORED 300 MG: at 09:05

## 2021-01-01 RX ADMIN — CARVEDILOL 6.25 MG: 6.25 TABLET, FILM COATED ORAL at 20:41

## 2021-01-01 RX ADMIN — ANTACID TABLETS 500 MG: 500 TABLET, CHEWABLE ORAL at 08:34

## 2021-01-01 RX ADMIN — ROPINIROLE 0.5 MG: 0.5 TABLET, FILM COATED ORAL at 20:53

## 2021-01-01 RX ADMIN — POTASSIUM CHLORIDE 40 MEQ: 1500 TABLET, EXTENDED RELEASE ORAL at 16:16

## 2021-01-01 RX ADMIN — AMIODARONE HYDROCHLORIDE 100 MG: 200 TABLET ORAL at 08:40

## 2021-01-01 RX ADMIN — AMIODARONE HYDROCHLORIDE 100 MG: 200 TABLET ORAL at 22:29

## 2021-01-01 RX ADMIN — CEFTRIAXONE SODIUM 2000 MG: 2 INJECTION, POWDER, FOR SOLUTION INTRAMUSCULAR; INTRAVENOUS at 14:13

## 2021-01-01 RX ADMIN — CARVEDILOL 6.25 MG: 6.25 TABLET, FILM COATED ORAL at 21:48

## 2021-01-01 RX ADMIN — SODIUM CHLORIDE 1000 ML: 9 INJECTION, SOLUTION INTRAVENOUS at 12:46

## 2021-01-01 RX ADMIN — NYSTATIN 500000 UNITS: 100000 SUSPENSION ORAL at 21:42

## 2021-01-01 RX ADMIN — ROPINIROLE 0.5 MG: 0.5 TABLET, FILM COATED ORAL at 08:09

## 2021-01-01 RX ADMIN — Medication 1 MG: at 21:26

## 2021-01-01 RX ADMIN — HYDROCORTISONE: 0.01 CREAM TOPICAL at 13:35

## 2021-01-01 RX ADMIN — Medication 1000 UNITS: at 22:16

## 2021-01-01 RX ADMIN — LORAZEPAM 1 MG: 2 INJECTION INTRAMUSCULAR; INTRAVENOUS at 04:25

## 2021-01-01 RX ADMIN — Medication 10 ML: at 21:12

## 2021-01-01 RX ADMIN — DILTIAZEM HYDROCHLORIDE 5 MG/HR: 5 INJECTION INTRAVENOUS at 12:26

## 2021-01-01 ASSESSMENT — ENCOUNTER SYMPTOMS
ABDOMINAL PAIN: 0
EYE DISCHARGE: 0
NAUSEA: 0
VOMITING: 0
COLOR CHANGE: 0
EYES NEGATIVE: 1
EYES NEGATIVE: 1
COLOR CHANGE: 0
ALLERGIC/IMMUNOLOGIC NEGATIVE: 1
COUGH: 0
TROUBLE SWALLOWING: 0
DIARRHEA: 0
DIARRHEA: 0
SHORTNESS OF BREATH: 0
STRIDOR: 0
TROUBLE SWALLOWING: 0
TROUBLE SWALLOWING: 0
SORE THROAT: 0
RESPIRATORY NEGATIVE: 1
BLOOD IN STOOL: 0
BACK PAIN: 1
COUGH: 0
CHEST TIGHTNESS: 0
EYE REDNESS: 0
SINUS PAIN: 1
EYE ITCHING: 0
BLOOD IN STOOL: 0
TROUBLE SWALLOWING: 0
CHEST TIGHTNESS: 0
CHEST TIGHTNESS: 0
SHORTNESS OF BREATH: 0
BLOOD IN STOOL: 0
BACK PAIN: 1
CHEST TIGHTNESS: 0
FACIAL SWELLING: 0
CHOKING: 0
ABDOMINAL PAIN: 0
CHEST TIGHTNESS: 0
NAUSEA: 0
EYE ITCHING: 0
EYE PAIN: 0
ABDOMINAL PAIN: 0
COLOR CHANGE: 0
ABDOMINAL PAIN: 0
BACK PAIN: 1
NAUSEA: 0
EYE REDNESS: 0
RESPIRATORY NEGATIVE: 1
CHEST TIGHTNESS: 0
NAUSEA: 0
EYE REDNESS: 0
CHEST TIGHTNESS: 0
CHEST TIGHTNESS: 0
COUGH: 0
CHEST TIGHTNESS: 0
BLOOD IN STOOL: 0
TROUBLE SWALLOWING: 0
TROUBLE SWALLOWING: 0
EYE ITCHING: 0
CONSTIPATION: 0
ABDOMINAL PAIN: 0
SHORTNESS OF BREATH: 0
RHINORRHEA: 0
BLOOD IN STOOL: 0
COUGH: 0
VOMITING: 0
COLOR CHANGE: 0
COUGH: 0
EYE REDNESS: 0
EYE ITCHING: 0
EYE REDNESS: 0
ALLERGIC/IMMUNOLOGIC NEGATIVE: 1
BLOOD IN STOOL: 0
CHEST TIGHTNESS: 0
SORE THROAT: 0
EYE REDNESS: 0
ABDOMINAL PAIN: 0
SHORTNESS OF BREATH: 0
COUGH: 0
TROUBLE SWALLOWING: 0
TROUBLE SWALLOWING: 0
EYE REDNESS: 0
SINUS PRESSURE: 0
APNEA: 0
EYE REDNESS: 0
SHORTNESS OF BREATH: 0
GASTROINTESTINAL NEGATIVE: 1
COUGH: 0
CHEST TIGHTNESS: 0
CHEST TIGHTNESS: 0
COLOR CHANGE: 0
CHEST TIGHTNESS: 0
EYE DISCHARGE: 0
NAUSEA: 0
ABDOMINAL PAIN: 0
VOMITING: 0
NAUSEA: 0
ABDOMINAL PAIN: 0
NAUSEA: 0
NAUSEA: 0
PHOTOPHOBIA: 0
TROUBLE SWALLOWING: 0
STRIDOR: 0
ABDOMINAL PAIN: 0
BLOOD IN STOOL: 0
TROUBLE SWALLOWING: 0
VOMITING: 0
EYE ITCHING: 0
SHORTNESS OF BREATH: 1
BACK PAIN: 1
COUGH: 0
ABDOMINAL DISTENTION: 0
SHORTNESS OF BREATH: 0
CHEST TIGHTNESS: 0
CONSTIPATION: 0
EYE REDNESS: 0
WHEEZING: 0
BLOOD IN STOOL: 0
ANAL BLEEDING: 0
COUGH: 0
ABDOMINAL DISTENTION: 0
ABDOMINAL DISTENTION: 0
EYE PAIN: 1
COUGH: 0
EYE ITCHING: 0
ABDOMINAL PAIN: 0
NAUSEA: 0
EYE REDNESS: 0
NAUSEA: 0
EYE REDNESS: 0
BACK PAIN: 1
COUGH: 0
SHORTNESS OF BREATH: 0
DIARRHEA: 0
BLOOD IN STOOL: 0
CHEST TIGHTNESS: 0
SORE THROAT: 0
VOMITING: 0
TROUBLE SWALLOWING: 0
BACK PAIN: 1
VOMITING: 0
ABDOMINAL PAIN: 0
VOMITING: 0
VOMITING: 0
COLOR CHANGE: 0
VOMITING: 0
ABDOMINAL PAIN: 0
CHEST TIGHTNESS: 0
COUGH: 0
BLOOD IN STOOL: 0
EYE REDNESS: 0
VOMITING: 0
DIARRHEA: 0
ABDOMINAL DISTENTION: 0
ABDOMINAL PAIN: 0
SHORTNESS OF BREATH: 1
VOMITING: 0
SINUS PRESSURE: 1
CHEST TIGHTNESS: 0
COLOR CHANGE: 0
SORE THROAT: 0
SHORTNESS OF BREATH: 0
BACK PAIN: 1
VOMITING: 0
EYE DISCHARGE: 0
SHORTNESS OF BREATH: 0
SORE THROAT: 0
ABDOMINAL DISTENTION: 0
COLOR CHANGE: 0
BLOOD IN STOOL: 1
VOMITING: 0
TROUBLE SWALLOWING: 0
RECTAL PAIN: 0
FACIAL SWELLING: 0
BLOOD IN STOOL: 0
BACK PAIN: 1
SHORTNESS OF BREATH: 1
BLURRED VISION: 0
BLOOD IN STOOL: 0
CONSTIPATION: 0
COUGH: 0
WHEEZING: 0
TROUBLE SWALLOWING: 0
APNEA: 0
SHORTNESS OF BREATH: 1
COUGH: 0
BLOOD IN STOOL: 0
COUGH: 0
CHOKING: 0
NAUSEA: 0
SHORTNESS OF BREATH: 0
SORE THROAT: 0
ABDOMINAL PAIN: 0
COLOR CHANGE: 0
TROUBLE SWALLOWING: 0
CHEST TIGHTNESS: 0
CONSTIPATION: 0
COLOR CHANGE: 0
ABDOMINAL PAIN: 0
SHORTNESS OF BREATH: 1
EYE REDNESS: 0
NAUSEA: 0
APNEA: 0
COLOR CHANGE: 0
VOMITING: 0
EYE DISCHARGE: 0
EYE REDNESS: 0
EYE ITCHING: 0
ABDOMINAL PAIN: 0
TROUBLE SWALLOWING: 0
COLOR CHANGE: 0
EYES NEGATIVE: 1
SHORTNESS OF BREATH: 0
CHOKING: 0
BLOOD IN STOOL: 0
SHORTNESS OF BREATH: 0
COUGH: 0
CHEST TIGHTNESS: 0
BLOOD IN STOOL: 0
EYE REDNESS: 0
COUGH: 0
ABDOMINAL PAIN: 0
EYE REDNESS: 0
RHINORRHEA: 0
BLOOD IN STOOL: 0
BACK PAIN: 0
COLOR CHANGE: 0
COUGH: 0
EYE ITCHING: 0
COLOR CHANGE: 0
VOMITING: 0
VOMITING: 0
CONSTIPATION: 0
COUGH: 0
STRIDOR: 0
EYE ITCHING: 0
NAUSEA: 0
COUGH: 0
EYE ITCHING: 0
TROUBLE SWALLOWING: 0
NAUSEA: 0
EYE REDNESS: 0
CHEST TIGHTNESS: 0
EYE REDNESS: 0
VOMITING: 0
TROUBLE SWALLOWING: 0
NAUSEA: 0
RECTAL PAIN: 0
NAUSEA: 0
VOMITING: 0
GASTROINTESTINAL NEGATIVE: 1
EYE REDNESS: 0
SHORTNESS OF BREATH: 0
BLOOD IN STOOL: 1
ABDOMINAL DISTENTION: 0
ANAL BLEEDING: 0
SHORTNESS OF BREATH: 0
EYE REDNESS: 0
ABDOMINAL PAIN: 0
EYE DISCHARGE: 0
BACK PAIN: 1
SHORTNESS OF BREATH: 1
SHORTNESS OF BREATH: 0
RECTAL PAIN: 0
EYE ITCHING: 0
COUGH: 0
EYE PAIN: 0
SORE THROAT: 0
ABDOMINAL PAIN: 0
CHEST TIGHTNESS: 0
ABDOMINAL PAIN: 0
ABDOMINAL PAIN: 0
COUGH: 0
VOMITING: 0
COUGH: 0
ANAL BLEEDING: 1
NAUSEA: 0
NAUSEA: 0
VOMITING: 0
EYE ITCHING: 0
COUGH: 0
COLOR CHANGE: 0
EYE ITCHING: 0
ABDOMINAL PAIN: 0
COLOR CHANGE: 0
EYE ITCHING: 0
CONSTIPATION: 1
CHEST TIGHTNESS: 0
COUGH: 0
NAUSEA: 0
GASTROINTESTINAL NEGATIVE: 1
EYE PAIN: 0
BACK PAIN: 1
COLOR CHANGE: 0
COLOR CHANGE: 0
BACK PAIN: 1
SHORTNESS OF BREATH: 1
TROUBLE SWALLOWING: 0
STRIDOR: 0
EYE REDNESS: 0
NAUSEA: 0
EYE ITCHING: 0
BACK PAIN: 1
VOMITING: 0
DIARRHEA: 0
COUGH: 0
STRIDOR: 0
SHORTNESS OF BREATH: 0
WHEEZING: 0
SHORTNESS OF BREATH: 0
SHORTNESS OF BREATH: 0
SHORTNESS OF BREATH: 1
ABDOMINAL DISTENTION: 0
BACK PAIN: 1
ABDOMINAL PAIN: 0
COUGH: 0
EYE ITCHING: 0
WHEEZING: 0
RESPIRATORY NEGATIVE: 1
SHORTNESS OF BREATH: 0
ABDOMINAL PAIN: 0
VOMITING: 0
ABDOMINAL PAIN: 0
ABDOMINAL PAIN: 0
CHEST TIGHTNESS: 0
COLOR CHANGE: 0
BLOOD IN STOOL: 1
SHORTNESS OF BREATH: 1
APNEA: 0
SHORTNESS OF BREATH: 0
SHORTNESS OF BREATH: 1
EYE ITCHING: 0
WHEEZING: 0
ANAL BLEEDING: 0
VOMITING: 0
ABDOMINAL PAIN: 0
EYE PAIN: 0
VOMITING: 0
STRIDOR: 0
TROUBLE SWALLOWING: 0
BLOOD IN STOOL: 0
ABDOMINAL PAIN: 0
EYE DISCHARGE: 0
EYE ITCHING: 0
SHORTNESS OF BREATH: 1
ABDOMINAL PAIN: 0
CHEST TIGHTNESS: 0
SHORTNESS OF BREATH: 1
BACK PAIN: 1
EYE ITCHING: 0
APNEA: 0
EYE PAIN: 0
SHORTNESS OF BREATH: 0
NAUSEA: 0
CHEST TIGHTNESS: 0
WHEEZING: 0
VOMITING: 0
VOMITING: 0
ALLERGIC/IMMUNOLOGIC NEGATIVE: 1
APNEA: 0
GASTROINTESTINAL NEGATIVE: 1
BACK PAIN: 1
APNEA: 0
SHORTNESS OF BREATH: 0
PHOTOPHOBIA: 0
SHORTNESS OF BREATH: 0
COLOR CHANGE: 0
VOMITING: 0
ABDOMINAL PAIN: 0
SORE THROAT: 0
BLOOD IN STOOL: 0
RECTAL PAIN: 0
NAUSEA: 0
DOUBLE VISION: 0
STRIDOR: 0
ANAL BLEEDING: 0
BACK PAIN: 0
CHEST TIGHTNESS: 0
EYES NEGATIVE: 1
BACK PAIN: 1
SHORTNESS OF BREATH: 1
CONSTIPATION: 0
VOMITING: 0
COUGH: 0
CHOKING: 0
COLOR CHANGE: 0
NAUSEA: 0
EYE REDNESS: 0
CHEST TIGHTNESS: 0
BACK PAIN: 1
TROUBLE SWALLOWING: 0
ANAL BLEEDING: 0
SHORTNESS OF BREATH: 0
NAUSEA: 0
COUGH: 0
EYE ITCHING: 0
NAUSEA: 0
NAUSEA: 0
COLOR CHANGE: 0
BLOOD IN STOOL: 0
SHORTNESS OF BREATH: 0
COUGH: 0
NAUSEA: 0
CHEST TIGHTNESS: 0
WHEEZING: 0
TROUBLE SWALLOWING: 0
EYE ITCHING: 0
WHEEZING: 0
EYE REDNESS: 0
VOMITING: 1
TROUBLE SWALLOWING: 0

## 2021-01-01 ASSESSMENT — PAIN SCALES - GENERAL
PAINLEVEL_OUTOF10: 5
PAINLEVEL_OUTOF10: 0
PAINLEVEL_OUTOF10: 8
PAINLEVEL_OUTOF10: 3
PAINLEVEL_OUTOF10: 6
PAINLEVEL_OUTOF10: 8
PAINLEVEL_OUTOF10: 5
PAINLEVEL_OUTOF10: 5
PAINLEVEL_OUTOF10: 4
PAINLEVEL_OUTOF10: 5
PAINLEVEL_OUTOF10: 7
PAINLEVEL_OUTOF10: 4
PAINLEVEL_OUTOF10: 6
PAINLEVEL_OUTOF10: 5
PAINLEVEL_OUTOF10: 0
PAINLEVEL_OUTOF10: 8
PAINLEVEL_OUTOF10: 8
PAINLEVEL_OUTOF10: 5
PAINLEVEL_OUTOF10: 0
PAINLEVEL_OUTOF10: 7
PAINLEVEL_OUTOF10: 8
PAINLEVEL_OUTOF10: 0
PAINLEVEL_OUTOF10: 4
PAINLEVEL_OUTOF10: 2
PAINLEVEL_OUTOF10: 7
PAINLEVEL_OUTOF10: 6
PAINLEVEL_OUTOF10: 0
PAINLEVEL_OUTOF10: 8
PAINLEVEL_OUTOF10: 0
PAINLEVEL_OUTOF10: 6
PAINLEVEL_OUTOF10: 0
PAINLEVEL_OUTOF10: 7
PAINLEVEL_OUTOF10: 2
PAINLEVEL_OUTOF10: 0
PAINLEVEL_OUTOF10: 8
PAINLEVEL_OUTOF10: 4
PAINLEVEL_OUTOF10: 8
PAINLEVEL_OUTOF10: 9
PAINLEVEL_OUTOF10: 5
PAINLEVEL_OUTOF10: 7
PAINLEVEL_OUTOF10: 3
PAINLEVEL_OUTOF10: 7
PAINLEVEL_OUTOF10: 1
PAINLEVEL_OUTOF10: 5
PAINLEVEL_OUTOF10: 7
PAINLEVEL_OUTOF10: 5
PAINLEVEL_OUTOF10: 5
PAINLEVEL_OUTOF10: 9
PAINLEVEL_OUTOF10: 8
PAINLEVEL_OUTOF10: 0
PAINLEVEL_OUTOF10: 4
PAINLEVEL_OUTOF10: 7
PAINLEVEL_OUTOF10: 0
PAINLEVEL_OUTOF10: 4
PAINLEVEL_OUTOF10: 0
PAINLEVEL_OUTOF10: 3
PAINLEVEL_OUTOF10: 1
PAINLEVEL_OUTOF10: 0
PAINLEVEL_OUTOF10: 2
PAINLEVEL_OUTOF10: 5
PAINLEVEL_OUTOF10: 8
PAINLEVEL_OUTOF10: 4
PAINLEVEL_OUTOF10: 0
PAINLEVEL_OUTOF10: 0
PAINLEVEL_OUTOF10: 2
PAINLEVEL_OUTOF10: 0
PAINLEVEL_OUTOF10: 7
PAINLEVEL_OUTOF10: 5
PAINLEVEL_OUTOF10: 9
PAINLEVEL_OUTOF10: 10
PAINLEVEL_OUTOF10: 3
PAINLEVEL_OUTOF10: 3
PAINLEVEL_OUTOF10: 6
PAINLEVEL_OUTOF10: 0
PAINLEVEL_OUTOF10: 2
PAINLEVEL_OUTOF10: 7
PAINLEVEL_OUTOF10: 0
PAINLEVEL_OUTOF10: 0
PAINLEVEL_OUTOF10: 10
PAINLEVEL_OUTOF10: 2
PAINLEVEL_OUTOF10: 4
PAINLEVEL_OUTOF10: 0
PAINLEVEL_OUTOF10: 0
PAINLEVEL_OUTOF10: 3
PAINLEVEL_OUTOF10: 5
PAINLEVEL_OUTOF10: 4
PAINLEVEL_OUTOF10: 9
PAINLEVEL_OUTOF10: 8
PAINLEVEL_OUTOF10: 0
PAINLEVEL_OUTOF10: 4
PAINLEVEL_OUTOF10: 5
PAINLEVEL_OUTOF10: 4
PAINLEVEL_OUTOF10: 1
PAINLEVEL_OUTOF10: 7
PAINLEVEL_OUTOF10: 3
PAINLEVEL_OUTOF10: 10
PAINLEVEL_OUTOF10: 5
PAINLEVEL_OUTOF10: 4
PAINLEVEL_OUTOF10: 0
PAINLEVEL_OUTOF10: 7
PAINLEVEL_OUTOF10: 4
PAINLEVEL_OUTOF10: 6
PAINLEVEL_OUTOF10: 6
PAINLEVEL_OUTOF10: 4
PAINLEVEL_OUTOF10: 0
PAINLEVEL_OUTOF10: 9
PAINLEVEL_OUTOF10: 4
PAINLEVEL_OUTOF10: 3
PAINLEVEL_OUTOF10: 4
PAINLEVEL_OUTOF10: 6
PAINLEVEL_OUTOF10: 4
PAINLEVEL_OUTOF10: 7
PAINLEVEL_OUTOF10: 0
PAINLEVEL_OUTOF10: 5
PAINLEVEL_OUTOF10: 0
PAINLEVEL_OUTOF10: 7
PAINLEVEL_OUTOF10: 0
PAINLEVEL_OUTOF10: 4
PAINLEVEL_OUTOF10: 5
PAINLEVEL_OUTOF10: 8
PAINLEVEL_OUTOF10: 3
PAINLEVEL_OUTOF10: 0
PAINLEVEL_OUTOF10: 10
PAINLEVEL_OUTOF10: 5
PAINLEVEL_OUTOF10: 5
PAINLEVEL_OUTOF10: 6
PAINLEVEL_OUTOF10: 2
PAINLEVEL_OUTOF10: 4
PAINLEVEL_OUTOF10: 5
PAINLEVEL_OUTOF10: 5
PAINLEVEL_OUTOF10: 4
PAINLEVEL_OUTOF10: 0
PAINLEVEL_OUTOF10: 5
PAINLEVEL_OUTOF10: 4
PAINLEVEL_OUTOF10: 0
PAINLEVEL_OUTOF10: 9
PAINLEVEL_OUTOF10: 8
PAINLEVEL_OUTOF10: 6
PAINLEVEL_OUTOF10: 2
PAINLEVEL_OUTOF10: 10
PAINLEVEL_OUTOF10: 3
PAINLEVEL_OUTOF10: 5
PAINLEVEL_OUTOF10: 0
PAINLEVEL_OUTOF10: 10
PAINLEVEL_OUTOF10: 0
PAINLEVEL_OUTOF10: 5
PAINLEVEL_OUTOF10: 8
PAINLEVEL_OUTOF10: 8
PAINLEVEL_OUTOF10: 2
PAINLEVEL_OUTOF10: 8
PAINLEVEL_OUTOF10: 5
PAINLEVEL_OUTOF10: 0
PAINLEVEL_OUTOF10: 4
PAINLEVEL_OUTOF10: 9
PAINLEVEL_OUTOF10: 5
PAINLEVEL_OUTOF10: 4
PAINLEVEL_OUTOF10: 0
PAINLEVEL_OUTOF10: 5
PAINLEVEL_OUTOF10: 3
PAINLEVEL_OUTOF10: 9
PAINLEVEL_OUTOF10: 5
PAINLEVEL_OUTOF10: 1
PAINLEVEL_OUTOF10: 0
PAINLEVEL_OUTOF10: 7
PAINLEVEL_OUTOF10: 10
PAINLEVEL_OUTOF10: 6
PAINLEVEL_OUTOF10: 9
PAINLEVEL_OUTOF10: 4
PAINLEVEL_OUTOF10: 2
PAINLEVEL_OUTOF10: 5
PAINLEVEL_OUTOF10: 2
PAINLEVEL_OUTOF10: 6
PAINLEVEL_OUTOF10: 0
PAINLEVEL_OUTOF10: 5
PAINLEVEL_OUTOF10: 4
PAINLEVEL_OUTOF10: 4
PAINLEVEL_OUTOF10: 8
PAINLEVEL_OUTOF10: 2
PAINLEVEL_OUTOF10: 0
PAINLEVEL_OUTOF10: 3
PAINLEVEL_OUTOF10: 0
PAINLEVEL_OUTOF10: 10
PAINLEVEL_OUTOF10: 0
PAINLEVEL_OUTOF10: 5
PAINLEVEL_OUTOF10: 7
PAINLEVEL_OUTOF10: 0
PAINLEVEL_OUTOF10: 4
PAINLEVEL_OUTOF10: 4
PAINLEVEL_OUTOF10: 0
PAINLEVEL_OUTOF10: 2
PAINLEVEL_OUTOF10: 5
PAINLEVEL_OUTOF10: 5
PAINLEVEL_OUTOF10: 2
PAINLEVEL_OUTOF10: 4
PAINLEVEL_OUTOF10: 7
PAINLEVEL_OUTOF10: 8
PAINLEVEL_OUTOF10: 3
PAINLEVEL_OUTOF10: 8
PAINLEVEL_OUTOF10: 10
PAINLEVEL_OUTOF10: 0
PAINLEVEL_OUTOF10: 9
PAINLEVEL_OUTOF10: 0
PAINLEVEL_OUTOF10: 4
PAINLEVEL_OUTOF10: 0
PAINLEVEL_OUTOF10: 6
PAINLEVEL_OUTOF10: 0
PAINLEVEL_OUTOF10: 3
PAINLEVEL_OUTOF10: 0
PAINLEVEL_OUTOF10: 7
PAINLEVEL_OUTOF10: 5
PAINLEVEL_OUTOF10: 4
PAINLEVEL_OUTOF10: 2
PAINLEVEL_OUTOF10: 9
PAINLEVEL_OUTOF10: 5
PAINLEVEL_OUTOF10: 10
PAINLEVEL_OUTOF10: 6
PAINLEVEL_OUTOF10: 7
PAINLEVEL_OUTOF10: 7
PAINLEVEL_OUTOF10: 5
PAINLEVEL_OUTOF10: 0
PAINLEVEL_OUTOF10: 0
PAINLEVEL_OUTOF10: 7
PAINLEVEL_OUTOF10: 5
PAINLEVEL_OUTOF10: 0
PAINLEVEL_OUTOF10: 1
PAINLEVEL_OUTOF10: 4
PAINLEVEL_OUTOF10: 7
PAINLEVEL_OUTOF10: 10
PAINLEVEL_OUTOF10: 3

## 2021-01-01 ASSESSMENT — PULMONARY FUNCTION TESTS
PIF_VALUE: 0
PIF_VALUE: 19
PIF_VALUE: 20
PIF_VALUE: 19
PIF_VALUE: 0
PIF_VALUE: 0
PIF_VALUE: 1
PIF_VALUE: 1
PIF_VALUE: 19
PIF_VALUE: 20
PIF_VALUE: 0
PIF_VALUE: 1
PIF_VALUE: 2
PIF_VALUE: 0
PIF_VALUE: 18
PIF_VALUE: 0
PIF_VALUE: 18
PIF_VALUE: 0
PIF_VALUE: 0
PIF_VALUE: 1
PIF_VALUE: 19
PIF_VALUE: 17
PIF_VALUE: 1
PIF_VALUE: 1
PIF_VALUE: 20
PIF_VALUE: 1
PIF_VALUE: 20
PIF_VALUE: 1
PIF_VALUE: 19
PIF_VALUE: 0
PIF_VALUE: 1
PIF_VALUE: 0
PIF_VALUE: 1
PIF_VALUE: 24
PIF_VALUE: 22
PIF_VALUE: 0
PIF_VALUE: 20
PIF_VALUE: 22
PIF_VALUE: 0
PIF_VALUE: 1
PIF_VALUE: 1
PIF_VALUE: 18
PIF_VALUE: 0
PIF_VALUE: 19
PIF_VALUE: 19
PIF_VALUE: 1
PIF_VALUE: 18
PIF_VALUE: 19
PIF_VALUE: 0
PIF_VALUE: 0
PIF_VALUE: 4
PIF_VALUE: 0
PIF_VALUE: 1
PIF_VALUE: 0
PIF_VALUE: 19
PIF_VALUE: 20
PIF_VALUE: 1
PIF_VALUE: 0
PIF_VALUE: 19
PIF_VALUE: 1
PIF_VALUE: 1
PIF_VALUE: 20
PIF_VALUE: 3
PIF_VALUE: 1
PIF_VALUE: 20
PIF_VALUE: 0
PIF_VALUE: 1
PIF_VALUE: 0
PIF_VALUE: 19
PIF_VALUE: 19
PIF_VALUE: 29
PIF_VALUE: 20
PIF_VALUE: 20
PIF_VALUE: 2

## 2021-01-01 ASSESSMENT — PAIN DESCRIPTION - LOCATION
LOCATION: BACK
LOCATION: BACK;BUTTOCKS
LOCATION: BACK
LOCATION: HIP
LOCATION: BACK
LOCATION: BUTTOCKS
LOCATION: BACK
LOCATION: BACK;BUTTOCKS
LOCATION: BACK
LOCATION: BACK
LOCATION: BACK;BUTTOCKS
LOCATION: BACK
LOCATION: BACK;BUTTOCKS
LOCATION: BACK;BUTTOCKS
LOCATION: BACK

## 2021-01-01 ASSESSMENT — PAIN SCALES - WONG BAKER
WONGBAKER_NUMERICALRESPONSE: 2
WONGBAKER_NUMERICALRESPONSE: 4
WONGBAKER_NUMERICALRESPONSE: 6
WONGBAKER_NUMERICALRESPONSE: 2
WONGBAKER_NUMERICALRESPONSE: 6

## 2021-01-01 ASSESSMENT — PAIN DESCRIPTION - ORIENTATION
ORIENTATION: MID;LOWER
ORIENTATION: UPPER;LOWER
ORIENTATION: MID
ORIENTATION: MID
ORIENTATION: LOWER
ORIENTATION: RIGHT;LEFT
ORIENTATION: LOWER
ORIENTATION: RIGHT;LEFT
ORIENTATION: LEFT;RIGHT
ORIENTATION: RIGHT;LEFT
ORIENTATION: LOWER
ORIENTATION: LOWER
ORIENTATION: MID
ORIENTATION: LEFT

## 2021-01-01 ASSESSMENT — PAIN DESCRIPTION - PROGRESSION
CLINICAL_PROGRESSION: GRADUALLY IMPROVING
CLINICAL_PROGRESSION: NOT CHANGED
CLINICAL_PROGRESSION: GRADUALLY WORSENING
CLINICAL_PROGRESSION: GRADUALLY WORSENING

## 2021-01-01 ASSESSMENT — PAIN DESCRIPTION - ONSET
ONSET: ON-GOING

## 2021-01-01 ASSESSMENT — PAIN - FUNCTIONAL ASSESSMENT
PAIN_FUNCTIONAL_ASSESSMENT: PREVENTS OR INTERFERES SOME ACTIVE ACTIVITIES AND ADLS
PAIN_FUNCTIONAL_ASSESSMENT: 0-10
PAIN_FUNCTIONAL_ASSESSMENT: 0-10
PAIN_FUNCTIONAL_ASSESSMENT: PREVENTS OR INTERFERES WITH MANY ACTIVE NOT PASSIVE ACTIVITIES
PAIN_FUNCTIONAL_ASSESSMENT: PREVENTS OR INTERFERES SOME ACTIVE ACTIVITIES AND ADLS
PAIN_FUNCTIONAL_ASSESSMENT: PREVENTS OR INTERFERES WITH MANY ACTIVE NOT PASSIVE ACTIVITIES
PAIN_FUNCTIONAL_ASSESSMENT: 0-10
PAIN_FUNCTIONAL_ASSESSMENT: PREVENTS OR INTERFERES SOME ACTIVE ACTIVITIES AND ADLS
PAIN_FUNCTIONAL_ASSESSMENT: PREVENTS OR INTERFERES SOME ACTIVE ACTIVITIES AND ADLS

## 2021-01-01 ASSESSMENT — PAIN DESCRIPTION - PAIN TYPE
TYPE: CHRONIC PAIN
TYPE: ACUTE PAIN;CHRONIC PAIN
TYPE: SURGICAL PAIN
TYPE: SURGICAL PAIN
TYPE: ACUTE PAIN
TYPE: SURGICAL PAIN
TYPE: ACUTE PAIN;SURGICAL PAIN
TYPE: CHRONIC PAIN
TYPE: SURGICAL PAIN
TYPE: ACUTE PAIN
TYPE: SURGICAL PAIN
TYPE: SURGICAL PAIN
TYPE: ACUTE PAIN
TYPE: CHRONIC PAIN
TYPE: ACUTE PAIN
TYPE: SURGICAL PAIN;ACUTE PAIN
TYPE: SURGICAL PAIN
TYPE: ACUTE PAIN
TYPE: ACUTE PAIN
TYPE: SURGICAL PAIN
TYPE: SURGICAL PAIN
TYPE: CHRONIC PAIN
TYPE: ACUTE PAIN
TYPE: CHRONIC PAIN
TYPE: SURGICAL PAIN
TYPE: ACUTE PAIN
TYPE: ACUTE PAIN
TYPE: CHRONIC PAIN;ACUTE PAIN
TYPE: CHRONIC PAIN
TYPE: ACUTE PAIN
TYPE: CHRONIC PAIN
TYPE: SURGICAL PAIN
TYPE: CHRONIC PAIN

## 2021-01-01 ASSESSMENT — PAIN DESCRIPTION - DESCRIPTORS
DESCRIPTORS: ACHING
DESCRIPTORS: ACHING;DULL
DESCRIPTORS: ACHING
DESCRIPTORS: SHARP;STABBING;ACHING
DESCRIPTORS: ACHING;SHARP
DESCRIPTORS: ACHING;CONSTANT;JABBING;NAGGING;SHARP
DESCRIPTORS: STABBING
DESCRIPTORS: DISCOMFORT
DESCRIPTORS: ACHING;SHARP
DESCRIPTORS: RADIATING;SHARP
DESCRIPTORS: ACHING;SPASM;SHOOTING
DESCRIPTORS: ACHING
DESCRIPTORS: ACHING
DESCRIPTORS: ACHING;SHARP

## 2021-01-01 ASSESSMENT — PAIN DESCRIPTION - FREQUENCY
FREQUENCY: CONTINUOUS
FREQUENCY: INTERMITTENT
FREQUENCY: INTERMITTENT
FREQUENCY: CONTINUOUS
FREQUENCY: INTERMITTENT
FREQUENCY: CONTINUOUS

## 2021-01-01 ASSESSMENT — 9 HOLE PEG TEST
TESTTIME_SECONDS: 31.91
TEST_RESULT: IMPAIRED
TESTTIME_SECONDS: 38.55

## 2021-02-12 NOTE — BRIEF OP NOTE
Brief Postoperative Note      Patient: Stephanie Naranjo  YOB: 1935  MRN: [id number]    Date of Procedure: 2/12/2021    Pre-Op Diagnosis: GIANT CELL ARTERITIS    Post-Op Diagnosis: Same       Procedure(s):  TEMPORAL ARTERY BIOPSY    Surgeon(s):  Americo Haq MD    Assistant:  * No surgical staff found *    Anesthesia: Monitor Anesthesia Care    Estimated Blood Loss (mL): Minimal    Complications: None    Specimens:   ID Type Source Tests Collected by Time Destination   A : RIGHT TEMPORAL ARTERY  Tissue Head SURGICAL PATHOLOGY Americo Haq MD 2/12/2021 1402    B : LEFT TEMPORAL ARTERY Tissue Head SURGICAL PATHOLOGY Americo Haq MD 2/12/2021 1403        Implants:  * No implants in log *      Drains: * No LDAs found *    Findings: Bilateral temporal artery specimens were removed and sent to pathology    Electronically signed by Americo Haq MD on 2/12/2021 at 2:36 PM

## 2021-02-12 NOTE — PROGRESS NOTES
Patient admitted, consent signed and questions answered. Patient ready for procedure. Call light to reach with side rails up 2 of 2.  family at bedside with patient. History and physical needed.

## 2021-02-12 NOTE — ANESTHESIA PRE PROCEDURE
Department of Anesthesiology  Preprocedure Note       Name:  Sheridan Hoffman   Age:  81 y.o.  :  1935                                          MRN:  [id number]         Date:  2021      Surgeon: Luda Marquez):  Cierra Lorenzo MD    Procedure: Procedure(s):  TEMPORAL ARTERY BIOPSY    Medications prior to admission:   Prior to Admission medications    Medication Sig Start Date End Date Taking?  Authorizing Provider   celecoxib (CELEBREX) 200 MG capsule Take 1 capsule by mouth daily 20  Yes Kathy Hall MD   amoxicillin (AMOXIL) 500 MG capsule TAKE FOUR CAPSULES BY MOUTH ONE HOUR BEFORE APPOINTMENT 20  Yes Historical Provider, MD   losartan (COZAAR) 100 MG tablet Take 100 mg by mouth daily 10/20/20  Yes Historical Provider, MD   oxybutynin (DITROPAN XL) 15 MG extended release tablet TAKE 1 TABLET BY MOUTH ONCE DAILY 20  Yes Historical Provider, MD   polyethylene glycol (GLYCOLAX) 17 GM/SCOOP powder MIX & DRINK 17 GRAMS BY MOUTH DAILY 20  Yes Historical Provider, MD   Ascorbic Acid (VITAMIN C) 250 MG tablet Take 250 mg by mouth daily   Yes Historical Provider, MD   ferrous sulfate (FLACO-IN-SOL) 75 (15 Fe) MG/ML solution Take 15 mg by mouth daily   Yes Historical Provider, MD   torsemide (DEMADEX) 20 MG tablet TAKE 1 TABLET EVERY DAY 19  Yes Arabella Bryant MD   aspirin 81 MG tablet Take 81 mg by mouth   Yes Historical Provider, MD   omeprazole (PRILOSEC) 20 MG delayed release capsule Take 20 mg by mouth Daily  12/3/16  Yes Historical Provider, MD   isosorbide dinitrate (ISORDIL) 30 MG tablet Take 30 mg by mouth 3 times daily  17  Yes Historical Provider, MD   atorvastatin (LIPITOR) 40 MG tablet Take 40 mg by mouth daily  16  Yes Historical Provider, MD   potassium chloride SA (K-DUR;KLOR-CON M) 20 MEQ tablet Take 1 tablet by mouth 2 times daily 3/10/16  Yes Arabella Bryant MD rOPINIRole (REQUIP) 0.5 MG tablet Take 0.5 mg by mouth 3 times daily   Yes Historical Provider, MD   Vitamin D (CHOLECALCIFEROL) 1000 UNITS CAPS capsule Take 1,000 Units by mouth 2 times daily   Yes Historical Provider, MD   predniSONE (DELTASONE) 5 MG tablet Take 5 mg by mouth daily    Yes Historical Provider, MD   amLODIPine (NORVASC) 5 MG tablet Take 5 mg by mouth daily. Yes Historical Provider, MD   Omega-3 Fatty Acids (OMEGA 3 PO) Take  by mouth 2 times daily. Yes Historical Provider, MD   sertraline (ZOLOFT) 50 MG tablet Take 50 mg by mouth daily. Yes Historical Provider, MD   meclizine (ANTIVERT) 25 MG tablet Take 25 mg by mouth 2/8/18   Historical Provider, MD       Current medications:    Current Facility-Administered Medications   Medication Dose Route Frequency Provider Last Rate Last Admin    0.9 % sodium chloride infusion   Intravenous Continuous Red Pandey MD 75 mL/hr at 02/12/21 1010 New Bag at 02/12/21 1010    sodium chloride flush 0.9 % injection 10 mL  10 mL Intravenous 2 times per day Red Pandey MD        sodium chloride flush 0.9 % injection 10 mL  10 mL Intravenous PRN Red Pandey MD           Allergies:     Allergies   Allergen Reactions    Adhesive Tape Rash    Meperidine Nausea Only    Demerol Nausea Only    Ultram [Tramadol Hcl]      Hot flashes    Zocor [Simvastatin]      Muscle aches    Codeine      \"out of it\"    Fentanyl Rash       Problem List:    Patient Active Problem List   Diagnosis Code    Hypertension I10    Nephrolithiasis N20.0    Hyperlipidemia E78.5    Osteoarthritis M19.90    Peritoneal mesothelioma ([facility] [address] C45.1    Ovarian cyst N83.209    Basal cell carcinoma of nose C44.311    Bimalleolar ankle fracture S82.843A    Epistaxis, recurrent R04.0    Cervical spondylosis M47.812    Trochanteric bursitis, right hip M70.61    Status post bilateral total hip replacement O76.815       Past Medical History:        Diagnosis Date Time of last solid consumption: 2100                        Date of last liquid consumption: 02/11/21                        Date of last solid food consumption: 02/11/21    BMI:   Wt Readings from Last 3 Encounters:   02/12/21 160 lb (72.6 kg)   08/24/20 160 lb (72.6 kg)   07/21/20 160 lb (72.6 kg)     Body mass index is 30.23 kg/m².     CBC:   Lab Results   Component Value Date    WBC 3.9 10/31/2020    RBC 4.52 10/31/2020    RBC 4.17 02/08/2012    HGB 14.9 10/31/2020    HCT 42.9 10/31/2020    MCV 95.0 10/31/2020    RDW 14.4 10/31/2020     10/31/2020     02/08/2012       CMP:   Lab Results   Component Value Date     10/31/2020    K 4.4 10/31/2020     10/31/2020    CO2 26 10/31/2020    BUN 22 10/31/2020    CREATININE 1.06 02/12/2021    CREATININE 0.80 10/31/2020    GFRAA >60 10/31/2020    LABGLOM 50 02/12/2021    GLUCOSE 98 10/31/2020    GLUCOSE 135 02/08/2012    PROT 7.1 10/31/2020    CALCIUM 9.8 10/31/2020    BILITOT 0.85 10/31/2020    ALKPHOS 102 10/31/2020    AST 27 10/31/2020    ALT 19 10/31/2020       POC Tests:   Recent Labs     02/12/21  0956   POCGLU 110*   POCNA 144   POCK 3.4*   POCCL 105   POCHEMO 14.5   POCHCT 43       Coags:   Lab Results   Component Value Date    PROTIME 11.1 02/09/2016    INR 1.1 02/09/2016    APTT 27.9 02/09/2016       HCG (If Applicable): No results found for: PREGTESTUR, PREGSERUM, HCG, HCGQUANT     ABGs: No results found for: PHART, PO2ART, GEZ7PKS, VON7WZC, BEART, C0BUNDIO     Type & Screen (If Applicable):  No results found for: LABABO, LABRH    Drug/Infectious Status (If Applicable):  No results found for: HIV, HEPCAB    COVID-19 Screening (If Applicable):   Lab Results   Component Value Date    COVID19 Not Detected 02/08/2021    COVID19 Not Detected 07/17/2020         Anesthesia Evaluation  Patient summary reviewed no history of anesthetic complications:   Airway: Mallampati: II  TM distance: >3 FB   Neck ROM: full

## 2021-02-12 NOTE — PROGRESS NOTES
Cardiac monitor shows short runs of atrial fib. Patient states that she has no previous history of this . Dr. Obed Ibarra informed. Dr. Hayden Beavers also informed . He ordered an EKG.  Patient's cardiologist called and recent ECHO requested per Dr. Minal Wood

## 2021-02-12 NOTE — H&P
Vik Parson is an 81 y.o.  female. This is an 61-year-old female with a history of right sided temporal headaches. At some point she had elevated inflammatory markers and there is some concern for temporal arteritis. Is been sometime since she has had an episode of the sharp headaches which concentrate on the right side but her family doctor is concerned enough to recommend temporal artery biopsy to rule out giant cell arteritis. Past Medical History:   Diagnosis Date    Basal cell carcinoma of nose     CKD (chronic kidney disease) stage 2, GFR 60-89 ml/min     Hyperlipidemia     Hypertension     MDRO (multiple drug resistant organisms) resistance 2/19/2014    E. Coli urine    Nephrolithiasis     Osteoarthritis     Ovarian cyst     removed    Peritoneal mesothelioma ([facility] [address] 1999    treated at [address]: Allergies   Allergen Reactions    Adhesive Tape Rash    Meperidine Nausea Only    Demerol Nausea Only    Ultram [Tramadol Hcl]      Hot flashes    Zocor [Simvastatin]      Muscle aches    Codeine      \"out of it\"    Fentanyl Rash       Active Problems:    * No active hospital problems. *  Resolved Problems:    * No resolved hospital problems. *    Blood pressure (!) 156/83, pulse 86, temperature 98.9 °F (37.2 °C), temperature source Oral, resp. rate 18, height 5' 1\" (1.549 m), weight 160 lb (72.6 kg), SpO2 94 %. Review of Systems   Constitutional: Negative for activity change, appetite change, chills, fatigue and fever. Eyes: Positive for pain and visual disturbance. Gastrointestinal: Negative for abdominal distention and abdominal pain. Musculoskeletal: Negative for back pain and neck pain. Neurological: Positive for headaches. Psychiatric/Behavioral: Negative for confusion. Physical Exam  Constitutional:       General: She is not in acute distress. Appearance: Normal appearance. She is normal weight.    HENT: Head: Normocephalic and atraumatic. Nose: Nose normal.      Mouth/Throat:      Mouth: Mucous membranes are moist.   Eyes:      General: No scleral icterus. Conjunctiva/sclera: Conjunctivae normal.      Pupils: Pupils are equal, round, and reactive to light. Cardiovascular:      Rate and Rhythm: Normal rate and regular rhythm. Pulses: Normal pulses. Heart sounds: No murmur. No gallop. Pulmonary:      Effort: Pulmonary effort is normal.      Breath sounds: No rhonchi. Chest:      Chest wall: No tenderness. Skin:     Capillary Refill: Capillary refill takes less than 2 seconds. Neurological:      Mental Status: She is alert and oriented to person, place, and time. Psychiatric:         Mood and Affect: Mood normal.         Thought Content:  Thought content normal.         Assessment:  81-year-old female with temporal headaches, temporal arteritis syndrome    Plan:  Bilateral temporal artery biopsy will be performed to rule out temporal arteritis    Rosa Dawson MD  2/12/2021

## 2021-02-12 NOTE — OP NOTE
Operative Note    Patient: Felice Sepulveda  YOB: 1935  MRN: [id number]    Date of Procedure: 2/12/2021    Pre-Op Diagnosis: GIANT CELL ARTERITIS    Post-Op Diagnosis: Same       Procedure(s):  TEMPORAL ARTERY BIOPSY    Surgeon(s):  Jazmine Ragland MD    Assistant:  * No surgical staff found *    Anesthesia: Monitor Anesthesia Care    Estimated Blood Loss (mL): Minimal    Complications: None    Specimens:   ID Type Source Tests Collected by Time Destination   A : RIGHT TEMPORAL ARTERY  Tissue Head SURGICAL PATHOLOGY Jazmine Ragland MD 2/12/2021 1402    B : LEFT TEMPORAL ARTERY Tissue Head SURGICAL PATHOLOGY Jazmine Ragland MD 2/12/2021 1403        Implants:  * No implants in log *      Drains: * No LDAs found *    Findings: Bilateral temporal artery specimens were removed and sent to pathology    Detailed Description of Procedure: This is an 50-year-old female with some right-sided headaches. There is some concern for giant cell arteritis and a temporal artery biopsy was requested. After the risks benefits alternatives were discussed informed consent was obtained. The patient was brought to the operating room and placed supine. The temporal pulses were palpated and marked on the skin. Hair was clipped bilaterally to expose the operative field. She was first positioned overlooking her right shoulder exposing the left temple. The area was cleaned prepped and sterile drapes were applied. Lidocaine was used to locally anesthetize the area. Skin was incised and subcutaneous tissues were dissected with cautery. Temporal artery could clearly be seen and was circumferentially mobilized for a segment of about 2-1/2 cm. Side branch was ligated and divided. Proximally and distally clamps were placed and the specimen was removed and placed in formalin. Vicryl ties were used to obtain proximal and distal hemostasis. A running 4-0 Vicryl was used to reapproximate the skin and Dermabond was applied as a sterile dressing. In a similar fashion the patient was positioned overlooking the left shoulder exposing the right temple. Skin was anesthetized with lidocaine and the area was reprepped and draped. An incision was made and subcutaneous tissues were dissected with cautery. Once again the temporal artery was clearly visualized. Side branch was ligated and clamps were placed for proximal distal control. 4-0 Vicryl ties were used to obtain hemostasis. Specimen was sent to pathology and placed in formalin. Running 4-0 Vicryl was used to close the skin. Dermabond was applied as a sterile dressing.   The procedure was tolerated well without complication    Electronically signed by Kathleen Beebe MD on 2/12/2021 at 2:36 PM

## 2021-02-12 NOTE — PROGRESS NOTES
Patient returned to room. Post surgery recovery initiated. Patient awake and alert, denies any pain at this time. Small incisions noted to bilateral temperal areas. Areas well approximated, No drainage or hematoma noted. Side rails up times 2, call light within reach. Patient closely monitored.

## 2021-02-16 NOTE — PROGRESS NOTES
Patient completed a telephone visit today     Chief Complaint: neck pain/pain in upper arms    [facility]  Patient complains of neck pain that radiates into her shoulders and upper arms. MRI with mild multilevel degenerative changes and mild stenosis. She has had cervical facet joint injections on the left and right side in the past with moderate relief. Most recent injection was on the left side 11/2020. She has been started on Celebrex by Dr. Josh Ramirez which is helping significantly and states her kidney doctor is aware she is taking it and will monitor her closely while on it. She states he has not had neck or upper arm pain since starting the Celebrex. Neck Pain   This is a chronic problem. The current episode started more than 1 year ago. The problem occurs intermittently. The problem has been gradually improving. The pain is at a severity of 0/10. The patient is experiencing no pain. The symptoms are aggravated by position and stress. Pertinent negatives include no chest pain or fever. Patient denies any new neurological symptoms. No bowel or bladder incontinence, no weakness, and no falling. Past Medical History:   Diagnosis Date    Basal cell carcinoma of nose     CKD (chronic kidney disease) stage 2, GFR 60-89 ml/min     Hyperlipidemia     Hypertension     MDRO (multiple drug resistant organisms) resistance 2/19/2014    E.  Coli urine    Nephrolithiasis     Osteoarthritis     Ovarian cyst     removed    Peritoneal mesothelioma ([facility] [address] 1999    treated at [place]       Past Surgical History:   Procedure Laterality Date    ANKLE FRACTURE SURGERY Right 2013    APPENDECTOMY  1964    BIOPSY / [address] Bilateral 2/12/2021    TEMPORAL ARTERY BIOPSY performed by Radha Dueñas MD at [address] [place] [address] Bilateral     CHOLECYSTECTOMY  2005    COLONOSCOPY      CORONARY ANGIOPLASTY WITH STENT PLACEMENT  April 2002    HYSTERECTOMY [address]    LIVER BIOPSY      NASAL ENDOSCOPY      NASAL ENDOSCOPY Bilateral 11/25/2014    lt nasal cautery with packing    PHOTODYNAMIC THERAPY  April 2000 & Jan. 2002    for peritoneal Mesothelioma    [address]    REVISION TOTAL HIP ARTHROPLASTY Right 1998    ROTATOR CUFF REPAIR  2006    SKIN CANCER EXCISION Bilateral 2006    ankle    SKIN CANCER EXCISION Left Oct. 2014    shoulder    TOTAL HIP ARTHROPLASTY Right 1990    TOTAL HIP ARTHROPLASTY Left 2003    TUBAL LIGATION  1969    UPPER GASTROINTESTINAL ENDOSCOPY      WISDOM TOOTH EXTRACTION         Allergies   Allergen Reactions    Adhesive Tape Rash    Meperidine Nausea Only    Demerol Nausea Only    Ultram [Tramadol Hcl]      Hot flashes    Zocor [Simvastatin]      Muscle aches    Codeine      \"out of it\"    Fentanyl Rash         Current Outpatient Medications:     celecoxib (CELEBREX) 200 MG capsule, Take 1 capsule by mouth daily, Disp: 60 capsule, Rfl: 3    amoxicillin (AMOXIL) 500 MG capsule, TAKE FOUR CAPSULES BY MOUTH ONE HOUR BEFORE APPOINTMENT, Disp: , Rfl:     losartan (COZAAR) 100 MG tablet, Take 100 mg by mouth daily, Disp: , Rfl:     oxybutynin (DITROPAN XL) 15 MG extended release tablet, TAKE 1 TABLET BY MOUTH ONCE DAILY, Disp: , Rfl:     polyethylene glycol (GLYCOLAX) 17 GM/SCOOP powder, MIX & DRINK 17 GRAMS BY MOUTH DAILY, Disp: , Rfl:     Ascorbic Acid (VITAMIN C) 250 MG tablet, Take 250 mg by mouth daily, Disp: , Rfl:     ferrous sulfate (FLACO-IN-SOL) 75 (15 Fe) MG/ML solution, Take 15 mg by mouth daily, Disp: , Rfl:     torsemide (DEMADEX) 20 MG tablet, TAKE 1 TABLET EVERY DAY, Disp: 90 tablet, Rfl: 3    meclizine (ANTIVERT) 25 MG tablet, Take 25 mg by mouth, Disp: , Rfl:     aspirin 81 MG tablet, Take 81 mg by mouth, Disp: , Rfl:     omeprazole (PRILOSEC) 20 MG delayed release capsule, Take 20 mg by mouth Daily , Disp: , Rfl:     isosorbide dinitrate (ISORDIL) 30 MG tablet, Take 30 mg by mouth 3 times daily , Disp: , Rfl:     atorvastatin (LIPITOR) 40 MG tablet, Take 40 mg by mouth daily , Disp: , Rfl:     potassium chloride SA (K-DUR;KLOR-CON M) 20 MEQ tablet, Take 1 tablet by mouth 2 times daily, Disp: 180 tablet, Rfl: 3    rOPINIRole (REQUIP) 0.5 MG tablet, Take 0.5 mg by mouth 3 times daily, Disp: , Rfl:     Vitamin D (CHOLECALCIFEROL) 1000 UNITS CAPS capsule, Take 1,000 Units by mouth 2 times daily, Disp: , Rfl:     predniSONE (DELTASONE) 5 MG tablet, Take 5 mg by mouth daily , Disp: , Rfl:     amLODIPine (NORVASC) 5 MG tablet, Take 5 mg by mouth daily. , Disp: , Rfl:     Omega-3 Fatty Acids (OMEGA 3 PO), Take  by mouth 2 times daily. , Disp: , Rfl:     sertraline (ZOLOFT) 50 MG tablet, Take 50 mg by mouth daily.   , Disp: , Rfl:     Family History   Problem Relation Age of Onset    Heart Failure Brother     Heart Attack Brother         triple bypass    Prostate Cancer Brother     Coronary Art Dis Mother     Breast Cancer Mother         dx'd in late 46s   [place] Emphysema Father     Colon Cancer Neg Hx     Diabetes Neg Hx     Eclampsia Neg Hx     Hypertension Neg Hx     Ovarian Cancer Neg Hx      Labor Neg Hx     Spont Abortions Neg Hx     Stroke Neg Hx        Social History     Socioeconomic History    Marital status:      Spouse name: Not on file    Number of children: Not on file    Years of education: Not on file    Highest education level: Not on file   Occupational History    Not on file   Social Needs    Financial resource strain: Not on file    Food insecurity     Worry: Not on file     Inability: Not on file    Transportation needs     Medical: Not on file     Non-medical: Not on file   Tobacco Use    Smoking status: Never Smoker    Smokeless tobacco: Never Used   Substance and Sexual Activity    Alcohol use: Yes     Comment: OCCASIONALLY    Drug use: No    Sexual activity: Not Currently   Lifestyle    Physical activity     Days per week: Not on file     Minutes per session: Not on file    Stress: Not on file   Relationships    Social connections     Talks on phone: Not on file     Gets together: Not on file     Attends Taoism service: Not on file     Active member of club or organization: Not on file     Attends meetings of clubs or organizations: Not on file     Relationship status: Not on file    Intimate partner violence     Fear of current or ex partner: Not on file     Emotionally abused: Not on file     Physically abused: Not on file     Forced sexual activity: Not on file   Other Topics Concern    Not on file   Social History Narrative    Not on file       Review of Systems:  Review of Systems   Constitution: Negative for chills and fever. Cardiovascular: Negative for chest pain and palpitations. Respiratory: Negative for cough and shortness of breath. Musculoskeletal: Positive for myalgias and neck pain. Gastrointestinal: Negative for constipation. Neurological: Negative for disturbances in coordination and loss of balance. Physical Exam:  There were no vitals taken for this visit. Physical Exam  Neurological:      Mental Status: She is alert.    Psychiatric:         Mood and Affect: Mood normal.         Record/Diagnostics Review:    As reviewed above    Assessment:  Problem List Items Addressed This Visit     Cervical spondylosis    Relevant Medications    celecoxib (CELEBREX) 200 MG capsule      Other Visit Diagnoses     Medication monitoring encounter    -  Primary    Relevant Orders    Comprehensive Metabolic Panel             Treatment Plan:  Continue Celebrex - she has been taking for about a month with significant relief  Her nephrologist is aware that she is taking and states he will monitor her closely while on it  Will order CMP for next month to monitor  Follow up as needed    Jaelyn Renteria is a 81 y.o. female being evaluated by a Virtual Visit (telephone visit) encounter to address concerns as mentioned above. A caregiver was present when appropriate. Due to this being a TeleHealth encounter (During SIJCG-38 public health emergency), evaluation of the following organ systems was limited: Vitals/Constitutional/EENT/Resp/CV/GI//MS/Neuro/Skin/Heme-Lymph-Imm. Pursuant to the emergency declaration under the [address] [place] [address], [address] [place] [address] and the [employer] Act, this Virtual Visit was conducted with patient's (and/or legal guardian's) consent, to reduce the patient's risk of exposure to COVID-19 and provide necessary medical care. The patient (and/or legal guardian) has also been advised to contact this office for worsening conditions or problems, and seek emergency medical treatment and/or call 911 if deemed necessary. Patient identification was verified at the start of the visit: Yes    Total time spent for this encounter: 15 minutes    Services were provided through a telephone discussion virtually to substitute for in-person clinic visit. Patient and provider were located at their individual homes. --CANDIS Gonzalez - CNP on 2/16/2021 at 1:31 PM    An electronic signature was used to authenticate this note.

## 2021-03-04 PROBLEM — E55.9 VITAMIN D DEFICIENCY: Status: ACTIVE | Noted: 2021-01-01

## 2021-03-04 PROBLEM — J96.10 CHRONIC RESPIRATORY FAILURE (HCC): Status: ACTIVE | Noted: 2021-01-01

## 2021-03-04 PROBLEM — I48.0 PAROXYSMAL ATRIAL FIBRILLATION (HCC): Status: ACTIVE | Noted: 2021-01-01

## 2021-03-04 PROBLEM — M31.6 GIANT CELL ARTERITIS (HCC): Status: ACTIVE | Noted: 2021-01-01

## 2021-03-04 PROBLEM — I50.32 CHRONIC DIASTOLIC HEART FAILURE (HCC): Status: ACTIVE | Noted: 2021-01-01

## 2021-03-04 PROBLEM — R35.0 URINARY FREQUENCY: Status: ACTIVE | Noted: 2021-01-01

## 2021-04-04 NOTE — ED NOTES
Bed: 11  Expected date:   Expected time:   Means of arrival:   Comments:  [id number] Elfego Guzman RN  04/04/21 3592

## 2021-04-04 NOTE — ED NOTES
Discharge instructions reviewed with patient and patient's  with prescriptions and follow-up appointments noted. Patient verbalized understanding to return to ED if symptoms worsened. Patient transferred to wheel chair and then to awaiting transportation to transfer home.       Rekha López RN  04/04/21 1639

## 2021-04-04 NOTE — ED PROVIDER NOTES
[address]  EMERGENCY DEPARTMENT ENCOUNTER      Pt Name: Nieves Case  MRN: [id number]  [place] 1935  Date of evaluation: 4/4/21      CHIEF COMPLAINT       Chief Complaint   Patient presents with    Hip Pain     HISTORY OF PRESENT ILLNESS   81 y.o. female presents with c/o hip and low back pain. Symptoms started about 3 weeks ago. Pain is described as sharp and throbbing in character, moderate to severe in severity. The pain is located primarily in the left low back with radiation into her buttock and left hip. The pain has been constant in course, worse in the mornings. This morning she was unable to get out of bed or change her clothes because the pain was so bad. .  The patient tried Tylenol prior to arrival with no relief of symptoms. The patient denies any fevers, chills, weight loss, bowel/bladder incontinence, lower extremity weakness, or midline tenderness. Patient saw her orthopedic doctor a few weeks ago, x-rays were done that did not show any acute abnormalities. Recently diagnosed with temporal arteritis. Is on high dose steroid therapy. She wonders if the temporal arteritis could be causing her low back pain. REVIEW OF SYSTEMS     Review of Systems   Constitutional: Negative for fever. HENT: Negative for congestion. Eyes: Negative for visual disturbance. Respiratory: Negative for cough. Cardiovascular: Negative for chest pain. Gastrointestinal: Negative for abdominal pain. Genitourinary: Negative for difficulty urinating. Musculoskeletal: Positive for arthralgias, gait problem, joint swelling and myalgias. Skin: Negative for rash. Neurological: Negative for weakness. PAST MEDICAL HISTORY     Past Medical History:   Diagnosis Date    Basal cell carcinoma of nose     CKD (chronic kidney disease) stage 2, GFR 60-89 ml/min     Hyperlipidemia     Hypertension     MDRO (multiple drug resistant organisms) resistance 2/19/2014    E.  Coli urine    Nephrolithiasis     Osteoarthritis     Ovarian cyst     removed    Peritoneal mesothelioma ([address] 1999    treated at [address]       Past Surgical History:   Procedure Laterality Date    ANKLE FRACTURE SURGERY Right 2013   [address] / LIGATION TEMPORAL ARTERY Bilateral 2/12/2021    TEMPORAL ARTERY BIOPSY performed by Paola Krause MD at [facility] Bilateral     CHOLECYSTECTOMY  2005    COLONOSCOPY      CORONARY ANGIOPLASTY WITH STENT PLACEMENT  April 2002    HYSTERECTOMY  1981    LIVER BIOPSY  2000    LIVER BIOPSY      NASAL ENDOSCOPY      NASAL ENDOSCOPY Bilateral 11/25/2014    lt nasal cautery with packing    PHOTODYNAMIC THERAPY  April 2000 & Jan. 2002    for peritoneal Mesothelioma    [address]    REVISION TOTAL HIP ARTHROPLASTY Right 1998    ROTATOR CUFF REPAIR  2006    SKIN CANCER EXCISION Bilateral 2006    ankle    SKIN CANCER EXCISION Left Oct. 2014    shoulder    TOTAL HIP ARTHROPLASTY Right 1990    TOTAL HIP ARTHROPLASTY Left 2003    TUBAL LIGATION  1969    UPPER GASTROINTESTINAL ENDOSCOPY      WISDOM TOOTH EXTRACTION         CURRENT MEDICATIONS       Previous Medications    ACETAMINOPHEN (TYLENOL EXTRA STRENGTH PO)    Take by mouth prn    AMLODIPINE (NORVASC) 5 MG TABLET    Take 5 mg by mouth daily.     AMOXICILLIN (AMOXIL) 500 MG CAPSULE    TAKE FOUR CAPSULES BY MOUTH ONE HOUR BEFORE APPOINTMENT    ASCORBIC ACID (VITAMIN C) 250 MG TABLET    Take 250 mg by mouth daily    ATORVASTATIN (LIPITOR) 40 MG TABLET    Take 40 mg by mouth daily     CELECOXIB (CELEBREX) 200 MG CAPSULE    Take 1 capsule by mouth daily    DIPHENHYDRAMINE-APAP, SLEEP, (TYLENOL PM EXTRA STRENGTH PO)    Take by mouth prn    FERROUS SULFATE (FLACO-IN-SOL) 75 (15 FE) MG/ML SOLUTION    Take 15 mg by mouth daily    ISOSORBIDE DINITRATE (ISORDIL) 30 MG TABLET    Take 30 mg by mouth 3 times daily LOSARTAN (COZAAR) 100 MG TABLET    Take 100 mg by mouth daily    MECLIZINE (ANTIVERT) 25 MG TABLET    Take 25 mg by mouth    METOPROLOL SUCCINATE (TOPROL XL) 25 MG EXTENDED RELEASE TABLET    Take 25 mg by mouth daily    OMEGA-3 FATTY ACIDS (OMEGA 3 PO)    Take  by mouth 2 times daily. OMEPRAZOLE (PRILOSEC) 20 MG DELAYED RELEASE CAPSULE    Take 20 mg by mouth Daily     OXYBUTYNIN (DITROPAN XL) 15 MG EXTENDED RELEASE TABLET    TAKE 1 TABLET BY MOUTH ONCE DAILY    POLYETHYLENE GLYCOL (GLYCOLAX) 17 GM/SCOOP POWDER    MIX & DRINK 17 GRAMS BY MOUTH DAILY    POTASSIUM CHLORIDE SA (K-DUR;KLOR-CON M) 20 MEQ TABLET    Take 1 tablet by mouth 2 times daily    PREDNISONE (DELTASONE) 5 MG TABLET    Take 5 mg by mouth daily Patient is currently taking 50 mf QD    RIVAROXABAN (XARELTO) 20 MG TABS TABLET    1 tablet with food    ROPINIROLE (REQUIP) 0.5 MG TABLET    Take 0.5 mg by mouth 3 times daily    SERTRALINE (ZOLOFT) 50 MG TABLET    Take 50 mg by mouth daily. TORSEMIDE (DEMADEX) 20 MG TABLET    Take 1 tablet by mouth 2 times daily Take twice a day for 1 months then go back to 20 mg daily    VITAMIN D (CHOLECALCIFEROL) 1000 UNITS CAPS CAPSULE    Take 1,000 Units by mouth 2 times daily       ALLERGIES     is allergic to adhesive tape; meperidine; demerol; ultram [tramadol hcl]; zocor [simvastatin]; codeine; and fentanyl. FAMILY HISTORY     She indicated that the status of her mother is unknown. She indicated that the status of her father is unknown. She indicated that only one of her two brothers is alive. She indicated that the status of her neg hx is unknown. SOCIAL HISTORY      reports that she has never smoked. She has never used smokeless tobacco. She reports current alcohol use. She reports that she does not use drugs.     PHYSICAL EXAM     INITIAL VITALS: /67   Pulse 72   Temp 98.2 °F (36.8 °C) (Temporal)   Resp 12   Ht 5' 2\" (1.575 m)   Wt 155 lb (70.3 kg)   SpO2 92%   BMI 28.35 kg/m²   Gen: lesions noted and will require outpatient follow-up, this was noted to the patient. Given the persistent pain and compression fractures, I recommended placement in the hospital.  Patient declines, she states that she has a rheumatology appointment tomorrow that she wants to attend. She was able to get up and walk with assistance. I advised that she can return at any time if her symptoms worsen or she changes her mind. D/w pt the results, treatment plan, warning precautions for prompt ED return and importance of close OP FU, she verbalizes understanding and agrees with the treatment plan. DIAGNOSTIC RESULTS     RADIOLOGY:All plain film, CT, MRI, and formal ultrasound images (except ED bedside ultrasound) are read by the radiologist and the images and interpretations are directly viewed by the emergency physician. CT HIP LEFT WO CONTRAST   Final Result   1. Prior left hip arthroplasty hardware which limits the exam due to streak   artifact. Areas of periprosthetic lucency surrounding the proximal femoral   component measuring up to 8 mm which could represent hardware loosening. 2. Moderate to severe colonic diverticulosis. 3. Severe atrophy and fatty degeneration of the left gluteus medius and   minimus musculature. 4. Atherosclerotic calcification of the vasculature. CT LUMBAR SPINE WO CONTRAST   Final Result   1. Age-indeterminate height loss/acute to subacute compression deformity of   L1 with approximately 20-25% height loss without significant retropulsion. 2. Multiple hypodense and hyperdense lesions arising from the left kidney   likely representing a combination of hemorrhagic and simple renal cysts. Further evaluation with nonemergent renal ultrasound is recommended for   confirmation. 3. Diffuse osteopenia and mild to moderate degenerative changes within the   lumbar spine. 4. Severe rectosigmoid colonic diverticulosis. Moderate to severe stool   burden. LABS: All lab results were reviewed by myself, and all abnormals are listed below. Labs Reviewed   URINE RT REFLEX TO CULTURE       EMERGENCY DEPARTMENT COURSE:   Vitals:    Vitals:    04/04/21 1430 04/04/21 1502 04/04/21 1515 04/04/21 1530   BP: 133/68 135/67 136/61 133/67   Pulse:       Resp:       Temp:       TempSrc:       SpO2: 94% (!) 88% 93% 92%   Weight:       Height:           The patient was given the following medications while in the emergency department:  Orders Placed This Encounter   Medications    HYDROmorphone (DILAUDID) injection 1 mg    cyclobenzaprine (FLEXERIL) tablet 10 mg    cyclobenzaprine (FLEXERIL) 10 MG tablet     Sig: Take 1 tablet by mouth 3 times daily as needed for Muscle spasms     Dispense:  10 tablet     Refill:  0    oxyCODONE-acetaminophen (PERCOCET) 5-325 MG per tablet     Sig: Take 1 tablet by mouth every 6 hours as needed for Pain for up to 3 days. Intended supply: 3 days. Take lowest dose possible to manage pain     Dispense:  10 tablet     Refill:  0     -------------------------  CRITICAL CARE:   CONSULTS: None  PROCEDURES: Procedures     FINAL IMPRESSION      1. Left hip pain    2. Closed compression fracture of body of L1 vertebra (HCC)    3. Renal cyst          DISPOSITION/PLAN   DISPOSITION Decision To Discharge 04/04/2021 04:19:05 PM      PATIENT REFERRED TO:  DO Brandon Robles [address] [place] [address]  [phone]    In 2 days      [facility] ED  [facility] [address] [phone]    If symptoms worsen    Your Orthopedic surgeon    In 2 days        DISCHARGE MEDICATIONS:  New Prescriptions    CYCLOBENZAPRINE (FLEXERIL) 10 MG TABLET    Take 1 tablet by mouth 3 times daily as needed for Muscle spasms    OXYCODONE-ACETAMINOPHEN (PERCOCET) 5-325 MG PER TABLET    Take 1 tablet by mouth every 6 hours as needed for Pain for up to 3 days. Intended supply: 3 days.  Take lowest dose possible to manage pain         Maya Klein Kayla Owens MD  Attending Emergency Physician                      Es Alas MD  04/04/21 [id number]

## 2021-04-06 NOTE — TELEPHONE ENCOUNTER
I received a referral yesterday afternoon for comp fx; Arminda Faith LM on patient's VM to call and schedule.

## 2021-04-07 NOTE — PROGRESS NOTES
Tyesha Rosales is a 81 y.o. female evaluated on 4/7/2021. Modality of virtual service provided -via  telephone   Consent:  Patient and/or health care decision maker is aware that that patient may receive a bill for this telephone service, depending on one's insurance coverage, and has provided verbal consent to proceed: Yes    Patient identification was verified at the start of the visit: Yes    Chief complaint: Tyesha Rosales is 81 y.o.,  female, with  with chief complaint of pain involving low back. .    Referring diagnosis: M50.30 (ICD-10-CM) - Other cervical disc degeneration, unspecified cervical region    Patient is an 55-year-old female referred back to the pain clinic with a chief complaint of pain involving the low back. Patient reports she developed the pain in the low back about 2 weeks ago. Apparently she felt a severe back pain and was unable to move as the pain was not getting better she went to the emergency room and had x-rays done and was found to have a compression fracture on Sunday. Patient denies any fall or any trauma to her back. Patient reports her pain is unchanged and is very severe. 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 Patient also reports that she had his cardiac stents placed within. She was apparently was on baby aspirin and she was started on Xarelto 2 weeks ago. She reports when she was to have surgery she was found to have transient atrial fibrillation and hence that she was put on Xarelto. She reports her rhythm converted to normal spontaneously. She also gives history of shortness of breath the for some unknown cause and is being seen by Dr. Nasreen Colbert. Patient reports her pain is severe and would like to have kyphoplasty done.   Patient has a history of polymyalgia and is on prednisone    Back Pain  This is a new problem. The current episode started 1 to 4 weeks ago. The problem occurs constantly (When she tries to stand or move). The problem is unchanged. The pain is present in the lumbar spine (Mostly in the left side (axial)). The quality of the pain is described as stabbing and shooting Amandaann Solorzano). The pain does not radiate. The pain is at a severity of 8/10. The pain is severe. The pain is worse during the day. Exacerbated by: Any movement. Pertinent negatives include no abdominal pain or dysuria. Risk factors include history of osteoporosis and lack of exercise. Alleviating factors:rest   Lifestyle changes experienced with pain: Prevents or limits ADLs, Increases w/activity. Increases w/prolonged sitting/standing/walking  Mood changes,none  Patient currently unemployed. Physical therapy did not help the pain. Are you under psychological counseling at present: No  Goals for treatment include:  Decrease in pain  Enjoy daily and recreational activities, return to previous status. Last procedure was  Cervical facet joint injections at the levels of C2-3, C3-4, C4-5, C5-6, C6-7, C7-T1 on the Left side on 11/19/2020    Patient relates current medications are helping the pain. Patient reports taking pain medications as prescribed, denies obtaining medications from different sources and denies use of illegal drugs. Patient denies side effects from medications like nausea, vomiting, constipation or drowsiness. Patient reports current activities of daily living ar possible due to medications and would like to continue them. ACTIVITY/SOCIAL/EMOTIONAL:  Sleep Pattern: 7 hours per night.  generally restful sleep  Home Exercises:  no regular exercise  Activity:decreased  Emotional Issues: normal.   Currently seeing a Psychiatrist or Psychologist:  No    Past Medical History:   Diagnosis Date    Basal cell carcinoma of nose     CKD (chronic kidney disease) stage 2, GFR 60-89 ml/min     Hyperlipidemia     Hypertension     MDRO (multiple drug resistant organisms) resistance 2014    E.  Coli urine    Nephrolithiasis     Osteoarthritis     Ovarian cyst     removed    Peritoneal mesothelioma ([address]     treated at [facility]       Past Surgical History:   Procedure Laterality Date    ANKLE FRACTURE SURGERY Right    [address] / [address] [facility] Bilateral 2021    TEMPORAL ARTERY BIOPSY performed by Elie Neff MD at [address] [facility] Bilateral     CHOLECYSTECTOMY      COLONOSCOPY      CORONARY ANGIOPLASTY WITH STENT PLACEMENT  2002    HYSTERECTOMY  1981    LIVER BIOPSY      LIVER BIOPSY      NASAL ENDOSCOPY      NASAL ENDOSCOPY Bilateral 2014    lt nasal cautery with packing    PHOTODYNAMIC THERAPY  2000 & 2002    for peritoneal Mesothelioma    [address]    REVISION TOTAL HIP ARTHROPLASTY Right     ROTATOR CUFF REPAIR  2006    SKIN CANCER EXCISION Bilateral     ankle    SKIN CANCER EXCISION Left Oct. 2014    shoulder    TOTAL HIP ARTHROPLASTY Right     TOTAL HIP ARTHROPLASTY Left     TUBAL LIGATION  1969    UPPER GASTROINTESTINAL ENDOSCOPY      WISDOM TOOTH EXTRACTION         Family History   Problem Relation Age of Onset    Heart Failure Brother     Heart Attack Brother         triple bypass    Prostate Cancer Brother     Coronary Art Dis Mother     Breast Cancer Mother         dx'd in late 46s    Emphysema Father     Colon Cancer Neg Hx     Diabetes Neg Hx     Eclampsia Neg Hx     Hypertension Neg Hx     Ovarian Cancer Neg Hx      Labor Neg Hx     Spont Abortions Neg Hx     Stroke Neg Hx        Social History     Socioeconomic History    Marital status:      Spouse name: Not on file    Number of children: Not on file    Years of education: Not on file    Highest education level: Not on file   Occupational History    Not on file   Social Needs    Financial resource strain: Not on file    Food insecurity     Worry: Not on file     Inability: Not on file    Transportation needs     Medical: Not on file     Non-medical: Not on file   Tobacco Use    Smoking status: Never Smoker    Smokeless tobacco: Never Used   Substance and Sexual Activity    Alcohol use: Yes     Comment: OCCASIONALLY    Drug use: No    Sexual activity: Not Currently   Lifestyle    Physical activity     Days per week: Not on file     Minutes per session: Not on file    Stress: Not on file   Relationships    Social connections     Talks on phone: Not on file     Gets together: Not on file     Attends [employer] service: Not on file     Active member of club or organization: Not on file     Attends meetings of clubs or organizations: Not on file     Relationship status: Not on file    Intimate partner violence     Fear of current or ex partner: Not on file     Emotionally abused: Not on file     Physically abused: Not on file     Forced sexual activity: Not on file   Other Topics Concern    Not on file   Social History Narrative    Not on file       Allergies   Allergen Reactions    Adhesive Tape Rash    Meperidine Nausea Only    Demerol Nausea Only    Ultram [Tramadol Hcl]      Hot flashes    Zocor [Simvastatin]      Muscle aches    Codeine      \"out of it\"    Fentanyl Rash       Current Outpatient Medications on File Prior to Encounter   Medication Sig Dispense Refill    cyclobenzaprine (FLEXERIL) 10 MG tablet Take 1 tablet by mouth 3 times daily as needed for Muscle spasms 10 tablet 0    oxyCODONE-acetaminophen (PERCOCET) 5-325 MG per tablet Take 1 tablet by mouth every 6 hours as needed for Pain for up to 3 days. Intended supply: 3 days.  Take lowest dose possible to manage pain 10 tablet 0    rivaroxaban (XARELTO) 20 MG TABS tablet 1 tablet with food      metoprolol succinate (TOPROL Lab   GFR Non- 54Low   >60 mL/min Final 02/25/2021 12:04  [facility]   GFR African American >60  >60 mL/min Final 02/25/2021 12:04  [facility]   GFR Comment               2/25/2021 12:21 PM - [place], [facility] Incoming Lab Results From [facility]    Component Value Ref Range & Units Status Collected Lab   WBC 7.8  3.5 - 11.0 k/uL Final 02/25/2021 12:04  [facility]   RBC 4.31  4.0 - 5.2 m/uL Final 02/25/2021 12:04  [facility]   Hemoglobin 14.0  12.0 - 16.0 g/dL Final 02/25/2021 12:04  [facility]   Hematocrit 42.1  36 - 46 % Final 02/25/2021 12:04  [facility]   MCV 97.6  80 - 100 fL Final 02/25/2021 12:04  [facility]   MCH 32.5  26 - 34 pg Final 02/25/2021 12:04  [facility]   MCHC 33.3  31 - 37 g/dL Final 02/25/2021 12:04  [facility]   RDW 15.2High   11.5 - 14.9 % Final 02/25/2021 12:04  [facility]   Platelets 508  301 - 450 k/uL Final 02/25/2021 12:04  [facility]   MPV 7.8  6.0 - 12.0 fL Final 02/25/2021 12:04  [facility]   [facility] Automated NOT REPORTED  per 100 WBC Final 02/25/2021 12:04  [facility]   Differential Type NOT REPORTED   Final 02/25/2021 12:04  [facility]   Seg Neutrophils 78High   36 - 66 % Final 02/25/2021 12:04  [facility]   Lymphocytes 14Low   24 - 44 % Final 02/25/2021 12:04  [facility]   Monocytes 8High   1 - 7 % Final 02/25/2021 12:04  [facility]   Eosinophils % 0  0 - 4 % Final 02/25/2021 12:04  [facility]   Basophils 0  0 - 2 % Final 02/25/2021 12:04  [facility]   Immature Granulocytes NOT REPORTED  0 % Final 02/25/2021 12:04  [facility]   Segs Absolute 6.10  1.3 - 9.1 k/uL Final 02/25/2021 12:04  [facility]   Absolute Lymph # 1.10  1.0 - 4.8 k/uL Final 02/25/2021 12:04  [facility]   Absolute Mono # 0.60  0.1 - 1.3 k/uL Final 02/25/2021 12:04  [facility]   Absolute Eos # 0.00  0.0 - 0.4 k/uL Final 02/25/2021 12:04  [facility]   Basophils Absolute 0.00  0.0 - 0.2 k/uL Final 02/25/2021 12:04  [facility]   Absolute Immature Granulocyte NOT REPORTED  0.00 - 0.30 k/uL Final 02/25/2021 12:04  [facility]   WBC Morphology NOT REPORTED   Final 02/25/2021 12:04  [facility]   RBC Morphology NOT REPORTED   Final 02/25/2021 12:04  [facility]   Platelet Estimate NOT REPORTED   Final         X-Ray reports:  EXAMINATION:   CT OF THE LUMBAR SPINE WITHOUT CONTRAST  4/4/2021       TECHNIQUE:   CT of the lumbar spine was performed without the administration of   intravenous contrast. Multiplanar reformatted images are provided for review. Dose modulation, iterative reconstruction, and/or weight based adjustment of   the mA/kV was utilized to reduce the radiation dose to as low as reasonably   achievable.       COMPARISON:   CT abdomen and pelvis from 04/24/2020       HISTORY:   ORDERING SYSTEM PROVIDED HISTORY: low back and hip pain   TECHNOLOGIST PROVIDED HISTORY:   low back and hip pain   Decision Support Exception->Emergency Medical Condition (MA)   Reason for Exam: low back and hip pain, for 3 weeks   Acuity: Unknown   Type of Exam: Unknown       52-year-old female with lower back and hip pain for 3 weeks       FINDINGS:   BONES/ALIGNMENT: Lumbar spine is imaged from superior endplate of A92 to the   mid coccyx on the lateral views.  Diffuse osteopenia.  Age-indeterminate   height loss/acute to subacute compression deformity of L1 with approximately   20-25% height loss on image 37, series 602. No significant retropulsion.       Alignment well maintained.  Multilevel Schmorl's node deformities.       DEGENERATIVE CHANGES: Mild to moderate multilevel disc space narrowing and   hypertrophic osteophyte spurring.  Mild multilevel facet arthrosis.       SOFT TISSUES/RETROPERITONEUM: Atherosclerotic calcification of the aorta and   branch vasculature.  Multiple hypodense and hyperdense lesions arising from   the left kidney which could represent hemorrhagic and/or simple renal cysts.       Moderate to severe stool burden.  Severe rectosigmoid diverticulosis.         Impression   1. Age-indeterminate height loss/acute to subacute compression deformity of   L1 with approximately 20-25% height loss without significant retropulsion. 2. Multiple hypodense and hyperdense lesions arising from the left kidney   likely representing a combination of hemorrhagic and simple renal cysts. Further evaluation with nonemergent renal ultrasound is recommended for   confirmation. 3. Diffuse osteopenia and mild to moderate degenerative changes within the   lumbar spine. 4. Severe rectosigmoid colonic diverticulosis.  Moderate to severe stool   burden.         EXAMINATION:   CT OF THE LEFT HIP WITHOUT CONTRAST 4/4/2021 2:50 pm       TECHNIQUE:   CT of the left hip was performed without the administration of intravenous   contrast.  Multiplanar reformatted images are provided for review.  Dose   modulation, iterative reconstruction, and/or weight based adjustment of the   mA/kV was utilized to reduce the radiation dose to as low as reasonably   achievable.       COMPARISON:   Plain radiographs of the left hip from 10/14/2015       HISTORY   ORDERING SYSTEM PROVIDED HISTORY: low back and hip ain   TECHNOLOGIST PROVIDED HISTORY:   low back and hip ain   Decision Support Exception->Emergency Medical Condition (MA)   Reason for Exam: low back and hip pain, for 3 weeks   Acuity: Unknown   Type of Exam: Unknown   Relevant Medical/Surgical History: total hip replacement       81year-old conservative measures including activity modification, oral medications and other conservative therapies. Patient's activities of daily living are severely compromised due to pain and inability to ambulate. We discussed Morbidity  associated with vertebral fractures often includes continued pain, decreased physical function, deformity, and potentially social isolation, all of which negatively impact the individual's overall quality of  life. Mortality is also associated with vertebral fractures, as (women) diagnosed with a compression fracture of the spine have a 15% higher mortality rate than those who do not experience fractures. The treatment options were discussed with the patient. These include activity modification like bed rest, physical therapy, using the brace, medications to control the pain, and surgical options including kyphoplasty and open surgery. The kyphoplasty procedure was explained in detail to the patient including the risks like bleeding, infection ,nerve damage, damage to the spinal cord, paralysis, fractures of the bones, extravasation of the cement, pneumothorax, cardiac arrest and stroke. After discussion with the patient and the family, they decided to proceed with kyphoplasty. Patient understands and wanted to undergo the procedure in the near feature. We will schedule for the procedure once medical clearance is obtained. Orders Placed This Encounter   Procedures    FLUORO FOR SURGICAL PROCEDURES     Standing Status:   Future     Standing Expiration Date:   4/7/2022       Decision Making Process : Patient's health history and referral records thoroughly reviewed before focused physical examination and discussion with patient. I have spent 50 mins. Over 50% of today's visit is spent on examining the patient and counseling and coordinating the care. Level of complexity of date to be reviewed is Moderate. The chart date reviewed include the following: Imaging Reports. Summary of Care. Time spent reviewing with patient the below reports:   Medication safety, Treatment options. Level of diagnosis and management options of this case is multiple: involving the following management options: Interventions as needed, medication management as appropriate, future visits, activity modification, heat/ice as needed, Urine drug screen as required. [x]The patient's questions were answered to the best of my abilities. This note was created using voice recognition software. There may be inaccuracies of transcription  that are inadvertently overlooked prior to the signature. There is any questions about the transcription please contact me. Due to the COVID-19 pandemic and the appropriate interventions by Leanne Beaver, our non-urgent pain management patients will not be seen in the office at this time for their protection and the protection of our staff. To offer continuity of care, their prescriptions will be escribed this month after a careful chart review and review of their OARRS report  Pursuant to the emergency declaration under the [employer] and [facility], 1135 waiver authority and the [employer] and [employer] Act, this Virtual Visit was conducted, with patient's consent, to reduce the patient's risk of exposure to COVID-19 and provide continuity of care for an established patient. Services were provided through a video synchronous discussion virtually to substitute for in-person appointment. \"  Documentation:  I communicated with the patient and/or health care decision maker about plan of care  Details of this discussion including any medical advice provided:   Total Time: 45-55 minutes    I affirm this is a Patient Initiated Episode with an Established Patient who has not had a related appointment within my department in the past 7 days or scheduled within the next 24 hours.     Electronically signed by Gogo Tejeda MD on 4/7/2021 at 3:59 PM

## 2021-04-14 NOTE — PROGRESS NOTES
Pre-op Instructions For Out-Patient Surgery    Medication Instructions:  · Please stop herbs and any supplements now (includes vitamins and minerals). Please contact your surgeon and prescribing physician for pre-op instructions for any blood thinners. Already stopped Xarelto as directed    · If you have inhalers/aerosol treatments at home, please use them the morning of your surgery and bring the inhalers with you to the hospital.    · Please take the following medications the morning of your surgery with a sip of water:    Isosorbide, Losartan, Metoprolol, Amlodipine, Prednisone, Omeprazole    Surgery Instructions:  1. After midnight before surgery:  Do not eat or drink anything, including water, mints, gum, and hard candy. You may brush your teeth without swallowing. No smoking, chewing tobacco, or street drugs. 2. Please shower or bathe before surgery. If you were given Surgical Scrub Chlorhexidine Gluconate Liquid (CHG), please shower the night before and the morning of your surgery following the detailed instructions you received during your pre-admission visit. Daughter will  soap today     3. Please do not wear any cologne, lotion, powder, deodorant, jewelry, piercings, perfume, makeup, nail polish, hair accessories, or hair spray on the day of surgery. Wear loose comfortable clothing. 4. Leave your valuables at home. Bring a storage case for any glasses/contacts. 5. An adult who is responsible for you MUST drive you home and should be with you for the first 24 hours after surgery. 6. If having out-patient knee and foot surgeries, please arrange for planned crutches, walker, or wheelchair before arriving to the hospital.    The Day of Surgery:  · Arrive at [address] [place] [address] Surgery Entrance at the time directed by your surgeon and check in at the desk. · If you have a living will or healthcare power of , please bring a copy.     · You will be taken to the pre-op holding area where you will be prepared for surgery. A physical assessment will be performed by a nurse practitioner or house officer. Your IV will be started and you will meet your anesthesiologist.    · We are currently limiting visitors to only one designated person in the pre-op holding area. When you go to surgery, your family will be directed to the surgical waiting room, where the doctor should speak with them after your surgery. · After surgery, you will be taken to the recovery room then when you are awake and stable you will go to the short stay unit for preparation to be discharged. Only your one designated person is allowed to come to short stay for your discharge. · If you use a Bi-PAP or C-PAP machine, please bring it with you and leave it in the car in case it is needed in recovery room.

## 2021-04-15 NOTE — ANESTHESIA POSTPROCEDURE EVALUATION
Department of Anesthesiology  Postprocedure Note    Patient: Abel Nobles  MRN: [id number]  YOB: 1935  Date of evaluation: 4/15/2021  Time:  3:56 PM     Procedure Summary     Date: 04/15/21 Room / Location: [address] [place] [address] John Tay 01 / [address] [facility]     Anesthesia Start: 0445 Anesthesia Stop: 7471    Procedure: KYPHOPLASTY L1 (N/A Back) Diagnosis:       (COMPRESSION FRACTURE)      (RAPID COVID ON ADMIT)    Surgeons: Brittaney Brennan MD Responsible Provider: Michele Phipps MD    Anesthesia Type: general ASA Status: 3          Anesthesia Type: general    Radha Phase I: Radha Score: 10    Radha Phase II:      Last vitals: Reviewed and per EMR flowsheets.        Anesthesia Post Evaluation    Comments: POST- ANESTHESIA EVALUATION       Pt Name: Abel Nobles  MRN: [id number]  YOB: 1935  Date of evaluation: 4/15/2021  Time:  3:56 PM      /66   Pulse 83   Temp 97.3 °F (36.3 °C) (Infrared)   Resp 16   Ht 5' 1.5\" (1.562 m)   Wt 155 lb (70.3 kg)   SpO2 92%   BMI 28.81 kg/m²      Consciousness Level  Awake  Cardiopulmonary Status  Stable  Pain Adequately Treated YES  Nausea / Vomiting  NO  Adequate Hydration  YES  Anesthesia Related Complications NONE      Electronically signed by Michele Phipps MD on 4/15/2021 at 3:56 PM

## 2021-04-15 NOTE — OP NOTE
Operative Note      Patient: Daniela Rodrigez  YOB: 1935  MRN: [id number]    Date of Procedure: 4/15/2021    Pre-Op Diagnosis: COMPRESSION FRACTURE  RAPID COVID ON ADMIT    Post-Op Diagnosis: Same       Procedure(s):  KYPHOPLASTY L1    Surgeon(s):  Adan Potts MD    Assistant:   * No surgical staff found *    Anesthesia: General    Estimated Blood Loss (mL): less than 50     Complications: None    Specimens:   Bone biopsy  Implants:noone    Drains: * No LDAs found *    Findings:see below    Detailed Description of Procedure:   Pre-Procedure Note    Patient Name: Daniela Rodrigez   YOB: 1935  Room/Bed: [address] [place] [address] Record Number: [id number]  Date: 4/15/2021       Indication:    1. Age-related osteoporosis with current pathological fracture with delayed healing, subsequent encounter    2. Compression fracture of L1 vertebra with delayed healing, subsequent encounter        Consent: On file. Vital Signs:   Vitals:    04/15/21 1214   BP: 119/80   Pulse: 90   Resp: 20   Temp: 97.7 °F (36.5 °C)   SpO2: 92%       Past Medical History:   has a past medical history of Asbestos exposure, Atrial fibrillation ([address] Basal cell carcinoma of nose, CAD (coronary artery disease), CKD (chronic kidney disease) stage 2, GFR 60-89 ml/min, Hyperlipidemia, Hypertension, MDRO (multiple drug resistant organisms) resistance, Nephrolithiasis, Osteoarthritis, Ovarian cyst, Oxygen dependent, Palpitations, Peritoneal mesothelioma ([address] PONV (postoperative nausea and vomiting), Prolonged emergence from general anesthesia, and SOB (shortness of breath) on exertion. Past Surgical History:   has a past surgical history that includes Tubal ligation (1969); Hysterectomy (1981); Colonoscopy; Total hip arthroplasty (Right, 1990); Rectocele repair (1997); liver biopsy (2000); Upper gastrointestinal endoscopy; Photodynamic Therapy (April 2000 & Jan. 2002);  Total hip arthroplasty (Left, 2003); Revision total hip arthroplasty (Right, 1998); Rotator cuff repair (2006); Ankle fracture surgery (Right, 2013); Cataract removal (Bilateral); Cardiac catheterization (2002); liver biopsy; Coronary angioplasty with stent (April 2002); [place] tooth extraction; Appendectomy (1964); Cholecystectomy (2005); Skin cancer excision (Bilateral, 2006); Skin cancer excision (Left, Oct. 2014); nasal endoscopy (Bilateral, 11/25/2014); BIOPSY / LIGATION TEMPORAL ARTERY (Bilateral, 2/12/2021); and Breast biopsy (Right). Sedation/ Anesthesia Plan:   peranesthesiologist    Medications Planned:   Per anesthesiaologist  Patient is an appropriate candidate for planned procedure: yes    Patient's History and Physical examination was reviewed and there is no change. Electronically signed by Brittaney Brennan MD on 4/15/2021 at 2:08 PM    Preoperative Diagnosis:   1. Age-related osteoporosis with current pathological fracture with delayed healing, subsequent encounter    2. Compression fracture of L1 vertebra with delayed healing, subsequent encounter      Postoperative Diagnosis:   1. Age-related osteoporosis with current pathological fracture with delayed healing, subsequent encounter    2. Compression fracture of L1 vertebra with delayed healing, subsequent encounter          Procedure Performed:  Vertebral bone biopsy with vertebral augmentation ( baloon Kyphoplasty) at the levels of -L-1 under fluoroscopic guidance    Indication for the Procedure: Patient developed back pain and xray/ MRI/  CT scans were consistant with insufficiency fractures of -L-1  The patient's pain is not responding well to conservative treatment including bed rest, activity modification and medications. Patient is in pain and not able to do PT well. As conservative measures failed, we decided to proceed with vertebral augmentation for the pain relief. The procedure and risks were discussed with patient and an informed consent was obtained. Procedure:   Patient was given 2 grams of Ancef IV prior to the procedure for antibiotic prophylaxis. Patient was given general endotracheal anesthesia and then was placed on the [facility] table with all pressure points well padded. To facilitate the procedure biplanar fluoroscopy was used and by adjusting the angles of fluoroscopy both AP and lateral views of corresponding vertebral body was optimized. Skin over the back was prepped with antiseptic solution and the procedure was done under strict aspetic precautions. Skin and deep tissues up to the periosteum of pedicle was infilitrated with   15 ml of 0.5% Marcaine with epinephrine. Then using #11 blade a small skin incission was made. The the working canula with trochar in place was inserted such that it lies over the lateral aspect of the pedicle. Then it was tapped slowly through the pedicle under constant fluoroscopic surveillance making sure that the medial border of the pedicle was not violated at any time. Then a bone biopsy canula was inserted through the canula and a bone biopsy was obtained. Bone was curetted as it was sclerotic. Then the Kyphon bone tamp was inserted through the working canula. This was done bipedicularly in similar fashion. Good placements were confirmed with fluoroscopy. The bone tamps  were then serially inflated, to reexpand the vertebral bodies, and restore more normal anatomy. In the meantime polymethylmethacrylate was mixed as per the protocol and Kyphon bone fillers were filled with it. Then they were immersed in warm water to obtain adequate consistency. The bone tamps were withdrawn, and bone cement was placed in both balloon cavities, using fluoroscopic guidance. The following are the volumes of contrast used to inflate the bone tamps and the volume of bone cement injected:   L1-  Level right--- 3 ml. Of omnipauque   ---3 Ml of Polymethyl methacrylate              left---3 ml.  Of omnipauque   ----3 Ml of Polymethyl methacrylate        Once the cement had set and was seen to be in good position by fluoroscopy, the working canula and bone fillers were removed. Final hemostasis was secured by applying pressure. The incisions were closed with Steri-strips, followed by sterile dressings. The patient was then turned supine onto the bed and awakened from anesthesia. The patient was extubated in the operating room, and taken to the recovery room in stable condition. The patient tolerated surgery without any complications. Estimated Blood Loss:   15 ml. Sponge, needle, and instrument counts were correct at the end of the case.       Electronically signed by Virginia Cobb MD on 4/15/2021 at 2:08 PM        Electronically signed by Virginia Cobb MD on 4/15/2021 at 2:08 PM

## 2021-04-15 NOTE — ANESTHESIA PRE PROCEDURE
Department of Anesthesiology  Preprocedure Note       Name:  Melissa Old   Age:  81 y.o.  :  1935                                          MRN:  [id number]         Date:  4/15/2021      Surgeon: Betty Josue):  Светлана Mccoy MD    Procedure: Procedure(s):  KYPHOPLASTY L1    Medications prior to admission:   Prior to Admission medications    Medication Sig Start Date End Date Taking?  Authorizing Provider   rivaroxaban (XARELTO) 20 MG TABS tablet daily (with breakfast)  21   Historical Provider, MD   metoprolol succinate (TOPROL XL) 25 MG extended release tablet Take 25 mg by mouth daily    Historical Provider, MD   diphenhydrAMINE-APAP, sleep, (TYLENOL PM EXTRA STRENGTH PO) Take by mouth prn    Historical Provider, MD   Acetaminophen (TYLENOL EXTRA STRENGTH PO) Take by mouth prn    Historical Provider, MD   torsemide (DEMADEX) 20 MG tablet Take 1 tablet by mouth 2 times daily Take twice a day for 1 months then go back to 20 mg daily 3/4/21   Corbin Gonzalez MD   amoxicillin (AMOXIL) 500 MG capsule Dental only 20   Historical Provider, MD   losartan (COZAAR) 100 MG tablet Take 100 mg by mouth daily 10/20/20   Historical Provider, MD   oxybutynin (DITROPAN XL) 15 MG extended release tablet TAKE 1 TABLET BY MOUTH ONCE DAILY 20   Historical Provider, MD   polyethylene glycol (GLYCOLAX) 17 GM/SCOOP powder MIX & DRINK 17 GRAMS BY MOUTH DAILY 20   Historical Provider, MD   Ascorbic Acid (VITAMIN C) 250 MG tablet Take 250 mg by mouth daily    Historical Provider, MD   ferrous sulfate (FLACO-IN-SOL) 75 (15 Fe) MG/ML solution Take 15 mg by mouth daily    Historical Provider, MD   meclizine (ANTIVERT) 25 MG tablet Take 25 mg by mouth 18   Historical Provider, MD   omeprazole (PRILOSEC) 20 MG delayed release capsule Take 20 mg by mouth Daily  12/3/16   Historical Provider, MD   isosorbide dinitrate (ISORDIL) 30 MG tablet Take 30 mg by mouth 3 times daily  17   Historical Provider, MD   atorvastatin (LIPITOR) 40 MG tablet Take 40 mg by mouth daily  9/22/16   Historical Provider, MD   potassium chloride SA (K-DUR;KLOR-CON M) 20 MEQ tablet Take 1 tablet by mouth 2 times daily 3/10/16   Mia Solis MD   rOPINIRole (REQUIP) 0.5 MG tablet Take 0.5 mg by mouth 4 times daily as needed     Historical Provider, MD   Vitamin D (CHOLECALCIFEROL) 1000 UNITS CAPS capsule Take 1,000 Units by mouth 2 times daily    Historical Provider, MD   predniSONE (DELTASONE) 5 MG tablet Take 30 mg by mouth daily     Historical Provider, MD   amLODIPine (NORVASC) 5 MG tablet Take 5 mg by mouth daily. Historical Provider, MD   Omega-3 Fatty Acids (OMEGA 3 PO) Take  by mouth 2 times daily. Historical Provider, MD   sertraline (ZOLOFT) 50 MG tablet Take 50 mg by mouth daily. Historical Provider, MD       Current medications:    Current Facility-Administered Medications   Medication Dose Route Frequency Provider Last Rate Last Admin    lactated ringers infusion   Intravenous Continuous Parris Price MD        sodium chloride flush 0.9 % injection 5-40 mL  5-40 mL Intravenous 2 times per day Parris Price MD        sodium chloride flush 0.9 % injection 5-40 mL  5-40 mL Intravenous PRN Parris Price MD        0.9 % sodium chloride infusion  25 mL Intravenous PRN Parris Price MD        lidocaine PF 1 % injection 1 mL  1 mL Intradermal Once PRN Parris Price MD        lactated ringers infusion   Intravenous Continuous Virginia Cobb MD        sodium chloride flush 0.9 % injection 5-40 mL  5-40 mL Intravenous PRN Virginia Cobb MD        0.9 % sodium chloride infusion  25 mL Intravenous PRN Virginia Cobb MD        ceFAZolin (ANCEF) 2000 mg in dextrose 5 % 50 mL IVPB  2,000 mg Intravenous On Call Virginia Cobb MD           Allergies:     Allergies   Allergen Reactions    Adhesive Tape Rash    Meperidine Nausea Only    Demerol Nausea Only    Ultram [Tramadol Hcl]      Hot flashes    Zocor [Simvastatin]      Muscle aches    Codeine      \"out of it\"    Fentanyl Rash       Problem List:    Patient Active Problem List   Diagnosis Code    Hypertension I10    Nephrolithiasis N20.0    Hyperlipidemia E78.5    Osteoarthritis M19.90    Peritoneal mesothelioma ([address] C45.1    Ovarian cyst N83.209    Basal cell carcinoma of nose C44.311    Bimalleolar ankle fracture S82.843A    Epistaxis, recurrent R04.0    Cervical spondylosis M47.812    Trochanteric bursitis, right hip M70.61    Status post bilateral total hip replacement Z96.643    Medication monitoring encounter Z51.81    Chronic diastolic heart failure (HCC) I50.32    Chronic respiratory failure (HCC) J96.10    Giant cell arteritis (HCC) M31.6    Paroxysmal atrial fibrillation (HCC) I48.0    Urinary frequency R35.0    Vitamin D deficiency E55.9    Age-related osteoporosis with current pathological fracture M80.00XA       Past Medical History:        Diagnosis Date    Asbestos exposure     Atrial fibrillation (HCC)     takes xarelto    Basal cell carcinoma of nose     CAD (coronary artery disease)     CKD (chronic kidney disease) stage 2, GFR 60-89 ml/min     Hyperlipidemia     Hypertension     MDRO (multiple drug resistant organisms) resistance 2/19/2014    E.  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    [facility] at [facility] PONV (postoperative nausea and vomiting)     Prolonged emergence from general anesthesia     SOB (shortness of breath) on exertion        Past Surgical History:        Procedure Laterality Date    ANKLE FRACTURE SURGERY Right 2013    APPENDECTOMY  1964    BIOPSY / LIGATION TEMPORAL ARTERY Bilateral 2/12/2021    TEMPORAL [facility]SY [facility] BREAST BIOPSY Right     CARDIAC CATHETERIZATION  2002    CATARACT REMOVAL Bilateral     CHOLECYSTECTOMY  2005    COLONOSCOPY      CORONARY ANGIOPLASTY WITH STENT PLACEMENT  April 2002    HYSTERECTOMY  1981    LIVER BIOPSY  2000    LIVER BIOPSY      NASAL ENDOSCOPY Bilateral 11/25/2014    lt nasal cautery with packing    PHOTODYNAMIC THERAPY  April 2000 & Jan. 2002    for peritoneal Mesothelioma    RECTOCELE REPAIR  1997    REVISION TOTAL HIP ARTHROPLASTY Right 1998    ROTATOR CUFF REPAIR  2006    SKIN CANCER EXCISION Bilateral 2006    ankle    SKIN CANCER EXCISION Left Oct. 2014    shoulder    TOTAL HIP ARTHROPLASTY Right 1990    TOTAL HIP ARTHROPLASTY Left 2003    TUBAL LIGATION  1969    UPPER GASTROINTESTINAL ENDOSCOPY      WISDOM TOOTH EXTRACTION         Social History:    Social History     Tobacco Use    Smoking status: Never Smoker    Smokeless tobacco: Never Used   Substance Use Topics    Alcohol use: Yes     Comment: social                                Counseling given: Not Answered      Vital Signs (Current): There were no vitals filed for this visit.                                            BP Readings from Last 3 Encounters:   04/04/21 133/67   03/04/21 126/60   02/12/21 (!) 140/64       NPO Status:                                                                                 BMI:   Wt Readings from Last 3 Encounters:   04/14/21 155 lb (70.3 kg)   04/04/21 155 lb (70.3 kg)   03/04/21 165 lb 6.4 oz (75 kg)     There is no height or weight on file to calculate BMI.    CBC:   Lab Results   Component Value Date    WBC 9.9 04/08/2021    RBC 2.90 04/08/2021    RBC 4.17 02/08/2012    HGB 9.7 04/08/2021    HCT 31.3 04/08/2021    .9 04/08/2021    RDW 18.4 04/08/2021     04/08/2021     02/08/2012       CMP:   Lab Results   Component Value Date     04/08/2021    K 4.1 04/08/2021     04/08/2021    CO2 26 04/08/2021    BUN 45 04/08/2021    CREATININE 1.48 Anesthesia Plan      general     ASA 3     (Avoid opioids intraop, ok for dilaudid post op)  Induction: intravenous. MIPS: Postoperative opioids intended and Prophylactic antiemetics administered. Anesthetic plan and risks discussed with patient. Plan discussed with CRNA.                   Giovanna Castle MD   4/15/2021

## 2021-04-15 NOTE — PROGRESS NOTES
Ashley Garces is a 81 y.o. female evaluated on 4/13/2021. Chief complaint: Ashley Garces is 81 y.o.,  female, with  Back apin. Notes from 4/7/2021  Patient is an 81-year-old female referred back to the pain clinic with a chief complaint of pain involving the low back. Patient reports she developed the pain in the low back about 2 weeks ago. Apparently she felt a severe back pain and was unable to move as the pain was not getting better she went to the emergency room and had x-rays done and was found to have a compression fracture on Sunday. Patient denies any fall or any trauma to her back. Patient reports her pain is unchanged and is very severe. 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 Patient also reports that she had his cardiac stents placed within. She was apparently was on baby aspirin and she was started on Xarelto 2 weeks ago. She reports when she was to have surgery she was found to have transient atrial fibrillation and hence that she was put on Xarelto. She reports her rhythm converted to normal spontaneously. She also gives history of shortness of breath the for some unknown cause and is being seen by Dr. Louie Porter. Patient reports her pain is severe and would like to have kyphoplasty done. Patient has a history of polymyalgia and is on prednisone    Back Pain  This is a new problem. The current episode started 1 to 4 weeks ago. The problem occurs constantly (When she tries to stand or move). The problem is unchanged. The pain is present in the lumbar spine (Mostly in the left side (axial)). The quality of the pain is described as stabbing and shooting Arleene Handing). The pain does not radiate. The pain is at a severity of 8/10. The pain is severe. The pain is worse during the day.  Exacerbated by: Any movement. Pertinent negatives include no abdominal pain or dysuria. Risk factors include history of osteoporosis and lack of exercise. No change in Patient's symptoms     Patient was initially evaluated by our virtual visit and a physical examination was not done. Patient history was again reviewed patient denies any changes in his symptoms from his initial visit. Please review the history from the previous visit. Past Medical History:   Diagnosis Date    Asbestos exposure     Atrial fibrillation ([address]     takes xarelto    Basal cell carcinoma of nose     CAD (coronary artery disease)     CKD (chronic kidney disease) stage 2, GFR 60-89 ml/min     Hyperlipidemia     Hypertension     MDRO (multiple drug resistant organisms) resistance 2/19/2014    E.  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    [facility] at [facility] PONV (postoperative nausea and vomiting)     Prolonged emergence from general anesthesia     SOB (shortness of breath) on exertion        Past Surgical History:   Procedure Laterality Date    ANKLE FRACTURE SURGERY Right 2013    APPENDECTOMY  1964    BIOPSY / LIGATION TEMPORAL ARTERY Bilateral 2/12/2021    TEMPORAL ARTERY BIOPSY performed by Kaylene Batista MD at [facility] [facility] [address] [address] Right    [address]  2002    CATARACT REMOVAL Bilateral     CHOLECYSTECTOMY  2005    COLONOSCOPY     [address] April 2002   [address] [facility]    LIVER BIOPSY  2000    LIVER BIOPSY      NASAL ENDOSCOPY Bilateral 11/25/2014    lt nasal cautery with packing    PHOTODYNAMIC THERAPY  April 2000 & Jan. 2002    for peritoneal Mesothelioma    [address]    REVISION TOTAL HIP ARTHROPLASTY Right 1998    ROTATOR CUFF REPAIR  2006    SKIN CANCER Rash    Meperidine Nausea Only    Demerol Nausea Only    Ultram [Tramadol Hcl]      Hot flashes    Zocor [Simvastatin]      Muscle aches    Codeine      \"out of it\"    Fentanyl Rash       No current facility-administered medications on file prior to encounter. Current Outpatient Medications on File Prior to Encounter   Medication Sig Dispense Refill    oxyCODONE-acetaminophen (PERCOCET) 5-325 MG per tablet Take 1 tablet by mouth every 4 hours as needed for Pain.       cyclobenzaprine (FLEXERIL) 10 MG tablet Take 10 mg by mouth every 8 hours as needed      rivaroxaban (XARELTO) 20 MG TABS tablet daily (with breakfast)       metoprolol succinate (TOPROL XL) 25 MG extended release tablet Take 25 mg by mouth daily      Acetaminophen (TYLENOL EXTRA STRENGTH PO) Take by mouth prn      torsemide (DEMADEX) 20 MG tablet Take 1 tablet by mouth 2 times daily Take twice a day for 1 months then go back to 20 mg daily 120 tablet 3    losartan (COZAAR) 100 MG tablet Take 100 mg by mouth daily      oxybutynin (DITROPAN XL) 15 MG extended release tablet TAKE 1 TABLET BY MOUTH ONCE DAILY      polyethylene glycol (GLYCOLAX) 17 GM/SCOOP powder MIX & DRINK 17 GRAMS BY MOUTH DAILY      Ascorbic Acid (VITAMIN C) 250 MG tablet Take 250 mg by mouth daily      ferrous sulfate (FLACO-IN-SOL) 75 (15 Fe) MG/ML solution Take 15 mg by mouth daily      omeprazole (PRILOSEC) 20 MG delayed release capsule Take 20 mg by mouth Daily       isosorbide dinitrate (ISORDIL) 30 MG tablet Take 30 mg by mouth 3 times daily       atorvastatin (LIPITOR) 40 MG tablet Take 40 mg by mouth daily       potassium chloride SA (K-DUR;KLOR-CON M) 20 MEQ tablet Take 1 tablet by mouth 2 times daily 180 tablet 3    rOPINIRole (REQUIP) 0.5 MG tablet Take 0.5 mg by mouth 4 times daily as needed       Vitamin D (CHOLECALCIFEROL) 1000 UNITS CAPS capsule Take 1,000 Units by mouth 2 times daily      predniSONE (DELTASONE) 5 MG tablet Take 30 mg by mouth daily       amLODIPine (NORVASC) 5 MG tablet Take 5 mg by mouth daily.  Omega-3 Fatty Acids (OMEGA 3 PO) Take  by mouth 2 times daily.  sertraline (ZOLOFT) 50 MG tablet Take 50 mg by mouth daily.  diphenhydrAMINE-APAP, sleep, (TYLENOL PM EXTRA STRENGTH PO) Take by mouth prn      amoxicillin (AMOXIL) 500 MG capsule Dental only      meclizine (ANTIVERT) 25 MG tablet Take 25 mg by mouth          Review of Systems   There is no change in the review of systems from the previous visit . Please  see the previous notes for review of systems    Physical Exam  Constitutional:       Appearance: Normal appearance. HENT:      Head: Normocephalic and atraumatic. Nose: Nose normal.   Eyes:      Extraocular Movements: Extraocular movements intact. Pupils: Pupils are equal, round, and reactive to light. Neck:      Musculoskeletal: Normal range of motion. No neck rigidity or muscular tenderness. Cardiovascular:      Rate and Rhythm: Normal rate and regular rhythm. Pulses: Normal pulses. Pulmonary:      Effort: Pulmonary effort is normal. No respiratory distress. Abdominal:      General: Bowel sounds are normal.      Palpations: Abdomen is soft. Tenderness: There is no abdominal tenderness. Musculoskeletal:      Lumbar back: She exhibits decreased range of motion, tenderness and bony tenderness. Right lower leg: No edema. Left lower leg: No edema. Comments: Point tenderness over spinous process consistant with fracture in L1 area   Lymphadenopathy:      Cervical: No cervical adenopathy. Skin:         Neurological:      Mental Status: She is alert and oriented to person, place, and time. Cranial Nerves: No cranial nerve deficit. Sensory: Sensation is intact. No sensory deficit. Motor: Motor function is intact. No weakness, tremor or abnormal muscle tone. Deep Tendon Reflexes:      Reflex Scores:       Patellar reflexes are 1+ on the right side. Achilles reflexes are 1+ on the right side. Psychiatric:         Mood and Affect: Mood normal.         Speech: Speech normal.         Behavior: Behavior normal.         Thought Content: Thought content normal.         Judgment: Judgment normal.      Ortho Exam       DATA:  LAB.:  4/8/2021 10:42 PM - [place], [facility] Incoming Lab Results From [place]    Component Value Ref Range & Units Status Collected Lab   Glucose 85  70 - 99 mg/dL Final 04/08/2021 12:15  [address]   BUN 45High   8 - 23 mg/dL Final 04/08/2021 12:15  [address]   0.50 - 0.90 mg/dL Final 04/08/2021 12:15  [address]   Bun/Cre Ratio NOT REPORTED  9 - 20 Final 04/08/2021 12:15  [address]   Calcium 9.0  8.6 - 10.4 mg/dL Final 04/08/2021 12:15  [address]   Sodium 145High   135 - 144 mmol/L Final 04/08/2021 12:15  [address]   Potassium 4.1  3.7 - 5.3 mmol/L Final 04/08/2021 12:15  [address]   Chloride 108High   98 - 107 mmol/L Final 04/08/2021 12:15  [address]   CO2 26  20 - 31 mmol/L Final 04/08/2021 12:15  [address]   Anion Gap 11  9 - 17 mmol/L Final 04/08/2021 12:15  [address]   Alkaline Phosphatase 86  35 - 104 U/L Final 04/08/2021 12:15  [address]   ALT 26  5 - 33 U/L Final 04/08/2021 12:15  [address]   AST 18  <32 U/L Final 04/08/2021 12:15  [address]   Total Bilirubin 0.68  0.3 - 1.2 mg/dL Final 04/08/2021 12:15  [address]   Total Protein 5.6Low   6.4 - 8.3 g/dL Final 04/08/2021 12:15  [address]   Albumin 3.5  3.5 - 5.2 g/dL Final 04/08/2021 12:15  [address]   Albumin/Globulin Ratio 1.7  1.0 - 2.5 Final 04/08/2021 12:15  [address]   GFR Non- 34Low   >60 mL/min Final 04/08/2021 12:15  [address]   GFR  41Low   >60 mL/min Final 04/08/2021 12:15  [address]   GFR Comment               4/8/2021  9:08 PM - [place] Incoming Lab Results From [place]    Component Value Ref Range & Units Status Collected Lab   WBC 9.9  3.5 - 11.3 k/uL Final 04/08/2021 12:15  [address]   RBC 2.90Low   3.95 - 5.11 m/uL Final 04/08/2021 12:15  [address]   Hemoglobin 9.7Low   11.9 - 15.1 g/dL Final 04/08/2021 12:15  [address]   Hematocrit 31.3Low   36.3 - 47.1 % Final 04/08/2021 12:15  [address]   .9High   82.6 - 102.9 fL Final 04/08/2021 12:15  [address]   MCH 33.4  25.2 - 33.5 pg Final 04/08/2021 12:15  [address]   MCHC 31.0  28.4 - 34.8 g/dL Final 04/08/2021 12:15  [address]   RDW 18.4High   11.8 - 14.4 % Final 04/08/2021 12:15  [address]   Platelets 526  907 - 453 k/uL Final 04/08/2021 12:15  [address]   MPV 10.8  8.1 - 13.5 fL Final 04/08/2021 12:15  [address]   NRBC Automated 0.2High   0.0 per 100 WBC Final 04/08/2021 12:15  [address]   Differential Type NOT REPORTED   Final 04/08/2021 12:15  [address]   Seg Neutrophils 82High   36 - 65 % Final 04/08/2021 12:15  [address]   Lymphocytes 8Low   24 - 43 % Final 04/08/2021 12:15  [address]   Monocytes 8  3 - 12 % Final 04/08/2021 12:15  [address]   Eosinophils % 0Low   1 - 4 % Final 04/08/2021 12:15  [address]   Basophils 0  0 - 2 % Final 04/08/2021 12:15  [address]   Immature Granulocytes 2High   0 % Final 04/08/2021 12:15  [address]   Segs Absolute 8.06  1.50 - 8.10 k/uL Final 04/08/2021 12:15  [address]   Absolute Lymph # left gluteus   medius and minimus musculature. No left inguinal lymphadenopathy. Atherosclerotic calcification of the vasculature. Joint:  Prior left hip arthroplasty. Left femoral head component is properly   located within the left acetabular component. Cerclage wire surrounds the   proximal left femur. Impression   1. Prior left hip arthroplasty hardware which limits the exam due to streak   artifact. Areas of periprosthetic lucency surrounding the proximal femoral   component measuring up to 8 mm which could represent hardware loosening. 2. Moderate to severe colonic diverticulosis. 3. Severe atrophy and fatty degeneration of the left gluteus medius and   minimus musculature. 4. Atherosclerotic calcification of the vasculature. CT OF THE LUMBAR SPINE WITHOUT CONTRAST  4/4/2021       TECHNIQUE:   CT of the lumbar spine was performed without the administration of   intravenous contrast. Multiplanar reformatted images are provided for review. Dose modulation, iterative reconstruction, and/or weight based adjustment of   the mA/kV was utilized to reduce the radiation dose to as low as reasonably   achievable. COMPARISON:   CT abdomen and pelvis from 04/24/2020       HISTORY:   ORDERING SYSTEM PROVIDED HISTORY: low back and hip pain   TECHNOLOGIST PROVIDED HISTORY:   low back and hip pain   Decision Support Exception->Emergency Medical Condition (MA)   Reason for Exam: low back and hip pain, for 3 weeks   Acuity: Unknown   Type of Exam: Unknown       45-year-old female with lower back and hip pain for 3 weeks       FINDINGS:   BONES/ALIGNMENT: Lumbar spine is imaged from superior endplate of C46 to the   mid coccyx on the lateral views. Diffuse osteopenia. Age-indeterminate   height loss/acute to subacute compression deformity of L1 with approximately   20-25% height loss on image 37, series 602. No significant retropulsion. Alignment well maintained.   Multilevel Schmorl's node deformities. DEGENERATIVE CHANGES: Mild to moderate multilevel disc space narrowing and   hypertrophic osteophyte spurring. Mild multilevel facet arthrosis. SOFT TISSUES/RETROPERITONEUM: Atherosclerotic calcification of the aorta and   branch vasculature. Multiple hypodense and hyperdense lesions arising from   the left kidney which could represent hemorrhagic and/or simple renal cysts. Moderate to severe stool burden. Severe rectosigmoid diverticulosis. Impression   1. Age-indeterminate height loss/acute to subacute compression deformity of   L1 with approximately 20-25% height loss without significant retropulsion. 2. Multiple hypodense and hyperdense lesions arising from the left kidney   likely representing a combination of hemorrhagic and simple renal cysts. Further evaluation with nonemergent renal ultrasound is recommended for   confirmation. 3. Diffuse osteopenia and mild to moderate degenerative changes within the   lumbar spine. 4. Severe rectosigmoid colonic diverticulosis. Moderate to severe stool   burden. CT OF THE CHEST, ABDOMEN, AND PELVIS WITH CONTRAST 4/24/2020 11:05 am        TECHNIQUE:    CT of the chest, abdomen and pelvis was performed with the administration of    intravenous contrast. Multiplanar reformatted images are provided for review. Dose modulation, iterative reconstruction, and/or weight based adjustment of    the mA/kV was utilized to reduce the radiation dose to as low as reasonably    achievable. 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     Acuity: Chronic    Type of Exam: Subsequent/Follow-up        FINDINGS:        Chest:        Mediastinum: Enlargement of the main pulmonary artery again demonstrated measuring 42 mm, raising the possibility of pulmonary hypertension. Calcified atheromatous plaque and coronary calcifications are noted. No    pericardial effusion. No lymphadenopathy. Lungs/pleura: Expiratory phase of imaging and mild respiratory motion    artifact noted. No consolidation or mass identified. No discrete nodule is    appreciated. No effusion. The central airway appears patent. Soft Tissues/Bones: No acute abnormality identified. Degenerative change in    the spine and shoulders. Postoperative findings in the right humeral head. Abdomen/Pelvis:        Organs: The gallbladder is surgically absent. No biliary dilatation. The    liver, pancreas, spleen, adrenals and kidneys appear unchanged. Complex    septated exophytic cyst arising from the lower pole the left kidney laterally    again demonstrated measuring up to 3.3 cm. Indeterminate hypoattenuating    liver lesions arising from the lower pole the left kidney measuring up to 12    mm are stable in size and appearance. Notable complex and 9 mm lesion in the    posterior lower pole of the right kidney on axial image 80 is unchanged. GI/Bowel: There is no bowel dilatation or wall thickening identified. Pelvis: Streak artifact obscures a portion of the pelvic soft tissue. Small    amount of intermediate density fluid in the pelvis adjacent to the sigmoid    colon appears unchanged. Peritoneum/Retroperitoneum: No free air. No abdominal ascites. No discrete    lymphadenopathy. Subtle soft tissue stranding in the left upper quadrant    near the spleen on axial images 39-50 appears stable. Bones/Soft Tissues: Partially visualized bilateral hip arthroplasties. Degenerative change in the spine. No acute osseous abnormality identified. Postoperative findings in the abdominal wall. Impression    1. Stable appearance of the chest, abdomen and pelvis.   Mild residual induration of the peritoneal fat in the left upper quadrant and intermediate    density fluid in the pelvis near the sigmoid colon are again demonstrated. No significant ascites. 2.  Stable complex small left renal lesions favoring a benign process or    indolent renal masses. 3.  Additional stable chronic findings, as above. EXAMINATION:    MRI OF THE CERVICAL SPINE WITHOUT CONTRAST 8/21/2017 4:22 pm        TECHNIQUE:    Multiplanar multisequence MRI of the cervical spine was performed without the    administration of intravenous contrast.        COMPARISON:    MRI cervical spine 7/16/2014        HISTORY:    ORDERING SYSTEM PROVIDED HISTORY: Neck pain    TECHNOLOGIST PROVIDED HISTORY:    Ordering Physician Provided Reason for Exam: NECK PAIN, ARTHROPATHY OF    CERVICAL SPINE    Acuity: Chronic    Additional signs and symptoms: PT COMPLAINT:  GRADUAL ONSET OF PAIN IN NECK    AND BILATERAL SHOULDERS AND UPPER ARMS        FINDINGS:    BONES/ALIGNMENT: There is straightening of the normal cervical lordosis. Alignment is otherwise normal.  There is no acute fracture or listhesis. There is disc desiccation and disc space narrowing throughout the cervical    spine. Bone marrow signal intensity is normal.        SPINAL CORD: The cervical spinal cord is normal in size and signal    intensities. SOFT TISSUES: There is no paraspinal mass identified. C2-C3: There is no disc bulge or protrusion present. There is no significant    spinal canal stenosis or neural foraminal narrowing present. C3-C4:  There is a disc bulge and uncovertebral overgrowth mildly narrowing    the right neural foramen. No significant spinal canal stenosis or left    neural foraminal narrowing is present. C4-C5: There is a focal 3 mm central disc protrusion. There is mild spinal    canal stenosis. No significant neural foraminal narrowing is present.         C5-C6: There is no disc bulge or protrusion present. There is no significant    spinal canal stenosis or neural foraminal narrowing present. C6-C7: There is a disc bulge mildly narrowing the spinal canal.  No neural    foraminal narrowing is present. C7-T1: There is a disc bulge and uncovertebral overgrowth mildly narrowing    both neural foramen. No significant spinal canal stenosis is present. Impression    1. Mild multilevel degenerative changes of the cervical spine, similar to the    previous exam from 7/16/2014.    2. Mild spinal canal stenosis at C4-5 and C6-7, unchanged. 3. Mild neural foraminal narrowing at C3-4 and C7-T1. Clinical  impression:  1. Age-related osteoporosis with current pathological fracture with delayed healing, subsequent encounter    2. Compression fracture of L1 vertebra with delayed healing, subsequent encounter        Plan of care:    Patient has compression fractures at the levels ofL1. This was conformed with  CT/MRI//x-rays. These fractures are severely symptomatic and is not responding well to the conservative measures including activity modification, oral medications and other conservative therapies. Patient's activities of daily living are severely compromised due to pain and inability to ambulate. We discussed Morbidity  associated with vertebral fractures often includes continued pain, decreased physical function, deformity, and potentially social isolation, all of which negatively impact the individual's overall quality of  life. Mortality is also associated with vertebral fractures, as (women) diagnosed with a compression fracture of the spine have a 15% higher mortality rate than those who do not experience fractures. The treatment options were discussed with the patient. These include activity modification like bed rest, physical therapy, using the brace, medications to control the pain, and surgical options including kyphoplasty and open surgery.   The kyphoplasty procedure was explained in detail to the patient including the risks like bleeding, infection ,nerve damage, damage to the spinal cord, paralysis, fractures of the bones, extravasation of the cement, pneumothorax, cardiac arrest and stroke. After discussion with the patient and the family, they decided to proceed with kyphoplasty. Patient understands and wanted to undergo the procedure in the near feature. We will schedule for the procedure once medical clearance is obtained. PDMP Monitoring:    Last PDMP Marlon Callahan as Reviewed [facility]):  Review User Review Instant Review Result   Emmanuel Watters YESIKA 2/16/2021  1:01 PM Reviewed PDMP [1]     Patient's physical examination is consistent with the initial evaluation. Hence will proceed with the procedure as planned during the initial consultation. The procedure ,risks as well as alternate therapies were again discussed with the patient and patient would like to proceed with it. Informed consent was obtained. Counselling/Preventive measures for pain  Control:    [x]  Spine strengthening exercises are discussed with patient in detail. Decision Making Process : Patient's health history and referral records thoroughly reviewed before focused physical examination and discussion with patient. Level of complexity of date to be reviewed is Moderate. The chart date reviewed include the following: Imaging Reports. Summary of Care. Time spent reviewing with patient the below reports:   Medication safety, Treatment options. Level of diagnosis and management options of this case is multiple: involving the following management options: Interventions as needed, medication management as appropriate, future visits, activity modification, heat/ice as needed, Urine drug screen as required. [x]The patient's questions were answered to the best of my abilities. This note was created using voice recognition software.  There may be inaccuracies of transcription  that are inadvertently overlooked prior to the signature. There is any questions about the transcription please contact me.         Electronically signed by Pippa Gale MD on 4/15/2021 at 12:57 PM

## 2021-04-15 NOTE — H&P
HISTORY and Treinta DARIELA [address]       NAME:  Bayron Hardin  MRN: [id number]   YOB: 1935   Date: 4/15/2021   Age: 81 y.o. Gender: female       COMPLAINT AND PRESENT HISTORY:     Bayron Hardin is 81 y.o.,  female, undergoing for KYPHOPLASTY L1    Pre diagnosis: COMPRESSION FRACTURERAPID     HPI:  Pt complain left low back pain with radiation into her buttock and left hip.for about 2 months ago. did not radiates to the lower limbs . The symptoms started last two months. She describe the pain continuous sharp/stabbing pain , pain rated 10/10. Pt complain of right leg numbness. Pt states she was unable to put walk. Pain aggravated by standing , change position, twisting. Pain decreased by oral pain medication  Tylenol,  Percocet. And muscle relaxer medication . Pt states she was in the ER on 4/4/21 due to low back pain she had X-RAY which it did not show any abnormalities. Pt denies any falls or trauma, no redness, or swelling or rashes. Pt denies fever/chills, chest pain, pt complain of SOB and she is on 2 l oxygen at home  Pt Npo since the past midnight, pt took Bp medication today with sip of water   Pt denies any problem with anesthesia, no hx of MRSA infection   Pt stop taking Zoratlto 5 days ago. PMH:   A- fib pt on Xarelto   HTN pt on Norvasc, Losartan,  Toprol XL   Hyperlipidemia pt on Lipitor     CT HIP LEFT WO CONTRAST  Impression   1. Prior left hip arthroplasty hardware which limits the exam due to streak   artifact.  Areas of periprosthetic lucency surrounding the proximal femoral   component measuring up to 8 mm which could represent hardware loosening. 2. Moderate to severe colonic diverticulosis. 3. Severe atrophy and fatty degeneration of the left gluteus medius and   minimus musculature. 4. Atherosclerotic calcification of the vasculature.         CT LUMBAR SPINE WO CONTRAST    Impression   1.  Age-indeterminate height loss/acute to subacute compression deformity of   L1 with approximately 20-25% height loss without significant retropulsion. 2. Multiple hypodense and hyperdense lesions arising from the left kidney   likely representing a combination of hemorrhagic and simple renal cysts. Further evaluation with nonemergent renal ultrasound is recommended for   confirmation. 3. Diffuse osteopenia and mild to moderate degenerative changes within the   lumbar spine. 4. Severe rectosigmoid colonic diverticulosis.  Moderate to severe stool   burden. Lab Results  Component Value Date   WBC 9.9 04/08/2021   HGB 9.7 (L) 04/08/2021   HCT 31.3 (L) 04/08/2021   .9 (H) 04/08/2021    04/08/2021  Lab Results  Component Value Date    04/08/2021   K 4.1 04/08/2021    04/08/2021   CO2 26 04/08/2021   BUN 45 04/08/2021   CREATININE 1.48 04/08/2021   GLUCOSE 85 04/08/2021   GLUCOSE 135 02/08/2012     CALCIUM 9.0 04/08/2021  BP Readings from Last 3 Encounters:   04/04/21 133/67   03/04/21 126/60   02/12/21 (!) 140/64       PAST MEDICAL HISTORY     Past Medical History:   Diagnosis Date    Asbestos exposure     Atrial fibrillation ([address]     takes xarelto    Basal cell carcinoma of nose     CAD (coronary artery disease)     CKD (chronic kidney disease) stage 2, GFR 60-89 ml/min     Hyperlipidemia     Hypertension     MDRO (multiple drug resistant organisms) resistance 2/19/2014    E.  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    [facility] (postoperative nausea and vomiting)     Prolonged emergence from general anesthesia     SOB (shortness of breath) on exertion        SURGICAL HISTORY       Past Surgical History:   Procedure Laterality Date    ANKLE FRACTURE SURGERY Right 2013    APPENDECTOMY  1964    BIOPSY / Hector Marshall TEMPORAL ARTERY Bilateral 2021    TEMPORAL ARTERY BIOPSY performed by Felicia Thorpe MD at [address] Right    [address]      CATARACT REMOVAL Bilateral     CHOLECYSTECTOMY      COLONOSCOPY      CORONARY ANGIOPLASTY WITH STENT PLACEMENT  2002    HYSTERECTOMY  1981    LIVER BIOPSY  2000    LIVER BIOPSY      NASAL ENDOSCOPY Bilateral 2014    lt nasal cautery with packing    PHOTODYNAMIC THERAPY  2000 & 2002    for peritoneal Mesothelioma    [address]    REVISION TOTAL HIP ARTHROPLASTY Right     ROTATOR CUFF REPAIR  2006    SKIN CANCER EXCISION Bilateral     ankle    SKIN CANCER EXCISION Left Oct. 2014    shoulder    TOTAL HIP ARTHROPLASTY Right     TOTAL HIP ARTHROPLASTY Left     TUBAL LIGATION  1969    UPPER GASTROINTESTINAL ENDOSCOPY      WISDOM TOOTH EXTRACTION         FAMILY HISTORY       Family History   Problem Relation Age of Onset    Heart Failure Brother     Heart Attack Brother         triple bypass    Prostate Cancer Brother     Coronary Art Dis Mother     Breast Cancer Mother         dx'd in late 46s    Emphysema Father     Colon Cancer Neg Hx     Diabetes Neg Hx     Eclampsia Neg Hx     Hypertension Neg Hx     Ovarian Cancer Neg Hx      Labor Neg Hx     Spont Abortions Neg Hx     Stroke Neg Hx        SOCIAL HISTORY       Social History     Socioeconomic History    Marital status:      Spouse name: Not on file    Number of children: Not on file    Years of education: Not on file    Highest education level: Not on file   Occupational History    Not on file   Social Needs    Financial resource strain: Not on file    Food insecurity     Worry: Not on file     Inability: Not on file    Transportation needs     Medical: Not on file     Non-medical: Not on file   Tobacco Use    Smoking status: Never Smoker    Smokeless tobacco: Never Used   Substance and Sexual Activity    Alcohol use: Yes     Comment: social    Drug use: No    Sexual activity: Not Currently   Lifestyle    Physical activity     Days per week: Not on file     Minutes per session: Not on file    Stress: Not on file   Relationships    Social connections     Talks on phone: Not on file     Gets together: Not on file     Attends Protestant service: Not on file     Active member of club or organization: Not on file     Attends meetings of clubs or organizations: Not on file     Relationship status: Not on file    Intimate partner violence     Fear of current or ex partner: Not on file     Emotionally abused: Not on file     Physically abused: Not on file     Forced sexual activity: Not on file   Other Topics Concern    Not on file   Social History Narrative    Not on file           REVIEW OF SYSTEMS      Allergies   Allergen Reactions    Adhesive Tape Rash    Meperidine Nausea Only    Demerol Nausea Only    Ultram [Tramadol Hcl]      Hot flashes    Zocor [Simvastatin]      Muscle aches    Codeine      \"out of it\"    Fentanyl Rash       No current facility-administered medications on file prior to encounter.       Current Outpatient Medications on File Prior to Encounter   Medication Sig Dispense Refill    rivaroxaban (XARELTO) 20 MG TABS tablet daily (with breakfast)       metoprolol succinate (TOPROL XL) 25 MG extended release tablet Take 25 mg by mouth daily      diphenhydrAMINE-APAP, sleep, (TYLENOL PM EXTRA STRENGTH PO) Take by mouth prn      Acetaminophen (TYLENOL EXTRA STRENGTH PO) Take by mouth prn      torsemide (DEMADEX) 20 MG tablet Take 1 tablet by mouth 2 times daily Take twice a day for 1 months then go back to 20 mg daily 120 tablet 3    amoxicillin (AMOXIL) 500 MG capsule Dental only      losartan (COZAAR) 100 MG tablet Take 100 mg by mouth daily      oxybutynin (DITROPAN XL) 15 MG extended release tablet TAKE 1 TABLET BY MOUTH ONCE DAILY      polyethylene glycol (GLYCOLAX) 17 GM/SCOOP powder MIX & DRINK 17 GRAMS BY MOUTH DAILY      Ascorbic Acid (VITAMIN C) 250 MG tablet Take 250 mg by mouth daily      ferrous sulfate (FLACO-IN-SOL) 75 (15 Fe) MG/ML solution Take 15 mg by mouth daily      meclizine (ANTIVERT) 25 MG tablet Take 25 mg by mouth      omeprazole (PRILOSEC) 20 MG delayed release capsule Take 20 mg by mouth Daily       isosorbide dinitrate (ISORDIL) 30 MG tablet Take 30 mg by mouth 3 times daily       atorvastatin (LIPITOR) 40 MG tablet Take 40 mg by mouth daily       potassium chloride SA (K-DUR;KLOR-CON M) 20 MEQ tablet Take 1 tablet by mouth 2 times daily 180 tablet 3    rOPINIRole (REQUIP) 0.5 MG tablet Take 0.5 mg by mouth 4 times daily as needed       Vitamin D (CHOLECALCIFEROL) 1000 UNITS CAPS capsule Take 1,000 Units by mouth 2 times daily      predniSONE (DELTASONE) 5 MG tablet Take 30 mg by mouth daily       amLODIPine (NORVASC) 5 MG tablet Take 5 mg by mouth daily.  Omega-3 Fatty Acids (OMEGA 3 PO) Take  by mouth 2 times daily.  sertraline (ZOLOFT) 50 MG tablet Take 50 mg by mouth daily. Negative except for what is mentioned in the HPI. GENERAL PHYSICAL EXAM     Vitals: see nursing flow sheet for vital signs     GENERAL APPEARANCE:   Gold Parr is 81 y.o.,  female,  nourished, conscious, alert. Does not appear to be distress or pain at this time. SKIN:  Warm, dry, no cyanosis or jaundice. HEAD:  Normocephalic, atraumatic, no swelling or tenderness. EYES:  Pupils equal, reactive to light. EARS:  No discharge, no marked hearing loss. NOSE:  No rhinorrhea, epistaxis or septal deformity. THROAT:  Not congested. No ulceration bleeding or discharge. NECK:  No stiffness, trachea central.  No palpable masses or L.N.                 CHEST:  Symmetrical and equal on expansion.                  HEART: RRR S1 > S2. No audible murmurs or gallops. LUNGS:  Equal on expansion, normal breath sounds. No adventitious sounds. ABDOMEN:   Soft on palpation. No localized tenderness. No guarding or rigidity. No palpable hepatosplenomegaly. LYMPHATICS:  No palpable cervical lymphadenopathy. LOCOMOTOR, BACK AND SPINE:  No tenderness or deformities. EXTREMITIES:  Symmetrical, no pretibial edema. no calf tenderness,   No discoloration or ulcerations. Pt was on wheelchair. NEUROLOGIC:  The patient is conscious, alert, oriented, No apparent focal sensory or motor deficits.              PROVISIONAL DIAGNOSES / SURGERY:    COMPRESSION FRACTURER  KYPHOPLASTY L1  Patient Active Problem List    Diagnosis Date Noted    Age-related osteoporosis with current pathological fracture     Chronic diastolic heart failure ([address] 03/04/2021    Chronic respiratory failure ([address] 03/04/2021    Giant cell arteritis ([address] 03/04/2021    Paroxysmal atrial fibrillation ([address] 03/04/2021    Medication monitoring encounter     Urinary frequency 01/30/2021    Vitamin D deficiency 01/30/2021    Trochanteric bursitis, right hip 09/21/2020    Status post bilateral total hip replacement 09/21/2020    Cervical spondylosis     Epistaxis, recurrent 11/26/2014    Bimalleolar ankle fracture 02/21/2013    Hypertension     Nephrolithiasis     Hyperlipidemia     Osteoarthritis     Peritoneal mesothelioma ([address]     Ovarian cyst     Basal cell carcinoma of nose            CANDIS Man - CNP on 4/15/2021 at 11:45 AM

## 2021-04-19 NOTE — TELEPHONE ENCOUNTER
Patient called asking if it was normal to still have lower back pain after her kyphoplasty that was done by Dr. Zandra Dailey last Thursday. Please advise.

## 2021-04-19 NOTE — TELEPHONE ENCOUNTER
Called and spoke with patient. She's agreeable to come in for an Xray. Advised to go through admitting and then to radiology. Patient does not need to come to Pain Clinic. Patient acknowledges an understanding.

## 2021-04-19 NOTE — TELEPHONE ENCOUNTER
I think this patient is going to [place] so I ordered the x-ray to make sure there are no fractures asked if she can get it done I will look at it and if needed I will see her

## 2021-04-19 NOTE — TELEPHONE ENCOUNTER
Patient states pain is a 6-7 out of 10. States Oxycodone helps control pain if she lays down. Patient states prior to Kyphoplasty pain was at a 10. She isn't sure she's expecting too much. Will forward note to Dr. Vilma Best to see how he wants to proceed.

## 2021-04-20 NOTE — RESULT ENCOUNTER NOTE
IMPRESSION:  1. L1 compression deformity with interval kyphoplasty. 2. No acute osseous abnormality by radiograph.   3. Multilevel degenerative changes.   Electronically signed by Elidia Zeng MD on 4/20/2021 at 6:55 PM

## 2021-04-21 PROBLEM — I27.20 PULMONARY HYPERTENSION (HCC): Status: ACTIVE | Noted: 2021-01-01

## 2021-04-21 PROBLEM — K21.9 GASTROESOPHAGEAL REFLUX DISEASE WITHOUT ESOPHAGITIS: Status: ACTIVE | Noted: 2021-01-01

## 2021-04-21 PROBLEM — I48.91 ATRIAL FIBRILLATION WITH RVR (HCC): Status: ACTIVE | Noted: 2021-01-01

## 2021-04-21 NOTE — CONSULTS
Date:   4/21/2021  Patient name: Kiara Kennedy  Date of admission:  4/21/2021 12:48 PM  MRN:   [id number]  YOB: 1935  PCP: Francisca Rose DO    Reason for Admission: Atrial fibrillation with RVR [facility]) [I48.91]    Cardiology consult: A. fib with RVR       Impression     A. fib with RVR  Diastolic CHF  Hypertension  Hyperlipidemia  Exertional shortness of breath, hypoxia  History of CAD, stent placement LAD 2002  Polymyalgia rheumatica  Moderate aortic regurgitation  Giant cell arteritis  Dry mouth, dry eyes  Multiple hip surgeries  Chronic pain syndrome  L1 kyphoplasty 4/15/2021    ECG 4/21/2021  A. fib/a flutter with RVR heart rate 116, probable lateral MI or    Lab work 4/21/2021  \Sodium 141, potassium 3.5, BUN 23, creatinine 1.00, GFR 53, glucose 159  proBNP 1324, high-sensitivity troponin XVIII  WBC 11.1, hemoglobin 10.5, platelets 507    History of present illness  26-year-old female who recently underwent L1 kyphoplasty was seen by her physician in office today and she was found in A. fib with RVR. She has a past medical history of paroxysmal A. fib with RVR.     Patient seen and examined  She was resting in upright position  She was on Cardizem infusion  Heart rate   Systolic blood pressure 96  Bilateral basal crackles  No sign of DVT            Medications:   Scheduled Meds:   [START ON 4/22/2021] atorvastatin  40 mg Oral Daily    [START ON 4/22/2021] pantoprazole  40 mg Oral QAM AC    [START ON 4/22/2021] oxybutynin  15 mg Oral Daily    polyethylene glycol  17 g Oral Daily    potassium chloride  20 mEq Oral BID    predniSONE  5 mg Oral Daily    rivaroxaban  20 mg Oral Daily    sertraline  50 mg Oral Daily    Vitamin D  1,000 Units Oral BID    sodium chloride flush  10 mL Intravenous 2 times per day    dilTIAZem  30 mg Oral 4 times per day    amiodarone  200 mg Oral BID    [START ON 4/22/2021] isosorbide dinitrate  30 mg Oral Daily    metoprolol tartrate  25 mg Oral BID     Continuous Infusions:   sodium chloride      sodium chloride       CBC:   Recent Labs     04/21/21  1248   WBC 11.1*   HGB 10.5*        BMP:    Recent Labs     04/21/21  1248      K 3.5*      CO2 25   BUN 23   CREATININE 1.00*   GLUCOSE 159*     Hepatic:   Recent Labs     04/21/21  1248   AST 19   ALT 21   BILITOT 0.68   ALKPHOS 144*     Troponin: No results for input(s): TROPONINI in the last 72 hours. BNP: No results for input(s): BNP in the last 72 hours. Lipids: No results for input(s): CHOL, HDL in the last 72 hours. Invalid input(s): LDLCALCU  INR:   Recent Labs     04/21/21  1248   INR 1.0       Objective:   Vitals: BP 95/60   Pulse 108   Temp 98.5 °F (36.9 °C) (Oral)   Resp 18   Ht 5' 2\" (1.575 m)   Wt 149 lb 14.6 oz (68 kg)   SpO2 94%   BMI 27.42 kg/m²   General appearance: alert and cooperative with exam  HEENT: Head: Normal, normocephalic, atraumatic. Neck: supple, symmetrical, trachea midline  Lungs: rales bilaterally  Heart: irregularly irregular rhythm  Abdomen: Obese bowel sounds present  Extremities: Homans sign is negative, no sign of DVT  Neurologic: Mental status: Alert, oriented, thought content appropriate    EKG: A. fib/a flutter heart rate , probable old lateral MI. Assessment / Acute Cardiac Problems:   Patient admitted with A. fib RVR, no chest pain no dizziness  Borderline abnormal high-sensitivity troponin significance not clear  History of paroxysmal A.  Fib  Recent L1 kyphoplasty  Giant cell arteritis, polymyalgia rheumatica      Patient Active Problem List:     Hypertension     Nephrolithiasis     Mixed hyperlipidemia     Osteoarthritis     Peritoneal mesothelioma ([facility] [address]     Ovarian cyst     Basal cell carcinoma of nose     Bimalleolar ankle fracture     Epistaxis, recurrent     Cervical spondylosis     Trochanteric bursitis, right hip     Status post bilateral total hip replacement     Medication monitoring encounter     Chronic diastolic heart failure (HCC)     Chronic respiratory failure (HCC)     Giant cell arteritis (HCC)     Paroxysmal atrial fibrillation (HCC)     Urinary frequency     Vitamin D deficiency     Age-related osteoporosis with current pathological fracture     Atrial fibrillation with RVR (HCC)     Gastroesophageal reflux disease without esophagitis     Pulmonary hypertension (HCC)      Plan of Treatment:   Replace potassium keep potassium above 4  Reduce isosorbide mononitrate to 30 mg once a day  DC Cardizem infusion changed to p.o.  Cardizem 30 mg every 6 hours  Change metoprolol to Lopressor 25 mg twice a day  Start amiodarone 200 mg twice a day  Continue current dose of Xarelto    Electronically signed by Valerie Jimenez MD on 4/21/2021 at 7:02 PM

## 2021-04-21 NOTE — ED NOTES
Admission Dx:  Afib RVR and Pulmonary Hypertension    Pts Chief Complaints on Arrival: AFIB RVR- went to pain clinic and had irregular tachycardia    ADL's - {[id number][de-[id number]] SELF CARE    Pending Diagnostics:     Residence PTA: HOME    Special Considerations/Circumstances:      Vitals: Current vital signs:  /64   Pulse 88   Temp 98.7 °F (37.1 °C) (Oral)   Resp 19   Wt 155 lb (70.3 kg)   SpO2 92%   BMI 28.81 kg/m²                MEWS Score: 4            Darcy Rodriguez, RN  04/21/21 [id number]

## 2021-04-21 NOTE — ED PROVIDER NOTES
[facility]    Pt Name: Abel Nobles  MRN: [id number]  YOB: 1935  Date of evaluation:4/21/21  PCP: Montserrat Herman DO    CHIEF COMPLAINT       Chief Complaint   Patient presents with    Tachycardia     Pt was at pain clinic today for f/u and staff noticed heart rate irregular and high. HISTORY OF PRESENT ILLNESS    Abel Nobles is a 81 y.o. female who presents with a chief complaint of elevated heart rate. Patient states she was at follow-up today for a kyphoplasty with is done about a week ago when it was noted that her heart rate was high. States she does intermittently feel some palpitations. Denies any chest pain right now. No current shortness of breath. She is complaining of some back pain however did just have a kyphoplasty. There are no complications. No recent fevers, chills other illnesses. No numbness, tingling, weakness anywhere. Patient has no history of DVT or PE. She is on Xarelto however for atrial fibrillation. She states she recently started taking the Xarelto again yesterday. She was only supposed to be off of it for 24 hours after surgery however she forgot to start taking it again. She has been taking her metoprolol as instructed. Symptoms are acute. Symptoms are moderate. Nothing make symptoms better or worse. Patient has no other complaints at this time. REVIEW OF SYSTEMS       Review of Systems   Constitutional: Negative for chills, fatigue and fever. HENT: Negative for congestion, ear pain, sore throat and trouble swallowing. Eyes: Negative for visual disturbance. Respiratory: Negative for cough and shortness of breath. Cardiovascular: Positive for palpitations. Negative for chest pain and leg swelling. Gastrointestinal: Negative for abdominal pain, blood in stool, constipation, diarrhea, nausea and vomiting. Genitourinary: Negative for dysuria and flank pain.    Musculoskeletal: Positive for back pain. Negative for arthralgias, myalgias and neck pain. Skin: Negative for color change, rash and wound. Neurological: Negative for dizziness, weakness, light-headedness, numbness and headaches. Psychiatric/Behavioral: Negative for confusion. All other systems reviewed and are negative. Negative in 10 essential Systems except as mentioned above and in the HPI. PAST MEDICAL HISTORY     Past Medical History:   Diagnosis Date    Asbestos exposure     Atrial fibrillation ([address]     takes xarelto    Basal cell carcinoma of nose     CAD (coronary artery disease)     CKD (chronic kidney disease) stage 2, GFR 60-89 ml/min     Hyperlipidemia     Hypertension     MDRO (multiple drug resistant organisms) resistance 2/19/2014    E. Coli urine    Nephrolithiasis     Osteoarthritis     Ovarian cyst     removed    Oxygen dependent     2 LNC mainly at night, during the day as needed    Palpitations     Peritoneal mesothelioma ([facility] [address] 1999    [facility] at [facility] PONV (postoperative nausea and vomiting)     Prolonged emergence from general anesthesia     SOB (shortness of breath) on exertion          SURGICAL HISTORY      has a past surgical history that includes Tubal ligation (1969); Hysterectomy (1981); Colonoscopy; Total hip arthroplasty (Right, 1990); Rectocele repair (1997); liver biopsy (2000); Upper gastrointestinal endoscopy; Photodynamic Therapy (April 2000 & Jan. 2002); Total hip arthroplasty (Left, 2003); Revision total hip arthroplasty (Right, 1998); Rotator cuff repair (2006); Ankle fracture surgery (Right, 2013); Cataract removal (Bilateral); Cardiac catheterization (2002); liver biopsy; Coronary angioplasty with stent (April 2002); [place] tooth extraction; Appendectomy (1964); Cholecystectomy (2005); Skin cancer excision (Bilateral, 2006);  Skin cancer excision (Left, Oct. 2014); nasal endoscopy (Bilateral, 11/25/2014); BIOPSY / LIGATION TEMPORAL ARTERY (Bilateral, 2/12/2021); Breast biopsy (Right); and Spine surgery (N/A, 4/15/2021). CURRENT MEDICATIONS       Current Discharge Medication List      CONTINUE these medications which have NOT CHANGED    Details   acetaminophen (TYLENOL) 500 MG tablet Take 500 mg by mouth every 6 hours as needed for Pain      isosorbide mononitrate (IMDUR) 60 MG extended release tablet Take 60 mg by mouth daily      oxyCODONE-acetaminophen (PERCOCET) 5-325 MG per tablet Take 1 tablet by mouth every 6 hours as needed for Pain for up to 10 days. Intended supply: 7 days. Take lowest dose possible to manage pain  Qty: 40 tablet, Refills: 0    Comments: Reduce doses taken as pain becomes manageable  Associated Diagnoses: Age-related osteoporosis with current pathological fracture with delayed healing, subsequent encounter;  Compression fracture of L1 vertebra with delayed healing, subsequent encounter      rivaroxaban (XARELTO) 20 MG TABS tablet Take 20 mg by mouth daily (with breakfast)       metoprolol succinate (TOPROL XL) 25 MG extended release tablet Take 25 mg by mouth daily      diphenhydrAMINE-APAP, sleep, (TYLENOL PM EXTRA STRENGTH)  MG tablet Take 1 tablet by mouth nightly as needed       torsemide (DEMADEX) 20 MG tablet Take 1 tablet by mouth 2 times daily Take twice a day for 1 months then go back to 20 mg daily  Qty: 120 tablet, Refills: 3      amoxicillin (AMOXIL) 500 MG capsule Take 2,000 mg by mouth as needed (dental procedures) Dental only      losartan (COZAAR) 100 MG tablet Take 100 mg by mouth daily      oxybutynin (DITROPAN XL) 15 MG extended release tablet Take 15 mg by mouth daily       polyethylene glycol (GLYCOLAX) 17 GM/SCOOP powder Take 17 g by mouth daily as needed (constipation)       Ascorbic Acid (VITAMIN C) 250 MG tablet Take 250 mg by mouth daily      ferrous sulfate (FLACO-IN-SOL) 75 (15 Fe) MG/ML solution Take 15 mg by mouth daily      omeprazole (PRILOSEC) 40 MG delayed release capsule Take 40 mg by mouth Daily       atorvastatin (LIPITOR) 40 MG tablet Take 40 mg by mouth daily       potassium chloride SA (K-DUR;KLOR-CON M) 20 MEQ tablet Take 1 tablet by mouth 2 times daily  Qty: 180 tablet, Refills: 3    Associated Diagnoses: Benign hypertension with CKD (chronic kidney disease), stage II; CKD (chronic kidney disease) stage 2, GFR 60-89 ml/min; Nephrolithiasis; Hypokalemia      rOPINIRole (REQUIP) 0.5 MG tablet Take 0.5 mg by mouth 4 times daily as needed       Vitamin D (CHOLECALCIFEROL) 1000 UNITS CAPS capsule Take 1,000 Units by mouth 2 times daily      predniSONE (DELTASONE) 5 MG tablet Take 20 mg by mouth daily       amLODIPine (NORVASC) 5 MG tablet Take 5 mg by mouth daily. Omega 3 1000 MG CAPS Take 1,000 mg by mouth daily       sertraline (ZOLOFT) 50 MG tablet Take 50 mg by mouth daily. ALLERGIES     is allergic to adhesive tape; meperidine; demerol; ultram [tramadol hcl]; zocor [simvastatin]; codeine; and fentanyl. FAMILY HISTORY     She indicated that the status of her mother is unknown. She indicated that the status of her father is unknown. She indicated that only one of her two brothers is alive. She indicated that the status of her neg hx is unknown.     family history includes Breast Cancer in her mother; Coronary Art Dis in her mother; Emphysema in her father; Heart Attack in her brother; Heart Failure in her brother; Prostate Cancer in her brother. SOCIAL HISTORY      reports that she has never smoked. She has never used smokeless tobacco. She reports current alcohol use. She reports that she does not use drugs. PHYSICAL EXAM     INITIAL VITALS:  height is 5' 2\" (1.575 m) and weight is 149 lb 14.6 oz (68 kg). Her oral temperature is 98.4 °F (36.9 °C). Her blood pressure is 101/58 (abnormal) and her pulse is 98. Her respiration is 18 and oxygen saturation is 96%. Physical Exam  Vitals signs and nursing note reviewed.    Constitutional:       General: She is not in acute distress. HENT:      Head: Normocephalic and atraumatic. Eyes:      Conjunctiva/sclera: Conjunctivae normal.      Pupils: Pupils are equal, round, and reactive to light. Neck:      Musculoskeletal: Neck supple. Cardiovascular:      Rate and Rhythm: Tachycardia present. Rhythm regularly irregular. Heart sounds: Normal heart sounds. No murmur. Pulmonary:      Effort: Pulmonary effort is normal. No respiratory distress. Breath sounds: Normal breath sounds. Abdominal:      General: Bowel sounds are normal. There is no distension. Palpations: Abdomen is soft. Tenderness: There is no abdominal tenderness. Musculoskeletal:         General: No tenderness. Lymphadenopathy:      Cervical: No cervical adenopathy. Skin:     General: Skin is warm and dry. Findings: No rash. Neurological:      Mental Status: She is alert and oriented to person, place, and time. Psychiatric:         Judgment: Judgment normal.           DIFFERENTIAL DIAGNOSIS/MDM:   78-year-old female about 1 week status post kyphoplasty presents with palpitations, elevated heart rate. She is afebrile, nontoxic, normal vital signs other than heart rate which is ranging from the 110s to 130s. It does look to be irregularly irregular. I am concerned for atrial fibrillation, PE, dehydration, metabolic abnormality, less likely postop infection. We will get cardiac work-up, give IV fluid bolus, monitor heart rate.   I am going to get a CT of the chest to evaluate for PE.    DIAGNOSTIC RESULTS     EKG: All EKG's are interpreted by the Emergency Department Physician who either signs or Co-signs this chart in the absence of a cardiologist.    EKG Interpretation    Interpreted by me    Rhythm: Irregularly irregular  Rate: 111  Axis: normal  Ectopy: none  Conduction: normal  ST Segments: no acute change  T Waves: no acute change  Q Waves: none    Clinical Impression nonspecific EKG, atrial fibrillation with RVR    RADIOLOGY:   I directly visualized the following  images and reviewed the radiologist interpretations:  CT CHEST PULMONARY EMBOLISM W CONTRAST   Final Result   No evidence of pulmonary embolism. Interval worsening severe pulmonary   artery hypertension. 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 Additional atelectatic or fibrotic changes and   small left pleural effusion noted. Additional stable findings, as above. Outpatient nonemergent renal   ultrasound suggested to assess the probable high density cysts on the left to   exclude solid lesion. Findings were discussed with Tank Milan at 3:11 pm on 4/21/2021.                  ED BEDSIDE ULTRASOUND:      LABS:  Labs Reviewed   TROPONIN - Abnormal; Notable for the following components:       Result Value    Troponin, High Sensitivity 18 (*)     All other components within normal limits   CBC WITH AUTO DIFFERENTIAL - Abnormal; Notable for the following components:    WBC 11.1 (*)     RBC 3.29 (*)     Hemoglobin 10.5 (*)     Hematocrit 32.3 (*)     RDW 17.1 (*)     Seg Neutrophils 84 (*)     Lymphocytes 5 (*)     Segs Absolute 9.32 (*)     Absolute Lymph # 0.56 (*)     All other components within normal limits   COMPREHENSIVE METABOLIC PANEL - Abnormal; Notable for the following components:    Glucose 159 (*)     CREATININE 1.00 (*)     Potassium 3.5 (*)     Alkaline Phosphatase 144 (*)     Total Protein 6.1 (*)     GFR Non- 53 (*)     All other components within normal limits   BRAIN NATRIURETIC PEPTIDE - Abnormal; Notable for the following components:    Pro-BNP 1,324 (*)     All other components within normal limits   TROPONIN - Abnormal; Notable for the following components:    Troponin, High Sensitivity 17 (*)     All other components within normal limits   PROTIME-INR   APTT   TSH WITH REFLEX   MAGNESIUM   TROPONIN   TROPONIN   COMPREHENSIVE METABOLIC PANEL W/ REFLEX TO MG FOR LOW K   CBC WITH AUTO DIFFERENTIAL   TROPONIN         EMERGENCY DEPARTMENT COURSE:   Vitals:    Vitals:    04/21/21 1545 04/21/21 1748 04/21/21 1800 04/21/21 1915   BP: 120/66 96/61 95/60 (!) 101/58   Pulse: 99 103 108 98   Resp: 18   18   Temp: 98.5 °F (36.9 °C)   98.4 °F (36.9 °C)   TempSrc: Oral   Oral   SpO2: 94%   96%   Weight:       Height:         CT chest shows no evidence of PE. Likely worsening pulmonary hypertension per reading radiologist.    Patient likely with symptoms secondary to atrial fibrillation with RVR. Patient is on Cardizem infusion. Patient was evaluated at the bedside by Dr. Hipolito Donahue. She did place admission orders. Heart rate has improved on the time of patient transfer to the floor. CRITICALCARE:      CONSULTS:  IP CONSULT TO PRIMARY CARE PROVIDER  IP CONSULT TO CARDIOLOGY      PROCEDURES:      FINAL IMPRESSION      1. Atrial fibrillation with RVR ([address]            DISPOSITION/PLAN   DISPOSITION Admitted 04/21/2021 01:35:58 PM        PATIENT REFERRED TO:  No follow-up provider specified.     DISCHARGE MEDICATIONS:  Current Discharge Medication List          (Please note that portions ofthis note were completed with a voice recognition program.  Efforts were made to edit the dictations but occasionally words are mis-transcribed.)    Clarissa Mcnally DO  Attending Emergency Physician          Clarissa Mcnally DO  04/21/21 9509

## 2021-04-21 NOTE — H&P
Historical Provider, MD   diphenhydrAMINE-APAP, sleep, (TYLENOL PM EXTRA STRENGTH PO) Take by mouth prn    Historical Provider, MD   Acetaminophen (TYLENOL EXTRA STRENGTH PO) Take by mouth prn    Historical Provider, MD   torsemide (DEMADEX) 20 MG tablet Take 1 tablet by mouth 2 times daily Take twice a day for 1 months then go back to 20 mg daily 3/4/21   Estephania Albarado MD   amoxicillin (AMOXIL) 500 MG capsule Dental only 9/21/20   Historical Provider, MD   losartan (COZAAR) 100 MG tablet Take 100 mg by mouth daily 10/20/20   Historical Provider, MD   oxybutynin (DITROPAN XL) 15 MG extended release tablet TAKE 1 TABLET BY MOUTH ONCE DAILY 7/30/20   Historical Provider, MD   polyethylene glycol (GLYCOLAX) 17 GM/SCOOP powder MIX & DRINK 17 GRAMS BY MOUTH DAILY 7/30/20   Historical Provider, MD   Ascorbic Acid (VITAMIN C) 250 MG tablet Take 250 mg by mouth daily    Historical Provider, MD   ferrous sulfate (FLACO-IN-SOL) 75 (15 Fe) MG/ML solution Take 15 mg by mouth daily    Historical Provider, MD   meclizine (ANTIVERT) 25 MG tablet Take 25 mg by mouth 2/8/18   Historical Provider, MD   omeprazole (PRILOSEC) 20 MG delayed release capsule Take 20 mg by mouth Daily  12/3/16   Historical Provider, MD   isosorbide dinitrate (ISORDIL) 30 MG tablet Take 30 mg by mouth 3 times daily  2/14/17   Historical Provider, MD   atorvastatin (LIPITOR) 40 MG tablet Take 40 mg by mouth daily  9/22/16   Historical Provider, MD   potassium chloride SA (K-DUR;KLOR-CON M) 20 MEQ tablet Take 1 tablet by mouth 2 times daily 3/10/16   Estephania Albarado MD   rOPINIRole (REQUIP) 0.5 MG tablet Take 0.5 mg by mouth 4 times daily as needed     Historical Provider, MD   Vitamin D (CHOLECALCIFEROL) 1000 UNITS CAPS capsule Take 1,000 Units by mouth 2 times daily    Historical Provider, MD   predniSONE (DELTASONE) 5 MG tablet Take 30 mg by mouth daily     Historical Provider, MD   amLODIPine (NORVASC) 5 MG tablet Take 5 mg by mouth daily. headaches. Hematological: Does not bruise/bleed easily. Psychiatric/Behavioral: Negative for agitation, behavioral problems and suicidal ideas. The patient is not hyperactive. Code Status:  Prior    Physical Exam:     Vitals:  /64   Pulse 88   Temp 98.7 °F (37.1 °C) (Oral)   Resp 19   Wt 155 lb (70.3 kg)   SpO2 92%   BMI 28.81 kg/m²   Temp (24hrs), Av.5 °F (36.9 °C), Min:98.2 °F (36.8 °C), Max:98.7 °F (37.1 °C)        Physical Exam  Vitals signs reviewed. HENT:      Head: Normocephalic. Right Ear: External ear normal.      Left Ear: External ear normal.      Nose: Nose normal.      Mouth/Throat:      Mouth: Mucous membranes are moist.      Pharynx: Oropharynx is clear. Eyes:      Conjunctiva/sclera: Conjunctivae normal.   Neck:      Musculoskeletal: Normal range of motion and neck supple. Cardiovascular:      Rate and Rhythm: Tachycardia present. Rhythm irregular. Pulses: Normal pulses. Heart sounds: Normal heart sounds. Pulmonary:      Effort: Pulmonary effort is normal.      Breath sounds: Normal breath sounds. Abdominal:      General: Bowel sounds are normal.      Palpations: Abdomen is soft. Musculoskeletal:         General: No deformity. Right lower leg: Edema (1+) present. Left lower leg: Edema (1+) present. Skin:     General: Skin is warm. Capillary Refill: Capillary refill takes less than 2 seconds. Coloration: Skin is not jaundiced. Neurological:      General: No focal deficit present. Mental Status: She is alert. Mental status is at baseline.    Psychiatric:         Mood and Affect: Mood normal.         Behavior: Behavior normal.               Data:     Recent Results (from the past 24 hour(s))   Troponin    Collection Time: 21 12:48 PM   Result Value Ref Range    Troponin, High Sensitivity 18 (H) 0 - 14 ng/L    Troponin T NOT REPORTED <0.03 ng/mL    Troponin Interp NOT REPORTED    CBC Auto Differential    Collection 04/21/21 12:48 PM   Result Value Ref Range    Pro-BNP 1,324 (H) <300 pg/mL    BNP Interpretation Pro-BNP Reference Range:    Protime-INR    Collection Time: 04/21/21 12:48 PM   Result Value Ref Range    Protime 13.4 11.8 - 14.6 sec    INR 1.0    APTT    Collection Time: 04/21/21 12:48 PM   Result Value Ref Range    PTT 29.9 24.0 - 36.0 sec   TSH w/reflex to FT4    Collection Time: 04/21/21 12:48 PM   Result Value Ref Range    TSH 0.55 0.30 - 5.00 mIU/L   Magnesium    Collection Time: 04/21/21 12:48 PM   Result Value Ref Range    Magnesium 1.8 1.6 - 2.6 mg/dL   EKG 12 Lead    Collection Time: 04/21/21  1:01 PM   Result Value Ref Range    Ventricular Rate 111 BPM    Atrial Rate 100 BPM    QRS Duration 90 ms    Q-T Interval 304 ms    QTc Calculation (Bazett) 413 ms    R Axis 16 degrees    T Axis 24 degrees       Assesment:     Primary Problem  Atrial fibrillation with RVR (HCC)    Principal Problem:    Atrial fibrillation with RVR (HCC)  Active Problems:    Hypertension    Mixed hyperlipidemia    Chronic diastolic heart failure (HCC)    Gastroesophageal reflux disease without esophagitis    Pulmonary hypertension (HCC)  Resolved Problems:    * No resolved hospital problems. *      Plan:     1. IV Cardizem drip for A. fib RVR  2. Monitor Trop level  3. Consult cardiology  4. Lasix 40 mg IV daily  5. Monitor CBC CMP  6. Hold Norvasc  7. Metoprolol 25 mg XL p.o. daily  8. DVT prophylaxis on Xarelto  9. EPCs  10.  check and replace electrolytes per sliding scale  11.   restart home medications        Electronically signed by Michael Sexton MD     Copy sent to Dr. Irene Sarabia DO

## 2021-04-21 NOTE — ED NOTES
Bed: 03  Expected date:   Expected time:   Means of arrival:   Comments:  [address], RN  04/21/21 8023

## 2021-04-21 NOTE — PROGRESS NOTES
Medication History completed:    New medications: none    Medications discontinued: meclizine, cyclobenzaprine    Changes to dosing:   Prednisone changed to 20 mg (four 5 mg tablets) daily  Omeprazole changed to 40 mg daily  Isosorbide mononitrate ER changed to 60 mg daily    Stated allergies: As listed    Other pertinent information: Medications confirmed with [employer] and [employer]. The patient has received both doses of Moderna COVID-19 vaccination (1/8/21 and 2/5/21).      Thank you,  Josi Bird, PharmD, BCPS  [phone]

## 2021-04-21 NOTE — PLAN OF CARE
Patient heart rate between 120-160. Connected to monitor and patient is in an Atrial Fib. Dr.K. Fair aware.  informed and would like patient taken to ER. /61, O2 sat 94%, and RR 16.

## 2021-04-22 NOTE — PROGRESS NOTES
Progress Note    4/22/2021   1:59 PM    Name:  Shani He  MRN:    [id number]     Acct:     [de-identified]   Room:  2114/2114-01  IP Day: 1     Admit Date: 4/21/2021 12:48 PM  PCP: Cristin Clark DO    Subjective:     C/C:   Chief Complaint   Patient presents with    Tachycardia     Pt was at pain clinic today for f/u and staff noticed heart rate irregular and high. Interval History: Status: not changed. Patient denies chest pain shortness of breath or palpitations. Heart rate and blood pressure stable. Recent labs reviewed sodium 3.6, hemoglobin 8.6    ROS:   all 10 systems reviewed and are negative except as noted    Review of Systems   Constitutional: Negative for chills and fatigue. HENT: Negative for drooling, mouth sores, sneezing and trouble swallowing. Eyes: Negative for redness and itching. Respiratory: Negative for cough, chest tightness and shortness of breath. Cardiovascular: Negative for chest pain, palpitations and leg swelling. Gastrointestinal: Negative for abdominal pain, blood in stool, nausea and vomiting. Endocrine: Negative for heat intolerance and polyphagia. Genitourinary: Negative for difficulty urinating, flank pain and pelvic pain. Musculoskeletal: Negative for arthralgias, joint swelling and neck stiffness. Skin: Negative for color change and pallor. Allergic/Immunologic: Negative for food allergies. Neurological: Negative for dizziness, seizures and headaches. Hematological: Does not bruise/bleed easily. Psychiatric/Behavioral: Negative for agitation, behavioral problems and suicidal ideas. The patient is not hyperactive. Medications: Allergies:    Allergies   Allergen Reactions    Adhesive Tape Rash    Meperidine Nausea Only    Demerol Nausea Only    Ultram [Tramadol Hcl]      Hot flashes    Zocor [Simvastatin]      Muscle aches    Codeine      \"out of it\"    Fentanyl Rash       Current Meds: torsemide (DEMADEX) tablet 20 mg, Daily  [START ON 4/23/2021] ferrous sulfate (IRON 325) tablet 325 mg, Daily with breakfast  dilTIAZem (CARDIZEM) tablet 30 mg, 2 times per day  midodrine (PROAMATINE) tablet 2.5 mg, TID WC  atorvastatin (LIPITOR) tablet 40 mg, Daily  pantoprazole (PROTONIX) tablet 40 mg, QAM AC  oxybutynin (DITROPAN-XL) extended release tablet 15 mg, Daily  oxyCODONE-acetaminophen (PERCOCET) 5-325 MG per tablet 1 tablet, Q6H PRN  polyethylene glycol (GLYCOLAX) packet 17 g, Daily  potassium chloride (KLOR-CON M) extended release tablet 20 mEq, BID  predniSONE (DELTASONE) tablet 5 mg, Daily  rivaroxaban (XARELTO) tablet 20 mg, Daily  rOPINIRole (REQUIP) tablet 0.5 mg, 4x Daily PRN  sertraline (ZOLOFT) tablet 50 mg, Daily  Vitamin D (CHOLECALCIFEROL) tablet 1,000 Units, BID  sodium chloride flush 0.9 % injection 10 mL, 2 times per day  sodium chloride flush 0.9 % injection 10 mL, PRN  0.9 % sodium chloride infusion, PRN  potassium chloride (KLOR-CON M) extended release tablet 40 mEq, PRN    Or  potassium bicarb-citric acid (EFFER-K) effervescent tablet 40 mEq, PRN    Or  potassium chloride 10 mEq/100 mL IVPB (Peripheral Line), PRN  magnesium sulfate 1000 mg in dextrose 5% 100 mL IVPB, PRN  promethazine (PHENERGAN) tablet 12.5 mg, Q6H PRN    Or  ondansetron (ZOFRAN) injection 4 mg, Q6H PRN  magnesium hydroxide (MILK OF MAGNESIA) 400 MG/5ML suspension 30 mL, Daily PRN  acetaminophen (TYLENOL) tablet 650 mg, Q6H PRN    Or  acetaminophen (TYLENOL) suppository 650 mg, Q6H PRN  sodium chloride flush 0.9 % injection 10 mL, PRN  acetaminophen (TYLENOL) tablet 500 mg, Nightly PRN    And  diphenhydrAMINE (BENADRYL) tablet 25 mg, Nightly PRN  amiodarone (CORDARONE) tablet 200 mg, BID  metoprolol tartrate (LOPRESSOR) tablet 25 mg, BID  isosorbide mononitrate (IMDUR) extended release tablet 30 mg, Daily        Data:     Code Status:  Full Code    Family History   Problem Relation Age of Onset    Heart Failure Brother     Heart Attack Brother triple bypass    Prostate Cancer Brother     Coronary Art Dis Mother     Breast Cancer Mother         dx'd in late 46s   [place] Emphysema Father     Colon Cancer Neg Hx     Diabetes Neg Hx     Eclampsia Neg Hx     Hypertension Neg Hx     Ovarian Cancer Neg Hx      Labor Neg Hx     Spont Abortions Neg Hx     Stroke Neg Hx        Social History     Socioeconomic History    Marital status:      Spouse name: Not on file    Number of children: Not on file    Years of education: Not on file    Highest education level: Not on file   Occupational History    Not on file   Social Needs    Financial resource strain: Not on file    Food insecurity     Worry: Not on file     Inability: Not on file    Transportation needs     Medical: Not on file     Non-medical: Not on file   Tobacco Use    Smoking status: Never Smoker    Smokeless tobacco: Never Used   Substance and Sexual Activity    Alcohol use: Yes     Comment: social    Drug use: No    Sexual activity: Not Currently   Lifestyle    Physical activity     Days per week: Not on file     Minutes per session: Not on file    Stress: Not on file   Relationships    Social connections     Talks on phone: Not on file     Gets together: Not on file     Attends Mosque service: Not on file     Active member of club or organization: Not on file     Attends meetings of clubs or organizations: Not on file     Relationship status: Not on file    Intimate partner violence     Fear of current or ex partner: Not on file     Emotionally abused: Not on file     Physically abused: Not on file     Forced sexual activity: Not on file   Other Topics Concern    Not on file   Social History Narrative    Not on file       I/O (24Hr):     Intake/Output Summary (Last 24 hours) at 2021 1359  Last data filed at 2021 2350  Gross per 24 hour   Intake 325 ml   Output --   Net 325 ml     Radiology:  Xr Lumbar Spine (min 4 Views)    Result Date: 4/19/2021  1. L1 compression deformity with interval kyphoplasty. 2. No acute osseous abnormality by radiograph. 3. Multilevel degenerative changes. Ct Chest Pulmonary Embolism W Contrast    Result Date: 4/21/2021  No evidence of pulmonary embolism. Interval worsening severe pulmonary artery hypertension. Hypoventilation accentuating mild ground-glass opacities in the upper lung zones and additional findings seen recently on CT high-resolution, likely combination small airway disease, bronchitis and possibly nonspecific interstitial pneumonitis. Additional atelectatic or fibrotic changes and small left pleural effusion noted. Additional stable findings, as above. Outpatient nonemergent renal ultrasound suggested to assess the probable high density cysts on the left to exclude solid lesion. Findings were discussed with Dorinda Crisostomo at 3:11 pm on 4/21/2021.        Labs:  Recent Results (from the past 24 hour(s))   TROPONIN    Collection Time: 04/21/21  7:51 PM   Result Value Ref Range    Troponin, High Sensitivity 17 (H) 0 - 14 ng/L    Troponin T NOT REPORTED <0.03 ng/mL    Troponin Interp NOT REPORTED    TROPONIN    Collection Time: 04/22/21  2:08 AM   Result Value Ref Range    Troponin, High Sensitivity 17 (H) 0 - 14 ng/L    Troponin T NOT REPORTED <0.03 ng/mL    Troponin Interp NOT REPORTED    Comprehensive Metabolic Panel w/ Reflex to MG    Collection Time: 04/22/21  6:03 AM   Result Value Ref Range    Glucose 175 (H) 70 - 99 mg/dL    BUN 25 (H) 8 - 23 mg/dL    CREATININE 0.88 0.50 - 0.90 mg/dL    Bun/Cre Ratio NOT REPORTED 9 - 20    Calcium 8.8 8.6 - 10.4 mg/dL    Sodium 144 135 - 144 mmol/L    Potassium 3.6 (L) 3.7 - 5.3 mmol/L    Chloride 108 (H) 98 - 107 mmol/L    CO2 27 20 - 31 mmol/L    Anion Gap 9 9 - 17 mmol/L    Alkaline Phosphatase 118 (H) 35 - 104 U/L    ALT 21 5 - 33 U/L    AST 18 <32 U/L    Total Bilirubin 0.60 0.3 - 1.2 mg/dL    Total Protein 5.2 (L) 6.4 - 8.3 g/dL    Albumin 2.9 (L) 3.5 - 5.2 g/dL    Albumin/Globulin Ratio NOT REPORTED 1.0 - 2.5    GFR Non-African American >60 >60 mL/min    GFR African American >60 >60 mL/min    GFR Comment          GFR Staging NOT REPORTED    CBC auto differential    Collection Time: 21  6:03 AM   Result Value Ref Range    WBC 6.3 3.5 - 11.0 k/uL    RBC 2.66 (L) 4.0 - 5.2 m/uL    Hemoglobin 8.6 (L) 12.0 - 16.0 g/dL    Hematocrit 25.8 (L) 36 - 46 %    MCV 96.8 80 - 100 fL    MCH 32.4 26 - 34 pg    MCHC 33.5 31 - 37 g/dL    RDW 17.0 (H) 11.5 - 14.9 %    Platelets 604 063 - 798 k/uL    MPV 7.5 6.0 - 12.0 fL    NRBC Automated NOT REPORTED per 100 WBC    Differential Type NOT REPORTED     Immature Granulocytes NOT REPORTED 0 %    Absolute Immature Granulocyte NOT REPORTED 0.00 - 0.30 k/uL    WBC Morphology NOT REPORTED     RBC Morphology NOT REPORTED     Platelet Estimate NOT REPORTED     Seg Neutrophils 83 (H) 36 - 66 %    Lymphocytes 10 (L) 24 - 44 %    Monocytes 7 1 - 7 %    Eosinophils % 0 0 - 4 %    Basophils 0 0 - 2 %    Segs Absolute 5.20 1.3 - 9.1 k/uL    Absolute Lymph # 0.60 (L) 1.0 - 4.8 k/uL    Absolute Mono # 0.40 0.1 - 1.3 k/uL    Absolute Eos # 0.00 0.0 - 0.4 k/uL    Basophils Absolute 0.00 0.0 - 0.2 k/uL   Troponin    Collection Time: 21  6:03 AM   Result Value Ref Range    Troponin, High Sensitivity 17 (H) 0 - 14 ng/L    Troponin T NOT REPORTED <0.03 ng/mL    Troponin Interp NOT REPORTED        Physical Examination:        Vitals:  /72   Pulse 87   Temp 97.7 °F (36.5 °C) (Oral)   Resp 18   Ht 5' 2\" (1.575 m)   Wt 150 lb 5.7 oz (68.2 kg)   SpO2 93%   BMI 27.50 kg/m²   Temp (24hrs), Av.4 °F (36.9 °C), Min:97.7 °F (36.5 °C), Max:99.4 °F (37.4 °C)    No results for input(s): POCGLU in the last 72 hours. Physical Exam  Vitals signs reviewed. HENT:      Head: Normocephalic.       Right Ear: External ear normal.      Left Ear: External ear normal.      Nose: Nose normal.      Mouth/Throat:      Mouth: Mucous membranes are nausea and vomiting)     Prolonged emergence from general anesthesia     SOB (shortness of breath) on exertion         Plan:        1. Cardizem 30 mg p.o. twice daily  2. Discontinue Cardizem 60 mg every 6 hours  3. Metoprolol 25 mg p.o. twice daily  1. Imdur XL 30 mg daily  2. Start ferrous sulfate 325 mg p.o. daily  3. Discussed with cardiologist Dr. Narendra Coyle  4. Start midodrine 2.5 mg p.o. 3 times a day  5. Cardiology input noted  6. Demadex 20 mg p.o. daily  7. DVT Prophylaxis on Xarelto  8. EPCs  9. PT/OT to evaluate and treat  10. Pain control  11. Replace electrolytes as per sliding scale  12. Home medications reviewed and appropriate medications continued  13.  Reviewed labs and imaging studies from last 24 hours and results explained to patient      Electronically signed by Ysabel Kimble MD

## 2021-04-22 NOTE — PLAN OF CARE
Problem: Skin Integrity:  Goal: Will show no infection signs and symptoms  Description: Will show no infection signs and symptoms  Outcome: Ongoing  Note: No new signs or symptoms of infection noted this shift     Problem: Skin Integrity:  Goal: Absence of new skin breakdown  Description: Absence of new skin breakdown  Outcome: Ongoing  Note: No new alterations in skin integrity. Problem: Falls - Risk of:  Goal: Will remain free from falls  Description: Will remain free from falls  Outcome: Ongoing  Note: Patient remained free from falls. Call light within reach.

## 2021-04-22 NOTE — FLOWSHEET NOTE
Patient shared all she has gone through medically in recent times and said \"enough! \" She was in good spirits and is hopeful that new meds will control her cardiac situation. She is going to [place] this weekend with her girls to see a friend and is looking forward to getting away. She appreciated writer's listening presence and welcomed prayer. 04/22/21 1958   Encounter Summary   Services provided to: Patient   Referral/Consult From: [address] [place] [address] Visiting   (4-22-21)   Complexity of Encounter Moderate   Length of Encounter 15 minutes   Spiritual Assessment Completed Yes   Spiritual/Episcopalian   Type Spiritual support   Assessment Calm; Approachable   Intervention Active listening;Explored feelings, thoughts, concerns;Explored coping resources;Prayer;Sustaining presence/ Ministry of presence; Discussed illness/injury and it's impact   Outcome Expressed gratitude;Engaged in conversation;Expressed feelings/needs/concerns;Coping;Receptive

## 2021-04-22 NOTE — PROGRESS NOTES
Date:   4/22/2021  Patient name: Isamar Khan  Date of admission:  4/21/2021 12:48 PM  MRN:   [id number]  YOB: 1935  PCP: Nestor Rose DO    Reason for Admission: Atrial fibrillation with RVR [facility]) [I48.91]    Cardiology consult: A. fib with RVR       Impression      A. fib with RVR  Diastolic CHF  Hypertension  Hyperlipidemia  Exertional shortness of breath, hypoxia  History of CAD, stent placement LAD 2002  Polymyalgia rheumatica  Moderate aortic regurgitation  Giant cell arteritis  Dry mouth, dry eyes  Multiple hip surgeries, bilateral  Chronic pain syndrome  L1 kyphoplasty 4/15/2021     ECG 4/21/2021  A. fib/a flutter with RVR heart rate 116, probable lateral MI or     Lab work 4/21/2021  \Sodium 141, potassium 3.5, BUN 23, creatinine 1.00, GFR 53, glucose 159  proBNP 1324, high-sensitivity troponin XVIII  WBC 11.1, hemoglobin 10.5, platelets 926     History of present illness  81-year-old female who recently underwent L1 kyphoplasty was seen by her physician in office today and she was found in A. fib with RVR. She has a past medical history of paroxysmal A. fib with RVR.     Current evaluation 4/22/2021    Patient seen and examined  She was resting on the bed did not appear in any distress  ECG monitor A. fib heart rate 90    Patient did not take amiodarone because of side effects  She is running low blood pressure  She wants to go home she said she has appointment to have her haircut tomorrow and she is going back to [place]    Started on midodrine      Medications:   Scheduled Meds:   torsemide  20 mg Oral Daily    [START ON 4/23/2021] ferrous sulfate  325 mg Oral Daily with breakfast    dilTIAZem  30 mg Oral 2 times per day    midodrine  5 mg Oral TID WC    atorvastatin  40 mg Oral Daily    pantoprazole  40 mg Oral QAM AC    oxybutynin  15 mg Oral Daily    polyethylene glycol  17 g Oral Daily    potassium chloride  20 mEq Oral BID    predniSONE  5 mg Oral Daily    rivaroxaban  20 mg Oral Daily    sertraline  50 mg Oral Daily    Vitamin D  1,000 Units Oral BID    sodium chloride flush  10 mL Intravenous 2 times per day    amiodarone  200 mg Oral BID    metoprolol tartrate  25 mg Oral BID    isosorbide mononitrate  30 mg Oral Daily     Continuous Infusions:   sodium chloride       CBC:   Recent Labs     04/21/21  1248 04/22/21  0603   WBC 11.1* 6.3   HGB 10.5* 8.6*    263     BMP:    Recent Labs     04/21/21  1248 04/22/21  0603    144   K 3.5* 3.6*    108*   CO2 25 27   BUN 23 25*   CREATININE 1.00* 0.88   GLUCOSE 159* 175*     Hepatic:   Recent Labs     04/21/21  1248 04/22/21  0603   AST 19 18   ALT 21 21   BILITOT 0.68 0.60   ALKPHOS 144* 118*     Troponin: No results for input(s): TROPONINI in the last 72 hours. BNP: No results for input(s): BNP in the last 72 hours. Lipids: No results for input(s): CHOL, HDL in the last 72 hours. Invalid input(s): LDLCALCU  INR:   Recent Labs     04/21/21  1248   INR 1.0       Objective:   Vitals: /72   Pulse 87   Temp 97.7 °F (36.5 °C) (Oral)   Resp 18   Ht 5' 2\" (1.575 m)   Wt 150 lb 5.7 oz (68.2 kg)   SpO2 93%   BMI 27.50 kg/m²   General appearance: alert and cooperative with exam  HEENT: Head: Normal, normocephalic, atraumatic. Neck: supple, symmetrical, trachea midline  Lungs: Chest expansion is poor, breath sounds are diminished, basal crackles  Heart: irregularly irregular rhythm  Abdomen: Obese bowel sounds present  Extremities: No calf tenderness  Neurologic: Mental status: Alert, oriented, thought content appropriate    EKG: atrial fibrillation, rate 90. Assessment / Acute Cardiac Problems:   Patient admitted with A. fib RVR, no chest pain no dizziness  Borderline abnormal high-sensitivity troponin significance not clear  History of paroxysmal A.  Fib  Recent L1 kyphoplasty  Giant cell arteritis, polymyalgia rheumatica       Patient Active Problem List:     Hypertension

## 2021-04-22 NOTE — PROGRESS NOTES
[address] [facility]    Occupational Therapy Evaluation  Date: 21  Patient Name: Tyesha Rosales       Room: 4-  MRN: [id number]  Account: [de-identified]   : 1935  (81 y.o.) Gender: female     Discharge Recommendations: The patient may need non-skilled ADL assistance after discharge. OT Equipment Recommendations  Equipment Needed: No          Additional Pertinent Hx: REcent L1 Kyphoplasty    Treatment Diagnosis: Altered ADL status realted to back pain / surgery  Past Medical History:  has a past medical history of Asbestos exposure, Atrial fibrillation ([address] Basal cell carcinoma of nose, CAD (coronary artery disease), CKD (chronic kidney disease) stage 2, GFR 60-89 ml/min, Hyperlipidemia, Hypertension, MDRO (multiple drug resistant organisms) resistance, Nephrolithiasis, Osteoarthritis, Ovarian cyst, Oxygen dependent, Palpitations, Peritoneal mesothelioma ([address] PONV (postoperative nausea and vomiting), Prolonged emergence from general anesthesia, and SOB (shortness of breath) on exertion. Past Surgical History:   has a past surgical history that includes Tubal ligation (); Hysterectomy (); Colonoscopy; Total hip arthroplasty (Right, ); Rectocele repair (); liver biopsy (); Upper gastrointestinal endoscopy; Photodynamic Therapy (2000 & 2002); Total hip arthroplasty (Left, ); Revision total hip arthroplasty (Right, ); Rotator cuff repair (); Ankle fracture surgery (Right, ); Cataract removal (Bilateral); Cardiac catheterization (); liver biopsy; Coronary angioplasty with stent (2002); Bowman tooth extraction; Appendectomy (); Cholecystectomy (); Skin cancer excision (Bilateral, ); Skin cancer excision (Left, Oct. 2014); nasal endoscopy (Bilateral, 2014); BIOPSY / LIGATION TEMPORAL ARTERY (Bilateral, 2021);  Breast biopsy (Right); and Spine surgery (N/A, 4/15/2021).     Restrictions  Restrictions/Precautions: General Precautions  Implants present? : Metal implants  Spinal Precautions: Recent L1 Kyphoplasty  Other position/activity restrictions: Recent L1 Kyphoplasty  Required Braces or Orthoses?: No     Vitals  Temp: 97.7 °F (36.5 °C)  Pulse: 87  Resp: 18  BP: 107/72  Height: 5' 2\" (157.5 cm)  Weight: 150 lb 5.7 oz (68.2 kg)  BMI (Calculated): 27.6  Oxygen Therapy  SpO2: 93 %  Pulse Oximeter Device Mode: Intermittent  Pulse Oximeter Device Location: Finger  O2 Device: None (Room air)  Level of Consciousness: Alert (0)    Subjective     Overall Orientation Status: Within Normal Limits  Vision  Vision: Within Functional Limits  Hearing  Hearing: Within functional limits  Social/Functional History  Lives With: Spouse  Type of Home: (margy (2 story))  Home Layout: Two level, Able to Live on Main level with bedroom/bathroom  Home Access: Stairs to enter with rails  Entrance Stairs - Number of Steps: 2  Entrance Stairs - Rails: Both  Bathroom Shower/Tub: Walk-in shower, Doors  Bathroom Toilet: Handicap height  Bathroom Equipment: Built-in shower seat, Grab bars around toilet  Bathroom Accessibility: Accessible  Home Equipment: Wheelchair-manual, Crutches, Oxygen, Reacher, Long-handled shoehorn(O2 at 2 L PRN)  Receives Help From: Family  ADL Assistance: Independent  Homemaking Assistance: Needs assistance(daugthers assisting w/ laundry, bringing in meals, cleaning and grocery shopping, spouse assists as needed)  Homemaking Responsibilities: No  Ambulation Assistance: Independent(uses crutches)  Transfer Assistance: Independent  Active : Yes  Mode of Transportation: SUV, Car  Occupation: Retired  IADL Comments: sleeps in a flat bed  Additional Comments: large supportive family assist at D/C  Pain Assessment  Pain Assessment: 0-10  Pain Level: 2  Pain Type: Acute pain  Pain Location: Back    Objective      Cognition  Overall Cognitive Status: WFL       ADL  Feeding: Independent  Grooming: Independent  UE Bathing: Independent  LE Bathing: Setup  UE Dressing: Modified independent   LE Dressing: Setup  Toileting: Supervision    UE Function           LUE Strength  Gross LUE Strength: WFL  L Hand General: 4+/5     LUE Tone: Normotonic     LUE AROM (degrees)  LUE AROM : WFL     Left Hand AROM (degrees)  Left Hand AROM: WFL  RUE Strength  Gross RUE Strength: WFL  R Hand General: 4+/5      RUE Tone: Normotonic     RUE AROM (degrees)  RUE AROM : WFL     Right Hand AROM (degrees)  Right Hand AROM: WFL    Fine Motor Skills                              Mobility                                Assessment  Performance deficits / Impairments: Decreased functional mobility , Decreased ADL status, Decreased endurance, Decreased safe awareness  Treatment Diagnosis: Altered ADL status realted to back pain / surgery  Prognosis: Good  Decision Making: Low Complexity  History:  Moderate chart review  Exam: 4 areas of alterd performance  REQUIRES OT FOLLOW UP: Yes  Discharge Recommendations: Home with assist PRN  Activity Tolerance: Patient limited by fatigue, Patient limited by pain         Functional Outcome Measures  AM-PAC Daily Activity Inpatient   How much help for putting on and taking off regular lower body clothing?: A Little  How much help for Bathing?: A Little  How much help for Toileting?: A Little  How much help for putting on and taking off regular upper body clothing?: None  How much help for taking care of personal grooming?: None  How much help for eating meals?: None  AM-[facility] Inpatient Daily Activity Raw Score: 21  AM-PAC Inpatient ADL T-Scale Score : 44.27  ADL Inpatient CMS 0-100% Score: 32.79  ADL Inpatient CMS G-Code Modifier : CJ       Goals  Patient Goals   Patient goals : Return Home  Short term goals  Time Frame for Short term goals: 1-2 days  Short term goal 1: D/V understanding of Back safety and modified care strategies related to L1 Kyphoplasty    Plan  Safety Devices  Safety Devices in place: Yes  Type of devices: Left in bed, Call light within reach     Plan  Times per week: 1-2 sessions  Times per day: Daily  Specific instructions for Next Treatment: Back safety for ADL tasks  Current Treatment Recommendations: Endurance Training, Patient/Caregiver Education & Training, Self-Care / ADL, Safety Education & Training, Functional Mobility Training  OT Education  OT Education: OT Role, Home Exercise Program, Precautions, ADL Adaptive Strategies    OT Equipment Recommendations  Equipment Needed: No  OT Individual Minutes  Time In: 1300  Time Out: 1320  Minutes: 20    Electronically signed by Susana Moore OT on 4/22/21 at 4:32 PM EDT

## 2021-04-22 NOTE — CARE COORDINATION
CASE MANAGEMENT NOTE:    Admission Date:  4/21/2021 Janet Berry is a 81 y.o.  female    Admitted for : Atrial fibrillation with RVR ([facility] [address] [I48.91]    Met with:  Patient    PCP:  Sidney Guadalupe                                Insurance:  medicare      Is patient alert and oriented at time of discussion:  No    Current Residence/ Living Arrangements:  independently at home             Current Services PTA:  No    Does patient go to outpatient dialysis: No  If yes, location and chair time:     Is patient agreeable to VNS: No    Freedom of choice provided:  NA    List of [address] provided: NA    VNS chosen:  No    DME:  Walking crutches    Home Oxygen: No    Nebulizer: No    CPAP/BIPAP: No    Supplier: N/A    Potential Assistance Needed: No    SNF needed: No    Freedom of choice and list provided: NA    Pharmacy:  [facility]       Does Patient want to use MEDS to BEDS? No    Is patient currently receiving oral anticoagulation therapy? Yes, [place]    Is the Patient an [facility] with Readmission Risk Score greater than 14%? No  If yes, pt needs a follow up appointment made within 7 days. Family Members/Caregivers that pt would like involved in their care:    Yes    If yes, list name here:  Daughter Collette Ripple    Transportation Provider:  Family             Discharge Plan:  4/22/21 - Medicare - Patient is from home with Spouse. DME: Walking Crutches, Denies need for VNS. Xarelkyra PTA, Plan is for home with no needs. Will follow . //pf                 Electronically signed by: Marlene Spence RN on 4/22/2021 at 12:31 PM

## 2021-04-22 NOTE — PROGRESS NOTES
Physical Therapy    Facility/Department: [facility] PROGRESSIVE CARE  Initial Assessment    NAME: Jessica Stanley  : 1935  MRN: [id number]    Date of Service: 2021    Discharge Recommendations:  Home with assist PRN   PT Equipment Recommendations  Equipment Needed: No    Assessment   Body structures, Functions, Activity limitations: Decreased endurance  Assessment: pt demonstrates independent bed mobility and transfers. Pt demonstrates good balance using crutches  Treatment Diagnosis: A-Fib w/ RVR  Specific instructions for Next Treatment: D/C PT  Prognosis: Good  Decision Making: Medium Complexity  History: admitted due to A-FIB w/ RVR  Exam: ROM, MMT, balance and mobility assessments  Clinical Presentation: MOD I using crutches 150', MOD I for bed mobility, independent sit <> stand  PT Education: Plan of Care  Barriers to Learning: none  No Skilled PT: At baseline function  REQUIRES PT FOLLOW UP: No  Activity Tolerance  Activity Tolerance: Patient limited by endurance       Patient Diagnosis(es): The encounter diagnosis was Atrial fibrillation with RVR ([facility] [address] has a past medical history of Asbestos exposure, Atrial fibrillation ([address] Basal cell carcinoma of nose, CAD (coronary artery disease), CKD (chronic kidney disease) stage 2, GFR 60-89 ml/min, Hyperlipidemia, Hypertension, MDRO (multiple drug resistant organisms) resistance, Nephrolithiasis, Osteoarthritis, Ovarian cyst, Oxygen dependent, Palpitations, Peritoneal mesothelioma ([address] PONV (postoperative nausea and vomiting), Prolonged emergence from general anesthesia, and SOB (shortness of breath) on exertion. has a past surgical history that includes Tubal ligation (); Hysterectomy (); Colonoscopy; Total hip arthroplasty (Right, ); Rectocele repair (); liver biopsy (); Upper gastrointestinal endoscopy; Photodynamic Therapy (2000 & 2002); Total hip arthroplasty (Left, );  Revision total hip arthroplasty (Right, 1998); Rotator cuff repair (2006); Ankle fracture surgery (Right, 2013); Cataract removal (Bilateral); Cardiac catheterization (2002); liver biopsy; Coronary angioplasty with stent (April 2002); [place] tooth extraction; Appendectomy (1964); Cholecystectomy (2005); Skin cancer excision (Bilateral, 2006); Skin cancer excision (Left, Oct. 2014); nasal endoscopy (Bilateral, 11/25/2014); BIOPSY / LIGATION TEMPORAL ARTERY (Bilateral, 2/12/2021); Breast biopsy (Right); and Spine surgery (N/A, 4/15/2021). Restrictions  Restrictions/Precautions  Restrictions/Precautions: (peripheral IV right antecubital)  Required Braces or Orthoses?: No  Implants present? : Metal implants(bilateral THR, cardiac stent, metal plate and screws right ankle)  Vision/Hearing  Vision: Within Functional Limits  Hearing: Within functional limits     Subjective  General  Chart Reviewed: Yes  Patient assessed for rehabilitation services?: Yes  Response To Previous Treatment: Not applicable  Family / Caregiver Present: Lilia Goldman)  Referring Practitioner: Dr. Rom Burgos  Referral Date : 04/21/21  Diagnosis: A-Fib w/ RVR  Follows Commands: Within Functional Limits  Other (Comment): OK per nurse Hilda Mckeon to proceed w/ PT evaluation  Subjective  Subjective: pt reports that the staff at the pain clininic noticed that her heart rate was high and irregular when she was there for a followup visit 1 week post L1 kyphoplasty done by Dr. Verna Resednez. Pt reports that she feels fine. Pt plans trip to [facility] this coming weekend.   Pain Screening  Patient Currently in Pain: Yes  Pain Assessment  Pain Assessment: 0-10  Pain Level: 2  Patient's Stated Pain Goal: No pain  Pain Type: Acute pain  Pain Location: Back  Pain Orientation: Lower  Pain Descriptors: Aching  Pain Frequency: Continuous  Pain Onset: On-going  Clinical Progression: Gradually worsening  Non-Pharmaceutical Pain Intervention(s): Ambulation/Increased Activity;Repositioned  Response to Pain Intervention: Patient Satisfied  Multiple Pain Sites: No  Vital Signs  Patient Currently in Pain: Yes       Orientation  Orientation  Overall Orientation Status: Within Functional Limits  Social/Functional History  Social/Functional History  Lives With: Spouse  Type of Home: (margy (2 story))  Home Layout: Two level, Able to Live on Main level with bedroom/bathroom  Home Access: Stairs to enter with rails  Entrance Stairs - Number of Steps: 2  Entrance Stairs - Rails: Both  Bathroom Shower/Tub: Walk-in shower, Doors  Bathroom Toilet: Handicap height  Bathroom Equipment: Built-in shower seat, Grab bars around toilet  Home Equipment: Wheelchair-manual, Crutches, Oxygen, Reacher, Long-handled shoehorn(O2 at 2 L PRN)  ADL Assistance: Independent  Homemaking Assistance: Needs assistance(daugthers assisting w/ laundry, bringing in meals, cleaning and grocery shopping, spouse assists as needed)  Homemaking Responsibilities: No  Ambulation Assistance: Independent(uses crutches)  Transfer Assistance: Independent  Active : Yes  Mode of Transportation: SUV, Car  Occupation: Retired  IADL Comments: sleeps in a flat bed  Additional Comments: large supportive family assist at D/C  Cognition        Objective     Observation/Palpation  Observation: peripheral IV right antecubital    AROM RLE (degrees)  RLE AROM: WFL  AROM LLE (degrees)  LLE AROM : WFL  AROM RUE (degrees)  RUE General AROM: see OT for UE assessment  AROM LUE (degrees)  LUE General AROM: see OT for UE assessment  Strength RLE  Strength RLE: WFL  Comment: grossly 4./5  Strength LLE  Strength LLE: WFL  Comment: grossly 4./5  Strength RUE  Comment: see OT for UE assessment  Strength LUE  Comment: see OT for UE assessment     Sensation  Overall Sensation Status: WFL(denies)  Bed mobility  Rolling to Right: Modified independent  Supine to Sit: Modified independent  Sit to Supine: Modified independent  Scooting: Modified independent  Comment: HOB slight raised, lori at the [facility] independently  Transfers  Sit to Stand: Independent  Stand to sit:  Independent  Stand Pivot Transfers: Modified independent(used crutches)  Ambulation  Ambulation?: Yes  Ambulation 1  Surface: level tile  Device: Axillary Crutches  Assistance: Modified Independent  Gait Deviations: None  Distance: 150'  Comments: no LOB noted, pt does a 4 point gait using the crutches; O2 sat 85% HR 84 post gait but rebounded w/ deep breathing and pursed lip to improve O2 sat to 90%   Stairs/Curb  Stairs?: No(denies need to practice)     Balance  Sitting - Static: Good  Sitting - Dynamic: Good  Standing - Static: Good(used crutches)  Standing - Dynamic: Good(used crutches)        Plan   Plan  Times per week: D/C PT  Times per day: (D/C PT)  Specific instructions for Next Treatment: D/C PT  Safety Devices  Type of devices: Call light within reach, Gait belt, Left in bed, Nurse notified(nurse Millan Said)    G-Code       OutComes Score                                                  AM-PAC Score     AM-PAC Inpatient Mobility without Stair Climbing Raw Score : 20 (04/22/21 1420)  AM-PAC Inpatient without Stair Climbing T-Scale Score : 60.57 (04/22/21 1420)  Mobility Inpatient CMS 0-100% Score: 0 (04/22/21 1420)  Mobility Inpatient without Stair CMS G-Code Modifier : [facility] (04/22/21 1420)       Goals  Short term goals  Time Frame for Short term goals: D/C PT  Patient Goals   Patient goals : [place] vacation       Therapy Time   Individual Concurrent Group Co-treatment   Time In 1420         Time Out 1445         Minutes 25         Timed Code Treatment Minutes: [address] [facility], PT

## 2021-04-23 NOTE — CARE COORDINATION
ONGOING DISCHARGE PLAN:    Patient is alert and oriented x4. Spoke with patient regarding discharge plan and patient confirms that plan is still home without needs. Declined VNS. Pt states she will be going home today and feels much better. Will continue to follow for additional discharge needs.     Electronically signed by Nancy Tan RN on 4/23/2021 at 12:37 PM

## 2021-04-23 NOTE — DISCHARGE SUMMARY
Discharge Summary      Patient ID: Alysia Chavez    MRN: [id number]     Acct:  [de-identified]       Patient's PCP: Veola Favre, DO    Admit Date: 4/21/2021     Discharge Date: 4/23/2021      Admitting Physician: Rajesh Gerber MD    Discharge Physician: Rajesh Gerber MD     Discharge Diagnoses:    Primary Problem  Atrial fibrillation with RVR [facility])    Principal Problem:    Atrial fibrillation with RVR ([facility] [address]  Active Problems:    Hypertension    Mixed hyperlipidemia    Chronic diastolic heart failure ([address]    Gastroesophageal reflux disease without esophagitis    Pulmonary hypertension ([address]  Resolved Problems:    * No resolved hospital problems. *    Past Medical History:   Diagnosis Date    Asbestos exposure     Atrial fibrillation ([facility] [address]     takes xarelto    Basal cell carcinoma of nose     CAD (coronary artery disease)     CKD (chronic kidney disease) stage 2, GFR 60-89 ml/min     Hyperlipidemia     Hypertension     MDRO (multiple drug resistant organisms) resistance 2/19/2014    E. 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    [facility] PONV (postoperative nausea and vomiting)     Prolonged emergence from general anesthesia     SOB (shortness of breath) on exertion      The patient was seen and examined on day of discharge and this discharge summary is in conjunction with daily progress note from day of discharge. Code Status:  Full Code    Hospital Course:   H&P Reviewed. patient admitted due tachycardia patient does have history of AFib patient was found to be in AFib with RVR patient was treated with Cardizem and she was seen by cardiologist was started on amiodarone after discussion with the patient about side effects. patient was also on Xarelto for anticoagulation.    patient advised to follow-up Musculoskeletal:         General: No tenderness or deformity. Lymphadenopathy:      Cervical: No cervical adenopathy. Upper Body:      Right upper body: No supraclavicular or epitrochlear adenopathy. Left upper body: No supraclavicular or epitrochlear adenopathy. Skin:     General: Skin is warm and dry. Capillary Refill: Capillary refill takes less than 2 seconds. Neurological:      Mental Status: She is alert. Motor: No abnormal muscle tone or seizure activity.    Psychiatric:         Speech: Speech normal.         Behavior: Behavior normal.         Judgment: Judgment normal.         Consults:  IP CONSULT TO PRIMARY CARE PROVIDER  IP CONSULT TO CARDIOLOGY    Significant Diagnostic Studies: as above, and as follows:    Treatments: as above    Disposition: home    Discharged Condition: Stable    Follow Up:  Byron Torres DO in one week    Discharge Medications:    Myriam Butler   Home Medication Instructions CVD:[id number]    Printed on:04/23/21 4081   Medication Information                      acetaminophen (TYLENOL) 500 MG tablet  Take 500 mg by mouth every 6 hours as needed for Pain             amiodarone (CORDARONE) 200 MG tablet  Take 1 tablet by mouth 2 times daily             amoxicillin (AMOXIL) 500 MG capsule  Take 2,000 mg by mouth as needed (dental procedures) Dental only             Ascorbic Acid (VITAMIN C) 250 MG tablet  Take 250 mg by mouth daily             atorvastatin (LIPITOR) 40 MG tablet  Take 40 mg by mouth daily              diphenhydrAMINE-APAP, sleep, (TYLENOL PM EXTRA STRENGTH)  MG tablet  Take 1 tablet by mouth nightly as needed              ferrous sulfate (FLACO-IN-SOL) 75 (15 Fe) MG/ML solution  Take 15 mg by mouth daily             isosorbide mononitrate (IMDUR) 60 MG extended release tablet  Take 60 mg by mouth daily             metoprolol tartrate (LOPRESSOR) 25 MG tablet  Take 1 tablet by mouth 2 times daily             Omega 3 1000 MG CAPS  Take 1,000 mg by mouth daily              omeprazole (PRILOSEC) 40 MG delayed release capsule  Take 40 mg by mouth Daily              oxybutynin (DITROPAN XL) 15 MG extended release tablet  Take 15 mg by mouth daily              oxyCODONE-acetaminophen (PERCOCET) 5-325 MG per tablet  Take 1 tablet by mouth every 6 hours as needed for Pain for up to 10 days. Intended supply: 7 days. Take lowest dose possible to manage pain             polyethylene glycol (GLYCOLAX) 17 GM/SCOOP powder  Take 17 g by mouth daily as needed (constipation)              potassium chloride SA (K-DUR;KLOR-CON M) 20 MEQ tablet  Take 1 tablet by mouth 2 times daily             predniSONE (DELTASONE) 5 MG tablet  Take 20 mg by mouth daily              rivaroxaban (XARELTO) 20 MG TABS tablet  Take 20 mg by mouth daily (with breakfast)              rOPINIRole (REQUIP) 0.5 MG tablet  Take 0.5 mg by mouth 4 times daily as needed              sertraline (ZOLOFT) 50 MG tablet  Take 50 mg by mouth daily. torsemide (DEMADEX) 20 MG tablet  Take 1 tablet by mouth 2 times daily Take twice a day for 1 months then go back to 20 mg daily             Vitamin D (CHOLECALCIFEROL) 1000 UNITS CAPS capsule  Take 1,000 Units by mouth 2 times daily                          Time Spent on discharge is more than  35 min in the examination, evaluation, counseling and review of medications and discharge plan.       Electronically signed by Rachael Pena MD     Copy sent to Dr. Byron Torres DO

## 2021-04-23 NOTE — PROGRESS NOTES
Discharge teaching and instructions for diagnosis/procedure of A Fib completed with patient using teachback method. AVS reviewed. Printed prescriptions given to patient. Patient voiced understanding regarding prescriptions, follow up appointments, and care of self at home.  Discharged ambulatory to  home with support per family

## 2021-04-23 NOTE — PLAN OF CARE
Problem: Skin Integrity:  Goal: Absence of new skin breakdown  Description: Absence of new skin breakdown  4/23/2021 0323 by Karine Castañeda RN  Outcome: Ongoing  Note: No new alterations in skin integrity. Problem: Falls - Risk of:  Goal: Will remain free from falls  Description: Will remain free from falls  4/23/2021 0323 by Karine Castañeda RN  Outcome: Ongoing  Note: Patient remained free from falls. Call light within reach. Problem: Pain:  Goal: Pain level will decrease  Description: Pain level will decrease  4/23/2021 0323 by Karine Castañeda RN  Outcome: Ongoing  Note: Patient given pain medication as ordered.

## 2021-04-23 NOTE — PROGRESS NOTES
[address] [facility]    INPATIENT OCCUPATIONAL THERAPY  PROGRESS NOTE  Date: 2021  Patient Name: Isamar Khan      Room: 4-01  MRN: [id number]    : 1935  (81 y.o.) Gender: female     Discharge Recommendations: The patient's needs are being met with no further Occupational Therapy recommended at discharge. OT Equipment Recommendations  Equipment Needed: No          General  Chart Reviewed: Yes, Orders, Progress Notes, History and Physical, Previous Admission  Patient assessed for rehabilitation services?: Yes  Additional Pertinent Hx: REcent L1 Kyphoplasty  Family / Caregiver Present: No    Restrictions  Restrictions/Precautions: General Precautions  Implants present? : Metal implants  Spinal Precautions: Recent L1 Kyphoplasty  Other position/activity restrictions: Recent L1 Kyphoplasty  Required Braces or Orthoses?: No      Subjective  Subjective: Pt reports she is familiar with OT services due to  prervious sugeries. Comments: Pt pleasant, hoping to DC home today. Cyclical BP monitor present. Reports no dizziness. Minimal difficulty noted with foot care per patient only; has DME at home baseline.   Overall Orientation Status: Within Normal Limits     Pain Assessment  Response to Pain Intervention: Patient Satisfied    Objective     Bed mobility  Rolling to Right: Modified independent  Supine to Sit: Modified independent  Sit to Supine: Modified independent  Scooting: Modified independent  Balance  Sitting Balance: Modified independent   Standing Balance: Modified independent   Standing Balance  Comment: per self in room  Functional Mobility  Functional Mobility Comments: per self in room, using crutches   ADL  Feeding: Independent  Grooming: Independent  UE Bathing: Independent  LE Bathing: Modified independent   UE Dressing: Modified independent   LE Dressing: Modified independent   Toileting: Modified independent   Additional Comments: Pt has minimal difficulty with LB tasks at this time; able to complete with increased time and mild pain. Writer provided handout and verbal review on pacing and pain management stategies. Provided long handled sponge and elastic shoe string per pt request for improved ease. Toilet Transfers  Toilet Transfers Comments: per self in room  Tub Transfers  Tub Transfers Comments: uses shower chair baseline and standard shower held within her tub. Writer reviewed benefits on handled shower head for improved ease and pacing. Additional Activities: Review of back protection strategies and stabiliziation of BLE oppose to dangling for back pain mgt. Assessment  Performance deficits / Impairments: Decreased functional mobility ; Decreased ADL status; Decreased endurance;Decreased safe awareness  Assessment: OT POC Met; AE provided and strategies as noted above  Prognosis: Good  Specific instructions for Next Treatment: Back safety for ADL tasks  Discharge Recommendations: Home with assist PRN  Activity Tolerance: Patient limited by fatigue;Patient limited by pain  Safety Devices in place: Yes  Type of devices: Left in bed;Call light within reach  Equipment Recommendations  Hand Held Shower: X  Other: elastic shoe strings          Patient Education:   OT POC, Adaptive Strategies  Learner:patient  Method: demonstration, explanation and handout       Outcome: demonstrated understanding     Plan  Safety Devices  Safety Devices in place: Yes  Type of devices: Left in bed, Call light within reach  Plan  Times per week: 1-2 sessions  Times per day: Daily  Specific instructions for Next Treatment: Back safety for ADL tasks  Current Treatment Recommendations: Endurance Training, Patient/Caregiver Education & Training, Self-Care / ADL, Safety Education & Training, Functional Mobility Training      Goals  Short term goals  Time Frame for Short term goals: 1-2 days  Short term goal 1: D/V understanding of Back safety and modified care strategies related to L1 Kyphoplasty (goal met 4/23)    OT Individual Minutes  Time In: 4180  Time Out: 1007  Minutes: 25   Total Billed Minutes: 15      Electronically signed by NISREEN Albright on 4/23/21 at 10:27 AM EDT

## 2021-04-23 NOTE — PROGRESS NOTES
Date:   4/23/2021  Patient name: Suzi Boswell  Date of admission:  4/21/2021 12:48 PM  MRN:   [id number]  YOB: 1935  PCP: Silke Bryan DO    Reason for Admission: Atrial fibrillation with RVR [facility]) [I48.91]    Cardiology follow-up: A. fib with RVR       Impression      A. fib with RVR  Diastolic CHF  Hypertension  Hyperlipidemia  Exertional shortness of breath, hypoxia  History of CAD, stent placement LAD 2002  Polymyalgia rheumatica  Moderate aortic regurgitation  Giant cell arteritis  chronic anemia  history of peritoneal/omental mesothelioma surgery done at [facility]  Dry mouth, dry eyes  Multiple hip surgeries, bilateral  Chronic pain syndrome  L1 kyphoplasty 4/15/2021     ECG 4/21/2021  A. fib/a flutter with RVR heart rate 116, probable lateral MI or     Lab work 4/21/2021  \Sodium 141, potassium 3.5, BUN 23, creatinine 1.00, GFR 53, glucose 159  proBNP 1324, high-sensitivity troponin XVIII  WBC 11.1, hemoglobin 10.5, platelets 284  2/49/9705  Sodium 142, potassium 3.6, CO2 28, BUN 24, creatinine 0.88, glucose 119, albumin 3.2  Hemoglobin 9.2, WBC 6.4, platelets 281      History of present illness  71-year-old female who recently underwent L1 kyphoplasty was seen by her physician in office today and she was found in A. fib with RVR.  She has a past medical history of paroxysmal A. fib with RVR. Current evaluation 4/23/2021  Patient seen and examined, medications and labs checked  she is ambulating well  she still has A.  Fib  hemodynamically stable   blood pressure 120/70 no postural hypotension    patient wants to go home and she wants to go to [place] for few days       Medications:   Scheduled Meds:   torsemide  20 mg Oral Daily    ferrous sulfate  325 mg Oral Daily with breakfast    dilTIAZem  30 mg Oral 2 times per day    midodrine  5 mg Oral TID WC    atorvastatin  40 mg Oral Daily    pantoprazole  40 mg Oral QAM AC    oxybutynin  15 mg Oral Daily    abnormal high-sensitivity troponin significance not clear  History of paroxysmal A. Fib  Recent L1 kyphoplasty  Giant cell arteritis, polymyalgia rheumatica  anemia    4/23/2021  still has A. fib heart rate control much better  patient agrees to take amiodarone for short. Patient Active Problem List:     Hypertension     Nephrolithiasis     Mixed hyperlipidemia     Osteoarthritis     Peritoneal mesothelioma ([facility] [address]     Ovarian cyst     Basal cell carcinoma of nose     Bimalleolar ankle fracture     Epistaxis, recurrent     Cervical spondylosis     Trochanteric bursitis, right hip     Status post bilateral total hip replacement     Medication monitoring encounter     Chronic diastolic heart failure (HCC)     Chronic respiratory failure (HCC)     Giant cell arteritis (HCC)     Paroxysmal atrial fibrillation (HCC)     Urinary frequency     Vitamin D deficiency     Age-related osteoporosis with current pathological fracture     Atrial fibrillation with RVR (HCC)     Gastroesophageal reflux disease without esophagitis     Pulmonary hypertension ([facility] [address]      Plan of Treatment:   Continue amiodarone 200 mg twice a day p.o.   Lopressor 25 mg twice daily, isosorbide dinitrate 30 mg a day and Xarelto  DC Cardizem  continue with the midodrine  okay to discharge home on current medication  follow-up with Dr. Montse Rizo 1 week    Electronically signed by Fe Hernandez MD on 4/23/2021 at 10:48 AM

## 2021-04-23 NOTE — FLOWSHEET NOTE
04/23/21 1208   Provider Notification   Reason for Communication Evaluate;New orders   Provider Name Dr Latasha Cheung   Provider Notification Physician   Method of Communication Call   Response Waiting for response   Provider called regarding discharge.

## 2021-05-15 NOTE — PROGRESS NOTES
Melissa Penaloza is a 81 y.o. female evaluated on 5/18/2021. Modality of virtual service provided -via telephone   Consent:  Patient and/or health care decision maker is aware that that patient may receive a bill for this telephone service, depending on one's insurance coverage, and has provided verbal consent to proceed: Yes    Patient identification was verified at the start of the visit: Yes    Chief complaint: Melissa Penaloza is 81 y.o.,  female, with  with chief complaint of pain involving low back. .    Referring diagnosis: M50.30 (ICD-10-CM) - Other cervical disc degeneration, unspecified cervical region    Patient is complaining of severe low back pain. Pain in the low back as well as in the cervical area. She did undergo kyphoplasty at the level of L1 on 4/15/2021. Patient has improved the pain for about 2 weeks but since then her pain is worse. The pain appears to be mostly in the low back region without much radiation. She also reports her legs are weak and most of the time the thighs feel weak. Patient reports she is not able to function due to pain and her sleep patterns are poor. Patient also gives history of for polymyalgia and she is on prednisone    Back Pain  This is a chronic problem. The current episode started more than 1 year ago. The problem occurs intermittently (When she tries to stand or move). The problem has been gradually worsening since onset. The pain is present in the lumbar spine (Mostly in the left side (axial)). The quality of the pain is described as aching and stabbing Therese Saunas). The pain does not radiate. The pain is at a severity of 9/10. The pain is severe. The pain is worse during the day. Exacerbated by: Any movement. Pertinent negatives include no abdominal pain, chest pain, dysuria or fever. Risk factors include history of osteoporosis and lack of exercise.         Alleviating factors:heat and rest and stretching   lifestyle changes experienced with pain: Prevents or limits ADLs, Increases w/activity. Increases w/prolonged sitting/standing/walking  Mood changes,none  Patient currently unemployed. Physical therapy did not help the pain. Are you under psychological counseling at present: Yes  Goals for treatment include:  Decrease in pain  Enjoy daily and recreational activities, return to previous status. Last procedure was kyphoplasty L1 on 4/15/2021    Patient relates current medications are helping the pain. Patient reports taking pain medications as prescribed, denies obtaining medications from different sources and denies use of illegal drugs. Patient denies side effects from medications like nausea, vomiting, constipation or drowsiness. Patient reports current activities of daily living ar possible due to medications and would like to continue them. ACTIVITY/SOCIAL/EMOTIONAL:  Sleep Pattern: 7 hours per night.  generally restful sleep  Home Exercises: daily Stretching  Activity:not significantly changed  Emotional Issues: normal.   Currently seeing a Psychiatrist or Psychologist:  No     ADVERSE MEDICATION EFFECTS:   Nausea and vomiting: no   Constipation: no-Undercontrol-: yes  Dizziness/drowsy/sleepy--no  Urinary Retention: no    ABERRANT BEHAVIORS SINCE LAST VISIT  Lost rx/pills:------------------------------------------ not applicable  Taking  medication as prescribed: ----------- not applicable  Urine Drug Screen ---------------------------------  not applicable             Date-------------------------------------------------not done              Results as expected ---------------------not applicable    Recent ER visits: -------------------------------------No  Pill count is appropriate: ---------------------------not applicable   Refills for prescriptions appropriate:---------- not applicable      Past Medical History:   Diagnosis Date    Asbestos exposure     Atrial fibrillation ([facility] [address]     takes xarelto    Basal cell carcinoma in late 46s    Emphysema Father     Colon Cancer Neg Hx     Diabetes Neg Hx     Eclampsia Neg Hx     Hypertension Neg Hx     Ovarian Cancer Neg Hx      Labor Neg Hx     Spont Abortions Neg Hx     Stroke Neg Hx        Social History     Socioeconomic History    Marital status:      Spouse name: None    Number of children: None    Years of education: None    Highest education level: None   Occupational History    None   Tobacco Use    Smoking status: Never Smoker    Smokeless tobacco: Never Used   Vaping Use    Vaping Use: Never used   Substance and Sexual Activity    Alcohol use: Yes     Comment: social    Drug use: No    Sexual activity: Not Currently   Other Topics Concern    None   Social History Narrative    None     Social Determinants of Health     Financial Resource Strain:     Difficulty of Paying Living Expenses:    Food Insecurity:     Worried About Running Out of Food in the Last Year:     Ran Out of Food in the Last Year:    Transportation Needs:     Lack of Transportation (Medical):  Lack of Transportation (Non-Medical):    Physical Activity:     Days of Exercise per Week:     Minutes of Exercise per Session:    Stress:     Feeling of Stress :    Social Connections:     Frequency of Communication with Friends and Family:     Frequency of Social Gatherings with Friends and Family:     Attends Rastafarian Services:     Active Member of Clubs or Organizations:     Attends Club or Organization Meetings:     Marital Status:    Intimate Partner Violence:     Fear of Current or Ex-Partner:     Emotionally Abused:     Physically Abused:     Sexually Abused:         Allergies   Allergen Reactions    Adhesive Tape Rash    Meperidine Nausea Only    Demerol Nausea Only    Ultram [Tramadol Hcl]      Hot flashes    Zocor [Simvastatin]      Muscle aches    Codeine      \"out of it\"    Fentanyl Rash       Current Outpatient Medications on File Prior to Encounter   Medication Sig Dispense Refill    amiodarone (CORDARONE) 200 MG tablet Take 1 tablet by mouth 2 times daily 30 tablet 0    metoprolol tartrate (LOPRESSOR) 25 MG tablet Take 1 tablet by mouth 2 times daily 60 tablet 3    acetaminophen (TYLENOL) 500 MG tablet Take 500 mg by mouth every 6 hours as needed for Pain      isosorbide mononitrate (IMDUR) 60 MG extended release tablet Take 60 mg by mouth daily      rivaroxaban (XARELTO) 20 MG TABS tablet Take 20 mg by mouth daily (with breakfast)       diphenhydrAMINE-APAP, sleep, (TYLENOL PM EXTRA STRENGTH)  MG tablet Take 1 tablet by mouth nightly as needed       torsemide (DEMADEX) 20 MG tablet Take 1 tablet by mouth 2 times daily Take twice a day for 1 months then go back to 20 mg daily 120 tablet 3    amoxicillin (AMOXIL) 500 MG capsule Take 2,000 mg by mouth as needed (dental procedures) Dental only      oxybutynin (DITROPAN XL) 15 MG extended release tablet Take 15 mg by mouth daily       polyethylene glycol (GLYCOLAX) 17 GM/SCOOP powder Take 17 g by mouth daily as needed (constipation)       Ascorbic Acid (VITAMIN C) 250 MG tablet Take 250 mg by mouth daily      ferrous sulfate (FLACO-IN-SOL) 75 (15 Fe) MG/ML solution Take 15 mg by mouth daily      omeprazole (PRILOSEC) 40 MG delayed release capsule Take 40 mg by mouth Daily       atorvastatin (LIPITOR) 40 MG tablet Take 40 mg by mouth daily       potassium chloride SA (K-DUR;KLOR-CON M) 20 MEQ tablet Take 1 tablet by mouth 2 times daily 180 tablet 3    rOPINIRole (REQUIP) 0.5 MG tablet Take 0.5 mg by mouth 4 times daily as needed       Vitamin D (CHOLECALCIFEROL) 1000 UNITS CAPS capsule Take 1,000 Units by mouth 2 times daily      predniSONE (DELTASONE) 5 MG tablet Take 20 mg by mouth daily       Omega 3 1000 MG CAPS Take 1,000 mg by mouth daily       sertraline (ZOLOFT) 50 MG tablet Take 50 mg by mouth daily.          No current facility-administered medications on file prior to encounter. Review of Systems   Constitutional: Negative. Negative for activity change, appetite change, fever and unexpected weight change. HENT: Negative. Negative for congestion, hearing loss and sore throat. Eyes: Negative. Negative for photophobia and visual disturbance. Respiratory: Negative. Negative for cough and shortness of breath. Cardiovascular: Negative. Negative for chest pain and palpitations. History of atrial fibrillation   Gastrointestinal: Negative. Negative for abdominal pain, constipation, nausea and vomiting. Endocrine: Negative. Negative for polydipsia and polyphagia. Genitourinary: Negative. Negative for dysuria and hematuria. Musculoskeletal: Positive for arthralgias and back pain. Skin: Negative. Negative for rash. Allergic/Immunologic: Negative. Neurological: Negative. Hematological: Bruises/bleeds easily. Patient is on Xarelto   Psychiatric/Behavioral: Negative. Negative for suicidal ideas. The patient is not nervous/anxious. There is not much change in the review of systems since her last visit  Physical Exam  Skin:         Neurological:      Mental Status: She is alert and oriented to person, place, and time. Psychiatric:         Mood and Affect: Mood normal.            DATA:  LAB.:  5/14/2021  9:06 PM - [place], pn Incoming Lab Results From [facility]    Component Value Ref Range & Units Status Collected Lab   Glucose 98  70 - 99 mg/dL Final 05/14/2021 11:43  [address]   BUN 36High   8 - 23 mg/dL Final 05/14/2021 11:43  [facility] 1. 60High   0.50 - 0.90 mg/dL Final 05/14/2021 11:43  [address]   Bun/Cre Ratio NOT REPORTED  9 - 20 Final 05/14/2021 11:43  [address]   Calcium 9.6  8.6 - 10.4 mg/dL Final 05/14/2021 11:43  [address]   Sodium 144  135 - 144 mmol/L Final 05/14/2021 11:43  [address] Potassium 5.3  3.7 - 5.3 mmol/L Final 05/14/2021 11:43  [address]   Chloride 105  98 - 107 mmol/L Final 05/14/2021 11:43  [address]   CO2 25  20 - 31 mmol/L Final 05/14/2021 11:43  [address]   Anion Gap 14  9 - 17 mmol/L Final 05/14/2021 11:43  [address]   Alkaline Phosphatase 150High   35 - 104 U/L Final 05/14/2021 11:43  [address]   ALT 30  5 - 33 U/L Final 05/14/2021 11:43  [address]   AST 26  <32 U/L Final 05/14/2021 11:43  [address]   Total Bilirubin 0.74  0.3 - 1.2 mg/dL Final 05/14/2021 11:43  [address]   Total Protein 6.4  6.4 - 8.3 g/dL Final 05/14/2021 11:43  [address]   Albumin 3.5  3.5 - 5.2 g/dL Final 05/14/2021 11:43  [address]   Albumin/Globulin Ratio 1.2  1.0 - 2.5 Final 05/14/2021 11:43  [address]   GFR Non- 31Low   >60 mL/min Final 05/14/2021 11:43  [address]   GFR  37Low   >60 mL/min Final 05/14/2021 11:43  [address]   GFR Comment        Final         X-Ray reports:  EXAMINATION:   CT OF THE CHEST, ABDOMEN, AND PELVIS WITHOUT CONTRAST 5/14/2021 11:32 am       TECHNIQUE:   CT of the chest, abdomen and pelvis was performed without the administration   of intravenous contrast. Multiplanar reformatted images are provided for   review.  Dose modulation, iterative reconstruction, and/or weight based   adjustment of the mA/kV was utilized to reduce the radiation dose to as low   as reasonably achievable.       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  Enlargement   of the main pulmonary artery measuring 48 mm is noted, suggestive of   pulmonary hypertension.  No pericardial effusion.  No lymphadenopathy.       Lungs/pleura: No acute airspace disease identified.  No effusion.  Minimal   subsegmental atelectasis in the lung bases.  The central airway is patent.       Soft Tissues/Bones: No acute findings.           Abdomen/Pelvis:       Organs: Evaluation of the abdominal and pelvic viscera is limited in the   absence of contrast.   The liver, biliary tree, pancreas, spleen, adrenals   and kidneys appear stable.  Nodularity of the adrenal glands again   demonstrated, a chronic finding.  Bilateral renal cysts are noted, consistent   with a combination of simple and hemorrhagic/proteinaceous cysts.  An   exophytic lobulated complex cyst arising from the lower pole the left kidney   is demonstrated, better characterized as a cyst on prior contrast imaging.       GI/Bowel: Diverticulosis.  Wall thickening of the sigmoid colon is noted. Moderate distal colonic stool burden.  No evidence for bowel obstruction. There is adjacent complex fluid or soft tissue thickening in the pelvis near   this area, a stable chronic finding.       Pelvis: Streak artifact from bilateral hip arthroplasties noted.  The bladder   appears decompressed.       Peritoneum/Retroperitoneum: Mild induration of the peritoneal fat in the left   upper quadrant again demonstrated, a stable finding.  Presumed pleural   thickening versus complex fluid in the pelvis is also a stable finding.  No   new finding identified in the peritoneal cavity.  No ascites.       Bones/Soft Tissues: Bilateral hip arthroplasties partially visualized.    Treated compression fracture at L1 is noted.  Multilevel degenerative disc   disease and lower lumbar facet arthropathy.           Impression   1.  Short-segment sigmoid colon thickening with diverticula, suggestive of   colitis.       2.  Otherwise stable exam with mild residual peritoneal thickening noted in   the left upper quadrant and pelvis.  Additional stable chronic and benign   findings, as above. EXAMINATION:   5 XRAY VIEWS OF THE LUMBAR SPINE       4/19/2021 3:13 pm       COMPARISON:   04/04/2021       HISTORY:   ORDERING SYSTEM PROVIDED HISTORY: Chronic midline low back pain without   sciatica   TECHNOLOGIST PROVIDED HISTORY:   Reason for Exam: PT CO pain in left side of lumbar spine X several months   that is now radiating to the right side. PT HX chronic spinal conditions. NKI. Acuity: Chronic   Type of Exam: Initial       FINDINGS:   There are 5 lumbar type vertebral bodies. There is compression deformity at   L1 with interval kyphoplasty. Diffuse bone demineralization. The remaining   lumbar vertebral body heights are maintained. No significant listhesis. Multilevel intervertebral disc height loss. Multilevel facet DJD. Impression   1. L1 compression deformity with interval kyphoplasty. 2. No acute osseous abnormality by radiograph. 3. Multilevel degenerative changes. EXAMINATION:   CT OF THE LEFT HIP WITHOUT CONTRAST 4/4/2021 2:50 pm       TECHNIQUE:   CT of the left hip was performed without the administration of intravenous   contrast.  Multiplanar reformatted images are provided for review. Dose   modulation, iterative reconstruction, and/or weight based adjustment of the   mA/kV was utilized to reduce the radiation dose to as low as reasonably   achievable.        COMPARISON:   Plain radiographs of the left hip from 10/14/2015       HISTORY   ORDERING SYSTEM PROVIDED HISTORY: low back and hip ain   TECHNOLOGIST PROVIDED HISTORY:   low back and hip ain   Decision Support Exception->Emergency Medical Condition (MA)   Reason for Exam: low back and hip pain, for 3 weeks   Acuity: Unknown   Type of Exam: Unknown   Relevant Medical/Surgical Condition (MA)   Reason for Exam: low back and hip pain, for 3 weeks   Acuity: Unknown   Type of Exam: Unknown       60-year-old female with lower back and hip pain for 3 weeks       FINDINGS:   BONES/ALIGNMENT: Lumbar spine is imaged from superior endplate of E09 to the   mid coccyx on the lateral views. Diffuse osteopenia. Age-indeterminate   height loss/acute to subacute compression deformity of L1 with approximately   20-25% height loss on image 37, series 602. No significant retropulsion. Alignment well maintained. Multilevel Schmorl's node deformities. DEGENERATIVE CHANGES: Mild to moderate multilevel disc space narrowing and   hypertrophic osteophyte spurring. Mild multilevel facet arthrosis. SOFT TISSUES/RETROPERITONEUM: Atherosclerotic calcification of the aorta and   branch vasculature. Multiple hypodense and hyperdense lesions arising from   the left kidney which could represent hemorrhagic and/or simple renal cysts. Moderate to severe stool burden. Severe rectosigmoid diverticulosis. Impression   1. Age-indeterminate height loss/acute to subacute compression deformity of   L1 with approximately 20-25% height loss without significant retropulsion. 2. Multiple hypodense and hyperdense lesions arising from the left kidney   likely representing a combination of hemorrhagic and simple renal cysts. Further evaluation with nonemergent renal ultrasound is recommended for   confirmation. 3. Diffuse osteopenia and mild to moderate degenerative changes within the   lumbar spine. 4. Severe rectosigmoid colonic diverticulosis. Moderate to severe stool   burden. CT OF THE CHEST, ABDOMEN, AND PELVIS WITH CONTRAST 4/24/2020 11:05 am        TECHNIQUE:    CT of the chest, abdomen and pelvis was performed with the administration of    intravenous contrast. Multiplanar reformatted images are provided for review.     Dose modulation, iterative reconstruction, and/or colon appears unchanged. Peritoneum/Retroperitoneum: No free air. No abdominal ascites. No discrete    lymphadenopathy. Subtle soft tissue stranding in the left upper quadrant    near the spleen on axial images 39-50 appears stable. Bones/Soft Tissues: Partially visualized bilateral hip arthroplasties. Degenerative change in the spine. No acute osseous abnormality identified. Postoperative findings in the abdominal wall. Impression    1. Stable appearance of the chest, abdomen and pelvis. Mild residual    induration of the peritoneal fat in the left upper quadrant and intermediate    density fluid in the pelvis near the sigmoid colon are again demonstrated. No significant ascites. 2.  Stable complex small left renal lesions favoring a benign process or    indolent renal masses. 3.  Additional stable chronic findings, as above. EXAMINATION:    MRI OF THE CERVICAL SPINE WITHOUT CONTRAST 8/21/2017 4:22 pm        TECHNIQUE:    Multiplanar multisequence MRI of the cervical spine was performed without the    administration of intravenous contrast.        COMPARISON:    MRI cervical spine 7/16/2014        HISTORY:    ORDERING SYSTEM PROVIDED HISTORY: Neck pain    TECHNOLOGIST PROVIDED HISTORY:    Ordering Physician Provided Reason for Exam: NECK PAIN, ARTHROPATHY OF    CERVICAL SPINE    Acuity: Chronic    Additional signs and symptoms: PT COMPLAINT:  GRADUAL ONSET OF PAIN IN NECK    AND BILATERAL SHOULDERS AND UPPER ARMS        FINDINGS:    BONES/ALIGNMENT: There is straightening of the normal cervical lordosis. Alignment is otherwise normal.  There is no acute fracture or listhesis. There is disc desiccation and disc space narrowing throughout the cervical    spine. Bone marrow signal intensity is normal.        SPINAL CORD: The cervical spinal cord is normal in size and signal    intensities. SOFT TISSUES: There is no paraspinal mass identified. C2-C3: There is no disc bulge or protrusion present. There is no significant    spinal canal stenosis or neural foraminal narrowing present. C3-C4:  There is a disc bulge and uncovertebral overgrowth mildly narrowing    the right neural foramen. No significant spinal canal stenosis or left    neural foraminal narrowing is present. C4-C5: There is a focal 3 mm central disc protrusion. There is mild spinal    canal stenosis. No significant neural foraminal narrowing is present. C5-C6: There is no disc bulge or protrusion present. There is no significant    spinal canal stenosis or neural foraminal narrowing present. C6-C7: There is a disc bulge mildly narrowing the spinal canal.  No neural    foraminal narrowing is present. C7-T1: There is a disc bulge and uncovertebral overgrowth mildly narrowing    both neural foramen. No significant spinal canal stenosis is present. Impression    1. Mild multilevel degenerative changes of the cervical spine, similar to the    previous exam from 7/16/2014.    2. Mild spinal canal stenosis at C4-5 and C6-7, unchanged. 3. Mild neural foraminal narrowing at C3-4 and C7-T1. Clinical  impression:  1. Lumbosacral spondylosis without myelopathy    2. Age-related osteoporosis with current pathological fracture, initial encounter    3. Compression fracture of L1 vertebra, sequela-kyphoplasty    4. DDD (degenerative disc disease), lumbar    5. Sacroiliitis ([address]    6. Medication monitoring encounter    7. Compression fracture of L1 vertebra, initial encounter ([address]    8. Cervical spondylosis    9. Degenerative cervical disc    10. Arthropathy of cervical facet joint        Plan of care:  I reviewed the patient's x-rays again. There appears to be a Schmorl's node at L4 which could be painful but there is no decrease in the height of the vertebral body.   Hence we will do an epidural steroid injection prior to ordering an MRI to assess the spine properly. This was explained to the patient and she agrees with the plan. Patient is complaining of pain involving the low back as well as in the legs especially in the thighs with face weakness. She may get benefit from a lumbar epidural steroid injection. I did not do a physical exam on her. Patient was told that we will tentatively schedule her for lumbar epidural steroid injection at which time I will do a physical examination and depending on the findings may plan the intervention. I need to do a physical examination on the patient before proceeding with a lumbar epidural steroid injection  The following treatment plan was developed after discussion with patient:    We discussed Lumbar Epidural steroid Injections x 1  at L3 - L4. Patient tried and failed NSAIDS,Home exercises, Physical Therapy, Chiropractic manipulations without relief. Patient has not had prior Lumbar Surgery. We will see the patient in 2 weeks after the procedures and re-evaluate symptoms. PDMP Monitoring:    Last PDMP Abdoulaye Clark as Reviewed [facility]):  Review User Review Instant Review Result   Ligia Angelina 5/15/2021  6:35 AM Reviewed PDMP [1]     Counselling/Preventive measures for pain  Control:    [x]  Spine strengthening exercises are discussed with patient in detail. Orders Placed This Encounter   Procedures    Lumbar Epidural Steroid Injection/Caudal     Standing Status:   Future     Standing Expiration Date:   5/19/2022    FLUORO FOR SURGICAL PROCEDURES     Standing Status:   Future     Standing Expiration Date:   5/19/2022    Saline lock IV     Standing Status:   Future     Standing Expiration Date:   11/19/2022       Decision Making Process : Patient's health history and referral records thoroughly reviewed before focused physical examination and discussion with patient. I have spent 25 mins.   Over 50% of today's visit is spent on examining the patient and counseling and coordinating the care.     Level of complexity of date to be reviewed is Moderate. The chart date reviewed include the following: Imaging Reports. Summary of Care. Time spent reviewing with patient the below reports:   Medication safety, Treatment options. Level of diagnosis and management options of this case is multiple: involving the following management options: Interventions as needed, medication management as appropriate, future visits, activity modification, heat/ice as needed, Urine drug screen as required. [x]The patient's questions were answered to the best of my abilities. This note was created using voice recognition software. There may be inaccuracies of transcription  that are inadvertently overlooked prior to the signature. There is any questions about the transcription please contact me. Due to the COVID-19 pandemic and the appropriate interventions by Leanne Beaver, our non-urgent pain management patients will not be seen in the office at this time for their protection and the protection of our staff. To offer continuity of care, their prescriptions will be escribed this month after a careful chart review and review of their OARRS report  Pursuant to the emergency declaration under the [employer] [facility], 1135 waiver authority and the [employer] and [employer] Act, this Virtual Visit was conducted, with patient's consent, to reduce the patient's risk of exposure to COVID-19 and provide continuity of care for an established patient. Services were provided through a video synchronous discussion virtually to substitute for in-person appointment. \"  Documentation:  I communicated with the patient and/or health care decision maker about plan of care  Details of this discussion including any medical advice provided:   Total Time: minutes: 21-30 minutes    I affirm this is a Patient Initiated Episode with an Established Patient who has not had a related appointment within my department in the past 7 days or scheduled within the next 24 hours.     Electronically signed by Brayden Frazier MD on 5/19/2021 at 4:46 AM

## 2021-05-19 PROBLEM — N17.9 AKI (ACUTE KIDNEY INJURY) (HCC): Status: ACTIVE | Noted: 2021-01-01

## 2021-05-19 NOTE — ED NOTES
Only one dose of Digoxin 250mcg to be given, this RN put in daily dose. Discontinued daily dose. Digoxin one time dose given per MD verbal order.      Zach Landers RN  05/19/21 9386

## 2021-05-19 NOTE — ED PROVIDER NOTES
[address] ED  eMERGENCY dEPARTMENT eNCOUnter      Pt Name: Sadie Huynh  MRN: [id number]  [place] 1935  Date of evaluation: 5/19/21      CHIEF COMPLAINT       Chief Complaint   Patient presents with    Back Pain         HISTORY OF PRESENT ILLNESS    Sadie Huynh is a 81 y.o. female who presents complaining of back pain. Patient has been complaining of progressively worsening back pain. Patient states that she had a kyphoplasty done last month because of a compression fracture that they found while trying to figure out why she was having so much pain. Patient states that she is just waiting for doctor to call her back. Patient did have CT scans done few days ago because of her cancer. Patient states that today she just could not tolerate the pain any longer. Patient states the pain is actually a little bit higher than where her fracture was before. Patient has had no nausea vomiting or diarrhea. Patient denies fevers. REVIEW OF SYSTEMS       Review of Systems   Constitutional: Negative for activity change, appetite change, chills, diaphoresis and fever. HENT: Negative for congestion, ear pain, facial swelling, nosebleeds, rhinorrhea, sinus pressure, sore throat and trouble swallowing. Eyes: Negative for pain, discharge and redness. Respiratory: Negative for cough, chest tightness, shortness of breath and wheezing. Cardiovascular: Negative for chest pain, palpitations and leg swelling. Gastrointestinal: Negative for abdominal pain, blood in stool, constipation, diarrhea, nausea and vomiting. Genitourinary: Negative for difficulty urinating, dysuria, flank pain, frequency, genital sores and hematuria. Musculoskeletal: Positive for back pain. Negative for arthralgias, gait problem, joint swelling, myalgias and neck pain. Skin: Negative for color change, pallor, rash and wound.    Neurological: Negative for dizziness, tremors, seizures, syncope, speech difficulty, weakness, numbness and headaches. Psychiatric/Behavioral: Negative for confusion, decreased concentration, hallucinations, self-injury, sleep disturbance and suicidal ideas. PAST MEDICAL HISTORY     Past Medical History:   Diagnosis Date    Asbestos exposure     Atrial fibrillation ([address]     takes xarelto    Basal cell carcinoma of nose     CAD (coronary artery disease)     CKD (chronic kidney disease) stage 2, GFR 60-89 ml/min     Hyperlipidemia     Hypertension     MDRO (multiple drug resistant organisms) resistance 2/19/2014    E.  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    treated at [facility] PONV (postoperative nausea and vomiting)     Prolonged emergence from general anesthesia     SOB (shortness of breath) on exertion        SURGICAL HISTORY       Past Surgical History:   Procedure Laterality Date    ANKLE FRACTURE SURGERY Right 2013    APPENDECTOMY  1964    BIOPSY / LIGATION TEMPORAL ARTERY Bilateral 2/12/2021    TEMPORAL ARTERY BIOPSY performed by Brittany Shaw MD at [address] Right    [address]  [address]    CATARACT REMOVAL Bilateral     CHOLECYSTECTOMY  2005    COLONOSCOPY     [address] April 2002   [address] [facility]    LIVER BIOPSY  2000    LIVER BIOPSY      NASAL ENDOSCOPY Bilateral 11/25/2014    lt nasal cautery with packing    PHOTODYNAMIC THERAPY  April 2000 & Jan. 2002    for peritoneal Mesothelioma    RECTOCELE REPAIR  1997    REVISION TOTAL HIP ARTHROPLASTY Right 1998    ROTATOR CUFF REPAIR  2006    SKIN CANCER EXCISION Bilateral 2006    ankle    SKIN CANCER EXCISION Left Oct. 2014    shoulder    SPINE SURGERY N/A 4/15/2021    KYPHOPLASTY L1 performed by Kae Cash MD at [address] [facility] Right 1990    TOTAL HIP ARTHROPLASTY Left 2003    TUBAL LIGATION  1969    UPPER GASTROINTESTINAL ENDOSCOPY      WISDOM TOOTH EXTRACTION         CURRENT MEDICATIONS       Previous Medications    ACETAMINOPHEN (TYLENOL) 500 MG TABLET    Take 500 mg by mouth every 6 hours as needed for Pain    AMIODARONE (CORDARONE) 200 MG TABLET    Take 1 tablet by mouth 2 times daily    AMOXICILLIN (AMOXIL) 500 MG CAPSULE    Take 2,000 mg by mouth as needed (dental procedures) Dental only    ASCORBIC ACID (VITAMIN C) 250 MG TABLET    Take 250 mg by mouth daily    ATORVASTATIN (LIPITOR) 40 MG TABLET    Take 40 mg by mouth daily     DIPHENHYDRAMINE-APAP, SLEEP, (TYLENOL PM EXTRA STRENGTH)  MG TABLET    Take 1 tablet by mouth nightly as needed     FERROUS SULFATE (FLACO-IN-SOL) 75 (15 FE) MG/ML SOLUTION    Take 15 mg by mouth daily    ISOSORBIDE MONONITRATE (IMDUR) 60 MG EXTENDED RELEASE TABLET    Take 60 mg by mouth daily    METOPROLOL TARTRATE (LOPRESSOR) 25 MG TABLET    Take 1 tablet by mouth 2 times daily    OMEGA 3 1000 MG CAPS    Take 1,000 mg by mouth daily     OMEPRAZOLE (PRILOSEC) 40 MG DELAYED RELEASE CAPSULE    Take 40 mg by mouth Daily     OXYBUTYNIN (DITROPAN XL) 15 MG EXTENDED RELEASE TABLET    Take 15 mg by mouth daily     POLYETHYLENE GLYCOL (GLYCOLAX) 17 GM/SCOOP POWDER    Take 17 g by mouth daily as needed (constipation)     POTASSIUM CHLORIDE SA (K-DUR;KLOR-CON M) 20 MEQ TABLET    Take 1 tablet by mouth 2 times daily    PREDNISONE (DELTASONE) 5 MG TABLET    Take 20 mg by mouth daily     RIVAROXABAN (XARELTO) 20 MG TABS TABLET    Take 20 mg by mouth daily (with breakfast)     ROPINIROLE (REQUIP) 0.5 MG TABLET    Take 0.5 mg by mouth 4 times daily as needed     SERTRALINE (ZOLOFT) 50 MG TABLET    Take 50 mg by mouth daily.       TORSEMIDE (DEMADEX) 20 MG TABLET    Take 1 tablet by mouth 2 times daily Take twice a day for 1 months then go back to 20 mg daily    VITAMIN D (CHOLECALCIFEROL) 1000 UNITS CAPS CAPSULE Take 1,000 Units by mouth 2 times daily       ALLERGIES     is allergic to adhesive tape, meperidine, demerol, ultram [tramadol hcl], zocor [simvastatin], codeine, and fentanyl. SOCIAL HISTORY      reports that she has never smoked. She has never used smokeless tobacco. She reports current alcohol use. She reports that she does not use drugs. PHYSICAL EXAM     INITIAL VITALS: /71   Pulse 119   Temp 98 °F (36.7 °C) (Oral)   Resp 16   Ht 5' 2\" (1.575 m)   Wt 143 lb (64.9 kg)   SpO2 94%   BMI 26.16 kg/m²      Physical Exam  Vitals and nursing note reviewed. Constitutional:       General: She is not in acute distress. Appearance: She is well-developed. She is not diaphoretic. HENT:      Head: Normocephalic and atraumatic. Eyes:      General: No scleral icterus. Right eye: No discharge. Left eye: No discharge. Conjunctiva/sclera: Conjunctivae normal.      Pupils: Pupils are equal, round, and reactive to light. Cardiovascular:      Rate and Rhythm: Normal rate and regular rhythm. Heart sounds: Normal heart sounds. No murmur heard. No friction rub. No gallop. Pulmonary:      Effort: Pulmonary effort is normal. No respiratory distress. Breath sounds: Normal breath sounds. No wheezing or rales. Chest:      Chest wall: No tenderness. Abdominal:      General: Bowel sounds are normal. There is no distension. Palpations: Abdomen is soft. There is no mass. Tenderness: There is abdominal tenderness in the right upper quadrant. There is no guarding or rebound. Musculoskeletal:      Thoracic back: Tenderness (Right lower) present. No swelling, edema, deformity, signs of trauma, lacerations, spasms or bony tenderness. Decreased range of motion. No scoliosis. Skin:     General: Skin is warm and dry. Coloration: Skin is not pale. Findings: No erythema or rash.    Neurological:      Mental Status: She is alert and oriented to person, place, and time I am currently concerned with the abdominal tenderness that she has that this could be something like a liver or gallbladder issue. Will check labs but I think will most likely have to reCT her to make sure there is no other issues since there was some colitis seen on the CT from 5 days ago. EMERGENCY DEPARTMENT COURSE:   Vitals:    Vitals:    05/19/21 1006 05/19/21 1015 05/19/21 1059 05/19/21 1107   BP:  120/75 (!) 120/98 110/71   Pulse:  145 155 119   Resp:  17 17 16   Temp:       TempSrc:       SpO2:  93% 95% 94%   Weight: 143 lb (64.9 kg)      Height: 5' 2\" (1.575 m)          The patient was given the following medications while in the emergency department:  Orders Placed This Encounter   Medications    morphine sulfate (PF) injection 2 mg    dilTIAZem injection 10 mg    morphine sulfate (PF) injection 4 mg    dilTIAZem 125 mg in dextrose 5 % 125 mL infusion       -------------------------  12:09 PM EDT  Patient was reevaluated and she no longer has the tenderness in the abdomen but she still having severe back pain and her heart rate is still in the 120s to 130s so we will start a Cardizem drip and get her admitted. I spoke with Dr. Charito pedraza for the PCP who agrees to the admission. CONSULTS:  IP CONSULT TO PRIMARY CARE PROVIDER  IP CONSULT TO CARDIOLOGY  IP CONSULT TO PAIN MANAGEMENT    PROCEDURES:  None    FINAL IMPRESSION      1. Acute exacerbation of chronic low back pain    2. Atrial fibrillation with rapid ventricular response [facility])          DISPOSITION/PLAN   DISPOSITION Admitted 05/19/2021 12:09:02 PM      PATIENT REFERREDTO:  No follow-up provider specified.     DISCHARGEMEDICATIONS:  New Prescriptions    No medications on file       (Please note that portions of this note were completed with a voice recognition program.  Efforts were made to edit thedictations but occasionally words are mis-transcribed.)    Cliff Bradford MD  Attending Emergency Physician Georges Salazar MD  05/19/21 2494

## 2021-05-19 NOTE — PROGRESS NOTES
I was called by the emergency room stating that the patient came to the emergency room with the severe back pain which is in the mid thoracic as well as in the lumbar regions. Patient apparently received about 8 mg of morphine which the in not helping her pain. As her pain is very severe and is not responding well to the narcotics I will start her on a PCA infusion. I will also obtain an MRI of the thoracic and lumbar spines to see why she is having so severe that severe pain is has not responding to the high doses of narcotics. I will see her in the morning tomorrow and depending on the results of the MRI and the response to the PCA will plan further treatment.

## 2021-05-19 NOTE — ED NOTES
Report given to Bjorn Zambrano from [address]. Report method by phone   The following was reviewed with receiving RN:   Current vital signs:  /81   Pulse 122   Temp 98 °F (36.7 °C) (Oral)   Resp 15   Ht 5' 2\" (1.575 m)   Wt 143 lb (64.9 kg)   SpO2 94%   BMI 26.16 kg/m²                MEWS Score: 4     Any medication or safety alerts were reviewed. Any pending diagnostics and notifications were also reviewed, as well as any safety concerns or issues, abnormal labs, abnormal imaging, and abnormal assessment findings. Questions were answered.             Therese Agee RN  05/19/21 9431

## 2021-05-19 NOTE — ED NOTES
Bed: B  Expected date:   Expected time:   Means of arrival:   Comments:  [address], RN  05/19/21 5259

## 2021-05-19 NOTE — ED NOTES
Spoke with Mary Berman with pain management, states will order MRI and pain medication for patient. Asked when patient will be admitted to floor, this RN stated no time frame at this time pt is a boarder in ED.      Mitch Graves RN  05/19/21 2293

## 2021-05-19 NOTE — ED NOTES
Daughter in waiting room, updated about plan of care, okay per patient.      Mony Edward, RN  05/19/21 5549

## 2021-05-19 NOTE — ED NOTES
Spoke with Dr. Scotty Gould, cardiology, order to titrate Cardizem down to 5mg/hr and order Coreg 6.25 BID started now. MD aware of pt current vital signs. MD requests to be updated in 2 hours from [facility].      Irma Waller RN  05/19/21 5798

## 2021-05-19 NOTE — ED NOTES
Pt -160 and SPO2 89% on RA, EKG performed, pt placed on 2L MD MARLENA notified.      Anahi Jo RN  05/19/21 1012

## 2021-05-20 PROBLEM — S32.010S CLOSED COMPRESSION FRACTURE OF L1 LUMBAR VERTEBRA, SEQUELA: Status: ACTIVE | Noted: 2021-01-01

## 2021-05-20 PROBLEM — M54.41 ACUTE MIDLINE LOW BACK PAIN WITH RIGHT-SIDED SCIATICA: Status: ACTIVE | Noted: 2021-01-01

## 2021-05-20 NOTE — PROGRESS NOTES
Pt admitted to room 2115 from ER via stretcher. Heart monitor applied and vitals were obtained. Pt belongings placed in closet. Admission questions complete with pt. No c/o or s/s of distress noted at this time.

## 2021-05-20 NOTE — CONSULTS
Consults  Patient: Zachariah Butler  Unit/Bed: 2115/2115-01  YOB: 1935  MRN: [id number]  Acct: [de-identified]   Admitting Diagnosis: Atrial fibrillation with RVR ([facility] [address] [I48.91]  Admit Date:  5/19/2021  Hospital Day: 1    Subjective:    Patient is having problems with   HPI  Patient Seen, Chart, Labs, Radiology studies, and Consults reviewed. Patient presents with acute low back pain with some right radicular leg pain. History is reviewed from patient and chart. Patient with acute onset low back pain associated with an L1 predominant compression fracture with some slight burst components a 3 type burst of the superior endplate with minimal retropulsion. Patient was treated with kyphoplasty with excellent cement fill and interdigitation and had initially an excellent result or at least a good result    Patient is suffered from acute new low back pain with some right radicular pain      Past Medical History:   Diagnosis Date    Asbestos exposure     Atrial fibrillation ([facility] [address]     takes xarelto    Basal cell carcinoma of nose     CAD (coronary artery disease)     CKD (chronic kidney disease) stage 2, GFR 60-89 ml/min     Hyperlipidemia     Hypertension     MDRO (multiple drug resistant organisms) resistance 2/19/2014    E.  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    treated at [facility] PONV (postoperative nausea and vomiting)     Prolonged emergence from general anesthesia     SOB (shortness of breath) on exertion      Past Surgical History:   Procedure Laterality Date    ANKLE FRACTURE SURGERY Right 2013    APPENDECTOMY  1964    BIOPSY / LIGATION TEMPORAL ARTERY Bilateral 2/12/2021    TEMPORAL ARTERY BIOPSY performed by Glory Ash MD at [facility] [facility] Right    [address] [employer]  Physical Exam      Drains:No  Imaging:Yes prefracture CAT scans; acute fracture CAT scan post fracture CAT scan and MRI thoracic and lumbar spine reviewed -most recent imaging reveals the patient be status post L1 burst fracture with minimal retropulsion status post kyphoplasty with excellent cement fill interdigitation patient has some modest stenosis at the level of the fracture and some modest multilevel spondylosis and foraminal stenosis      Medications:    polyethylene glycol  17 g Oral Daily    [START ON 5/21/2021] heparin (porcine)  4,000 Units Intravenous Once    sodium chloride flush  5-40 mL Intravenous 2 times per day    carvedilol  6.25 mg Oral BID    atorvastatin  40 mg Oral Daily    ferrous sulfate  300 mg Oral Daily    pantoprazole  40 mg Oral QAM AC    oxybutynin  15 mg Oral Daily    potassium chloride  20 mEq Oral BID    predniSONE  20 mg Oral Daily    rOPINIRole  0.5 mg Oral 4x Daily    sertraline  50 mg Oral Daily    amiodarone  100 mg Oral BID     PRN Meds:[START ON 5/21/2021] heparin (porcine), [START ON 5/21/2021] heparin (porcine), perflutren lipid microspheres, sodium chloride flush, sodium chloride, acetaminophen, naloxone, polyethylene glycol      Data:  CBC:   Recent Labs     05/19/21  1004 05/20/21  0425   WBC 7.7 6.5   RBC 3.54* 3.27*   HGB 10.9* 10.1*   HCT 33.0* 30.6*   MCV 93.2 93.7   RDW 18.1* 17.8*    240     BNP: No results for input(s): BNP in the last 72 hours. PT/INR: No results for input(s): PROTIME, INR in the last 72 hours.     Assessment:     Principal Problem:    Atrial fibrillation with RVR ([facility])  Active Problems:    Hypertension    Mixed hyperlipidemia    Chronic diastolic heart failure (HCC)    Chronic respiratory failure (HCC)    Gastroesophageal reflux disease without esophagitis    Pulmonary hypertension (HCC)    YOGESH (acute kidney injury) ([facility])    Closed compression fracture of L1 lumbar vertebra, sequela    Acute midline low back pain with right-sided sciatica  Resolved Problems:    * No resolved hospital problems.  *    Low back pain likely a result of incompletely healed L1 fracture status post kyphoplasty    Plan:   Agree with Dr. Holly Mijares that lumbar epidural steroid injection may help    Recommend PT OT and expected follow-up    Electronically signed by Syd Estrada MD on 5/20/2021 at 5:19 PM    [address] [place] [address]

## 2021-05-20 NOTE — H&P
1963    BIOPSY / LIGATION TEMPORAL ARTERY Bilateral 2/12/2021    TEMPORAL ARTERY BIOPSY performed by Gracy Gottron, MD at [address] Right    [address]  2002    CATARACT REMOVAL Bilateral     CHOLECYSTECTOMY  2005    COLONOSCOPY      CORONARY ANGIOPLASTY WITH STENT PLACEMENT  April 2002    HYSTERECTOMY  1981    LIVER BIOPSY  2000    LIVER BIOPSY      NASAL ENDOSCOPY Bilateral 11/25/2014    lt nasal cautery with packing    PHOTODYNAMIC THERAPY  April 2000 & Jan. 2002    for peritoneal Mesothelioma    [address]    REVISION TOTAL HIP ARTHROPLASTY Right 1998    ROTATOR CUFF REPAIR  2006    SKIN CANCER EXCISION Bilateral 2006    ankle    SKIN CANCER EXCISION Left Oct. 2014    shoulder    SPINE SURGERY N/A 4/15/2021    KYPHOPLASTY L1 performed by Harshal Epperson MD at [address] [facility] ARTHROPLASTY Left 2003    TUBAL LIGATION  1969    UPPER GASTROINTESTINAL ENDOSCOPY      WISDOM TOOTH EXTRACTION          Medications Prior to Admission:       Prior to Admission medications    Medication Sig Start Date End Date Taking?  Authorizing Provider   amiodarone (CORDARONE) 200 MG tablet Take 1 tablet by mouth 2 times daily 4/23/21   Evelin Lundberg MD   metoprolol tartrate (LOPRESSOR) 25 MG tablet Take 1 tablet by mouth 2 times daily 4/23/21   Evelin Lundberg MD   acetaminophen (TYLENOL) 500 MG tablet Take 500 mg by mouth every 6 hours as needed for Pain    Historical Provider, MD   isosorbide mononitrate (IMDUR) 60 MG extended release tablet Take 60 mg by mouth daily    Historical Provider, MD   rivaroxaban (XARELTO) 20 MG TABS tablet Take 20 mg by mouth daily (with breakfast)  2/22/21   Historical Provider, MD   diphenhydrAMINE-APAP, sleep, (TYLENOL PM EXTRA STRENGTH)  MG tablet Take 1 tablet by mouth nightly as needed     Historical Provider, MD   torsemide (DEMADEX) 20 MG tablet Take 1 tablet by mouth 2 times daily Take twice a day for 1 months then go back to 20 mg daily 3/4/21   Marion Sena MD   amoxicillin (AMOXIL) 500 MG capsule Take 2,000 mg by mouth as needed (dental procedures) Dental only 9/21/20   Historical Provider, MD   oxybutynin (DITROPAN XL) 15 MG extended release tablet Take 15 mg by mouth daily  7/30/20   Historical Provider, MD   polyethylene glycol (GLYCOLAX) 17 GM/SCOOP powder Take 17 g by mouth daily as needed (constipation)  7/30/20   Historical Provider, MD   Ascorbic Acid (VITAMIN C) 250 MG tablet Take 250 mg by mouth daily    Historical Provider, MD   ferrous sulfate (FLACO-IN-SOL) 75 (15 Fe) MG/ML solution Take 15 mg by mouth daily    Historical Provider, MD   omeprazole (PRILOSEC) 40 MG delayed release capsule Take 40 mg by mouth Daily  12/3/16   Historical Provider, MD   atorvastatin (LIPITOR) 40 MG tablet Take 40 mg by mouth daily  9/22/16   Historical Provider, MD   potassium chloride SA (K-DUR;KLOR-CON M) 20 MEQ tablet Take 1 tablet by mouth 2 times daily 3/10/16   Marion Sena MD   rOPINIRole (REQUIP) 0.5 MG tablet Take 0.5 mg by mouth 4 times daily as needed     Historical Provider, MD   Vitamin D (CHOLECALCIFEROL) 1000 UNITS CAPS capsule Take 1,000 Units by mouth 2 times daily    Historical Provider, MD   predniSONE (DELTASONE) 5 MG tablet Take 20 mg by mouth daily     Historical Provider, MD   Omega 3 1000 MG CAPS Take 1,000 mg by mouth daily     Historical Provider, MD   sertraline (ZOLOFT) 50 MG tablet Take 50 mg by mouth daily. Historical Provider, MD        Allergies:       Adhesive tape, Meperidine, Demerol, Ultram [tramadol hcl], Zocor [simvastatin], Codeine, and Fentanyl    Social History:     Tobacco:    reports that she has never smoked. She has never used smokeless tobacco.  Alcohol:      reports current alcohol use. Drug Use:  reports no history of drug use.     Family History:     Family History   Problem Relation Age of Onset    Heart Failure Brother     Heart Attack Brother         triple bypass    Prostate Cancer Brother     Coronary Art Dis Mother     Breast Cancer Mother         dx'd in late 46s   [facility] Emphysema Father     Colon Cancer Neg Hx     Diabetes Neg Hx     Eclampsia Neg Hx     Hypertension Neg Hx     Ovarian Cancer Neg Hx      Labor Neg Hx     Spont Abortions Neg Hx     Stroke Neg Hx        Review of Systems:     Positive and Negative as described in HPI   all 10 systems are reviewed and negative except as Noted      Review of Systems   Constitutional: Negative for chills and fatigue. HENT: Negative for drooling, mouth sores, sneezing and trouble swallowing. Eyes: Negative for redness and itching. Respiratory: Positive for shortness of breath. Negative for cough and chest tightness. Cardiovascular: Positive for palpitations. Negative for chest pain and leg swelling. Gastrointestinal: Negative for abdominal pain, blood in stool, nausea and vomiting. Endocrine: Negative for heat intolerance and polyphagia. Genitourinary: Negative for difficulty urinating, flank pain and pelvic pain. Musculoskeletal: Positive for back pain (Lumbar and thoracic). Negative for arthralgias, joint swelling and neck stiffness. Skin: Negative for color change and pallor. Allergic/Immunologic: Negative for food allergies. Neurological: Negative for dizziness, seizures and headaches. Hematological: Does not bruise/bleed easily. Psychiatric/Behavioral: Negative for agitation, behavioral problems and suicidal ideas. The patient is not hyperactive. Code Status:  Full Code    Physical Exam:     Vitals:  /82   Pulse 86   Temp 98.2 °F (36.8 °C) (Oral)   Resp 18   Ht 5' 2\" (1.575 m)   Wt 150 lb 9.2 oz (68.3 kg)   SpO2 94%   BMI 27.54 kg/m²   Temp (24hrs), Av °F (36.7 °C), Min:97.5 °F (36.4 °C), Max:98.3 °F (36.8 °C)        Physical Exam  Vitals reviewed. HENT:      Head: Normocephalic.       Right Ear: External ear normal.      Left Ear: External ear normal.      Nose: Nose normal.      Mouth/Throat:      Mouth: Mucous membranes are moist.      Pharynx: Oropharynx is clear. Eyes:      Conjunctiva/sclera: Conjunctivae normal.   Cardiovascular:      Rate and Rhythm: Normal rate and regular rhythm. Pulses: Normal pulses. Heart sounds: Normal heart sounds. Pulmonary:      Effort: Pulmonary effort is normal.      Breath sounds: Normal breath sounds. Abdominal:      General: Bowel sounds are normal.      Palpations: Abdomen is soft. Musculoskeletal:         General: No deformity. Cervical back: Normal range of motion and neck supple. Comments: Lumbar and thoracic exam: On inspection no deformity noted. Lower thoracic vertebra and upper lumbar vertebra mild tenderness. Flexion extension of lumbar and thoracic spine. No saddle anesthesia. Skin:     General: Skin is warm. Capillary Refill: Capillary refill takes less than 2 seconds. Coloration: Skin is not jaundiced. Neurological:      General: No focal deficit present. Mental Status: She is alert. Mental status is at baseline. Sensory: No sensory deficit. Motor: No weakness.    Psychiatric:         Mood and Affect: Mood normal.         Behavior: Behavior normal.               Data:     Recent Results (from the past 24 hour(s))   Urinalysis Reflex to Culture    Collection Time: 05/20/21 12:26 AM    Specimen: Urine, clean catch   Result Value Ref Range    Color, UA YELLOW YELLOW    Turbidity UA CLEAR CLEAR    Glucose, Ur NEGATIVE NEGATIVE    Bilirubin Urine NEGATIVE NEGATIVE    Ketones, Urine NEGATIVE NEGATIVE    Specific Gravity, UA 1.023 1.000 - 1.030    Urine Hgb NEGATIVE NEGATIVE    pH, UA 5.5 5.0 - 8.0    Protein, UA NEGATIVE NEGATIVE    Urobilinogen, Urine Normal Normal    Nitrite, Urine NEGATIVE NEGATIVE    Leukocyte Esterase, Urine NEGATIVE NEGATIVE    Urinalysis Comments       Microscopic exam not performed based on chemical results unless requested in original order.    CBC with DIFF    Collection Time: 05/20/21  4:25 AM   Result Value Ref Range    WBC 6.5 3.5 - 11.0 k/uL    RBC 3.27 (L) 4.0 - 5.2 m/uL    Hemoglobin 10.1 (L) 12.0 - 16.0 g/dL    Hematocrit 30.6 (L) 36 - 46 %    MCV 93.7 80 - 100 fL    MCH 31.0 26 - 34 pg    MCHC 33.1 31 - 37 g/dL    RDW 17.8 (H) 11.5 - 14.9 %    Platelets 177 634 - 625 k/uL    MPV 8.3 6.0 - 12.0 fL    NRBC Automated NOT REPORTED per 100 WBC    Differential Type NOT REPORTED     Immature Granulocytes NOT REPORTED 0 %    Absolute Immature Granulocyte NOT REPORTED 0.00 - 0.30 k/uL    WBC Morphology NOT REPORTED     RBC Morphology NOT REPORTED     Platelet Estimate NOT REPORTED     Seg Neutrophils 82 (H) 36 - 66 %    Lymphocytes 8 (L) 24 - 44 %    Monocytes 8 (H) 1 - 7 %    Eosinophils % 1 0 - 4 %    Basophils 1 0 - 2 %    Segs Absolute 5.40 1.3 - 9.1 k/uL    Absolute Lymph # 0.50 (L) 1.0 - 4.8 k/uL    Absolute Mono # 0.50 0.1 - 1.3 k/uL    Absolute Eos # 0.00 0.0 - 0.4 k/uL    Basophils Absolute 0.00 0.0 - 0.2 k/uL   Comprehensive Metabolic Panel w/ Reflex to MG    Collection Time: 05/20/21  4:25 AM   Result Value Ref Range    Glucose 126 (H) 70 - 99 mg/dL    BUN 28 (H) 8 - 23 mg/dL    CREATININE 1.19 (H) 0.50 - 0.90 mg/dL    Bun/Cre Ratio NOT REPORTED 9 - 20    Calcium 8.8 8.6 - 10.4 mg/dL    Sodium 145 (H) 135 - 144 mmol/L    Potassium 5.0 3.7 - 5.3 mmol/L    Chloride 110 (H) 98 - 107 mmol/L    CO2 28 20 - 31 mmol/L    Anion Gap 7 (L) 9 - 17 mmol/L    Alkaline Phosphatase 138 (H) 35 - 104 U/L    ALT 22 5 - 33 U/L    AST 17 <32 U/L    Total Bilirubin 0.61 0.3 - 1.2 mg/dL    Total Protein 5.2 (L) 6.4 - 8.3 g/dL    Albumin 2.9 (L) 3.5 - 5.2 g/dL    Albumin/Globulin Ratio NOT REPORTED 1.0 - 2.5    GFR Non-African American 43 (L) >60 mL/min    GFR  52 (L) >60 mL/min    GFR Comment          GFR Staging NOT REPORTED    SODIUM, URINE, RANDOM    Collection Time: 05/20/21  7:53 AM   Result

## 2021-05-20 NOTE — PLAN OF CARE
Problem: Falls - Risk of:  Goal: Will remain free from falls  Description: Will remain free from falls  Outcome: Ongoing  Goal: Absence of physical injury  Description: Absence of physical injury  Outcome: Ongoing     Problem: Pain:  Goal: Pain level will decrease  Description: Pain level will decrease  Outcome: Ongoing  Goal: Control of acute pain  Description: Control of acute pain  Outcome: Ongoing  Note: Morphine PCA started this shift for acute back pain. Pt states pain is better and has slept for most of shift. Goal: Control of chronic pain  Description: Control of chronic pain  Outcome: Ongoing     Problem: Cardiac Output - Decreased:  Goal: Hemodynamic stability will improve  Description: Hemodynamic stability will improve  Outcome: Ongoing  Note: Afib RVR resolved this shift. Cardizem gtt turned off but pt remains in stable afib at this time.

## 2021-05-20 NOTE — CARE COORDINATION
CASE MANAGEMENT NOTE:    Admission Date:  5/19/2021 Alysa Antonio is a 81 y.o.  female    Admitted for : Atrial fibrillation with RVR ([facility] [address] [I48.91]    Met with:  Patient    PCP:  Dr. Trang Patterson:  Medicare       Is patient alert and oriented at time of discussion:  Yes    Current Residence/ Living Arrangements:  independently at home             Current Services PTA:  No    Does patient go to outpatient dialysis: No  If yes, location and chair time:     Is patient agreeable to VNS: No    Freedom of choice provided:  NA    List of [address] provided: NA    VNS chosen:  No    DME:  crutches    Home Oxygen: Yes     Nebulizer: No    CPAP/BIPAP: No    Supplier: [facility]    Potential Assistance Needed: No    SNF needed: No    Freedom of choice and list provided: NA    Pharmacy:  [facility]       Does Patient want to use MEDS to BEDS? No    Is patient currently receiving oral anticoagulation therapy? Yes    Is the Patient an [facility] with Readmission Risk Score greater than 14%? Yes  If yes, pt needs a follow up appointment made within 7 days. Family Members/Caregivers that pt would like involved in their care:    Yes    If yes, list name here:  Evelin Whelan     Transportation Provider:  Family             Discharge Plan:  5/20/21 Medicare Pt is from home in a two story home she uses walking crutches PT denies need for vns Pt is on Jose Donath PTA will continue to follow for needs . //tv                   Electronically signed by:  Floresita Reynolds RN on 5/20/2021 at 2:07 PM

## 2021-05-20 NOTE — PLAN OF CARE
Problem: Falls - Risk of:  Goal: Will remain free from falls  Description: Will remain free from falls  5/20/2021 1544 by Arlene Pereira RN  Outcome: Ongoing     Problem: Falls - Risk of:  Goal: Absence of physical injury  Description: Absence of physical injury  5/20/2021 1544 by Arlene Pereira RN  Outcome: Ongoing     Problem: Pain:  Goal: Pain level will decrease  Description: Pain level will decrease  5/20/2021 1544 by Arlene Pereira RN  Outcome: Ongoing     Problem: Pain:  Goal: Control of acute pain  Description: Control of acute pain  5/20/2021 1544 by Arlene Pereira RN  Outcome: Ongoing       Problem: Cardiac Output - Decreased:  Goal: Hemodynamic stability will improve  Description: Hemodynamic stability will improve  5/20/2021 1544 by Arlene Pereira RN  Outcome: Ongoing

## 2021-05-20 NOTE — FLOWSHEET NOTE
05/20/21 0530   Provider Notification   Reason for Communication Evaluate   Provider Name Dr. Areli Schneider   Provider Notification Physician   Method of Communication Call   Response See orders   Notification Time    RN called MD regarding increase in sodium from 140-145 this am. See orders.

## 2021-05-20 NOTE — CONSULTS
Date:   5/20/2021  Patient name: Tammy Bravo  Date of admission:  5/19/2021  9:49 AM  MRN:   [id number]  YOB: 1935  PCP: Makayla Valverde DO    Reason for Admission: Atrial fibrillation with RVR [facility]) [I48.91]    Cardiology consult: A. fib with RVR       Impression      A. fib with RVR  Diastolic CHF  Hypertension  COPD, hypoxia, on oxygen at home  Hyperlipidemia  Exertional shortness of breath, hypoxia  History of CAD, stent placement LAD 2002  Polymyalgia rheumatica  Moderate aortic regurgitation  Giant cell arteritis  chronic anemia  history of peritoneal/omental mesothelioma surgery done at [address] [place] [address]  Dry mouth, dry eyes  Multiple hip surgeries, bilateral  Chronic pain syndrome  L1 kyphoplasty 4/15/2021        ECG 5/19/2021  A. fib/a flutter heart rate 159, nonspecific ST-T changes    X-ray lumbar spine 5/19/2021  Unchanged appearance of the lumbar spine, status post vertebroplasty at L1, no new osseous abnormality  Moderate stool burden, previously administered oral contrast is present in the distal colon    Lab work 5/19/2021  Sodium 140, potassium 5.2, BUN 30, creatinine 1.42, GFR 35, alkaline phosphatase 133, bilirubin 0.60  Troponin XX 3, myoglobin 28  WBC 7.7, hemoglobin 10.9, platelets 007  Lab work 5/20/2021  Sodium 145, potassium 5.0, CO2 28, chloride 110, BUN 28, creatinine 1.19, hemoglobin 10.1    History of present illness  81years old female who recently had L1 kyphoplasty presented to the ER with a chief complaint of back pain. She had a kyphoplasty about a month ago for compression fracture L1. Her pain got very severe. She has been also having dizziness and she had a near syncope about 10 days ago. No chest pain no palpitation. On admission her ECG showed A. fib with RVR heart rate 159. She was started on Cardizem infusion. She developed hypotension and dose of Cardizem infusion was reduced to 5 mg/h.   Later on it was discontinued and started on carvedilol 6.25 mg twice a day. Heart rate improved significantly. Patient seen and examined  Very frail and pale looking female she was resting with one pillow  She was on oxygen via nasal cannula of oxygen saturation 96%  Afebrile  ECG monitor A. fib heart rate       Medications:   Scheduled Meds:   polyethylene glycol  17 g Oral Daily    sodium chloride flush  5-40 mL Intravenous 2 times per day    carvedilol  6.25 mg Oral BID    atorvastatin  40 mg Oral Daily    ferrous sulfate  300 mg Oral Daily    pantoprazole  40 mg Oral QAM AC    oxybutynin  15 mg Oral Daily    potassium chloride  20 mEq Oral BID    predniSONE  20 mg Oral Daily    rivaroxaban  15 mg Oral Daily with breakfast    rOPINIRole  0.5 mg Oral 4x Daily    sertraline  50 mg Oral Daily    amiodarone  100 mg Oral BID     Continuous Infusions:   dilTIAZem (CARDIZEM) 125 mg in dextrose 5% 125 mL infusion Stopped (05/19/21 2216)    sodium chloride      morphine       CBC:   Recent Labs     05/19/21  1004 05/20/21  0425   WBC 7.7 6.5   HGB 10.9* 10.1*    240     BMP:    Recent Labs     05/19/21  1004 05/20/21  0425    145*   K 5.2 5.0    110*   CO2 25 28   BUN 30* 28*   CREATININE 1.42* 1.19*   GLUCOSE 116* 126*     Hepatic:   Recent Labs     05/19/21  1004 05/20/21  0425   AST 33* 17   ALT 28 22   BILITOT 0.60 0.61   ALKPHOS 133* 138*     Troponin: No results for input(s): TROPONINI in the last 72 hours. BNP: No results for input(s): BNP in the last 72 hours. Lipids: No results for input(s): CHOL, HDL in the last 72 hours. Invalid input(s): LDLCALCU  INR: No results for input(s): INR in the last 72 hours. Objective:   Vitals: BP 88/61   Pulse 88   Temp 97.5 °F (36.4 °C) (Oral)   Resp 20   Ht 5' 2\" (1.575 m)   Wt 150 lb 9.2 oz (68.3 kg)   SpO2 96%   BMI 27.54 kg/m²   General appearance: alert and cooperative with exam  HEENT: Head: Normal, normocephalic, atraumatic.   Neck: no JVD and supple,

## 2021-05-20 NOTE — CONSULTS
Pain Management Inpatient Consultation  Note      Patient: Allie Butler    Location: 2115/2115-01    CHIEF COMPLAINT:  Thoracic and lumbar back pain    HISTORY OFPRESENT ILLNESS:    The patient is a 81 y.o. female who is referred to pain clinic in consultation   for  Lower back pain by patient's physician. Patient has a PMH significant for atrial fibrillation on Xarelto, CHF, basal cell carcinoma, and chronic back pain with kyphoplasty at the level of L1 on 4/15/21. Patient states that her back pain had initially improved for the first 2 weeks after kyphoplasty. Patient's pain is located in her lower back without radiation, 10/10 and brought on by any movement of her arms, legs. She also reports to have weakness in her legs. She last saw her orthopedic surgeon in April and stated that he compared her Xrays and did not recommend any intervention at that time. In the ER, patient was given Morphine to a total of 8 mg without relief. She started on PCA without much relief of her symptoms. She is laying flat in bed and states that any movement causes her severe pain. Past Medical History:        Diagnosis Date    Asbestos exposure     Atrial fibrillation ([address]     takes xarelto    Basal cell carcinoma of nose     CAD (coronary artery disease)     CKD (chronic kidney disease) stage 2, GFR 60-89 ml/min     Hyperlipidemia     Hypertension     MDRO (multiple drug resistant organisms) resistance 2/19/2014    E.  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    [facility] at [facility] PONV (postoperative nausea and vomiting)     Prolonged emergence from general anesthesia     SOB (shortness of breath) on exertion        Past Surgical History:        Procedure Laterality Date    ANKLE FRACTURE SURGERY Right 2013   [facility] BIOPSY / LIGATION TEMPORAL ARTERY Bilateral 2/12/2021    TEMPORAL ARTERY BIOPSY performed by Davion Albert MD at [address] Right    [address]  2002    CATARACT REMOVAL Bilateral     CHOLECYSTECTOMY  2005    COLONOSCOPY      CORONARY ANGIOPLASTY WITH STENT PLACEMENT  April 2002    HYSTERECTOMY  1981    LIVER BIOPSY  2000    LIVER BIOPSY      NASAL ENDOSCOPY Bilateral 11/25/2014    lt nasal cautery with packing    PHOTODYNAMIC THERAPY  April 2000 & Jan. 2002    for peritoneal Mesothelioma    [address]    REVISION TOTAL HIP ARTHROPLASTY Right 1998    ROTATOR CUFF REPAIR  2006    SKIN CANCER EXCISION Bilateral 2006    ankle    SKIN CANCER EXCISION Left Oct. 2014    shoulder    SPINE SURGERY N/A 4/15/2021    KYPHOPLASTY L1 performed by Nick Ochoa MD at [address] Right 1990    TOTAL HIP ARTHROPLASTY Left 2003    TUBAL LIGATION  1969    UPPER GASTROINTESTINAL ENDOSCOPY      WISDOM TOOTH EXTRACTION         Home Medications:   Prior to Admission medications    Medication Sig Start Date End Date Taking?  Authorizing Provider   amiodarone (CORDARONE) 200 MG tablet Take 1 tablet by mouth 2 times daily 4/23/21   Susanne Holm MD   metoprolol tartrate (LOPRESSOR) 25 MG tablet Take 1 tablet by mouth 2 times daily 4/23/21   Susanne Holm MD   acetaminophen (TYLENOL) 500 MG tablet Take 500 mg by mouth every 6 hours as needed for Pain    Historical Provider, MD   isosorbide mononitrate (IMDUR) 60 MG extended release tablet Take 60 mg by mouth daily    Historical Provider, MD   rivaroxaban (XARELTO) 20 MG TABS tablet Take 20 mg by mouth daily (with breakfast)  2/22/21   Historical Provider, MD   diphenhydrAMINE-APAP, sleep, (TYLENOL PM EXTRA STRENGTH)  MG tablet Take 1 tablet by mouth nightly as needed     Historical Provider, MD   torsemide (DEMADEX) 20 MG tablet Take 1 tablet by mouth 2 times daily Take twice a day for 1 months then go back to 20 mg daily 3/4/21   Eugenia Wilkins MD   amoxicillin (AMOXIL) 500 MG capsule Take 2,000 mg by mouth as needed (dental procedures) Dental only 9/21/20   Historical Provider, MD   oxybutynin (DITROPAN XL) 15 MG extended release tablet Take 15 mg by mouth daily  7/30/20   Historical Provider, MD   polyethylene glycol (GLYCOLAX) 17 GM/SCOOP powder Take 17 g by mouth daily as needed (constipation)  7/30/20   Historical Provider, MD   Ascorbic Acid (VITAMIN C) 250 MG tablet Take 250 mg by mouth daily    Historical Provider, MD   ferrous sulfate (FLACO-IN-SOL) 75 (15 Fe) MG/ML solution Take 15 mg by mouth daily    Historical Provider, MD   omeprazole (PRILOSEC) 40 MG delayed release capsule Take 40 mg by mouth Daily  12/3/16   Historical Provider, MD   atorvastatin (LIPITOR) 40 MG tablet Take 40 mg by mouth daily  9/22/16   Historical Provider, MD   potassium chloride SA (K-DUR;KLOR-CON M) 20 MEQ tablet Take 1 tablet by mouth 2 times daily 3/10/16   Eugenia Wilkins MD   rOPINIRole (REQUIP) 0.5 MG tablet Take 0.5 mg by mouth 4 times daily as needed     Historical Provider, MD   Vitamin D (CHOLECALCIFEROL) 1000 UNITS CAPS capsule Take 1,000 Units by mouth 2 times daily    Historical Provider, MD   predniSONE (DELTASONE) 5 MG tablet Take 20 mg by mouth daily     Historical Provider, MD   Omega 3 1000 MG CAPS Take 1,000 mg by mouth daily     Historical Provider, MD   sertraline (ZOLOFT) 50 MG tablet Take 50 mg by mouth daily.       Historical Provider, MD       Allergies:  Adhesive tape, Meperidine, Demerol, Ultram [tramadol hcl], Zocor [simvastatin], Codeine, and Fentanyl    Social History:  Social History     Socioeconomic History    Marital status:      Spouse name: Not on file    Number of children: Not on file    Years of education: Not on file    Highest education level: Not on file   Occupational History    Not on file   Tobacco Use    Smoking status: Never Smoker    Smokeless tobacco: Never Used   Vaping Use    Vaping Use: Never used   Substance and Sexual Activity    Alcohol use: Yes     Comment: social    Drug use: No    Sexual activity: Not Currently   Other Topics Concern    Not on file   Social History Narrative    Not on file     Social Determinants of Health     Financial Resource Strain:     Difficulty of Paying Living Expenses:    Food Insecurity:     Worried About Running Out of Food in the Last Year:     [address] in the Last Year:    Transportation Needs:     Lack of Transportation (Medical):  Lack of Transportation (Non-Medical):    Physical Activity:     Days of Exercise per Week:     Minutes of Exercise per Session:    Stress:     Feeling of Stress :    Social Connections:     Frequency of Communication with Friends and Family:     Frequency of Social Gatherings with Friends and Family:     Attends Rastafarian Services:     Active Member of Clubs or Organizations:     Attends Club or Organization Meetings:     Marital Status:    Intimate Partner Violence:     Fear of Current or Ex-Partner:     Emotionally Abused:     Physically Abused:     Sexually Abused:        Family History:       Problem Relation Age of Onset    Heart Failure Brother     Heart Attack Brother         triple bypass    Prostate Cancer Brother     Coronary Art Dis Mother     Breast Cancer Mother         dx'd in late 46s   Odalis Solum Emphysema Father     Colon Cancer Neg Hx     Diabetes Neg Hx     Eclampsia Neg Hx     Hypertension Neg Hx     Ovarian Cancer Neg Hx      Labor Neg Hx     Spont Abortions Neg Hx     Stroke Neg Hx        REVIEW OF SYSTEMS:  Review of Systems   Constitutional: Negative for activity change, appetite change, chills and fever. HENT: Negative for congestion. Respiratory: Positive for shortness of breath. Negative for cough, chest tightness and wheezing. Cardiovascular: Negative for chest pain and leg swelling. Gastrointestinal: Negative for abdominal distention, abdominal pain, diarrhea, nausea and vomiting. Genitourinary: Negative for dysuria and flank pain. Musculoskeletal: Positive for back pain. Skin: Negative for rash. Neurological: Positive for weakness. Negative for dizziness, numbness and headaches. Psychiatric/Behavioral: Negative for agitation, behavioral problems, confusion and sleep disturbance. PHYSICALEXAM:  Vital Signs:  /82   Pulse 86   Temp 98.2 °F (36.8 °C) (Oral)   Resp 18   Ht 5' 2\" (1.575 m)   Wt 150 lb 9.2 oz (68.3 kg)   SpO2 94%   BMI 27.54 kg/m²     Physical Exam  Constitutional:       General: She is not in acute distress. Appearance: Normal appearance. She is obese. She is not ill-appearing. HENT:      Head: Normocephalic. Mouth/Throat:      Mouth: Mucous membranes are moist.   Cardiovascular:      Rate and Rhythm: Normal rate and regular rhythm. Pulses: Normal pulses. Heart sounds: Normal heart sounds. No murmur heard. No gallop. Pulmonary:      Effort: Pulmonary effort is normal. No respiratory distress. Breath sounds: Normal breath sounds. No wheezing. Chest:      Chest wall: No tenderness. Abdominal:      General: Bowel sounds are normal. There is no distension. Palpations: Abdomen is soft. There is no mass. Tenderness: There is no abdominal tenderness. Musculoskeletal:         General: Tenderness present. No swelling or deformity. Neurological:      General: No focal deficit present. Mental Status: She is alert and oriented to person, place, and time. Psychiatric:         Mood and Affect: Mood normal.         Behavior: Behavior normal.         Thought Content: Thought content normal.       Ortho Exam  Examination is somewhat limited as patient is in severe pain and is not able to cooperate well.   Palpation revealed diffuse tenderness in the thoracic as well as in the lumbar regions    DATA:      MRI thoracic spine 5/19  1. No acute abnormality of the thoracic spine. 2. Chronic mild compression deformity of the L1 vertebral body status post   prior vertebroplasty. 3. Mild degenerative changes in the lower cervical and upper thoracic spine. 4. Small layering left pleural effusion. 5. Incidental subcentimeter left thyroid nodule.  No further follow-up is   warranted per guidelines below. MRI lumbar spine 5/19   1. L1 vertebroplasty in association with remote vertebral body compression   fracture with approximately 50% height loss. 2. Associated retropulsion of posterior L1 cortex with associated borderline   central canal stenosis. 3. L1/L2 mild, L2/L3 moderate, L3/L4 moderate central canal stenosis   secondary to encroachment by posterior disc osteophyte complex. 4. L1/L2 moderate bilateral, L2/L3 mild bilateral, L3/L4 moderate bilateral,   L4/L5 moderate bilateral neural foraminal stenosis secondary to encroachment   by disc osteophyte complex. 5. L3/L4-L5/S1 mild bilateral facet degenerative arthrosis.             IMPRESSION  82 yo female s/p L1 kyphoplasty complaining of progressively worsening back pain. Patient is currently admitted for Afib with RVR, cardiology on board. She is getting PCA without relief of her back pain. She is agreeable to orthopedic consult inpatient and lumbar steroid epidural injection on Monday. Will hold Xarelto for surgery. RECOMMENDATIONS:  - Lumbar epidural steroid injection  for Monday   - Hold Xarelto   - Recommend consultation with orthopedic surgery   - Continue PCA   - Continue medical management     Will discuss plan with attending, Dr. Celso Woo   Electronically signed by Liz Metcalf MD on 5/20/2021 at 11:25 AM    NAME:  Benji Short  MRN: [id number]   YOB: 1935   Date: 5/22/2021   Age: 81 y.o.   Gender: female       Benji Short is 81 y.o. ,female, referred because of Back Pain        I Performed a history and physical examination of the patient and discussed management with the resident/ Physician Assistant/ Nurse Practitioner. I reviewed the Resident/ Physician Assistant/ Nurse Practitioner note and agree with the documented findings and plan of care. Any areas of disagreement are noted on the chart. I was present for any key portions of any procedure. I have documented in the chart those procedures where I was not present during key Portions. I agree with the chief complaint, past medical history, past surgical history, Social & family history, Allergies, medications as documented unless noted below. I have personally evaluated this patient and have completed at least one if not all key elements of the (History, physical exam and plan of care). Additional findings are added to the note above    Diagnosis:   1. Acute exacerbation of chronic low back pain    2. Atrial fibrillation with rapid ventricular response ([address]    3. Compression fracture of L1 vertebra with delayed healing, subsequent encounter          Plan of Care:  Patient is having severe pain. Her x-rays did not reveal any new fracture to explain the pain. Her pain appears to be somewhat not very specific origin. She does have a degenerative changes changes. Patient also has atrial fibrillation and is on anticoagulants. I will obtain an Ortho consult with Dr. Annalisa Calderón as well as refer her to physical therapy. I also will obtain a cardiology consult as she is in atrial fib and is going to have some procedures done on Wednesday. We need to stop her anticoagulants before we will do the epidural steroid injection. I did talk to the nurse and asked her to check with the cardiologist and if it is okay then we will stop the anticoagulants and try to do her epidural steroid injection on Monday. I discussed this plan with the patient. Patient is on PCA at present but not using as much.   She is encouraged to use it for the pain and then she can participate in therapy and help to ambulate. We will plan further treatment depending on the assessment from Dr. Junior Antony : Patient's health history and referral records thoroughly reviewed before focused physical examination and discussion with patient. Over 50% of today's visit is spent on examining the patient and counseling. Level of complexity of date to be reviewed is Moderate. The chart date reviewed include the following: Imaging Reports. Summary of Care. Time spent reviewing with patient the below reports:   Medication safety, Treatment options. Level of diagnosis and management options of this case is multiple: involving the following management options: Interventions as needed, medication management as appropriate, future visits, activity modification, heat/ice as needed, Urine drug screen as required. [x]The patient's questions were answered to the best of my abilities.        Santino Almaraz MD on 5/22/2021 at 5:47 AM

## 2021-05-21 NOTE — PROGRESS NOTES
Progress Note    5/21/2021   11:34 AM    Name:  Krysten Bailey  MRN:    [id number]     Acct:     [de-identified]   Room:  2115/2115-01  IP Day: 2     Admit Date: 5/19/2021  9:49 AM  PCP: Emilia Eid DO    Subjective:     C/C:   Chief Complaint   Patient presents with    Back Pain       Interval History: Status: not changed. patient's generalized weakness and dyspnea is stable. patient denies any chest pain    ROS:   all 10 systems reviewed and are negative except as noted    Review of Systems   Constitutional: Positive for activity change and fatigue. Negative for appetite change. HENT: Negative for congestion, postnasal drip and sore throat. Eyes: Negative for pain and discharge. Respiratory: Positive for shortness of breath. Negative for apnea, chest tightness and stridor. Cardiovascular: Negative for palpitations. Gastrointestinal: Negative for anal bleeding and rectal pain. Endocrine: Negative for polydipsia and polyphagia. Genitourinary: Negative for decreased urine volume, flank pain and hematuria. Musculoskeletal: Negative for joint swelling and neck stiffness. Skin: Negative for rash. Allergic/Immunologic: Negative for food allergies. Neurological: Positive for dizziness. Negative for seizures, facial asymmetry and speech difficulty. Hematological: Does not bruise/bleed easily. Psychiatric/Behavioral: Negative for behavioral problems and suicidal ideas. The patient is not hyperactive. Medications: Allergies:    Allergies   Allergen Reactions    Adhesive Tape Rash    Meperidine Nausea Only    Demerol Nausea Only    Ultram [Tramadol Hcl]      Hot flashes    Zocor [Simvastatin]      Muscle aches    Codeine      \"out of it\"    Fentanyl Rash       Current Meds: polyethylene glycol (GLYCOLAX) packet 17 g, Daily  heparin (porcine) injection 4,000 Units, PRN  heparin (porcine) injection 2,000 Units, PRN  heparin 25,000 units in dextrose 5% 250 mL (premix) infusion, Continuous  perflutren lipid microspheres (DEFINITY) injection 2.2 mg, ONCE PRN  dilTIAZem 125 mg in dextrose 5 % 125 mL infusion, Continuous  sodium chloride flush 0.9 % injection 5-40 mL, 2 times per day  sodium chloride flush 0.9 % injection 5-40 mL, PRN  0.9 % sodium chloride infusion, PRN  acetaminophen (TYLENOL) tablet 650 mg, Q4H PRN  carvedilol (COREG) tablet 6.25 mg, BID  naloxone (NARCAN) injection 0.4 mg, PRN  morphine PCA 1 mg/mL, Continuous  atorvastatin (LIPITOR) tablet 40 mg, Daily  ferrous sulfate 300 (60 Fe) MG/5ML syrup 300 mg, Daily  pantoprazole (PROTONIX) tablet 40 mg, QAM AC  oxybutynin (DITROPAN-XL) extended release tablet 15 mg, Daily  polyethylene glycol (GLYCOLAX) packet 17 g, Daily PRN  potassium chloride (KLOR-CON M) extended release tablet 20 mEq, BID  predniSONE (DELTASONE) tablet 20 mg, Daily  rOPINIRole (REQUIP) tablet 0.5 mg, 4x Daily  sertraline (ZOLOFT) tablet 50 mg, Daily  amiodarone (CORDARONE) tablet 100 mg, BID        Data:     Code Status:  Full Code    Family History   Problem Relation Age of Onset    Heart Failure Brother     Heart Attack Brother         triple bypass    Prostate Cancer Brother     Coronary Art Dis Mother     Breast Cancer Mother         dx'd in late 46s   Estella See Emphysema Father     Colon Cancer Neg Hx     Diabetes Neg Hx     Eclampsia Neg Hx     Hypertension Neg Hx     Ovarian Cancer Neg Hx      Labor Neg Hx     Spont Abortions Neg Hx     Stroke Neg Hx        Social History     Socioeconomic History    Marital status:      Spouse name: Not on file    Number of children: Not on file    Years of education: Not on file    Highest education level: Not on file   Occupational History    Not on file   Tobacco Use    Smoking status: Never Smoker    Smokeless tobacco: Never Used   Vaping Use    Vaping Use: Never used   Substance and Sexual Activity    Alcohol use: Yes     Comment: social    Drug use: No    Sexual activity: Not Currently   Other Topics Concern    Not on file   Social History Narrative    Not on file     Social Determinants of Health     Financial Resource Strain:     Difficulty of Paying Living Expenses:    Food Insecurity:     Worried About Running Out of Food in the Last Year:     [address] in the Last Year:    Transportation Needs:     Lack of Transportation (Medical):  Lack of Transportation (Non-Medical):    Physical Activity:     Days of Exercise per Week:     Minutes of Exercise per Session:    Stress:     Feeling of Stress :    Social Connections:     Frequency of Communication with Friends and Family:     Frequency of Social Gatherings with Friends and Family:     Attends Religion Services:     Active Member of Clubs or Organizations:     Attends Club or Organization Meetings:     Marital Status:    Intimate Partner Violence:     Fear of Current or Ex-Partner:     Emotionally Abused:     Physically Abused:     Sexually Abused:        I/O (24Hr): Intake/Output Summary (Last 24 hours) at 5/21/2021 1134  Last data filed at 5/20/2021 1911  Gross per 24 hour   Intake 1 ml   Output 250 ml   Net -249 ml     Radiology:  XR THORACIC SPINE (3 VIEWS)    Result Date: 5/19/2021  Osteopenia without thoracic compression, fannie or retrolisthesis. Decrease in disc height at all thoracic disc levels. Post vertebroplasty changes involve L1. No change has occurred from the lumbar spine series dated 04/19/2021. XR LUMBAR SPINE (2-3 VIEWS)    Result Date: 5/19/2021  1. Unchanged appearance of the lumbar spine. Status post vertebroplasty at L1. No new osseous abnormality identified. 2.  Moderate stool burden. Previously administered oral contrast is present in the distal colon. MRI THORACIC SPINE WO CONTRAST    Result Date: 5/19/2021  1. No acute abnormality of the thoracic spine. 2. Chronic mild compression deformity of the L1 vertebral body status post prior vertebroplasty.  3. Mild degenerative changes in the lower cervical and upper thoracic spine. 4. Small layering left pleural effusion. 5. Incidental subcentimeter left thyroid nodule. No further follow-up is warranted per guidelines below. RECOMMENDATIONS: Subcentimeter incidental left thyroid nodule. No follow-up imaging is recommended. Reference: J Am Trell Radiol. 2015 Feb;12(2): 143-50     MRI LUMBAR SPINE WO CONTRAST    Result Date: 5/19/2021  . 1. L1 vertebroplasty in association with remote vertebral body compression fracture with approximately 50% height loss. 2. Associated retropulsion of posterior L1 cortex with associated borderline central canal stenosis. 3. L1/L2 mild, L2/L3 moderate, L3/L4 moderate central canal stenosis secondary to encroachment by posterior disc osteophyte complex. 4. L1/L2 moderate bilateral, L2/L3 mild bilateral, L3/L4 moderate bilateral, L4/L5 moderate bilateral neural foraminal stenosis secondary to encroachment by disc osteophyte complex. 5. L3/L4-L5/S1 mild bilateral facet degenerative arthrosis. CT CHEST ABDOMEN PELVIS WO CONTRAST    Result Date: 5/14/2021  1. Short-segment sigmoid colon thickening with diverticula, suggestive of colitis. 2.  Otherwise stable exam with mild residual peritoneal thickening noted in the left upper quadrant and pelvis. Additional stable chronic and benign findings, as above.        Labs:  Recent Results (from the past 24 hour(s))   CBC with DIFF    Collection Time: 05/21/21  4:39 AM   Result Value Ref Range    WBC 5.3 3.5 - 11.0 k/uL    RBC 3.40 (L) 4.0 - 5.2 m/uL    Hemoglobin 10.3 (L) 12.0 - 16.0 g/dL    Hematocrit 31.5 (L) 36 - 46 %    MCV 92.6 80 - 100 fL    MCH 30.2 26 - 34 pg    MCHC 32.6 31 - 37 g/dL    RDW 18.1 (H) 11.5 - 14.9 %    Platelets 505 108 - 598 k/uL    MPV 7.8 6.0 - 12.0 fL    NRBC Automated NOT REPORTED per 100 WBC    Differential Type NOT REPORTED     Seg Neutrophils 75 (H) 36 - 66 %    Lymphocytes 15 (L) 24 - 44 %    Monocytes 10 (H) 1 - 7 % Eosinophils % 0 0 - 4 %    Basophils 0 0 - 2 %    Immature Granulocytes NOT REPORTED 0 %    Segs Absolute 3.90 1.3 - 9.1 k/uL    Absolute Lymph # 0.80 (L) 1.0 - 4.8 k/uL    Absolute Mono # 0.50 0.1 - 1.3 k/uL    Absolute Eos # 0.00 0.0 - 0.4 k/uL    Basophils Absolute 0.00 0.0 - 0.2 k/uL    Absolute Immature Granulocyte NOT REPORTED 0.00 - 0.30 k/uL    WBC Morphology NOT REPORTED     RBC Morphology NOT REPORTED     Platelet Estimate NOT REPORTED    Comprehensive Metabolic Panel w/ Reflex to MG    Collection Time: 21  4:39 AM   Result Value Ref Range    Glucose 123 (H) 70 - 99 mg/dL    BUN 22 8 - 23 mg/dL    CREATININE 0.91 (H) 0.50 - 0.90 mg/dL    Bun/Cre Ratio NOT REPORTED 9 - 20    Calcium 8.7 8.6 - 10.4 mg/dL    Sodium 141 135 - 144 mmol/L    Potassium 4.8 3.7 - 5.3 mmol/L    Chloride 109 (H) 98 - 107 mmol/L    CO2 27 20 - 31 mmol/L    Anion Gap 5 (L) 9 - 17 mmol/L    Alkaline Phosphatase 136 (H) 35 - 104 U/L    ALT 21 5 - 33 U/L    AST 18 <32 U/L    Total Bilirubin 0.49 0.3 - 1.2 mg/dL    Total Protein 5.2 (L) 6.4 - 8.3 g/dL    Albumin 2.9 (L) 3.5 - 5.2 g/dL    Albumin/Globulin Ratio NOT REPORTED 1.0 - 2.5    GFR Non- 59 (L) >60 mL/min    GFR African American >60 >60 mL/min    GFR Comment          GFR Staging NOT REPORTED    APTT    Collection Time: 21  4:39 AM   Result Value Ref Range    PTT 38.7 (H) 24.0 - 36.0 sec   Protime-INR    Collection Time: 21  4:39 AM   Result Value Ref Range    Protime 22.4 (H) 11.8 - 14.6 sec    INR 2.0        Physical Examination:        Vitals:  /78   Pulse 97   Temp 97.9 °F (36.6 °C) (Oral)   Resp 16   Ht 5' 2\" (1.575 m)   Wt 151 lb 10.8 oz (68.8 kg)   SpO2 97%   BMI 27.74 kg/m²   Temp (24hrs), Av.1 °F (36.7 °C), Min:97.5 °F (36.4 °C), Max:98.4 °F (36.9 °C)    No results for input(s): POCGLU in the last 72 hours. Physical Exam  Vitals reviewed. Constitutional:       Appearance: She is well-developed.    HENT:      Head: Normocephalic and atraumatic. Nose: Nose normal.   Eyes:      General: Lids are normal.   Neck:      Trachea: No tracheal deviation. Cardiovascular:      Rate and Rhythm: Normal rate. Rhythm irregular. Heart sounds: Normal heart sounds, S1 normal and S2 normal.   Pulmonary:      Effort: Pulmonary effort is normal. No respiratory distress. Breath sounds: Normal breath sounds. Abdominal:      General: Bowel sounds are normal. There is no distension. Palpations: Abdomen is soft. Tenderness: There is no abdominal tenderness. There is no guarding. Musculoskeletal:         General: No tenderness or deformity. Lymphadenopathy:      Cervical: No cervical adenopathy. Upper Body:      Right upper body: No supraclavicular or epitrochlear adenopathy. Left upper body: No supraclavicular or epitrochlear adenopathy. Skin:     General: Skin is warm and dry. Capillary Refill: Capillary refill takes less than 2 seconds. Neurological:      Mental Status: She is alert. Motor: No abnormal muscle tone or seizure activity. Psychiatric:         Speech: Speech normal.         Behavior: Behavior normal.         Judgment: Judgment normal.         Assessment:        Primary Problem  Atrial fibrillation with RVR (HCC)     Principal Problem:    Atrial fibrillation with RVR (HCC)  Active Problems:    Hypertension    Mixed hyperlipidemia    Chronic diastolic heart failure (HCC)    Chronic respiratory failure (HCC)    Gastroesophageal reflux disease without esophagitis    Pulmonary hypertension (HCC)    YOGESH (acute kidney injury) (HCC)    Closed compression fracture of L1 lumbar vertebra, sequela    Acute midline low back pain with right-sided sciatica  Resolved Problems:    * No resolved hospital problems.  *      Past Medical History:   Diagnosis Date    Asbestos exposure     Atrial fibrillation ([facility] [address]     takes xarelto    Basal cell carcinoma of nose     CAD (coronary artery disease)  CKD (chronic kidney disease) stage 2, GFR 60-89 ml/min     Hyperlipidemia     Hypertension     MDRO (multiple drug resistant organisms) resistance 2/19/2014    E. 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    [facility] at [facility]ON[facility] (postoperative nausea and vomiting)     Prolonged emergence from general anesthesia     SOB (shortness of breath) on exertion         Plan:        1.  on IV Cardizem  1. PPI/ Protonix  2. DVT Prophylaxis on IV heparin  3. EPCs  4. PT/OT to evaluate and treat  5. Pain control  6. Replace electrolytes as per sliding scale  7. Home medications reviewed and appropriate medications continued  8.  Reviewed labs and imaging studies from last 24 hours and results explained to patient      Electronically signed by Merry Walsh MD

## 2021-05-21 NOTE — PLAN OF CARE
Problem: Falls - Risk of:  Goal: Will remain free from falls  Description: Will remain free from falls  5/21/2021 0312 by Alexandr Allison RN  Outcome: Ongoing  Note: Pt free of falls. Call light and bedside table within reach, bed in lowest position and non skid socks on feet.       Problem: Falls - Risk of:  Goal: Absence of physical injury  Description: Absence of physical injury  5/21/2021 5722 by Alexandr Allison RN  Outcome: Ongoing  Note: Pt absent of physical injury

## 2021-05-21 NOTE — PLAN OF CARE
Nutrition Problem #1: Inadequate oral intake  Intervention: Food and/or Nutrient Delivery: Modify Current Diet, Start Oral Nutrition Supplement  Nutritional Goals: po intake 75% or more

## 2021-05-21 NOTE — CARE COORDINATION
ONGOING DISCHARGE PLAN:    Patient is alert and oriented x4. Spoke with patient regarding discharge plan and patient confirms that plan is still to discharge to home with no needs   Pt refused need for vns   Will have a epidural on Monday   On heparin drip   Will have echo today     Will continue to follow for additional discharge needs.     Electronically signed by Miller Oneal RN on 5/21/2021 at 11:19 AM

## 2021-05-21 NOTE — PROGRESS NOTES
Physical Therapy    Facility/Department: [facility] PROGRESSIVE CARE  Initial Assessment    NAME: David Ralph  : 1935  MRN: [id number]    Date of Service: 2021    Discharge Recommendations:  Patient would benefit from continued therapy after discharge   PT Equipment Recommendations  Equipment Needed: No (has crutches and W/C)    Assessment   Body structures, Functions, Activity limitations: Decreased functional mobility ; Decreased strength;Decreased endurance;Decreased balance; Increased pain  Assessment: continue per POC to maxmize potential for safe D/C  Treatment Diagnosis: impaired mobility due to low back pain  Specific instructions for Next Treatment: begin w/ rolling in bed and advance to dangling and standing as pain is better controlled, will need to further assess transfers and gait when pain is better controlled- FALL RISK, pt reports planned epidural steroid lumbar injection on Monday  Prognosis: Good  Decision Making: Medium Complexity  History: pt admitted due to  A-Fib w/ RVR and  acute exacerbation of chronic back pain  Exam: ROM, MMT, balance and mobility assessments  Clinical Presentation: mod x 1 to roll from supine > left side-lying position; pt refused dangling or standing due to pain level, FALL RISK, will need to further assess transfers and gait when pain is better controlled- FALL RISK, pt reports planned epidural steroid lumbar injection on Monday  PT Education: Goals;PT Role;Plan of Care  Barriers to Learning: none  REQUIRES PT FOLLOW UP: Yes  Activity Tolerance  Activity Tolerance: Patient limited by pain       Patient Diagnosis(es): The primary encounter diagnosis was Acute exacerbation of chronic low back pain. A diagnosis of Atrial fibrillation with rapid ventricular response (HCC) was also pertinent to this visit.      has a past medical history of Asbestos exposure, Atrial fibrillation ([address] Basal cell carcinoma of nose, CAD (coronary artery disease), CKD (chronic kidney disease) stage 2, GFR 60-89 ml/min, Hyperlipidemia, Hypertension, MDRO (multiple drug resistant organisms) resistance, Nephrolithiasis, Osteoarthritis, Ovarian cyst, Oxygen dependent, Palpitations, Peritoneal mesothelioma ([address] PONV (postoperative nausea and vomiting), Prolonged emergence from general anesthesia, and SOB (shortness of breath) on exertion. has a past surgical history that includes Tubal ligation (1969); Hysterectomy (1981); Colonoscopy; Total hip arthroplasty (Right, 1990); Rectocele repair (1997); liver biopsy (2000); Upper gastrointestinal endoscopy; Photodynamic Therapy (April 2000 & Jan. 2002); Total hip arthroplasty (Left, 2003); Revision total hip arthroplasty (Right, 1998); Rotator cuff repair (2006); Ankle fracture surgery (Right, 2013); Cataract removal (Bilateral); Cardiac catheterization (2002); liver biopsy; Coronary angioplasty with stent (April 2002); [place] tooth extraction; Appendectomy (1964); Cholecystectomy (2005); Skin cancer excision (Bilateral, 2006); Skin cancer excision (Left, Oct. 2014); nasal endoscopy (Bilateral, 11/25/2014); BIOPSY / LIGATION TEMPORAL ARTERY (Bilateral, 2/12/2021); Breast biopsy (Right); and Spine surgery (N/A, 4/15/2021). Restrictions  Restrictions/Precautions  Restrictions/Precautions: Fall Risk, Up as Tolerated (peripheral IV right antecubital and left wrist, O2 at 3 L, external urinary catheter)  Required Braces or Orthoses?: No  Implants present? : Metal implants ((bilateral THR, cardiac stent, metal plate and screws right ankle))  Position Activity Restriction  Other position/activity restrictions: left leg 1/2\" shorter than the right (S/P BILATERAL THRs)  Vision/Hearing  Vision: Within Functional Limits  Hearing: Within functional limits     Subjective  General  Patient assessed for rehabilitation services?: Yes  Response To Previous Treatment: Not applicable  Family / Caregiver Present: No  Referring Practitioner: Dr. Michelle Carlisle Date : 05/20/21  Diagnosis: A-Fib w/ RVR, acute exacerbation of chronic back pain  Follows Commands: Within Functional Limits  Other (Comment): OK per nurse Jolene to proceed w/ PT evaluation. General Comment  Comments: pt had L1 kyphoplasty done April 15th, 2021  by Dr. Alfredo Benedict. Pt was seen by Dr. Lisa Woo on consult who agrees w/ Dr. Alfredo Benedict to proceed w/ lumbar steroid epidural injection. Per patient, injection planned for Monday. Subjective  Subjective: pt reports that she has been experiencing increased pain since early May after returning home from [place]. Pt denies injury. Pain Screening  Patient Currently in Pain: Yes  Pain Assessment  Pain Assessment: 0-10  Pain Level: 7  Patient's Stated Pain Goal: No pain  Pain Type: Acute pain  Pain Location: Back;Buttocks  Pain Orientation: Mid;Lower  Pain Radiating Towards: pain radiating into bilateral buttucks intermittantly when it spasms (started around 3:00 a.m. today)  Pain Descriptors: Lazaro Curia; Stabbing;Aching  Pain Frequency: Intermittent  Pain Onset: On-going  Clinical Progression: Gradually worsening  Non-Pharmaceutical Pain Intervention(s): Repositioned  Response to Pain Intervention: Drowsy (has PCA button to push as needed)  Multiple Pain Sites: No  Vital Signs  Patient Currently in Pain: Yes       Orientation  Orientation  Overall Orientation Status: Within Functional Limits (answered all questions correctly)  Social/Functional History  Social/Functional History  Lives With: Spouse  Type of Home:  ((condo (2 story))  Home Layout: Two level, Able to Live on Main level with bedroom/bathroom  Home Access: Stairs to enter with rails  Entrance Stairs - Number of Steps: 2  Entrance Stairs - Rails: Both  Bathroom Shower/Tub: Walk-in shower, Doors  Bathroom Toilet: Handicap height  Bathroom Equipment: Built-in shower seat, Grab bars around toilet  Home Equipment: Wheelchair-manual, Crutches, Oxygen, Long-handled shoehorn, Reacher (O2 at 2 L PRN)  ADL Assistance: Independent  Homemaking Assistance: Needs assistance (daugthers assisting w/ laundry, bringing in meals, cleaning and grocery shopping, spouse assists as needed)  Homemaking Responsibilities: No  Ambulation Assistance: Independent (uses crutches)  Transfer Assistance: Independent  Active : Yes  Mode of Transportation: Car, SUV  Occupation: Retired  IADL Comments: sleeps in a flat bed  Additional Comments: large supportive family assist at D/C  Cognition        Objective     Observation/Palpation  Observation: peripheral IV right antecubital and left wrist, O2 at 3 L, external urinary catheter    AROM RLE (degrees)  RLE General AROM: pt to actively move each leg but difficult to assess due to patient's pain level- unable to achieve full range  AROM LLE (degrees)  LLE General AROM: pt to actively move each leg but difficult to assess due to patient's pain level- unable to achieve full range  AROM RUE (degrees)  RUE General AROM: see OT for UE assessment  AROM LUE (degrees)  LUE General AROM: see OT for UE assessment  Strength RLE  Comment: appears at least 3+/5- difficult to assess due to patient's pain level  Strength LLE  Comment: appears at least 3+/5- difficult to assess due to patient's pain level  Strength RUE  Comment: see OT for UE assessment  Strength LUE  Comment: see OT for UE assessment     Sensation  Overall Sensation Status: WFL (denies)  Bed mobility  Rolling to Left: Moderate assistance  Scooting:  Moderate assistance;2 Person assistance  Comment: pt repositioned from supine > left side-lying position w/ pillow support- assisted by NAKITA Rm; pt refused dangling or standing  Transfers  Comment: deferred due to pain level- pt unable to tolerate, pt refusing dangling or standing  Ambulation  Ambulation?: No (deferred due to pain level- pt unable to tolerate)  WB Status: left leg 1/2\" shorter than the right (S/P BILATERAL THRs)  Stairs/Curb  Stairs?: No     Balance  Comments: unable to tolerate        Plan   Plan  Times per week: 5-7 treatments/ week  Times per day:  (5-7 treatments/ week)  Specific instructions for Next Treatment: begin w/ rolling in bed and advance to dangling and standing as pain is better controlled, will need to further assess transfers and gait when pain is better controlled- FALL RISK, pt reports planned epidural steroid lumbar injection on Monday  Current Treatment Recommendations: Strengthening, Functional Mobility Training, Safety Education & Training, Gait Training, Transfer Training, ROM, Balance Training, Stair training, Endurance Training, Patient/Caregiver Education & Training, Positioning, Equipment Evaluation, Education, & procurement, Home Exercise Program  Safety Devices  Type of devices:  All fall risk precautions in place, Bed alarm in place, Call light within reach, Patient at risk for falls, Left in bed, Nurse notified (nurse Bakari Ragsdale)    G-Code       OutComes Score                                                  AM-PAC Score  AM-PAC Inpatient Mobility Raw Score : 7 (05/21/21 1015)  AM-PAC Inpatient T-Scale Score : 26.42 (05/21/21 1015)  Mobility Inpatient CMS 0-100% Score: 92.36 (05/21/21 1015)  Mobility Inpatient CMS G-Code Modifier : CM (05/21/21 1015)          Goals  Short term goals  Time Frame for Short term goals: 5-7 treatments/ week  Short term goal 1: pt to tolerate 1/2 hour of therapuetic exercise and activity  Short term goal 2: pt to demonstrate good technique for log rolling, balance and LE ROM and strengthening exercises as tolerated  Short term goal 3: pt to demonstrate improved pain control to 5/10 or better to tolerate increased activity  Short term goal 4: pt to demonstrate improved bed mobility for rolling in bed using rail w/ supervision for position change  Short term goal 5: pt to advance to and demonstronstrate transfers supine <> sit w/ min x 1  Short term goal 6: pt to advance to and demonstronstrate fair or better sitting balance while dangling at the EOB x 10 minutes w/ CGA x 1  Short term goal 7: pt to advance to and demonstronstrate sit <> stand and bed <> chair transfers using least assistive device w/ min x 1 and O2 as directed  Short term goal 8: pt to advance to and demonstronstrate gait 50-80' using least assistive device w/ min x 1 and O2 as directed  Short term goal 9: pt to advance to and demonstronstrate ability to ascend/ descend 2 steps w/ rails and using least assistive device w/ min x 1 and O2 as directed  Patient Goals   Patient goals : control pain       Therapy Time   Individual Concurrent Group Co-treatment   Time In 1015         Time Out 1054         Minutes 39         Timed Code Treatment Minutes: Enrique Manrique PT

## 2021-05-21 NOTE — PLAN OF CARE
Pt remains free from falls. Bed alarm is engaged, bed is lowest position and call light is within reach. Pt has been PCA pump, verbalized that pain is more manageable than prior shift. Pt is being monitored on telemetry cardiac monitor at all times.

## 2021-05-21 NOTE — PROGRESS NOTES
syncope about 10 days ago. No chest pain no palpitation. On admission her ECG showed A. fib with RVR heart rate 159. She was started on Cardizem infusion. She developed hypotension and dose of Cardizem infusion was reduced to 5 mg/h. Later on it was discontinued and started on carvedilol 6.25 mg twice a day. Heart rate improved significantly. Current evaluation 5/21/2021  Patient seen and examined. Frail elderly female resting with one pillow  She was sleeping I have to wake her up  She said her back pain is better  Denied any chest pain or shortness of breath  Hemodynamically stable  ECG monitor A. fib heart rate 93        Medications:   Scheduled Meds:   polyethylene glycol  17 g Oral Daily    sodium chloride flush  5-40 mL Intravenous 2 times per day    carvedilol  6.25 mg Oral BID    atorvastatin  40 mg Oral Daily    ferrous sulfate  300 mg Oral Daily    pantoprazole  40 mg Oral QAM AC    oxybutynin  15 mg Oral Daily    potassium chloride  20 mEq Oral BID    predniSONE  20 mg Oral Daily    rOPINIRole  0.5 mg Oral 4x Daily    sertraline  50 mg Oral Daily    amiodarone  100 mg Oral BID     Continuous Infusions:   heparin (PORCINE) Infusion 8 Units/kg/hr (05/21/21 1525)    dilTIAZem (CARDIZEM) 125 mg in dextrose 5% 125 mL infusion Stopped (05/19/21 2216)    sodium chloride      morphine       CBC:   Recent Labs     05/19/21  1004 05/20/21  0425 05/21/21  0439   WBC 7.7 6.5 5.3   HGB 10.9* 10.1* 10.3*    240 242     BMP:    Recent Labs     05/19/21  1004 05/20/21  0425 05/21/21  0439    145* 141   K 5.2 5.0 4.8    110* 109*   CO2 25 28 27   BUN 30* 28* 22   CREATININE 1.42* 1.19* 0.91*   GLUCOSE 116* 126* 123*     Hepatic:   Recent Labs     05/19/21  1004 05/20/21  0425 05/21/21  0439   AST 33* 17 18   ALT 28 22 21   BILITOT 0.60 0.61 0.49   ALKPHOS 133* 138* 136*     Troponin: No results for input(s): TROPONINI in the last 72 hours.   BNP: No results for input(s): BNP in ([facility]     Closed compression fracture of L1 lumbar vertebra, sequela     Acute midline low back pain with right-sided sciatica      Plan of Treatment:   Medication check  A. fib heart rate under control :continue current dose of carvedilol, amiodarone  Diastolic CHF: Compensated    Continue with IV Heparin.     Electronically signed by Leonid Manuel MD on 5/21/2021 at 4:09 PM

## 2021-05-21 NOTE — PROGRESS NOTES
Comprehensive Nutrition Assessment    Type and Reason for Visit:  Initial, Positive Nutrition Screen    Nutrition Recommendations/Plan: Diet has been changed to No Added Salt. Will add Ensure Clear to trays twice daily to increase protein intake. Nutrition Assessment:  Pt admitted with Afib and back pain. She was observed eating lunch. Needs finger foods because she needs to lie flat. Review of documented wts shows 9# wt loss over 3 monts (6%). Pt was eating well at lunch time. Decreaed appetite prior to admit noted. P tis willing to drink Ensure Clear supplements. Malnutrition Assessment:  Malnutrition Status: At risk for malnutrition (Comment)    Context:  Acute Illness     Findings of the 6 clinical characteristics of malnutrition:  Energy Intake:  Mild decrease in energy intake (Comment)  Weight Loss:  No significant weight loss     Body Fat Loss:  No significant body fat loss     Muscle Mass Loss:  No significant muscle mass loss    Fluid Accumulation:  No significant fluid accumulation     Strength:  Not Performed    Estimated Daily Nutrient Needs:  Energy (kcal): [place] x 1.2= 1300 kcal; Weight Used for Energy Requirements:  Current (69 kg)     Protein (g):  1.3g/kg= 65 g; Weight Used for Protein Requirements:  Ideal          Nutrition Related Findings:  no edema, Labs (5/21) K 4.8, Med: Prednisone, PMH: CKD      Wounds:  None       Current Nutrition Therapies:    DIET GENERAL; No Added Salt (3-4 GM)    Anthropometric Measures:  · Height: 5' 2\" (157.5 cm)  · Current Body Weight: 151 lb (68.5 kg)   · Admission Body Weight: 150 lb (68 kg)    · Usual Body Weight: 160 lb (72.6 kg) (2/21)     · Ideal Body Weight: 110 lbs; BMI: 27.6  · BMI Categories: Overweight (BMI 25.0-29. 9)       Nutrition Diagnosis:   · Inadequate oral intake related to  (current medical condition) as evidenced by intake 0-25%, intake 26-50%, poor intake prior to admission, weight loss    Nutrition Interventions:   Food and/or Nutrient Delivery:  Modify Current Diet, Start Oral Nutrition Supplement  Nutrition Education/Counseling:  No recommendation at this time   Coordination of Nutrition Care:  Continue to monitor while inpatient    Goals:  po intake 75% or more       Nutrition Monitoring and Evaluation:   Food/Nutrient Intake Outcomes:  Food and Nutrient Intake, Supplement Intake  Physical Signs/Symptoms Outcomes:  Biochemical Data, GI Status, Skin, Weight, Fluid Status or Edema     Discharge Planning:     Too soon to determine     Electronically signed by Richa Jiang RD, LD on 5/21/21 at 2:56 PM EDT    Contact: [phone]

## 2021-05-21 NOTE — PLAN OF CARE
Problem: Falls - Risk of:  Goal: Will remain free from falls  Description: Will remain free from falls  5/21/2021 1654 by Lliy Waite RN  Outcome: Ongoing     Problem: Falls - Risk of:  Goal: Absence of physical injury  Description: Absence of physical injury  5/21/2021 1654 by Lily Waite RN  Outcome: Ongoing     Problem: Pain:  Goal: Pain level will decrease  Description: Pain level will decrease  5/21/2021 1654 by Lily Waite RN  Outcome: Ongoing     Problem: Pain:  Goal: Control of acute pain  Description: Control of acute pain  5/21/2021 1654 by Ousmane Hurtado RN  Outcome: Ongoing     Problem: Pain:  Goal: Control of chronic pain  Description: Control of chronic pain  5/21/2021 1654 by Ousmane Hurtado RN  Outcome: Ongoing     Problem: Cardiac Output - Decreased:  Goal: Hemodynamic stability will improve  Description: Hemodynamic stability will improve  5/21/2021 1654 by Lily Waite RN  Outcome: Ongoing     Problem: Musculor/Skeletal Functional Status  Goal: Highest potential functional level  5/21/2021 1654 by Lily Waite RN  Outcome: Ongoing     Problem: Musculor/Skeletal Functional Status  Goal: Absence of falls  5/21/2021 1654 by Lily Waite RN  Outcome: Ongoing     Problem: Nutrition  Goal: Optimal nutrition therapy  5/21/2021 1654 by Lily Waite RN  Outcome: Ongoing

## 2021-05-22 NOTE — PLAN OF CARE
Problem: Falls - Risk of:  Goal: Will remain free from falls  Description: Will remain free from falls  5/22/2021 0336 by Jay Jay Hines RN  Outcome: Ongoing  Note: Pt free of falls. Call light and bedside table within reach and bed in lowest position.      Problem: Pain:  Goal: Pain level will decrease  Description: Pain level will decrease  5/22/2021 0336 by Jay Jay Hines RN  Outcome: Ongoing  Note: Pt on PCA pump to help decrease pain     Problem: Musculor/Skeletal Functional Status  Goal: Absence of falls  5/22/2021 0336 by Jay Jay Hines RN  Outcome: Ongoing  Note: PT absent of falls

## 2021-05-22 NOTE — PROGRESS NOTES
Date:   5/22/2021  Patient name: Tal Toscano  Date of admission:  5/19/2021  9:49 AM  MRN:   [id number]  YOB: 1935  PCP: Leron Osgood, DO    Reason for Admission: Atrial fibrillation with RVR [facility]) [I48.91]    Cardiology follow-up: A. fib with RVR       Impression      A. fib with RVR  Diastolic CHF  Hypertension  COPD, hypoxia, on oxygen at home  Hyperlipidemia  Exertional shortness of breath, hypoxia  History of CAD, stent placement LAD 2002  Polymyalgia rheumatica  Moderate aortic regurgitation  Giant cell arteritis  chronic anemia  history of peritoneal/omental mesothelioma surgery done at [address] [place] [address]  Dry mouth, dry eyes  Multiple hip surgeries, bilateral  Chronic pain syndrome  L1 kyphoplasty 4/15/2021    2D echo 5/21/2021  Normal LV size and wall thickness ejection fraction more than 55% normal size LA and RA, RVSP 21, no significant pericardial effusion    ECG 5/19/2021  A. fib/a flutter heart rate 159, nonspecific ST-T changes     X-ray lumbar spine 5/19/2021  Unchanged appearance of the lumbar spine, status post vertebroplasty at L1, no new osseous abnormality  Moderate stool burden, previously administered oral contrast is present in the distal colon     Lab work 5/19/2021  Sodium 140, potassium 5.2, BUN 30, creatinine 1.42, GFR 35, alkaline phosphatase 133, bilirubin 0.60  Troponin XX 3, myoglobin 28  WBC 7.7, hemoglobin 10.9, platelets 481  Lab work 5/20/2021  Sodium 145, potassium 5.0, CO2 28, chloride 110, BUN 28, creatinine 1.19, hemoglobin 10.1  5/21/2021  Sodium 141, potassium 4.8, BUN 22, creatinine 0.91, glucose 123, albumin 2.9  Hemoglobin 10.3, WBC 5.3, platelets 419  Lab work 05/22/2021 WBC 6.9, hgb 10.2, platelets 500, Glucose 124, bun 21, creatinine 0.78, Calcium 8.8, sodium 142, potassium 5.3, chloride 109, alkaline phosphatase 141, alt 22, ast 20, albumin 2.8, GFR >60       History of present illness  81years old female who recently had L1 kyphoplasty presented to the ER with a chief complaint of back pain. She had a kyphoplasty about a month ago for compression fracture L1. Her pain got very severe. She has been also having dizziness and she had a near syncope about 10 days ago. No chest pain no palpitation. On admission her ECG showed A. fib with RVR heart rate 159. She was started on Cardizem infusion. She developed hypotension and dose of Cardizem infusion was reduced to 5 mg/h. Later on it was discontinued and started on carvedilol 6.25 mg twice a day. Heart rate improved significantly. Current evaluation 5/22/2021  Patient seen and examined. Frail elderly female resting with one pillow  She denies chest pain, SOB, and palpitations  She said her back pain is better 6/10, on Morphine PCA  Denied any chest pain or shortness of breath  Hemodynamically stable  ECG monitor A. fib heart rate   Afebrile, bp 96/62, 4L 98%. Patient states she takes Xarelto at home and the medication costs her over $500 a month. She has [employer]. Discuss with patient she may need to be on Coumadin and have INR drawn. This may be a more affordable option. Updated Dr. Kayla Rascon.        Medications:   Scheduled Meds:   polyethylene glycol  17 g Oral Daily    sodium chloride flush  5-40 mL Intravenous 2 times per day    carvedilol  6.25 mg Oral BID    atorvastatin  40 mg Oral Daily    ferrous sulfate  300 mg Oral Daily    pantoprazole  40 mg Oral QAM AC    oxybutynin  15 mg Oral Daily    potassium chloride  20 mEq Oral BID    predniSONE  20 mg Oral Daily    rOPINIRole  0.5 mg Oral 4x Daily    sertraline  50 mg Oral Daily    amiodarone  100 mg Oral BID     Continuous Infusions:   heparin (PORCINE) Infusion 5 Units/kg/hr (05/22/21 1253)    sodium chloride      morphine 3 mL/hr at 05/21/21 1939     CBC:   Recent Labs     05/20/21  0425 05/21/21  0439 05/22/21  0431   WBC 6.5 5.3 6.9   HGB 10.1* 10.3* 10.2*    242 236     BMP:    Recent Labs     05/20/21  0425 05/21/21  0439 05/22/21  0431   * 141 142   K 5.0 4.8 5.3   * 109* 109*   CO2 28 27 25   BUN 28* 22 21   CREATININE 1.19* 0.91* 0.78   GLUCOSE 126* 123* 124*     Hepatic:   Recent Labs     05/20/21  0425 05/21/21  0439 05/22/21  0431   AST 17 18 20   ALT 22 21 22   BILITOT 0.61 0.49 0.51   ALKPHOS 138* 136* 141*     Troponin: No results for input(s): TROPONINI in the last 72 hours. BNP: No results for input(s): BNP in the last 72 hours. Lipids: No results for input(s): CHOL, HDL in the last 72 hours. Invalid input(s): LDLCALCU  INR:   Recent Labs     05/21/21 0439   INR 2.0       Objective:   Vitals: BP 96/62   Pulse 85   Temp 97.7 °F (36.5 °C) (Oral)   Resp 14   Ht 5' 2\" (1.575 m)   Wt 154 lb 1.6 oz (69.9 kg)   SpO2 98%   BMI 28.19 kg/m²   General appearance: Tired and pale looking female  HEENT: Head: Normal, normocephalic, atraumatic. Neck: supple, symmetrical, trachea midline  Lungs: diminished breath sounds bibasilar  Heart: irregularly irregular rhythm  Abdomen: Soft previous surgical scar noted bowel sounds hypoactive  Extremities: Homans sign is negative, no sign of DVT  Neurologic: Mental status: Alert, oriented, thought content appropriate    EKG: atrial fibrillation, rate 93. ECHO: reviewed.    Ejection fraction: 60% normal size LA NRA no valvular abnormality    Assessment / Acute Cardiac Problems:   A. fib with RVR heart rate control improved  Patient admitted with a severe back pain, recent kyphoplasty  Diastolic CHF  COPD, hypoxia on oxygen  Anemia    Patient Active Problem List:     Hypertension     Nephrolithiasis     Mixed hyperlipidemia     Osteoarthritis     Peritoneal mesothelioma ([address]     Ovarian cyst     Basal cell carcinoma of nose     Bimalleolar ankle fracture     Epistaxis, recurrent     Cervical spondylosis     Trochanteric bursitis, right hip     Status post bilateral total hip replacement     Medication monitoring encounter     Chronic

## 2021-05-22 NOTE — FLOWSHEET NOTE
Patient remembered writer from visit last month and updated writer on trip to [facility] with her girls. She expressed her frustration and that she is getting very \"tired\" and wondering how much more she will go through. Writer asked about HPOA and she said that she has paperwork. Thinks it's her  and then son Sarah Louder. Writer told her it would be good if we had copies on file and she said she would tell Sarah Louder. Patient appreciated listening presence and welcomed prayer. 05/22/21 1246   Encounter Summary   Services provided to: Patient   Referral/Consult From: Rounding   Support System Spouse; Children   Continue Visiting   (5-22-21)   Complexity of Encounter Moderate   Length of Encounter 15 minutes   Spiritual Assessment Completed Yes   Advance Care Planning Yes   Spiritual/Yarsanism   Type Spiritual support   Assessment Calm; Approachable   Intervention Active listening;Explored feelings, thoughts, concerns;Explored coping resources;Prayer;Sustaining presence/ Ministry of presence; Discussed illness/injury and it's impact   Outcome Expressed gratitude;Engaged in conversation;Expressed feelings/needs/concerns;Receptive

## 2021-05-22 NOTE — CARE COORDINATION
ONGOING DISCHARGE PLAN:    Patient is alert and oriented x4. Spoke with patient regarding discharge plan and patient confirms that plan is still to discharge to home with no needs  Patient refusing vns  Will have a epidural done Monday     Will continue to follow for additional discharge needs.     Electronically signed by Abhi Rm RN on 5/22/2021 at 12:05 PM

## 2021-05-22 NOTE — PROGRESS NOTES
Patient ID:Luisa Butler is 81 y.o.   : 1935 MRN: [id number]    Admit date: 2021  Primary Care Physician: Nelly Ulrich DO   Patient Care Team:  Nelly Ulrich DO as PCP - Clementine Schirmer, MD as Orthopedic Surgeon (Orthopedic Surgery)  Ana Gould DO as Consulting Physician (Obstetrics & Gynecology)  Tel: [phone]  IP CONSULT TO PRIMARY CARE PROVIDER  IP CONSULT TO CARDIOLOGY  IP CONSULT TO PAIN MANAGEMENT  IP CONSULT TO ORTHOPEDIC SURGERY  IP CONSULT TO CARDIOLOGY  Admitting Physician: No admitting provider for patient encounter. Code Status:  Full Code  DIET GENERAL; No Added Salt (3-4 GM)  Dietary Nutrition Supplements: Clear Liquid Oral Supplement    Progress Note      HPI: Jacob Rocha is a 81 y.o.  female who presents with Back Pain    Patient has no complaints and denies chest pain, shortness of breath or cough. Problem List: Principal Problem:    Atrial fibrillation with RVR (HCC)  Active Problems:    Hypertension    Mixed hyperlipidemia    Chronic diastolic heart failure (HCC)    Chronic respiratory failure (HCC)    Gastroesophageal reflux disease without esophagitis    Pulmonary hypertension (HCC)    YOGESH (acute kidney injury) (HCC)    Closed compression fracture of L1 lumbar vertebra, sequela    Acute midline low back pain with right-sided sciatica    Acute exacerbation of chronic low back pain  Resolved Problems:    * No resolved hospital problems. *      Atrial fibrillation with RVR (HCC)    Assessment:    Atrial fibrillation  Chronic diastolic HF   Pulmonary hypertension    Plan:    Carvedilol, amiodarone  IV Heparin.       Anticipated Disposition upon discharge:     [] Home  [] Home with [place] [id number] ( [facility])   [] SNF ([facility])  [] LTAC ([address])      [address]    has a past medical history of Asbestos exposure, Atrial fibrillation ([facility] [address] Basal cell carcinoma of nose, CAD (coronary artery disease), CKD (chronic kidney disease) stage 2, GFR 60-89 ml/min, Hyperlipidemia, Hypertension, MDRO (multiple drug resistant organisms) resistance, Nephrolithiasis, Osteoarthritis, Ovarian cyst, Oxygen dependent, Palpitations, Peritoneal mesothelioma ([facility] [address] PONV (postoperative nausea and vomiting), Prolonged emergence from general anesthesia, and SOB (shortness of breath) on exertion. SURGICAL HISTORY      has a past surgical history that includes Tubal ligation (1969); Hysterectomy (1981); Colonoscopy; Total hip arthroplasty (Right, 1990); Rectocele repair (1997); liver biopsy (2000); Upper gastrointestinal endoscopy; Photodynamic Therapy (April 2000 & Jan. 2002); Total hip arthroplasty (Left, 2003); Revision total hip arthroplasty (Right, 1998); Rotator cuff repair (2006); Ankle fracture surgery (Right, 2013); Cataract removal (Bilateral); Cardiac catheterization (2002); liver biopsy; Coronary angioplasty with stent (April 2002); [place] tooth extraction; Appendectomy (1964); Cholecystectomy (2005); Skin cancer excision (Bilateral, 2006); Skin cancer excision (Left, Oct. 2014); nasal endoscopy (Bilateral, 11/25/2014); BIOPSY / LIGATION TEMPORAL ARTERY (Bilateral, 2/12/2021); Breast biopsy (Right); and Spine surgery (N/A, 4/15/2021).     CURRENT MEDICATIONS        polyethylene glycol  17 g Oral Daily    sodium chloride flush  5-40 mL Intravenous 2 times per day    carvedilol  6.25 mg Oral BID    atorvastatin  40 mg Oral Daily    ferrous sulfate  300 mg Oral Daily    pantoprazole  40 mg Oral QAM AC    oxybutynin  15 mg Oral Daily    potassium chloride  20 mEq Oral BID    predniSONE  20 mg Oral Daily    rOPINIRole  0.5 mg Oral 4x Daily    sertraline  50 mg Oral Daily    amiodarone  100 mg Oral BID     PRN Meds heparin (porcine), heparin (porcine), perflutren lipid microspheres, sodium chloride flush, sodium chloride, acetaminophen, naloxone, polyethylene glycol  Continuous Infusions:    heparin 20 mg daily  amoxicillin (AMOXIL) 500 MG capsule, Take 2,000 mg by mouth as needed (dental procedures) Dental only  oxybutynin (DITROPAN XL) 15 MG extended release tablet, Take 15 mg by mouth daily   polyethylene glycol (GLYCOLAX) 17 GM/SCOOP powder, Take 17 g by mouth daily as needed (constipation)   Ascorbic Acid (VITAMIN C) 250 MG tablet, Take 250 mg by mouth daily  ferrous sulfate (FLACO-IN-SOL) 75 (15 Fe) MG/ML solution, Take 15 mg by mouth daily  omeprazole (PRILOSEC) 40 MG delayed release capsule, Take 40 mg by mouth Daily   atorvastatin (LIPITOR) 40 MG tablet, Take 40 mg by mouth daily   potassium chloride SA (K-DUR;KLOR-CON M) 20 MEQ tablet, Take 1 tablet by mouth 2 times daily  rOPINIRole (REQUIP) 0.5 MG tablet, Take 0.5 mg by mouth 4 times daily as needed   Vitamin D (CHOLECALCIFEROL) 1000 UNITS CAPS capsule, Take 1,000 Units by mouth 2 times daily  predniSONE (DELTASONE) 5 MG tablet, Take 20 mg by mouth daily   Omega 3 1000 MG CAPS, Take 1,000 mg by mouth daily   sertraline (ZOLOFT) 50 MG tablet, Take 50 mg by mouth daily. ALLERGIES     is allergic to adhesive tape, meperidine, demerol, ultram [tramadol hcl], zocor [simvastatin], codeine, and fentanyl. Allergies   Allergen Reactions    Adhesive Tape Rash    Meperidine Nausea Only    Demerol Nausea Only    Ultram [Tramadol Hcl]      Hot flashes    Zocor [Simvastatin]      Muscle aches    Codeine      \"out of it\"    Fentanyl Rash       SOCIAL HISTORY      reports that she has never smoked. She has never used smokeless tobacco. She reports current alcohol use. She reports that she does not use drugs. Vitals:    05/22/21 0715 05/22/21 0728 05/22/21 0745 05/22/21 1107   BP: (!) 136/101      Pulse: 88      Resp: 18 18  14   Temp: 97.7 °F (36.5 °C)      TempSrc: Oral      SpO2: 94% 95% 92% 96%   Weight:       Height:         Body mass index is 28.19 kg/m².     BP (!) 136/101   Pulse 88   Temp 97.7 °F (36.5 °C) (Oral)   Resp 14   Ht 5' 2\" (1.575 m)   Wt 154 lb 1.6 oz (69.9 kg)   SpO2 96%   BMI 28.19 kg/m²   Temp (24hrs), Av.6 °F (36.4 °C), Min:97.2 °F (36.2 °C), Max:97.9 °F (36.6 °C)    Pulse Ox: SpO2  Av.8 %  Min: 91 %  Max: 97 %  Supplemental O2: O2 Flow Rate (L/min): 4 L/min   Oxygen Therapy  SpO2: 96 %  Pulse Oximeter Device Mode: Continuous  Pulse Oximeter Device Location: Finger  O2 Device: Nasal cannula  O2 Flow Rate (L/min): 4 L/min  End Tidal CO2: 30 (%)  Blood Gas  Performed?: No    Patient Vitals for the past 96 hrs (Last 3 readings):   Weight   21 0034 154 lb 1.6 oz (69.9 kg)   21 0015 151 lb 10.8 oz (68.8 kg)   21 0507 150 lb 9.2 oz (68.3 kg)     Admission weight: 143 lb (64.9 kg)  Wt Readings from Last 3 Encounters:   21 154 lb 1.6 oz (69.9 kg)   21 153 lb 14.1 oz (69.8 kg)   21 155 lb (70.3 kg)    (encounters)    /O (24Hr): Intake/Output Summary (Last 24 hours) at 2021 1134  Last data filed at 2021 0921  Gross per 24 hour   Intake 714 ml   Output 600 ml   Net 114 ml       Vitals reviewed. H&N: atraumatic, normocephalic, anicteric, no conjunctivitis, EOM normal, no lid lag or exophthalmos, nose and ears appear normal, trachea mid line, no stridor,  Chest: no respiratory distress, normal effort, fairly good air entry, no wheezes,    Heart: normal heart sounds, regular rate and rhythm, no murmurs. gallops or rubs,  Abdomen: BS present, non-tender, no masses or organomegaly detected,   Extremities: no peripheral edema, no cyanosis. no oncholysis. Skin: no rash, no erythema, no jaundice,   CNS: grossly normal without cranial nerve deficits, no abnormal coordination or tone, Psychiatric: normal mood, affect.       LABS:    CBC:   Recent Labs     21  0425 21  0439 21  0431   WBC 6.5 5.3 6.9   RBC 3.27* 3.40* 3.40*   HGB 10.1* 10.3* 10.2*   HCT 30.6* 31.5* 31.6*   MCV 93.7 92.6 92.9   MCH 31.0 30.2 30.0   MCHC 33.1 32.6 32.3   RDW 17.8* 18.1* 17.7*    242 236   MPV 8.3 7.8 7.9       MAG: No results for input(s): MG in the last 72 hours. CMP:   Recent Labs     05/20/21 0425 05/21/21 0439 05/22/21 0431   * 141 142   K 5.0 4.8 5.3   * 109* 109*   CO2 28 27 25   BUN 28* 22 21   CREATININE 1.19* 0.91* 0.78   GLUCOSE 126* 123* 124*   CALCIUM 8.8 8.7 8.8   PROT 5.2* 5.2* 5.0*   LABALBU 2.9* 2.9* 2.8*   BILITOT 0.61 0.49 0.51   ALKPHOS 138* 136* 141*   AST 17 18 20   ALT 22 21 22      No results for input(s): LIPASE, AMYLASE in the last 72 hours. Phosphorus:  No results for input(s): PHOS in the last 72 hours. Albumin:   Recent Labs     05/20/21 0425 05/21/21 0439 05/22/21 0431   LABALBU 2.9* 2.9* 2.8*       U/A:  Lab Results   Component Value Date    COLORU YELLOW 05/20/2021    PHUR 5.5 05/20/2021    WBCUA TOO NUMEROUS TO COUNT 02/25/2021    RBCUA 0 TO 2 02/25/2021    MUCUS NOT REPORTED 02/25/2021    TRICHOMONAS NOT REPORTED 02/25/2021    YEAST NOT REPORTED 02/25/2021    BACTERIA MANY 02/25/2021    CLARITYU Clear 05/30/2019    SPECGRAV 1.023 05/20/2021    LEUKOCYTESUR NEGATIVE 05/20/2021    UROBILINOGEN Normal 05/20/2021    BILIRUBINUR NEGATIVE 05/20/2021    GLUCOSEU NEGATIVE 05/20/2021    AMORPHOUS NOT REPORTED 02/25/2021       Recent Labs     05/21/21 0439   INR 2.0     Lab Results   Component Value Date    DDIMER 2.38 (H) 12/17/2014     Lab Results   Component Value Date    BNP NOT REPORTED 08/22/2014     troponins  Lab Results   Component Value Date    TROPONINI <0.01 02/19/2014         D Dimer: No results for input(s): DDIMER in the last 72 hours. Troponin T   Recent Labs     05/19/21  1213   TROPONINT NOT REPORTED     ProBNP   No results found for requested labs within last 30 days.      ProBNP Invalid input(s): PRO-BNP  Lactic acid:Invalid input(s): LACTIC ACID      PT/INR:   Recent Labs     05/21/21  0439   PROTIME 22.4*   INR 2.0     APTT:   Recent Labs     05/21/21  1752 05/22/21  0010 05/22/21  0431   APTT 120.1* 39.5* 87.1*         ESR: Lab Results   Component Value Date    SEDRATE 1 04/08/2021     CRP:   Lab Results   Component Value Date    CRP <3.0 04/08/2021     Folate and B12:   Lab Results   Component Value Date    [id number] 902 10/31/2020   ,   Lab Results   Component Value Date    FOLATE >20.0 08/22/2014     Thyroid Studies:   Lab Results   Component Value Date    TSH 0.55 04/21/2021     D-Dimer:   Lab Results   Component Value Date    DDIMER 2.38 (H) 12/17/2014       Lactic acid: No components found for: LACT     Troponin T   Recent Labs     05/19/21  1213   TROPHS 23*       Troponins:     No components found for: TROP    Lab Results   Component Value Date    TROPONINI <0.01 02/19/2014       No results found for requested labs within last 720 hours. Cardiac Enzymes:    No results found for requested labs within last 720 hours.     No results found for: CKMB    ProBNP:    No components found for: NTBNP      U/A:  Lab Results   Component Value Date    COLORU YELLOW 05/20/2021    PHUR 5.5 05/20/2021    WBCUA TOO NUMEROUS TO COUNT 02/25/2021    RBCUA 0 TO 2 02/25/2021    MUCUS NOT REPORTED 02/25/2021    TRICHOMONAS NOT REPORTED 02/25/2021    YEAST NOT REPORTED 02/25/2021    BACTERIA MANY 02/25/2021    CLARITYU Clear 05/30/2019    SPECGRAV 1.023 05/20/2021    LEUKOCYTESUR NEGATIVE 05/20/2021    UROBILINOGEN Normal 05/20/2021    BILIRUBINUR NEGATIVE 05/20/2021    GLUCOSEU NEGATIVE 05/20/2021    AMORPHOUS NOT REPORTED 02/25/2021       Urine Sodium:     Lab Results   Component Value Date    REJI <20 05/20/2021     Urine Potassium:  No results found for: KUR  Urine Chloride:  No results found for: CLUR  Urine Osmolarity:   Lab Results   Component Value Date    OSMOU 492 05/20/2021     Urine Protein:   No results found for: TPU  Urine Creatinine:     Lab Results   Component Value Date    LABCREA 128.8 02/25/2021     Urine Eosinophils:  No components found for: UEOS    Thyroid Studies:   No components found for: T4,  FREE  No results found for: T3  Lab Results   Component Value Date    TSH 0.55 04/21/2021       Lab Results   Component Value Date    TSH 0.55 04/21/2021       HbA1c  No components found for: HA1CC    Lipids:  Lab Results   Component Value Date    HDL 43 01/21/2021    VLDL NOT REPORTED (H) 01/21/2021         CRP:   Lab Results   Component Value Date    CRP <3.0 04/08/2021     ESR:   Lab Results   Component Value Date    SEDRATE 1 04/08/2021       Folate and B12:   Lab Results   Component Value Date    [id number] 261 10/31/2020   ,   Lab Results   Component Value Date    FOLATE >20.0 08/22/2014       No components found for: 3WBC, 3RBC, 3HEMOGLOBIN, 3HEMATOCRIT, 3PLATELETS, 3MCV, 3SEGS, 3STAB, 3LYMP, 3MONOC, 3EOS, 3BASO, 3ASEG, 3MYEL, 3PRMY, 3BLAS, 3PLTE    Recent Results (from the past 24 hour(s))   APTT    Collection Time: 05/21/21 12:24 PM   Result Value Ref Range    PTT >150.0 (HH) 24.0 - 36.0 sec   APTT    Collection Time: 05/21/21  5:52 PM   Result Value Ref Range    .1 (HH) 24.0 - 36.0 sec   APTT    Collection Time: 05/22/21 12:10 AM   Result Value Ref Range    PTT 39.5 (H) 24.0 - 36.0 sec   CBC with DIFF    Collection Time: 05/22/21  4:31 AM   Result Value Ref Range    WBC 6.9 3.5 - 11.0 k/uL    RBC 3.40 (L) 4.0 - 5.2 m/uL    Hemoglobin 10.2 (L) 12.0 - 16.0 g/dL    Hematocrit 31.6 (L) 36 - 46 %    MCV 92.9 80 - 100 fL    MCH 30.0 26 - 34 pg    MCHC 32.3 31 - 37 g/dL    RDW 17.7 (H) 11.5 - 14.9 %    Platelets 107 846 - 990 k/uL    MPV 7.9 6.0 - 12.0 fL    NRBC Automated NOT REPORTED per 100 WBC    Differential Type NOT REPORTED     Seg Neutrophils 83 (H) 36 - 66 %    Lymphocytes 10 (L) 24 - 44 %    Monocytes 7 1 - 7 %    Eosinophils % 0 0 - 4 %    Basophils 0 0 - 2 %    Immature Granulocytes NOT REPORTED 0 %    Segs Absolute 5.70 1.3 - 9.1 k/uL    Absolute Lymph # 0.60 (L) 1.0 - 4.8 k/uL    Absolute Mono # 0.50 0.1 - 1.3 k/uL    Absolute Eos # 0.00 0.0 - 0.4 k/uL    Basophils Absolute 0.00 0.0 - 0.2 k/uL    Absolute Immature Granulocyte NOT REPORTED 0.00 - 0.30 k/uL    WBC Morphology NOT REPORTED     RBC Morphology NOT REPORTED     Platelet Estimate NOT REPORTED    Comprehensive Metabolic Panel w/ Reflex to MG    Collection Time: 05/22/21  4:31 AM   Result Value Ref Range    Glucose 124 (H) 70 - 99 mg/dL    BUN 21 8 - 23 mg/dL    CREATININE 0.78 0.50 - 0.90 mg/dL    Bun/Cre Ratio NOT REPORTED 9 - 20    Calcium 8.8 8.6 - 10.4 mg/dL    Sodium 142 135 - 144 mmol/L    Potassium 5.3 3.7 - 5.3 mmol/L    Chloride 109 (H) 98 - 107 mmol/L    CO2 25 20 - 31 mmol/L    Anion Gap 8 (L) 9 - 17 mmol/L    Alkaline Phosphatase 141 (H) 35 - 104 U/L    ALT 22 5 - 33 U/L    AST 20 <32 U/L    Total Bilirubin 0.51 0.3 - 1.2 mg/dL    Total Protein 5.0 (L) 6.4 - 8.3 g/dL    Albumin 2.8 (L) 3.5 - 5.2 g/dL    Albumin/Globulin Ratio NOT REPORTED 1.0 - 2.5    GFR Non-African American >60 >60 mL/min    GFR African American >60 >60 mL/min    GFR Comment          GFR Staging NOT REPORTED    APTT    Collection Time: 05/22/21  4:31 AM   Result Value Ref Range    PTT 87.1 (H) 24.0 - 36.0 sec       Blood Culture: No results for input(s): BC, BLOODCULT2, ORG in the last 72 hours. GRAM STAIN  No results for input(s): LABGRAM, LABANAE, ORG, WNDABS in the last 72 hours.     Resp Culture Brief : No results found for: CULTRESP    Body Fluid : No results found for: BFCX    MRSA : No results found for: [facility]    Urine Culture Brief : No results found for: LABURIN     Organism Brief :   Lab Results   Component Value Date    [facility] EC 02/19/2014    ORG PRMI 02/19/2014       ECHO Complete 2D W Doppler W Color    Result Date: 5/21/2021  [address] Transthoracic Echocardiography Report (TTE)  Patient Name [facility]   Date of Study                 05/21/2021               MARQUEZ A   Date of      1935  Gender                        Female  Birth   Age          81 year(s)  Race                             Room Number  1524        Height: 62 inch, 157.48 cm   Corporate ID [id number]    Weight:                       150 pounds, 68 kg  #   Patient Acct [de-identified]   BSA:           1.69 m^2       BMI:      27.44  #                                                                kg/m^2   MR #         [id number]      Sonographer                   Nataliia Barragan   Accession #  [id number]  Interpreting Physician        Marissa Tyson   Fellow                   Referring Nurse Practitioner   Interpreting             Referring Physician           Nataliia Hobson, *  Fellow  Type of Study   TTE procedure:2D Echocardiogram, M-Mode, Doppler, Color Doppler. Procedure Date Date: 05/21/2021 Start: 11:56 AM Study Location: [address] [place] [address] Technical Quality: Fair visualization Indications:Chest pain. History / Tech. Comments: AFib, Atrial ablation, CAD, CKD, HLD, HTN, Stent Patient Status: Inpatient Height: 62 inches Weight: 150 pounds BSA: 1.69 m^2 BMI: 27.44 kg/m^2 Rhythm: Sinus tachycardia HR: 103 bpm BP: 126/78 mmHg CONCLUSIONS Summary Left ventricle is normal in size and wall thickness. Global left ventricular systolic function is normal. Estimated LV EF >55 %. No obvious wall motion abnormality seen. Both atria are normal in size. Normal right ventricular size and function. Mild to moderate aortic insufficiency. Mild mitral regurgitation. Mild tricuspid regurgitation. Mild pulmonic insufficiency. No pulmonary hypertension. Estimated right ventricular systolic pressure is 34QASF. No significant pericardial effusion is seen. Normal aortic root dimension.  Signature ----------------------------------------------------------------------------  Electronically signed by Nataliia Barragan(Sonographer) on 05/21/2021 12:48  PM ---------------------------------------------------------------------------- ----------------------------------------------------------------------------  Electronically signed by Camryn KempInterpreting physician) on  05/21/2021 12:50 PM ---------------------------------------------------------------------------- FINDINGS Left Atrium Left atrium is normal in size. Left Ventricle Left ventricle is normal in size and wall thickness. Global left ventricular systolic function is normal. Estimated LV EF >55 %. No obvious wall motion abnormality seen. Right Atrium Right atrium is normal in size. Right Ventricle Normal right ventricular size and function. Mitral Valve Moderate calcification of the mitral annulus. Mild mitral regurgitation. Aortic Valve No obvious valvular abnormality seen. Mild to moderate aortic insufficiency. Tricuspid Valve No obvious valvular abnormality. Mild tricuspid regurgitation. No pulmonary hypertension. Estimated right ventricular systolic pressure is [id number]. Pulmonic Valve No obvious valvular abnormality seen. Mild pulmonic insufficiency. Pericardial Effusion No significant pericardial effusion is seen. Pleural Effusion No pleural effusion seen. Miscellaneous Normal aortic root dimension.  M-mode / 2D Measurements & Calculations:   LVIDd:3.58 cm(3.7 - 5.6 cm)      Diastolic QNZLLX:37.0 ml  VUYJU:1.43 cm(2.2 - 4.0 cm)      Systolic ABXXZJ:58.2 ml  MEHO:7.71 cm(0.6 - 1.1 cm)       Aortic Root:2.6 cm(2.0 - 3.7 cm)  LVPWd:0.81 cm(0.6 - 1.1 cm)      LA Dimension: 3.7 cm(1.9 - 4.0 cm)  Fractional Shortenin.39 %    LA volume/Index: 43.6 ml /26m^2  Calculated LVEF (%): 77.78 %     LVOT:2 cm   Mitral:                                Aortic   Peak E-Wave: 0.96 m/s                  Peak Velocity: 1.19 m/s                                         Peak Gradient: 5.66 mmHg  Peak Gradient: 3.66 mmHg  Deceleration Time: 141 msec   Tricuspid:                             Pulmonic:   Estimated RVSP: 21 mmHg  Peak TR Velocity: 2.15 m/s  Peak TR Gradient: 18.49 mmHg  Estimated RA Pressure: 3 mmHg                                         Estimated PASP: 21.49 mmHg  Lateral Wall E' velocity:0.05 m/s    XR THORACIC SPINE (3 VIEWS)    Result Date: 5/19/2021  EXAMINATION: THREE XRAY VIEWS OF THE THORACIC SPINE 5/19/2021 9:31 am COMPARISON: None. HISTORY: ORDERING SYSTEM PROVIDED HISTORY: Pain TECHNOLOGIST PROVIDED HISTORY: Pain Reason for Exam: hx of recent back surgery, pt still having back pain Acuity: Unknown Type of Exam: Unknown FINDINGS: The regional skeleton was osteopenic. Post vertebroplasty changes involve L1. No thoracic compression, fannie or retro listhesis were noted. Decrease in disc height was noted at all thoracic discs. Osteopenia without thoracic compression, fannie or retrolisthesis. Decrease in disc height at all thoracic disc levels. Post vertebroplasty changes involve L1. No change has occurred from the lumbar spine series dated 04/19/2021. XR LUMBAR SPINE (2-3 VIEWS)    Result Date: 5/19/2021  EXAMINATION: THREE XRAY VIEWS OF THE LUMBAR SPINE 5/19/2021 10:31 am COMPARISON: CT 05/14/2021. Lumbar radiographs 04/19/2021. HISTORY: ORDERING SYSTEM PROVIDED HISTORY: Pain TECHNOLOGIST PROVIDED HISTORY: Pain Reason for Exam: hx of recent back surgery, pt still having back pain Acuity: Unknown Type of Exam: Unknown FINDINGS: Status post vertebroplasty at L1. The vertebral body heights appear unchanged. Multilevel degenerative disc disease and lower lumbar facet arthropathy again noted. Calcified atheromatous plaque. Partially visualized 6 suture material projecting over the abdomen and pelvis. Partially visualized right hip arthroplasty. Retained oral contrast in the distal colon noted. Moderate stool burden. 1.  Unchanged appearance of the lumbar spine. Status post vertebroplasty at L1. No new osseous abnormality identified. 2.  Moderate stool burden. Previously administered oral contrast is present in the distal colon.      MRI THORACIC SPINE WO CONTRAST    Result Date: 5/19/2021  EXAMINATION: MRI OF THE THORACIC SPINE WITHOUT CONTRAST  5/19/2021 7:48 pm TECHNIQUE: Multiplanar multisequence MRI of the thoracic spine was performed without the administration of intravenous contrast. COMPARISON: None. HISTORY: ORDERING SYSTEM PROVIDED HISTORY: severe pain TECHNOLOGIST PROVIDED HISTORY: Patient is not responding well the pain with increasing dose of self-harm morphine severe pain Reason for Exam: severe back pain FINDINGS: BONES/ALIGNMENT: Thoracic vertebral heights are maintained. Alignment is normal.  No marrow edema or suspect osseous lesion is seen. There is a chronic mild compression deformity of the L1 vertebral body with intravertebral cement. SPINAL CORD: The visualized spinal cord has normal signal and morphology. No evidence of mass or abnormal fluid collection within the spinal canal. SOFT TISSUES: Small layering left pleural effusion. Incidental 9 mm left thyroid nodule. DEGENERATIVE CHANGES: Moderate disc height loss and desiccation. Mild spinal canal stenosis at the C6-7 through T3-4 levels secondary to disc bulges. No significant neural foraminal narrowing. 1. No acute abnormality of the thoracic spine. 2. Chronic mild compression deformity of the L1 vertebral body status post prior vertebroplasty. 3. Mild degenerative changes in the lower cervical and upper thoracic spine. 4. Small layering left pleural effusion. 5. Incidental subcentimeter left thyroid nodule. No further follow-up is warranted per guidelines below. RECOMMENDATIONS: Subcentimeter incidental left thyroid nodule. No follow-up imaging is recommended. Reference: J Am Trell Radiol. 2015 Feb;12(2): 143-50     MRI LUMBAR SPINE WO CONTRAST    Result Date: 5/19/2021  EXAMINATION: MRI OF THE LUMBAR SPINE WITHOUT CONTRAST, 5/19/2021 7:48 pm TECHNIQUE: Multiplanar multisequence MRI of the lumbar spine was performed without the administration of intravenous contrast. COMPARISON: Lumbar spine 2-3 views May 19, 2021. CT lumbar spine without contrast April 4, 2021.  HISTORY: ORDERING SYSTEM PROVIDED HISTORY: Severe low back pain TECHNOLOGIST PROVIDED HISTORY: Severe low back pain Reason for Exam: Severe low back pain FINDINGS: BONES/ALIGNMENT: L1 vertebroplasty is noted in setting of chronic compression fracture with approximately 50% height loss. Associated L1 retropulsion of posterior cortex is identified which contours the ventral thecal sac narrowing the midline AP thecal sac diameter to 10 mm consistent with borderline central canal stenosis. SPINAL CORD: The conus terminates normally. SOFT TISSUES: No paraspinal mass identified. L1-L2: Disc desiccation is noted with mild disc height loss in association with posterior disc osteophyte complex which indents the ventral thecal sac narrowing the midline AP thecal sac diameter to 9 mm consistent with mild central canal stenosis. Disc osteophyte complex encroaches upon and causes moderate bilateral neural foraminal stenosis. L2-L3: Disc desiccation is noted with mild disc height loss in association with posterior disc osteophyte complex which indents the ventral thecal sac narrowing the midline AP thecal sac diameter to 8 mm consistent with moderate central canal stenosis. Disc osteophyte complex encroaches upon and causes mild bilateral neural foraminal stenosis. L3-L4: Severe disc height loss is present at this level in association with posterior disc osteophyte complex which indents the ventral thecal sac narrowing the midline AP thecal sac diameter to 8 mm consistent with moderate central canal stenosis. Disc osteophyte complex encroaches upon and causes moderate bilateral neural foraminal stenosis. Mild bilateral facet hypertrophic degenerative changes are present. L4-L5: Severe disc height loss is noted at this level in association with posterior disc osteophyte complex without associated central canal stenosis. Disc osteophyte complex encroaches upon and causes moderate bilateral neural foraminal stenosis. Mild bilateral facet hypertrophic degenerative changes are present.  L5-S1: No significant disc central airway is patent. Soft Tissues/Bones: No acute findings. Abdomen/Pelvis: Organs: Evaluation of the abdominal and pelvic viscera is limited in the absence of contrast.   The liver, biliary tree, pancreas, spleen, adrenals and kidneys appear stable. Nodularity of the adrenal glands again demonstrated, a chronic finding. Bilateral renal cysts are noted, consistent with a combination of simple and hemorrhagic/proteinaceous cysts. An exophytic lobulated complex cyst arising from the lower pole the left kidney is demonstrated, better characterized as a cyst on prior contrast imaging. GI/Bowel: Diverticulosis. Wall thickening of the sigmoid colon is noted. Moderate distal colonic stool burden. No evidence for bowel obstruction. There is adjacent complex fluid or soft tissue thickening in the pelvis near this area, a stable chronic finding. Pelvis: Streak artifact from bilateral hip arthroplasties noted. The bladder appears decompressed. Peritoneum/Retroperitoneum: Mild induration of the peritoneal fat in the left upper quadrant again demonstrated, a stable finding. Presumed pleural thickening versus complex fluid in the pelvis is also a stable finding. No new finding identified in the peritoneal cavity. No ascites. Bones/Soft Tissues: Bilateral hip arthroplasties partially visualized. Treated compression fracture at L1 is noted. Multilevel degenerative disc disease and lower lumbar facet arthropathy. 1.  Short-segment sigmoid colon thickening with diverticula, suggestive of colitis. 2.  Otherwise stable exam with mild residual peritoneal thickening noted in the left upper quadrant and pelvis. Additional stable chronic and benign findings, as above. This note was dictated using M*Modal Fluency Direct so please excuse any grammatical or syntax errors as no guarantees can be provided that every mistake has been identified and corrected by editing.       Electronically signed by Daniel Baugh MD on 5/22/2021 at 11:34 AM   Answering service [phone]

## 2021-05-23 NOTE — PROGRESS NOTES
Patient PTT drawn, resulted at 57.7. No bolus no change. Two consecutive draws within therapeutic range. Patient changed to daily draws per protocol.

## 2021-05-23 NOTE — PROGRESS NOTES
Patient ID:Luisa Butler is 81 y.o.   : 1935 MRN: [id number]    Admit date: 2021  Primary Care Physician: Madelyn Ramos DO   Patient Care Team:  Madelyn Ramos DO as PCP - Elyse Yang MD as Orthopedic Surgeon (Orthopedic Surgery)  Jose Goodwin DO as Consulting Physician (Obstetrics & Gynecology)  Tel: [phone]  IP CONSULT TO PRIMARY CARE PROVIDER  IP CONSULT TO CARDIOLOGY  IP CONSULT TO PAIN MANAGEMENT  IP CONSULT TO ORTHOPEDIC SURGERY  IP CONSULT TO CARDIOLOGY  Admitting Physician: No admitting provider for patient encounter. Code Status:  Full Code  DIET GENERAL; No Added Salt (3-4 GM)  Dietary Nutrition Supplements: Clear Liquid Oral Supplement    Progress Note      HPI: Benji Short is a 81 y.o.  female who presents with Back Pain    Patient having some muscle spasms and has tried baclofen so discussed with nursing low-dose of 5 mg. Denies chest pain, shortness of breath or cough. Problem List: Principal Problem:    Atrial fibrillation with RVR (HCC)  Active Problems:    Hypertension    Mixed hyperlipidemia    Chronic diastolic heart failure (HCC)    Chronic respiratory failure (HCC)    Gastroesophageal reflux disease without esophagitis    Pulmonary hypertension (HCC)    YOGESH (acute kidney injury) (HCC)    Closed compression fracture of L1 lumbar vertebra, sequela    Acute midline low back pain with right-sided sciatica    Acute exacerbation of chronic low back pain  Resolved Problems:    * No resolved hospital problems. *      Atrial fibrillation with RVR (HCC)    Assessment:    Atrial fibrillation  Low back pain with L1 compression fracture status post vertebroplasty  Chronic diastolic heart failure  Coronary artery disease  Hypertension  Pulmonary hypertension  Normocytic anemia     Plan:     On carvedilol, amiodarone  Pending epidural steroid injection  Heparin iv.  Xarelto on hold pending epidural.    Anticipated Disposition upon discharge:     [x] Home  [] Home with [address] ([address] [facility])   [] SNF ([facility])  [] LTAC ([address])      [address]    has a past medical history of Asbestos exposure, Atrial fibrillation ([address] Basal cell carcinoma of nose, CAD (coronary artery disease), CKD (chronic kidney disease) stage 2, GFR 60-89 ml/min, Hyperlipidemia, Hypertension, MDRO (multiple drug resistant organisms) resistance, Nephrolithiasis, Osteoarthritis, Ovarian cyst, Oxygen dependent, Palpitations, Peritoneal mesothelioma ([address] PONV (postoperative nausea and vomiting), Prolonged emergence from general anesthesia, and SOB (shortness of breath) on exertion. SURGICAL HISTORY      has a past surgical history that includes Tubal ligation (1969); Hysterectomy (1981); Colonoscopy; Total hip arthroplasty (Right, 1990); Rectocele repair (1997); liver biopsy (2000); Upper gastrointestinal endoscopy; Photodynamic Therapy (April 2000 & Jan. 2002); Total hip arthroplasty (Left, 2003); Revision total hip arthroplasty (Right, 1998); Rotator cuff repair (2006); Ankle fracture surgery (Right, 2013); Cataract removal (Bilateral); Cardiac catheterization (2002); liver biopsy; Coronary angioplasty with stent (April 2002); [place] tooth extraction; Appendectomy (1964); Cholecystectomy (2005); Skin cancer excision (Bilateral, 2006); Skin cancer excision (Left, Oct. 2014); nasal endoscopy (Bilateral, 11/25/2014); BIOPSY / LIGATION TEMPORAL ARTERY (Bilateral, 2/12/2021); Breast biopsy (Right); and Spine surgery (N/A, 4/15/2021).     CURRENT MEDICATIONS        polyethylene glycol  17 g Oral Daily    sodium chloride flush  5-40 mL Intravenous 2 times per day    carvedilol  6.25 mg Oral BID    atorvastatin  40 mg Oral Daily    ferrous sulfate  300 mg Oral Daily    pantoprazole  40 mg Oral QAM AC    oxybutynin  15 mg Oral Daily    potassium chloride  20 mEq Oral BID    predniSONE  20 mg Oral Daily    rOPINIRole mg by mouth daily  rivaroxaban (XARELTO) 20 MG TABS tablet, Take 20 mg by mouth daily (with breakfast)   diphenhydrAMINE-APAP, sleep, (TYLENOL PM EXTRA STRENGTH)  MG tablet, Take 1 tablet by mouth nightly as needed   torsemide (DEMADEX) 20 MG tablet, Take 1 tablet by mouth 2 times daily Take twice a day for 1 months then go back to 20 mg daily  amoxicillin (AMOXIL) 500 MG capsule, Take 2,000 mg by mouth as needed (dental procedures) Dental only  oxybutynin (DITROPAN XL) 15 MG extended release tablet, Take 15 mg by mouth daily   polyethylene glycol (GLYCOLAX) 17 GM/SCOOP powder, Take 17 g by mouth daily as needed (constipation)   Ascorbic Acid (VITAMIN C) 250 MG tablet, Take 250 mg by mouth daily  ferrous sulfate (FLACO-IN-SOL) 75 (15 Fe) MG/ML solution, Take 15 mg by mouth daily  omeprazole (PRILOSEC) 40 MG delayed release capsule, Take 40 mg by mouth Daily   atorvastatin (LIPITOR) 40 MG tablet, Take 40 mg by mouth daily   potassium chloride SA (K-DUR;KLOR-CON M) 20 MEQ tablet, Take 1 tablet by mouth 2 times daily  rOPINIRole (REQUIP) 0.5 MG tablet, Take 0.5 mg by mouth 4 times daily as needed   Vitamin D (CHOLECALCIFEROL) 1000 UNITS CAPS capsule, Take 1,000 Units by mouth 2 times daily  predniSONE (DELTASONE) 5 MG tablet, Take 20 mg by mouth daily   Omega 3 1000 MG CAPS, Take 1,000 mg by mouth daily   sertraline (ZOLOFT) 50 MG tablet, Take 50 mg by mouth daily. ALLERGIES     is allergic to adhesive tape, meperidine, demerol, ultram [tramadol hcl], zocor [simvastatin], codeine, and fentanyl. Allergies   Allergen Reactions    Adhesive Tape Rash    Meperidine Nausea Only    Demerol Nausea Only    Ultram [Tramadol Hcl]      Hot flashes    Zocor [Simvastatin]      Muscle aches    Codeine      \"out of it\"    Fentanyl Rash       SOCIAL HISTORY      reports that she has never smoked. She has never used smokeless tobacco. She reports current alcohol use. She reports that she does not use drugs.     Vitals: 21 0822 21 1105 21 1226 21 1445   BP:   112/73 100/68   Pulse: 98  108 98   Resp:  16 18 16   Temp:   97.9 °F (36.6 °C) 97.5 °F (36.4 °C)   TempSrc:   Oral Oral   SpO2: 98% 98% 96%    Weight:       Height:         Body mass index is 29.35 kg/m². /68   Pulse 98   Temp 97.5 °F (36.4 °C) (Oral)   Resp 16   Ht 5' 2\" (1.575 m)   Wt 160 lb 7.9 oz (72.8 kg)   SpO2 96%   BMI 29.35 kg/m²   Temp (24hrs), Av °F (36.7 °C), Min:97.5 °F (36.4 °C), Max:98.4 °F (36.9 °C)    Pulse Ox: SpO2  Av.3 %  Min: 92 %  Max: 99 %  Supplemental O2: O2 Flow Rate (L/min): 3 L/min   Oxygen Therapy  SpO2: 96 %  Pulse Oximeter Device Mode: Continuous  Pulse Oximeter Device Location: Finger  O2 Device: Nasal cannula  O2 Flow Rate (L/min): 3 L/min  End Tidal CO2: 29 (%)  Blood Gas  Performed?: No    Patient Vitals for the past 96 hrs (Last 3 readings):   Weight   21 0000 160 lb 7.9 oz (72.8 kg)   21 0034 154 lb 1.6 oz (69.9 kg)   21 0015 151 lb 10.8 oz (68.8 kg)     Admission weight: 143 lb (64.9 kg)  Wt Readings from Last 3 Encounters:   21 160 lb 7.9 oz (72.8 kg)   21 153 lb 14.1 oz (69.8 kg)   21 155 lb (70.3 kg)    (encounters)    /O (24Hr): Intake/Output Summary (Last 24 hours) at 2021 1522  Last data filed at 2021 1403  Gross per 24 hour   Intake 201 ml   Output 350 ml   Net -149 ml       Vitals reviewed. H&N: atraumatic, normocephalic, anicteric, no conjunctivitis, EOM normal, no lid lag or exophthalmos, nose and ears appear normal, trachea mid line, no stridor,  Chest: no respiratory distress, normal effort, fairly good air entry, no wheezes,    Heart: normal heart sounds, regular rate and rhythm, no murmurs. gallops or rubs,  Abdomen: BS present, non-tender, no masses or organomegaly detected,   Extremities: no peripheral edema, no cyanosis. no oncholysis.  Skin: no rash, no erythema, no jaundice,   CNS: grossly normal without cranial nerve deficits, no abnormal coordination or tone, Psychiatric: normal mood, affect. LABS:    CBC:   Recent Labs     05/21/21 0439 05/22/21 0431 05/23/21 0442   WBC 5.3 6.9 5.5   RBC 3.40* 3.40* 3.33*   HGB 10.3* 10.2* 9.9*   HCT 31.5* 31.6* 30.4*   MCV 92.6 92.9 91.4   MCH 30.2 30.0 29.7   MCHC 32.6 32.3 32.5   RDW 18.1* 17.7* 17.3*    236 224   MPV 7.8 7.9 8.2       MAG: No results for input(s): MG in the last 72 hours. CMP:   Recent Labs     05/21/21 0439 05/22/21 0431 05/23/21 0442    142 138   K 4.8 5.3 4.9   * 109* 108*   CO2 27 25 24   BUN 22 21 20   CREATININE 0.91* 0.78 0.85   GLUCOSE 123* 124* 158*   CALCIUM 8.7 8.8 8.8   PROT 5.2* 5.0* 4.8*   LABALBU 2.9* 2.8* 2.7*   BILITOT 0.49 0.51 0.36   ALKPHOS 136* 141* 127*   AST 18 20 19   ALT 21 22 23      No results for input(s): LIPASE, AMYLASE in the last 72 hours. Phosphorus:  No results for input(s): PHOS in the last 72 hours. Albumin:   Recent Labs     05/21/21 0439 05/22/21 0431 05/23/21 0442   LABALBU 2.9* 2.8* 2.7*       U/A:  Lab Results   Component Value Date    COLORU YELLOW 05/20/2021    PHUR 5.5 05/20/2021    WBCUA TOO NUMEROUS TO COUNT 02/25/2021    RBCUA 0 TO 2 02/25/2021    MUCUS NOT REPORTED 02/25/2021    TRICHOMONAS NOT REPORTED 02/25/2021    YEAST NOT REPORTED 02/25/2021    BACTERIA MANY 02/25/2021    CLARITYU Clear 05/30/2019    SPECGRAV 1.023 05/20/2021    LEUKOCYTESUR NEGATIVE 05/20/2021    UROBILINOGEN Normal 05/20/2021    BILIRUBINUR NEGATIVE 05/20/2021    GLUCOSEU NEGATIVE 05/20/2021    AMORPHOUS NOT REPORTED 02/25/2021       Recent Labs     05/21/21  0439   INR 2.0     Lab Results   Component Value Date    DDIMER 2.38 (H) 12/17/2014     Lab Results   Component Value Date    BNP NOT REPORTED 08/22/2014     troponins  Lab Results   Component Value Date    TROPONINI <0.01 02/19/2014         D Dimer: No results for input(s): DDIMER in the last 72 hours.   Troponin T No results for input(s): TROPONINT in the last 72 hours. ProBNP   No results found for requested labs within last 30 days. ProBNP Invalid input(s): PRO-BNP  Lactic acid:Invalid input(s): LACTIC ACID      PT/INR:   Recent Labs     05/21/21  0439   PROTIME 22.4*   INR 2.0     APTT:   Recent Labs     05/22/21  1753 05/23/21  0006 05/23/21  0442   APTT 40.8* 69.7* 57.7*         ESR:   Lab Results   Component Value Date    SEDRATE 1 04/08/2021     CRP:   Lab Results   Component Value Date    CRP <3.0 04/08/2021     Folate and B12:   Lab Results   Component Value Date    PLMAHWFL13 395 10/31/2020   ,   Lab Results   Component Value Date    FOLATE >20.0 08/22/2014     Thyroid Studies:   Lab Results   Component Value Date    TSH 0.55 04/21/2021     D-Dimer:   Lab Results   Component Value Date    DDIMER 2.38 (H) 12/17/2014       Lactic acid: No components found for: LACT     Troponin T No results for input(s): TROPHS in the last 72 hours. Troponins:     No components found for: TROP    Lab Results   Component Value Date    TROPONINI <0.01 02/19/2014       No results found for requested labs within last 720 hours. Cardiac Enzymes:    No results found for requested labs within last 720 hours.     No results found for: CKMB    ProBNP:    No components found for: NTBNP      U/A:  Lab Results   Component Value Date    COLORU YELLOW 05/20/2021    PHUR 5.5 05/20/2021    WBCUA TOO NUMEROUS TO COUNT 02/25/2021    RBCUA 0 TO 2 02/25/2021    MUCUS NOT REPORTED 02/25/2021    TRICHOMONAS NOT REPORTED 02/25/2021    YEAST NOT REPORTED 02/25/2021    BACTERIA MANY 02/25/2021    CLARITYU Clear 05/30/2019    SPECGRAV 1.023 05/20/2021    LEUKOCYTESUR NEGATIVE 05/20/2021    UROBILINOGEN Normal 05/20/2021    BILIRUBINUR NEGATIVE 05/20/2021    GLUCOSEU NEGATIVE 05/20/2021    AMORPHOUS NOT REPORTED 02/25/2021       Urine Sodium:     Lab Results   Component Value Date    REJI <20 05/20/2021     Urine Potassium:  No results found for: KUR  Urine Chloride:  No results found for: Granulocytes NOT REPORTED 0 %    Segs Absolute 4.50 1.3 - 9.1 k/uL    Absolute Lymph # 0.60 (L) 1.0 - 4.8 k/uL    Absolute Mono # 0.40 0.1 - 1.3 k/uL    Absolute Eos # 0.00 0.0 - 0.4 k/uL    Basophils Absolute 0.00 0.0 - 0.2 k/uL    Absolute Immature Granulocyte NOT REPORTED 0.00 - 0.30 k/uL    WBC Morphology NOT REPORTED     RBC Morphology NOT REPORTED     Platelet Estimate NOT REPORTED    Comprehensive Metabolic Panel w/ Reflex to MG    Collection Time: 05/23/21  4:42 AM   Result Value Ref Range    Glucose 158 (H) 70 - 99 mg/dL    BUN 20 8 - 23 mg/dL    CREATININE 0.85 0.50 - 0.90 mg/dL    Bun/Cre Ratio NOT REPORTED 9 - 20    Calcium 8.8 8.6 - 10.4 mg/dL    Sodium 138 135 - 144 mmol/L    Potassium 4.9 3.7 - 5.3 mmol/L    Chloride 108 (H) 98 - 107 mmol/L    CO2 24 20 - 31 mmol/L    Anion Gap 6 (L) 9 - 17 mmol/L    Alkaline Phosphatase 127 (H) 35 - 104 U/L    ALT 23 5 - 33 U/L    AST 19 <32 U/L    Total Bilirubin 0.36 0.3 - 1.2 mg/dL    Total Protein 4.8 (L) 6.4 - 8.3 g/dL    Albumin 2.7 (L) 3.5 - 5.2 g/dL    Albumin/Globulin Ratio NOT REPORTED 1.0 - 2.5    GFR Non-African American >60 >60 mL/min    GFR African American >60 >60 mL/min    GFR Comment          GFR Staging NOT REPORTED    APTT    Collection Time: 05/23/21  4:42 AM   Result Value Ref Range    PTT 57.7 (H) 24.0 - 36.0 sec       Blood Culture: No results for input(s): BC, BLOODCULT2, ORG in the last 72 hours. GRAM STAIN  No results for input(s): LABGRAM, LABANAE, ORG, WNDABS in the last 72 hours.     Resp Culture Brief : No results found for: CULTRESP    Body Fluid : No results found for: BFCX    MRSA : No results found for: [address]     Urine Culture Brief : No results found for: LABURIN     Organism Brief :   Lab Results   Component Value Date    [facility] 02/19/2014    ORG PRMI 02/19/2014       ECHO Complete 2D W Doppler W Color    Result Date: 5/21/2021  [address] Transthoracic Echocardiography Report (TTE)  Patient Name [facility]   Date of Study                 05/21/2021               MARQUEZ A   Date of      1935  Gender                        Female  Birth   Age          81 year(s)  Race                             Room Number  2115        Height:                       62 inch, 157.48 cm   Corporate ID [id number]    Weight:                       150 pounds, 68 kg  #   Patient Acct [de-identified]   BSA:           1.69 m^2       BMI:      27.44  #                                                                kg/m^2   MR #         [id number]      Sonographer                   Nataliia Barragan   Accession #  [id number]  Interpreting Physician        Ronaldo Walls   Fellow                   Referring Nurse Practitioner   Interpreting             Referring Physician           Richard Torres, *  Fellow  Type of Study   TTE procedure:2D Echocardiogram, M-Mode, Doppler, Color Doppler. Procedure Date Date: 05/21/2021 Start: 11:56 AM Study Location: [address] [place] [address] Technical Quality: Fair visualization Indications:Chest pain. History / Tech. Comments: AFib, Atrial ablation, CAD, CKD, HLD, HTN, Stent Patient Status: Inpatient Height: 62 inches Weight: 150 pounds BSA: 1.69 m^2 BMI: 27.44 kg/m^2 Rhythm: Sinus tachycardia HR: 103 bpm BP: 126/78 mmHg CONCLUSIONS Summary Left ventricle is normal in size and wall thickness. Global left ventricular systolic function is normal. Estimated LV EF >55 %. No obvious wall motion abnormality seen. Both atria are normal in size. Normal right ventricular size and function. Mild to moderate aortic insufficiency. Mild mitral regurgitation. Mild tricuspid regurgitation. Mild pulmonic insufficiency. No pulmonary hypertension. Estimated right ventricular systolic pressure is 71YGTR. No significant pericardial effusion is seen. Normal aortic root dimension.  Signature ----------------------------------------------------------------------------  Electronically signed by Nataliia Barragan(Sonographer) on 05/21/2021 12:48  PM ---------------------------------------------------------------------------- ----------------------------------------------------------------------------  Electronically signed by Gui Garza(Interpreting physician) on  2021 12:50 PM ---------------------------------------------------------------------------- FINDINGS Left Atrium Left atrium is normal in size. Left Ventricle Left ventricle is normal in size and wall thickness. Global left ventricular systolic function is normal. Estimated LV EF >55 %. No obvious wall motion abnormality seen. Right Atrium Right atrium is normal in size. Right Ventricle Normal right ventricular size and function. Mitral Valve Moderate calcification of the mitral annulus. Mild mitral regurgitation. Aortic Valve No obvious valvular abnormality seen. Mild to moderate aortic insufficiency. Tricuspid Valve No obvious valvular abnormality. Mild tricuspid regurgitation. No pulmonary hypertension. Estimated right ventricular systolic pressure is 79PIGB. Pulmonic Valve No obvious valvular abnormality seen. Mild pulmonic insufficiency. Pericardial Effusion No significant pericardial effusion is seen. Pleural Effusion No pleural effusion seen. Miscellaneous Normal aortic root dimension.  M-mode / 2D Measurements & Calculations:   LVIDd:3.58 cm(3.7 - 5.6 cm)      Diastolic YRDQOL:05.9 ml  PPHSL:3.92 cm(2.2 - 4.0 cm)      Systolic NKFIJL:76.7 ml  EFOJ:0.19 cm(0.6 - 1.1 cm)       Aortic Root:2.6 cm(2.0 - 3.7 cm)  LVPWd:0.81 cm(0.6 - 1.1 cm)      LA Dimension: 3.7 cm(1.9 - 4.0 cm)  Fractional Shortenin.39 %    LA volume/Index: 43.6 ml /26m^2  Calculated LVEF (%): 77.78 %     LVOT:2 cm   Mitral:                                Aortic   Peak E-Wave: 0.96 m/s                  Peak Velocity: 1.19 m/s                                         Peak Gradient: 5.66 mmHg  Peak Gradient: 3.66 mmHg  Deceleration Time: 141 msec   Tricuspid:                             Pulmonic:   Estimated RVSP: 21 mmHg  Peak TR Velocity: 2.15 m/s  Peak TR Gradient: 18.49 mmHg  Estimated RA Pressure: 3 mmHg                                         Estimated PASP: 21.49 mmHg  Lateral Wall E' velocity:0.05 m/s    XR THORACIC SPINE (3 VIEWS)    Result Date: 5/19/2021  EXAMINATION: THREE XRAY VIEWS OF THE THORACIC SPINE 5/19/2021 9:31 am COMPARISON: None. HISTORY: ORDERING SYSTEM PROVIDED HISTORY: Pain TECHNOLOGIST PROVIDED HISTORY: Pain Reason for Exam: hx of recent back surgery, pt still having back pain Acuity: Unknown Type of Exam: Unknown FINDINGS: The regional skeleton was osteopenic. Post vertebroplasty changes involve L1. No thoracic compression, fannie or retro listhesis were noted. Decrease in disc height was noted at all thoracic discs. Osteopenia without thoracic compression, fannie or retrolisthesis. Decrease in disc height at all thoracic disc levels. Post vertebroplasty changes involve L1. No change has occurred from the lumbar spine series dated 04/19/2021. XR LUMBAR SPINE (2-3 VIEWS)    Result Date: 5/19/2021  EXAMINATION: THREE XRAY VIEWS OF THE LUMBAR SPINE 5/19/2021 10:31 am COMPARISON: CT 05/14/2021. Lumbar radiographs 04/19/2021. HISTORY: ORDERING SYSTEM PROVIDED HISTORY: Pain TECHNOLOGIST PROVIDED HISTORY: Pain Reason for Exam: hx of recent back surgery, pt still having back pain Acuity: Unknown Type of Exam: Unknown FINDINGS: Status post vertebroplasty at L1. The vertebral body heights appear unchanged. Multilevel degenerative disc disease and lower lumbar facet arthropathy again noted. Calcified atheromatous plaque. Partially visualized 6 suture material projecting over the abdomen and pelvis. Partially visualized right hip arthroplasty. Retained oral contrast in the distal colon noted. Moderate stool burden. 1.  Unchanged appearance of the lumbar spine. Status post vertebroplasty at L1. No new osseous abnormality identified. 2.  Moderate stool burden.   Previously administered oral contrast is present in the distal colon. MRI THORACIC SPINE WO CONTRAST    Result Date: 5/19/2021  EXAMINATION: MRI OF THE THORACIC SPINE WITHOUT CONTRAST  5/19/2021 7:48 pm TECHNIQUE: Multiplanar multisequence MRI of the thoracic spine was performed without the administration of intravenous contrast. COMPARISON: None. HISTORY: ORDERING SYSTEM PROVIDED HISTORY: severe pain TECHNOLOGIST PROVIDED HISTORY: Patient is not responding well the pain with increasing dose of self-harm morphine severe pain Reason for Exam: severe back pain FINDINGS: BONES/ALIGNMENT: Thoracic vertebral heights are maintained. Alignment is normal.  No marrow edema or suspect osseous lesion is seen. There is a chronic mild compression deformity of the L1 vertebral body with intravertebral cement. SPINAL CORD: The visualized spinal cord has normal signal and morphology. No evidence of mass or abnormal fluid collection within the spinal canal. SOFT TISSUES: Small layering left pleural effusion. Incidental 9 mm left thyroid nodule. DEGENERATIVE CHANGES: Moderate disc height loss and desiccation. Mild spinal canal stenosis at the C6-7 through T3-4 levels secondary to disc bulges. No significant neural foraminal narrowing. 1. No acute abnormality of the thoracic spine. 2. Chronic mild compression deformity of the L1 vertebral body status post prior vertebroplasty. 3. Mild degenerative changes in the lower cervical and upper thoracic spine. 4. Small layering left pleural effusion. 5. Incidental subcentimeter left thyroid nodule. No further follow-up is warranted per guidelines below. RECOMMENDATIONS: Subcentimeter incidental left thyroid nodule. No follow-up imaging is recommended. Reference: J Am Trell Radiol.  2015 Feb;12(2): 143-50     MRI LUMBAR SPINE WO CONTRAST    Result Date: 5/19/2021  EXAMINATION: MRI OF THE LUMBAR SPINE WITHOUT CONTRAST, 5/19/2021 7:48 pm TECHNIQUE: Multiplanar multisequence MRI of the lumbar osteophyte complex without associated central canal stenosis. Disc osteophyte complex encroaches upon and causes moderate bilateral neural foraminal stenosis. Mild bilateral facet hypertrophic degenerative changes are present. L5-S1: No significant disc height loss is present at this level. Minimal posterior disc bulge is seen without associated central canal compromise. Neural foramina are patent. Mild bilateral facet hypertrophic degenerative changes are seen     . 1. L1 vertebroplasty in association with remote vertebral body compression fracture with approximately 50% height loss. 2. Associated retropulsion of posterior L1 cortex with associated borderline central canal stenosis. 3. L1/L2 mild, L2/L3 moderate, L3/L4 moderate central canal stenosis secondary to encroachment by posterior disc osteophyte complex. 4. L1/L2 moderate bilateral, L2/L3 mild bilateral, L3/L4 moderate bilateral, L4/L5 moderate bilateral neural foraminal stenosis secondary to encroachment by disc osteophyte complex. 5. L3/L4-L5/S1 mild bilateral facet degenerative arthrosis. CT CHEST ABDOMEN PELVIS WO CONTRAST    Result Date: 5/14/2021  EXAMINATION: CT OF THE CHEST, ABDOMEN, AND PELVIS WITHOUT CONTRAST 5/14/2021 11:32 am TECHNIQUE: CT of the chest, abdomen and pelvis was performed without the administration of intravenous contrast. Multiplanar reformatted images are provided for review. Dose modulation, iterative reconstruction, and/or weight based adjustment of the mA/kV was utilized to reduce the radiation dose to as low as reasonably achievable. COMPARISON: 04/24/2020, 03/18/2019 HISTORY: ORDERING SYSTEM PROVIDED HISTORY: Mesothelioma of peritoneum [facility]) TECHNOLOGIST PROVIDED HISTORY: Reason for Exam: patient states this is a follow up for cancer Additional signs and symptoms: patient states this is a follow up for cancer FINDINGS: Chest: Mediastinum: The cardiac mediastinal contours appear unchanged.   Enlargement of the main pulmonary artery measuring 48 mm is noted, suggestive of pulmonary hypertension. No pericardial effusion. No lymphadenopathy. Lungs/pleura: No acute airspace disease identified. No effusion. Minimal subsegmental atelectasis in the lung bases. The central airway is patent. Soft Tissues/Bones: No acute findings. Abdomen/Pelvis: Organs: Evaluation of the abdominal and pelvic viscera is limited in the absence of contrast.   The liver, biliary tree, pancreas, spleen, adrenals and kidneys appear stable. Nodularity of the adrenal glands again demonstrated, a chronic finding. Bilateral renal cysts are noted, consistent with a combination of simple and hemorrhagic/proteinaceous cysts. An exophytic lobulated complex cyst arising from the lower pole the left kidney is demonstrated, better characterized as a cyst on prior contrast imaging. GI/Bowel: Diverticulosis. Wall thickening of the sigmoid colon is noted. Moderate distal colonic stool burden. No evidence for bowel obstruction. There is adjacent complex fluid or soft tissue thickening in the pelvis near this area, a stable chronic finding. Pelvis: Streak artifact from bilateral hip arthroplasties noted. The bladder appears decompressed. Peritoneum/Retroperitoneum: Mild induration of the peritoneal fat in the left upper quadrant again demonstrated, a stable finding. Presumed pleural thickening versus complex fluid in the pelvis is also a stable finding. No new finding identified in the peritoneal cavity. No ascites. Bones/Soft Tissues: Bilateral hip arthroplasties partially visualized. Treated compression fracture at L1 is noted. Multilevel degenerative disc disease and lower lumbar facet arthropathy. 1.  Short-segment sigmoid colon thickening with diverticula, suggestive of colitis. 2.  Otherwise stable exam with mild residual peritoneal thickening noted in the left upper quadrant and pelvis. Additional stable chronic and benign findings, as above. This note was dictated using M*Modal Fluency Direct so please excuse any grammatical or syntax errors as no guarantees can be provided that every mistake has been identified and corrected by editing.       Electronically signed by Cody Pierce MD on 5/23/2021 at 3:22 PM   Answering service [phone]

## 2021-05-23 NOTE — PLAN OF CARE
Problem: Falls - Risk of:  Goal: Will remain free from falls  Description: Will remain free from falls  5/23/2021 1521 by Dung Salinas RN  Outcome: Ongoing     Problem: Falls - Risk of:  Goal: Absence of physical injury  Description: Absence of physical injury  5/23/2021 1521 by Dung Salinas RN  Outcome: Ongoing     Problem: Pain:  Goal: Pain level will decrease  Description: Pain level will decrease  5/23/2021 1521 by Dung Salinas RN  Outcome: Ongoing     Problem: Pain:  Goal: Control of acute pain  Description: Control of acute pain  5/23/2021 1521 by Dung Salinas RN  Outcome: Ongoing     Problem: Pain:  Goal: Control of chronic pain  Description: Control of chronic pain  5/23/2021 1521 by Dung Salinas RN  Outcome: Ongoing     Problem: Cardiac Output - Decreased:  Goal: Hemodynamic stability will improve  Description: Hemodynamic stability will improve  5/23/2021 1521 by Dung Salinas RN  Outcome: Ongoing     Problem: Musculor/Skeletal Functional Status  Goal: Highest potential functional level  5/23/2021 1521 by Dung Salinas RN  Outcome: Ongoing     Problem: Musculor/Skeletal Functional Status  Goal: Absence of falls  5/23/2021 1521 by Dung Salinas RN  Outcome: Ongoing     Problem: Nutrition  Goal: Optimal nutrition therapy  5/23/2021 1521 by Dung Salinas RN  Outcome: Ongoing     Problem: Skin Integrity:  Goal: Will show no infection signs and symptoms  Description: Will show no infection signs and symptoms  5/23/2021 1521 by Dung Salinas RN  Outcome: Ongoing     Problem: Skin Integrity:  Goal: Absence of new skin breakdown  Description: Absence of new skin breakdown  5/23/2021 1521 by Dung Salinas RN  Outcome: Ongoing

## 2021-05-23 NOTE — PLAN OF CARE
Problem: Falls - Risk of:  Goal: Will remain free from falls  Description: Will remain free from falls  Outcome: Ongoing  Note: No falls this shift. Call light within reach, side rails up x2, bed in lowest position. Patient safety maintained. Problem: Falls - Risk of:  Goal: Absence of physical injury  Description: Absence of physical injury  Outcome: Ongoing  Note: No injury this shift. Patient safety maintained. Problem: Pain:  Goal: Pain level will decrease  Description: Pain level will decrease  Outcome: Ongoing  Note: Pain is controlled with PRN pain medications. Problem: Musculor/Skeletal Functional Status  Goal: Highest potential functional level  Outcome: Ongoing  Note: Patient is able to perform ADLs with assistance.

## 2021-05-23 NOTE — PROGRESS NOTES
Date:   5/23/2021  Patient name: Sebastien Gandhi  Date of admission:  5/19/2021  9:49 AM  MRN:   [id number]  YOB: 1935  PCP: Cherry Dubose DO    Reason for Admission: Atrial fibrillation with RVR [facility]) [I48.91]    Cardiology follow-up: A. fib with RVR       Impression      A. fib with RVR  Diastolic CHF  Hypertension  COPD, hypoxia, on oxygen at home  Hyperlipidemia  Exertional shortness of breath, hypoxia  History of CAD, stent placement LAD 2002  Polymyalgia rheumatica  Moderate aortic regurgitation  Giant cell arteritis  chronic anemia  history of peritoneal/omental mesothelioma surgery done at [address] [place] [address]  Dry mouth, dry eyes  Multiple hip surgeries, bilateral  Chronic pain syndrome  L1 kyphoplasty 4/15/2021    2D echo 5/21/2021  Normal LV size and wall thickness ejection fraction more than 55% normal size LA and RA, RVSP 21, no significant pericardial effusion    ECG 5/19/2021  A. fib/a flutter heart rate 159, nonspecific ST-T changes     X-ray lumbar spine 5/19/2021  Unchanged appearance of the lumbar spine, status post vertebroplasty at L1, no new osseous abnormality  Moderate stool burden, previously administered oral contrast is present in the distal colon     Lab work 5/19/2021  Sodium 140, potassium 5.2, BUN 30, creatinine 1.42, GFR 35, alkaline phosphatase 133, bilirubin 0.60  Troponin XX 3, myoglobin 28  WBC 7.7, hemoglobin 10.9, platelets 770  Lab work 5/20/2021  Sodium 145, potassium 5.0, CO2 28, chloride 110, BUN 28, creatinine 1.19, hemoglobin 10.1  5/21/2021  Sodium 141, potassium 4.8, BUN 22, creatinine 0.91, glucose 123, albumin 2.9  Hemoglobin 10.3, WBC 5.3, platelets 269  Lab work 05/22/2021 WBC 6.9, hgb 10.2, platelets 576, Glucose 124, bun 21, creatinine 0.78, Calcium 8.8, sodium 142, potassium 5.3, chloride 109, alkaline phosphatase 141, alt 22, ast 20, albumin 2.8, GFR >60  Lab work 5/23/2021: Glucose 150, BUN 20, creatinine 0.85, calcium 8.8, sodium 138, potassium 4.9, alkaline phosphatase 127, ALT 23, AST 19, albumin 2.7, GFR> 60, WBC 5.5, hemoglobin 9.9, platelets 178.       History of present illness  81years old female who recently had L1 kyphoplasty presented to the ER with a chief complaint of back pain. She had a kyphoplasty about a month ago for compression fracture L1. Her pain got very severe. She has been also having dizziness and she had a near syncope about 10 days ago. No chest pain no palpitation. On admission her ECG showed A. fib with RVR heart rate 159. She was started on Cardizem infusion. She developed hypotension and dose of Cardizem infusion was reduced to 5 mg/h. Later on it was discontinued and started on carvedilol 6.25 mg twice a day. Heart rate improved significantly. Current evaluation 5/23/2021  Patient seen and examined. Frail elderly female resting with one pillow  She denies chest pain, SOB, and palpitations  She said her back pain is the same, 6/10, on Morphine PCA  Denied any chest pain or shortness of breath  Hemodynamically stable  ECG monitor A. fib heart rate   Afebrile, bp 112/73, 3L 96%. Patient states she takes Xarelto at home and the medication costs her over $500 a month. She has [employer]. Discuss with patient she may need to be on Coumadin and have INR drawn. This may be a more affordable option. Updated Dr. Rody Perez.        Medications:   Scheduled Meds:   polyethylene glycol  17 g Oral Daily    sodium chloride flush  5-40 mL Intravenous 2 times per day    carvedilol  6.25 mg Oral BID    atorvastatin  40 mg Oral Daily    ferrous sulfate  300 mg Oral Daily    pantoprazole  40 mg Oral QAM AC    oxybutynin  15 mg Oral Daily    potassium chloride  20 mEq Oral BID    predniSONE  20 mg Oral Daily    rOPINIRole  0.5 mg Oral 4x Daily    sertraline  50 mg Oral Daily    amiodarone  100 mg Oral BID     Continuous Infusions:   heparin (PORCINE) Infusion 7.028 Units/kg/hr (05/23/21 3567)    sodium chloride      morphine 30 mg (05/23/21 0503)     CBC:   Recent Labs     05/21/21 0439 05/22/21  0431 05/23/21  0442   WBC 5.3 6.9 5.5   HGB 10.3* 10.2* 9.9*    236 224     BMP:    Recent Labs     05/21/21 0439 05/22/21 0431 05/23/21 0442    142 138   K 4.8 5.3 4.9   * 109* 108*   CO2 27 25 24   BUN 22 21 20   CREATININE 0.91* 0.78 0.85   GLUCOSE 123* 124* 158*     Hepatic:   Recent Labs     05/21/21 0439 05/22/21 0431 05/23/21 0442   AST 18 20 19   ALT 21 22 23   BILITOT 0.49 0.51 0.36   ALKPHOS 136* 141* 127*     Troponin: No results for input(s): TROPONINI in the last 72 hours. BNP: No results for input(s): BNP in the last 72 hours. Lipids: No results for input(s): CHOL, HDL in the last 72 hours. Invalid input(s): LDLCALCU  INR:   Recent Labs     05/21/21 0439   INR 2.0       Objective:   Vitals: /73   Pulse 108   Temp 97.9 °F (36.6 °C) (Oral)   Resp 18   Ht 5' 2\" (1.575 m)   Wt 160 lb 7.9 oz (72.8 kg)   SpO2 96%   BMI 29.35 kg/m²   General appearance: Tired and pale looking female  HEENT: Head: Normal, normocephalic, atraumatic. Neck: supple, symmetrical, trachea midline  Lungs: diminished breath sounds bibasilar  Heart: irregularly irregular rhythm  Abdomen: Soft previous surgical scar noted bowel sounds hypoactive  Extremities: Homans sign is negative, no sign of DVT  Neurologic: Mental status: Alert, oriented, thought content appropriate    EKG: atrial fibrillation, rate 93. ECHO: reviewed.    Ejection fraction: 60% normal size LA NRA no valvular abnormality    Assessment / Acute Cardiac Problems:   A. fib with RVR heart rate control improved  Patient admitted with a severe back pain, recent kyphoplasty  Diastolic CHF  COPD, hypoxia on oxygen  Anemia    Patient Active Problem List:     Hypertension     Nephrolithiasis     Mixed hyperlipidemia     Osteoarthritis     Peritoneal mesothelioma ([address]     Ovarian cyst     Basal cell carcinoma of nose Bimalleolar ankle fracture     Epistaxis, recurrent     Cervical spondylosis     Trochanteric bursitis, right hip     Status post bilateral total hip replacement     Medication monitoring encounter     Chronic diastolic heart failure (HCC)     Chronic respiratory failure (HCC)     Giant cell arteritis (HCC)     Paroxysmal atrial fibrillation (HCC)     Urinary frequency     Vitamin D deficiency     Age-related osteoporosis with current pathological fracture     Atrial fibrillation with RVR (HCC)     Gastroesophageal reflux disease without esophagitis     Pulmonary hypertension (HCC)     Lumbosacral spondylosis without myelopathy     YOGESH (acute kidney injury) [address] [address]     Closed compression fracture of L1 lumbar vertebra, sequela     Acute midline low back pain with right-sided sciatica      Plan of Treatment:   Medication check  A. fib heart rate under control :continue current dose of carvedilol, amiodarone, Lipitor  Diastolic CHF: Compensated    Continue with IV Heparin. Called Dr. Anish Hood and discussed labs, vitals, assessment, and medication.      Electronically signed by CANDIS Gomes CNP on 5/23/2021 at 2:04 PM

## 2021-05-23 NOTE — CARE COORDINATION
ONGOING DISCHARGE PLAN:    Patient is alert and oriented x4. Spoke with patient regarding discharge plan and patient confirms that plan is still to discharge to home with no needs   Patient refused vns will continue to follow   Patient remains on a heparin drip   On amiodarone and carvedilol    Epidural injection on Monday ? Will continue to follow for additional discharge needs.     Electronically signed by Jessica Gale RN on 5/23/2021 at 8:46 AM

## 2021-05-24 NOTE — PROGRESS NOTES
Date:   5/24/2021  Patient name: Terrell Orozco  Date of admission:  5/19/2021  9:49 AM  MRN:   [id number]  YOB: 1935  PCP: Radha Baugh DO    Reason for Admission: Atrial fibrillation with RVR [facility]) [I48.91]    Cardiology follow-up: A. fib with RVR       Impression      A. fib with RVR  Diastolic CHF  Hypertension  COPD, hypoxia, on oxygen at home  Hyperlipidemia  Exertional shortness of breath, hypoxia  History of CAD, stent placement LAD 2002  Polymyalgia rheumatica  Moderate aortic regurgitation  Giant cell arteritis  chronic anemia  history of peritoneal/omental mesothelioma surgery done at [address] [place] [address]  Dry mouth, dry eyes  Multiple hip surgeries, bilateral  Chronic pain syndrome  L1 kyphoplasty 4/15/2021     2D echo 5/21/2021  Normal LV size and wall thickness ejection fraction more than 55% normal size LA and RA, RVSP 21, no significant pericardial effusion    ECG 5/19/2021  A. fib/a flutter heart rate 159, nonspecific ST-T changes     X-ray lumbar spine 5/19/2021  Unchanged appearance of the lumbar spine, status post vertebroplasty at L1, no new osseous abnormality  Moderate stool burden, previously administered oral contrast is present in the distal colon     Lab work 5/19/2021  Sodium 140, potassium 5.2, BUN 30, creatinine 1.42, GFR 35, alkaline phosphatase 133, bilirubin 0.60  Troponin XX 3, myoglobin 28  WBC 7.7, hemoglobin 10.9, platelets 660  Lab work 5/20/2021  Sodium 145, potassium 5.0, CO2 28, chloride 110, BUN 28, creatinine 1.19, hemoglobin 10.1  5/21/2021  Sodium 141, potassium 4.8, BUN 22, creatinine 0.91, glucose 123, albumin 2.9  Hemoglobin 10.3, WBC 5.3, platelets 955  Lab work 05/22/2021 WBC 6.9, hgb 10.2, platelets 183, Glucose 124, bun 21, creatinine 0.78, Calcium 8.8, sodium 142, potassium 5.3, chloride 109, alkaline phosphatase 141, alt 22, ast 20, albumin 2.8, GFR >60  Lab work 5/23/2021: Glucose 150, BUN 20, creatinine 0.85, calcium 8.8, sodium 138, potassium 4.9, alkaline phosphatase 127, ALT 23, AST 19, albumin 2.7, GFR> 60, WBC 5.5, hemoglobin 9.9, platelets 943. History of present illness  81years old female who recently had L1 kyphoplasty presented to the ER with a chief complaint of back pain.  She had a kyphoplasty about a month ago for compression fracture L1.  Her pain got very severe.  She has been also having dizziness and she had a near syncope about 10 days ago.  No chest pain no palpitation. On admission her ECG showed A. fib with RVR heart rate 159.  She was started on Cardizem infusion.  She developed hypotension and dose of Cardizem infusion was reduced to 5 mg/h.  Later on it was discontinued and started on carvedilol 6.25 mg twice a day.  Heart rate improved significantly.     Current evaluation 5/24/2021  Patient seen and examined.   She was resting on chair  She had epidural injection today  Still has back pain  ECG monitor A. fib, heart rate 100 to 110    Medications:   Scheduled Meds:   polyethylene glycol  17 g Oral Daily    sodium chloride flush  5-40 mL Intravenous 2 times per day    carvedilol  6.25 mg Oral BID    atorvastatin  40 mg Oral Daily    ferrous sulfate  300 mg Oral Daily    pantoprazole  40 mg Oral QAM AC    oxybutynin  15 mg Oral Daily    potassium chloride  20 mEq Oral BID    predniSONE  20 mg Oral Daily    rOPINIRole  0.5 mg Oral 4x Daily    sertraline  50 mg Oral Daily    amiodarone  100 mg Oral BID     Continuous Infusions:   heparin (PORCINE) Infusion Stopped (05/24/21 1048)    sodium chloride      morphine 30 mg (05/23/21 0503)     CBC:   Recent Labs     05/22/21  0431 05/23/21  0442 05/24/21  0604   WBC 6.9 5.5 6.3   HGB 10.2* 9.9* 9.3*    224 204     BMP:    Recent Labs     05/22/21  0431 05/23/21  0442 05/24/21  0604 05/24/21  1425    138 137  --    K 5.3 4.9 5.4* 5.3   * 108* 108*  --    CO2 25 24 25  --    BUN 21 20 19  --    CREATININE 0.78 0.85 0.76  --    GLUCOSE 124* 158* 144*  --      Hepatic:   Recent Labs     05/22/21  0431 05/23/21  0442 05/24/21  0604   AST 20 19 25   ALT 22 23 28   BILITOT 0.51 0.36 0.36   ALKPHOS 141* 127* 119*     Troponin: No results for input(s): TROPONINI in the last 72 hours. BNP: No results for input(s): BNP in the last 72 hours. Lipids: No results for input(s): CHOL, HDL in the last 72 hours. Invalid input(s): LDLCALCU  INR: No results for input(s): INR in the last 72 hours. Objective:   Vitals: /70   Pulse 116   Temp 98.3 °F (36.8 °C) (Oral)   Resp 16   Ht 5' 1.5\" (1.562 m)   Wt 143 lb (64.9 kg)   SpO2 99%   BMI 26.58 kg/m²   General appearance: alert and cooperative with exam  HEENT: Head: Normal, normocephalic, atraumatic. Neck: no JVD and supple, symmetrical, trachea midline  Lungs: diminished breath sounds bibasilar  Heart: irregularly irregular rhythm  Abdomen: Obese soft bowel sounds present  Extremities: Homans sign is negative, no sign of DVT  Neurologic: Mental status: Alert, oriented, thought content appropriate    EKG: atrial fibrillation, rate 100-110. ECHO: reviewed.    Ejection fraction: 55%, mild to moderate AR    Assessment / Acute Cardiac Problems:   A. fib with RVR heart rate control improved  Patient admitted with a severe back pain, recent kyphoplasty  Diastolic CHF  COPD, hypoxia on oxygen  Anemia        Patient Active Problem List:     Hypertension     Nephrolithiasis     Mixed hyperlipidemia     Osteoarthritis     Peritoneal mesothelioma ([facility] [address]     Ovarian cyst     Basal cell carcinoma of nose     Bimalleolar ankle fracture     Epistaxis, recurrent     Cervical spondylosis     Trochanteric bursitis, right hip     Status post bilateral total hip replacement     Medication monitoring encounter     Chronic diastolic heart failure (HCC)     Chronic respiratory failure (HCC)     Giant cell arteritis (HCC)     Paroxysmal atrial fibrillation (HCC)     Urinary frequency     Vitamin D deficiency Age-related osteoporosis with current pathological fracture     Atrial fibrillation with RVR (HCC)     Gastroesophageal reflux disease without esophagitis     Pulmonary hypertension (HCC)     Lumbosacral spondylosis without myelopathy     YOGESH (acute kidney injury) ([facility] [address]     Closed compression fracture of L1 lumbar vertebra, sequela     Acute midline low back pain with right-sided sciatica     Acute exacerbation of chronic low back pain      Plan of Treatment:   A. Fib: Continue current dose of carvedilol and amiodarone    Hypertension: Controlled  Hyperlipidemia: Continue with current dose of Lipitor    Electronically signed by Lydia Mason MD on 5/24/2021 at 4:52 PM

## 2021-05-24 NOTE — PROGRESS NOTES
Physical Therapy    DATE: 2021    NAME: Emmanuel Wren  MRN: [id number]   : 1935      Patient not seen this date for Physical Therapy due to:    Surgery/Porcedure at 1430 today. Pt. In with MD discussing procedure/prep. Transfer to [facility]. V's. For this procedure shortly. Presently on a heparin drip.     Electronically signed by Adams Chavez PTA on 2021 at 12:57 PM

## 2021-05-24 NOTE — PLAN OF CARE
Problem: Falls - Risk of:  Goal: Will remain free from falls  Description: Will remain free from falls  5/23/2021 2253 by Emilio Koyanagi, RN  Outcome: Ongoing  5/23/2021 1521 by Jane Padilla RN  Outcome: Ongoing     Problem: Falls - Risk of:  Goal: Absence of physical injury  Description: Absence of physical injury  5/23/2021 2253 by Emilio Koyanagi, RN  Outcome: Ongoing  5/23/2021 1521 by Jane Padilla RN  Outcome: Ongoing   Pt has had zero falls or injuries so far this shift.

## 2021-05-24 NOTE — PROGRESS NOTES
RN spoke to Dr. Geo Hanley, stop  the heparin drip (stopped at 10:48) and verify with Dr. Belkis Toscano.  Plan is for epidural at 2:30pm.     RN talked to Dr. Belkis Toscano, ok to stop the heparin drip for the epidural.

## 2021-05-24 NOTE — CARE COORDINATION
ONGOING DISCHARGE PLAN:    Patient is alert and oriented x4. Spoke with patient regarding discharge plan and patient confirms that plan is still to discharge to home with no needs   Patient will have a epidural at 1430 today   Heparin drip stopped   Pt is on xalerton PTA        Will continue to follow for additional discharge needs.     Electronically signed by John Paredes RN on 5/24/2021 at 12:38 PM

## 2021-05-24 NOTE — PROGRESS NOTES
Discharge criteria met. Patient alert and oriented x3  Post procedure dressing dry and intact. Sensory and motor function intact as per pre-procedure. Instructions and follow up reviewed with pt at patient at discharge. Patient discharged ambulatory @  1630          Patient discharged per wheelchair to hospital room accompanied by RN and PCT.

## 2021-05-24 NOTE — PROCEDURES
Pre-Procedure Note    Patient Name: Annabella Allen   YOB: 1935  Room/Bed: 2115/2115-01  Medical Record Number: [id number]  Date: 5/24/2021       Indication:    1. Acute exacerbation of chronic low back pain    2. Lumbar radiculopathy    3. Lumbar spondylosis    4. Age-related osteoporosis with current pathological fracture, sequela        Consent: On file. Vital Signs:   Vitals:    05/24/21 1603   BP: 120/70   Pulse: 116   Resp: 16   Temp:    SpO2: 99%       Past Medical History:   has a past medical history of Asbestos exposure, Atrial fibrillation ([address] Basal cell carcinoma of nose, CAD (coronary artery disease), CKD (chronic kidney disease) stage 2, GFR 60-89 ml/min, Hyperlipidemia, Hypertension, MDRO (multiple drug resistant organisms) resistance, Nephrolithiasis, Osteoarthritis, Ovarian cyst, Oxygen dependent, Palpitations, Peritoneal mesothelioma ([address] PONV (postoperative nausea and vomiting), Prolonged emergence from general anesthesia, and SOB (shortness of breath) on exertion. Past Surgical History:   has a past surgical history that includes Tubal ligation (1969); Hysterectomy (1981); Colonoscopy; Total hip arthroplasty (Right, 1990); Rectocele repair (1997); liver biopsy (2000); Upper gastrointestinal endoscopy; Photodynamic Therapy (April 2000 & Jan. 2002); Total hip arthroplasty (Left, 2003); Revision total hip arthroplasty (Right, 1998); Rotator cuff repair (2006); Ankle fracture surgery (Right, 2013); Cataract removal (Bilateral); Cardiac catheterization (2002); liver biopsy; Coronary angioplasty with stent (April 2002); [place] tooth extraction; Appendectomy (1964); Cholecystectomy (2005); Skin cancer excision (Bilateral, 2006); Skin cancer excision (Left, Oct. 2014); nasal endoscopy (Bilateral, 11/25/2014); BIOPSY / LIGATION TEMPORAL ARTERY (Bilateral, 2/12/2021); Breast biopsy (Right); and Spine surgery (N/A, 4/15/2021).     Pre-Sedation Documentation and Exam:   Vital signs have been reviewed (see flow sheet for vitals). Mallampati Airway Assessment:  normal    ASA Classification:  Class 3 - A patient with severe systemic disease that limits activity but is not incapacitating    Sedation/ Anesthesia Plan:   intravenous sedation   as needed. Medications Planned:   midazolam (Versed) / Fentanyl  Intravenously  as needed. Patient is an appropriate candidate for plan of sedation: yes  Patient's History and Physical examination was reviewed and there is no change. Electronically signed by Nancy Nguyen MD on 5/24/2021 at 7:17 PM        Preoperative Diagnosis:    1. Acute exacerbation of chronic low back pain    2. Lumbar radiculopathy    3. Lumbar spondylosis    4. Age-related osteoporosis with current pathological fracture, sequela        Postoperative Diagnosis:    1. Acute exacerbation of chronic low back pain    2. Lumbar radiculopathy    3. Lumbar spondylosis    4. Age-related osteoporosis with current pathological fracture, sequela        Procedure Performed:  Lumbar epidural steroid injection under fluoroscopy guidance without IV sedation. Indication for the Procedure: The patient failed conservative management  for pain in the low back radiating to lower extremities. As the patient is not responding to conservative management and it is interfering with activities of daily living we decided to proceed with lumbar epidural steroid injection.  The procedure and risks were discussed with the patient and an informed consent was obtained  Current Pain Assessment  Pain Assessment  Pain Assessment: 0-10  Pain Level: 3  Patient's Stated Pain Goal: 5  Pain Type: Chronic pain  Pain Location: Back, Buttocks  Pain Orientation: Right, Left  Pain Radiating Towards: none  Pain Descriptors: Aching, Constant, Jabbing, Nagging, Sharp  Pain Frequency: Continuous  Pain Onset: On-going  Clinical Progression: Gradually worsening  Functional Pain Assessment: Prevents or interferes some active activities and ADLs  Non-Pharmaceutical Pain Intervention(s): Emotional support  Response to Pain Intervention: Asleep with RR greater than 10  Multiple Pain Sites: No  RASS Score: Drowsy - Patient awakens with sustained eye opening and eye contact  POSS Score (Patient Ctrl Analgesia): 1     Procedure:       Patient's vital signs including BP, EKG and SaO2 were monitored by the RN and they remained stable during the procedure. A meaningful communication was kept up with the patient throughout  the procedure. The patient is placed in prone position. Skin over the back was prepped and draped in sterile manner. Then using fluoroscopy the L3, L4 interspace was observed and the skin and deep tissues in the right paramedian area were infiltrated with 2 ml of 1% lidocaine. The #20-gauge, 3-1/2 inch Tuohy needle was inserted through the skin wheal and the epidural space was identified using loss of resistance technique with normal saline. This was confirmed with AP and lateral views using fluoroscopy after injecting about 2 ml of Omnipaque-180 and observing the spread of the contrast in the epidural space. Then after negative aspiration a total of 80 mg of triamcinolone with 8 ml of normal saline was injected into the epidural space. The needle is removed and a Band-Aid was placed over the needle insertion site. Patient's vital signs remained stable and the patient tolerated the procedure well. The patient was discharged home in stable condition and will be followed in the pain clinic in the next few weeks for further planning.     Electronically signed by Santino Almaraz MD on 5/24/2021 at 7:17 PM

## 2021-05-25 NOTE — CONSULTS
INITIAL CONSULTATION     HISTORY OF PRESENT ILLNESS: Dionisio Stallings is a 81 y.o. female admitted to the hospital on 5/19/2021 for back pain. She was found to have anemia. She reports no gross bleeding. She is on blood thinners for atrial fibrillation. No weight loss. She reports colonoscopy more than 10 years ago. Possibly was normal. No prior EGD. No family history of GI pathology. PAST MEDICAL HISTORY:     Past Medical History:   Diagnosis Date    Asbestos exposure     Atrial fibrillation ([address]     takes xarelto    Basal cell carcinoma of nose     CAD (coronary artery disease)     CKD (chronic kidney disease) stage 2, GFR 60-89 ml/min     Hyperlipidemia     Hypertension     MDRO (multiple drug resistant organisms) resistance 2/19/2014    E.  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    treated at [facility] PONV (postoperative nausea and vomiting)     Prolonged emergence from general anesthesia     SOB (shortness of breath) on exertion         Past Surgical History:   Procedure Laterality Date    ANKLE FRACTURE SURGERY Right 2013    APPENDECTOMY  1964    BIOPSY / LIGATION TEMPORAL ARTERY Bilateral 2/12/2021    TEMPORAL ARTERY BIOPSY performed by Juanjose Kang MD at [address] Right    [address]  2002    CATARACT REMOVAL Bilateral     CHOLECYSTECTOMY  2005    COLONOSCOPY     [address] April 2002   [facility]    LIVER BIOPSY  2000    LIVER BIOPSY      NASAL ENDOSCOPY Bilateral 11/25/2014    lt nasal cautery with packing    PHOTODYNAMIC THERAPY  April 2000 & Jan. 2002    for peritoneal Mesothelioma    [address]    REVISION TOTAL HIP ARTHROPLASTY Right 1998    ROTATOR CUFF REPAIR  2006    SKIN CANCER EXCISION Bilateral 2006    ankle    SKIN CANCER EXCISION Left Oct. 2014    shoulder    SPINE SURGERY N/A 4/15/2021    KYPHOPLASTY L1 performed by Avelina Branch MD at [address] Left 2003    TUBAL LIGATION  1969    UPPER GASTROINTESTINAL ENDOSCOPY      WISDOM TOOTH EXTRACTION            CURRENT MEDICATIONS:       Current Facility-Administered Medications:     oxyCODONE-acetaminophen (PERCOCET) 5-325 MG per tablet 1 tablet, 1 tablet, Oral, Q4H PRN, Avelina Branch MD    warfarin (COUMADIN) daily dosing (placeholder), , Other, RX Placeholder, Martin Palacio MD    baclofen (LIORESAL) tablet 5 mg, 5 mg, Oral, TID PRN, Keeley Girard MD, 5 mg at 05/24/21 1817    polyethylene glycol (GLYCOLAX) packet 17 g, 17 g, Oral, Daily, Corie Hood MD, 17 g at 05/25/21 0539    perflutren lipid microspheres (DEFINITY) injection 2.2 mg, 2 mL, Intravenous, ONCE PRN, Martin Palacio MD    sodium chloride flush 0.9 % injection 5-40 mL, 5-40 mL, Intravenous, 2 times per day, Paty Sutherland MD, 10 mL at 05/25/21 0941    sodium chloride flush 0.9 % injection 5-40 mL, 5-40 mL, Intravenous, PRN, Paty Sutherland MD    0.9 % sodium chloride infusion, 25 mL, Intravenous, PRN, Paty Sutherland MD    acetaminophen (TYLENOL) tablet 650 mg, 650 mg, Oral, Q4H PRN, Paty Sutherland MD, 650 mg at 05/19/21 1753    carvedilol (COREG) tablet 6.25 mg, 6.25 mg, Oral, BID, Martin Palacio MD, 6.25 mg at 05/25/21 0938    naloxone (NARCAN) injection 0.4 mg, 0.4 mg, Intravenous, PRN, Avelina Branch MD    atorvastatin (LIPITOR) tablet 40 mg, 40 mg, Oral, Daily, Corie Hood MD, 40 mg at 05/25/21 5861    ferrous sulfate 300 (60 Fe) MG/5ML syrup 300 mg, 300 mg, Oral, Daily, Corie Hood MD, 300 mg at 05/25/21 0940    pantoprazole (PROTONIX) tablet 40 mg, 40 mg, Oral, ROSALBA AC, Corie Hood MD, 40 mg at 05/25/21 0703    oxybutynin (DITROPAN-XL) extended release tablet 15 mg, 15 mg, Oral, Daily, Catrachito Rhodes MD, 15 mg at 05/25/21 1723    polyethylene glycol (GLYCOLAX) packet 17 g, 17 g, Oral, Daily PRN, Catrachito Rhodes MD    potassium chloride (KLOR-CON M) extended release tablet 20 mEq, 20 mEq, Oral, BID, Catrachito Rhodes MD, 20 mEq at 05/25/21 4468    predniSONE (DELTASONE) tablet 20 mg, 20 mg, Oral, Daily, Catrachito Rhodes MD, 20 mg at 05/25/21 1148    rOPINIRole (REQUIP) tablet 0.5 mg, 0.5 mg, Oral, 4x Daily, Catrachito Rhodes MD, 0.5 mg at 05/25/21 1256    sertraline (ZOLOFT) tablet 50 mg, 50 mg, Oral, Daily, Catrachito Rhodes MD, 50 mg at 05/25/21 2665    amiodarone (CORDARONE) tablet 100 mg, 100 mg, Oral, BID, Catrachito Rhodes MD, 100 mg at 05/25/21 5633       ALLERGIES:   Allergies   Allergen Reactions    Adhesive Tape Rash    Meperidine Nausea Only    Demerol Nausea Only    Ultram [Tramadol Hcl]      Hot flashes    Zocor [Simvastatin]      Muscle aches    Codeine      \"out of it\"    Fentanyl Rash          FAMILY HISTORY: The patient's family history was reviewed.         SOCIAL HISTORY:   Social History     Socioeconomic History    Marital status:      Spouse name: Not on file    Number of children: Not on file    Years of education: Not on file    Highest education level: Not on file   Occupational History    Not on file   Tobacco Use    Smoking status: Never Smoker    Smokeless tobacco: Never Used   Vaping Use    Vaping Use: Never used   Substance and Sexual Activity    Alcohol use: Yes     Comment: social    Drug use: No    Sexual activity: Not Currently   Other Topics Concern    Not on file   Social History Narrative    Not on file     Social Determinants of Health     Financial Resource Strain:     Difficulty of Paying Living Expenses:    Food Insecurity:     Worried About Running Out of Food in the Last Year:     [address] in the Last Year:    Transportation Needs:     Lack of Transportation (Medical):  Lack of Transportation (Non-Medical):    Physical Activity:     Days of Exercise per Week:     Minutes of Exercise per Session:    Stress:     Feeling of Stress :    Social Connections:     Frequency of Communication with Friends and Family:     Frequency of Social Gatherings with Friends and Family:     Attends Buddhism Services:     Active Member of Clubs or Organizations:     Attends Club or Organization Meetings:     Marital Status:    Intimate Partner Violence:     Fear of Current or Ex-Partner:     Emotionally Abused:     Physically Abused:     Sexually Abused:          REVIEW OF SYSTEMS: A 14-point review of systems was obtained and pertinent positives andnegatives were enumerated above in the history of present illness. All other reviewed systems / symptoms were negative. Review of Systems      PHYSICAL EXAMINATION: Vital signs reviewed per the nursing documentation. /67   Pulse 95   Temp 97.9 °F (36.6 °C) (Oral)   Resp 16   Ht 5' 1.5\" (1.562 m)   Wt 154 lb 12.2 oz (70.2 kg)   SpO2 98%   BMI 28.77 kg/m²    [unfilled]   Body mass index is 28.77 kg/m². General:  A O x 3 in NAD   Psych: . Normal affect. Mentation normal   HEENT: PERRLA. Clear conjunctivae and sclerae. Moist oral mucosae, no lesions orulcers. The neck is supple, without lymphadenopathy or jugular venous distension. No masses. Normal thyroid. Cardiovascular: S1 S2 RRR no rubs or murmurs. Pulmonary: clear BL. No accessory muscle usage. Abd Exam: Soft, NT ND, no hepato or spleno megaly, +BS, no ascites. No groin masses or lymphadenopathy. Extremities: No edema. Skin: Warm skin. No skin rash. No spider nevi palmar erythema naildystrophy. Joint: No joint swelling or deformity. Neurological: intact sensory. DTR+.  No asterixis     LABORATORY DATA: Reviewed   Lab Results   Component Value Date    WBC 6.8 05/25/2021    HGB 9.7 (L) 05/25/2021    HCT 29.8 (L) 05/25/2021    MCV 91.4 05/25/2021     05/25/2021     05/25/2021    K 5.2 05/25/2021     (H) 05/25/2021    CO2 23 05/25/2021    BUN 22 05/25/2021    CREATININE 0.79 05/25/2021    LABPROT 7.3 03/01/2013    LABALBU 2.7 (L) 05/25/2021    BILITOT 0.44 05/25/2021    ALKPHOS 115 (H) 05/25/2021    AST 20 05/25/2021    ALT 28 05/25/2021    INR 1.2 05/25/2021      Lab Results   Component Value Date    RBC 3.26 (L) 05/25/2021    HGB 9.7 (L) 05/25/2021    MCV 91.4 05/25/2021    MCH 29.6 05/25/2021    MCHC 32.4 05/25/2021    RDW 17.6 (H) 05/25/2021    MPV 8.1 05/25/2021    BASOPCT 0 05/25/2021    LYMPHSABS 0.50 (L) 05/25/2021    MONOSABS 0.20 05/25/2021    NEUTROABS 6.10 05/25/2021    EOSABS 0.00 05/25/2021    BASOSABS 0.00 05/25/2021          DIAGNOSTIC TESTING:   ECHO Complete 2D W Doppler W Color    Result Date: 5/21/2021  [address] Transthoracic Echocardiography Report (TTE)  Patient Name [facility]   Date of Study                 05/21/2021               MARQUEZ A   Date of      1935  Gender                        Female  Birth   Age          81 year(s)  Race                             Room Number  2115        Height:                       62 inch, 157.48 cm   Corporate ID [id number]    Weight:                       150 pounds, 68 kg  #   Patient Acct [de-identified]   BSA:           1.69 m^2       BMI:      27.44  #                                                                kg/m^2   MR #         [id number]      Sonographer                   MobraedenerlinNataliia   Accession #  [id number]  Interpreting Physician        Cami Cole   Fellow                   Referring Nurse Practitioner   Interpreting             Referring Physician           Eleuterio Galo, *  Fellow  Type of Study   TTE procedure:2D Echocardiogram, M-Mode, Doppler, Color Doppler.   Procedure Date Date: 05/21/2021 Start: 11:56 AM Study Location: [facility] Technical Quality: Fair visualization Indications:Chest pain. History / Tech. Comments: AFib, Atrial ablation, CAD, CKD, HLD, HTN, Stent Patient Status: Inpatient Height: 62 inches Weight: 150 pounds BSA: 1.69 m^2 BMI: 27.44 kg/m^2 Rhythm: Sinus tachycardia HR: 103 bpm BP: 126/78 mmHg CONCLUSIONS Summary Left ventricle is normal in size and wall thickness. Global left ventricular systolic function is normal. Estimated LV EF >55 %. No obvious wall motion abnormality seen. Both atria are normal in size. Normal right ventricular size and function. Mild to moderate aortic insufficiency. Mild mitral regurgitation. Mild tricuspid regurgitation. Mild pulmonic insufficiency. No pulmonary hypertension. Estimated right ventricular systolic pressure is 83KCCR. No significant pericardial effusion is seen. Normal aortic root dimension. Signature ----------------------------------------------------------------------------  Electronically signed by Nataliia Barragan(Sonographer) on 05/21/2021 12:48  PM ---------------------------------------------------------------------------- ----------------------------------------------------------------------------  Electronically signed by Jesse Garza(Interpreting physician) on  05/21/2021 12:50 PM ---------------------------------------------------------------------------- FINDINGS Left Atrium Left atrium is normal in size. Left Ventricle Left ventricle is normal in size and wall thickness. Global left ventricular systolic function is normal. Estimated LV EF >55 %. No obvious wall motion abnormality seen. Right Atrium Right atrium is normal in size. Right Ventricle Normal right ventricular size and function. Mitral Valve Moderate calcification of the mitral annulus. Mild mitral regurgitation. Aortic Valve No obvious valvular abnormality seen. Mild to moderate aortic insufficiency. Tricuspid Valve No obvious valvular abnormality. Mild tricuspid regurgitation. No pulmonary hypertension.  Estimated right ventricular systolic pressure is 21mmHg. Pulmonic Valve No obvious valvular abnormality seen. Mild pulmonic insufficiency. Pericardial Effusion No significant pericardial effusion is seen. Pleural Effusion No pleural effusion seen. Miscellaneous Normal aortic root dimension. M-mode / 2D Measurements & Calculations:   LVIDd:3.58 cm(3.7 - 5.6 cm)      Diastolic LYTSQS:36.7 ml  HCEJT:8.37 cm(2.2 - 4.0 cm)      Systolic JWPKKW:52.1 ml  ZZJA:5.38 cm(0.6 - 1.1 cm)       Aortic Root:2.6 cm(2.0 - 3.7 cm)  LVPWd:0.81 cm(0.6 - 1.1 cm)      LA Dimension: 3.7 cm(1.9 - 4.0 cm)  Fractional Shortenin.39 %    LA volume/Index: 43.6 ml /26m^2  Calculated LVEF (%): 77.78 %     LVOT:2 cm   Mitral:                                Aortic   Peak E-Wave: 0.96 m/s                  Peak Velocity: 1.19 m/s                                         Peak Gradient: 5.66 mmHg  Peak Gradient: 3.66 mmHg  Deceleration Time: 141 msec   Tricuspid:                             Pulmonic:   Estimated RVSP: 21 mmHg  Peak TR Velocity: 2.15 m/s  Peak TR Gradient: 18.49 mmHg  Estimated RA Pressure: 3 mmHg                                         Estimated PASP: 21.49 mmHg  Lateral Wall E' velocity:0.05 m/s    XR THORACIC SPINE (3 VIEWS)    Result Date: 2021  EXAMINATION: THREE XRAY VIEWS OF THE THORACIC SPINE 2021 9:31 am COMPARISON: None. HISTORY: ORDERING SYSTEM PROVIDED HISTORY: Pain TECHNOLOGIST PROVIDED HISTORY: Pain Reason for Exam: hx of recent back surgery, pt still having back pain Acuity: Unknown Type of Exam: Unknown FINDINGS: The regional skeleton was osteopenic. Post vertebroplasty changes involve L1. No thoracic compression, fannie or retro listhesis were noted. Decrease in disc height was noted at all thoracic discs. Osteopenia without thoracic compression, fannie or retrolisthesis. Decrease in disc height at all thoracic disc levels. Post vertebroplasty changes involve L1. No change has occurred from the lumbar spine series dated 2021. stenosis. Disc osteophyte complex encroaches upon and causes moderate bilateral neural foraminal stenosis. L2-L3: Disc desiccation is noted with mild disc height loss in association with posterior disc osteophyte complex which indents the ventral thecal sac narrowing the midline AP thecal sac diameter to 8 mm consistent with moderate central canal stenosis. Disc osteophyte complex encroaches upon and causes mild bilateral neural foraminal stenosis. L3-L4: Severe disc height loss is present at this level in association with posterior disc osteophyte complex which indents the ventral thecal sac narrowing the midline AP thecal sac diameter to 8 mm consistent with moderate central canal stenosis. Disc osteophyte complex encroaches upon and causes moderate bilateral neural foraminal stenosis. Mild bilateral facet hypertrophic degenerative changes are present. L4-L5: Severe disc height loss is noted at this level in association with posterior disc osteophyte complex without associated central canal stenosis. Disc osteophyte complex encroaches upon and causes moderate bilateral neural foraminal stenosis. Mild bilateral facet hypertrophic degenerative changes are present. L5-S1: No significant disc height loss is present at this level. Minimal posterior disc bulge is seen without associated central canal compromise. Neural foramina are patent. Mild bilateral facet hypertrophic degenerative changes are seen     . 1. L1 vertebroplasty in association with remote vertebral body compression fracture with approximately 50% height loss. 2. Associated retropulsion of posterior L1 cortex with associated borderline central canal stenosis. 3. L1/L2 mild, L2/L3 moderate, L3/L4 moderate central canal stenosis secondary to encroachment by posterior disc osteophyte complex.  4. L1/L2 moderate bilateral, L2/L3 mild bilateral, L3/L4 moderate bilateral, L4/L5 moderate bilateral neural foraminal stenosis secondary to encroachment by disc osteophyte complex. 5. L3/L4-L5/S1 mild bilateral facet degenerative arthrosis. FLUORO FOR SURGICAL PROCEDURES    Result Date: 5/24/2021  Radiology exam is complete. No Radiologist dictation. Please follow up with ordering provider. CT CHEST ABDOMEN PELVIS WO CONTRAST    Result Date: 5/14/2021  EXAMINATION: CT OF THE CHEST, ABDOMEN, AND PELVIS WITHOUT CONTRAST 5/14/2021 11:32 am TECHNIQUE: CT of the chest, abdomen and pelvis was performed without the administration of intravenous contrast. Multiplanar reformatted images are provided for review. Dose modulation, iterative reconstruction, and/or weight based adjustment of the mA/kV was utilized to reduce the radiation dose to as low as reasonably achievable. COMPARISON: 04/24/2020, 03/18/2019 HISTORY: ORDERING SYSTEM PROVIDED HISTORY: Mesothelioma of peritoneum [facility]) TECHNOLOGIST PROVIDED HISTORY: Reason for Exam: patient states this is a follow up for cancer Additional signs and symptoms: patient states this is a follow up for cancer FINDINGS: Chest: Mediastinum: The cardiac mediastinal contours appear unchanged. Enlargement of the main pulmonary artery measuring 48 mm is noted, suggestive of pulmonary hypertension. No pericardial effusion. No lymphadenopathy. Lungs/pleura: No acute airspace disease identified. No effusion. Minimal subsegmental atelectasis in the lung bases. The central airway is patent. Soft Tissues/Bones: No acute findings. Abdomen/Pelvis: Organs: Evaluation of the abdominal and pelvic viscera is limited in the absence of contrast.   The liver, biliary tree, pancreas, spleen, adrenals and kidneys appear stable. Nodularity of the adrenal glands again demonstrated, a chronic finding. Bilateral renal cysts are noted, consistent with a combination of simple and hemorrhagic/proteinaceous cysts.   An exophytic lobulated complex cyst arising from the lower pole the left kidney is demonstrated, better characterized as a cyst on prior contrast imaging. GI/Bowel: Diverticulosis. Wall thickening of the sigmoid colon is noted. Moderate distal colonic stool burden. No evidence for bowel obstruction. There is adjacent complex fluid or soft tissue thickening in the pelvis near this area, a stable chronic finding. Pelvis: Streak artifact from bilateral hip arthroplasties noted. The bladder appears decompressed. Peritoneum/Retroperitoneum: Mild induration of the peritoneal fat in the left upper quadrant again demonstrated, a stable finding. Presumed pleural thickening versus complex fluid in the pelvis is also a stable finding. No new finding identified in the peritoneal cavity. No ascites. Bones/Soft Tissues: Bilateral hip arthroplasties partially visualized. Treated compression fracture at L1 is noted. Multilevel degenerative disc disease and lower lumbar facet arthropathy. 1.  Short-segment sigmoid colon thickening with diverticula, suggestive of colitis. 2.  Otherwise stable exam with mild residual peritoneal thickening noted in the left upper quadrant and pelvis. Additional stable chronic and benign findings, as above. IMPRESSION: Ms. Wu Gupta is a 81 y.o. female with iron deficiency anemia. Hold blood thinners. Monitor H&H closely. Transfuse as needed keep hemoglobin above 7. Plan for EGD and colonoscopy likely on Thursday. Thank you for allowing me to participate in the care of Ms. Butler. For any further questions please do not hesitate to contact me.        MD Ashlee Collins

## 2021-05-25 NOTE — PROGRESS NOTES
RN spoke with Dr. Beatriz Lennox about holding coumadin for egd/colonoscopy Thursday. At this time received okay to hold coumadin due to bleeding this morning.

## 2021-05-25 NOTE — PLAN OF CARE
Problem: Falls - Risk of:  Goal: Will remain free from falls  Description: Will remain free from falls  5/25/2021 0426 by Alexandria Sibley RN  Outcome: Ongoing     Problem: Pain:  Goal: Pain level will decrease  Description: Pain level will decrease  5/25/2021 0426 by Alexandria Sibley RN  Outcome: Ongoing     Problem: Nutrition  Goal: Optimal nutrition therapy  5/25/2021 0426 by Alexandria Sibley RN  Outcome: Ongoing

## 2021-05-25 NOTE — PROGRESS NOTES
Physical Therapy  Re-assessment and [address]     Date: 21  Patient Name: Ck Moss       Room:   MRN: [id number]  Account: [de-identified]   : 1935  (81 y.o.) Gender: female   Patient Height Height: 5' 1.5\" (156.2 cm)  Patient Weight 154 lb 12.2 oz (70.2 kg)      21 1027   Short term goals   Time Frame for Short term goals 5-7 treatments/ week- REVISED GOALS   Short term goal 1 pt to tolerate 1/2 hour of therapuetic exercise and activity   Short term goal 2 pt to demonstrate good technique for log rolling, balance and LE ROM and strengthening exercises as tolerated   Short term goal 3 pt to demonstrate improved pain control to 5/10 or better to tolerate increased activity   Short term goal 4 pt to demonstrate improved bed mobility for rolling in bed using rail w/ supervision for position change   Short term goal 5 pt to  demonstronstrate transfers supine <> sit w/ min x 1   Short term goal 6 pt to demonstronstrate fair + or better sitting balance while dangling at the EOB x 10 minutes w/ CGA x 1   Short term goal 7 pt to  demonstronstrate sit <> stand and bed <> chair transfers using least assistive device w/ CGA x 1 and O2 as directed   Short term goal 8 pt to  demonstronstrate gait 50-80' using least assistive device w/ CGA  x 1 and O2 as directed   Short term goal 9 pt to advance to and demonstronstrate ability to ascend/ descend 2 steps w/ rails and using least assistive device w/ min x 1 and O2 as directed   Pt had lumbar epidural steroid injection 2021 by Dr. Abimbola FOWLER per nurse Tanner Montaño to see for reassessment. Pt sitting up in chair when therapists entered the room. Pt reports pain level 2/10 currently. Pt reports that she just walked back to the chair w/ nursing from the bathroom and became dizzy. Pt willing to demonstrate sit <> stand from chair but does not want to walk in the hallway.   Pt stood from chair w/ CGA x 1

## 2021-05-25 NOTE — CARE COORDINATION
ONGOING DISCHARGE PLAN:    Patient is alert and oriented x4. Spoke with patient regarding discharge plan and patient confirms that plan is still to discharge to home with no needs   Patient will need a egd/colonoscopy on Thursday   Low dose Lipitor        Will continue to follow for additional discharge needs.     Electronically signed by Vivian Echeverria RN on 5/25/2021 at 3:29 PM

## 2021-05-25 NOTE — PROGRESS NOTES
Current Meds: warfarin (COUMADIN) tablet 4 mg, Once  warfarin (COUMADIN) daily dosing (placeholder), RX Placeholder  baclofen (LIORESAL) tablet 5 mg, TID PRN  polyethylene glycol (GLYCOLAX) packet 17 g, Daily  heparin (porcine) injection 4,000 Units, PRN  heparin (porcine) injection 2,000 Units, PRN  heparin 25,000 units in dextrose 5% 250 mL (premix) infusion, Continuous  perflutren lipid microspheres (DEFINITY) injection 2.2 mg, ONCE PRN  sodium chloride flush 0.9 % injection 5-40 mL, 2 times per day  sodium chloride flush 0.9 % injection 5-40 mL, PRN  0.9 % sodium chloride infusion, PRN  acetaminophen (TYLENOL) tablet 650 mg, Q4H PRN  carvedilol (COREG) tablet 6.25 mg, BID  naloxone (NARCAN) injection 0.4 mg, PRN  morphine PCA 1 mg/mL, Continuous  atorvastatin (LIPITOR) tablet 40 mg, Daily  ferrous sulfate 300 (60 Fe) MG/5ML syrup 300 mg, Daily  pantoprazole (PROTONIX) tablet 40 mg, QAM AC  oxybutynin (DITROPAN-XL) extended release tablet 15 mg, Daily  polyethylene glycol (GLYCOLAX) packet 17 g, Daily PRN  potassium chloride (KLOR-CON M) extended release tablet 20 mEq, BID  predniSONE (DELTASONE) tablet 20 mg, Daily  rOPINIRole (REQUIP) tablet 0.5 mg, 4x Daily  sertraline (ZOLOFT) tablet 50 mg, Daily  amiodarone (CORDARONE) tablet 100 mg, BID        Data:     Code Status:  Full Code    Family History   Problem Relation Age of Onset    Heart Failure Brother     Heart Attack Brother         triple bypass    Prostate Cancer Brother     Coronary Art Dis Mother     Breast Cancer Mother         dx'd in late 46s   Esther Kale Emphysema Father     Colon Cancer Neg Hx     Diabetes Neg Hx     Eclampsia Neg Hx     Hypertension Neg Hx     Ovarian Cancer Neg Hx      Labor Neg Hx     Spont Abortions Neg Hx     Stroke Neg Hx        Social History     Socioeconomic History    Marital status:      Spouse name: Not on file    Number of children: Not on file    Years of education: Not on file    Highest education level: Not on file   Occupational History    Not on file   Tobacco Use    Smoking status: Never Smoker    Smokeless tobacco: Never Used   Vaping Use    Vaping Use: Never used   Substance and Sexual Activity    Alcohol use: Yes     Comment: social    Drug use: No    Sexual activity: Not Currently   Other Topics Concern    Not on file   Social History Narrative    Not on file     Social Determinants of Health     Financial Resource Strain:     Difficulty of Paying Living Expenses:    Food Insecurity:     Worried About Running Out of Food in the Last Year:     Ran Out of Food in the Last Year:    Transportation Needs:     Lack of Transportation (Medical):  Lack of Transportation (Non-Medical):    Physical Activity:     Days of Exercise per Week:     Minutes of Exercise per Session:    Stress:     Feeling of Stress :    Social Connections:     Frequency of Communication with Friends and Family:     Frequency of Social Gatherings with Friends and Family:     Attends Church Services:     Active Member of Clubs or Organizations:     Attends Club or Organization Meetings:     Marital Status:    Intimate Partner Violence:     Fear of Current or Ex-Partner:     Emotionally Abused:     Physically Abused:     Sexually Abused:        I/O (24Hr): Intake/Output Summary (Last 24 hours) at 5/25/2021 1403  Last data filed at 5/25/2021 0831  Gross per 24 hour   Intake 358 ml   Output --   Net 358 ml     Radiology:  XR THORACIC SPINE (3 VIEWS)    Result Date: 5/19/2021  Osteopenia without thoracic compression, fannie or retrolisthesis. Decrease in disc height at all thoracic disc levels. Post vertebroplasty changes involve L1. No change has occurred from the lumbar spine series dated 04/19/2021. XR LUMBAR SPINE (2-3 VIEWS)    Result Date: 5/19/2021  1. Unchanged appearance of the lumbar spine. Status post vertebroplasty at L1. No new osseous abnormality identified.  2.  Moderate 11.0 k/uL    RBC 3.26 (L) 4.0 - 5.2 m/uL    Hemoglobin 9.7 (L) 12.0 - 16.0 g/dL    Hematocrit 29.8 (L) 36 - 46 %    MCV 91.4 80 - 100 fL    MCH 29.6 26 - 34 pg    MCHC 32.4 31 - 37 g/dL    RDW 17.6 (H) 11.5 - 14.9 %    Platelets 648 609 - 850 k/uL    MPV 8.1 6.0 - 12.0 fL    NRBC Automated NOT REPORTED per 100 WBC    Differential Type NOT REPORTED     Seg Neutrophils 89 (H) 36 - 66 %    Lymphocytes 8 (L) 24 - 44 %    Monocytes 3 1 - 7 %    Eosinophils % 0 0 - 4 %    Basophils 0 0 - 2 %    Immature Granulocytes NOT REPORTED 0 %    Segs Absolute 6.10 1.3 - 9.1 k/uL    Absolute Lymph # 0.50 (L) 1.0 - 4.8 k/uL    Absolute Mono # 0.20 0.1 - 1.3 k/uL    Absolute Eos # 0.00 0.0 - 0.4 k/uL    Basophils Absolute 0.00 0.0 - 0.2 k/uL    Absolute Immature Granulocyte NOT REPORTED 0.00 - 0.30 k/uL    WBC Morphology NOT REPORTED     RBC Morphology NOT REPORTED     Platelet Estimate NOT REPORTED    Comprehensive Metabolic Panel w/ Reflex to MG    Collection Time: 05/25/21  4:58 AM   Result Value Ref Range    Glucose 132 (H) 70 - 99 mg/dL    BUN 22 8 - 23 mg/dL    CREATININE 0.79 0.50 - 0.90 mg/dL    Bun/Cre Ratio NOT REPORTED 9 - 20    Calcium 8.6 8.6 - 10.4 mg/dL    Sodium 137 135 - 144 mmol/L    Potassium 5.2 3.7 - 5.3 mmol/L    Chloride 108 (H) 98 - 107 mmol/L    CO2 23 20 - 31 mmol/L    Anion Gap 6 (L) 9 - 17 mmol/L    Alkaline Phosphatase 115 (H) 35 - 104 U/L    ALT 28 5 - 33 U/L    AST 20 <32 U/L    Total Bilirubin 0.44 0.3 - 1.2 mg/dL    Total Protein 4.8 (L) 6.4 - 8.3 g/dL    Albumin 2.7 (L) 3.5 - 5.2 g/dL    Albumin/Globulin Ratio NOT REPORTED 1.0 - 2.5    GFR Non-African American >60 >60 mL/min    GFR African American >60 >60 mL/min    GFR Comment          GFR Staging NOT REPORTED    Anti-Xa Unfract Heparin    Collection Time: 05/25/21  9:40 AM   Result Value Ref Range    Anti-XA Unfrac Heparin 0.74 (HH) 0.30 - 0.70 IU/L   Protime-INR    Collection Time: 05/25/21  9:40 AM   Result Value Ref Range    Protime 15.2 (H) 11.8 - 14.6 sec    INR 1.2        Physical Examination:        Vitals:  /67   Pulse 95   Temp 97.9 °F (36.6 °C) (Oral)   Resp 16   Ht 5' 1.5\" (1.562 m)   Wt 154 lb 12.2 oz (70.2 kg)   SpO2 98%   BMI 28.77 kg/m²   Temp (24hrs), Av.2 °F (36.8 °C), Min:97.3 °F (36.3 °C), Max:98.6 °F (37 °C)    No results for input(s): POCGLU in the last 72 hours. Physical Exam  Vitals reviewed. HENT:      Head: Normocephalic. Right Ear: External ear normal.      Left Ear: External ear normal.      Nose: Nose normal.      Mouth/Throat:      Mouth: Mucous membranes are moist.      Pharynx: Oropharynx is clear. Eyes:      Conjunctiva/sclera: Conjunctivae normal.   Cardiovascular:      Rate and Rhythm: Normal rate and regular rhythm. Pulses: Normal pulses. Heart sounds: Normal heart sounds. Pulmonary:      Effort: Pulmonary effort is normal.      Breath sounds: Normal breath sounds. Abdominal:      General: Bowel sounds are normal.      Palpations: Abdomen is soft. Tenderness: There is no abdominal tenderness. There is no right CVA tenderness or left CVA tenderness. Musculoskeletal:         General: No deformity. Cervical back: Normal range of motion and neck supple. Right lower leg: No edema. Left lower leg: No edema. Comments: Lumbar and thoracic spine nontender   Skin:     General: Skin is warm. Capillary Refill: Capillary refill takes less than 2 seconds. Coloration: Skin is not jaundiced. Neurological:      General: No focal deficit present. Mental Status: She is alert. Mental status is at baseline.    Psychiatric:         Mood and Affect: Mood normal.         Behavior: Behavior normal.         Assessment:        Primary Problem  Atrial fibrillation with RVR (HCC)     Principal Problem:    Atrial fibrillation with RVR (HCC)  Active Problems:    Hypertension    Mixed hyperlipidemia    Chronic diastolic heart failure (HCC)    Chronic respiratory failure ([facility] [address]    Gastroesophageal reflux disease without esophagitis    Pulmonary hypertension (HCC)    YOGESH (acute kidney injury) ([facility] [address]    Closed compression fracture of L1 lumbar vertebra, sequela    Acute midline low back pain with right-sided sciatica    Acute exacerbation of chronic low back pain  Resolved Problems:    * No resolved hospital problems. *      Past Medical History:   Diagnosis Date    Asbestos exposure     Atrial fibrillation ([facility] [address]     takes xarelto    Basal cell carcinoma of nose     CAD (coronary artery disease)     CKD (chronic kidney disease) stage 2, GFR 60-89 ml/min     Hyperlipidemia     Hypertension     MDRO (multiple drug resistant organisms) resistance 2/19/2014    E. Coli urine    Nephrolithiasis     Osteoarthritis     Ovarian cyst     removed    Oxygen dependent     2 LNC mainly at night, during the day as needed    Palpitations     Peritoneal mesothelioma ([facility] [address] 1999    treated at [facility] PONV (postoperative nausea and vomiting)     Prolonged emergence from general anesthesia     SOB (shortness of breath) on exertion         Plan:        1. Hold IV heparin  2. Consult GI  3. Consult general surgery for hemorrhoidal bleed  4. CBC, CMP  5. Continue Coreg and amiodarone at current dose  1. PPI protonics 40 mg p.o. daily  2. DVT Prophylaxis hold IV heparin drip due to GI bleed  3. Discussed with cardiologist Dr. Adalgisa Cornejo  4. EPCs  5. PT/OT to evaluate and treat  6. Pain control  7. Replace electrolytes as per sliding scale  8. Home medications reviewed and appropriate medications continued  9.  Reviewed labs and imaging studies from last 24 hours and results explained to patient      Electronically signed by Sarath Colindres MD

## 2021-05-25 NOTE — PROGRESS NOTES
Date:   5/25/2021  Patient name: Familia Bazan  Date of admission:  5/19/2021  9:49 AM  MRN:   [id number]  YOB: 1935  PCP: Brisa Hasasn DO    Reason for Admission: Atrial fibrillation with RVR [facility]) [I48.91]    Cardiology follow-up: A. fib with RVR       Impression      A. fib with RVR  Diastolic CHF  Hypertension  COPD, hypoxia, on oxygen at home  Hyperlipidemia  Exertional shortness of breath, hypoxia  History of CAD, stent placement LAD 2002  Polymyalgia rheumatica  Moderate aortic regurgitation  Giant cell arteritis  chronic anemia  history of peritoneal/omental mesothelioma surgery done at [address] [place] [address]  Dry mouth, dry eyes  Multiple hip surgeries, bilateral  Chronic pain syndrome  L1 kyphoplasty 4/15/2021  Hemorrhoids, rectal bleeding    2D echo 5/21/2021  Normal LV size and wall thickness ejection fraction more than 55% normal size LA and RA, RVSP 21, no significant pericardial effusion     ECG 5/19/2021  A. fib/a flutter heart rate 159, nonspecific ST-T changes     X-ray lumbar spine 5/19/2021  Unchanged appearance of the lumbar spine, status post vertebroplasty at L1, no new osseous abnormality  Moderate stool burden, previously administered oral contrast is present in the distal colon     Lab work 5/19/2021  Sodium 140, potassium 5.2, BUN 30, creatinine 1.42, GFR 35, alkaline phosphatase 133, bilirubin 0.60  Troponin XX 3, myoglobin 28  WBC 7.7, hemoglobin 10.9, platelets 948  Lab work 5/20/2021  Sodium 145, potassium 5.0, CO2 28, chloride 110, BUN 28, creatinine 1.19, hemoglobin 10.1  5/21/2021  Sodium 141, potassium 4.8, BUN 22, creatinine 0.91, glucose 123, albumin 2.9  Hemoglobin 10.3, WBC 5.3, platelets 956  Lab work 05/22/2021 WBC 6.9, hgb 10.2, platelets 607, Glucose 124, bun 21, creatinine 0.78, Calcium 8.8, sodium 142, potassium 5.3, chloride 109, alkaline phosphatase 141, alt 22, ast 20, albumin 2.8, GFR >60  Lab work 5/23/2021: Glucose 150, BUN 20, creatinine 0.85, calcium 8.8, sodium 138, potassium 4.9, alkaline phosphatase 127, ALT 23, AST 19, albumin 2.7, GFR> 60, WBC 5.5, hemoglobin 9.9, platelets 591.  7/07/1269  Sodium 137, potassium 5.2, creatinine 0.79, BUN 22, albumin 2.7  Hemoglobin 9.7, WBC 6.8, platelets 198    History of present illness  81years old female who recently had L1 kyphoplasty presented to the ER with a chief complaint of back pain.  She had a kyphoplasty about a month ago for compression fracture L1.  Her pain got very severe.  She has been also having dizziness and she had a near syncope about 10 days ago.  No chest pain no palpitation. On admission her ECG showed A. fib with RVR heart rate 159.  She was started on Cardizem infusion.  She developed hypotension and dose of Cardizem infusion was reduced to 5 mg/h.  Later on it was discontinued and started on carvedilol 6.25 mg twice a day.  Heart rate improved significantly.     Current evaluation 5/25/2021  Patient seen and examined.   She was resting on bed did not appear in any distress  She had a rectal bleeding today  At present hemodynamically stable  Did not appear in any significant pain    Medications:   Scheduled Meds:   warfarin  4 mg Oral Once    warfarin (COUMADIN) daily dosing (placeholder)   Other RX Placeholder    polyethylene glycol  17 g Oral Daily    sodium chloride flush  5-40 mL Intravenous 2 times per day    carvedilol  6.25 mg Oral BID    atorvastatin  40 mg Oral Daily    ferrous sulfate  300 mg Oral Daily    pantoprazole  40 mg Oral QAM AC    oxybutynin  15 mg Oral Daily    potassium chloride  20 mEq Oral BID    predniSONE  20 mg Oral Daily    rOPINIRole  0.5 mg Oral 4x Daily    sertraline  50 mg Oral Daily    amiodarone  100 mg Oral BID     Continuous Infusions:   heparin (PORCINE) Infusion Stopped (05/25/21 1142)    sodium chloride       CBC:   Recent Labs     05/23/21  0442 05/24/21  0604 05/25/21  0458   WBC 5.5 6.3 6.8   HGB 9.9* 9.3* 9.7*  204 210     BMP:    Recent Labs     05/23/21  0442 05/24/21  0604 05/24/21  1425 05/25/21  0458    137  --  137   K 4.9 5.4* 5.3 5.2   * 108*  --  108*   CO2 24 25  --  23   BUN 20 19  --  22   CREATININE 0.85 0.76  --  0.79   GLUCOSE 158* 144*  --  132*     Hepatic:   Recent Labs     05/23/21  0442 05/24/21  0604 05/25/21  0458   AST 19 25 20   ALT 23 28 28   BILITOT 0.36 0.36 0.44   ALKPHOS 127* 119* 115*     Troponin: No results for input(s): TROPONINI in the last 72 hours. BNP: No results for input(s): BNP in the last 72 hours. Lipids: No results for input(s): CHOL, HDL in the last 72 hours. Invalid input(s): LDLCALCU  INR:   Recent Labs     05/24/21  2324 05/25/21  0940   INR 1.1 1.2       Objective:   Vitals: /67   Pulse 95   Temp 97.9 °F (36.6 °C) (Oral)   Resp 16   Ht 5' 1.5\" (1.562 m)   Wt 154 lb 12.2 oz (70.2 kg)   SpO2 98%   BMI 28.77 kg/m²   General appearance: alert and cooperative with exam  HEENT: Head: Normal, normocephalic, atraumatic. Neck: no JVD and supple, symmetrical, trachea midline  Lungs: diminished breath sounds bibasilar  Heart: irregularly irregular rhythm  Abdomen: soft, non-tender; bowel sounds normal; no masses,  no organomegaly  Extremities: Homans sign is negative, no sign of DVT  Neurologic: Mental status: Alert, oriented, thought content appropriate    EKG: atrial fibrillation, rate . ECHO: reviewed.    Ejection fraction: 55%, mild to moderate AR    Assessment / Acute Cardiac Problems:     A. fib with RVR heart rate control improved  Patient admitted with a severe back pain, recent kyphoplasty  Diastolic CHF  COPD, hypoxia on oxygen  Anemia      Patient Active Problem List:     Hypertension     Nephrolithiasis     Mixed hyperlipidemia     Osteoarthritis     Peritoneal mesothelioma ([address]     Ovarian cyst     Basal cell carcinoma of nose     Bimalleolar ankle fracture     Epistaxis, recurrent     Cervical spondylosis     Trochanteric bursitis, right hip     Status post bilateral total hip replacement     Medication monitoring encounter     Chronic diastolic heart failure (HCC)     Chronic respiratory failure (HCC)     Giant cell arteritis (HCC)     Paroxysmal atrial fibrillation (HCC)     Urinary frequency     Vitamin D deficiency     Age-related osteoporosis with current pathological fracture     Atrial fibrillation with RVR (HCC)     Gastroesophageal reflux disease without esophagitis     Pulmonary hypertension (HCC)     Lumbosacral spondylosis without myelopathy     YOGESH (acute kidney injury) ([facility] [address]     Closed compression fracture of L1 lumbar vertebra, sequela     Acute midline low back pain with right-sided sciatica     Acute exacerbation of chronic low back pain      Plan of Treatment:   A. Fib: Continue current dose of carvedilol and amiodarone  Rectal bleeding/hemorrhoids: Consult general surgery/GI  Hold heparin infusion for short time.   Hypertension: Controlled  Hyperlipidemia: Continue with current dose of Lipitor    Electronically signed by Marian Lilly MD on 5/25/2021 at 2:53 PM

## 2021-05-25 NOTE — CONSULTS
Pain Management Inpatient Consultation  Note      Patient: Zehra Butler    Location: 2115/2115-01    CHIEF COMPLAINT:  Thoracic and lumbar back pain    SUBJECTIVE:   Patient was seen and examined at the bedside. No acute events overnight. She is sitting in an armchair and eating lunch. Patient had lumbar steroid epidural shot yesterday. She tolerated the procedure well and states that she was able to sleep last night. Her pain is 2-3 when lying down and 5-6 when standing and walking around compared to 10/10 before the procedure. Cardioversion postponed for 2 wks per cardiology and GI consulted for hemorrhoidal bleed. HISTORY OFPRESENT ILLNESS:    The patient is a 81 y.o. female who is referred to pain clinic in consultation   for  Lower back pain by patient's physician. Patient has a PMH significant for atrial fibrillation on Xarelto, CHF, basal cell carcinoma, and chronic back pain with kyphoplasty at the level of L1 on 4/15/21. Patient states that her back pain had initially improved for the first 2 weeks after kyphoplasty. Patient's pain is located in her lower back without radiation, 10/10 and brought on by any movement of her arms, legs. She also reports to have weakness in her legs. She last saw her orthopedic surgeon in April and stated that he compared her Xrays and did not recommend any intervention at that time. In the ER, patient was given Morphine to a total of 8 mg without relief. She started on PCA without much relief of her symptoms. She is laying flat in bed and states that any movement causes her severe pain. Past Medical History:        Diagnosis Date    Asbestos exposure     Atrial fibrillation ([address]     takes xarelto    Basal cell carcinoma of nose     CAD (coronary artery disease)     CKD (chronic kidney disease) stage 2, GFR 60-89 ml/min     Hyperlipidemia     Hypertension     MDRO (multiple drug resistant organisms) resistance 2/19/2014    E.  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    treated at [facility] PONV (postoperative nausea and vomiting)     Prolonged emergence from general anesthesia     SOB (shortness of breath) on exertion        Past Surgical History:        Procedure Laterality Date    ANKLE FRACTURE SURGERY Right 2013    APPENDECTOMY  1964    BIOPSY / LIGATION TEMPORAL ARTERY Bilateral 2/12/2021    TEMPORAL ARTERY BIOPSY performed by Kandra Hodgkin, MD at [address] Right    [address]  2002    CATARACT REMOVAL Bilateral     CHOLECYSTECTOMY  2005    COLONOSCOPY     [address] April 2002   [facility]    LIVER BIOPSY  2000    LIVER BIOPSY      NASAL ENDOSCOPY Bilateral 11/25/2014    lt nasal cautery with packing    PHOTODYNAMIC THERAPY  April 2000 & Jan. 2002    for peritoneal Mesothelioma    [address]    REVISION TOTAL HIP ARTHROPLASTY Right 1998    ROTATOR CUFF REPAIR  2006    SKIN CANCER EXCISION Bilateral 2006    ankle    SKIN CANCER EXCISION Left Oct. 2014    shoulder    SPINE SURGERY N/A 4/15/2021    KYPHOPLASTY L1 performed by Alexi Pepe MD at [address] Left 2003    TUBAL LIGATION  1969    UPPER GASTROINTESTINAL ENDOSCOPY      WISDOM TOOTH EXTRACTION         Home Medications:   Prior to Admission medications    Medication Sig Start Date End Date Taking?  Authorizing Provider   amiodarone (CORDARONE) 200 MG tablet Take 1 tablet by mouth 2 times daily 4/23/21   Amina Zuniga MD   metoprolol tartrate (LOPRESSOR) 25 MG tablet Take 1 tablet by mouth 2 times daily 4/23/21   Amina Zuniga MD   acetaminophen (TYLENOL) 500 MG tablet Take 500 mg by mouth every 6 hours as needed for Pain    Historical Provider, MD   isosorbide mononitrate (IMDUR) 60 MG extended release tablet Take 60 mg by mouth daily    Historical Provider, MD   rivaroxaban (XARELTO) 20 MG TABS tablet Take 20 mg by mouth daily (with breakfast)  2/22/21   Historical Provider, MD   diphenhydrAMINE-APAP, sleep, (TYLENOL PM EXTRA STRENGTH)  MG tablet Take 1 tablet by mouth nightly as needed     Historical Provider, MD   torsemide (DEMADEX) 20 MG tablet Take 1 tablet by mouth 2 times daily Take twice a day for 1 months then go back to 20 mg daily 3/4/21   Kalyn Fajardo MD   amoxicillin (AMOXIL) 500 MG capsule Take 2,000 mg by mouth as needed (dental procedures) Dental only 9/21/20   Historical Provider, MD   oxybutynin (DITROPAN XL) 15 MG extended release tablet Take 15 mg by mouth daily  7/30/20   Historical Provider, MD   polyethylene glycol (GLYCOLAX) 17 GM/SCOOP powder Take 17 g by mouth daily as needed (constipation)  7/30/20   Historical Provider, MD   Ascorbic Acid (VITAMIN C) 250 MG tablet Take 250 mg by mouth daily    Historical Provider, MD   ferrous sulfate (FLACO-IN-SOL) 75 (15 Fe) MG/ML solution Take 15 mg by mouth daily    Historical Provider, MD   omeprazole (PRILOSEC) 40 MG delayed release capsule Take 40 mg by mouth Daily  12/3/16   Historical Provider, MD   atorvastatin (LIPITOR) 40 MG tablet Take 40 mg by mouth daily  9/22/16   Historical Provider, MD   potassium chloride SA (K-DUR;KLOR-CON M) 20 MEQ tablet Take 1 tablet by mouth 2 times daily 3/10/16   Kalyn Fajardo MD   rOPINIRole (REQUIP) 0.5 MG tablet Take 0.5 mg by mouth 4 times daily as needed     Historical Provider, MD   Vitamin D (CHOLECALCIFEROL) 1000 UNITS CAPS capsule Take 1,000 Units by mouth 2 times daily    Historical Provider, MD   predniSONE (DELTASONE) 5 MG tablet Take 20 mg by mouth daily     Historical Provider, MD   Omega 3 1000 MG CAPS Take 1,000 mg by mouth daily     Historical Provider, MD   sertraline (ZOLOFT) 50 MG tablet Take 50 mg by mouth daily. Historical Provider, MD       Allergies:  Adhesive tape, Meperidine, Demerol, Ultram [tramadol hcl], Zocor [simvastatin], Codeine, and Fentanyl    Social History:  Social History     Socioeconomic History    Marital status:      Spouse name: Not on file    Number of children: Not on file    Years of education: Not on file    Highest education level: Not on file   Occupational History    Not on file   Tobacco Use    Smoking status: Never Smoker    Smokeless tobacco: Never Used   Vaping Use    Vaping Use: Never used   Substance and Sexual Activity    Alcohol use: Yes     Comment: social    Drug use: No    Sexual activity: Not Currently   Other Topics Concern    Not on file   Social History Narrative    Not on file     Social Determinants of Health     Financial Resource Strain:     Difficulty of Paying Living Expenses:    Food Insecurity:     Worried About Running Out of Food in the Last Year:     [address] in the Last Year:    Transportation Needs:     Lack of Transportation (Medical):      Lack of Transportation (Non-Medical):    Physical Activity:     Days of Exercise per Week:     Minutes of Exercise per Session:    Stress:     Feeling of Stress :    Social Connections:     Frequency of Communication with Friends and Family:     Frequency of Social Gatherings with Friends and Family:     Attends [employer] Services:     Active Member of Clubs or Organizations:     Attends Club or Organization Meetings:     Marital Status:    Intimate Partner Violence:     Fear of Current or Ex-Partner:     Emotionally Abused:     Physically Abused:     Sexually Abused:        Family History:       Problem Relation Age of Onset    Heart Failure Brother     Heart Attack Brother         triple bypass    Prostate Cancer Brother     Coronary Art Dis Mother     Breast Cancer Mother         dx'd in late 46s    Emphysema Father     Colon Cancer Neg Hx     Diabetes Neg Hx  Eclampsia Neg Hx     Hypertension Neg Hx     Ovarian Cancer Neg Hx      Labor Neg Hx     Spont Abortions Neg Hx     Stroke Neg Hx        REVIEW OF SYSTEMS:  Review of Systems   Constitutional: Negative for activity change, appetite change, chills and fever. HENT: Negative for congestion. Respiratory: Positive for shortness of breath. Negative for cough, chest tightness and wheezing. Cardiovascular: Negative for chest pain and leg swelling. Gastrointestinal: Negative for abdominal distention, abdominal pain, diarrhea, nausea and vomiting. Genitourinary: Negative for dysuria and flank pain. Musculoskeletal: Positive for back pain. Skin: Negative for rash. Neurological: Positive for weakness. Negative for dizziness, numbness and headaches. Psychiatric/Behavioral: Negative for agitation, behavioral problems, confusion and sleep disturbance. PHYSICALEXAM:  Vital Signs:  /67   Pulse 95   Temp 97.9 °F (36.6 °C) (Oral)   Resp 16   Ht 5' 1.5\" (1.562 m)   Wt 154 lb 12.2 oz (70.2 kg)   SpO2 98%   BMI 28.77 kg/m²     Physical Exam  Constitutional:       General: She is not in acute distress. Appearance: Normal appearance. She is obese. She is not ill-appearing. HENT:      Head: Normocephalic. Mouth/Throat:      Mouth: Mucous membranes are moist.   Cardiovascular:      Rate and Rhythm: Normal rate. Rhythm irregular. Pulses: Normal pulses. Heart sounds: Normal heart sounds. No murmur heard. No gallop. Pulmonary:      Effort: Pulmonary effort is normal. No respiratory distress. Breath sounds: Normal breath sounds. No wheezing. Chest:      Chest wall: No tenderness. Abdominal:      General: Bowel sounds are normal. There is no distension. Palpations: Abdomen is soft. There is no mass. Tenderness: There is no abdominal tenderness. Musculoskeletal:         General: Tenderness present. No swelling or deformity.       Lumbar back: Negative right straight leg raise test and negative left straight leg raise test.   Neurological:      General: No focal deficit present. Mental Status: She is alert and oriented to person, place, and time. Psychiatric:         Mood and Affect: Mood normal.         Behavior: Behavior normal.         Thought Content: Thought content normal.       Right Hip Exam   Right hip exam is normal.     Range of Motion   The patient has normal right hip ROM. Muscle Strength   The patient has normal right hip strength. Other   Sensation: normal  Pulse: present      Left Hip Exam   Left hip exam is normal.    Range of Motion   The patient has normal left hip ROM. Muscle Strength   The patient has normal left hip strength. Other   Sensation: normal  Pulse: present      Back Exam     Tenderness   The patient is experiencing tenderness in the lumbar. Range of Motion   The patient has normal back ROM. Muscle Strength   The patient has normal back strength. Tests   Straight leg raise right: negative  Straight leg raise left: negative    Reflexes   Patellar: normal  Achilles: normal  Biceps: normal  Babinski's sign: normal     Other   Sensation: normal  Gait: antalgic       Right Elbow Exam   Right elbow exam is normal.    Range of Motion   The patient has normal right elbow ROM. Muscle Strength   The patient has normal right elbow strength. Other   Sensation: normal  Pulse: present      Left Elbow Exam   Left elbow exam is normal.    Range of Motion   The patient has normal left elbow ROM. Muscle Strength   The patient has normal left elbow strength. Other   Sensation: normal  Pulse: present      Right Shoulder Exam   Right shoulder exam is normal.    Tenderness   The patient is experiencing no tenderness. Range of Motion   The patient has normal right shoulder ROM. Muscle Strength   The patient has normal right shoulder strength.     Other   Sensation: normal      Left Shoulder Exam Left shoulder exam is normal.    Tenderness   The patient is experiencing no tenderness. Range of Motion   The patient has normal left shoulder ROM. Muscle Strength   The patient has normal left shoulder strength. Other   Sensation: normal  Pulse: present               DATA:      MRI thoracic spine 5/19  1. No acute abnormality of the thoracic spine. 2. Chronic mild compression deformity of the L1 vertebral body status post   prior vertebroplasty. 3. Mild degenerative changes in the lower cervical and upper thoracic spine. 4. Small layering left pleural effusion. 5. Incidental subcentimeter left thyroid nodule.  No further follow-up is   warranted per guidelines below. MRI lumbar spine 5/19   1. L1 vertebroplasty in association with remote vertebral body compression   fracture with approximately 50% height loss. 2. Associated retropulsion of posterior L1 cortex with associated borderline   central canal stenosis. 3. L1/L2 mild, L2/L3 moderate, L3/L4 moderate central canal stenosis   secondary to encroachment by posterior disc osteophyte complex. 4. L1/L2 moderate bilateral, L2/L3 mild bilateral, L3/L4 moderate bilateral,   L4/L5 moderate bilateral neural foraminal stenosis secondary to encroachment   by disc osteophyte complex. 5. L3/L4-L5/S1 mild bilateral facet degenerative arthrosis.         Postoperative Diagnosis:    1. Acute exacerbation of chronic low back pain    2. Lumbar radiculopathy    3. Lumbar spondylosis    4.  Age-related osteoporosis with current pathological fracture, sequela       Current Pain Assessment  Pain Assessment  Pain Assessment: 0-10  Pain Level: 3  Patient's Stated Pain Goal: 5  Pain Type: Chronic pain  Pain Location: Back, Buttocks  Pain Orientation: Right, Left  Pain Radiating Towards: none  Pain Descriptors: Aching, Constant, Jabbing, Nagging, Sharp  Pain Frequency: Continuous  Pain Onset: On-going  Clinical Progression: Gradually worsening  Functional Pain Assessment: Prevents or interferes some active activities and ADLs  Non-Pharmaceutical Pain Intervention(s): Emotional support  Response to Pain Intervention: Asleep with RR greater than 10  Multiple Pain Sites: No  RASS Score: 0     IMPRESSION:   Patient doing well post-procedure and reports improved pain score. Percocet 5-325 prescribed for additonal pain control. Patient also having BRBPR, GI consulted. Pia Bernal RECOMMENDATIONS:  1. Trial of lumbar epidural steroid injection  2. Percocet 5-325 1 tablet q 4 hrs PRN   3. We will continue current pain medications        Counselling/Preventive measures for pain  Control:    [x]  Spine strengthening exercises are discussed with patient in detail. Electronically signed by Chris Blue MD on 5/25/2021 at 11:26 AM    NAME:  Amanda Keenan  MRN: [id number]   YOB: 1935   Date: 5/26/2021   Age: 81 y.o. Gender: female       Amanda Keenan is 81 y.o. ,female, referred because of Back Pain        I Performed a history and physical examination of the patient and discussed management with the resident/ Physician Assistant/ Nurse Practitioner. I reviewed the Resident/ Physician Assistant/ Nurse Practitioner note and agree with the documented findings and plan of care. Any areas of disagreement are noted on the chart. I was present for any key portions of any procedure. I have documented in the chart those procedures where I was not present during key Portions. I agree with the chief complaint, past medical history, past surgical history, Social & family history, Allergies, medications as documented unless noted below. I have personally evaluated this patient and have completed at least one if not all key elements of the (History, physical exam and plan of care). Additional findings are added to the note above    Diagnosis:   1. Acute exacerbation of chronic low back pain    2. Lumbar radiculopathy    3.  Lumbar spondylosis    4. Age-related osteoporosis with current pathological fracture, sequela        Plan of Care: This patient is having radicular pain. Her CT also showed mild depression of L4 which is subacute to chronic. Patient is on anticoagulants. She needs to stop them for 3 days prior to doing the epidural steroid injection to help her back pain the patient agrees. I am not sure her medical condition will allow us to proceed with L4 kyphoplasty. Hence we will try epidural steroid injection initially and then depending on the response will plan further. Patient is in physical therapy and will try to see her again tomorrow and then plan. We need medical clearance to stop her anticoagulants before we can proceed with any interventions. In the meantime we will continue her on the current medications. Decision Making Process : Patient's health history and referral records thoroughly reviewed before focused physical examination and discussion with patient. Over 50% of today's visit is spent on examining the patient and counseling. Level of complexity of date to be reviewed is Moderate. The chart date reviewed include the following: Imaging Reports. Summary of Care. Time spent reviewing with patient the below reports:   Medication safety, Treatment options. Level of diagnosis and management options of this case is multiple: involving the following management options: Interventions as needed, medication management as appropriate, future visits, activity modification, heat/ice as needed, Urine drug screen as required.      Odell Castillo MD on 5/26/2021 at 5:39 AM

## 2021-05-25 NOTE — PLAN OF CARE
Case d/w dr Orpah Oppenheim likely egd and colonoscopy Thursday, please hold any anticoagulation meds. Jus Ashley, APRN - CNP

## 2021-05-26 NOTE — CONSULTS
General Surgery Consult      Pt Name: Cindy Degroot  MRN: [id number]  YOB: 1935  Date of evaluation: 5/25/2021  Primary Care Physician: Chalino Melchor DO   Patient evaluated at the request of  Dr. Morena Kennedy  Reason for evaluation: rectal bleeding    SUBJECTIVE:   History of Chief Complaint:    Cindy Degroot is a 81 y.o. female who presents with and anemia. history of rectal bleeding. Last colonoscopy more than 10 years ago. No prior EGD. Patient stated she's had problems with hemorrhoids for a long time. Nurse witnessed bleeding as well. No distention no fever chills. Symptom onset has been acute for a time period of few day(s). Severity is described as moderate. Course of her symptoms over time is acute. Past Medical History   has a past medical history of Asbestos exposure, Atrial fibrillation ([address] Basal cell carcinoma of nose, CAD (coronary artery disease), CKD (chronic kidney disease) stage 2, GFR 60-89 ml/min, Hyperlipidemia, Hypertension, MDRO (multiple drug resistant organisms) resistance, Nephrolithiasis, Osteoarthritis, Ovarian cyst, Oxygen dependent, Palpitations, Peritoneal mesothelioma ([address] PONV (postoperative nausea and vomiting), Prolonged emergence from general anesthesia, and SOB (shortness of breath) on exertion. Past Surgical History   has a past surgical history that includes Tubal ligation (1969); Hysterectomy (1981); Colonoscopy; Total hip arthroplasty (Right, 1990); Rectocele repair (1997); liver biopsy (2000); Upper gastrointestinal endoscopy; Photodynamic Therapy (April 2000 & Jan. 2002); Total hip arthroplasty (Left, 2003); Revision total hip arthroplasty (Right, 1998); Rotator cuff repair (2006); Ankle fracture surgery (Right, 2013); Cataract removal (Bilateral); Cardiac catheterization (2002); liver biopsy; Coronary angioplasty with stent (April 2002); [place] tooth extraction; Appendectomy (1964); Cholecystectomy (2005);  Skin cancer excision (Bilateral, 2006); Skin cancer excision (Left, Oct. 2014); nasal endoscopy (Bilateral, 11/25/2014); BIOPSY / LIGATION TEMPORAL ARTERY (Bilateral, 2/12/2021); Breast biopsy (Right); and Spine surgery (N/A, 4/15/2021). Medications  Prior to Admission medications    Medication Sig Start Date End Date Taking?  Authorizing Provider   vitamin E 1000 units capsule Take 1,000 Units by mouth daily    Historical Provider, MD   calcium carbonate 600 MG TABS tablet Take 1 tablet by mouth 2 times daily    Historical Provider, MD   losartan (COZAAR) 100 MG tablet Take 100 mg by mouth daily    Historical Provider, MD   meclizine (ANTIVERT) 25 MG tablet Take 25 mg by mouth daily as needed for Dizziness    Historical Provider, MD   amiodarone (CORDARONE) 200 MG tablet Take 1 tablet by mouth 2 times daily 4/23/21   Altha Gosselin, MD   metoprolol tartrate (LOPRESSOR) 25 MG tablet Take 1 tablet by mouth 2 times daily 4/23/21   Altha Gosselin, MD   acetaminophen (TYLENOL) 500 MG tablet Take 500 mg by mouth every 6 hours as needed for Pain    Historical Provider, MD   isosorbide mononitrate (IMDUR) 60 MG extended release tablet Take 60 mg by mouth daily    Historical Provider, MD   rivaroxaban (XARELTO) 20 MG TABS tablet Take 20 mg by mouth daily (with breakfast)  2/22/21   Historical Provider, MD   diphenhydrAMINE-APAP, sleep, (TYLENOL PM EXTRA STRENGTH)  MG tablet Take 1 tablet by mouth nightly as needed     Historical Provider, MD   torsemide (DEMADEX) 20 MG tablet Take 1 tablet by mouth 2 times daily Take twice a day for 1 months then go back to 20 mg daily 3/4/21   Angelita Lombardi MD   amoxicillin (AMOXIL) 500 MG capsule Take 2,000 mg by mouth as needed (dental procedures) Dental only 9/21/20   Historical Provider, MD   oxybutynin (DITROPAN XL) 15 MG extended release tablet Take 15 mg by mouth daily  7/30/20   Historical Provider, MD   polyethylene glycol (GLYCOLAX) 17 GM/SCOOP powder Take 17 g by mouth daily as needed (constipation) 7/30/20   Historical Provider, MD   Ascorbic Acid (VITAMIN C) 250 MG tablet Take 250 mg by mouth daily    Historical Provider, MD   ferrous sulfate (FLACO-IN-SOL) 75 (15 Fe) MG/ML solution Take 15 mg by mouth daily    Historical Provider, MD   omeprazole (PRILOSEC) 40 MG delayed release capsule Take 40 mg by mouth Daily  12/3/16   Historical Provider, MD   atorvastatin (LIPITOR) 40 MG tablet Take 40 mg by mouth daily  9/22/16   Historical Provider, MD   potassium chloride SA (K-DUR;KLOR-CON M) 20 MEQ tablet Take 1 tablet by mouth 2 times daily 3/10/16   Barb Hobson MD   rOPINIRole (REQUIP) 0.5 MG tablet Take 0.5 mg by mouth 4 times daily as needed     Historical Provider, MD   Vitamin D (CHOLECALCIFEROL) 1000 UNITS CAPS capsule Take 1,000 Units by mouth 2 times daily    Historical Provider, MD   predniSONE (DELTASONE) 5 MG tablet Take 20 mg by mouth daily     Historical Provider, MD   Omega 3 1000 MG CAPS Take 1,000 mg by mouth daily     Historical Provider, MD   sertraline (ZOLOFT) 50 MG tablet Take 50 mg by mouth daily. Historical Provider, MD     Allergies  is allergic to adhesive tape, meperidine, demerol, ultram [tramadol hcl], zocor [simvastatin], codeine, and fentanyl. Family History  family history includes Breast Cancer in her mother; Coronary Art Dis in her mother; Emphysema in her father; Heart Attack in her brother; Heart Failure in her brother; Prostate Cancer in her brother. Social History   reports that she has never smoked. She has never used smokeless tobacco. She reports current alcohol use. She reports that she does not use drugs. Review of Systems:  All 10 system review was conducted. Please refer to chart. OBJECTIVE:   VITALS:  height is 5' 1.5\" (1.562 m) and weight is 154 lb 12.2 oz (70.2 kg). Her oral temperature is 97.7 °F (36.5 °C). Her blood pressure is 104/64 and her pulse is 81. Her respiration is 16 and oxygen saturation is 99%.    CONSTITUTIONAL: Alert and oriented times 3, no acute distress and cooperative to examination with proper mood and affect. SKIN: Skin color, texture, turgor normal. No rashes or lesions. LYMPH: no cervical nodes, no inguinal nodes  HEENT: Head is normocephalic, atraumatic. EOMI, PERRLA  NECK: Supple, symmetrical, trachea midline, no adenopathy, thyroid symmetric, not enlarged and no tenderness, skin normal  CHEST/LUNGS: chest symmetric with normal A/P diameter, normal respiratory rate and rhythm, lungs clear to auscultation without wheezes, rales or rhonchi. No accessory muscle use. Scars None   CARDIOVASCULAR: Heart regular rate and rhythm Normal S1 and S2. . Carotid and femoral pulses 2+/4 and equal bilaterally  ABDOMEN: soft nondistended nontender  RECTAL: deferred. Patient scheduled for colonoscopy on Thursday. NEUROLOGIC: There are no focalizing motor or sensory deficits. CN II-XII are grossly intact.   EXTREMITIES: no cyanosis, no clubbing and no edema    LABS:   CBC with Differential:    Lab Results   Component Value Date    WBC 6.8 05/25/2021    RBC 3.26 05/25/2021    RBC 4.17 02/08/2012    HGB 9.7 05/25/2021    HCT 29.8 05/25/2021     05/25/2021     02/08/2012    MCV 91.4 05/25/2021    MCH 29.6 05/25/2021    MCHC 32.4 05/25/2021    RDW 17.6 05/25/2021    LYMPHOPCT 8 05/25/2021    MONOPCT 3 05/25/2021    BASOPCT 0 05/25/2021    MONOSABS 0.20 05/25/2021    LYMPHSABS 0.50 05/25/2021    EOSABS 0.00 05/25/2021    BASOSABS 0.00 05/25/2021    DIFFTYPE NOT REPORTED 05/25/2021     BMP:    Lab Results   Component Value Date     05/25/2021    K 5.2 05/25/2021     05/25/2021    CO2 23 05/25/2021    BUN 22 05/25/2021    LABALBU 2.7 05/25/2021    LABALBU 4.1 01/11/2012    CREATININE 0.79 05/25/2021    CALCIUM 8.6 05/25/2021    GFRAA >60 05/25/2021    LABGLOM >60 05/25/2021    GLUCOSE 132 05/25/2021    GLUCOSE 135 02/08/2012     Hepatic Function Panel:    Lab Results   Component Value Date    ALKPHOS 115 05/25/2021    ALT 28 05/25/2021    AST 20 05/25/2021    PROT 4.8 05/25/2021    BILITOT 0.44 05/25/2021    BILIDIR 0.16 09/03/2014    IBILI 0.64 09/03/2014    LABALBU 2.7 05/25/2021    LABALBU 4.1 01/11/2012     Calcium:    Lab Results   Component Value Date    CALCIUM 8.6 05/25/2021     Magnesium:    Lab Results   Component Value Date    MG 1.8 04/21/2021     Phosphorus:    Lab Results   Component Value Date    PHOS 3.1 02/25/2021     PT/INR:    Lab Results   Component Value Date    PROTIME 15.2 05/25/2021    INR 1.2 05/25/2021     ABG:  No results found for: PHART, PH, FEF1TLD, PCO2, PO2ART, PO2, JHO4LAR, HCO3, BEART, BE, THGBART, THB, EAP2PGV, Y7DOCKCK, O2SAT  Urine Culture:  No components found for: CURINE  Blood Culture:  No components found for: CBLOOD, CFUNGUSBL  Stool Culture:  No components found for: CSTOOL    RADIOLOGY:   I have personally reviewed the following films:  FLUORO FOR SURGICAL PROCEDURES    Result Date: 5/24/2021  Radiology exam is complete. No Radiologist dictation. Please follow up with ordering provider. IMPRESSION:   1. Anemia. 2. Rectal bleeding. 3. History of hemorrhoidal issues. does not have any pertinent problems on file. PLAN:   1. Bowel prep tomorrow. EGD colonoscopy per GI on Thursday. Surgery on standby. Patient currently hemodynamically stable. I will follow with you. Thank you for this interesting consult and for allowing us to participate in the care of this patient. If you have any questions please don't hesitate to call.           Electronically signed by Jose Enrique Vincent MD  on 5/25/2021 at 10:15 PM

## 2021-05-26 NOTE — PROGRESS NOTES
Pharmacy Note  Warfarin Consult follow-up      Recent Labs     05/24/21  2324 05/25/21  0940 05/26/21  0454   INR 1.1 1.2 1.7     Recent Labs     05/24/21  0604 05/25/21  0458 05/26/21  0454   HGB 9.3* 9.7* 9.5*   HCT 28.8* 29.8* 28.6*    210 239       Significant Drug-Drug Interactions:  Current warfarin drug-drug interactions:       Date             INR        Dose given previous day  Dose scheduled for today  5/26/2021            1.7        held  mg         Hold        Notes:   Hold today for EGD                Daily PT/INR while inpatient.   Chong Coleman [facility]  5/26/2021  8:46 AM

## 2021-05-26 NOTE — PROGRESS NOTES
Patient was seen and examined. Awake alert in no acute distress. Getting prep for colonoscopy tomorrow. Vital signs are stable. Afebrile. Abdomen is soft. Extremity mild edema. Blood work was reviewed. Hemoglobin is stable. Platelet count is adequate. WBC count is normal.  Potassium was high and will be treated by medical service. Creatinine is 1.1. For EGD colonoscopy tomorrow. Depending on the results I will make further recommendations in terms of intervention regarding her hemorrhoids.

## 2021-05-26 NOTE — CARE COORDINATION
ONGOING DISCHARGE PLAN:    Patient is alert and oriented x4. Spoke with patient regarding discharge plan and patient confirms that plan is still to discharge to home with no needs  Patient will have a egd/Colonoscopy done tomorrow     Will continue to follow for additional discharge needs.     Electronically signed by Alex Enciso RN on 5/26/2021 at 3:15 PM

## 2021-05-26 NOTE — PROGRESS NOTES
Physical Therapy  Facility/Department: [facility] PROGRESSIVE CARE  Daily Treatment Note  NAME: Victorina Clancy  : 1935  MRN: [id number]    Date of Service: 2021    Discharge Recommendations:  Patient would benefit from continued therapy after discharge   PT Equipment Recommendations  Equipment Needed: No (has crutches and W/C)    Assessment   Body structures, Functions, Activity limitations: Decreased functional mobility ; Decreased strength;Decreased endurance;Decreased balance; Increased pain  Treatment Diagnosis: impaired mobility due to low back pain  Prognosis: Good  Barriers to Learning: none  REQUIRES PT FOLLOW UP: Yes  Activity Tolerance  Activity Tolerance: Patient limited by pain     Patient Diagnosis(es): The primary encounter diagnosis was Acute exacerbation of chronic low back pain. Diagnoses of Lumbar radiculopathy, Lumbar spondylosis, and Age-related osteoporosis with current pathological fracture, sequela were also pertinent to this visit. has a past medical history of Asbestos exposure, Atrial fibrillation ([address] Basal cell carcinoma of nose, CAD (coronary artery disease), CKD (chronic kidney disease) stage 2, GFR 60-89 ml/min, Hyperlipidemia, Hypertension, MDRO (multiple drug resistant organisms) resistance, Nephrolithiasis, Osteoarthritis, Ovarian cyst, Oxygen dependent, Palpitations, Peritoneal mesothelioma ([address] PONV (postoperative nausea and vomiting), Prolonged emergence from general anesthesia, and SOB (shortness of breath) on exertion. has a past surgical history that includes Tubal ligation (); Hysterectomy (); Colonoscopy; Total hip arthroplasty (Right, ); Rectocele repair (); liver biopsy (); Upper gastrointestinal endoscopy; Photodynamic Therapy (2000 & 2002); Total hip arthroplasty (Left, ); Revision total hip arthroplasty (Right, ); Rotator cuff repair (); Ankle fracture surgery (Right, ); Cataract removal (Bilateral);  Cardiac Cues for hand placement  Ambulation  Ambulation?: Yes  Ambulation 1  Surface: level tile  Device: Rolling Walker  Other Apparatus: O2 (2 L)  Assistance: Contact guard assistance  Quality of Gait: Slow and steady, no LOB  Gait Deviations: Slow Manuela;Decreased step length;Decreased step height  Distance: 5' x 4 (1 rest break in between activity)  Stairs/Curb  Stairs?: No     Balance  Posture: Good  Sitting - Static: Fair;+  Sitting - Dynamic: Fair  Standing - Static: Fair  Standing - Dynamic: Fair  Comments: with RW  Other exercises  Other exercises?: Yes  Other exercises 1: AROM both L/E seated x 10  Other exercises 2: Semi-squat x 5 (w/RW)  Other exercises 3: Sit<>Stand x 3 (Cues for hand placement)         Other Activities: Other (see comment) (Assist pt with transfer back to bed)              G-Code     OutComes Score                                                     AM-PAC Score             Goals  Short term goals  Time Frame for Short term goals: 5-7 treatments/ week  Short term goal 1: pt to tolerate 1/2 hour of therapuetic exercise and activity  Short term goal 2: pt to demonstrate good technique for log rolling, balance and LE ROM and strengthening exercises as tolerated  Short term goal 3: pt to demonstrate improved pain control to 5/10 or better to tolerate increased activity  Short term goal 4: pt to demonstrate improved bed mobility for rolling in bed using rail w/ supervision for position change  Short term goal 5: pt to advance to and demonstronstrate transfers supine <> sit w/ min x 1  Short term goal 6: pt to advance to and demonstronstrate fair or better sitting balance while dangling at the EOB x 10 minutes w/ CGA x 1  Short term goal 7: pt to advance to and demonstronstrate sit <> stand and bed <> chair transfers using least assistive device w/ min x 1 and O2 as directed  Short term goal 8: pt to advance to and demonstronstrate gait 50-80' using least assistive device w/ min x 1 and O2 as

## 2021-05-26 NOTE — FLOWSHEET NOTE
05/26/21 1330   Provider Notification   Reason for Communication Critical Value (comment); Review case  (K 5.7)   Provider Name Dr. Dustin Reed   Provider Notification Physician   Method of Communication Call   Response Waiting for response     RN updated Dr. Dustin Reed of pt's K 5.7; still waiting for response.

## 2021-05-26 NOTE — PROGRESS NOTES
[facility]    Gastroenterology Daily Progress Note      Patient:   Denver Meneses   :    1935   Facility:   [facility]  Date:     2021  Consultant:   CANDIS Pena - CNP, CNP      SUBJECTIVE  81 y.o. female admitted 2021 with Atrial fibrillation with RVR ([facility] [address] [I48.91] and seen for rectal bleeding. The pt was seen and examined. No rectal bleeding overnight hgb down trending to 9.5. no c/o abdominal pain or nausea. lft's are further elevated.          OBJECTIVE  Scheduled Meds:   warfarin (COUMADIN) daily dosing (placeholder)   Other RX Placeholder    polyethylene glycol  17 g Oral Daily    sodium chloride flush  5-40 mL Intravenous 2 times per day    carvedilol  6.25 mg Oral BID    atorvastatin  40 mg Oral Daily    ferrous sulfate  300 mg Oral Daily    pantoprazole  40 mg Oral QAM AC    oxybutynin  15 mg Oral Daily    potassium chloride  20 mEq Oral BID    predniSONE  20 mg Oral Daily    rOPINIRole  0.5 mg Oral 4x Daily    sertraline  50 mg Oral Daily    amiodarone  100 mg Oral BID       Vital Signs:  /71   Pulse 80   Temp 98.2 °F (36.8 °C) (Oral)   Resp 16   Ht 5' 1.5\" (1.562 m)   Wt 156 lb 15.5 oz (71.2 kg)   SpO2 98%   BMI 29.18 kg/m²      Physical Exam:     General Appearance: alert and oriented to person, place and time, well-developed and well-nourished, in no acute distress  Skin: warm and dry, no rash or erythema  Head: normocephalic and atraumatic  Eyes: pupils equal, round, and reactive to light, extraocular eye movements intact, conjunctivae normal  ENT: hearing grossly normal bilaterally  Neck: neck supple and non tender without mass, no thyromegaly or thyroid nodules, no cervical lymphadenopathy   Pulmonary/Chest: clear to auscultation bilaterally- no wheezes, rales or rhonchi, normal air movement, no respiratory distress  Cardiovascular: normal rate, regular rhythm, normal S1 and S2, no murmurs, rubs, clicks or gallops, distal pulses intact, no carotid bruits  Abdomen: soft, obese non-tender, non-distended, normal bowel sounds, no masses or organomegaly  Extremities: no cyanosis, clubbing or edema  Musculoskeletal: normal range of motion, no joint swelling, deformity or tenderness  Neurologic: no cranial nerve deficit and muscle strength normal    Lab and Imaging Review     CBC  Recent Labs     05/24/21  0604 05/25/21  0458 05/26/21  0454   WBC 6.3 6.8 9.4   HGB 9.3* 9.7* 9.5*   HCT 28.8* 29.8* 28.6*   MCV 92.0 91.4 90.1    210 239       BMP  Recent Labs     05/24/21  0604 05/24/21  1425 05/25/21  0458 05/26/21  0454     --  137 138   K 5.4* 5.3 5.2 5.7*   *  --  108* 107   CO2 25  --  23 23   BUN 19  --  22 30*   CREATININE 0.76  --  0.79 1.14*   GLUCOSE 144*  --  132* 153*   CALCIUM 8.8  --  8.6 8.8       LFTS  Recent Labs     05/24/21  0604 05/25/21  0458 05/26/21  0454   ALKPHOS 119* 115* 122*   ALT 28 28 61*   AST 25 20 54*   PROT 4.8* 4.8* 5.0*   BILITOT 0.36 0.44 0.46   BILIDIR  --   --  0.18   LABALBU 2.8* 2.7* 3.0*     PT/INR  Recent Labs     05/24/21  2324 05/25/21  0940 05/26/21  0454   PROTIME 14.1 15.2* 19.9*   INR 1.1 1.2 1.7         ANEMIA STUDIES  Recent Labs     05/25/21  1558   LABIRON 21   TIBC 229*   FERRITIN 329*   [id number] 1644*   FOLATE 17.6   Results for Zandra Alvarez (MRN [id number]) as of 5/26/2021 10:31   Ref.  Range 2/25/2021 12:02   Hep B E Ag Latest Ref Range: Negative  Negative   Hepatitis C Ab Latest Ref Range: NONREACTIVE  NONREACTIVE   Hep B Core Total Ab Latest Ref Range: NONREACTIVE  NONREACTIVE     FINDINGS:ct 5/14/21       Chest:       Mediastinum: The cardiac mediastinal contours appear unchanged.  Enlargement   of the main pulmonary artery measuring 48 mm is noted, suggestive of   pulmonary hypertension.  No pericardial effusion.  No lymphadenopathy.       Lungs/pleura: No acute airspace disease identified.  No effusion.  Minimal   subsegmental atelectasis in the lung bases.  The central airway is patent.       Soft Tissues/Bones: No acute findings.           Abdomen/Pelvis:       Organs: Evaluation of the abdominal and pelvic viscera is limited in the   absence of contrast.   The liver, biliary tree, pancreas, spleen, adrenals   and kidneys appear stable.  Nodularity of the adrenal glands again   demonstrated, a chronic finding.  Bilateral renal cysts are noted, consistent   with a combination of simple and hemorrhagic/proteinaceous cysts.  An   exophytic lobulated complex cyst arising from the lower pole the left kidney   is demonstrated, better characterized as a cyst on prior contrast imaging.       GI/Bowel: Diverticulosis.  Wall thickening of the sigmoid colon is noted. Moderate distal colonic stool burden.  No evidence for bowel obstruction. There is adjacent complex fluid or soft tissue thickening in the pelvis near   this area, a stable chronic finding.       Pelvis: Streak artifact from bilateral hip arthroplasties noted.  The bladder   appears decompressed.       Peritoneum/Retroperitoneum: Mild induration of the peritoneal fat in the left   upper quadrant again demonstrated, a stable finding.  Presumed pleural   thickening versus complex fluid in the pelvis is also a stable finding.  No   new finding identified in the peritoneal cavity.  No ascites.       Bones/Soft Tissues: Bilateral hip arthroplasties partially visualized. Treated compression fracture at L1 is noted.  Multilevel degenerative disc   disease and lower lumbar facet arthropathy.           Impression   1.  Short-segment sigmoid colon thickening with diverticula, suggestive of   colitis.       2.  Otherwise stable exam with mild residual peritoneal thickening noted in   the left upper quadrant and pelvis.  Additional stable chronic and benign   findings, as above.         ASSESSMENT/plan  1.  Anemia with rectal bleeding  -d/w dr Salma De La Garza who feels its unsafe to discharge pt at this time due to

## 2021-05-26 NOTE — PLAN OF CARE
Problem: Falls - Risk of:  Goal: Will remain free from falls  Description: Will remain free from falls  Outcome: Ongoing  Note: No falls noted this shift. Patient ambulates with x1 staff assistance without difficulty. Bed kept in low position. Safe environment maintained. Bedside table & call light in reach. Uses call light appropriately when needing assistance. Problem: Pain:  Goal: Pain level will decrease  Description: Pain level will decrease  Outcome: Ongoing  Note: No pain at this time. 0/10 pain scale. Problem: Cardiac Output - Decreased:  Goal: Hemodynamic stability will improve  Description: Hemodynamic stability will improve  Outcome: Ongoing  Note:   Vitals:    05/25/21 1330 05/25/21 1355 05/25/21 1915 05/26/21 0009   BP: 113/67  104/64 113/69   Pulse: 95  81 76   Resp: 16  16 16   Temp: 97.9 °F (36.6 °C)  97.7 °F (36.5 °C) 98.2 °F (36.8 °C)   TempSrc: Oral  Oral Oral   SpO2: 98% 98% 99% 99%   Weight:    156 lb 15.5 oz (71.2 kg)   Height:         Pt Afib on telemetry; adequate urine output; will continue to monitor hemodynamic stability. Problem: Skin Integrity:  Goal: Will show no infection signs and symptoms  Description: Will show no infection signs and symptoms  Outcome: Ongoing  Note: Skin assessment complete. Turned and repositioned every two hours per self. Area kept free from moisture. Proper nourishment and fluids encouraged, as appropriate. Will continue to monitor for additional needs and changes in skin breakdown.

## 2021-05-26 NOTE — PROGRESS NOTES
Date:   5/26/2021  Patient name: Benji Short  Date of admission:  5/19/2021  9:49 AM  MRN:   [id number]  YOB: 1935  PCP: Madelyn Ramos DO    Reason for Admission: Atrial fibrillation with RVR [facility]) [I48.91]    Cardiology follow-up: A. fib with RVR       Impression      A. fib with RVR  Diastolic CHF  Hypertension  COPD, hypoxia, on oxygen at home  Hyperlipidemia  Exertional shortness of breath, hypoxia  History of CAD, stent placement LAD 2002  Polymyalgia rheumatica  Moderate aortic regurgitation  Giant cell arteritis  chronic anemia  history of peritoneal/omental mesothelioma surgery done at [address] [place] [address]  Dry mouth, dry eyes  Multiple hip surgeries, bilateral  Chronic pain syndrome  L1 kyphoplasty 4/15/2021  Hemorrhoids, rectal bleeding    2D echo 5/21/2021  Normal LV size and wall thickness ejection fraction more than 55% normal size LA and RA, RVSP 21, no significant pericardial effusion     ECG 5/19/2021  A. fib/a flutter heart rate 159, nonspecific ST-T changes     X-ray lumbar spine 5/19/2021  Unchanged appearance of the lumbar spine, status post vertebroplasty at L1, no new osseous abnormality  Moderate stool burden, previously administered oral contrast is present in the distal colon     Lab work 5/19/2021  Sodium 140, potassium 5.2, BUN 30, creatinine 1.42, GFR 35, alkaline phosphatase 133, bilirubin 0.60  Troponin XX 3, myoglobin 28  WBC 7.7, hemoglobin 10.9, platelets 830  Lab work 5/20/2021  Sodium 145, potassium 5.0, CO2 28, chloride 110, BUN 28, creatinine 1.19, hemoglobin 10.1  5/21/2021  Sodium 141, potassium 4.8, BUN 22, creatinine 0.91, glucose 123, albumin 2.9  Hemoglobin 10.3, WBC 5.3, platelets 784  Lab work 05/22/2021 WBC 6.9, hgb 10.2, platelets 148, Glucose 124, bun 21, creatinine 0.78, Calcium 8.8, sodium 142, potassium 5.3, chloride 109, alkaline phosphatase 141, alt 22, ast 20, albumin 2.8, GFR >60  Lab work 5/23/2021: Glucose 150, BUN 20, creatinine 0.85, calcium 8.8, sodium 138, potassium 4.9, alkaline phosphatase 127, ALT 23, AST 19, albumin 2.7, GFR> 60, WBC 5.5, hemoglobin 9.9, platelets 698.  9/87/9734  Sodium 137, potassium 5.2, creatinine 0.79, BUN 22, albumin 2.7  Hemoglobin 9.7, WBC 6.8, platelets 647  Lab work 5/26/2021: WBC 9.4, hemoglobin 9.5, glucose 239, glucose 153, BUN 30, creatinine 1.14, calcium 8.8, sodium 138, potassium 5.7, alkaline phosphatase 122, ALT 61, AST 4, albumin 3.0, GFR 45, INR 1.7, repeat potassium 6.2. History of present illness  81years old female who recently had L1 kyphoplasty presented to the ER with a chief complaint of back pain.  She had a kyphoplasty about a month ago for compression fracture L1.  Her pain got very severe.  She has been also having dizziness and she had a near syncope about 10 days ago.  No chest pain no palpitation. On admission her ECG showed A. fib with RVR heart rate 159.  She was started on Cardizem infusion.  She developed hypotension and dose of Cardizem infusion was reduced to 5 mg/h.  Later on it was discontinued and started on carvedilol 6.25 mg twice a day.  Heart rate improved significantly.     Current evaluation   Patient seen and examined. She was resting on bed did not appear in any distress  Denies chest pain, shortness of breath, palpitations. No bloody stools overnight. At present hemodynamically stable  Did not appear in any significant pain  Patient's potassium was 6.2. Primary care ordered insulin, dextrose 50. Low-grade temp 99, heart rate 94, blood pressure 119/76, room air 97%.       Medications:   Scheduled Meds:   warfarin (COUMADIN) daily dosing (placeholder)   Other RX Placeholder    polyethylene glycol  17 g Oral Daily    sodium chloride flush  5-40 mL Intravenous 2 times per day    carvedilol  6.25 mg Oral BID    atorvastatin  40 mg Oral Daily    ferrous sulfate  300 mg Oral Daily    pantoprazole  40 mg Oral QAM AC    oxybutynin  15 mg Oral Daily    predniSONE  20 mg Oral Daily    rOPINIRole  0.5 mg Oral 4x Daily    sertraline  50 mg Oral Daily    amiodarone  100 mg Oral BID     Continuous Infusions:   sodium chloride       CBC:   Recent Labs     05/24/21  0604 05/25/21  0458 05/26/21  0454   WBC 6.3 6.8 9.4   HGB 9.3* 9.7* 9.5*    210 239     BMP:    Recent Labs     05/24/21  0604 05/25/21  0458 05/26/21  0454 05/26/21  1348    137 138  --    K 5.4* 5.2 5.7* 6.2*   * 108* 107  --    CO2 25 23 23  --    BUN 19 22 30*  --    CREATININE 0.76 0.79 1.14*  --    GLUCOSE 144* 132* 153*  --      Hepatic:   Recent Labs     05/24/21  0604 05/25/21 0458 05/26/21  0454   AST 25 20 54*   ALT 28 28 61*   BILITOT 0.36 0.44 0.46   ALKPHOS 119* 115* 122*     Troponin: No results for input(s): TROPONINI in the last 72 hours. BNP: No results for input(s): BNP in the last 72 hours. Lipids: No results for input(s): CHOL, HDL in the last 72 hours. Invalid input(s): LDLCALCU  INR:   Recent Labs     05/24/21  2324 05/25/21  0940 05/26/21  0454   INR 1.1 1.2 1.7       Objective:   Vitals: /76   Pulse 80   Temp 99 °F (37.2 °C) (Oral)   Resp 16   Ht 5' 1.5\" (1.562 m)   Wt 156 lb 15.5 oz (71.2 kg)   SpO2 97%   BMI 29.18 kg/m²   General appearance: alert and cooperative with exam  HEENT: Head: Normal, normocephalic, atraumatic. Neck: no JVD and supple, symmetrical, trachea midline  Lungs: diminished breath sounds bibasilar  Heart: irregularly irregular rhythm  Abdomen: soft, non-tender; bowel sounds normal; no masses,  no organomegaly  Extremities: Homans sign is negative, no sign of DVT  Neurologic: Mental status: Alert, oriented, thought content appropriate    EKG: atrial fibrillation, rate . ECHO: reviewed.    Ejection fraction: 55%, mild to moderate AR    Assessment / Acute Cardiac Problems:     A. fib with RVR heart rate control improved  Patient admitted with a severe back pain, recent kyphoplasty  Diastolic CHF  COPD, hypoxia on oxygen  Anemia      Patient Active Problem List:     Hypertension     Nephrolithiasis     Mixed hyperlipidemia     Osteoarthritis     Peritoneal mesothelioma ([facility] [address]     Ovarian cyst     Basal cell carcinoma of nose     Bimalleolar ankle fracture     Epistaxis, recurrent     Cervical spondylosis     Trochanteric bursitis, right hip     Status post bilateral total hip replacement     Medication monitoring encounter     Chronic diastolic heart failure (HCC)     Chronic respiratory failure (HCC)     Giant cell arteritis (HCC)     Paroxysmal atrial fibrillation (HCC)     Urinary frequency     Vitamin D deficiency     Age-related osteoporosis with current pathological fracture     Atrial fibrillation with RVR (HCC)     Gastroesophageal reflux disease without esophagitis     Pulmonary hypertension (HCC)     Lumbosacral spondylosis without myelopathy     YOGESH (acute kidney injury) ([facility] [address]     Closed compression fracture of L1 lumbar vertebra, sequela     Acute midline low back pain with right-sided sciatica     Acute exacerbation of chronic low back pain      Plan of Treatment:   A. Fib: Continue current dose of carvedilol and amiodarone  Rectal bleeding/hemorrhoids: Consult general surgery/GI  Hold heparin infusion for short time. Hypertension: Controlled  Hyperlipidemia: Continue with current dose of Lipitor    Renal consult Dr Jin Akhtar for hyperkalemia. Discussed assessment, medications, labs, and orders Dr. Allison Quintero.     Electronically signed by CANDIS Bradley CNP on 5/26/2021 at 3:24 PM

## 2021-05-26 NOTE — PROGRESS NOTES
Progress Note    5/26/2021   1:29 PM    Name:  Krysten Bailey  MRN:    [id number]     Acct:     [de-identified]   Room:  2115/2115-01  IP Day: 7     Admit Date: 5/19/2021  9:49 AM  PCP: Anneliese Orr DO    Subjective:     C/C:   Chief Complaint   Patient presents with    Back Pain       Interval History: Status: not changed. Patient back pain is well controlled. There have been no reports of bright red blood per rectum overnight. Denies chest pain shortness of breath. Vital signs reviewed and stable    ROS:   all 10 systems reviewed and are negative except as noted    Review of Systems   Constitutional: Negative for chills and fatigue. HENT: Negative for drooling, mouth sores, sneezing and trouble swallowing. Eyes: Negative for redness and itching. Respiratory: Negative for cough, chest tightness and shortness of breath. Cardiovascular: Negative for chest pain, palpitations and leg swelling. Gastrointestinal: Negative for abdominal pain, blood in stool, nausea and vomiting. Endocrine: Negative for heat intolerance and polyphagia. Genitourinary: Negative for difficulty urinating, flank pain and pelvic pain. Musculoskeletal: Negative for arthralgias, joint swelling and neck stiffness. Skin: Negative for color change and pallor. Allergic/Immunologic: Negative for food allergies. Neurological: Negative for dizziness, seizures and headaches. Hematological: Does not bruise/bleed easily. Psychiatric/Behavioral: Negative for agitation, behavioral problems and suicidal ideas. The patient is not hyperactive. Medications: Allergies:    Allergies   Allergen Reactions    Adhesive Tape Rash    Meperidine Nausea Only    Demerol Nausea Only    Ultram [Tramadol Hcl]      Hot flashes    Zocor [Simvastatin]      Muscle aches    Codeine      \"out of it\"    Fentanyl Rash       Current Meds: polyethylene glycol (GoLYTELY) solution 4,000 mL, Once  dextrose 50 % IV solution, PRN  insulin regular (HUMULIN R;NOVOLIN R) injection 10 Units, Once  oxyCODONE-acetaminophen (PERCOCET) 5-325 MG per tablet 1 tablet, Q4H PRN  warfarin (COUMADIN) daily dosing (placeholder), RX Placeholder  baclofen (LIORESAL) tablet 5 mg, TID PRN  polyethylene glycol (GLYCOLAX) packet 17 g, Daily  perflutren lipid microspheres (DEFINITY) injection 2.2 mg, ONCE PRN  sodium chloride flush 0.9 % injection 5-40 mL, 2 times per day  sodium chloride flush 0.9 % injection 5-40 mL, PRN  0.9 % sodium chloride infusion, PRN  acetaminophen (TYLENOL) tablet 650 mg, Q4H PRN  carvedilol (COREG) tablet 6.25 mg, BID  naloxone (NARCAN) injection 0.4 mg, PRN  atorvastatin (LIPITOR) tablet 40 mg, Daily  ferrous sulfate 300 (60 Fe) MG/5ML syrup 300 mg, Daily  pantoprazole (PROTONIX) tablet 40 mg, QAM AC  oxybutynin (DITROPAN-XL) extended release tablet 15 mg, Daily  polyethylene glycol (GLYCOLAX) packet 17 g, Daily PRN  predniSONE (DELTASONE) tablet 20 mg, Daily  rOPINIRole (REQUIP) tablet 0.5 mg, 4x Daily  sertraline (ZOLOFT) tablet 50 mg, Daily  amiodarone (CORDARONE) tablet 100 mg, BID        Data:     Code Status:  Full Code    Family History   Problem Relation Age of Onset    Heart Failure Brother     Heart Attack Brother         triple bypass    Prostate Cancer Brother     Coronary Art Dis Mother     Breast Cancer Mother         dx'd in late 46s   Reinaldo Dickerson Emphysema Father     Colon Cancer Neg Hx     Diabetes Neg Hx     Eclampsia Neg Hx     Hypertension Neg Hx     Ovarian Cancer Neg Hx      Labor Neg Hx     Spont Abortions Neg Hx     Stroke Neg Hx        Social History     Socioeconomic History    Marital status:      Spouse name: Not on file    Number of children: Not on file    Years of education: Not on file    Highest education level: Not on file   Occupational History    Not on file   Tobacco Use    Smoking status: Never Smoker    Smokeless tobacco: Never Used   Vaping Use    Vaping Use: Never used   Substance and Sexual Activity    Alcohol use: Yes     Comment: social    Drug use: No    Sexual activity: Not Currently   Other Topics Concern    Not on file   Social History Narrative    Not on file     Social Determinants of Health     Financial Resource Strain:     Difficulty of Paying Living Expenses:    Food Insecurity:     Worried About Running Out of Food in the Last Year:     [address] in the Last Year:    Transportation Needs:     Lack of Transportation (Medical):  Lack of Transportation (Non-Medical):    Physical Activity:     Days of Exercise per Week:     Minutes of Exercise per Session:    Stress:     Feeling of Stress :    Social Connections:     Frequency of Communication with Friends and Family:     Frequency of Social Gatherings with Friends and Family:     Attends Jewish Services:     Active Member of Clubs or Organizations:     Attends Club or Organization Meetings:     Marital Status:    Intimate Partner Violence:     Fear of Current or Ex-Partner:     Emotionally Abused:     Physically Abused:     Sexually Abused:        I/O (24Hr): Intake/Output Summary (Last 24 hours) at 5/26/2021 1329  Last data filed at 5/26/2021 0911  Gross per 24 hour   Intake 1355.5 ml   Output --   Net 1355.5 ml     Radiology:  MRI THORACIC SPINE WO CONTRAST    Result Date: 5/19/2021  1. No acute abnormality of the thoracic spine. 2. Chronic mild compression deformity of the L1 vertebral body status post prior vertebroplasty. 3. Mild degenerative changes in the lower cervical and upper thoracic spine. 4. Small layering left pleural effusion. 5. Incidental subcentimeter left thyroid nodule. No further follow-up is warranted per guidelines below. RECOMMENDATIONS: Subcentimeter incidental left thyroid nodule. No follow-up imaging is recommended. Reference: J Am Trell Radiol.  2015 Feb;12(2): 143-50     MRI LUMBAR SPINE WO CONTRAST    Result Date: 5/19/2021  . 1. L1 vertebroplasty in association with remote vertebral body compression fracture with approximately 50% height loss. 2. Associated retropulsion of posterior L1 cortex with associated borderline central canal stenosis. 3. L1/L2 mild, L2/L3 moderate, L3/L4 moderate central canal stenosis secondary to encroachment by posterior disc osteophyte complex. 4. L1/L2 moderate bilateral, L2/L3 mild bilateral, L3/L4 moderate bilateral, L4/L5 moderate bilateral neural foraminal stenosis secondary to encroachment by disc osteophyte complex. 5. L3/L4-L5/S1 mild bilateral facet degenerative arthrosis.        Labs:  Recent Results (from the past 24 hour(s))   Anti-Xa Unfract Heparin    Collection Time: 05/25/21  2:58 PM   Result Value Ref Range    Anti-XA Unfrac Heparin 0.16 (L) 0.30 - 0.70 IU/L   Iron and TIBC    Collection Time: 05/25/21  3:58 PM   Result Value Ref Range    Iron 49 37 - 145 ug/dL    TIBC 229 (L) 250 - 450 ug/dL    Iron Saturation 21 20 - 55 %    UIBC 180 112 - 347 ug/dL   Ferritin    Collection Time: 05/25/21  3:58 PM   Result Value Ref Range    Ferritin 329 (H) 13 - 150 ug/L   Folate    Collection Time: 05/25/21  3:58 PM   Result Value Ref Range    Folate 17.6 >4.8 ng/mL   Vitamin B12    Collection Time: 05/25/21  3:58 PM   Result Value Ref Range    Vitamin B-12 1644 (H) 232 - 1245 pg/mL   CBC with DIFF    Collection Time: 05/26/21  4:54 AM   Result Value Ref Range    WBC 9.4 3.5 - 11.0 k/uL    RBC 3.18 (L) 4.0 - 5.2 m/uL    Hemoglobin 9.5 (L) 12.0 - 16.0 g/dL    Hematocrit 28.6 (L) 36 - 46 %    MCV 90.1 80 - 100 fL    MCH 29.8 26 - 34 pg    MCHC 33.1 31 - 37 g/dL    RDW 17.7 (H) 11.5 - 14.9 %    Platelets 477 987 - 031 k/uL    MPV 8.7 6.0 - 12.0 fL    NRBC Automated NOT REPORTED per 100 WBC    Differential Type NOT REPORTED     Immature Granulocytes NOT REPORTED 0 %    Absolute Immature Granulocyte NOT REPORTED 0.00 - 0.30 k/uL    WBC Morphology NOT REPORTED     RBC Morphology NOT REPORTED     Platelet Estimate NOT REPORTED Seg Neutrophils 89 (H) 36 - 66 %    Lymphocytes 5 (L) 24 - 44 %    Monocytes 6 1 - 7 %    Eosinophils % 0 0 - 4 %    Basophils 0 0 - 2 %    Segs Absolute 8.30 1.3 - 9.1 k/uL    Absolute Lymph # 0.50 (L) 1.0 - 4.8 k/uL    Absolute Mono # 0.60 0.1 - 1.3 k/uL    Absolute Eos # 0.00 0.0 - 0.4 k/uL    Basophils Absolute 0.00 0.0 - 0.2 k/uL   Comprehensive Metabolic Panel w/ Reflex to MG    Collection Time: 21  4:54 AM   Result Value Ref Range    Glucose 153 (H) 70 - 99 mg/dL    BUN 30 (H) 8 - 23 mg/dL    CREATININE 1.14 (H) 0.50 - 0.90 mg/dL    Bun/Cre Ratio NOT REPORTED 9 - 20    Calcium 8.8 8.6 - 10.4 mg/dL    Sodium 138 135 - 144 mmol/L    Potassium 5.7 (H) 3.7 - 5.3 mmol/L    Chloride 107 98 - 107 mmol/L    CO2 23 20 - 31 mmol/L    Anion Gap 8 (L) 9 - 17 mmol/L    Alkaline Phosphatase 122 (H) 35 - 104 U/L    ALT 61 (H) 5 - 33 U/L    AST 54 (H) <32 U/L    Total Bilirubin 0.46 0.3 - 1.2 mg/dL    Total Protein 5.0 (L) 6.4 - 8.3 g/dL    Albumin 3.0 (L) 3.5 - 5.2 g/dL    Albumin/Globulin Ratio NOT REPORTED 1.0 - 2.5    GFR Non- 45 (L) >60 mL/min    GFR  55 (L) >60 mL/min    GFR Comment          GFR Staging NOT REPORTED    PROTIME-INR    Collection Time: 21  4:54 AM   Result Value Ref Range    Protime 19.9 (H) 11.8 - 14.6 sec    INR 1.7    Bilirubin, Direct    Collection Time: 21  4:54 AM   Result Value Ref Range    Bilirubin, Direct 0.18 <0.31 mg/dL       Physical Examination:        Vitals:  /76   Pulse 80   Temp 99 °F (37.2 °C) (Oral)   Resp 16   Ht 5' 1.5\" (1.562 m)   Wt 156 lb 15.5 oz (71.2 kg)   SpO2 97%   BMI 29.18 kg/m²   Temp (24hrs), Av.2 °F (36.8 °C), Min:97.7 °F (36.5 °C), Max:99 °F (37.2 °C)    No results for input(s): POCGLU in the last 72 hours. Physical Exam  Vitals reviewed. HENT:      Head: Normocephalic.       Right Ear: External ear normal.      Left Ear: External ear normal.      Nose: Nose normal.      Mouth/Throat:      Mouth: Mucous membranes are moist.      Pharynx: Oropharynx is clear. Eyes:      Conjunctiva/sclera: Conjunctivae normal.   Cardiovascular:      Rate and Rhythm: Normal rate and regular rhythm. Pulses: Normal pulses. Heart sounds: Normal heart sounds. Pulmonary:      Effort: Pulmonary effort is normal.      Breath sounds: Normal breath sounds. Abdominal:      General: Bowel sounds are normal.      Palpations: Abdomen is soft. Musculoskeletal:         General: No tenderness or deformity. Cervical back: Normal range of motion and neck supple. Right lower leg: No edema. Left lower leg: No edema. Comments: Lumbar and thoracic spine exam: Nontender lumbar, thoracic spine   Skin:     General: Skin is warm. Capillary Refill: Capillary refill takes less than 2 seconds. Coloration: Skin is not jaundiced. Neurological:      General: No focal deficit present. Mental Status: She is alert. Mental status is at baseline. Psychiatric:         Mood and Affect: Mood normal.         Behavior: Behavior normal.         Assessment:        Primary Problem  Atrial fibrillation with RVR (HCC)     Principal Problem:    Atrial fibrillation with RVR (HCC)  Active Problems:    Hypertension    Mixed hyperlipidemia    Chronic diastolic heart failure (HCC)    Chronic respiratory failure (HCC)    Gastroesophageal reflux disease without esophagitis    Pulmonary hypertension (HCC)    YOGESH (acute kidney injury) (HCC)    Closed compression fracture of L1 lumbar vertebra, sequela    Acute midline low back pain with right-sided sciatica    Acute exacerbation of chronic low back pain    Iron deficiency anemia    Rectal bleeding    Elevated LFTs  Resolved Problems:    * No resolved hospital problems.  *      Past Medical History:   Diagnosis Date    Asbestos exposure     Atrial fibrillation ([facility] [address]     takes xarelto    Basal cell carcinoma of nose     CAD (coronary artery disease)     CKD (chronic kidney disease) stage 2, GFR 60-89 ml/min     Hyperlipidemia     Hypertension     MDRO (multiple drug resistant organisms) resistance 2/19/2014    E. 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    treated at [facility] PONV (postoperative nausea and vomiting)     Prolonged emergence from general anesthesia     SOB (shortness of breath) on exertion         Plan:        1. Recheck potassium now  2. Insulin Humulin 10 units IV now  3. D50 25 g now  4. Continue KCl  5. EGD and colonoscopy per GI in a.m. 1. CBC, CMP  2. DVT Prophylaxis on Coumadin INR subtherapeutic  3. EPCs  4. PT/OT to evaluate and treat  5. Pain control  6. Replace electrolytes as per sliding scale  7. Home medications reviewed and appropriate medications continued  8.  Reviewed labs and imaging studies from last 24 hours and results explained to patient      Electronically signed by Sophia Ochoa MD

## 2021-05-27 NOTE — PLAN OF CARE
Problem: Falls - Risk of:  Goal: Will remain free from falls  Description: Will remain free from falls  5/27/2021 0550 by Rene De Leon RN  Outcome: Ongoing  Note: Pt able to transfer to and from commode with assistance. No injury noted overnight. Bowel prep unsuccessful during HS. Procedure will be rescheduled for tomorrow. Problem: Falls - Risk of:  Goal: Absence of physical injury  Description: Absence of physical injury  Outcome: Ongoing     Problem: Pain:  Goal: Pain level will decrease  Description: Pain level will decrease  5/27/2021 0550 by Rene De Leon RN  Outcome: Ongoing     Problem: Pain:  Goal: Control of acute pain  Description: Control of acute pain  Outcome: Ongoing     Problem: Pain:  Goal: Control of chronic pain  Description: Control of chronic pain  Outcome: Ongoing     Problem: Cardiac Output - Decreased:  Goal: Hemodynamic stability will improve  Description: Hemodynamic stability will improve  5/27/2021 0550 by Rene De Leon RN  Outcome: Ongoing     Problem: Musculor/Skeletal Functional Status  Goal: Highest potential functional level  Outcome: Ongoing     Problem: Musculor/Skeletal Functional Status  Goal: Absence of falls  Outcome: Ongoing     Problem: Nutrition  Goal: Optimal nutrition therapy  5/27/2021 0550 by Rene De Leon RN  Outcome: Ongoing     Problem: Skin Integrity:  Goal: Will show no infection signs and symptoms  Description: Will show no infection signs and symptoms  Outcome: Ongoing  Note: No new skin issues overnight. LDAs assessed.       Problem: Skin Integrity:  Goal: Absence of new skin breakdown  Description: Absence of new skin breakdown  Outcome: Ongoing

## 2021-05-27 NOTE — CARE COORDINATION
ONGOING DISCHARGE PLAN:    Patient is alert and oriented x4. Spoke with patient regarding discharge plan and patient confirms that plan is now [facility] notified and PM&R consult placed. States if she cannot go to [facility] then wants [facility] - referral sent. Prepping again today for EGD/scope tomorrow. On Xarelto PTA - now on coumadin and wants [facility] cou[facility] - referral sent. Will continue to follow for additional discharge needs.     Electronically signed by Roshan Umaña RN on 5/27/2021 at 3:28 PM

## 2021-05-27 NOTE — PROGRESS NOTES
[facility]    Gastroenterology Daily Progress Note      Patient:   Mindy Yan   :    1935   Facility:   [facility]  Date:     2021  Consultant:   CANDIS Polanco - CNP, CNP      SUBJECTIVE  81 y.o. female admitted 2021 with Atrial fibrillation with RVR ([address] [I48.91] and seen for rectal bleeding and elevated lft's. The pt was seen and examined. Her egd and colonoscopy were cancelled for today, she did not finish the prep and is not clear. Did have another episode of rectal bleeding last night but states there was not as much blood as before. hgb 8. 8. no c/o abdominal pain. lft's further elevated.          OBJECTIVE  Scheduled Meds:   warfarin (COUMADIN) daily dosing (placeholder)   Other RX Placeholder    polyethylene glycol  17 g Oral Daily    sodium chloride flush  5-40 mL Intravenous 2 times per day    carvedilol  6.25 mg Oral BID    atorvastatin  40 mg Oral Daily    ferrous sulfate  300 mg Oral Daily    pantoprazole  40 mg Oral QAM AC    oxybutynin  15 mg Oral Daily    predniSONE  20 mg Oral Daily    rOPINIRole  0.5 mg Oral 4x Daily    sertraline  50 mg Oral Daily    amiodarone  100 mg Oral BID       Vital Signs:  BP (!) 116/95   Pulse 90   Temp 97.8 °F (36.6 °C) (Oral)   Resp 16   Ht 5' 1.5\" (1.562 m)   Wt 156 lb 15.5 oz (71.2 kg)   SpO2 94%   BMI 29.18 kg/m²      Physical Exam:   General Appearance: alert and oriented to person, place and time, well-developed and well-nourished, in no acute distress  Skin: warm and dry, no rash or erythema  Head: normocephalic and atraumatic  Eyes: pupils equal, round, and reactive to light, extraocular eye movements intact, conjunctivae normal  ENT: hearing grossly normal bilaterally  Neck: neck supple and non tender without mass, no thyromegaly or thyroid nodules, no cervical lymphadenopathy   Pulmonary/Chest: clear to auscultation bilaterally- no wheezes, rales or rhonchi, normal air movement, no respiratory distress  Cardiovascular: normal rate, regular rhythm, normal S1 and S2, no murmurs, rubs, clicks or gallops, distal pulses intact, no carotid bruits  Abdomen: soft, non-tender, obese  non-distended, normal bowel sounds, no masses or organomegaly  Extremities: no cyanosis, clubbing or edema  Musculoskeletal: normal range of motion, no joint swelling, deformity or tenderness  Neurologic: no cranial nerve deficit and muscle strength normal    Lab and Imaging Review     CBC  Recent Labs     05/25/21 0458 05/26/21  0454 05/27/21 0441   WBC 6.8 9.4 6.9   HGB 9.7* 9.5* 8.8*   HCT 29.8* 28.6* 26.5*   MCV 91.4 90.1 89.0    239 222       BMP  Recent Labs     05/25/21  0458 05/26/21  0454 05/26/21  1348 05/26/21  1534 05/27/21 0441    138  --   --  135   K 5.2 5.7* 6.2* 5.4* 4.7   * 107  --   --  103   CO2 23 23  --   --  24   BUN 22 30*  --   --  27*   CREATININE 0.79 1.14*  --   --  0.92*   GLUCOSE 132* 153*  --   --  120*   CALCIUM 8.6 8.8  --   --  8.5*       LFTS  Recent Labs     05/25/21 0458 05/26/21 0454 05/27/21 0441   ALKPHOS 115* 122* 124*   ALT 28 61* 103*   AST 20 54* 70*   PROT 4.8* 5.0* 4.8*   BILITOT 0.44 0.46 0.56   BILIDIR  --  0.18  --    LABALBU 2.7* 3.0* 3.0*         PT/INR  Recent Labs     05/25/21  0940 05/26/21  0454 05/27/21  0441   PROTIME 15.2* 19.9* 20.1*   INR 1.2 1.7 1.7         ANEMIA STUDIES  Recent Labs     05/25/21  1558   LABIRON 21   TIBC 229*   FERRITIN 329*   [id number] 1644*   FOLATE 17.6         ASSESSMENT/plan  1.anemia with rectal bleeding, did not complete colonoscopy prep  -instructed to drink rest of the prep today and will discuss need for additional prep with md. Will likely be able to do egd and colonoscopy tomorrow  -clear diet  -trend hh and keep hgb >7  -continue iron replacement      2.elevated lft  iso enzymes and hepatitis panel ordered  Continue to trend    This plan was formulated in collaboration with Dr. Love Son .     Electronically signed by: CANDIS Scales CNP on 5/27/2021 at 7:39 AM

## 2021-05-27 NOTE — ANESTHESIA PRE PROCEDURE
Department of Anesthesiology  Preprocedure Note       Name:  Filippo Moses   Age:  81 y.o.  :  1935                                          MRN:  [id number]         Date:  2021      Surgeon: Alicia Velasco):  Lyle Horta MD    Procedure: Procedure(s):  EGD ESOPHAGOGASTRODUODENOSCOPY  COLONOSCOPY DIAGNOSTIC    Medications prior to admission:   Prior to Admission medications    Medication Sig Start Date End Date Taking?  Authorizing Provider   vitamin E 1000 units capsule Take 1,000 Units by mouth daily    Historical Provider, MD   calcium carbonate 600 MG TABS tablet Take 1 tablet by mouth 2 times daily    Historical Provider, MD   losartan (COZAAR) 100 MG tablet Take 100 mg by mouth daily    Historical Provider, MD   meclizine (ANTIVERT) 25 MG tablet Take 25 mg by mouth daily as needed for Dizziness    Historical Provider, MD   amiodarone (CORDARONE) 200 MG tablet Take 1 tablet by mouth 2 times daily 21   Mora Mcdonald MD   metoprolol tartrate (LOPRESSOR) 25 MG tablet Take 1 tablet by mouth 2 times daily 21   Mora Mcdonald MD   acetaminophen (TYLENOL) 500 MG tablet Take 500 mg by mouth every 6 hours as needed for Pain    Historical Provider, MD   isosorbide mononitrate (IMDUR) 60 MG extended release tablet Take 60 mg by mouth daily    Historical Provider, MD   rivaroxaban (XARELTO) 20 MG TABS tablet Take 20 mg by mouth daily (with breakfast)  21   Historical Provider, MD   diphenhydrAMINE-APAP, sleep, (TYLENOL PM EXTRA STRENGTH)  MG tablet Take 1 tablet by mouth nightly as needed     Historical Provider, MD   torsemide (DEMADEX) 20 MG tablet Take 1 tablet by mouth 2 times daily Take twice a day for 1 months then go back to 20 mg daily 3/4/21   Liliam Funez MD   amoxicillin (AMOXIL) 500 MG capsule Take 2,000 mg by mouth as needed (dental procedures) Dental only 20   Historical Provider, MD   oxybutynin (DITROPAN XL) 15 MG extended release tablet Take 15 mg by mouth daily  7/30/20   Historical Provider, MD   polyethylene glycol (GLYCOLAX) 17 GM/SCOOP powder Take 17 g by mouth daily as needed (constipation)  7/30/20   Historical Provider, MD   Ascorbic Acid (VITAMIN C) 250 MG tablet Take 250 mg by mouth daily    Historical Provider, MD   ferrous sulfate (FLACO-IN-SOL) 75 (15 Fe) MG/ML solution Take 15 mg by mouth daily    Historical Provider, MD   omeprazole (PRILOSEC) 40 MG delayed release capsule Take 40 mg by mouth Daily  12/3/16   Historical Provider, MD   atorvastatin (LIPITOR) 40 MG tablet Take 40 mg by mouth daily  9/22/16   Historical Provider, MD   potassium chloride SA (K-DUR;KLOR-CON M) 20 MEQ tablet Take 1 tablet by mouth 2 times daily 3/10/16   Mely Talamantes MD   rOPINIRole (REQUIP) 0.5 MG tablet Take 0.5 mg by mouth 4 times daily as needed     Historical Provider, MD   Vitamin D (CHOLECALCIFEROL) 1000 UNITS CAPS capsule Take 1,000 Units by mouth 2 times daily    Historical Provider, MD   predniSONE (DELTASONE) 5 MG tablet Take 20 mg by mouth daily     Historical Provider, MD   Omega 3 1000 MG CAPS Take 1,000 mg by mouth daily     Historical Provider, MD   sertraline (ZOLOFT) 50 MG tablet Take 50 mg by mouth daily. Historical Provider, MD       Current medications:    No current facility-administered medications for this visit. No current outpatient medications on file.      Facility-Administered Medications Ordered in Other Visits   Medication Dose Route Frequency Provider Last Rate Last Admin    polyethylene glycol (GoLYTELY) solution 4,000 mL  4,000 mL Per NG tube Once Abhinav Rogers, APRN - CNP        dextrose 50 % IV solution  25 g Intravenous PRN Santino Bran MD   25 g at 05/26/21 1431    oxyCODONE-acetaminophen (PERCOCET) 5-325 MG per tablet 1 tablet  1 tablet Oral Q4H PRN Gio Rodríguez MD   1 tablet at 05/26/21 0408    warfarin (COUMADIN) daily dosing (placeholder)   Other Drake Mcdonnell MD        baclofen (LIORESAL) tablet 5 mg  5 mg Oral TID PRN Stephanie Mtz MD   5 mg at 05/24/21 1817    polyethylene glycol (GLYCOLAX) packet 17 g  17 g Oral Daily Demetris Collins MD   17 g at 05/27/21 0846    perflutren lipid microspheres (DEFINITY) injection 2.2 mg  2 mL Intravenous ONCE PRN Marie Lopez MD        sodium chloride flush 0.9 % injection 5-40 mL  5-40 mL Intravenous 2 times per day Tito Contreras MD   10 mL at 05/27/21 0846    sodium chloride flush 0.9 % injection 5-40 mL  5-40 mL Intravenous PRN Tito Contreras MD        0.9 % sodium chloride infusion  25 mL Intravenous PRN Tito Contreras MD        acetaminophen (TYLENOL) tablet 650 mg  650 mg Oral Q4H PRN Tito Contreras MD   650 mg at 05/19/21 1753    carvedilol (COREG) tablet 6.25 mg  6.25 mg Oral BID Marie Lopez MD   6.25 mg at 05/27/21 0846    naloxone (NARCAN) injection 0.4 mg  0.4 mg Intravenous PRN Martha Edmondson MD        atorvastatin (LIPITOR) tablet 40 mg  40 mg Oral Daily Demetris Collins MD   40 mg at 05/27/21 0846    ferrous sulfate 300 (60 Fe) MG/5ML syrup 300 mg  300 mg Oral Daily Demetris Collins MD   300 mg at 05/27/21 0849    pantoprazole (PROTONIX) tablet 40 mg  40 mg Oral QAM AC Demetris Collins MD   40 mg at 05/27/21 0537    oxybutynin (DITROPAN-XL) extended release tablet 15 mg  15 mg Oral Daily Alcario Agent, MD   15 mg at 05/27/21 0846    polyethylene glycol (GLYCOLAX) packet 17 g  17 g Oral Daily PRN Demetris Collins MD        predniSONE (DELTASONE) tablet 20 mg  20 mg Oral Daily Alcrhonda Agent, MD   20 mg at 05/27/21 0846    rOPINIRole (REQUIP) tablet 0.5 mg  0.5 mg Oral 4x Daily Clevelandario Agent, MD   0.5 mg at 05/27/21 0846    sertraline (ZOLOFT) tablet 50 mg  50 mg Oral Daily Alcario Agent, MD   50 mg at 05/27/21 0846    amiodarone (CORDARONE) tablet 100 mg  100 mg Oral BID Demetris Collins MD   100 mg at 05/27/21 0846       Allergies:     Allergies   Allergen  Nephrolithiasis     Osteoarthritis     Ovarian cyst     removed    Oxygen dependent     2 LNC mainly at night, during the day as needed    Palpitations     Peritoneal mesothelioma ([address] 1999    treated at [facility] (postoperative nausea and vomiting)     Prolonged emergence from general anesthesia     SOB (shortness of breath) on exertion        Past Surgical History:        Procedure Laterality Date    ANKLE FRACTURE SURGERY Right 2013    APPENDECTOMY  1964    BIOPSY / LIGATION TEMPORAL ARTERY Bilateral 2/12/2021    TEMPORAL ARTERY BIOPSY performed by Paola Krause MD at [address] Right    [address]  2002    CATARACT REMOVAL Bilateral     CHOLECYSTECTOMY  2005    COLONOSCOPY     [address] April 2002   [address]    LIVER BIOPSY  2000    LIVER BIOPSY      NASAL ENDOSCOPY Bilateral 11/25/2014    lt nasal cautery with packing    PHOTODYNAMIC THERAPY  April 2000 & Jan. 2002    for peritoneal Mesothelioma    [address]    REVISION TOTAL HIP ARTHROPLASTY Right 1998    ROTATOR CUFF REPAIR  2006    SKIN CANCER EXCISION Bilateral 2006    ankle    SKIN CANCER EXCISION Left Oct. 2014    shoulder    SPINE SURGERY N/A 4/15/2021    KYPHOPLASTY L1 performed by Berry Sharma MD at [address] Left 2003    TUBAL LIGATION  1969    UPPER GASTROINTESTINAL ENDOSCOPY      WISDOM TOOTH EXTRACTION         Social History:    Social History     Tobacco Use    Smoking status: Never Smoker    Smokeless tobacco: Never Used   Substance Use Topics    Alcohol use: Yes     Comment: social                                Counseling given: Not Answered      Vital Signs (Current): There were no vitals filed for this visit.                                            BP Readings from Last 3 Encounters:   05/27/21 (!) 111/90   04/23/21 98/68

## 2021-05-27 NOTE — PROGRESS NOTES
Physical Therapy  Facility/Department: [facility] PROGRESSIVE CARE  Daily Treatment Note  NAME: Zehra Butler  : 1935  MRN: [id number]       21 0945   Restrictions/Precautions   Restrictions/Precautions Fall Risk;Up as Tolerated  (peripheral IV right antecubital and left wrist, O2 at 3 L, external urinary catheter)   Required Braces or Orthoses? No   Implants present? Metal implants  ((bilateral THR, cardiac stent, metal plate and screws right ankle))   Position Activity Restriction   Other position/activity restrictions left leg 1/2\" shorter than the right (S/P BILATERAL THRs)   Subjective   Subjective t in bed upon entry, agreeable to therapy. General Comment   Comments Pt continued to be very cooperative. Pain Assessment   Pain Assessment 0-10   Pain Level 2   Pain Type Chronic pain   Pain Location Back   Pain Orientation Right;Left   Oxygen Therapy   SpO2 98 %   Pulse Oximeter Device Mode Intermittent   Pulse Oximeter Device Location Finger   O2 Device None (Room air)   Orientation   Overall Orientation Status WFL   Bed Mobility   Scooting Stand by assistance  (Toward HOB)   Transfers   Sit to Stand Contact guard assistance   Stand to sit Contact guard assistance   Stand Pivot Transfers Contact guard assistance   Comment Cues for hand placement   Ambulation   Ambulation? Yes   Ambulation 1   Surface level tile   Device Rolling Walker   Assistance Contact guard assistance   Quality of Gait Slow and steady, no LOB   Gait Deviations Slow Manuela;Decreased step length;Decreased step height   Distance 40' x 1  (1 rest break in between activity)   Comments Cues to stay inside of walker   Stairs/Curb   Stairs? No   Balance   Posture Good   Sitting - Static Good   Sitting - Dynamic Good;-   Standing - Static Fair;+   Standing - Dynamic Fair   Comments with RW   Other exercises   Other exercises?  Yes   Other exercises 1 AROM both L/E seated x 10   Other exercises 2 her ex both U/E with MIN (Orange) T-band Times per day   (5-7 treatments/ week)   Current Treatment Recommendations Strengthening; Functional Mobility Training; Safety Education & Training;Gait Training;Transfer Training;ROM;Balance Training;Stair training; Endurance Training;Patient/Caregiver Education & Training;Positioning;Equipment Evaluation, Education, & procurement;Home Exercise Program   Safety Devices   Type of devices All fall risk precautions in place;Call light within reach; Patient at risk for falls;Nurse notified;Gait belt;Left in chair   PT Whiteboard Notes   Therapy Whiteboard 5/26/2121 ** Home w/ assist vs rehab; Pt amb 40' x 1 with RW, CGA/SBA x 1. Bed Mob/SBA cues for hand placement. Ther ex seated x 10, both U/E w/ MIN (Orange) T-band x 5.  FALL RISK, 9:45-10:23 (38 mins)   Electronically signed by Barbra Dowd PTA on 5/26/2021 at 9:46 PM

## 2021-05-27 NOTE — PROGRESS NOTES
NG tube placed LT nare. Patient tolerated very well.   Xray to confirm placment is ordered prior prep

## 2021-05-27 NOTE — PROGRESS NOTES
[facility] radiology called RN for the KUB results. This RN advanced the NG tube 5cm per radiology request. Patient tolerated well and can now start to have her prep.

## 2021-05-27 NOTE — FLOWSHEET NOTE
PT and her  have three children and two of daughters work at [employer]. 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  said that hey live less than 4 miles from the hospital and all their children live close by which is very good. He states that they have good in-laws and grand children. Welcomed prayer. 05/27/21 1537   Encounter Summary   Services provided to: Patient and family together   Referral/Consult From: [address]; Children   Continue Visiting   (5-27-21)   Complexity of Encounter Moderate   Length of Encounter 30 minutes   Spiritual Assessment Completed Yes   Spiritual/Latter day   Type Spiritual support   Assessment Calm; Approachable; Hopeful;Coping;Peaceful   Intervention Active listening;Explored feelings, thoughts, concerns;Prayer;Sustaining presence/ Ministry of presence   Outcome Hopeful;Receptive;Coping;Encouraged;Comfort;Expressed gratitude;Expressed feelings of yane, peace, and/or awe; Shared life review;Engaged in conversation

## 2021-05-27 NOTE — PLAN OF CARE
Case d/w dr Ezequiel Mijares pt agreeable to have NG for colonoscopy prep orders placed . Nicki Bradford, APRN - CNP

## 2021-05-27 NOTE — PLAN OF CARE
Nutrition Problem #1: Inadequate oral intake  Intervention: Food and/or Nutrient Delivery: Continue Current Diet  Nutritional Goals: po intake 75% or more

## 2021-05-27 NOTE — PROGRESS NOTES
RN contacted Dr. Thirza Bence regarding patient progress with bowel prep overnight. Patient was only able to tolerate half of prep and still had formed stool. Per Dr. Thirza Bence we will cancel procedure today and let the patient finish drinking the prep. Ok to switch to clear liquids for the day while she continues the prep.

## 2021-05-27 NOTE — PROGRESS NOTES
Date:   5/27/2021  Patient name: Clyde Gill  Date of admission:  5/19/2021  9:49 AM  MRN:   [id number]  YOB: 1935  PCP: Gilberto Ervin DO    Reason for Admission: Atrial fibrillation with RVR [facility]) [I48.91]    Cardiology follow-up: A. fib with RVR       Impression      A. fib with RVR  Diastolic CHF  Hypertension  COPD, hypoxia, on oxygen at home  Hyperlipidemia  Exertional shortness of breath, hypoxia  History of CAD, stent placement LAD 2002  Polymyalgia rheumatica  Moderate aortic regurgitation  Giant cell arteritis  chronic anemia  history of peritoneal/omental mesothelioma surgery done at [address] [place] [address]  Dry mouth, dry eyes  Multiple hip surgeries, bilateral  Chronic pain syndrome  L1 kyphoplasty 4/15/2021  Hemorrhoids, rectal bleeding    2D echo 5/21/2021  Normal LV size and wall thickness ejection fraction more than 55% normal size LA and RA, RVSP 21, no significant pericardial effusion     ECG 5/19/2021  A. fib/a flutter heart rate 159, nonspecific ST-T changes     X-ray lumbar spine 5/19/2021  Unchanged appearance of the lumbar spine, status post vertebroplasty at L1, no new osseous abnormality  Moderate stool burden, previously administered oral contrast is present in the distal colon    Abdominal x-ray 5/27/2021 enteric tube needs further advancement approximately 5 cm.     Lab work 5/19/2021  Sodium 140, potassium 5.2, BUN 30, creatinine 1.42, GFR 35, alkaline phosphatase 133, bilirubin 0.60  Troponin XX 3, myoglobin 28  WBC 7.7, hemoglobin 10.9, platelets 372  Lab work 5/20/2021  Sodium 145, potassium 5.0, CO2 28, chloride 110, BUN 28, creatinine 1.19, hemoglobin 10.1  5/21/2021  Sodium 141, potassium 4.8, BUN 22, creatinine 0.91, glucose 123, albumin 2.9  Hemoglobin 10.3, WBC 5.3, platelets 761  Lab work 05/22/2021 WBC 6.9, hgb 10.2, platelets 620, Glucose 124, bun 21, creatinine 0.78, Calcium 8.8, sodium 142, potassium 5.3, chloride 109, alkaline phosphatase 141, alt 22, ast 20, albumin 2.8, GFR >60  Lab work 5/23/2021: Glucose 150, BUN 20, creatinine 0.85, calcium 8.8, sodium 138, potassium 4.9, alkaline phosphatase 127, ALT 23, AST 19, albumin 2.7, GFR> 60, WBC 5.5, hemoglobin 9.9, platelets 709.  7/35/2865  Sodium 137, potassium 5.2, creatinine 0.79, BUN 22, albumin 2.7  Hemoglobin 9.7, WBC 6.8, platelets 808  Lab work 5/26/2021: WBC 9.4, hemoglobin 9.5, glucose 239, glucose 153, BUN 30, creatinine 1.14, calcium 8.8, sodium 138, potassium 5.7, alkaline phosphatase 122, ALT 61, AST 4, albumin 3.0, GFR 45, INR 1.7, repeat potassium 6.2. Work 5/27/2021 WBC 6.9, hemoglobin 8.8, platelets 753, INR 1.7, glucose 120, BUN 27, creatinine 0.92, calcium 8.5, sodium 135, potassium 4.7, , AST 70, albumin 3.0, GFR 58. History of present illness  81years old female who recently had L1 kyphoplasty presented to the ER with a chief complaint of back pain.  She had a kyphoplasty about a month ago for compression fracture L1.  Her pain got very severe.  She has been also having dizziness and she had a near syncope about 10 days ago.  No chest pain no palpitation. On admission her ECG showed A. fib with RVR heart rate 159.  She was started on Cardizem infusion.  She developed hypotension and dose of Cardizem infusion was reduced to 5 mg/h.  Later on it was discontinued and started on carvedilol 6.25 mg twice a day.  Heart rate improved significantly.     Current evaluation   Patient seen and examined. She was resting on bed did not appear in any distress  Nursing at bedside and was administering Go-Lytely through her NG tube. EGD/colonoscopy was cancelled due to she did not clear the prep this morning. Denies chest pain, shortness of breath, palpitations. She did have episode of rectal bleeding last night. At present hemodynamically stable  Did not appear in any significant pain  Afebrile,, heart rate 95, blood pressure 111/90, room air 95%.       Medications:   Scheduled Meds:   warfarin (COUMADIN) daily dosing (placeholder)   Other RX Placeholder    polyethylene glycol  17 g Oral Daily    sodium chloride flush  5-40 mL Intravenous 2 times per day    carvedilol  6.25 mg Oral BID    atorvastatin  40 mg Oral Daily    ferrous sulfate  300 mg Oral Daily    pantoprazole  40 mg Oral QAM AC    oxybutynin  15 mg Oral Daily    predniSONE  20 mg Oral Daily    rOPINIRole  0.5 mg Oral 4x Daily    sertraline  50 mg Oral Daily    amiodarone  100 mg Oral BID     Continuous Infusions:   sodium chloride       CBC:   Recent Labs     05/25/21  0458 05/26/21 0454 05/27/21 0441   WBC 6.8 9.4 6.9   HGB 9.7* 9.5* 8.8*    239 222     BMP:    Recent Labs     05/25/21  0458 05/26/21  0454 05/26/21  1348 05/26/21  1534 05/27/21 0441    138  --   --  135   K 5.2 5.7* 6.2* 5.4* 4.7   * 107  --   --  103   CO2 23 23  --   --  24   BUN 22 30*  --   --  27*   CREATININE 0.79 1.14*  --   --  0.92*   GLUCOSE 132* 153*  --   --  120*     Hepatic:   Recent Labs     05/25/21 0458 05/26/21 0454 05/27/21 0441   AST 20 54* 70*   ALT 28 61* 103*   BILITOT 0.44 0.46 0.56   ALKPHOS 115* 122* 124*     Troponin: No results for input(s): TROPONINI in the last 72 hours. BNP: No results for input(s): BNP in the last 72 hours. Lipids: No results for input(s): CHOL, HDL in the last 72 hours. Invalid input(s): LDLCALCU  INR:   Recent Labs     05/25/21  0940 05/26/21 0454 05/27/21 0441   INR 1.2 1.7 1.7       Objective:   Vitals: BP (!) 111/90   Pulse 95   Temp 97.8 °F (36.6 °C) (Oral)   Resp 16   Ht 5' 1.5\" (1.562 m)   Wt 156 lb 15.5 oz (71.2 kg)   SpO2 95%   BMI 29.18 kg/m²   General appearance: alert and cooperative with exam  HEENT: Head: Normal, normocephalic, atraumatic.  NG tube  Neck: no JVD and supple, symmetrical, trachea midline  Lungs: diminished breath sounds bibasilar  Heart: irregularly irregular rhythm  Abdomen: soft, non-tender; bowel sounds normal; no masses,  no organomegaly  Extremities: Homans sign is negative, no sign of DVT  Neurologic: Mental status: Alert, oriented, thought content appropriate    EKG: atrial fibrillation, rate . ECHO: reviewed. Ejection fraction: 55%, mild to moderate AR    Assessment / Acute Cardiac Problems:     A. fib with RVR heart rate control improved  Patient admitted with a severe back pain, recent kyphoplasty  Diastolic CHF  COPD, hypoxia on oxygen  Anemia      Patient Active Problem List:     Hypertension     Nephrolithiasis     Mixed hyperlipidemia     Osteoarthritis     Peritoneal mesothelioma ([address]     Ovarian cyst     Basal cell carcinoma of nose     Bimalleolar ankle fracture     Epistaxis, recurrent     Cervical spondylosis     Trochanteric bursitis, right hip     Status post bilateral total hip replacement     Medication monitoring encounter     Chronic diastolic heart failure (HCC)     Chronic respiratory failure (HCC)     Giant cell arteritis (HCC)     Paroxysmal atrial fibrillation (HCC)     Urinary frequency     Vitamin D deficiency     Age-related osteoporosis with current pathological fracture     Atrial fibrillation with RVR (HCC)     Gastroesophageal reflux disease without esophagitis     Pulmonary hypertension (HCC)     Lumbosacral spondylosis without myelopathy     YOGESH (acute kidney injury) (HCC)     Closed compression fracture of L1 lumbar vertebra, sequela     Acute midline low back pain with right-sided sciatica     Acute exacerbation of chronic low back pain      Plan of Treatment:   A. Fib: Continue current dose of carvedilol and amiodarone  Rectal bleeding/hemorrhoids: Consult general surgery/GI  Hold heparin infusion for short time. Hypertension: Controlled  Hyperlipidemia: Continue with current dose of Lipitor    Renal consult Dr James Murcia for hyperkalemia. NG tube for Go-Lytlely prep. EGD/Colonoscopy in am .  Discussed assessment, medications, labs, and orders Dr. Ju Patel.     Electronically signed by CANDIS Bradley - CNP on 5/27/2021 at 3:39 PM

## 2021-05-27 NOTE — PROGRESS NOTES
Physical Therapy    DATE: 2021    NAME: Tal Toscano  MRN: [id number]   : 1935      Patient not seen this date for Physical Therapy due to: 1325 p.m Treatment cancelled, pt just returned from having NG tube placed.           Electronically signed by Evelia De PTA on 2021 at 1:55 PM

## 2021-05-27 NOTE — PROGRESS NOTES
Patient was seen and examined. Afebrile bowel sounds are stable. Blood work noted. Patient did not tolerate the prep. NG tube was inserted for the prep. For EGD colonoscopy tomorrow. Abdomen is soft.hemoglobin 8.8 from 9.5 yesterday. WBC count is normal. BMP is unremarkable. surgery on standby.

## 2021-05-27 NOTE — PROGRESS NOTES
Progress Note    5/27/2021   12:31 PM    Name:  Maurisio Rasmussen  MRN:    [id number]     Acct:     [de-identified]   Room:  2115/2115-01  IP Day: 8     Admit Date: 5/19/2021  9:49 AM  PCP: Ary Acharya DO    Subjective:     C/C:   Chief Complaint   Patient presents with    Back Pain       Interval History: Status: not changed. Patient had one episode of blood per stool. She was unable to tolerate colonoscopy prep yesterday. Back pain well controlled. Denies chest pain shortness of breath or palpitations. Vital signs reviewed and stable recent labs reviewed potassium 4.7, hemoglobin 8.8, AST 70  alk phos 124  ROS:   all 10 systems reviewed and are negative except as noted    Review of Systems   Constitutional: Negative for chills and fatigue. HENT: Negative for drooling, mouth sores, sneezing and trouble swallowing. Eyes: Negative for redness and itching. Respiratory: Negative for cough, chest tightness and shortness of breath. Cardiovascular: Negative for chest pain, palpitations and leg swelling. Gastrointestinal: Positive for blood in stool. Negative for abdominal pain, nausea and vomiting. Endocrine: Negative for heat intolerance and polyphagia. Genitourinary: Negative for difficulty urinating, flank pain and pelvic pain. Musculoskeletal: Negative for arthralgias, joint swelling and neck stiffness. Skin: Negative for color change and pallor. Allergic/Immunologic: Negative for food allergies. Neurological: Negative for dizziness, seizures and headaches. Hematological: Does not bruise/bleed easily. Psychiatric/Behavioral: Negative for agitation, behavioral problems and suicidal ideas. The patient is not hyperactive. Medications: Allergies:    Allergies   Allergen Reactions    Adhesive Tape Rash    Meperidine Nausea Only    Demerol Nausea Only    Ultram [Tramadol Hcl]      Hot flashes    Zocor [Simvastatin]      Muscle aches    Codeine      \"out of it\"  Fentanyl Rash       Current Meds: polyethylene glycol (GoLYTELY) solution 4,000 mL, Once  dextrose 50 % IV solution, PRN  oxyCODONE-acetaminophen (PERCOCET) 5-325 MG per tablet 1 tablet, Q4H PRN  warfarin (COUMADIN) daily dosing (placeholder), RX Placeholder  baclofen (LIORESAL) tablet 5 mg, TID PRN  polyethylene glycol (GLYCOLAX) packet 17 g, Daily  perflutren lipid microspheres (DEFINITY) injection 2.2 mg, ONCE PRN  sodium chloride flush 0.9 % injection 5-40 mL, 2 times per day  sodium chloride flush 0.9 % injection 5-40 mL, PRN  0.9 % sodium chloride infusion, PRN  acetaminophen (TYLENOL) tablet 650 mg, Q4H PRN  carvedilol (COREG) tablet 6.25 mg, BID  naloxone (NARCAN) injection 0.4 mg, PRN  atorvastatin (LIPITOR) tablet 40 mg, Daily  ferrous sulfate 300 (60 Fe) MG/5ML syrup 300 mg, Daily  pantoprazole (PROTONIX) tablet 40 mg, QAM AC  oxybutynin (DITROPAN-XL) extended release tablet 15 mg, Daily  polyethylene glycol (GLYCOLAX) packet 17 g, Daily PRN  predniSONE (DELTASONE) tablet 20 mg, Daily  rOPINIRole (REQUIP) tablet 0.5 mg, 4x Daily  sertraline (ZOLOFT) tablet 50 mg, Daily  amiodarone (CORDARONE) tablet 100 mg, BID        Data:     Code Status:  Full Code    Family History   Problem Relation Age of Onset    Heart Failure Brother     Heart Attack Brother         triple bypass    Prostate Cancer Brother     Coronary Art Dis Mother     Breast Cancer Mother         dx'd in late 46s   [employer] Emphysema Father     Colon Cancer Neg Hx     Diabetes Neg Hx     Eclampsia Neg Hx     Hypertension Neg Hx     Ovarian Cancer Neg Hx      Labor Neg Hx     Spont Abortions Neg Hx     Stroke Neg Hx        Social History     Socioeconomic History    Marital status:      Spouse name: Not on file    Number of children: Not on file    Years of education: Not on file    Highest education level: Not on file   Occupational History    Not on file   Tobacco Use    Smoking status: Never Smoker    Smokeless tobacco: Never Used   Vaping Use    Vaping Use: Never used   Substance and Sexual Activity    Alcohol use: Yes     Comment: social    Drug use: No    Sexual activity: Not Currently   Other Topics Concern    Not on file   Social History Narrative    Not on file     Social Determinants of Health     Financial Resource Strain:     Difficulty of Paying Living Expenses:    Food Insecurity:     Worried About Running Out of Food in the Last Year:     [address] in the Last Year:    Transportation Needs:     Lack of Transportation (Medical):  Lack of Transportation (Non-Medical):    Physical Activity:     Days of Exercise per Week:     Minutes of Exercise per Session:    Stress:     Feeling of Stress :    Social Connections:     Frequency of Communication with Friends and Family:     Frequency of Social Gatherings with Friends and Family:     Attends Restoration Services:     Active Member of Clubs or Organizations:     Attends Club or Organization Meetings:     Marital Status:    Intimate Partner Violence:     Fear of Current or Ex-Partner:     Emotionally Abused:     Physically Abused:     Sexually Abused:        I/O (24Hr): Intake/Output Summary (Last 24 hours) at 5/27/2021 1231  Last data filed at 5/27/2021 0856  Gross per 24 hour   Intake 950 ml   Output 701 ml   Net 249 ml     Radiology:  No results found.     Labs:  Recent Results (from the past 24 hour(s))   K (Potassium)    Collection Time: 05/26/21  1:48 PM   Result Value Ref Range    Potassium 6.2 (HH) 3.7 - 5.3 mmol/L   POC Glucose Fingerstick    Collection Time: 05/26/21  2:52 PM   Result Value Ref Range    POC Glucose 256 (H) 65 - 105 mg/dL   K (Potassium)    Collection Time: 05/26/21  3:34 PM   Result Value Ref Range    Potassium 5.4 (H) 3.7 - 5.3 mmol/L   CBC with DIFF    Collection Time: 05/27/21  4:41 AM   Result Value Ref Range    WBC 6.9 3.5 - 11.0 k/uL    RBC 2.97 (L) 4.0 - 5.2 m/uL    Hemoglobin 8.8 (L) 12.0 - 16.0 g/dL Hematocrit 26.5 (L) 36 - 46 %    MCV 89.0 80 - 100 fL    MCH 29.5 26 - 34 pg    MCHC 33.1 31 - 37 g/dL    RDW 17.0 (H) 11.5 - 14.9 %    Platelets 599 894 - 179 k/uL    MPV 7.8 6.0 - 12.0 fL    NRBC Automated NOT REPORTED per 100 WBC    Differential Type NOT REPORTED     Seg Neutrophils 86 (H) 36 - 66 %    Lymphocytes 6 (L) 24 - 44 %    Monocytes 8 (H) 1 - 7 %    Eosinophils % 0 0 - 4 %    Basophils 0 0 - 2 %    Immature Granulocytes NOT REPORTED 0 %    Segs Absolute 5.90 1.3 - 9.1 k/uL    Absolute Lymph # 0.40 (L) 1.0 - 4.8 k/uL    Absolute Mono # 0.50 0.1 - 1.3 k/uL    Absolute Eos # 0.00 0.0 - 0.4 k/uL    Basophils Absolute 0.00 0.0 - 0.2 k/uL    Absolute Immature Granulocyte NOT REPORTED 0.00 - 0.30 k/uL    WBC Morphology NOT REPORTED     RBC Morphology NOT REPORTED     Platelet Estimate NOT REPORTED    Comprehensive Metabolic Panel w/ Reflex to MG    Collection Time: 05/27/21  4:41 AM   Result Value Ref Range    Glucose 120 (H) 70 - 99 mg/dL    BUN 27 (H) 8 - 23 mg/dL    CREATININE 0.92 (H) 0.50 - 0.90 mg/dL    Bun/Cre Ratio NOT REPORTED 9 - 20    Calcium 8.5 (L) 8.6 - 10.4 mg/dL    Sodium 135 135 - 144 mmol/L    Potassium 4.7 3.7 - 5.3 mmol/L    Chloride 103 98 - 107 mmol/L    CO2 24 20 - 31 mmol/L    Anion Gap 8 (L) 9 - 17 mmol/L    Alkaline Phosphatase 124 (H) 35 - 104 U/L     (H) 5 - 33 U/L    AST 70 (H) <32 U/L    Total Bilirubin 0.56 0.3 - 1.2 mg/dL    Total Protein 4.8 (L) 6.4 - 8.3 g/dL    Albumin 3.0 (L) 3.5 - 5.2 g/dL    Albumin/Globulin Ratio NOT REPORTED 1.0 - 2.5    GFR Non- 58 (L) >60 mL/min    GFR African American >60 >60 mL/min    GFR Comment          GFR Staging NOT REPORTED    PROTIME-INR    Collection Time: 05/27/21  4:41 AM   Result Value Ref Range    Protime 20.1 (H) 11.8 - 14.6 sec    INR 1.7        Physical Examination:        Vitals:  BP (!) 111/90   Pulse 95   Temp 97.8 °F (36.6 °C) (Oral)   Resp 16   Ht 5' 1.5\" (1.562 m)   Wt 156 lb 15.5 oz (71.2 kg) SpO2 95%   BMI 29.18 kg/m²   Temp (24hrs), Av.8 °F (36.6 °C), Min:97.5 °F (36.4 °C), Max:98.1 °F (36.7 °C)    Recent Labs     21  1452   POCGLU 256*         Physical Exam  Vitals reviewed. HENT:      Head: Normocephalic. Right Ear: External ear normal.      Left Ear: External ear normal.      Nose: Nose normal.      Mouth/Throat:      Mouth: Mucous membranes are moist.      Pharynx: Oropharynx is clear. Eyes:      Conjunctiva/sclera: Conjunctivae normal.   Cardiovascular:      Rate and Rhythm: Normal rate and regular rhythm. Pulses: Normal pulses. Heart sounds: Normal heart sounds. Pulmonary:      Effort: Pulmonary effort is normal.      Breath sounds: Normal breath sounds. Abdominal:      General: Bowel sounds are normal.      Palpations: Abdomen is soft. Musculoskeletal:         General: No tenderness (Lumbar spine and thoracic spine nontender) or deformity. Cervical back: Normal range of motion and neck supple. Right lower leg: No edema. Left lower leg: No edema. Skin:     General: Skin is warm. Capillary Refill: Capillary refill takes less than 2 seconds. Coloration: Skin is not jaundiced. Neurological:      General: No focal deficit present. Mental Status: She is alert. Mental status is at baseline.    Psychiatric:         Mood and Affect: Mood normal.         Behavior: Behavior normal.         Assessment:        Primary Problem  Atrial fibrillation with RVR (HCC)     Principal Problem:    Atrial fibrillation with RVR (HCC)  Active Problems:    Hypertension    Mixed hyperlipidemia    Chronic diastolic heart failure (HCC)    Chronic respiratory failure (HCC)    Gastroesophageal reflux disease without esophagitis    Pulmonary hypertension (HCC)    YOGESH (acute kidney injury) (HCC)    Closed compression fracture of L1 lumbar vertebra, sequela    Acute midline low back pain with right-sided sciatica    Acute exacerbation of chronic low back pain Iron deficiency anemia    Rectal bleeding    Elevated LFTs  Resolved Problems:    * No resolved hospital problems. *      Past Medical History:   Diagnosis Date    Asbestos exposure     Atrial fibrillation [address] [address]     takes xarelto    Basal cell carcinoma of nose     CAD (coronary artery disease)     CKD (chronic kidney disease) stage 2, GFR 60-89 ml/min     Hyperlipidemia     Hypertension     MDRO (multiple drug resistant organisms) resistance 2/19/2014    E. Coli urine    Nephrolithiasis     Osteoarthritis     Ovarian cyst     removed    Oxygen dependent     2 LNC mainly at night, during the day as needed    Palpitations     Peritoneal mesothelioma ([facility] [address] 1999    [facility] at [facility] PONV (postoperative nausea and vomiting)     Prolonged emergence from general anesthesia     SOB (shortness of breath) on exertion         Plan:        1. EEG and colonoscopy per GI in a.m. 1. PPI protonics 40 mg p.o. daily  2. DVT Prophylaxis  INR 1.7  3. CBC, BMP,L FT  4. EPCs  5. PT/OT to evaluate and treat  6. Pain control  7. Replace electrolytes as per sliding scale  8. Home medications reviewed and appropriate medications continued  9.  Reviewed labs and imaging studies from last 24 hours and results explained to patient      Electronically signed by Shadi Bearden MD

## 2021-05-27 NOTE — PROGRESS NOTES
Comprehensive Nutrition Assessment    Type and Reason for Visit:  Reassess    Nutrition Recommendations/Plan: Continue diet as ordered. Follow for nutrition progression. Nutrition Assessment:  Pt was unable to drink all the prep for colonoscopy therefore unable to do it today. Pt was willing to get NG placed to provide the prep with plan for colonoscopy tomorrow. Malnutrition Assessment:  Malnutrition Status: At risk for malnutrition (Comment)    Context:  Acute Illness     Findings of the 6 clinical characteristics of malnutrition:  Energy Intake:  Mild decrease in energy intake (Comment)  Weight Loss:  No significant weight loss     Body Fat Loss:  No significant body fat loss     Muscle Mass Loss:  No significant muscle mass loss    Fluid Accumulation:  No significant fluid accumulation     Strength:  Not Performed    Estimated Daily Nutrient Needs:  Energy (kcal): [place] x 1.2= 1300 kcal; Weight Used for Energy Requirements:  Current (69 kg)     Protein (g):  1.3g/kg= 65 g; Weight Used for Protein Requirements:  Ideal          Nutrition Related Findings:  no edema, Labs (5/21) K 4.8, Med: Prednisone, PMH: CKD      Wounds:  None       Current Nutrition Therapies:    DIET CLEAR LIQUID; No Added Salt (3-4 GM)  Diet NPO, After Midnight Exceptions are: Sips of Water with Meds    Anthropometric Measures:  · Height: 5' 1.5\" (156.2 cm)  · Current Body Weight: 156 lb (70.8 kg)   · Admission Body Weight: 150 lb (68 kg)    · Usual Body Weight: 160 lb (72.6 kg) (2/21)     · Ideal Body Weight: 108 lbs; % Ideal Body Weight     · BMI: 29  · BMI Categories: Overweight (BMI 25.0-29. 9)       Nutrition Diagnosis:   · Inadequate oral intake related to  (current medical condition) as evidenced by intake 0-25%, intake 26-50%, poor intake prior to admission, weight loss      Nutrition Interventions:   Food and/or Nutrient Delivery:  Continue Current Diet  Nutrition Education/Counseling:  No recommendation at this time Coordination of Nutrition Care:  Continue to monitor while inpatient    Goals:  po intake 75% or more       Nutrition Monitoring and Evaluation:   Behavioral-Environmental Outcomes:  None Identified   Food/Nutrient Intake Outcomes:  Food and Nutrient Intake, Supplement Intake  Physical Signs/Symptoms Outcomes:  Biochemical Data, GI Status, Skin, Weight, Fluid Status or Edema     Discharge Planning: Too soon to determine     Some areas of assessment may be incomplete due to COVID-19 precautions. Zeeshan Tavera R.D. L.D.   Clinical Dietitian  Office: [phone]

## 2021-05-28 NOTE — ANESTHESIA POSTPROCEDURE EVALUATION
Department of Anesthesiology  Postprocedure Note    Patient: Ashley Garces  MRN: [id number]  YOB: 1935  Date of evaluation: 5/28/2021  Time:  10:46 AM     Procedure Summary     Date: 05/28/21 Room / Location: [facility] ENDO 02 / [facility] ENDO    Anesthesia Start: 0289 Anesthesia Stop: 1004    Procedures:       EGD ESOPHAGOGASTRODUODENOSCOPY (N/A Esophagus)      COLONOSCOPY POLYPECTOMY HOT BIOPSY (N/A ) Diagnosis: (RECTAL BLEEDING)    Surgeons: Rosa M Bran MD Responsible Provider: Triston Cervantes MD    Anesthesia Type: MAC ASA Status: 3          Anesthesia Type: MAC    Radha Phase I: Radha Score: 10    Radha Phase II:      Last vitals: Reviewed and per EMR flowsheets.        Anesthesia Post Evaluation    Comments: POST- ANESTHESIA EVALUATION       Pt Name: Ashley Garces  MRN: [id number]  YOB: 1935  Date of evaluation: 5/28/2021  Time:  10:46 AM      BP (!) 109/48   Pulse 94   Temp 97.9 °F (36.6 °C) (Infrared)   Resp 14   Ht 5' 1.5\" (1.562 m)   Wt 156 lb 15.5 oz (71.2 kg)   SpO2 93%   BMI 29.18 kg/m²      Consciousness Level  Awake  Cardiopulmonary Status  Stable  Pain Adequately Treated YES  Nausea / Vomiting  NO  Adequate Hydration  YES  Anesthesia Related Complications NONE      Electronically signed by Triston Cervantes MD on 5/28/2021 at 10:46 AM

## 2021-05-28 NOTE — CONSULTS
Physical Medicine & Rehabilitation  Consult Note      Admitting Physician:   Duke Agosto MD    Primary Care Provider:   Jazmine Watters DO     Reason for Consult:  Acute Inpatient Rehabilitation    Chief Complaint: Back Pain    History of Present Illness:  Referring Provider is requesting an evaluation for appropriate placement upon discharge from acute care. History from chart review and patient. Alysa Antonio is a 81 y.o. RHD female admitted to [facility] on 5/19/2021. Patient with progressively worsening low back pain over prior 10 days. She had recent kyphoplasty of thoracic vertebra. Pain management consulted - adjusting Percocet with improved pain control. Dr. Osmani Horowitz performed caudal epidural steroid injection on 5/24/21. Cardiology consulted for atrial fibrillation and CHF history. Orthopedics consulted for chronic back pain with radicular symptoms. Gastroenterology consulted for iron deficiency anemia. Dr. Clayton Duong performed colonoscopy 5/28/21 which showed severe diverticulosis, 1 polyp, no bleeding, small internal hemorrhoids. EGD performed same day showed changes consistent with GERD. They approved restarting anticoagulation post-procedure and signed off. Pharmacy managing INR. She reports significant improvement in her back pain since caudal injection. She does continue to have weakness in upper arms and thighs.      Review of Systems:  Constitutional: negative for anorexia, chills, fatigue, fevers, sweats and weight loss  Eyes: negative for redness and visual disturbance  Ears, nose, mouth, throat, and face: negative for earaches, sore throat and tinnitus  Respiratory: negative for cough and shortness of breath  Cardiovascular: negative for chest pain, dyspnea and palpitations  Gastrointestinal: negative for abdominal pain, change in bowel habits, constipation, nausea and vomiting  Genitourinary:negative for dysuria, frequency, hesitancy and urinary incontinence  Integument/breast: negative for pruritus and rash  Musculoskeletal: positive for stiff joints  Neurological: negative for dizziness, headaches   Behavioral/Psych: negative for decreased appetite, depression and fatigue       Premorbid function:  Modified Independent    Current function:    PT:  Restrictions/Precautions: Fall Risk, Up as Tolerated (peripheral IV right antecubital and left wrist, O2 at 3 L, external urinary catheter)  Implants present? : Metal implants ((bilateral THR, cardiac stent, metal plate and screws right ankle))  Other position/activity restrictions: left leg 1/2\" shorter than the right (S/P BILATERAL THRs)   Transfers  Sit to Stand: Contact guard assistance  Stand to sit: Contact guard assistance  Stand Pivot Transfers: Contact guard assistance  Comment: Cues for hand placement  WB Status: left leg 1/2\" shorter than the right (S/P BILATERAL THRs)  Ambulation 1  Surface: level tile  Device: Rolling Walker  Other Apparatus: O2 (2 L)  Assistance: Contact guard assistance  Quality of Gait: Slow and steady, no LOB  Gait Deviations: Slow Manuela, Decreased step length, Decreased step height  Distance: 40' x 1 (1 rest break in between activity)  Comments: Cues to stay inside of walker    Transfers  Sit to Stand: Contact guard assistance  Stand to sit: Contact guard assistance  Stand Pivot Transfers: Contact guard assistance  Comment: Cues for hand placement  Ambulation  Ambulation?: Yes  WB Status: left leg 1/2\" shorter than the right (S/P BILATERAL THRs)  Ambulation 1  Surface: level tile  Device: Rolling Walker  Other Apparatus: O2 (2 L)  Assistance: Contact guard assistance  Quality of Gait: Slow and steady, no LOB  Gait Deviations: Slow Manuela, Decreased step length, Decreased step height  Distance: 40' x 1 (1 rest break in between activity)  Comments: Cues to stay inside of walker    Surface: level tile  Ambulation 1  Surface: level tile  Device: Rolling Walker  Other Apparatus: O2 (2 L)  Assistance: Contact guard assistance  Quality of Gait: Slow and steady, no LOB  Gait Deviations: Slow Manuela, Decreased step length, Decreased step height  Distance: 40' x 1 (1 rest break in between activity)  Comments: Cues to stay inside of walker      OT:                          Bed mobility  Rolling to Left: Unable to assess  Rolling to Right: Stand by assistance  Supine to Sit: Stand by assistance (Cues to use bedrails)  Sit to Supine: Unable to assess (Pt remained up in chair following therapy.)  Scooting: Stand by assistance (Toward HOB)  Comment: Pt sitting in bedside chair upon arrival.                    SLP:      Past Medical History:        Diagnosis Date    Asbestos exposure     Atrial fibrillation (HCC)     takes xarelto    Basal cell carcinoma of nose     CAD (coronary artery disease)     CKD (chronic kidney disease) stage 2, GFR 60-89 ml/min     Hyperlipidemia     Hypertension     MDRO (multiple drug resistant organisms) resistance 2/19/2014    E.  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    treated at [facility] PONV (postoperative nausea and vomiting)     Prolonged emergence from general anesthesia     SOB (shortness of breath) on exertion        Past Surgical History:        Procedure Laterality Date    ANKLE FRACTURE SURGERY Right 2013    APPENDECTOMY  1964    BIOPSY / LIGATION TEMPORAL ARTERY Bilateral 2/12/2021    TEMPORAL ARTERY BIOPSY performed by Aye Nicole MD at [address] Right    [address]  2002    CATARACT REMOVAL Bilateral     CHOLECYSTECTOMY  2005    COLONOSCOPY      COLONOSCOPY N/A 5/28/2021    COLONOSCOPY POLYPECTOMY SNARE/COLD BIOPSY performed by Amanda Montesinos MD at [address] [facility] [place] [address]  April 2002    HYSTERECTOMY  1981    LIVER BIOPSY  2000    LIVER BIOPSY      NASAL ENDOSCOPY Bilateral 11/25/2014    lt nasal cautery with packing    PHOTODYNAMIC THERAPY  April 2000 & Jan. 2002    for peritoneal Mesothelioma    RECTOCELE REPAIR  1997    REVISION TOTAL HIP ARTHROPLASTY Right 1998    ROTATOR CUFF REPAIR  2006    SKIN CANCER EXCISION Bilateral 2006    ankle    SKIN CANCER EXCISION Left Oct. 2014    shoulder    SPINE SURGERY N/A 4/15/2021    KYPHOPLASTY L1 performed by Kae Cash MD at [address] [place] [address] ARTHROPLASTY Left 2003    TUBAL LIGATION  1969    UPPER GASTROINTESTINAL ENDOSCOPY      UPPER GASTROINTESTINAL ENDOSCOPY N/A 5/28/2021    EGD ESOPHAGOGASTRODUODENOSCOPY performed by Brandi Manuel MD at [facility]         Allergies:     Allergies   Allergen Reactions    Adhesive Tape Rash    Meperidine Nausea Only    Demerol Nausea Only    Ultram [Tramadol Hcl]      Hot flashes    Zocor [Simvastatin]      Muscle aches    Codeine      \"out of it\"    Fentanyl Rash        Current Medications:   Current Facility-Administered Medications: hydrocortisone-aloe 1 % cream, , Topical, BID  heparin (porcine) injection 4,000 Units, 4,000 Units, Intravenous, Once  heparin (porcine) injection 4,000 Units, 4,000 Units, Intravenous, PRN  heparin (porcine) injection 2,000 Units, 2,000 Units, Intravenous, PRN  heparin 25,000 units in dextrose 5% 250 mL (premix) infusion, 356-1,000 Units/hr, Intravenous, Continuous  potassium chloride (KLOR-CON M) extended release tablet 40 mEq, 40 mEq, Oral, PRN **OR** potassium bicarb-citric acid (EFFER-K) effervescent tablet 40 mEq, 40 mEq, Oral, PRN **OR** potassium chloride 10 mEq/100 mL IVPB (Peripheral Line), 10 mEq, Intravenous, PRN  warfarin (COUMADIN) tablet 3 mg, 3 mg, Oral, Once  dextrose 50 % IV solution, 25 g, Intravenous, PRN  oxyCODONE-acetaminophen (PERCOCET) 5-325 MG per tablet 1 tablet, 1 tablet, Oral, Q4H PRN  warfarin (COUMADIN) daily dosing (placeholder), , Other, RX Placeholder  baclofen (LIORESAL) tablet 5 mg, 5 mg, Oral, TID PRN  polyethylene glycol (GLYCOLAX) packet 17 g, 17 g, Oral, Daily  perflutren lipid microspheres (DEFINITY) injection 2.2 mg, 2 mL, Intravenous, ONCE PRN  sodium chloride flush 0.9 % injection 5-40 mL, 5-40 mL, Intravenous, 2 times per day  sodium chloride flush 0.9 % injection 5-40 mL, 5-40 mL, Intravenous, PRN  0.9 % sodium chloride infusion, 25 mL, Intravenous, PRN  acetaminophen (TYLENOL) tablet 650 mg, 650 mg, Oral, Q4H PRN  carvedilol (COREG) tablet 6.25 mg, 6.25 mg, Oral, BID  naloxone (NARCAN) injection 0.4 mg, 0.4 mg, Intravenous, PRN  atorvastatin (LIPITOR) tablet 40 mg, 40 mg, Oral, Daily  ferrous sulfate 300 (60 Fe) MG/5ML syrup 300 mg, 300 mg, Oral, Daily  pantoprazole (PROTONIX) tablet 40 mg, 40 mg, Oral, QAM AC  oxybutynin (DITROPAN-XL) extended release tablet 15 mg, 15 mg, Oral, Daily  polyethylene glycol (GLYCOLAX) packet 17 g, 17 g, Oral, Daily PRN  predniSONE (DELTASONE) tablet 20 mg, 20 mg, Oral, Daily  rOPINIRole (REQUIP) tablet 0.5 mg, 0.5 mg, Oral, 4x Daily  sertraline (ZOLOFT) tablet 50 mg, 50 mg, Oral, Daily  amiodarone (CORDARONE) tablet 100 mg, 100 mg, Oral, BID    Social History:  Lives With: Spouse  Type of Home:  ((condo (2 story))  Home Layout: Two level, Able to Live on Main level with bedroom/bathroom  Home Access: Stairs to enter with rails  Entrance Stairs - Number of Steps: 2  Entrance Stairs - Rails: Both  Bathroom Shower/Tub: Walk-in shower, Doors  Bathroom Toilet: Handicap height  Bathroom Equipment: Built-in shower seat, Grab bars around toilet  Home Equipment: Wheelchair-manual, Crutches, Oxygen, Long-handled shoehorn, Reacher (O2 at 2 L PRN)  ADL Assistance: Independent  Homemaking Assistance: Needs assistance (daugthers assisting w/ laundry, bringing in meals, cleaning and grocery shopping, spouse assists as needed)  Homemaking Responsibilities: No  Ambulation Assistance: Independent (uses crutches)  Social History     Socioeconomic History    Marital status:      Spouse name: None    Number of children: None    Years of education: None    Highest education level: None   Occupational History    None   Tobacco Use    Smoking status: Never Smoker    Smokeless tobacco: Never Used   Vaping Use    Vaping Use: Never used   Substance and Sexual Activity    Alcohol use: Yes     Comment: social    Drug use: No    Sexual activity: Not Currently   Other Topics Concern    None   Social History Narrative    None     Social Determinants of Health     Financial Resource Strain:     Difficulty of Paying Living Expenses:    Food Insecurity:     Worried About Running Out of Food in the Last Year:     Ran Out of Food in the Last Year:    Transportation Needs:     Lack of Transportation (Medical):      Lack of Transportation (Non-Medical):    Physical Activity:     Days of Exercise per Week:     Minutes of Exercise per Session:    Stress:     Feeling of Stress :    Social Connections:     Frequency of Communication with Friends and Family:     Frequency of Social Gatherings with Friends and Family:     Attends Synagogue Services:     Active Member of Clubs or Organizations:     Attends Club or Organization Meetings:     Marital Status:    Intimate Partner Violence:     Fear of Current or Ex-Partner:     Emotionally Abused:     Physically Abused:     Sexually Abused:        Family History:       Problem Relation Age of Onset    Heart Failure Brother     Heart Attack Brother         triple bypass    Prostate Cancer Brother     Coronary Art Dis Mother     Breast Cancer Mother         dx'd in late 46s   Milady Crowe Emphysema Father     Colon Cancer Neg Hx     Diabetes Neg Hx     Eclampsia Neg Hx     Hypertension Neg Hx     Ovarian Cancer Neg Hx      Labor Neg Hx     Spont Abortions Neg Hx     Stroke Neg Hx Diagnostics:    MRI thoracic spine 5/19  1. No acute abnormality of the thoracic spine. 2. Chronic mild compression deformity of the L1 vertebral body status post   prior vertebroplasty. 3. Mild degenerative changes in the lower cervical and upper thoracic spine. 4. Small layering left pleural effusion. 5. Incidental subcentimeter left thyroid nodule.  No further follow-up is   warranted per guidelines below.            MRI lumbar spine 5/19   1. L1 vertebroplasty in association with remote vertebral body compression   fracture with approximately 50% height loss. 2. Associated retropulsion of posterior L1 cortex with associated borderline   central canal stenosis. 3. L1/L2 mild, L2/L3 moderate, L3/L4 moderate central canal stenosis   secondary to encroachment by posterior disc osteophyte complex. 4. L1/L2 moderate bilateral, L2/L3 mild bilateral, L3/L4 moderate bilateral,   L4/L5 moderate bilateral neural foraminal stenosis secondary to encroachment   by disc osteophyte complex. 5. L3/L4-L5/S1 mild bilateral facet degenerative arthrosis.             CBC:   Recent Labs     05/27/21 0441 05/28/21  0443 05/28/21  1311   WBC 6.9 6.5 6.5   RBC 2.97* 3.08* 3.21*   HGB 8.8* 9.2* 9.5*   HCT 26.5* 27.3* 28.2*   MCV 89.0 88.5 87.6   RDW 17.0* 17.0* 16.7*    215 210     BMP:   Recent Labs     05/26/21  0454 05/26/21  1534 05/27/21  0441 05/28/21  0443     --  135 139   K 5.7* 5.4* 4.7 3.4*     --  103 102   CO2 23  --  24 26   BUN 30*  --  27* 22   CREATININE 1.14*  --  0.92* 0.79   GLUCOSE 153*  --  120* 111*      HbA1c:   Lab Results   Component Value Date    LABA1C 5.1 05/10/2019     BNP: No results for input(s): BNP in the last 72 hours.   PT/INR:   Recent Labs     05/27/21 0441 05/28/21  0443 05/28/21  1311   PROTIME 20.1* 18.0* 16.9*   INR 1.7 1.5 1.4     APTT:   Recent Labs     05/28/21  1311   APTT 29.0     CARDIAC ENZYMES: No results for input(s): CKMB, CKMBINDEX, TROPONINT in subtherapeutic    It was my pleasure to evaluate Luisa A Schimming today. Please call with questions. Mojgan Benton MD        This note is created with the assistance of a speech recognition program.  While intending to generate a document that actually reflects the content of the visit, the document can still have some errors including those of syntax and sound a like substitutions which may escape proof reading. In such instances, actual meaning can be extrapolated by contextual diversion.

## 2021-05-28 NOTE — PROGRESS NOTES
Pharmacy Note  Warfarin Consult follow-up      Recent Labs     05/26/21  0454 05/27/21  0441 05/28/21  0443   INR 1.7 1.7 1.5     Recent Labs     05/26/21  0454 05/27/21  0441 05/28/21  0443   HGB 9.5* 8.8* 9.2*   HCT 28.6* 26.5* 27.3*    222 215       Significant Drug-Drug Interactions:  Current warfarin drug-drug interactions: ropinirole, sertraline, prednisone, amiodarone, atorvastatin      Date             INR        Dose given previous day  Dose scheduled for today  5/28/2021            1.5       held         hold        Notes:  Continue to hold for procedure today. Daily PT/INR while inpatient.   Alexandr Mcgarry RPh  5/28/2021  8:14 AM

## 2021-05-28 NOTE — PROGRESS NOTES
Patient was seen and examined. Awake alert in no acute distress. EGD colonoscopy noted. EGD showed changes of GERD. Colonoscopy showed 1 polyp suboptimal prep diverticular disease small internal hemorrhoid no signs of bleeding. Abdomen is soft. Extremity nontender. Blood work was reviewed. Hemoglobin is stable. Continue medical management. No surgical intervention at this time. Surgically stable for discharge when okay with others. Discussed with patient and family.

## 2021-05-28 NOTE — PROGRESS NOTES
Date:   5/28/2021  Patient name: Denver Meneses  Date of admission:  5/19/2021  9:49 AM  MRN:   [id number]  YOB: 1935  PCP: Cihn Tong DO    Reason for Admission: Atrial fibrillation with RVR [facility]) [I48.91]    Cardiology follow-up: A. fib with RVR       Impression      A. fib with RVR  Diastolic CHF  Hypertension  COPD, hypoxia, on oxygen at home  Hyperlipidemia  Exertional shortness of breath, hypoxia  History of CAD, stent placement LAD 2002  Polymyalgia rheumatica  Moderate aortic regurgitation  Giant cell arteritis  chronic anemia  history of peritoneal/omental mesothelioma surgery done at [address] [place] [address]  Dry mouth, dry eyes  Multiple hip surgeries, bilateral  Chronic pain syndrome  L1 kyphoplasty 4/15/2021  Hemorrhoids, rectal bleeding    2D echo 5/21/2021  Normal LV size and wall thickness ejection fraction more than 55% normal size LA and RA, RVSP 21, no significant pericardial effusion     ECG 5/19/2021  A. fib/a flutter heart rate 159, nonspecific ST-T changes     X-ray lumbar spine 5/19/2021  Unchanged appearance of the lumbar spine, status post vertebroplasty at L1, no new osseous abnormality  Moderate stool burden, previously administered oral contrast is present in the distal colon    Abdominal x-ray 5/27/2021 enteric tube needs further advancement approximately 5 cm.     Lab work 5/19/2021  Sodium 140, potassium 5.2, BUN 30, creatinine 1.42, GFR 35, alkaline phosphatase 133, bilirubin 0.60  Troponin XX 3, myoglobin 28  WBC 7.7, hemoglobin 10.9, platelets 305  Lab work 5/20/2021  Sodium 145, potassium 5.0, CO2 28, chloride 110, BUN 28, creatinine 1.19, hemoglobin 10.1  5/21/2021  Sodium 141, potassium 4.8, BUN 22, creatinine 0.91, glucose 123, albumin 2.9  Hemoglobin 10.3, WBC 5.3, platelets 337  Lab work 05/22/2021 WBC 6.9, hgb 10.2, platelets 649, Glucose 124, bun 21, creatinine 0.78, Calcium 8.8, sodium 142, potassium 5.3, chloride 109, alkaline phosphatase 141, alt 22, ast 20, albumin 2.8, GFR >60  Lab work 5/23/2021: Glucose 150, BUN 20, creatinine 0.85, calcium 8.8, sodium 138, potassium 4.9, alkaline phosphatase 127, ALT 23, AST 19, albumin 2.7, GFR> 60, WBC 5.5, hemoglobin 9.9, platelets 916.  3/32/6958  Sodium 137, potassium 5.2, creatinine 0.79, BUN 22, albumin 2.7  Hemoglobin 9.7, WBC 6.8, platelets 849  Lab work 5/26/2021: WBC 9.4, hemoglobin 9.5, glucose 239, glucose 153, BUN 30, creatinine 1.14, calcium 8.8, sodium 138, potassium 5.7, alkaline phosphatase 122, ALT 61, AST 4, albumin 3.0, GFR 45, INR 1.7, repeat potassium 6.2. Work 5/27/2021 WBC 6.9, hemoglobin 8.8, platelets 746, INR 1.7, glucose 120, BUN 27, creatinine 0.92, calcium 8.5, sodium 135, potassium 4.7, , AST 70, albumin 3.0, GFR 58.   5/28/2021 WBC 6.5, hemoglobin 9.2, platelets 822, glucose 111, BUN 22, creatinine 0.79, calcium 8.2, potassium 3.4, alkaline phosphatase 124, ALT 85, AST 37, BUN 3.0, INR 1.5, magnesium 1.9. History of present illness  81years old female who recently had L1 kyphoplasty presented to the ER with a chief complaint of back pain.  She had a kyphoplasty about a month ago for compression fracture L1.  Her pain got very severe.  She has been also having dizziness and she had a near syncope about 10 days ago.  No chest pain no palpitation. On admission her ECG showed A. fib with RVR heart rate 159.  She was started on Cardizem infusion.  She developed hypotension and dose of Cardizem infusion was reduced to 5 mg/h.  Later on it was discontinued and started on carvedilol 6.25 mg twice a day.  Heart rate improved significantly.     Current evaluation   Patient seen and examined. She was resting on bed did not appear in any distress  Patient EGD and colonoscopy completed this morning. Denies chest pain, shortness of breath, palpitations. She did have episode of rectal bleeding last night.    At present hemodynamically stable  Afebrile,, heart rate , blood pressure 124/86, room air 96%. Medications:   Scheduled Meds:   hydrocortisone-aloe   Topical BID    heparin (porcine)  4,000 Units Intravenous Once    warfarin  3 mg Oral Once    warfarin (COUMADIN) daily dosing (placeholder)   Other RX Placeholder    polyethylene glycol  17 g Oral Daily    sodium chloride flush  5-40 mL Intravenous 2 times per day    carvedilol  6.25 mg Oral BID    atorvastatin  40 mg Oral Daily    ferrous sulfate  300 mg Oral Daily    pantoprazole  40 mg Oral QAM AC    oxybutynin  15 mg Oral Daily    predniSONE  20 mg Oral Daily    rOPINIRole  0.5 mg Oral 4x Daily    sertraline  50 mg Oral Daily    amiodarone  100 mg Oral BID     Continuous Infusions:   heparin (PORCINE) Infusion      sodium chloride       CBC:   Recent Labs     05/27/21 0441 05/28/21 0443 05/28/21  1311   WBC 6.9 6.5 6.5   HGB 8.8* 9.2* 9.5*    215 210     BMP:    Recent Labs     05/26/21  0454 05/26/21  1534 05/27/21 0441 05/28/21  0443     --  135 139   K 5.7* 5.4* 4.7 3.4*     --  103 102   CO2 23  --  24 26   BUN 30*  --  27* 22   CREATININE 1.14*  --  0.92* 0.79   GLUCOSE 153*  --  120* 111*     Hepatic:   Recent Labs     05/26/21  0454 05/27/21 0441 05/28/21  0443   AST 54* 70* 37*   ALT 61* 103* 85*   BILITOT 0.46 0.56 0.68   ALKPHOS 122* 124* 124*     Troponin: No results for input(s): TROPONINI in the last 72 hours. BNP: No results for input(s): BNP in the last 72 hours. Lipids: No results for input(s): CHOL, HDL in the last 72 hours. Invalid input(s): LDLCALCU  INR:   Recent Labs     05/27/21  0441 05/28/21  0443 05/28/21  1311   INR 1.7 1.5 1.4       Objective:   Vitals: /86   Pulse 98   Temp 97.7 °F (36.5 °C) (Oral)   Resp 18   Ht 5' 1.5\" (1.562 m)   Wt 156 lb 15.5 oz (71.2 kg)   SpO2 95%   BMI 29.18 kg/m²   General appearance: alert and cooperative with exam  HEENT: Head: Normal, normocephalic, atraumatic.  NG tube  Neck: no JVD and supple, symmetrical, trachea midline  Lungs: diminished breath sounds bibasilar  Heart: irregularly irregular rhythm  Abdomen: soft, non-tender; bowel sounds normal; no masses,  no organomegaly  Extremities: Homans sign is negative, no sign of DVT  Neurologic: Mental status: Alert, oriented, thought content appropriate    EKG: atrial fibrillation, rate . ECHO: reviewed. Ejection fraction: 55%, mild to moderate AR    Assessment / Acute Cardiac Problems:     A. fib with RVR heart rate control improved  Patient admitted with a severe back pain, recent kyphoplasty  Diastolic CHF  COPD, hypoxia on oxygen  Anemia      Patient Active Problem List:     Hypertension     Nephrolithiasis     Mixed hyperlipidemia     Osteoarthritis     Peritoneal mesothelioma ([facility] [address]     Ovarian cyst     Basal cell carcinoma of nose     Bimalleolar ankle fracture     Epistaxis, recurrent     Cervical spondylosis     Trochanteric bursitis, right hip     Status post bilateral total hip replacement     Medication monitoring encounter     Chronic diastolic heart failure (HCC)     Chronic respiratory failure (HCC)     Giant cell arteritis (HCC)     Paroxysmal atrial fibrillation (HCC)     Urinary frequency     Vitamin D deficiency     Age-related osteoporosis with current pathological fracture     Atrial fibrillation with RVR (HCC)     Gastroesophageal reflux disease without esophagitis     Pulmonary hypertension (HCC)     Lumbosacral spondylosis without myelopathy     YOGESH (acute kidney injury) (HCC)     Closed compression fracture of L1 lumbar vertebra, sequela     Acute midline low back pain with right-sided sciatica     Acute exacerbation of chronic low back pain      Plan of Treatment:   A. Fib: Continue current dose of carvedilol and amiodarone  Rectal bleeding/hemorrhoids: Consult general surgery/GI    Hypertension: Controlled  Hyperlipidemia: Continue with current dose of Lipitor     .   Electronically signed by CANDIS Urias CNP on 5/28/2021 at 1:43 PM

## 2021-05-28 NOTE — PLAN OF CARE
Ok to restart anticoagulation meds. F/u outpt for bx results gi signing off . Michael Sharma, APRN - CNP

## 2021-05-28 NOTE — PROGRESS NOTES
Physical Therapy    DATE: 2021    NAME: David Ralph  MRN: [id number]   : 1935      Patient not seen this date for Physical Therapy due to: Pt unavailable, she had procedure today per nursing staff.           Electronically signed by Coleen Perez PTA on 2021 at 4:24 PM

## 2021-05-28 NOTE — OP NOTE
[facility]     PROCEDURE DATE: 05/28/21    REFERRING PHYSICIAN: No ref. provider found     PRIMARY CARE PROVIDER: Soila Mustafa DO    ATTENDING PHYSICIAN: Marco A Neumann MD     HISTORY: Ms. Emerson Sood is a 81 y.o. female who presents to the [place] [address] Endoscopy unit for colonoscopy. The patient's clinical history is remarkable for iron deficiency anemia. She is currently medically stable and appropriate for the planned procedure. PREOPERATIVE DIAGNOSIS: iron deficiency anemia. PROCEDURES:   Transanal Colonoscopy with polypectomy (snare cautery). POSTPROCEDURE DIAGNOSIS:  Severe diverticulosis  1 polyp  No blood  Small internal hemorrhoids   Suboptimal prep      SPECIMENS: polyp    MEDICATIONS:     MAC per anesthesia    EBL: minimal    INSTRUMENT: Olympus PCF-H190 AL flexible Colonoscope. PREPARATION: The nature and character of the procedure as well as risks, benefits, and alternatives were discussed with the patient and informed consent was obtained. Complications were said to include, but were not limited to: medication allergy, medication reaction, cardiovascular and respiratory problems, bleeding, perforation, infection, and/or missed diagnosis. Following arrival in the endoscopy room, the patient was placed in the left lateral decubitus position and final time-out accomplished in the presence of the nursing staff. Baseline vital signs were obtained and reviewed, and IV sedation was subsequently initiated. FINDINGS: Rectal examination demonstrated no significant visible external abnormality and digital palpation was unremarkable. Following adequate conscious sedation the colonoscope was introduced and advanced under direct visualization to the cecum, which was identified by the ileocecal valve and appendiceal orifice.  The bowel preparation was felt to be suboptimal. This included small amounts of thick stool that was not able to be adequately irrigated and aspirated. Cecal intubation time was 8 minutes. Once maximally inserted, the endoscope was withdrawn and the mucosa was carefully inspected. The mucosal exam was normal.  Very severe sigmoid diverticulosis was noted. Mild diverticulosis was scattered throughout the colon. 8 mm polyp was removed from transverse colon by hot snare. Retroflexion was performed in the rectum and showed small internal hemorrhoids. No blood was noted anywhere in the colon. No gross lesions. RECOMMENDATIONS:   1) Transfer back to the floor for further management as per primary care team.   2) Follow up on pathology report. 3) Preparation-H suppositories as needed for rectal bleeding. If bleeding persists she may need hemorrhoidectomy.        Amanda Vazquez

## 2021-05-28 NOTE — PROGRESS NOTES
Physician Progress Note      Jonathan WALKER #:                  [id number]  :                       1935  ADMIT DATE:       2021 9:49 AM  DISCH DATE:  RESPONDING  PROVIDER #:        Mague Mendoza MD          QUERY TEXT:    Patient admitted with atrial fibrillation and back pain from lumbar   compression fracture, noted to have elevated serum potassium of 5.7 and 6.2 on    with documented hypokalemia in cardiology progress note on . If   possible, please document in progress notes and discharge summary if you are   evaluating and/or treating any of the following: The medical record reflects the following:  Risk Factors: daily potassium supplement, Coreg  Clinical Indicators:  serum potassium 5.7 and 6.2 when repeated  Treatment: glucose and insulin, Lokema;  potassium supplement discontinued  Options provided:  -- Hyperkalemia confirmed and hypokalemia ruled out  -- Other - I will add my own diagnosis  -- Disagree - Not applicable / Not valid  -- Disagree - Clinically unable to determine / Unknown  -- Refer to Clinical Documentation Reviewer    PROVIDER RESPONSE TEXT:    This patient had hyperkalemia, not hypokalemia.     Query created by: Carlos Eduardo Strange on 2021 1:27 PM      Electronically signed by:  Mague Mendoza MD 2021 11:37 AM

## 2021-05-28 NOTE — PROGRESS NOTES
Progress Note    5/28/2021   2:16 PM    Name:  Mario Zeng  MRN:    [id number]     Acct:     [de-identified]   Room:  2115/2115-01  IP Day: 9     Admit Date: 5/19/2021  9:49 AM  PCP: Terence oPllack DO    Subjective:     C/C:   Chief Complaint   Patient presents with    Back Pain       Interval History: Status: not changed. Patient had EGD and colonoscopy per GI today. Denies abdominal pain nausea vomiting chest pain or shortness of breath. Had one stool with bright red blood per rectum. Vital signs reviewed and stable. Recent labs reviewed hemoglobin 9.5 and stable    ROS:   all 10 systems reviewed and are negative except as noted    Review of Systems   Constitutional: Negative for chills and fatigue. HENT: Negative for drooling, mouth sores, sneezing and trouble swallowing. Eyes: Negative for redness and itching. Respiratory: Negative for cough, chest tightness and shortness of breath. Cardiovascular: Negative for chest pain, palpitations and leg swelling. Gastrointestinal: Positive for blood in stool. Negative for abdominal pain, nausea and vomiting. Endocrine: Negative for heat intolerance and polyphagia. Genitourinary: Negative for difficulty urinating, flank pain and pelvic pain. Musculoskeletal: Negative for arthralgias, joint swelling and neck stiffness. Skin: Negative for color change and pallor. Allergic/Immunologic: Negative for food allergies. Neurological: Negative for dizziness, seizures and headaches. Hematological: Does not bruise/bleed easily. Psychiatric/Behavioral: Negative for agitation, behavioral problems and suicidal ideas. The patient is not hyperactive. Medications: Allergies:    Allergies   Allergen Reactions    Adhesive Tape Rash    Meperidine Nausea Only    Demerol Nausea Only    Ultram [Tramadol Hcl]      Hot flashes    Zocor [Simvastatin]      Muscle aches    Codeine      \"out of it\"    Fentanyl Rash       Current Meds: hydrocortisone-aloe 1 % cream, BID  heparin (porcine) injection 4,000 Units, Once  heparin (porcine) injection 4,000 Units, PRN  heparin (porcine) injection 2,000 Units, PRN  heparin 25,000 units in dextrose 5% 250 mL (premix) infusion, Continuous  potassium chloride (KLOR-CON M) extended release tablet 40 mEq, PRN   Or  potassium bicarb-citric acid (EFFER-K) effervescent tablet 40 mEq, PRN   Or  potassium chloride 10 mEq/100 mL IVPB (Peripheral Line), PRN  warfarin (COUMADIN) tablet 3 mg, Once  dextrose 50 % IV solution, PRN  oxyCODONE-acetaminophen (PERCOCET) 5-325 MG per tablet 1 tablet, Q4H PRN  warfarin (COUMADIN) daily dosing (placeholder), RX Placeholder  baclofen (LIORESAL) tablet 5 mg, TID PRN  polyethylene glycol (GLYCOLAX) packet 17 g, Daily  perflutren lipid microspheres (DEFINITY) injection 2.2 mg, ONCE PRN  sodium chloride flush 0.9 % injection 5-40 mL, 2 times per day  sodium chloride flush 0.9 % injection 5-40 mL, PRN  0.9 % sodium chloride infusion, PRN  acetaminophen (TYLENOL) tablet 650 mg, Q4H PRN  carvedilol (COREG) tablet 6.25 mg, BID  naloxone (NARCAN) injection 0.4 mg, PRN  atorvastatin (LIPITOR) tablet 40 mg, Daily  ferrous sulfate 300 (60 Fe) MG/5ML syrup 300 mg, Daily  pantoprazole (PROTONIX) tablet 40 mg, QAM AC  oxybutynin (DITROPAN-XL) extended release tablet 15 mg, Daily  polyethylene glycol (GLYCOLAX) packet 17 g, Daily PRN  predniSONE (DELTASONE) tablet 20 mg, Daily  rOPINIRole (REQUIP) tablet 0.5 mg, 4x Daily  sertraline (ZOLOFT) tablet 50 mg, Daily  amiodarone (CORDARONE) tablet 100 mg, BID        Data:     Code Status:  Full Code    Family History   Problem Relation Age of Onset    Heart Failure Brother     Heart Attack Brother         triple bypass    Prostate Cancer Brother     Coronary Art Dis Mother     Breast Cancer Mother         dx'd in late 46s   Bro Self Emphysema Father     Colon Cancer Neg Hx     Diabetes Neg Hx     Eclampsia Neg Hx     Hypertension Neg Hx     Ovarian Cancer Neg Hx      Labor Neg Hx     Spont Abortions Neg Hx     Stroke Neg Hx        Social History     Socioeconomic History    Marital status:      Spouse name: Not on file    Number of children: Not on file    Years of education: Not on file    Highest education level: Not on file   Occupational History    Not on file   Tobacco Use    Smoking status: Never Smoker    Smokeless tobacco: Never Used   Vaping Use    Vaping Use: Never used   Substance and Sexual Activity    Alcohol use: Yes     Comment: social    Drug use: No    Sexual activity: Not Currently   Other Topics Concern    Not on file   Social History Narrative    Not on file     Social Determinants of Health     Financial Resource Strain:     Difficulty of Paying Living Expenses:    Food Insecurity:     Worried About Running Out of Food in the Last Year:     Ran Out of Food in the Last Year:    Transportation Needs:     Lack of Transportation (Medical):  Lack of Transportation (Non-Medical):    Physical Activity:     Days of Exercise per Week:     Minutes of Exercise per Session:    Stress:     Feeling of Stress :    Social Connections:     Frequency of Communication with Friends and Family:     Frequency of Social Gatherings with Friends and Family:     Attends Taoism Services:     Active Member of Clubs or Organizations:     Attends Club or Organization Meetings:     Marital Status:    Intimate Partner Violence:     Fear of Current or Ex-Partner:     Emotionally Abused:     Physically Abused:     Sexually Abused:        I/O (24Hr): Intake/Output Summary (Last 24 hours) at 2021 1416  Last data filed at 2021 1031  Gross per 24 hour   Intake 655 ml   Output 1900 ml   Net -1245 ml     Radiology:  XR ABDOMEN (KUB) (SINGLE AP VIEW)    Result Date: 2021  Enteric tube needs further advancement, approximately 5 cm. Additional findings, as above.  The findings were sent to the Radiology Staging NOT REPORTED    PROTIME-INR    Collection Time: 21  4:43 AM   Result Value Ref Range    Protime 18.0 (H) 11.8 - 14.6 sec    INR 1.5    Magnesium    Collection Time: 21  4:43 AM   Result Value Ref Range    Magnesium 1.9 1.6 - 2.6 mg/dL   CBC    Collection Time: 21  1:11 PM   Result Value Ref Range    WBC 6.5 3.5 - 11.0 k/uL    RBC 3.21 (L) 4.0 - 5.2 m/uL    Hemoglobin 9.5 (L) 12.0 - 16.0 g/dL    Hematocrit 28.2 (L) 36 - 46 %    MCV 87.6 80 - 100 fL    MCH 29.4 26 - 34 pg    MCHC 33.6 31 - 37 g/dL    RDW 16.7 (H) 11.5 - 14.9 %    Platelets 790 354 - 125 k/uL    MPV 8.5 6.0 - 12.0 fL    NRBC Automated NOT REPORTED per 100 WBC   APTT    Collection Time: 21  1:11 PM   Result Value Ref Range    PTT 29.0 24.0 - 36.0 sec   Protime-INR    Collection Time: 21  1:11 PM   Result Value Ref Range    Protime 16.9 (H) 11.8 - 14.6 sec    INR 1.4        Physical Examination:        Vitals:  /86   Pulse 91   Temp 98.2 °F (36.8 °C) (Oral)   Resp 18   Ht 5' 1.5\" (1.562 m)   Wt 156 lb 15.5 oz (71.2 kg)   SpO2 96%   BMI 29.18 kg/m²   Temp (24hrs), Av.9 °F (36.6 °C), Min:97.7 °F (36.5 °C), Max:98.2 °F (36.8 °C)    Recent Labs     21  1452   POCGLU 256*         Physical Exam  Vitals reviewed. HENT:      Head: Normocephalic. Right Ear: External ear normal.      Left Ear: External ear normal.      Nose: Nose normal.      Mouth/Throat:      Mouth: Mucous membranes are moist.      Pharynx: Oropharynx is clear. Eyes:      Conjunctiva/sclera: Conjunctivae normal.   Cardiovascular:      Rate and Rhythm: Normal rate and regular rhythm. Pulses: Normal pulses. Heart sounds: Normal heart sounds. Pulmonary:      Effort: Pulmonary effort is normal.      Breath sounds: Normal breath sounds. Abdominal:      General: Bowel sounds are normal.      Palpations: Abdomen is soft. Tenderness: There is no abdominal tenderness.  There is no right CVA tenderness or left CVA tenderness. Musculoskeletal:         General: No tenderness or deformity. Cervical back: Normal range of motion and neck supple. Right lower leg: No edema. Left lower leg: No edema. Skin:     General: Skin is warm. Capillary Refill: Capillary refill takes less than 2 seconds. Coloration: Skin is not jaundiced. Neurological:      General: No focal deficit present. Mental Status: She is alert. Mental status is at baseline. Psychiatric:         Mood and Affect: Mood normal.         Behavior: Behavior normal.         Assessment:        Primary Problem  Atrial fibrillation with RVR (HCC)     Principal Problem:    Atrial fibrillation with RVR (HCC)  Active Problems:    Hypertension    Mixed hyperlipidemia    Chronic diastolic heart failure (HCC)    Chronic respiratory failure (HCC)    Gastroesophageal reflux disease without esophagitis    Pulmonary hypertension (HCC)    YOGESH (acute kidney injury) (HCC)    Closed compression fracture of L1 lumbar vertebra, sequela    Acute midline low back pain with right-sided sciatica    Acute exacerbation of chronic low back pain    Iron deficiency anemia    Rectal bleeding    Elevated LFTs  Resolved Problems:    * No resolved hospital problems. *      Past Medical History:   Diagnosis Date    Asbestos exposure     Atrial fibrillation ([address]     takes xarelto    Basal cell carcinoma of nose     CAD (coronary artery disease)     CKD (chronic kidney disease) stage 2, GFR 60-89 ml/min     Hyperlipidemia     Hypertension     MDRO (multiple drug resistant organisms) resistance 2/19/2014    E.  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    [facility] at [facility] PONV (postoperative nausea and vomiting)     Prolonged emergence from general anesthesia     SOB (shortness of breath) on exertion         Plan:        1. Okay to start anticoagulation per GI recommendation  2. IV heparin drip for now  3. Follow general surgery consult for hemorrhoids  4. ARU consult  5. Hydrocortisone/aloe twice daily for hemorrhoids  6. CBC, CMP  1. PPI Protonix p.o. 2. Continue ferrous sulfate  3. DVT Prophylaxis  4. EPCs  5. PT/OT to evaluate and treat  6. Pain control  7. Replace electrolytes as per sliding scale  8. Home medications reviewed and appropriate medications continued  9.  Reviewed labs and imaging studies from last 24 hours and results explained to patient      Electronically signed by Eloise Albarado MD

## 2021-05-28 NOTE — PLAN OF CARE
Problem: Falls - Risk of:  Goal: Will remain free from falls  Description: Will remain free from falls  Outcome: Ongoing  Note: Pt remains free from falls overnight. Up with assistance to commode. Bowel prep completed via NG tube. Goal: Absence of physical injury  Description: Absence of physical injury  Outcome: Ongoing     Problem: Pain:  Goal: Pain level will decrease  Description: Pain level will decrease  Outcome: Ongoing  Goal: Control of acute pain  Description: Control of acute pain  Outcome: Ongoing  Goal: Control of chronic pain  Description: Control of chronic pain  Outcome: Ongoing     Problem: Cardiac Output - Decreased:  Goal: Hemodynamic stability will improve  Description: Hemodynamic stability will improve  Outcome: Ongoing     Problem: Musculor/Skeletal Functional Status  Goal: Highest potential functional level  Outcome: Ongoing  Goal: Absence of falls  Outcome: Ongoing     Problem: Nutrition  Goal: Optimal nutrition therapy  Outcome: Ongoing     Problem: Skin Integrity:  Goal: Will show no infection signs and symptoms  Description: Will show no infection signs and symptoms  Outcome: Ongoing  Goal: Absence of new skin breakdown  Description: Absence of new skin breakdown  Outcome: Ongoing  Note: No new skin issues overnight. LDAs assessed.

## 2021-05-28 NOTE — ANESTHESIA PRE PROCEDURE
Department of Anesthesiology  Preprocedure Note       Name:  Nedra Ibarra   Age:  81 y.o.  :  1935                                          MRN:  [id number]         Date:  2021      Surgeon: Josh Means):  Janet Santoro MD    Procedure: Procedure(s):  EGD ESOPHAGOGASTRODUODENOSCOPY  COLONOSCOPY DIAGNOSTIC    Medications prior to admission:   Prior to Admission medications    Medication Sig Start Date End Date Taking?  Authorizing Provider   vitamin E 1000 units capsule Take 1,000 Units by mouth daily    Historical Provider, MD   calcium carbonate 600 MG TABS tablet Take 1 tablet by mouth 2 times daily    Historical Provider, MD   losartan (COZAAR) 100 MG tablet Take 100 mg by mouth daily    Historical Provider, MD   meclizine (ANTIVERT) 25 MG tablet Take 25 mg by mouth daily as needed for Dizziness    Historical Provider, MD   amiodarone (CORDARONE) 200 MG tablet Take 1 tablet by mouth 2 times daily 21   Vi Nuñez MD   metoprolol tartrate (LOPRESSOR) 25 MG tablet Take 1 tablet by mouth 2 times daily 21   Vi uNñez MD   acetaminophen (TYLENOL) 500 MG tablet Take 500 mg by mouth every 6 hours as needed for Pain    Historical Provider, MD   isosorbide mononitrate (IMDUR) 60 MG extended release tablet Take 60 mg by mouth daily    Historical Provider, MD   rivaroxaban (XARELTO) 20 MG TABS tablet Take 20 mg by mouth daily (with breakfast)  21   Historical Provider, MD   diphenhydrAMINE-APAP, sleep, (TYLENOL PM EXTRA STRENGTH)  MG tablet Take 1 tablet by mouth nightly as needed     Historical Provider, MD   torsemide (DEMADEX) 20 MG tablet Take 1 tablet by mouth 2 times daily Take twice a day for 1 months then go back to 20 mg daily 3/4/21   Osito Prasad MD   amoxicillin (AMOXIL) 500 MG capsule Take 2,000 mg by mouth as needed (dental procedures) Dental only 20   Historical Provider, MD   oxybutynin (DITROPAN XL) 15 MG extended release tablet Take 15 mg by mouth daily  7/30/20   Historical Provider, MD   polyethylene glycol (GLYCOLAX) 17 GM/SCOOP powder Take 17 g by mouth daily as needed (constipation)  7/30/20   Historical Provider, MD   Ascorbic Acid (VITAMIN C) 250 MG tablet Take 250 mg by mouth daily    Historical Provider, MD   ferrous sulfate (FLACO-IN-SOL) 75 (15 Fe) MG/ML solution Take 15 mg by mouth daily    Historical Provider, MD   omeprazole (PRILOSEC) 40 MG delayed release capsule Take 40 mg by mouth Daily  12/3/16   Historical Provider, MD   atorvastatin (LIPITOR) 40 MG tablet Take 40 mg by mouth daily  9/22/16   Historical Provider, MD   potassium chloride SA (K-DUR;KLOR-CON M) 20 MEQ tablet Take 1 tablet by mouth 2 times daily 3/10/16   Scout Hudson MD   rOPINIRole (REQUIP) 0.5 MG tablet Take 0.5 mg by mouth 4 times daily as needed     Historical Provider, MD   Vitamin D (CHOLECALCIFEROL) 1000 UNITS CAPS capsule Take 1,000 Units by mouth 2 times daily    Historical Provider, MD   predniSONE (DELTASONE) 5 MG tablet Take 20 mg by mouth daily     Historical Provider, MD   Omega 3 1000 MG CAPS Take 1,000 mg by mouth daily     Historical Provider, MD   sertraline (ZOLOFT) 50 MG tablet Take 50 mg by mouth daily. Historical Provider, MD       Current medications:    No current facility-administered medications for this visit. No current outpatient medications on file.      Facility-Administered Medications Ordered in Other Visits   Medication Dose Route Frequency Provider Last Rate Last Admin    0.9 % sodium chloride infusion   Intravenous Continuous Jefferson MD Regan        [MAR Hold] dextrose 50 % IV solution  25 g Intravenous PRN Demetris Collins MD   25 g at 05/26/21 1431    [MAR Hold] oxyCODONE-acetaminophen (PERCOCET) 5-325 MG per tablet 1 tablet  1 tablet Oral Q4H PRN Martha Edmondson MD   1 tablet at 05/26/21 0408    [MAR Hold] warfarin (COUMADIN) daily dosing (placeholder)   Other Brock Garcia MD        [facility] Hold] baclofen (LIORESAL) tablet 5 mg  5 mg Oral TID PRN Tobias Vo MD   5 mg at 05/24/21 1817    [MAR Hold] polyethylene glycol (GLYCOLAX) packet 17 g  17 g Oral Daily Fabienne Petty MD   17 g at 05/27/21 0846    [MAR Hold] perflutren lipid microspheres (DEFINITY) injection 2.2 mg  2 mL Intravenous ONCE PRN Asia Marquez MD        [facility] Hold] sodium chloride flush 0.9 % injection 5-40 mL  5-40 mL Intravenous 2 times per day Angelica Garcia MD   10 mL at 05/27/21 2022    [MAR Hold] sodium chloride flush 0.9 % injection 5-40 mL  5-40 mL Intravenous PRN Angelica Garcia MD        [facility] Hold] 0.9 % sodium chloride infusion  25 mL Intravenous PRN Angelica Garcia MD        [facility] Hold] acetaminophen (TYLENOL) tablet 650 mg  650 mg Oral Q4H PRN Angelica Garcia MD   650 mg at 05/19/21 1753    [MAR Hold] carvedilol (COREG) tablet 6.25 mg  6.25 mg Oral BID Asia Marquez MD   6.25 mg at 05/27/21 2022    [MAR Hold] naloxone (NARCAN) injection 0.4 mg  0.4 mg Intravenous PRN Jory Gama MD        [facility] Hold] atorvastatin (LIPITOR) tablet 40 mg  40 mg Oral Daily Fabienne Petty MD   40 mg at 05/27/21 0846    [MAR Hold] ferrous sulfate 300 (60 Fe) MG/5ML syrup 300 mg  300 mg Oral Daily Fabienne Petty MD   300 mg at 05/27/21 0849    [MAR Hold] pantoprazole (PROTONIX) tablet 40 mg  40 mg Oral QAM AC Fabienne Petty MD   40 mg at 05/27/21 0537    [MAR Hold] oxybutynin (DITROPAN-XL) extended release tablet 15 mg  15 mg Oral Daily Fabienne Petty MD   15 mg at 05/27/21 0846    [MAR Hold] polyethylene glycol (GLYCOLAX) packet 17 g  17 g Oral Daily PRN Fabienne Petty MD        [facility] Hold] predniSONE (DELTASONE) tablet 20 mg  20 mg Oral Daily Fabienne Ptety MD   20 mg at 05/27/21 0846    [MAR Hold] rOPINIRole (REQUIP) tablet 0.5 mg  0.5 mg Oral 4x Daily Fabienne Petty MD   0.5 mg at 05/27/21 2023    [MAR Hold] sertraline (ZOLOFT) tablet 50 mg  50 mg Oral Daily Traci Thresa Runner, MD   50 mg at 05/27/21 0846    [MAR Hold] amiodarone (CORDARONE) tablet 100 mg  100 mg Oral BID Aysha Hogan MD   100 mg at 05/27/21 2023       Allergies:     Allergies   Allergen Reactions    Adhesive Tape Rash    Meperidine Nausea Only    Demerol Nausea Only    Ultram [Tramadol Hcl]      Hot flashes    Zocor [Simvastatin]      Muscle aches    Codeine      \"out of it\"    Fentanyl Rash       Problem List:    Patient Active Problem List   Diagnosis Code    Hypertension I10    Nephrolithiasis N20.0    Mixed hyperlipidemia E78.2    Osteoarthritis M19.90    Peritoneal mesothelioma ([facility] [address] C45.1    Ovarian cyst N83.209    Basal cell carcinoma of nose C44.311    Bimalleolar ankle fracture S82.843A    Epistaxis, recurrent R04.0    Cervical spondylosis M47.812    Trochanteric bursitis, right hip M70.61    Status post bilateral total hip replacement Z96.643    Medication monitoring encounter Z51.81    Chronic diastolic heart failure (HCC) I50.32    Chronic respiratory failure ([facility]) J96.10    Giant cell arteritis ([facility]) M31.6    Paroxysmal atrial fibrillation ([facility]) I48.0    Urinary frequency R35.0    Vitamin D deficiency E55.9    Age-related osteoporosis with current pathological fracture M80.00XA    Atrial fibrillation with RVR ([facility]) I48.91    Gastroesophageal reflux disease without esophagitis K21.9    Pulmonary hypertension ([facility]) I27.20    Lumbosacral spondylosis without myelopathy M47.817    YOGESH (acute kidney injury) ([facility]) N17.9    Closed compression fracture of L1 lumbar vertebra, sequela S32.010S    Acute midline low back pain with right-sided sciatica M54.41    Acute exacerbation of chronic low back pain M54.5, G89.29    Iron deficiency anemia D50.9    Rectal bleeding K62.5    Elevated LFTs R79.89       Past Medical History:        Diagnosis Date    Asbestos exposure     Atrial fibrillation (HCC)     takes xarelto    Basal cell carcinoma of nose     CAD (coronary artery disease)     CKD (chronic kidney disease) stage 2, GFR 60-89 ml/min     Hyperlipidemia     Hypertension     MDRO (multiple drug resistant organisms) resistance 2/19/2014    E.  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    treated at [facility] PONV (postoperative nausea and vomiting)     Prolonged emergence from general anesthesia     SOB (shortness of breath) on exertion        Past Surgical History:        Procedure Laterality Date    ANKLE FRACTURE SURGERY Right 2013    APPENDECTOMY  1964    BIOPSY / LIGATION TEMPORAL ARTERY Bilateral 2/12/2021    TEMPORAL ARTERY BIOPSY performed by Yandel Cotter MD at [address] Right    [address]  2002    CATARACT REMOVAL Bilateral     CHOLECYSTECTOMY  2005    [facility]   [address] [facility]    LIVER BIOPSY  2000    LIVER BIOPSY      NASAL ENDOSCOPY Bilateral 11/25/2014    lt nasal cautery with packing    PHOTODYNAMIC THERAPY  April 2000 & Jan. 2002    for peritoneal Mesothelioma    [address]    REVISION TOTAL HIP ARTHROPLASTY Right 1998    ROTATOR CUFF REPAIR  2006    SKIN CANCER EXCISION Bilateral 2006    ankle    SKIN CANCER EXCISION Left Oct. 2014    shoulder    SPINE SURGERY N/A 4/15/2021    KYPHOPLASTY L1 performed by Elosie Hou MD at [address] Left 2003    TUBAL LIGATION  1969    UPPER GASTROINTESTINAL ENDOSCOPY      WISDOM TOOTH EXTRACTION         Social History:    Social History     Tobacco Use    Smoking status: Never Smoker    Smokeless tobacco: Never Used   Substance Use Topics    Alcohol use: Yes     Comment: social                                Counseling given: Not Patient summary reviewed and Nursing notes reviewed   history of anesthetic complications (Delayed emergence): PONV. Airway: Mallampati: II  TM distance: >3 FB   Neck ROM: full  Mouth opening: > = 3 FB Dental: normal exam         Pulmonary:normal exam  breath sounds clear to auscultation  (+) shortness of breath (Oxygen dependent):                             Cardiovascular:    (+) hypertension:, valvular problems/murmurs: AI and MR, CAD:, CABG/stent: no interval change, dysrhythmias: atrial fibrillation, CHF:, JENKINS:, hyperlipidemia      ECG reviewed  Rhythm: regular  Rate: normal                 ROS comment: Left ventricle is normal in size and wall thickness. Global left ventricular systolic function is normal. Estimated LV EF >55 %. No obvious wall motion abnormality seen. Both atria are normal in size. Normal right ventricular size and function. Mild to moderate aortic insufficiency. Mild mitral regurgitation. Mild tricuspid regurgitation. Mild pulmonic insufficiency. No pulmonary hypertension. Estimated right ventricular systolic pressure is 13RDXA. No significant pericardial effusion is seen. Normal aortic root dimension. Neuro/Psych:   (+) neuromuscular disease:,             GI/Hepatic/Renal:   (+) GERD:, liver disease (Abnormal LFTs):, renal disease: CRI and kidney stones,          ROS comment: H/o Rectal bleeding      Patient has an NG tube which she states was used for the prep. Denies any nausea or vomiting. Endo/Other:    (+) blood dyscrasia: anticoagulation therapy and anemia, arthritis: OA and rheumatoid. , electrolyte abnormalities (hypokalemia - K 3.4 - being replaced), malignancy/cancer (peritoneal mesothelioma). Abdominal:           Vascular: negative vascular ROS. Anesthesia Plan      MAC     ASA 3       Induction: intravenous. MIPS: Prophylactic antiemetics administered. Anesthetic plan and risks discussed with patient. Plan discussed with CRNA.                   Beto Gil MD   5/28/2021

## 2021-05-28 NOTE — PLAN OF CARE
Problem: Falls - Risk of:  Goal: Will remain free from falls  Outcome: Ongoing     Problem: Pain:  Goal: Pain level will decrease  Outcome: Ongoing     Problem: Cardiac Output - Decreased:  Goal: Hemodynamic stability will improve  Outcome: Ongoing     Problem: Musculor/Skeletal Functional Status  Goal: Highest potential functional level  Outcome: Ongoing     Problem: Nutrition  Goal: Optimal nutrition therapy  Outcome: Ongoing     Problem: Skin Integrity:  Goal: Will show no infection signs and symptoms  Outcome: Ongoing

## 2021-05-28 NOTE — CARE COORDINATION
SAMMY received a call from Gema in admissions for the ARU. She asked that OT see this patient. SAMMY noted that the patient does not even have OT as an order. SAMMY called Edyta BATES as she is this patient's nurse. SAMMY did ask her for the OT order. SAMMY then spoke with Abhinav Naranjo and if the order can get in the system prior to 3:15 pm, Antonio Ray will be able to see the patient on this date. SAMMY called Edyta back and told her about the time restraint for this patient. ADD:  OT did not get ordered in the time frame that was needed for the patient to be seen on this date.   OT and PT will need to have updated notes in this patient chart from the weekend in order for the ARU to complete their evaluation

## 2021-05-29 NOTE — PROGRESS NOTES
Pharmacy Note  Warfarin Consult follow-up      Recent Labs     05/28/21  0443 05/28/21  1311 05/29/21  0505   INR 1.5 1.4 1.4     Recent Labs     05/28/21  0443 05/28/21  1311 05/29/21  0505   HGB 9.2* 9.5* 8.6*   HCT 27.3* 28.2* 26.5*    210 201       Significant Drug-Drug Interactions:  New warfarin drug-drug interactions: None  Discontinued drug-drug interactions: None  Current warfarin drug-drug interactions: ropinirole, sertraline, prednisone, amiodarone, atorvastatin      Date             INR        Dose given previous day  Dose scheduled for today  5/29/2021            1.4       3 mg           4 mg        Notes:                     Daily PT/INR while inpatient.      Lv Bernal PharmD, [facility] 5/29/2021 8:14 AM

## 2021-05-29 NOTE — PLAN OF CARE
Problem: Falls - Risk of:  Goal: Will remain free from falls  Description: Will remain free from falls  5/29/2021 0506 by David Chicas RN  Outcome: Ongoing  Note: Patient will remain free from falls will continue to monitor  5/28/2021 1817 by James Lesch, RN  Outcome: Ongoing     Problem: Pain:  Goal: Pain level will decrease  Description: Pain level will decrease  5/29/2021 0506 by David Chicas RN  Outcome: Ongoing  Note: Patient will have her pain well controlled will continue to monitor  5/28/2021 1817 by James Lesch, RN  Outcome: Ongoing     Problem: Skin Integrity:  Goal: Will show no infection signs and symptoms  Description: Will show no infection signs and symptoms  5/29/2021 0506 by David Chicas RN  Outcome: Ongoing  Note: Patient will continue to have intact skin will continue to monitor  5/28/2021 1817 by James Lesch, RN  Outcome: Ongoing

## 2021-05-29 NOTE — PROGRESS NOTES
Progress Note    5/29/2021   5:51 PM    Name:  David Ralph  MRN:    [id number]     Acct:     [de-identified]   Room:  2115/2115-01  IP Day: 8     Admit Date: 5/19/2021  9:49 AM  PCP: Caitlyn Wharton DO    Subjective:     C/C:   Chief Complaint   Patient presents with    Back Pain       Interval History: Status: not changed. Patient denies chest pain shortness of breath , blood in stools. Vital signs reviewed and stable. Recent labs reviewed  Hemoglobin 8.6, INR 1.4  ROS:   all 10 systems reviewed and are negative except as noted    Review of Systems   Constitutional: Negative for chills and fatigue. HENT: Negative for drooling, mouth sores, sneezing and trouble swallowing. Eyes: Negative for redness and itching. Respiratory: Negative for cough, chest tightness and shortness of breath. Cardiovascular: Negative for chest pain, palpitations and leg swelling. Gastrointestinal: Negative for abdominal pain, blood in stool, nausea and vomiting. Endocrine: Negative for heat intolerance and polyphagia. Genitourinary: Negative for difficulty urinating, flank pain and pelvic pain. Musculoskeletal: Negative for arthralgias, joint swelling and neck stiffness. Skin: Negative for color change and pallor. Allergic/Immunologic: Negative for food allergies. Neurological: Negative for dizziness, seizures and headaches. Hematological: Does not bruise/bleed easily. Psychiatric/Behavioral: Negative for agitation, behavioral problems and suicidal ideas. The patient is not hyperactive. Medications: Allergies:    Allergies   Allergen Reactions    Adhesive Tape Rash    Meperidine Nausea Only    Demerol Nausea Only    Ultram [Tramadol Hcl]      Hot flashes    Zocor [Simvastatin]      Muscle aches    Codeine      \"out of it\"    Fentanyl Rash       Current Meds: warfarin (COUMADIN) tablet 4 mg, Once  nystatin (MYCOSTATIN) 772032 UNIT/ML suspension 500,000 Units, 4x Daily  enoxaparin (LOVENOX) injection 70 mg, BID  hydrocortisone-aloe 1 % cream, BID  heparin (porcine) injection 4,000 Units, Once  potassium chloride (KLOR-CON M) extended release tablet 40 mEq, PRN   Or  potassium bicarb-citric acid (EFFER-K) effervescent tablet 40 mEq, PRN   Or  potassium chloride 10 mEq/100 mL IVPB (Peripheral Line), PRN  dextrose 50 % IV solution, PRN  oxyCODONE-acetaminophen (PERCOCET) 5-325 MG per tablet 1 tablet, Q4H PRN  warfarin (COUMADIN) daily dosing (placeholder), RX Placeholder  baclofen (LIORESAL) tablet 5 mg, TID PRN  polyethylene glycol (GLYCOLAX) packet 17 g, Daily  perflutren lipid microspheres (DEFINITY) injection 2.2 mg, ONCE PRN  sodium chloride flush 0.9 % injection 5-40 mL, 2 times per day  sodium chloride flush 0.9 % injection 5-40 mL, PRN  0.9 % sodium chloride infusion, PRN  acetaminophen (TYLENOL) tablet 650 mg, Q4H PRN  carvedilol (COREG) tablet 6.25 mg, BID  naloxone (NARCAN) injection 0.4 mg, PRN  atorvastatin (LIPITOR) tablet 40 mg, Daily  ferrous sulfate 300 (60 Fe) MG/5ML syrup 300 mg, Daily  pantoprazole (PROTONIX) tablet 40 mg, QAM AC  oxybutynin (DITROPAN-XL) extended release tablet 15 mg, Daily  polyethylene glycol (GLYCOLAX) packet 17 g, Daily PRN  predniSONE (DELTASONE) tablet 20 mg, Daily  rOPINIRole (REQUIP) tablet 0.5 mg, 4x Daily  sertraline (ZOLOFT) tablet 50 mg, Daily  amiodarone (CORDARONE) tablet 100 mg, BID        Data:     Code Status:  Full Code    Family History   Problem Relation Age of Onset    Heart Failure Brother     Heart Attack Brother         triple bypass    Prostate Cancer Brother     Coronary Art Dis Mother     Breast Cancer Mother         dx'd in late 46s   Deirdre Stabs Emphysema Father     Colon Cancer Neg Hx     Diabetes Neg Hx     Eclampsia Neg Hx     Hypertension Neg Hx     Ovarian Cancer Neg Hx      Labor Neg Hx     Spont Abortions Neg Hx     Stroke Neg Hx        Social History     Socioeconomic History    Marital status:  Spouse name: Not on file    Number of children: Not on file    Years of education: Not on file    Highest education level: Not on file   Occupational History    Not on file   Tobacco Use    Smoking status: Never Smoker    Smokeless tobacco: Never Used   Vaping Use    Vaping Use: Never used   Substance and Sexual Activity    Alcohol use: Yes     Comment: social    Drug use: No    Sexual activity: Not Currently   Other Topics Concern    Not on file   Social History Narrative    Not on file     Social Determinants of Health     Financial Resource Strain:     Difficulty of Paying Living Expenses:    Food Insecurity:     Worried About Running Out of Food in the Last Year:     [address] in the Last Year:    Transportation Needs:     Lack of Transportation (Medical):  Lack of Transportation (Non-Medical):    Physical Activity:     Days of Exercise per Week:     Minutes of Exercise per Session:    Stress:     Feeling of Stress :    Social Connections:     Frequency of Communication with Friends and Family:     Frequency of Social Gatherings with Friends and Family:     Attends Worship Services:     Active Member of Clubs or Organizations:     Attends Club or Organization Meetings:     Marital Status:    Intimate Partner Violence:     Fear of Current or Ex-Partner:     Emotionally Abused:     Physically Abused:     Sexually Abused:        I/O (24Hr): No intake or output data in the 24 hours ending 05/29/21 1751  Radiology:  XR ABDOMEN (KUB) (SINGLE AP VIEW)    Result Date: 5/27/2021  Enteric tube needs further advancement, approximately 5 cm. Additional findings, as above. The findings were sent to the Radiology Results [address] at 2:47 p.m. on 5/27/2021to be communicated to a licensed caregiver.        Labs:  Recent Results (from the past 24 hour(s))   CBC with DIFF    Collection Time: 05/29/21  5:05 AM   Result Value Ref Range    WBC 5.9 3.5 - 11.0 k/uL    RBC 2.93 (L) 4.0 - 5.2 m/uL    Hemoglobin 8.6 (L) 12.0 - 16.0 g/dL    Hematocrit 26.5 (L) 36 - 46 %    MCV 90.4 80 - 100 fL    MCH 29.5 26 - 34 pg    MCHC 32.6 31 - 37 g/dL    RDW 17.4 (H) 11.5 - 14.9 %    Platelets 848 643 - 949 k/uL    MPV 8.7 6.0 - 12.0 fL    NRBC Automated NOT REPORTED per 100 WBC    Differential Type NOT REPORTED     Immature Granulocytes NOT REPORTED 0 %    Absolute Immature Granulocyte NOT REPORTED 0.00 - 0.30 k/uL    WBC Morphology NOT REPORTED     RBC Morphology NOT REPORTED     Platelet Estimate NOT REPORTED     Seg Neutrophils 84 (H) 36 - 66 %    Lymphocytes 6 (L) 24 - 44 %    Monocytes 7 1 - 7 %    Eosinophils % 0 0 - 4 %    Basophils 0 0 - 2 %    Bands 3 0 - 10 %    Segs Absolute 4.96 1.3 - 9.1 k/uL    Absolute Lymph # 0.35 (L) 1.0 - 4.8 k/uL    Absolute Mono # 0.41 0.1 - 1.3 k/uL    Absolute Eos # 0.00 0.0 - 0.4 k/uL    Basophils Absolute 0.00 0.0 - 0.2 k/uL    Absolute Bands # 0.18 0.0 - 1.0 k/uL    Morphology ANISOCYTOSIS PRESENT     Morphology 1+ ELLIPTOCYTES     Morphology 1+ TEARDROPS     Morphology HYPOCHROMIA PRESENT    Comprehensive Metabolic Panel w/ Reflex to MG    Collection Time: 05/29/21  5:05 AM   Result Value Ref Range    Glucose 184 (H) 70 - 99 mg/dL    BUN 20 8 - 23 mg/dL    CREATININE 0.88 0.50 - 0.90 mg/dL    Bun/Cre Ratio NOT REPORTED 9 - 20    Calcium 7.9 (L) 8.6 - 10.4 mg/dL    Sodium 136 135 - 144 mmol/L    Potassium 3.8 3.7 - 5.3 mmol/L    Chloride 104 98 - 107 mmol/L    CO2 23 20 - 31 mmol/L    Anion Gap 9 9 - 17 mmol/L    Alkaline Phosphatase 107 (H) 35 - 104 U/L    ALT 62 (H) 5 - 33 U/L    AST 21 <32 U/L    Total Bilirubin 0.44 0.3 - 1.2 mg/dL    Total Protein 4.4 (L) 6.4 - 8.3 g/dL    Albumin 2.6 (L) 3.5 - 5.2 g/dL    Albumin/Globulin Ratio NOT REPORTED 1.0 - 2.5    GFR Non-African American >60 >60 mL/min    GFR African American >60 >60 mL/min    GFR Comment          GFR Staging NOT REPORTED    PROTIME-INR    Collection Time: 05/29/21  5:05 AM   Result Value Ref Range Protime 17.4 (H) 11.8 - 14.6 sec    INR 1.4        Physical Examination:        Vitals:  /72   Pulse 93   Temp 98.3 °F (36.8 °C) (Oral)   Resp 16   Ht 5' 1.5\" (1.562 m)   Wt 163 lb 9.3 oz (74.2 kg)   SpO2 98%   BMI 30.41 kg/m²   Temp (24hrs), Av.3 °F (36.8 °C), Min:98.1 °F (36.7 °C), Max:98.4 °F (36.9 °C)    No results for input(s): POCGLU in the last 72 hours. Physical Exam  Vitals reviewed. HENT:      Head: Normocephalic. Right Ear: External ear normal.      Left Ear: External ear normal.      Nose: Nose normal.      Mouth/Throat:      Mouth: Mucous membranes are moist.      Pharynx: Oropharynx is clear. Eyes:      Conjunctiva/sclera: Conjunctivae normal.   Cardiovascular:      Rate and Rhythm: Normal rate and regular rhythm. Pulses: Normal pulses. Heart sounds: Normal heart sounds. Pulmonary:      Effort: Pulmonary effort is normal.      Breath sounds: Normal breath sounds. Abdominal:      General: Bowel sounds are normal.      Palpations: Abdomen is soft. Musculoskeletal:         General: No deformity. Cervical back: Normal range of motion and neck supple. Right lower leg: No edema. Left lower leg: No edema. Skin:     General: Skin is warm. Capillary Refill: Capillary refill takes less than 2 seconds. Coloration: Skin is not jaundiced. Neurological:      General: No focal deficit present. Mental Status: She is alert. Mental status is at baseline.    Psychiatric:         Mood and Affect: Mood normal.         Behavior: Behavior normal.         Assessment:        Primary Problem  Atrial fibrillation with RVR (HCC)     Principal Problem:    Atrial fibrillation with RVR (HCC)  Active Problems:    Hypertension    Mixed hyperlipidemia    Chronic diastolic heart failure (HCC)    Chronic respiratory failure (HCC)    Gastroesophageal reflux disease without esophagitis    Pulmonary hypertension (HCC)    YOGESH (acute kidney injury) ([facility] [address]    Closed compression fracture of L1 lumbar vertebra, sequela    Acute midline low back pain with right-sided sciatica    Acute exacerbation of chronic low back pain    Iron deficiency anemia    Rectal bleeding    Elevated LFTs  Resolved Problems:    * No resolved hospital problems. *      Past Medical History:   Diagnosis Date    Asbestos exposure     Atrial fibrillation ([address]     takes xarelto    Basal cell carcinoma of nose     CAD (coronary artery disease)     CKD (chronic kidney disease) stage 2, GFR 60-89 ml/min     Hyperlipidemia     Hypertension     MDRO (multiple drug resistant organisms) resistance 2/19/2014    E. 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    treated at [facility] PONV (postoperative nausea and vomiting)     Prolonged emergence from general anesthesia     SOB (shortness of breath) on exertion         Plan:        1. On Coumadin ,INR subtherapeutic  2. Lovenox 70 mg subcu twice daily for 3 days for bridging  1. PPI Protonix  2. GoAL  INR 2-3  3. ARU consult  4. General surgery and GI input noted  5. DVT Prophylaxis  6. EPCs  7. PT/OT to evaluate and treat  8. Pain control  9. Replace electrolytes as per sliding scale  10. Home medications reviewed and appropriate medications continued  11.  Reviewed labs and imaging studies from last 24 hours and results explained to patient      Electronically signed by Kasey Collins MD

## 2021-05-29 NOTE — PROGRESS NOTES
Patient was seen and examined. Afebrile vital signs are stable. Up in the chair. She feels great. No signs of bleeding. Tolerating diet. Abdomen is soft. Extremity nontender. Blood work was reviewed. Hemoglobin is 8.6. WBC count normal.  BMP is normal.  Surgically stable for discharge.

## 2021-05-29 NOTE — ANESTHESIA POSTPROCEDURE EVALUATION
Department of Anesthesiology  Postprocedure Note    Patient: Nedra Ibarra  MRN: [id number]  YOB: 1935  Date of evaluation: 5/29/2021  Time:  3:25 PM     Procedure Summary     Date: 05/28/21 Room / Location: [facility] / [facility]    Anesthesia Start: 7704 Anesthesia Stop: 1004    Procedures:       EGD ESOPHAGOGASTRODUODENOSCOPY (N/A Esophagus)      COLONOSCOPY POLYPECTOMY SNARE/COLD BIOPSY (N/A Anus) Diagnosis: (RECTAL BLEEDING)    Surgeons: Janet Santoro MD Responsible Provider: Indra Cuenca MD    Anesthesia Type: MAC ASA Status: 3          Anesthesia Type: MAC    Radha Phase I: Radha Score: 10    Radha Phase II:      Last vitals: Reviewed and per EMR flowsheets. Anesthesia Post Evaluation    Comments: POD #1. Patient seen lying in bed. Denies any anesthesia related issues.

## 2021-05-30 NOTE — PLAN OF CARE
Problem: Falls - Risk of:  Goal: Will remain free from falls  Description: Will remain free from falls  5/30/2021 1758 by Carol Ann Marie RN  Outcome: Met This Shift  5/30/2021 0449 by Bong Castano RN  Outcome: Ongoing  Goal: Absence of physical injury  Description: Absence of physical injury  5/30/2021 1758 by Carol Ann Marie RN  Outcome: Met This Shift  Note: Up with MADELAINE MCKNIGHT only d/t recent fall d/t , as pt described \"noodle legs\"; tolerated up in chair most of day  5/30/2021 0449 by Bong Castano RN  Outcome: Ongoing

## 2021-05-30 NOTE — CARE COORDINATION
Julio Lackey ONGOING DISCHARGE PLANNING NOTE:    Writer reviewed LSW notes, and discharge plan is for this patient to go to [place] [id number] - We need updated PT/OT notes for Tuesday.      Electronically signed by Kennedy Phalen, RN on 5/30/2021 at 12:38 PM

## 2021-05-30 NOTE — PROGRESS NOTES
Progress Note    5/30/2021   2:31 PM    Name:  La Nena Sandoval  MRN:    [id number]     Acct:     [de-identified]   Room:  2115/2115-01  IP Day: 6     Admit Date: 5/19/2021  9:49 AM  PCP: Kourtney Gil DO    Subjective:     C/C:   Chief Complaint   Patient presents with    Back Pain       Interval History: Status: not changed. There have been no reports of dark stool vomiting chest pain or shortness of breath vital signs reviewed and stable. Recent labs reviewed hemoglobin dropped to 8.6.    ROS:   all 10 systems reviewed and are negative except as noted    Review of Systems   Constitutional: Negative for chills and fatigue. HENT: Negative for drooling, mouth sores, sneezing and trouble swallowing. Eyes: Negative for redness and itching. Respiratory: Negative for cough, chest tightness and shortness of breath. Cardiovascular: Negative for chest pain, palpitations and leg swelling. Gastrointestinal: Negative for abdominal pain, blood in stool, nausea and vomiting. Endocrine: Negative for heat intolerance and polyphagia. Genitourinary: Negative for difficulty urinating, flank pain and pelvic pain. Musculoskeletal: Negative for arthralgias, joint swelling and neck stiffness. Skin: Negative for color change and pallor. Allergic/Immunologic: Negative for food allergies. Neurological: Negative for dizziness, seizures and headaches. Hematological: Does not bruise/bleed easily. Psychiatric/Behavioral: Negative for agitation, behavioral problems and suicidal ideas. The patient is not hyperactive. Medications: Allergies:    Allergies   Allergen Reactions    Adhesive Tape Rash    Meperidine Nausea Only    Demerol Nausea Only    Ultram [Tramadol Hcl]      Hot flashes    Zocor [Simvastatin]      Muscle aches    Codeine      \"out of it\"    Fentanyl Rash       Current Meds: warfarin (COUMADIN) tablet 1 mg, Once  [START ON 5/31/2021] predniSONE (DELTASONE) tablet 10 mg, Daily  nystatin (MYCOSTATIN) 285527 UNIT/ML suspension 500,000 Units, 4x Daily  hydrocortisone-aloe 1 % cream, BID  heparin (porcine) injection 4,000 Units, Once  potassium chloride (KLOR-CON M) extended release tablet 40 mEq, PRN   Or  potassium bicarb-citric acid (EFFER-K) effervescent tablet 40 mEq, PRN   Or  potassium chloride 10 mEq/100 mL IVPB (Peripheral Line), PRN  dextrose 50 % IV solution, PRN  oxyCODONE-acetaminophen (PERCOCET) 5-325 MG per tablet 1 tablet, Q4H PRN  warfarin (COUMADIN) daily dosing (placeholder), RX Placeholder  baclofen (LIORESAL) tablet 5 mg, TID PRN  polyethylene glycol (GLYCOLAX) packet 17 g, Daily  perflutren lipid microspheres (DEFINITY) injection 2.2 mg, ONCE PRN  sodium chloride flush 0.9 % injection 5-40 mL, 2 times per day  sodium chloride flush 0.9 % injection 5-40 mL, PRN  0.9 % sodium chloride infusion, PRN  acetaminophen (TYLENOL) tablet 650 mg, Q4H PRN  carvedilol (COREG) tablet 6.25 mg, BID  naloxone (NARCAN) injection 0.4 mg, PRN  atorvastatin (LIPITOR) tablet 40 mg, Daily  ferrous sulfate 300 (60 Fe) MG/5ML syrup 300 mg, Daily  pantoprazole (PROTONIX) tablet 40 mg, QAM AC  oxybutynin (DITROPAN-XL) extended release tablet 15 mg, Daily  polyethylene glycol (GLYCOLAX) packet 17 g, Daily PRN  rOPINIRole (REQUIP) tablet 0.5 mg, 4x Daily  sertraline (ZOLOFT) tablet 50 mg, Daily  amiodarone (CORDARONE) tablet 100 mg, BID        Data:     Code Status:  Full Code    Family History   Problem Relation Age of Onset    Heart Failure Brother     Heart Attack Brother         triple bypass    Prostate Cancer Brother     Coronary Art Dis Mother     Breast Cancer Mother         dx'd in late 46s   [facility] Emphysema Father     Colon Cancer Neg Hx     Diabetes Neg Hx     Eclampsia Neg Hx     Hypertension Neg Hx     Ovarian Cancer Neg Hx      Labor Neg Hx     Spont Abortions Neg Hx     Stroke Neg Hx        Social History     Socioeconomic History    Marital status:  Time: 05/30/21  4:55 AM   Result Value Ref Range    WBC 5.9 3.5 - 11.0 k/uL    RBC 2.87 (L) 4.0 - 5.2 m/uL    Hemoglobin 8.6 (L) 12.0 - 16.0 g/dL    Hematocrit 26.4 (L) 36 - 46 %    MCV 92.0 80 - 100 fL    MCH 30.0 26 - 34 pg    MCHC 32.6 31 - 37 g/dL    RDW 17.3 (H) 11.5 - 14.9 %    Platelets 316 961 - 990 k/uL    MPV 8.5 6.0 - 12.0 fL    NRBC Automated NOT REPORTED per 100 WBC    Differential Type NOT REPORTED     Seg Neutrophils 83 (H) 36 - 66 %    Lymphocytes 6 (L) 24 - 44 %    Monocytes 11 (H) 1 - 7 %    Eosinophils % 0 0 - 4 %    Basophils 0 0 - 2 %    Immature Granulocytes NOT REPORTED 0 %    Segs Absolute 4.90 1.3 - 9.1 k/uL    Absolute Lymph # 0.40 (L) 1.0 - 4.8 k/uL    Absolute Mono # 0.60 0.1 - 1.3 k/uL    Absolute Eos # 0.00 0.0 - 0.4 k/uL    Basophils Absolute 0.00 0.0 - 0.2 k/uL    Absolute Immature Granulocyte NOT REPORTED 0.00 - 0.30 k/uL    WBC Morphology NOT REPORTED     RBC Morphology NOT REPORTED     Platelet Estimate NOT REPORTED    Comprehensive Metabolic Panel w/ Reflex to MG    Collection Time: 05/30/21  4:55 AM   Result Value Ref Range    Glucose 193 (H) 70 - 99 mg/dL    BUN 19 8 - 23 mg/dL    CREATININE 0.74 0.50 - 0.90 mg/dL    Bun/Cre Ratio NOT REPORTED 9 - 20    Calcium 8.0 (L) 8.6 - 10.4 mg/dL    Sodium 139 135 - 144 mmol/L    Potassium 3.8 3.7 - 5.3 mmol/L    Chloride 109 (H) 98 - 107 mmol/L    CO2 24 20 - 31 mmol/L    Anion Gap 6 (L) 9 - 17 mmol/L    Alkaline Phosphatase 105 (H) 35 - 104 U/L    ALT 52 (H) 5 - 33 U/L    AST 18 <32 U/L    Total Bilirubin 0.35 0.3 - 1.2 mg/dL    Total Protein 4.3 (L) 6.4 - 8.3 g/dL    Albumin 2.6 (L) 3.5 - 5.2 g/dL    Albumin/Globulin Ratio NOT REPORTED 1.0 - 2.5    GFR Non-African American >60 >60 mL/min    GFR African American >60 >60 mL/min    GFR Comment          GFR Staging NOT REPORTED    PROTIME-INR    Collection Time: 05/30/21  4:55 AM   Result Value Ref Range    Protime 31.0 (H) 11.8 - 14.6 sec    INR 3.0        Physical Examination: Vitals:  /75   Pulse 110   Temp 97.7 °F (36.5 °C) (Oral)   Resp 16   Ht 5' 1.5\" (1.562 m)   Wt 158 lb 8.2 oz (71.9 kg)   SpO2 94%   BMI 29.47 kg/m²   Temp (24hrs), Av.9 °F (36.6 °C), Min:97.7 °F (36.5 °C), Max:98.2 °F (36.8 °C)    No results for input(s): POCGLU in the last 72 hours. Physical Exam  Vitals reviewed. HENT:      Head: Normocephalic. Right Ear: External ear normal.      Left Ear: External ear normal.      Nose: Nose normal.      Mouth/Throat:      Mouth: Mucous membranes are moist.      Pharynx: Oropharynx is clear. Eyes:      Conjunctiva/sclera: Conjunctivae normal.   Cardiovascular:      Rate and Rhythm: Normal rate and regular rhythm. Pulses: Normal pulses. Heart sounds: Normal heart sounds. Pulmonary:      Effort: Pulmonary effort is normal.      Breath sounds: Normal breath sounds. Abdominal:      General: Bowel sounds are normal.      Palpations: Abdomen is soft. Tenderness: There is no abdominal tenderness. There is no right CVA tenderness or left CVA tenderness. Musculoskeletal:         General: No deformity. Cervical back: Normal range of motion and neck supple. Right lower leg: No edema. Left lower leg: No edema. Skin:     General: Skin is warm. Capillary Refill: Capillary refill takes less than 2 seconds. Coloration: Skin is not jaundiced. Neurological:      General: No focal deficit present. Mental Status: She is alert. Mental status is at baseline.    Psychiatric:         Mood and Affect: Mood normal.         Behavior: Behavior normal.         Assessment:        Primary Problem  Atrial fibrillation with RVR (HCC)     Principal Problem:    Atrial fibrillation with RVR (HCC)  Active Problems:    Hypertension    Mixed hyperlipidemia    Chronic diastolic heart failure (HCC)    Chronic respiratory failure (HCC)    Gastroesophageal reflux disease without esophagitis    Pulmonary hypertension (HCC)    YOGESH (acute kidney injury) ([facility] [address]    Closed compression fracture of L1 lumbar vertebra, sequela    Acute midline low back pain with right-sided sciatica    Acute exacerbation of chronic low back pain    Iron deficiency anemia    Rectal bleeding    Elevated LFTs  Resolved Problems:    * No resolved hospital problems. *      Past Medical History:   Diagnosis Date    Asbestos exposure     Atrial fibrillation ([facility] [address]     takes xarelto    Basal cell carcinoma of nose     CAD (coronary artery disease)     CKD (chronic kidney disease) stage 2, GFR 60-89 ml/min     Hyperlipidemia     Hypertension     MDRO (multiple drug resistant organisms) resistance 2/19/2014    E. Coli urine    Nephrolithiasis     Osteoarthritis     Ovarian cyst     removed    Oxygen dependent     2 LNC mainly at night, during the day as needed    Palpitations     Peritoneal mesothelioma ([facility] [address] 1999    treated at [facility] PONV (postoperative nausea and vomiting)     Prolonged emergence from general anesthesia     SOB (shortness of breath) on exertion         Plan:        1. Discontinue Lovenox  2. On Coumadin INR therapeutic  3. Decrease prednisone to 10 mg p.o. daily  1. PPI Protonix 40 mg p.o. daily  2. Monitor CBC, CMP  3. ARU discharge planning in progress  4. DVT Prophylaxis on Coumadin  5. EPCs  6. PT/OT to evaluate and treat  7. Pain control  8. Replace electrolytes as per sliding scale  9. Home medications reviewed and appropriate medications continued  10.  Reviewed labs and imaging studies from last 24 hours and results explained to patient      Electronically signed by Yina Pena MD

## 2021-05-30 NOTE — PROGRESS NOTES
Pharmacy Note  Warfarin Consult follow-up      Recent Labs     05/28/21  1311 05/29/21  0505 05/30/21  0455   INR 1.4 1.4 3.0     Recent Labs     05/28/21  1311 05/29/21  0505 05/30/21  0455   HGB 9.5* 8.6* 8.6*   HCT 28.2* 26.5* 26.4*    201 181       Significant Drug-Drug Interactions:  New warfarin drug-drug interactions: Lovenox bridging  Discontinued drug-drug interactions: None  Current warfarin drug-drug interactions: ropinirole, sertraline, prednisone, amiodarone, atorvastatin, Lovenox bridging      Date             INR        Dose given previous day  Dose scheduled for today  5/30/2021            3       4mg           1 mg        Notes:                     Daily PT/INR while inpatient.      Harshal Rios PharmD, BCPS 5/30/2021 8:01 AM

## 2021-05-30 NOTE — PROGRESS NOTES
Discussed with charge nurse. No signs of active bleeding. Afebrile vital signs are stable. Hemoglobin is stable at 8.6. WBC count is normal.  Potassium is normal.  Creatinine is normal.  Awaiting approval for transfer to acute rehab.

## 2021-05-30 NOTE — PROGRESS NOTES
Dr Magali Herndon vs, speaks with pt,; r/w pt addressing hgb drop,d/c lovenox,pt concerns re; prenisone;see orders

## 2021-05-31 NOTE — PROGRESS NOTES
Patient was seen and examined. Awake alert in no acute distress. Up in the chair. Afebrile vital signs are stable. No bowel movements. Received MiraLAX. Abdomen is soft. Extremity nontender. Blood work was reviewed. Hemoglobin is 8.3 from 8.6. WBC count is normal.  Potassium creatinine normal.  Surgically stable. Discharge planning to acute rehab. Hopefully transfer to acute rehab tomorrow.

## 2021-05-31 NOTE — CARE COORDINATION
ONGOING DISCHARGE PLAN:    LSW following for discharge planning to ARU. PT/OT noted unable to work with patient today due to elevated INR. Will continue to follow for additional discharge needs.     Electronically signed by Cami Zapien RN on 5/31/2021 at 3:35 PM

## 2021-05-31 NOTE — PROGRESS NOTES
Writer was notified of the critical INR of 4.9. Writeteofilo cunningham served Dr. Nany Dubois regarding critical reading.

## 2021-05-31 NOTE — PROGRESS NOTES
Writer talked with Dr. Francisco Javier Beck to let him know of the patient's INR reading of 4.9. No further orders given. Writer also contacted pharmacy regarding INR, pt coumadin is pharmacy daily dosed. Pharmacy states they will hold her coumadin daily dose.

## 2021-05-31 NOTE — PLAN OF CARE
Problem: Falls - Risk of:  Goal: Will remain free from falls  Description: Will remain free from falls  5/31/2021 0325 by Stacy Shen RN  Outcome: Ongoing     Problem: Falls - Risk of:  Goal: Absence of physical injury  Description: Absence of physical injury  5/31/2021 0325 by Stacy Shen RN  Outcome: Ongoing     Problem: Pain:  Goal: Pain level will decrease  Description: Pain level will decrease  5/31/2021 0325 by Stacy Shen RN  Outcome: Ongoing     Problem: Pain:  Goal: Control of acute pain  Description: Control of acute pain  5/31/2021 0325 by Stacy Shen RN  Outcome: Ongoing     Problem: Pain:  Goal: Control of chronic pain  Description: Control of chronic pain  5/31/2021 0325 by Stacy Shen RN  Outcome: Ongoing     Problem: Cardiac Output - Decreased:  Goal: Hemodynamic stability will improve  Description: Hemodynamic stability will improve  5/31/2021 0325 by Stacy Shen RN  Outcome: Ongoing     Problem: Musculor/Skeletal Functional Status  Goal: Highest potential functional level  5/31/2021 0325 by Stacy Shen RN  Outcome: Ongoing     Problem: Musculor/Skeletal Functional Status  Goal: Absence of falls  5/31/2021 0325 by Stacy Shen RN  Outcome: Ongoing     Problem: Nutrition  Goal: Optimal nutrition therapy  5/31/2021 0325 by Stacy Shen RN  Outcome: Ongoing     Problem: Skin Integrity:  Goal: Will show no infection signs and symptoms  Description: Will show no infection signs and symptoms  5/31/2021 0325 by Stacy Shen RN  Outcome: Ongoing     Problem: Skin Integrity:  Goal: Absence of new skin breakdown  Description: Absence of new skin breakdown  5/31/2021 0325 by Stacy Shen RN  Outcome: Ongoing

## 2021-05-31 NOTE — PROGRESS NOTES
Writer perfect served Dr. Oz Hyman, Dr. Tino Goode is on call, for the second time regarding critical reading of INR of 4.9

## 2021-05-31 NOTE — PROGRESS NOTES
Progress Note    5/31/2021   3:15 PM    Name:  Светлана Gale  MRN:    [id number]     Acct:     [de-identified]   Room:  2115/2115-01   Day: 15     Admit Date: 5/19/2021  9:49 AM  PCP: Faustina Nolan DO    Subjective:     C/C:   Chief Complaint   Patient presents with    Back Pain       Interval History: Status: not changed. Patient denies chest pain shortness of breath melena hematemesis. Recent vital signs reviewed and stable. Recent labs reviewed hemoglobin 8.3. Chest x-ray report shows bilateral atelectasis. ROS:   all 10 systems reviewed and are negative except as noted    Review of Systems   Constitutional: Negative for chills and fatigue. HENT: Negative for drooling, mouth sores, sneezing and trouble swallowing. Eyes: Negative for redness and itching. Respiratory: Negative for cough, chest tightness and shortness of breath. Cardiovascular: Negative for chest pain, palpitations and leg swelling. Gastrointestinal: Negative for abdominal pain, anal bleeding, blood in stool, nausea and vomiting. Endocrine: Negative for heat intolerance and polyphagia. Genitourinary: Negative for difficulty urinating, dysuria, flank pain and pelvic pain. Musculoskeletal: Negative for arthralgias, joint swelling and neck stiffness. Skin: Negative for color change and pallor. Allergic/Immunologic: Negative for food allergies. Neurological: Negative for dizziness, seizures and headaches. Hematological: Does not bruise/bleed easily. Psychiatric/Behavioral: Negative for agitation, behavioral problems and suicidal ideas. The patient is not hyperactive. Medications: Allergies:    Allergies   Allergen Reactions    Adhesive Tape Rash    Meperidine Nausea Only    Demerol Nausea Only    Ultram [Tramadol Hcl]      Hot flashes    Zocor [Simvastatin]      Muscle aches    Codeine      \"out of it\"    Fentanyl Rash       Current Meds: famotidine (PEPCID) tablet 10 mg, Nightly  lidocaine viscous hcl (XYLOCAINE) 2 % solution 5 mL, Q3H PRN  predniSONE (DELTASONE) tablet 10 mg, Daily  nystatin (MYCOSTATIN) 760167 UNIT/ML suspension 500,000 Units, 4x Daily  hydrocortisone-aloe 1 % cream, BID  potassium chloride (KLOR-CON M) extended release tablet 40 mEq, PRN   Or  potassium bicarb-citric acid (EFFER-K) effervescent tablet 40 mEq, PRN   Or  potassium chloride 10 mEq/100 mL IVPB (Peripheral Line), PRN  dextrose 50 % IV solution, PRN  oxyCODONE-acetaminophen (PERCOCET) 5-325 MG per tablet 1 tablet, Q4H PRN  warfarin (COUMADIN) daily dosing (placeholder), RX Placeholder  baclofen (LIORESAL) tablet 5 mg, TID PRN  polyethylene glycol (GLYCOLAX) packet 17 g, Daily  perflutren lipid microspheres (DEFINITY) injection 2.2 mg, ONCE PRN  sodium chloride flush 0.9 % injection 5-40 mL, 2 times per day  sodium chloride flush 0.9 % injection 5-40 mL, PRN  0.9 % sodium chloride infusion, PRN  acetaminophen (TYLENOL) tablet 650 mg, Q4H PRN  carvedilol (COREG) tablet 6.25 mg, BID  naloxone (NARCAN) injection 0.4 mg, PRN  atorvastatin (LIPITOR) tablet 40 mg, Daily  ferrous sulfate 300 (60 Fe) MG/5ML syrup 300 mg, Daily  pantoprazole (PROTONIX) tablet 40 mg, QAM AC  oxybutynin (DITROPAN-XL) extended release tablet 15 mg, Daily  polyethylene glycol (GLYCOLAX) packet 17 g, Daily PRN  rOPINIRole (REQUIP) tablet 0.5 mg, 4x Daily  sertraline (ZOLOFT) tablet 50 mg, Daily  amiodarone (CORDARONE) tablet 100 mg, BID        Data:     Code Status:  Full Code    Family History   Problem Relation Age of Onset    Heart Failure Brother     Heart Attack Brother         triple bypass    Prostate Cancer Brother     Coronary Art Dis Mother     Breast Cancer Mother         dx'd in late 46s   [employer] Emphysema Father     Colon Cancer Neg Hx     Diabetes Neg Hx     Eclampsia Neg Hx     Hypertension Neg Hx     Ovarian Cancer Neg Hx      Labor Neg Hx     Spont Abortions Neg Hx     Stroke Neg Hx        Social History Socioeconomic History    Marital status:      Spouse name: Not on file    Number of children: Not on file    Years of education: Not on file    Highest education level: Not on file   Occupational History    Not on file   Tobacco Use    Smoking status: Never Smoker    Smokeless tobacco: Never Used   Vaping Use    Vaping Use: Never used   Substance and Sexual Activity    Alcohol use: Yes     Comment: social    Drug use: No    Sexual activity: Not Currently   Other Topics Concern    Not on file   Social History Narrative    Not on file     Social Determinants of Health     Financial Resource Strain:     Difficulty of Paying Living Expenses:    Food Insecurity:     Worried About Running Out of Food in the Last Year:     [address] in the Last Year:    Transportation Needs:     Lack of Transportation (Medical):  Lack of Transportation (Non-Medical):    Physical Activity:     Days of Exercise per Week:     Minutes of Exercise per Session:    Stress:     Feeling of Stress :    Social Connections:     Frequency of Communication with Friends and Family:     Frequency of Social Gatherings with Friends and Family:     Attends [employer] Services:     Active Member of Clubs or Organizations:     Attends Club or Organization Meetings:     Marital Status:    Intimate Partner Violence:     Fear of Current or Ex-Partner:     Emotionally Abused:     Physically Abused:     Sexually Abused:        I/O (24Hr): Intake/Output Summary (Last 24 hours) at 5/31/2021 1515  Last data filed at 5/30/2021 2236  Gross per 24 hour   Intake 300 ml   Output 200 ml   Net 100 ml     Radiology:  XR ABDOMEN (KUB) (SINGLE AP VIEW)    Result Date: 5/27/2021  Enteric tube needs further advancement, approximately 5 cm. Additional findings, as above. The findings were sent to the Radiology Results [address] at 2:47 p.m. on 5/27/2021to be communicated to a licensed caregiver.      XR CHEST PORTABLE    Result Date: 5/31/2021  Bibasilar atelectasis.        Labs:  Recent Results (from the past 24 hour(s))   CBC with DIFF    Collection Time: 05/31/21  5:13 AM   Result Value Ref Range    WBC 7.6 3.5 - 11.0 k/uL    RBC 2.83 (L) 4.0 - 5.2 m/uL    Hemoglobin 8.3 (L) 12.0 - 16.0 g/dL    Hematocrit 25.9 (L) 36 - 46 %    MCV 91.2 80 - 100 fL    MCH 29.2 26 - 34 pg    MCHC 32.0 31 - 37 g/dL    RDW 16.9 (H) 11.5 - 14.9 %    Platelets 203 778 - 914 k/uL    MPV 8.4 6.0 - 12.0 fL    NRBC Automated NOT REPORTED per 100 WBC    Differential Type NOT REPORTED     Seg Neutrophils 87 (H) 36 - 66 %    Lymphocytes 5 (L) 24 - 44 %    Monocytes 8 (H) 1 - 7 %    Eosinophils % 0 0 - 4 %    Basophils 0 0 - 2 %    Immature Granulocytes NOT REPORTED 0 %    Segs Absolute 6.60 1.3 - 9.1 k/uL    Absolute Lymph # 0.40 (L) 1.0 - 4.8 k/uL    Absolute Mono # 0.60 0.1 - 1.3 k/uL    Absolute Eos # 0.00 0.0 - 0.4 k/uL    Basophils Absolute 0.00 0.0 - 0.2 k/uL    Absolute Immature Granulocyte NOT REPORTED 0.00 - 0.30 k/uL    WBC Morphology NOT REPORTED     RBC Morphology NOT REPORTED     Platelet Estimate NOT REPORTED    Comprehensive Metabolic Panel w/ Reflex to MG    Collection Time: 05/31/21  5:13 AM   Result Value Ref Range    Glucose 168 (H) 70 - 99 mg/dL    BUN 18 8 - 23 mg/dL    CREATININE 0.69 0.50 - 0.90 mg/dL    Bun/Cre Ratio NOT REPORTED 9 - 20    Calcium 8.2 (L) 8.6 - 10.4 mg/dL    Sodium 140 135 - 144 mmol/L    Potassium 4.8 3.7 - 5.3 mmol/L    Chloride 108 (H) 98 - 107 mmol/L    CO2 26 20 - 31 mmol/L    Anion Gap 6 (L) 9 - 17 mmol/L    Alkaline Phosphatase 100 35 - 104 U/L    ALT 46 (H) 5 - 33 U/L    AST 17 <32 U/L    Total Bilirubin 0.27 (L) 0.3 - 1.2 mg/dL    Total Protein 4.4 (L) 6.4 - 8.3 g/dL    Albumin 2.7 (L) 3.5 - 5.2 g/dL    Albumin/Globulin Ratio NOT REPORTED 1.0 - 2.5    GFR Non-African American >60 >60 mL/min    GFR African American >60 >60 mL/min    GFR Comment          GFR Staging NOT REPORTED    PROTIME-INR Collection Time: 21  5:13 AM   Result Value Ref Range    Protime 44.9 (H) 11.8 - 14.6 sec    INR 4.9 (HH)        Physical Examination:        Vitals:  /78   Pulse 62   Temp 98.2 °F (36.8 °C) (Oral)   Resp 16   Ht 5' 1.5\" (1.562 m)   Wt 158 lb 8.2 oz (71.9 kg)   SpO2 95%   BMI 29.47 kg/m²   Temp (24hrs), Av.2 °F (36.8 °C), Min:98.1 °F (36.7 °C), Max:98.4 °F (36.9 °C)    No results for input(s): POCGLU in the last 72 hours. Physical Exam  Vitals reviewed. HENT:      Head: Normocephalic. Right Ear: External ear normal.      Left Ear: External ear normal.      Nose: Nose normal.      Mouth/Throat:      Mouth: Mucous membranes are moist.      Pharynx: Oropharynx is clear. Eyes:      Conjunctiva/sclera: Conjunctivae normal.   Cardiovascular:      Rate and Rhythm: Normal rate and regular rhythm. Pulses: Normal pulses. Heart sounds: Normal heart sounds. Pulmonary:      Effort: Pulmonary effort is normal.      Breath sounds: Normal breath sounds. Abdominal:      General: Bowel sounds are normal.      Palpations: Abdomen is soft. Tenderness: There is no abdominal tenderness. There is no right CVA tenderness or left CVA tenderness. Musculoskeletal:         General: No deformity. Cervical back: Normal range of motion and neck supple. Right lower leg: No edema. Left lower leg: No edema. Skin:     General: Skin is warm. Capillary Refill: Capillary refill takes less than 2 seconds. Coloration: Skin is not jaundiced. Neurological:      General: No focal deficit present. Mental Status: She is alert. Mental status is at baseline.    Psychiatric:         Mood and Affect: Mood normal.         Behavior: Behavior normal.         Assessment:        Primary Problem  Atrial fibrillation with RVR (HCC)     Principal Problem:    Atrial fibrillation with RVR (HCC)  Active Problems:    Hypertension    Mixed hyperlipidemia    Chronic diastolic heart failure (HCC)    Chronic respiratory failure (HCC)    Gastroesophageal reflux disease without esophagitis    Pulmonary hypertension (HCC)    YOGESH (acute kidney injury) ([facility] [address]    Closed compression fracture of L1 lumbar vertebra, sequela    Acute midline low back pain with right-sided sciatica    Acute exacerbation of chronic low back pain    Iron deficiency anemia    Rectal bleeding    Elevated LFTs  Resolved Problems:    * No resolved hospital problems. *      Past Medical History:   Diagnosis Date    Asbestos exposure     Atrial fibrillation ([facility] [address]     takes xarelto    Basal cell carcinoma of nose     CAD (coronary artery disease)     CKD (chronic kidney disease) stage 2, GFR 60-89 ml/min     Hyperlipidemia     Hypertension     MDRO (multiple drug resistant organisms) resistance 2/19/2014    E. Coli urine    Nephrolithiasis     Osteoarthritis     Ovarian cyst     removed    Oxygen dependent     2 LNC mainly at night, during the day as needed    Palpitations     Peritoneal mesothelioma ([facility] [address] 1999    [facility] at [facility] PONV (postoperative nausea and vomiting)     Prolonged emergence from general anesthesia     SOB (shortness of breath) on exertion         Plan:        1. INR supratherapeutic 4.9  2. Hold Coumadin  3. Continue nystatin swish and swallow p.o. oral  4. Viscous Lidocaine three times a day for oral superficial ulcers  5. ARU discharge planning in progress  1. PPI Protonix 40 mg p.o. in a.m. 2. Pepcid 10 mg p.o. at night  3. Incentive spirometry every 2 hours while awake  4. CBC, CMP  5. INR daily  6. DVT Prophylaxis  7. EPCs  8. PT/OT to evaluate and treat  9. Pain control  10. Replace electrolytes as per sliding scale  11. Home medications reviewed and appropriate medications continued  12.  Reviewed labs and imaging studies from last 24 hours and results explained to patient      Electronically signed by Shadi Bearden MD

## 2021-06-01 PROBLEM — S22.050A CLOSED WEDGE COMPRESSION FRACTURE OF T6 VERTEBRA (HCC): Status: ACTIVE | Noted: 2021-01-01

## 2021-06-01 PROBLEM — S22.050A CLOSED WEDGE COMPRESSION FRACTURE OF FIFTH THORACIC VERTEBRA (HCC): Status: ACTIVE | Noted: 2021-01-01

## 2021-06-01 PROBLEM — S22.070A CLOSED WEDGE COMPRESSION FRACTURE OF T9 VERTEBRA (HCC): Status: ACTIVE | Noted: 2021-01-01

## 2021-06-01 PROBLEM — S22.070A CLOSED WEDGE COMPRESSION FRACTURE OF T10 VERTEBRA (HCC): Status: ACTIVE | Noted: 2021-01-01

## 2021-06-01 NOTE — PROGRESS NOTES
Nutrition Assessment     Type and Reason for Visit: Reassess    Nutrition Recommendations/Plan:   1.) Continue current diet   2.) Continue Ensure High protein supplement 3x/day   3.) Encourage PO intake and monitor PO with supplements, weight, labs. Nutrition Assessment:  Pt on a carb control, no added salt diet. Pt had colonoscopy 5/28 with findings of severe diverticulosis. Pt consuming 1-25% of meals with % of Ensure supplements per patient. Pt's family members are also bringing her in milkshakes to drink. Pt attributes poor appetite/intake to lesions on tongue as well as a metallic and salty taste in mouth. Pt currently using nystatin. Pts current weight at 181# (question accuracy). This is a 25# weight gain from 5/27. Note +2 pitting edema in BLE. Will continue to monitor weight, PO with supplements, labs. Malnutrition Assessment:  Malnutrition Status: At risk for malnutrition (Comment)    Estimated Daily Nutrient Needs:  Energy (kcal): 8156-4890 (22-25kcal/kg); Weight Used for Energy Requirements:  Admission     Protein (g): 1.3g/kg= 65 g; Weight Used for Protein Requirements:  Ideal        Fluid (ml/day): 6130-3534; Weight Used for Fluid Requirements:  1 ml/kcal      Nutrition Related Findings: +2 pitting edema, labs: glucose-113, total protein-4.5, albumin-2.7, last BM: 5/29-glycolax being given.       Current Nutrition Therapies:    DIET GENERAL; Carb Control: 4 carb choices (60 gms)/meal; No Added Salt (3-4 GM)  Dietary Nutrition Supplements: Low Calorie High Protein Supplement    Anthropometric Measures:  · Height: 5' 1.5\" (156.2 cm)  · Current Body Wt: 181 lb 7 oz (82.3 kg) (question accuracy)   · BMI: 33.7    Nutrition Diagnosis:   · Inadequate oral intake related to  (current medical condition) as evidenced by intake 0-25%, poor intake prior to admission (mouth lesions)    Nutrition Interventions:   Food and/or Nutrient Delivery:  Continue Current Diet, Continue Oral Nutrition Supplement  Nutrition Education/Counseling:  No recommendation at this time   Coordination of Nutrition Care:  Continue to monitor while inpatient    Goals:  po intake 75% or more       Nutrition Monitoring and Evaluation:   Behavioral-Environmental Outcomes:  None Identified   Food/Nutrient Intake Outcomes:  Food and Nutrient Intake, Supplement Intake  Physical Signs/Symptoms Outcomes:  Biochemical Data, GI Status, Skin, Weight, Fluid Status or Edema     Discharge Planning:    Continue current diet, Continue Oral Nutrition Supplement     Contact: Libra Martínez RD, LD  Clinical Dietitian

## 2021-06-01 NOTE — PLAN OF CARE
Problem: Falls - Risk of:  Goal: Will remain free from falls  Description: Will remain free from falls  Outcome: Met This Shift     Problem: Falls - Risk of:  Goal: Will remain free from falls  Description: Will remain free from falls  Outcome: Met This Shift  Goal: Absence of physical injury  Description: Absence of physical injury  Outcome: Met This Shift  Note: MADELAINE MCKNIGHT used to bathroom and to get pt up in chair d/t pt's weakness of legs; writer encouraged pt to attempt PT tomorrow as an eval is required for admit to ARU; pt agreeable

## 2021-06-01 NOTE — PROGRESS NOTES
Pharmacy Note  Warfarin Consult follow-up      Recent Labs     05/30/21  0455 05/31/21  0513 06/01/21  0509   INR 3.0 4.9* 3.7     Recent Labs     05/30/21  0455 05/31/21  0513 06/01/21  0509   HGB 8.6* 8.3* 8.3*   HCT 26.4* 25.9* 25.8*    184 183       Significant Drug-Drug Interactions:  New warfarin drug-drug interactions: none  Discontinued drug-drug interactions: none  Current warfarin drug-drug interactions: ropinirole, sertraline, prednisone, amiodarone, atorvastatin         Date             INR        Dose given previous day  Dose scheduled for today  6/1/2021          3.7       none        Continue to hold        Notes:          Continue to hold coumadin. Daily PT/INR while inpatient. Katie Thompson. Ph.  6/1/2021  8:03 AM

## 2021-06-01 NOTE — PLAN OF CARE
Nutrition Problem #1: Inadequate oral intake  Intervention: Food and/or Nutrient Delivery: Continue Current Diet, Continue Oral Nutrition Supplement  Nutritional Goals: po intake 75% or more

## 2021-06-01 NOTE — FLOWSHEET NOTE
Patient wishes to attempt to void using bedpan vs. Straight cath. Patient able to void 100 ml michelle urine which was sent for UA/culture.

## 2021-06-01 NOTE — PROGRESS NOTES
Physical Medicine & Rehabilitation  Progress Note    6/1/2021 2:57 PM     CC: Ambulatory and ADL dysfunction due to back pain    A 12year-old female with history of atrial fibrillation CHF, basal carcinoma and chronic back pain with kyphoplasty of L1 on 4/15/2021 admitted [facility] on 5/19/2021 with progressive worsening low back pain over 10 days. She has had recent kyphoplasty of thoracic vertebra. Pain management-Dr. Og Sandhu performed caudal epidural steroid injection 5/24. Cardiology saw the patient for atrial fibrillation CHF. Leonid Lozano GI evaluation for iron deficiency meet anemia, performed colonoscopy 5/28 showed severe diverticulosis EGD showed consistent with GERD. Before restarting anticoagulation    Internal medicine 6/1-supratherapeutic INR, on prednisone 10 mg daily    GI 5/28 okay to restart anticoagulation follow-up outpatient for biopsy signed off    General surgery 5/30-no active signs of bleeding globin stable    Pain management 5/25-trial of epidural injection further plans depending on results probably not a candidate for L4 kyphoplasty      MRI thoracic spine 5/19  1. No acute abnormality of the thoracic spine. 2. Chronic mild compression deformity of the L1 vertebral body status post   prior vertebroplasty. 3. Mild degenerative changes in the lower cervical and upper thoracic spine. 4. Small layering left pleural effusion. 5. Incidental subcentimeter left thyroid nodule.  No further follow-up is   warranted per guidelines below.            MRI lumbar spine 5/19   1. L1 vertebroplasty in association with remote vertebral body compression   fracture with approximately 50% height loss. 2. Associated retropulsion of posterior L1 cortex with associated borderline   central canal stenosis. 3. L1/L2 mild, L2/L3 moderate, L3/L4 moderate central canal stenosis   secondary to encroachment by posterior disc osteophyte complex.    4. L1/L2 moderate bilateral, L2/L3 mild bilateral, L3/L4 moderate bilateral,   L4/L5 moderate bilateral neural foraminal stenosis secondary to encroachment   by disc osteophyte complex. 5. L3/L4-L5/S1 mild bilateral facet degenerative arthrosis.             Subjective:   Patient notes some mid back pain, x-ray ordered results pending. Family present. Feels she is weaker last couple days proximal lower extremities-question attributes not doing much in therapy    ROS:  Denies fevers, chills, sweats. No chest pain, palpitations, lightheadedness. Denies coughing, wheezing or shortness of breath. Denies abdominal pain, nausea, diarrhea or constipation. No new areas of joint pain. Denies new areas of numbness or weakness. Denies new anxiety or depression issues. No new skin problems. Rehabilitation:   PT:  Restrictions/Precautions: Fall Risk, Up as Tolerated  Implants present? : Metal implants (bilateral THR, cardiac stent, metal implant and screws right)  Other position/activity restrictions: left leg 1/2\" shorter than the right (S/P BILATERAL THRs)       Bed Mobility:   Bed Mobility  Rolling: Minimal assistance  Supine to Sit: Minimal assistance (asssit with BLE )  Sit to Supine: Minimal assistance (assist with BLE)  Scootin Person assistance;Maximal assistance  Bed mobility  Scootin Person assistance;Maximal assistance       Transfers   Sit to Stand Contact guard assistance   Stand to sit Contact guard assistance   Stand Pivot Transfers Contact guard assistance   Comment Cues for hand placement   Ambulation   Ambulation?  Yes   Ambulation 1   Surface level tile   Device Rolling Walker   Assistance Contact guard assistance   Quality of Gait Slow and steady, no LOB   Gait Deviations Slow Manuela;Decreased step length;Decreased step height   Distance 40' x 1  (1 rest break in between activity)   Comments Cues to stay inside of walker         Transfers  Sit to Stand: Contact guard assistance  Stand to sit: Contact guard assistance  Stand Pivot Transfers: Contact guard assistance  Comment: attempted 2x. pt not able to push herself off the bed, pt reported ligth headed and dizzy. WB Status: left leg 1/2\" shorter than the right (S/P BILATERAL THRs)  Ambulation 1  Surface: level tile  Device: Rolling Walker  Other Apparatus: O2 (2 L)  Assistance: Contact guard assistance  Quality of Gait: Slow and steady, no LOB  Gait Deviations: Slow Manuela, Decreased step length, Decreased step height  Distance: 5' x 4 (1 rest break in between activity)  Comments: Cues to stay inside of walker          OT:      ADL  Feeding: Setup  Grooming: Supervision  UE Bathing: Stand by assistance  LE Bathing: Moderate assistance  UE Dressing: Stand by assistance  LE Dressing: Maximum assistance  Toileting: Maximum assistance  Additional Comments: ADL scores based on skilled reasoning, clinical observations, and patient report unless otherwise noted. Pt reports 2-3 days ago she was able to don her socks while seated on the toilet as the toilet is lower than the bed. Balance  Sitting Balance: Contact guard assistance  Standing Balance: Moderate assistance   Standing Balance  Time: 30 seconds  Activity: BUE support on RW  Functional Mobility  Functional - Mobility Device: Rolling Walker  Activity:  (2 steps to head of bed)  Assist Level: Moderate assistance  Functional Mobility Comments: with Minimal verbal cues for pacing     Bed mobility  Rolling to Left: Unable to assess  Rolling to Right: Stand by assistance  Supine to Sit: Moderate assistance  Sit to Supine: Moderate assistance  Scootin Person assistance, Maximal assistance  Comment: Pt sitting in bedside chair upon arrival.  Transfers  Sit to stand:  Moderate assistance  Stand to sit: Moderate assistance  Transfer Comments: with Minimal verbal cues for hand placement and safety                   ST:            Objective:  /81   Pulse 92   Temp 98.4 °F (36.9 °C) (Oral)   Resp 18   Ht 5' 1.5\" (1.562 m) Wt 181 lb 7 oz (82.3 kg)   SpO2 97%   BMI 33.73 kg/m²  I Body mass index is 33.73 kg/m². I   Wt Readings from Last 1 Encounters:   21 181 lb 7 oz (82.3 kg)      Temp (24hrs), Av.4 °F (36.9 °C), Min:98.2 °F (36.8 °C), Max:98.8 °F (37.1 °C)         GEN: well developed, well nourished, no acute distress  HEENT: Normocephalic atraumatic, EOMI, mucous membranes pink and moist  CV: RRR, no murmurs, rubs or gallops  PULM: CTAB, no rales or rhonchi. Respirations WNL and unlabored  ABD: soft, NT, ND, +BS and equal  NEURO: A&O x3. Sensation intact to light touch. MSK: 4/5 distal lower extremity and upper extremity, proximal lower extremities 3+ to 4-/5  EXTREMITIES: No calf tenderness to palpation bilaterally. No edema BLEs  SKIN: warm dry and intact with good turgor  PSYCH: appropriately interactive. Affect WNL.          Medications   Scheduled Meds:   famotidine  10 mg Oral Nightly    predniSONE  10 mg Oral Daily    nystatin  5 mL Oral 4x Daily    hydrocortisone-aloe   Topical BID    warfarin (COUMADIN) daily dosing (placeholder)   Other RX Placeholder    polyethylene glycol  17 g Oral Daily    sodium chloride flush  5-40 mL Intravenous 2 times per day    carvedilol  6.25 mg Oral BID    atorvastatin  40 mg Oral Daily    ferrous sulfate  300 mg Oral Daily    pantoprazole  40 mg Oral QAM AC    oxybutynin  15 mg Oral Daily    rOPINIRole  0.5 mg Oral 4x Daily    sertraline  50 mg Oral Daily    amiodarone  100 mg Oral BID     Continuous Infusions:   sodium chloride       PRN Meds:.lidocaine viscous hcl, potassium chloride **OR** potassium alternative oral replacement **OR** potassium chloride, dextrose, oxyCODONE-acetaminophen, baclofen, perflutren lipid microspheres, sodium chloride flush, sodium chloride, acetaminophen, naloxone, polyethylene glycol     Diagnostics:     CBC:   Recent Labs     21  0455 21  0513 21  0509   WBC 5.9 7.6 8.4   RBC 2.87* 2.83* 2.82*   HGB 8.6* 8.3* 8.3* HCT 26.4* 25.9* 25.8*   MCV 92.0 91.2 91.5   RDW 17.3* 16.9* 17.3*    184 183     BMP:   Recent Labs     05/30/21  0455 05/31/21  0513 06/01/21  0509    140 139   K 3.8 4.8 4.3   * 108* 107   CO2 24 26 24   BUN 19 18 19   CREATININE 0.74 0.69 0.66     BNP: No results for input(s): BNP in the last 72 hours. PT/INR:   Recent Labs     05/30/21  0455 05/31/21  0513 06/01/21  0509   PROTIME 31.0* 44.9* 35.9*   INR 3.0 4.9* 3.7     APTT: No results for input(s): APTT in the last 72 hours. CARDIAC ENZYMES: No results for input(s): CKMB, CKMBINDEX, TROPONINT in the last 72 hours. Invalid input(s): CKTOTAL;3  FASTING LIPID PANEL:  Lab Results   Component Value Date    CHOL 198 01/21/2021    HDL 43 01/21/2021    TRIG 231 (H) 01/21/2021     LIVER PROFILE:   Recent Labs     05/30/21 0455 05/31/21  0513 06/01/21  0509   AST 18 17 16   ALT 52* 46* 41*   BILITOT 0.35 0.27* 0.35   ALKPHOS 105* 100 96        I/O (24Hr): Intake/Output Summary (Last 24 hours) at 6/1/2021 1457  Last data filed at 6/1/2021 0617  Gross per 24 hour   Intake 340 ml   Output 300 ml   Net 40 ml       Glu last 24 hour  No results for input(s): POCGLU in the last 72 hours. No results for input(s): CLARITYU, COLORU, PHUR, SPECGRAV, PROTEINU, RBCUA, BLOODU, BACTERIA, NITRU, WBCUA, LEUKOCYTESUR, YEAST, GLUCOSEU, BILIRUBINUR in the last 72 hours.     Lives With: Spouse  Type of Home:  (Condo - 2 story (lives on main level))  Home Layout: Two level, Able to Live on Main level with bedroom/bathroom  Home Access: Stairs to enter with rails  Entrance Stairs - Number of Steps: 2  Entrance Stairs - Rails: Both  Bathroom Shower/Tub: Walk-in shower, Doors  Bathroom Toilet: Handicap height  Bathroom Equipment: Built-in shower seat, Grab bars around toilet  Bathroom Accessibility: Walker accessible  Home Equipment: Crutches, Oxygen, Long-handled shoehorn, Reacher (O2 at 2 L PRN (typically at night))  ADL Assistance: [facility] Assistance: Needs assistance (daugthers assisting w/ laundry, bringing in meals, cleaning and grocery shopping, spouse assists as needed)  Homemaking Responsibilities: Yes  Ambulation Assistance: Independent (uses crutches PRN with hip pain)      Impression:  1. Acute exacerbation low back pain with lumbar radiculopathy and stenosis: s/p caudal MACKENZIE    2. Pain-Percocet, baclofen  3. HTN/Hyperlipidemia/Chronic diastolic heart failure-amiodarone, Lipitor, Coreg, prednisone 10 mg daily-? Taper, Coumadin  4. Atrial fibrillation with RVR: on coumadin, pharmacy dosing  5. Anemia-iron  6. GERD Pepcid and Protonix, transverse colon polypectomy-tubular adenoma  7. Chronic respiratory failure  8. Closed compression fracture L1, sequelae, per pain management not a candidate for L4 kyphoplasty  9. Iron deficiency anemia: EGD and colonoscopy as above        Recommendations:     1. Diagnosis:  Lumbar stenosis with radiculopathy  2. Therapy: Has OT needs, ambulate on 5/26, however 6/1 max assist to person for scooting  3. Medical Necessity: As above  4. Support: Lives with her spouse  5. Rehab Recommendation:  Has OT needs, as PT needs still today did less than previously, if has family support and medically cleared by primary and consultants would benefit from acute inpatient rehabilitation when medically ready,    -Await thoracic spine x-ray  -Clarify prednisone taper  -Questionable feels weaker proximal lower extremities    6. DVT Prophylaxis: on coumadin - INR subtherapeutic     It was my pleasure to evaluate Luisa A Schimming today. Please call with questions. Dariela Vogt. Aldo Paulino MD       This note is created with the assistance of a speech recognition program.  While intending to generate a document that actually reflects the content of the visit, the document can still have some errors including those of syntax and sound a like substitutions which may escape proof reading.   In such instances, actual meaning can be extrapolated by contextual diversion

## 2021-06-01 NOTE — FLOWSHEET NOTE
Patient not voiding this shift. Bladder scan completed. 282 ml urine in bladder. PC to Dr. Shiraz Badillo, orders received to straight cath and send for urine culture/urinalysis.

## 2021-06-01 NOTE — PROGRESS NOTES
Text sent to Dr. Matthew Webb requesting chart review to determine appropriateness of admission to ARU.

## 2021-06-01 NOTE — FLOWSHEET NOTE
BEBETO tello, spoke with Dr. Daina Aranda, updated regarding thoracic xray showing compression fracture at T5, T6, T9, T10. Per Dr. Daina Aranda, please notify Dr. Lendel Nissen.

## 2021-06-01 NOTE — PROGRESS NOTES
Physical Therapy  [address]    Physical Therapy Progress Note    Date: 21  Patient Name: Ant Ring       Room: 5-01  MRN: [id number]   Account: [de-identified]   : 1935  (81 y.o.)   Gender: female     Discharge Recommendations   Patient would benefit from continued therapy after discharge  Equipment Needed: No    Referring Practitioner: Zandra Pizarro MD  Diagnosis: Atrial fibrillation with RVR  Restrictions/Precautions: Fall Risk, Up as Tolerated  Implants present? : Metal implants (bilateral THR, cardiac stent, metal implant and screws right)  Other position/activity restrictions: left leg 1/2\" shorter than the right (S/P BILATERAL THRs)   Past Medical History:  has a past medical history of Asbestos exposure, Atrial fibrillation ([address] Basal cell carcinoma of nose, CAD (coronary artery disease), CKD (chronic kidney disease) stage 2, GFR 60-89 ml/min, Hyperlipidemia, Hypertension, MDRO (multiple drug resistant organisms) resistance, Nephrolithiasis, Osteoarthritis, Ovarian cyst, Oxygen dependent, Palpitations, Peritoneal mesothelioma ([address] PONV (postoperative nausea and vomiting), Prolonged emergence from general anesthesia, and SOB (shortness of breath) on exertion. Past Surgical History:   has a past surgical history that includes Tubal ligation (); Hysterectomy (); Colonoscopy; Total hip arthroplasty (Right, ); Rectocele repair (); liver biopsy (); Upper gastrointestinal endoscopy; Photodynamic Therapy (2000 & 2002); Total hip arthroplasty (Left, ); Revision total hip arthroplasty (Right, ); Rotator cuff repair (); Ankle fracture surgery (Right, ); Cataract removal (Bilateral); Cardiac catheterization (); liver biopsy; Coronary angioplasty with stent (2002); [place] tooth extraction; Appendectomy (); Cholecystectomy (); Skin cancer excision (Bilateral, );  Skin cancer excision (Left, Oct. ); nasal endoscopy (Bilateral, 2014); BIOPSY / LIGATION TEMPORAL ARTERY (Bilateral, 2021); Breast biopsy (Right); Spine surgery (N/A, 4/15/2021); Upper gastrointestinal endoscopy (N/A, 2021); and Colonoscopy (N/A, 2021). Restrictions/Precautions  Restrictions/Precautions: Fall Risk;Up as Tolerated  Required Braces or Orthoses?: No  Implants present? : Metal implants (bilateral THR, cardiac stent, metal implant and screws right)  Position Activity Restriction  Other position/activity restrictions: left leg 1/2\" shorter than the right (S/P BILATERAL THRs)    Subjective: pt agreed to therapy. Dr Katelin Reid present bedside talking to patient. Comments: Pt very pleasant and cooperative. Vital Signs  Patient Currently in Pain: Yes  Pain Assessment: 0-10  Pain Level: 3  Patient's Stated Pain Goal: No pain  Pain Type: Chronic pain  Pain Location: Back  Pain Orientation: Mid                Bed Mobility:   Bed Mobility  Rolling: Minimal assistance  Supine to Sit: Minimal assistance (asssit with BLE )  Sit to Supine: Minimal assistance (assist with BLE)  Scootin Person assistance;Maximal assistance  Bed mobility  Scootin Person assistance;Maximal assistance    Transfers:                                                                                      Posture: Good  Sitting - Static: Fair;+     Other exercises?: Yes  Other exercises 1: AROM both L/E seated x 15  Other exercises 2: ankle pumps x 15  Other exercises 3: seated at the EOB   Other exercises 4: attemtpted sit<>stand x 2 . pt reported light headed and dizzy. Activity Tolerance: Patient limited by pain; Patient limited by endurance  PT Equipment Recommendations  Equipment Needed: No       Assessment  Activity Tolerance: Patient limited by pain; Patient limited by endurance   Body structures, Functions, Activity limitations: Decreased functional mobility ; Decreased strength;Decreased endurance;Decreased balance; Increased pain  Prognosis: Good  Discharge Recommendations: Patient would benefit from continued therapy after discharge     Type of devices: All fall risk precautions in place;Call light within reach; Patient at risk for falls;Nurse notified;Gait belt;Left in bed     Plan  Times per week: 5-7 treatments/ week  Times per day:  (5-7 treatments/ week)  Current Treatment Recommendations: Strengthening, Functional Mobility Training, Safety Education & Training, Gait Training, Transfer Training, ROM, Balance Training, Stair training, Endurance Training, Patient/Caregiver Education & Training, Positioning, Equipment Evaluation, Education, & procurement, Home Exercise Program    Patient Education  New Education Provided:    Beto Mcnally and patient  Method: demonstration       Outcome: demonstrated understanding     Goals  Short term goals  Time Frame for Short term goals: 5-7 treatments/ week  Short term goal 1: pt to tolerate 1/2 hour of therapuetic exercise and activity  Short term goal 2: pt to demonstrate good technique for log rolling, balance and LE ROM and strengthening exercises as tolerated  Short term goal 3: pt to demonstrate improved pain control to 5/10 or better to tolerate increased activity  Short term goal 4: pt to demonstrate improved bed mobility for rolling in bed using rail w/ supervision for position change  Short term goal 5: pt to advance to and demonstronstrate transfers supine <> sit w/ min x 1  Short term goal 6: pt to advance to and demonstronstrate fair or better sitting balance while dangling at the EOB x 10 minutes w/ CGA x 1  Short term goal 7: pt to advance to and demonstronstrate sit <> stand and bed <> chair transfers using least assistive device w/ min x 1 and O2 as directed  Short term goal 8: pt to advance to and demonstronstrate gait 50-80' using least assistive device w/ min x 1 and O2 as directed  Short term goal 9: pt to advance to and demonstronstrate ability to ascend/ descend 2 steps w/ rails and using least assistive device w/ min x 1 and O2 as directed    PT Individual Minutes  Time In: (P) 1303  Time Out: (P) 1326  Minutes: (P) 23    Electronically signed by Gareth Bruno PTA on 6/1/21 at 1:57 PM EDT

## 2021-06-01 NOTE — PROGRESS NOTES
Patient was seen and examined. Awake alert in no acute distress. No BM. Afebrile vital signs are stable. Abdomen is soft. Extremity nontender. Blood work was reviewed. BMP is normal.  we will just hemoglobin is stable at 8.3. WBC count is normal.    Awaiting transfer to acute rehab. Pain clinic on the case for back pain.

## 2021-06-01 NOTE — DISCHARGE INSTR - COC
Continuity of Care Form    Patient Name: Dionisio Stallings   :  1935  MRN:  [id number]    Admit date:  2021  Discharge date:  ***    Code Status Order: Full Code   Advance Directives:   Advance Care Flowsheet Documentation       Date/Time Healthcare Directive Type of Healthcare Directive Copy in [address] Agent's Name Healthcare Agent's Phone Number    [phone]  Yes, patient has an advance directive for healthcare treatment LIving Will and HCPOA-dtr. Olivia  --  No, copy requested from family  --  --  --    21  Yes, patient has an advance directive for healthcare treatment  Living will  No, copy requested from family  Adult Children  --  --            Admitting Physician:  Catie Cerrato MD  PCP: Leti Lane DO    Discharging Nurse: [facility] Unit/Room#: 2115/2115-01  Discharging Unit Phone Number: ***    Emergency Contact:   Extended Emergency Contact Information  Primary Emergency Contact:  Ling Adams [facility] [address]  Phone: [phone]  Mobile Phone: [phone]  Relation: Child  Secondary Emergency Contact: Jim Butler  Address: [facility] [address] Phone: [phone]  Work Phone: [phone]  Mobile Phone: [phone]  Relation: Spouse    Past Surgical History:  Past Surgical History:   Procedure Laterality Date    ANKLE FRACTURE SURGERY Right 2013    [place] / LIGATION TEMPORAL ARTERY Bilateral 2021    TEMPORAL ARTERY BIOPSY performed by Juanjose Kang MD at [facility]      CATARACT REMOVAL Bilateral     CHOLECYSTECTOMY  2005    COLONOSCOPY      COLONOSCOPY N/A 2021    COLONOSCOPY POLYPECTOMY SNARE/COLD BIOPSY performed by Ileana Bates MD at [facility]  2002    [address]    LIVER BIOPSY  2000 LIVER BIOPSY      NASAL ENDOSCOPY Bilateral 11/25/2014    lt nasal cautery with packing    PHOTODYNAMIC THERAPY  April 2000 & Jan. 2002    for peritoneal Mesothelioma    [facility]  2006    SKIN CANCER EXCISION Bilateral 2006    ankle    SKIN CANCER EXCISION Left Oct. 2014    shoulder    SPINE SURGERY N/A 4/15/2021    KYPHOPLASTY L1 performed by Conrad Patel MD at [place] [address] Left 2003    TUBAL LIGATION  1969    UPPER GASTROINTESTINAL ENDOSCOPY      UPPER GASTROINTESTINAL ENDOSCOPY N/A 5/28/2021    EGD ESOPHAGOGASTRODUODENOSCOPY performed by Sabiha Koch MD at [facility]         Immunization History:   Immunization History   Administered Date(s) Administered    COVID-19, Moderna, PF, 100mcg/0.5mL 01/08/2021, 02/05/2021    Influenza Virus Vaccine 10/03/2017, 11/01/2017, 11/01/2020    Influenza, High Dose (Fluzone 65 yrs and older) 10/12/2016    PPD Test 03/01/2021    Pneumococcal Conjugate 13-valent (Pollyann Hermilo) 08/16/2016, 08/16/2016, 04/01/2017    Pneumococcal Polysaccharide (Doogxwcss19) 10/23/2017    Td (Adult), 5 Lf Tetanus Toxoid, Pf (Tenivac, Decavac) 05/06/2009    Tdap (Boostrix, Adacel) 05/14/2019       Active Problems:  Patient Active Problem List   Diagnosis Code    Hypertension I10    Nephrolithiasis N20.0    Mixed hyperlipidemia E78.2    Osteoarthritis M19.90    Peritoneal mesothelioma ([facility] [address] C45.1    Ovarian cyst N83.209    Basal cell carcinoma of nose C44.311    Bimalleolar ankle fracture S82.843A    Epistaxis, recurrent R04.0    Cervical spondylosis M47.812    Trochanteric bursitis, right hip M70.61    Status post bilateral total hip replacement Z96.643    Medication monitoring encounter Z51.81    Chronic diastolic heart failure (HCC) I50.32    Chronic respiratory failure (HCC) J96.10    Giant cell arteritis ([address] M31.6 Paroxysmal atrial fibrillation (HCC) I48.0    Urinary frequency R35.0    Vitamin D deficiency E55.9    Age-related osteoporosis with current pathological fracture M80.00XA    Atrial fibrillation with RVR (HCC) I48.91    Gastroesophageal reflux disease without esophagitis K21.9    Pulmonary hypertension (HCC) I27.20    Lumbosacral spondylosis without myelopathy M47.817    YOGESH (acute kidney injury) ([facility] [address] N17.9    Closed compression fracture of L1 lumbar vertebra, sequela S32.010S    Acute midline low back pain with right-sided sciatica M54.41    Acute exacerbation of chronic low back pain M54.5, G89.29    Iron deficiency anemia D50.9    Rectal bleeding K62.5    Elevated LFTs R79.89       Isolation/Infection:   Isolation            Contact          Patient Infection Status       Infection Onset Added Last Indicated Last Indicated By Review Planned Expiration Resolved Resolved By    MDRO (multi-drug resistant organism)  04/30/14 04/30/14 Gibran Dill RN        2/19/2014 urine, ecoli    Resolved    COVID-19 Rule Out 05/21/21 05/21/21 05/22/21 COVID-19 (Ordered)   05/24/21 Gibran Dill RN    COVID-19 Rule Out 04/15/21 04/15/21 04/15/21 COVID-19, Rapid (Ordered)   04/15/21 Rule-Out Test Resulted    COVID-19 Rule Out 02/08/21 02/08/21 02/08/21 COVID-19 (Ordered)   02/09/21 Rule-Out Test Resulted    COVID-19 Rule Out 07/17/20 07/17/20 07/17/20 COVID-19 (Ordered)   07/18/20 Rule-Out Test Resulted            Nurse Assessment:  Last Vital Signs: BP (!) 107/38   Pulse 104   Temp 98.2 °F (36.8 °C) (Oral)   Resp 16   Ht 5' 1.5\" (1.562 m)   Wt 181 lb 7 oz (82.3 kg)   SpO2 93%   BMI 33.73 kg/m²     Last documented pain score (0-10 scale): Pain Level: 8  Last Weight:   Wt Readings from Last 1 Encounters:   06/01/21 181 lb 7 oz (82.3 kg)     Mental Status:  {IP PT MENTAL STATUS:20030}    IV Access:  {MH DELVIN IV ACCESS:896389141}    Nursing Mobility/ADLs:  Walking   {KHANH MCCULLOUGH JEVON:951484382}  Transfer  {KHANH MCCULLOUGH RFOD:530019275}  Bathing  {CHP DME TLMM:523936201}  Dressing  {CHP DME IXWF:266809801}  Toileting  {CHP DME QFKA:814296191}  Feeding  {CHP DME PMGE:121452205}  Med Admin  {CHP DME HMGW:978631094}  Med Delivery   { DELVIN MED Delivery:951003853}    Wound Care Documentation and Therapy:        Elimination:  Continence: Bowel: {YES / KK:19877}  Bladder: {YES / CL:35210}  Urinary Catheter: {Urinary Catheter:497346161}   Colostomy/Ileostomy/Ileal Conduit: {YES / WO:64174}       Date of Last BM: ***    Intake/Output Summary (Last 24 hours) at 6/1/2021 0839  Last data filed at 6/1/2021 0617  Gross per 24 hour   Intake 440 ml   Output 300 ml   Net 140 ml     I/O last 3 completed shifts: In: 0 [P.O.:640]  Out: 300 [Urine:300]    Safety Concerns:     [address] Safety Concerns:910426349}    Impairments/Disabilities:      [address] Impairments/Disabilities:111806759}    Nutrition Therapy:  Current Nutrition Therapy:   [address] Diet List:365159890}    Routes of Feeding: {CHP DME Other Feedings:369250014}  Liquids: {Slp liquid thickness:20253}  Daily Fluid Restriction: {CHP DME Yes amt example:822138025}  Last Modified Barium Swallow with Video (Video Swallowing Test): {Done Not Done VHJA:379950715}    Treatments at the Time of Hospital Discharge:   Respiratory Treatments: ***  Oxygen Therapy:  {Therapy; copd oxygen:11316}  Ventilator:    { CC Vent IXNE:232768563}    Rehab Therapies: Physical Therapy and Occupational Therapy  Weight Bearing Status/Restrictions: Other Medical Equipment (for information only, NOT a DME order):     Other Treatments: skilled nursing assessment per protocol medication education     Patient's personal belongings (please select all that are sent with patient):  {CHP DME Belongings:655517169}    RN SIGNATURE:  {Esignature:449533301}    CASE MANAGEMENT/SOCIAL WORK SECTION    Inpatient Status Date: 5/19/21    Readmission Risk Assessment Score:  Readmission Risk              Risk of Unplanned Readmission: 28           Discharging to Facility/ [address]  Phone: [phone]  Fax [phone]    [address] (if applicable)   Name:  Address:  Dialysis Schedule:  Phone:  Fax:    / signature: Electronically signed by Miller Oneal RN on 6/1/21 at 8:40 AM EDT    PHYSICIAN SECTION    Prognosis: Fair    Condition at Discharge: Stable    Rehab Potential (if transferring to Rehab): Fair    Recommended Labs or Other Treatments After Discharge:     Physician Certification: I certify the above information and transfer of Maurisio Rasmussen  is necessary for the continuing treatment of the diagnosis listed and that she requires [facility] for less 30 days.      Update Admission H&P: No change in H&P    PHYSICIAN SIGNATURE:  Electronically signed by Marislea Levine MD on 6/6/21 at 2:35 PM EDT

## 2021-06-01 NOTE — PLAN OF CARE
Problem: Falls - Risk of:  Goal: Will remain free from falls  Description: Will remain free from falls  Outcome: Ongoing  Goal: Absence of physical injury  Description: Absence of physical injury  Outcome: Ongoing     Problem: Pain:  Goal: Pain level will decrease  Description: Pain level will decrease  Outcome: Ongoing  Goal: Control of acute pain  Description: Control of acute pain  Outcome: Ongoing  Goal: Control of chronic pain  Description: Control of chronic pain  Outcome: Ongoing     Problem: Cardiac Output - Decreased:  Goal: Hemodynamic stability will improve  Description: Hemodynamic stability will improve  Outcome: Ongoing     Problem: Musculor/Skeletal Functional Status  Goal: Highest potential functional level  Outcome: Ongoing  Goal: Absence of falls  Outcome: Ongoing     Problem: Nutrition  Goal: Optimal nutrition therapy  Outcome: Ongoing     Problem: Skin Integrity:  Goal: Will show no infection signs and symptoms  Description: Will show no infection signs and symptoms  Outcome: Ongoing  Goal: Absence of new skin breakdown  Description: Absence of new skin breakdown  Outcome: Ongoing     Problem: Musculor/Skeletal Functional Status  Goal: Highest potential functional level  Outcome: Ongoing  Goal: Absence of falls  Outcome: Ongoing

## 2021-06-01 NOTE — PROGRESS NOTES
[address]   Occupational Therapy Evaluation  Date: 21  Patient Name: Alysa Antonio       Room: 5-  MRN: [id number]  Account: [de-identified]   : 1935  (81 y.o.) Gender: female     Discharge Recommendations: The patient would benefit from an intensive level of therapy after discharge from the facility. They should be able to tolerate 3-hours of Combined OT/PT/ST over 5 days/week or at least 900 minutes of  Combined Therapy over 7 days. Referring Practitioner: Teddy Hollins MD  Diagnosis: Atrial fibrillation with RVR       Treatment Diagnosis: Impaired self-care status. Past Medical History:  has a past medical history of Asbestos exposure, Atrial fibrillation ([address] Basal cell carcinoma of nose, CAD (coronary artery disease), CKD (chronic kidney disease) stage 2, GFR 60-89 ml/min, Hyperlipidemia, Hypertension, MDRO (multiple drug resistant organisms) resistance, Nephrolithiasis, Osteoarthritis, Ovarian cyst, Oxygen dependent, Palpitations, Peritoneal mesothelioma ([address] PONV (postoperative nausea and vomiting), Prolonged emergence from general anesthesia, and SOB (shortness of breath) on exertion. Past Surgical History:   has a past surgical history that includes Tubal ligation (); Hysterectomy (); Colonoscopy; Total hip arthroplasty (Right, ); Rectocele repair (); liver biopsy (); Upper gastrointestinal endoscopy; Photodynamic Therapy (2000 & 2002); Total hip arthroplasty (Left, ); Revision total hip arthroplasty (Right, ); Rotator cuff repair (); Ankle fracture surgery (Right, ); Cataract removal (Bilateral); Cardiac catheterization (); liver biopsy; Coronary angioplasty with stent (2002); Buhler tooth extraction; Appendectomy (); Cholecystectomy (); Skin cancer excision (Bilateral, );  Skin cancer excision (Left, Oct. 2014); nasal endoscopy (Bilateral, 2014); BIOPSY / LIGATION TEMPORAL ARTERY (Bilateral, 2/12/2021); Breast biopsy (Right); Spine surgery (N/A, 4/15/2021); Upper gastrointestinal endoscopy (N/A, 5/28/2021); and Colonoscopy (N/A, 5/28/2021). Restrictions  Restrictions/Precautions: Fall Risk, Up as Tolerated (peripheral IV right antecubital and left wrist, O2 at 3 L, external urinary catheter)  Implants present? : Metal implants ((bilateral THR, cardiac stent, metal plate and screws right ankle))  Other position/activity restrictions: left leg 1/2\" shorter than the right (S/P BILATERAL THRs)  Required Braces or Orthoses?: No     Vitals  Temp: 98.2 °F (36.8 °C)  Pulse: 104  Resp: 16  BP: (!) 107/38  Height: 5' 1.5\" (156.2 cm)  Weight: 181 lb 7 oz (82.3 kg)  BMI (Calculated): 33.8  Oxygen Therapy  SpO2: 93 %  Pulse Oximeter Device Mode: Intermittent  Pulse Oximeter Device Location: Finger, Left, Hand  O2 Device: None (Room air)  O2 Flow Rate (L/min): 2 L/min  End Tidal CO2: 29 (%)  Blood Gas  Performed?: No  Level of Consciousness: Alert (0)    Subjective  Subjective: \"My knees feel like wet noodles. Cleophus McWilliams Cleophus McWilliams I did not fall, I slide off the wall. ..it was a few days ago\" Pt reports a recent fall in the bathroom due to weakness in bilateral LE.   Overall Orientation Status: Within Functional Limits  Vision  Vision: Within Functional Limits (implants)  Hearing  Hearing: Within functional limits  Social/Functional History  Lives With: Spouse  Type of Home:  (Condo - 2 story (lives on main level))  Home Layout: Two level, Able to Live on Main level with bedroom/bathroom  Home Access: Stairs to enter with rails  Entrance Stairs - Number of Steps: 2  Entrance Stairs - Rails: Both  Bathroom Shower/Tub: Walk-in shower, Doors  Bathroom Toilet: Handicap height  Bathroom Equipment: Built-in shower seat, Grab bars around toilet  Bathroom Accessibility: Walker accessible  Home Equipment: Crutches, Oxygen, Long-handled shoehorn, Reacher (O2 at 2 L PRN (typically at night))  ADL Assistance: Independent  Homemaking Assistance: Needs assistance (daugthers assisting w/ laundry, bringing in meals, cleaning and grocery shopping, spouse assists as needed)  Homemaking Responsibilities: Yes  Ambulation Assistance: Independent (uses crutches PRN with hip pain)  Transfer Assistance: Independent  Active : Yes  Mode of Transportation: Car, SUV  Occupation: Retired  Type of occupation: Nurse  IADL Comments: sleeps in a flat bed  Additional Comments: large supportive family assist at D/C  Pain Assessment  Pain Assessment: 0-10  Pain Level: 8  Patient's Stated Pain Goal: No pain  Pain Type: Acute pain  Pain Location: Back  Pain Orientation: Mid  Pain Descriptors: Stabbing  Pain Frequency: Intermittent  Clinical Progression: Not changed    Objective  Vision - Basic Assessment  Prior Vision: No visual deficits   Cognition  Overall Cognitive Status: WFL   Perception  Overall Perceptual Status: WFL  Sensation  Overall Sensation Status: WFL (denies)   ADL  Feeding: Setup  Grooming: Supervision  UE Bathing: Stand by assistance  LE Bathing: Moderate assistance  UE Dressing: Stand by assistance  LE Dressing: Maximum assistance  Toileting: Maximum assistance  Additional Comments: ADL scores based on skilled reasoning, clinical observations, and patient report unless otherwise noted. Pt reports 2-3 days ago she was able to don her socks while seated on the toilet as the toilet is lower than the bed. UE Function           LUE Strength  Gross LUE Strength: WFL  L Hand General: 4-/5  LUE Strength Comment: Grossly 4-/5     LUE Tone: Normotonic     LUE AROM (degrees)  LUE AROM : WFL     Left Hand AROM (degrees)  Left Hand AROM: WFL  RUE Strength  Gross RUE Strength: WFL  R Hand General: 4-/5  RUE Strength Comment: Grossly 4-/5      RUE Tone: Normotonic     RUE AROM (degrees)  RUE AROM : WFL     Right Hand AROM (degrees)  Right Hand AROM: WFL    Fine Motor Skills  Coordination  Movements Are Fluid And Coordinated:  Yes Mobility  Supine to Sit: Moderate assistance  Sit to Supine: Moderate assistance       Balance  Sitting Balance: Contact guard assistance  Standing Balance: Moderate assistance  Standing Balance  Time: 30 seconds  Activity: BUE support on RW  Functional Mobility  Functional - Mobility Device: Rolling Walker  Activity:  (2 steps to head of bed)  Assist Level: Moderate assistance  Functional Mobility Comments: with Minimal verbal cues for pacing  Bed mobility  Supine to Sit: Moderate assistance  Sit to Supine: Moderate assistance     Transfers  Sit to stand: Moderate assistance  Stand to sit: Moderate assistance  Transfer Comments: with Minimal verbal cues for hand placement and safety  Functional Activity Tolerance  Functional Activity Tolerance: Tolerates 30 min exercise with multiple rests   Assessment  Performance deficits / Impairments: Decreased functional mobility , Decreased ADL status, Decreased strength, Decreased safe awareness, Decreased endurance, Decreased balance, Decreased high-level IADLs  Treatment Diagnosis: Impaired self-care status.   Prognosis: Good  Decision Making: Medium Complexity  REQUIRES OT FOLLOW UP: Yes  Discharge Recommendations: Patient would benefit from continued therapy after discharge  Activity Tolerance: Patient Tolerated treatment well         Functional Outcome Measures  AM-PAC Daily Activity Inpatient   How much help for putting on and taking off regular lower body clothing?: A Lot  How much help for Bathing?: A Lot  How much help for Toileting?: A Lot  How much help for putting on and taking off regular upper body clothing?: A Little  How much help for taking care of personal grooming?: A Little  How much help for eating meals?: A Little  AM-[facility] Inpatient Daily Activity Raw Score: 15  AM-PAC Inpatient ADL T-Scale Score : 34.69  ADL Inpatient CMS 0-100% Score: 56.46  ADL Inpatient CMS G-Code Modifier : CK       Goals  Patient Goals   Patient goals : To d/c to ARU prior to return home  Short term goals  Time Frame for Short term goals: By discharge  Short term goal 1: Pt will verbalize/demonstrate Good understanding of assistive equipment/durable medical equipment/modified techniques for increased independence with self-care and mobility. Short term goal 2: Pt will perform bed mobility with SBA to sit edge of bed unsupported for 10+ minutes while engaging in self-care. Short term goal 3: Pt will perform upper body bathing/dressing with supervision and lower body bathing/dressing with Minimal assist.  Short term goal 4: Pt will perform stand pivot transfer with Minimal assist, RW, and Good safety. Short term goal 5: Pt will actively participate in 15+ minutes of therapeutic exercise/functional activities to promote increased independence with self-care and mobility. Plan  Safety Devices  Safety Devices in place: Yes  Type of devices:  All fall risk precautions in place, Bed alarm in place, Call light within reach, Gait belt, Patient at risk for falls, Left in bed, Nurse notified     Plan  Times per week: 5-7  Times per day: Daily  Current Treatment Recommendations: Self-Care / ADL, Home Management Training, Strengthening, Balance Training, Functional Mobility Training, Endurance Training, Safety Education & Training, Patient/Caregiver Education & Training, Equipment Evaluation, Education, & procurement          OT Individual Minutes  Time In: 1872  Time Out: 1797  Minutes: 24    Electronically signed by Lizzie Lindsey OT on 6/1/21 at 11:14 AM EDT

## 2021-06-01 NOTE — PROGRESS NOTES
Writer examined pt's tongue with flashlight and noted a small ulcerated area right edge of tongue where it is painful; also c/o pain left posterior corner of tongue; no ulcer noted; used lidocaine viscus and pain medication  with relief

## 2021-06-01 NOTE — PROGRESS NOTES
Progress Note    6/1/2021   1:40 PM    Name:  Serena Cortez  MRN:    [id number]     Acct:     [de-identified]   Room:  2115/2115-01  IP Day: 15     Admit Date: 5/19/2021  9:49 AM  PCP: Tahmina Bhakta DO    Subjective:     C/C:   Chief Complaint   Patient presents with    Back Pain       Interval History: Status: not changed. Patient complains of thoracic pain lumbar spine pain. There have been no reports of hematemesis or melena. Patient denies chest pain shortness of breath. Oral ulcer pain well controlled with Viscous Lidocaine. Recent vitals reviewed and stable. Recent labs reviewed hemoglobin 8.3, INR elevated at 3.7    ROS:   all 10 systems reviewed and are negative except as noted    Review of Systems   Constitutional: Negative for chills and fatigue. HENT: Negative for drooling, mouth sores, sneezing and trouble swallowing. Eyes: Negative for redness and itching. Respiratory: Negative for cough, chest tightness and shortness of breath. Cardiovascular: Negative for chest pain, palpitations and leg swelling. Gastrointestinal: Negative for abdominal pain, blood in stool, nausea and vomiting. Endocrine: Negative for heat intolerance and polyphagia. Genitourinary: Negative for difficulty urinating, flank pain and pelvic pain. Musculoskeletal: Positive for back pain (Lumbar and thoracic). Negative for arthralgias, joint swelling and neck stiffness. Skin: Negative for color change and pallor. Allergic/Immunologic: Negative for food allergies. Neurological: Negative for dizziness, seizures and headaches. Hematological: Does not bruise/bleed easily. Psychiatric/Behavioral: Negative for agitation, behavioral problems and suicidal ideas. The patient is not hyperactive. Medications: Allergies:    Allergies   Allergen Reactions    Adhesive Tape Rash    Meperidine Nausea Only    Demerol Nausea Only    Ultram [Tramadol Hcl]      Hot flashes    Zocor [Simvastatin] Muscle aches    Codeine      \"out of it\"    Fentanyl Rash       Current Meds: famotidine (PEPCID) tablet 10 mg, Nightly  lidocaine viscous hcl (XYLOCAINE) 2 % solution 5 mL, Q3H PRN  predniSONE (DELTASONE) tablet 10 mg, Daily  nystatin (MYCOSTATIN) 140732 UNIT/ML suspension 500,000 Units, 4x Daily  hydrocortisone-aloe 1 % cream, BID  potassium chloride (KLOR-CON M) extended release tablet 40 mEq, PRN   Or  potassium bicarb-citric acid (EFFER-K) effervescent tablet 40 mEq, PRN   Or  potassium chloride 10 mEq/100 mL IVPB (Peripheral Line), PRN  dextrose 50 % IV solution, PRN  oxyCODONE-acetaminophen (PERCOCET) 5-325 MG per tablet 1 tablet, Q4H PRN  warfarin (COUMADIN) daily dosing (placeholder), RX Placeholder  baclofen (LIORESAL) tablet 5 mg, TID PRN  polyethylene glycol (GLYCOLAX) packet 17 g, Daily  perflutren lipid microspheres (DEFINITY) injection 2.2 mg, ONCE PRN  sodium chloride flush 0.9 % injection 5-40 mL, 2 times per day  sodium chloride flush 0.9 % injection 5-40 mL, PRN  0.9 % sodium chloride infusion, PRN  acetaminophen (TYLENOL) tablet 650 mg, Q4H PRN  carvedilol (COREG) tablet 6.25 mg, BID  naloxone (NARCAN) injection 0.4 mg, PRN  atorvastatin (LIPITOR) tablet 40 mg, Daily  ferrous sulfate 300 (60 Fe) MG/5ML syrup 300 mg, Daily  pantoprazole (PROTONIX) tablet 40 mg, QAM AC  oxybutynin (DITROPAN-XL) extended release tablet 15 mg, Daily  polyethylene glycol (GLYCOLAX) packet 17 g, Daily PRN  rOPINIRole (REQUIP) tablet 0.5 mg, 4x Daily  sertraline (ZOLOFT) tablet 50 mg, Daily  amiodarone (CORDARONE) tablet 100 mg, BID        Data:     Code Status:  Full Code    Family History   Problem Relation Age of Onset    Heart Failure Brother     Heart Attack Brother         triple bypass    Prostate Cancer Brother     Coronary Art Dis Mother     Breast Cancer Mother         dx'd in late 46s   Lilyan Montane Emphysema Father     Colon Cancer Neg Hx     Diabetes Neg Hx     Eclampsia Neg Hx     Hypertension Neg Hx     Ovarian Cancer Neg Hx      Labor Neg Hx     Spont Abortions Neg Hx     Stroke Neg Hx        Social History     Socioeconomic History    Marital status:      Spouse name: Not on file    Number of children: Not on file    Years of education: Not on file    Highest education level: Not on file   Occupational History    Not on file   Tobacco Use    Smoking status: Never Smoker    Smokeless tobacco: Never Used   Vaping Use    Vaping Use: Never used   Substance and Sexual Activity    Alcohol use: Yes     Comment: social    Drug use: No    Sexual activity: Not Currently   Other Topics Concern    Not on file   Social History Narrative    Not on file     Social Determinants of Health     Financial Resource Strain:     Difficulty of Paying Living Expenses:    Food Insecurity:     Worried About Running Out of Food in the Last Year:     Ran Out of Food in the Last Year:    Transportation Needs:     Lack of Transportation (Medical):  Lack of Transportation (Non-Medical):    Physical Activity:     Days of Exercise per Week:     Minutes of Exercise per Session:    Stress:     Feeling of Stress :    Social Connections:     Frequency of Communication with Friends and Family:     Frequency of Social Gatherings with Friends and Family:     Attends Mosque Services:     Active Member of Clubs or Organizations:     Attends Club or Organization Meetings:     Marital Status:    Intimate Partner Violence:     Fear of Current or Ex-Partner:     Emotionally Abused:     Physically Abused:     Sexually Abused:        I/O (24Hr): Intake/Output Summary (Last 24 hours) at 2021 1340  Last data filed at 2021 0617  Gross per 24 hour   Intake 340 ml   Output 300 ml   Net 40 ml     Radiology:  XR ABDOMEN (KUB) (SINGLE AP VIEW)    Result Date: 2021  Enteric tube needs further advancement, approximately 5 cm. Additional findings, as above.  The findings were sent to the Radiology  >60 >60 mL/min    GFR Comment          GFR Staging NOT REPORTED    PROTIME-INR    Collection Time: 21  5:09 AM   Result Value Ref Range    Protime 35.9 (H) 11.8 - 14.6 sec    INR 3.7        Physical Examination:        Vitals:  /81   Pulse 92   Temp 98.4 °F (36.9 °C) (Oral)   Resp 18   Ht 5' 1.5\" (1.562 m)   Wt 181 lb 7 oz (82.3 kg)   SpO2 97%   BMI 33.73 kg/m²   Temp (24hrs), Av.4 °F (36.9 °C), Min:98.2 °F (36.8 °C), Max:98.8 °F (37.1 °C)    No results for input(s): POCGLU in the last 72 hours. Physical Exam  Vitals reviewed. HENT:      Head: Normocephalic. Right Ear: External ear normal.      Left Ear: External ear normal.      Nose: Nose normal.      Mouth/Throat:      Mouth: Mucous membranes are moist.      Pharynx: Oropharynx is clear. Eyes:      Conjunctiva/sclera: Conjunctivae normal.   Cardiovascular:      Rate and Rhythm: Normal rate and regular rhythm. Pulses: Normal pulses. Heart sounds: Normal heart sounds. Pulmonary:      Effort: Pulmonary effort is normal.      Breath sounds: Normal breath sounds. Abdominal:      General: Bowel sounds are normal.      Palpations: Abdomen is soft. Musculoskeletal:         General: Tenderness (Thoracic spine) present. No deformity. Cervical back: Normal range of motion and neck supple. Right lower leg: No edema. Left lower leg: No edema. Comments: Lumbar and thoracic spine exam: Mild tenderness mid thoracic spine. no step-off sign. No saddle anesthesia. Skin:     General: Skin is warm. Capillary Refill: Capillary refill takes less than 2 seconds. Coloration: Skin is not jaundiced. Neurological:      General: No focal deficit present. Mental Status: She is alert. Mental status is at baseline. Motor: No weakness (Power 5 out of 5 lower extremity).    Psychiatric:         Mood and Affect: Mood normal.         Behavior: Behavior normal.         Assessment: Primary Problem  Atrial fibrillation with RVR (HCC)     Principal Problem:    Atrial fibrillation with RVR (HCC)  Active Problems:    Hypertension    Mixed hyperlipidemia    Chronic diastolic heart failure (HCC)    Chronic respiratory failure (HCC)    Gastroesophageal reflux disease without esophagitis    Pulmonary hypertension (HCC)    YOGESH (acute kidney injury) (HCC)    Closed compression fracture of L1 lumbar vertebra, sequela    Acute midline low back pain with right-sided sciatica    Acute exacerbation of chronic low back pain    Iron deficiency anemia    Rectal bleeding    Elevated LFTs  Resolved Problems:    * No resolved hospital problems. *      Past Medical History:   Diagnosis Date    Asbestos exposure     Atrial fibrillation ([address]     takes xarelto    Basal cell carcinoma of nose     CAD (coronary artery disease)     CKD (chronic kidney disease) stage 2, GFR 60-89 ml/min     Hyperlipidemia     Hypertension     MDRO (multiple drug resistant organisms) resistance 2/19/2014    E. 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 INR 3.7  2. Goal INR 2-3  3. Hold Coumadin  4. Prednisone 10 mg p.o. daily  5. ARU discharge planning in progress  6. X-ray lumbar spine, x-ray thoracic spine  7. Protonix 40 mg p.o. daily  8. Med rec done  9. INR daily  1. DVT Prophylaxis no pharmacological DVT prophylaxis due to supratherapeutic INR  2. EPCs  3. PT/OT to evaluate and treat  4. Pain control  5. Replace electrolytes as per sliding scale  6. Home medications reviewed and appropriate medications continued  7.  Reviewed labs and imaging studies from last 24 hours and results explained to patient      Electronically signed by Rosalinda Wang MD

## 2021-06-01 NOTE — FLOWSHEET NOTE
Patient expressed hopes to be moved to ARU.      06/01/21 1506   Encounter Summary   Services provided to: Patient   Referral/Consult From: Rounding   Continue Visiting   (6-1-21)   Complexity of Encounter Moderate   Length of Encounter 15 minutes   Spiritual Assessment Completed Yes   Spiritual/Mandaen   Type Spiritual support   Assessment Calm; Approachable   Intervention Active listening;Explored feelings, thoughts, concerns;Prayer;Sustaining presence/ Ministry of presence; Discussed illness/injury and it's impact   Outcome Expressed gratitude;Engaged in conversation;Expressed feelings/needs/concerns;Coping;Receptive

## 2021-06-01 NOTE — PROGRESS NOTES
Date:   6/1/2021  Patient name: Alysa Antonio  Date of admission:  5/19/2021  9:49 AM  MRN:   [id number]  YOB: 1935  PCP: Jazmine Watters DO    Reason for Admission: Atrial fibrillation with RVR [facility]) [I48.91]    Cardiology follow-up: A. fib with RVR       Impression      A. fib with RVR  Diastolic CHF  Hypertension  COPD, hypoxia, on oxygen at home  Hyperlipidemia  Exertional shortness of breath, hypoxia  History of CAD, stent placement LAD 2002  Polymyalgia rheumatica  Moderate aortic regurgitation  Giant cell arteritis  chronic anemia  history of peritoneal/omental mesothelioma surgery done at [facility]  Dry mouth, dry eyes  Multiple hip surgeries, bilateral  Chronic pain syndrome  L1 kyphoplasty 4/15/2021  Hemorrhoids, rectal bleeding    2D echo 5/21/2021  Normal LV size and wall thickness ejection fraction more than 55% normal size LA and RA, RVSP 21, no significant pericardial effusion     ECG 5/19/2021  A. fib/a flutter heart rate 159, nonspecific ST-T changes     X-ray lumbar spine 5/19/2021  Unchanged appearance of the lumbar spine, status post vertebroplasty at L1, no new osseous abnormality  Moderate stool burden, previously administered oral contrast is present in the distal colon     Abdominal x-ray 5/27/2021 enteric tube needs further advancement approximately 5 cm. Lab work   6/1/2021  Sodium 139, potassium 4.3, BUN 19, creatinine 0.66, glucose 113, calcium 8.4, albumin 2.7 WBC 8.4, hemoglobin 8.3, platelets 040  INR 3.7    History of present illness  81years old female who recently had L1 kyphoplasty presented to the ER with a chief complaint of back pain.  She had a kyphoplasty about a month ago for compression fracture L1.  Her pain got very severe.  She has been also having dizziness and she had a near syncope about 10 days ago.  No chest pain no palpitation.   On admission her ECG showed A. fib with RVR heart rate 159.  She was started on Cardizem infusion.  She developed hypotension and dose of Cardizem infusion was reduced to 5 mg/h.  Later on it was discontinued and started on carvedilol 6.25 mg twice a day.  Heart rate improved significantly.     Current evaluation   Patient seen and examined. She was resting on bed, she looked pale and tired  Complaining of back pain, generalized weakness  Complaining of sore tongue  No chest pain no paroxysmal nocturnal dyspnea    ECG monitor A. fib heart rate 361, systolic blood pressure 546      Medications:   Scheduled Meds:   famotidine  10 mg Oral Nightly    predniSONE  10 mg Oral Daily    nystatin  5 mL Oral 4x Daily    hydrocortisone-aloe   Topical BID    warfarin (COUMADIN) daily dosing (placeholder)   Other RX Placeholder    polyethylene glycol  17 g Oral Daily    sodium chloride flush  5-40 mL Intravenous 2 times per day    carvedilol  6.25 mg Oral BID    atorvastatin  40 mg Oral Daily    ferrous sulfate  300 mg Oral Daily    pantoprazole  40 mg Oral QAM AC    oxybutynin  15 mg Oral Daily    rOPINIRole  0.5 mg Oral 4x Daily    sertraline  50 mg Oral Daily    amiodarone  100 mg Oral BID     Continuous Infusions:   sodium chloride       CBC:   Recent Labs     05/30/21 0455 05/31/21 0513 06/01/21  0509   WBC 5.9 7.6 8.4   HGB 8.6* 8.3* 8.3*    184 183     BMP:    Recent Labs     05/30/21 0455 05/31/21 0513 06/01/21  0509    140 139   K 3.8 4.8 4.3   * 108* 107   CO2 24 26 24   BUN 19 18 19   CREATININE 0.74 0.69 0.66   GLUCOSE 193* 168* 113*     Hepatic:   Recent Labs     05/30/21 0455 05/31/21 0513 06/01/21  0509   AST 18 17 16   ALT 52* 46* 41*   BILITOT 0.35 0.27* 0.35   ALKPHOS 105* 100 96     Troponin: No results for input(s): TROPONINI in the last 72 hours. BNP: No results for input(s): BNP in the last 72 hours. Lipids: No results for input(s): CHOL, HDL in the last 72 hours.     Invalid input(s): LDLCALCU  INR:   Recent Labs     05/30/21 0455 05/31/21 0513 06/01/21  0509   INR 3.0 4.9* 3.7       Objective:   Vitals: BP (!) 107/38   Pulse 104   Temp 98.2 °F (36.8 °C) (Oral)   Resp 16   Ht 5' 1.5\" (1.562 m)   Wt 181 lb 7 oz (82.3 kg)   SpO2 93%   BMI 33.73 kg/m²   General appearance: Tired and pale looking female  HEENT: Head: Normal, normocephalic, atraumatic. Neck: no JVD and supple, symmetrical, trachea midline  Lungs: diminished breath sounds bibasilar  Heart: irregularly irregular rhythm  Abdomen: Obese bowel sounds present  Extremities: Bilateral pitting edema, no calf tenderness  Neurologic: Mental status: Alert, oriented, thought content appropriate    EKG: atrial fibrillation, rate ,   ECHO: reviewed.    Ejection fraction: 55%, mild to moderate AR    Assessment / Acute Cardiac Problems:   A. fib with RVR heart rate control improved  Patient admitted with a severe back pain, recent kyphoplasty  Diastolic CHF  COPD, hypoxia on oxygen  Anemia  Severe protein malnutrition    6/1/2021  Complaining of full tongue she has aphthous ulcer  Hemodynamically stable  INR 3.7    Patient Active Problem List:     Hypertension     Nephrolithiasis     Mixed hyperlipidemia     Osteoarthritis     Peritoneal mesothelioma ([address]     Ovarian cyst     Basal cell carcinoma of nose     Bimalleolar ankle fracture     Epistaxis, recurrent     Cervical spondylosis     Trochanteric bursitis, right hip     Status post bilateral total hip replacement     Medication monitoring encounter     Chronic diastolic heart failure (HCC)     Chronic respiratory failure (HCC)     Giant cell arteritis (HCC)     Paroxysmal atrial fibrillation (HCC)     Urinary frequency     Vitamin D deficiency     Age-related osteoporosis with current pathological fracture     Atrial fibrillation with RVR (HCC)     Gastroesophageal reflux disease without esophagitis     Pulmonary hypertension (HCC)     Lumbosacral spondylosis without myelopathy     YOGESH (acute kidney injury) ([address]     Closed compression fracture of L1 lumbar vertebra, sequela     Acute midline low back pain with right-sided sciatica     Acute exacerbation of chronic low back pain     Iron deficiency anemia     Rectal bleeding     Elevated LFTs      Plan of Treatment:   Medication checked  Continue current dose of beta-blocker, warfarin, amiodarone, Lipitor  Patient is also on prednisone    Waiting for transfer to acute rehab    Electronically signed by Martin Palacio MD on 6/1/2021 at 11:25 AM

## 2021-06-01 NOTE — PLAN OF CARE
Problem: Falls - Risk of:  Goal: Will remain free from falls  Description: Will remain free from falls  6/1/2021 0327 by Myla Torres RN  Outcome: Ongoing  Note: Patient remained free from falls. Call light within reach. Problem: Pain:  Goal: Pain level will decrease  Description: Pain level will decrease  6/1/2021 0327 by Myla Torres RN  Outcome: Ongoing  Note: Patient given pain medication as ordered. Problem: Skin Integrity:  Goal: Absence of new skin breakdown  Description: Absence of new skin breakdown  6/1/2021 0327 by Myla Torres RN  Outcome: Ongoing  Note: No new alterations in skin integrity.

## 2021-06-02 NOTE — PROGRESS NOTES
Physical Medicine & Rehabilitation  Progress Note    6/2/2021 12:11 PM     CC: Ambulatory and ADL dysfunction due to back pain    A 60-year-old female with history of atrial fibrillation CHF, basal carcinoma and chronic back pain with kyphoplasty of L1 on 4/15/2021 admitted [facility] on 5/19/2021 with progressive worsening low back pain over 10 days. She has had recent kyphoplasty of thoracic vertebra. Pain management-Dr. Danie Badillo performed caudal epidural steroid injection 5/24. Cardiology saw the patient for atrial fibrillation CHF. Silvia Marroquin GI evaluation for iron deficiency meet anemia, performed colonoscopy 5/28 showed severe diverticulosis EGD showed consistent with GERD. Internal medicine 6/2-on prednisone 10 mg daily, pain management reconsulted due to compression fractures thoracic spine Prolia IV infusion for compression fractures, may need to restart heparin drip if kyphoplasty plan, noted MRI thoracic and lumbar spine ordered    GI 5/28 okay to restart anticoagulation follow-up outpatient for biopsy signed off    General surgery 5/30-no active signs of bleeding globin stable    Pain management 5/25-trial of epidural injection further plans depending on results probably not a candidate for L4 kyphoplasty    X-ray thoracic spine 6/1/2021  Impression:        Compression fracture of T5, T6, T9, T10 vertebral bodies with 30% loss of   normal height at T6. Stable compression fracture of L1 body with previous kyphoplasty     Multilevel spondylosis and degenerative disc disease. Facet arthropathy              MRI thoracic spine 5/19  1. No acute abnormality of the thoracic spine. 2. Chronic mild compression deformity of the L1 vertebral body status post   prior vertebroplasty. 3. Mild degenerative changes in the lower cervical and upper thoracic spine. 4. Small layering left pleural effusion.    5. Incidental subcentimeter left thyroid nodule.  No further follow-up is   warranted per guidelines below.            MRI lumbar spine    1. L1 vertebroplasty in association with remote vertebral body compression   fracture with approximately 50% height loss. 2. Associated retropulsion of posterior L1 cortex with associated borderline   central canal stenosis. 3. L1/L2 mild, L2/L3 moderate, L3/L4 moderate central canal stenosis   secondary to encroachment by posterior disc osteophyte complex. 4. L1/L2 moderate bilateral, L2/L3 mild bilateral, L3/L4 moderate bilateral,   L4/L5 moderate bilateral neural foraminal stenosis secondary to encroachment   by disc osteophyte complex. 5. L3/L4-L5/S1 mild bilateral facet degenerative arthrosis.             Subjective:   Continues with mid back pain. Continues to feel weak    ROS:  Denies fevers, chills, sweats. No chest pain, palpitations, lightheadedness. Denies coughing, wheezing or shortness of breath. Denies abdominal pain, nausea, diarrhea or constipation. No new areas of joint pain. Denies new areas of numbness or weakness. Denies new anxiety or depression issues. No new skin problems. Rehabilitation:   PT:  Restrictions/Precautions: Fall Risk, Up as Tolerated  Implants present? : Metal implants (bilateral THR, cardiac stent, metal implant and screws right)  Other position/activity restrictions: left leg 1/2\" shorter than the right (S/P BILATERAL THRs)       Bed Mobility:   Bed Mobility  Rolling: Minimal assistance  Supine to Sit: Minimal assistance (asssit with BLE )  Sit to Supine: Minimal assistance (assist with BLE)  Scootin Person assistance;Maximal assistance  Bed mobility  Scootin Person assistance;Maximal assistance       Transfers   Sit to Stand Contact guard assistance   Stand to sit Contact guard assistance   Stand Pivot Transfers Contact guard assistance   Comment Cues for hand placement   Ambulation   Ambulation?  Yes   Ambulation 1   Surface level tile   Device Rolling Walker   Assistance Contact guard assistance Quality of Gait Slow and steady, no LOB   Gait Deviations Slow Manuela;Decreased step length;Decreased step height   Distance 40' x 1  (1 rest break in between activity)   Comments Cues to stay inside of walker                 OT:      ADL  Feeding: Setup  Grooming: Supervision  UE Bathing: Stand by assistance  LE Bathing: Moderate assistance  UE Dressing: Stand by assistance  LE Dressing: Maximum assistance  Toileting: Maximum assistance  Additional Comments: ADL scores based on skilled reasoning, clinical observations, and patient report unless otherwise noted. Pt reports 2-3 days ago she was able to don her socks while seated on the toilet as the toilet is lower than the bed. Balance  Sitting Balance: Contact guard assistance  Standing Balance: Moderate assistance   Standing Balance  Time: 30 seconds  Activity: BUE support on RW  Functional Mobility  Functional - Mobility Device: Rolling Walker  Activity:  (2 steps to head of bed)  Assist Level: Moderate assistance  Functional Mobility Comments: with Minimal verbal cues for pacing     Bed mobility  Rolling to Left: Unable to assess  Rolling to Right: Stand by assistance  Supine to Sit: Moderate assistance  Sit to Supine: Moderate assistance  Scootin Person assistance, Maximal assistance  Comment: Pt sitting in bedside chair upon arrival.  Transfers  Sit to stand: Moderate assistance  Stand to sit: Moderate assistance  Transfer Comments: with Minimal verbal cues for hand placement and safety                   ST:            Objective:  /70   Pulse 107   Temp 98.6 °F (37 °C) (Oral)   Resp 18   Ht 5' 1.5\" (1.562 m)   Wt 156 lb 8.4 oz (71 kg)   SpO2 90%   BMI 29.10 kg/m²  I Body mass index is 29.1 kg/m².  I   Wt Readings from Last 1 Encounters:   21 156 lb 8.4 oz (71 kg)      Temp (24hrs), Av °F (36.7 °C), Min:97.3 °F (36.3 °C), Max:98.6 °F (37 °C)         GEN: well developed, well nourished, no acute distress  HEENT: Normocephalic atraumatic, EOMI, mucous membranes pink and moist  CV: RRR, no murmurs, rubs or gallops  PULM: CTAB, no rales or rhonchi. Respirations WNL and unlabored  ABD: soft, NT, ND, +BS and equal  NEURO: A&O x3. Sensation intact to light touch. MSK: 4/5 distal lower extremity and upper extremity, proximal lower extremities 3+ to 4-/5  EXTREMITIES: No calf tenderness to palpation bilaterally. No edema BLEs  SKIN: warm dry and intact with good turgor  PSYCH: appropriately interactive. Affect WNL.          Medications   Scheduled Meds:   vitamin D3  1,000 Units Oral Daily    famotidine  10 mg Oral Nightly    predniSONE  10 mg Oral Daily    nystatin  5 mL Oral 4x Daily    hydrocortisone-aloe   Topical BID    warfarin (COUMADIN) daily dosing (placeholder)   Other RX Placeholder    polyethylene glycol  17 g Oral Daily    sodium chloride flush  5-40 mL Intravenous 2 times per day    carvedilol  6.25 mg Oral BID    atorvastatin  40 mg Oral Daily    ferrous sulfate  300 mg Oral Daily    pantoprazole  40 mg Oral QAM AC    oxybutynin  15 mg Oral Daily    rOPINIRole  0.5 mg Oral 4x Daily    sertraline  50 mg Oral Daily    amiodarone  100 mg Oral BID     Continuous Infusions:   sodium chloride       PRN Meds:.lidocaine viscous hcl, potassium chloride **OR** potassium alternative oral replacement **OR** potassium chloride, dextrose, oxyCODONE-acetaminophen, baclofen, perflutren lipid microspheres, sodium chloride flush, sodium chloride, acetaminophen, naloxone, polyethylene glycol     Diagnostics:     CBC:   Recent Labs     05/31/21  0513 06/01/21  0509 06/02/21  0441   WBC 7.6 8.4 8.3   RBC 2.83* 2.82* 2.99*   HGB 8.3* 8.3* 8.8*   HCT 25.9* 25.8* 27.1*   MCV 91.2 91.5 90.6   RDW 16.9* 17.3* 17.1*    183 206     BMP:   Recent Labs     05/31/21  0513 06/01/21  0509 06/02/21  0441    139 138   K 4.8 4.3 4.7   * 107 103   CO2 26 24 29   BUN 18 19 20   CREATININE 0.69 0.66 0.74     BNP: No results for input(s): BNP in the last 72 hours. PT/INR:   Recent Labs     05/31/21  0513 06/01/21  0509 06/02/21 0441   PROTIME 44.9* 35.9* 27.8*   INR 4.9* 3.7 2.6     APTT: No results for input(s): APTT in the last 72 hours. CARDIAC ENZYMES: No results for input(s): CKMB, CKMBINDEX, TROPONINT in the last 72 hours. Invalid input(s): CKTOTAL;3  FASTING LIPID PANEL:  Lab Results   Component Value Date    CHOL 198 01/21/2021    HDL 43 01/21/2021    TRIG 231 (H) 01/21/2021     LIVER PROFILE:   Recent Labs     05/31/21  0513 06/01/21  0509 06/02/21 0441   AST 17 16 16   ALT 46* 41* 41*   BILITOT 0.27* 0.35 0.46   ALKPHOS 100 96 106*        I/O (24Hr): Intake/Output Summary (Last 24 hours) at 6/2/2021 1211  Last data filed at 6/2/2021 0502  Gross per 24 hour   Intake 150 ml   Output 100 ml   Net 50 ml       Glu last 24 hour  No results for input(s): POCGLU in the last 72 hours.     Recent Labs     06/01/21  1856   COLORU YELLOW   PHUR 6.0   SPECGRAV 1.024   PROTEINU TRACE*   RBCUA None   BACTERIA MANY*   NITRU NEGATIVE   WBCUA 10 TO 20   LEUKOCYTESUR SMALL*   YEAST NOT REPORTED   GLUCOSEU NEGATIVE   BILIRUBINUR NEGATIVE       Lives With: Spouse  Type of Home:  ([address] - 2 story (lives on main level))  Home Layout: Two level, Able to Live on Main level with bedroom/bathroom  Home Access: Stairs to enter with rails  Entrance Stairs - Number of Steps: 2  Entrance Stairs - Rails: Both  Bathroom Shower/Tub: Walk-in shower, Doors  Bathroom Toilet: Handicap height  Bathroom Equipment: Built-in shower seat, Grab bars around toilet  Bathroom Accessibility: Walker accessible  Home Equipment: Crutches, Oxygen, Long-handled shoehorn, Reacher (O2 at 2 L PRN (typically at night))  ADL Assistance: Independent  Homemaking Assistance: Needs assistance (michthers assisting w/ laundry, bringing in meals, cleaning and grocery shopping, spouse assists as needed)  Homemaking Responsibilities: Yes  Ambulation Assistance: Independent extrapolated by contextual diversion

## 2021-06-02 NOTE — PROGRESS NOTES
Physician Progress Note      Olivia Martínez  [facility] #:                  [id number]  :                       1935  ADMIT DATE:       2021 9:49 AM  DISCH DATE:  RESPONDING  PROVIDER #:        Stefania Torres MD          QUERY TEXT:    Patient admitted with atrial fibrillation and back pain. Noted documentation   of severe protein malnutrition in cardiology progress note on  and patient   \"at risk\" for malnutrition per dietician. In order to support the diagnosis   of severe PCM, please include additional clinical indicators in your   documentation. Or please document if the diagnosis of severe PCM has been   ruled out after further study. The medical record reflects the following:  Risk Factors: reported poor appetite and intake prior to admission, advanced   age, GERD, significant diverticulosis, now with aphthous ulcers  Clinical Indicators: Per dietician notes ,  and :  patient \"at   risk\" for malnutrition;   Dietician note:  Review of documented wts shows   9# wt loss over 3 months (6%) with inadequate oral intake as evidenced by oral   intake < 50%, poor intake prior to admission and weight loss--ordered Ensure   supplements;   dietician note:  patient with significant edema--2 + pitting   edema in bilateral lower extremities, less than 25% meal intake with %   Ensure supplement intake;  patient with metallic, salty taste in mouth and   oral ulcerations  Treatment: Dietary consultation and oral supplements since --clear liquid   twice daily prior to EGD/colonoscopy, now low calorie/high protein supplements   3 x/day ordered ;  Options provided:  -- Severe protein calorie malnutrition present as evidenced by, Please   document evidence.   -- Severe protein calorie malnutrition was ruled out  -- Other - I will add my own diagnosis  -- Disagree - Not applicable / Not valid  -- Disagree - Clinically unable to determine / Unknown  -- Refer to Clinical Documentation Reviewer    PROVIDER RESPONSE TEXT:    Moderate protein calorie malnutrition    Query created by: Conardo Dos Santos on 6/2/2021 12:15 PM      Electronically signed by:  Ermalene Boeck MD 6/2/2021 12:18 PM

## 2021-06-02 NOTE — ANESTHESIA PRE PROCEDURE
Provider, MD   polyethylene glycol (GLYCOLAX) 17 GM/SCOOP powder Take 17 g by mouth daily as needed (constipation)  7/30/20   Historical Provider, MD   Ascorbic Acid (VITAMIN C) 250 MG tablet Take 250 mg by mouth daily    Historical Provider, MD   ferrous sulfate (FLACO-IN-SOL) 75 (15 Fe) MG/ML solution Take 15 mg by mouth daily    Historical Provider, MD   omeprazole (PRILOSEC) 40 MG delayed release capsule Take 40 mg by mouth Daily  12/3/16   Historical Provider, MD   atorvastatin (LIPITOR) 40 MG tablet Take 40 mg by mouth daily  9/22/16   Historical Provider, MD   potassium chloride SA (K-DUR;KLOR-CON M) 20 MEQ tablet Take 1 tablet by mouth 2 times daily 3/10/16   Judy Sandra MD   rOPINIRole (REQUIP) 0.5 MG tablet Take 0.5 mg by mouth 4 times daily as needed     Historical Provider, MD   Vitamin D (CHOLECALCIFEROL) 1000 UNITS CAPS capsule Take 1,000 Units by mouth 2 times daily    Historical Provider, MD   predniSONE (DELTASONE) 5 MG tablet Take 20 mg by mouth daily     Historical Provider, MD   Omega 3 1000 MG CAPS Take 1,000 mg by mouth daily     Historical Provider, MD   sertraline (ZOLOFT) 50 MG tablet Take 50 mg by mouth daily. Historical Provider, MD       Current medications:    No current facility-administered medications for this visit. No current outpatient medications on file.      Facility-Administered Medications Ordered in Other Visits   Medication Dose Route Frequency Provider Last Rate Last Admin    vitamin D3 (CHOLECALCIFEROL) tablet 1,000 Units  1,000 Units Oral Daily Jarod Bocanegra MD        famotidine (PEPCID) tablet 10 mg  10 mg Oral Nightly Jarod Bocanegra MD   10 mg at 06/01/21 2141    predniSONE (DELTASONE) tablet 10 mg  10 mg Oral Daily Jarod Bocanegra MD   10 mg at 06/02/21 0957    nystatin (MYCOSTATIN) 899253 UNIT/ML suspension 500,000 Units  5 mL Oral 4x Daily Jarod Bocanegra MD   500,000 Units at 06/02/21 1109    hydrocortisone-aloe 1 % cream   Topical BID Purnima Chaney MD   Given at 06/01/21 2143    potassium chloride (KLOR-CON M) extended release tablet 40 mEq  40 mEq Oral PRN Purnima Chaney MD   40 mEq at 05/28/21 1616    Or    potassium bicarb-citric acid (EFFER-K) effervescent tablet 40 mEq  40 mEq Oral PRN Purnima Chaney MD        Or    potassium chloride 10 mEq/100 mL IVPB (Peripheral Line)  10 mEq Intravenous PRN Purnima Chaney MD        dextrose 50 % IV solution  25 g Intravenous PRN Joann Maxwell MD   25 g at 05/26/21 1431    oxyCODONE-acetaminophen (PERCOCET) 5-325 MG per tablet 1 tablet  1 tablet Oral Q4H PRN Joann Maxwell MD   1 tablet at 06/02/21 0954    warfarin (COUMADIN) daily dosing (placeholder)   Other RX Placeholder Joann Maxwell MD        baclofen (LIORESAL) tablet 5 mg  5 mg Oral TID PRN Joann Maxwell MD   5 mg at 06/01/21 1757    polyethylene glycol (GLYCOLAX) packet 17 g  17 g Oral Daily Joann Maxwell MD   17 g at 06/02/21 1113    perflutren lipid microspheres (DEFINITY) injection 2.2 mg  2 mL Intravenous ONCE PRN Joann Maxwell MD        sodium chloride flush 0.9 % injection 5-40 mL  5-40 mL Intravenous 2 times per day Joann Maxwell MD   10 mL at 06/02/21 1001    sodium chloride flush 0.9 % injection 5-40 mL  5-40 mL Intravenous PRN Joann Maxwell MD   10 mL at 05/28/21 1841    0.9 % sodium chloride infusion  25 mL Intravenous PRN Joann Maxwell MD        acetaminophen (TYLENOL) tablet 650 mg  650 mg Oral Q4H PRN Chio Rosa MD   650 mg at 05/19/21 1753    carvedilol (COREG) tablet 6.25 mg  6.25 mg Oral BID Joann Maxwell MD   6.25 mg at 06/02/21 0954    naloxone (NARCAN) injection 0.4 mg  0.4 mg Intravenous PRN Joann Maxwell MD        atorvastatin (LIPITOR) tablet 40 mg  40 mg Oral Daily Chio Rosa MD   40 mg at 06/02/21 0954    ferrous sulfate 300 (60 Fe) MG/5ML syrup 300 mg  300 mg Oral Daily Chio Rosa MD   300 mg at 06/02/21 1109    pantoprazole (PROTONIX) tablet 40 mg  40 mg Oral QAM AC Chio Rosa MD   40 mg at 06/02/21 0955    oxybutynin (DITROPAN-XL) extended release tablet 15 mg  15 mg Oral Daily Chio Rosa MD   15 mg at 06/02/21 0957    polyethylene glycol (GLYCOLAX) packet 17 g  17 g Oral Daily PRN Chio Rosa MD        rOPINIRole (REQUIP) tablet 0.5 mg  0.5 mg Oral 4x Daily Pilo Brady MD   0.5 mg at 06/02/21 0955    sertraline (ZOLOFT) tablet 50 mg  50 mg Oral Daily Chio Rosa MD   50 mg at 06/02/21 0954    amiodarone (CORDARONE) tablet 100 mg  100 mg Oral BID Pilo Brady MD   100 mg at 06/02/21 0955       Allergies:     Allergies   Allergen Reactions    Adhesive Tape Rash    Meperidine Nausea Only    Demerol Nausea Only    Ultram [Tramadol Hcl]      Hot flashes    Zocor [Simvastatin]      Muscle aches    Codeine      \"out of it\"    Fentanyl Rash       Problem List:    Patient Active Problem List   Diagnosis Code    Hypertension I10    Nephrolithiasis N20.0    Mixed hyperlipidemia E78.2    Osteoarthritis M19.90    Peritoneal mesothelioma ([facility] C45.1    Ovarian cyst N83.209    Basal cell carcinoma of nose C44.311    Bimalleolar ankle fracture S82.843A    Epistaxis, recurrent R04.0    Cervical spondylosis M47.812    Trochanteric bursitis, right hip M70.61    Status post bilateral total hip replacement Z96.643    Medication monitoring encounter Z51.81    Chronic diastolic heart failure (HCC) I50.32    Chronic respiratory failure (HCC) J96.10    Giant cell arteritis ([facility]) M31.6    Paroxysmal atrial fibrillation ([facility]) I48.0    Urinary frequency R35.0    Vitamin D deficiency E55.9    Age-related osteoporosis with current pathological fracture M80.00XA    Atrial fibrillation with RVR ([facility]) I48.91    Gastroesophageal reflux disease without esophagitis K21.9    Pulmonary hypertension ([facility]) I27.20    Lumbosacral spondylosis without myelopathy M47.817    YOGESH (acute kidney injury) ([facility]) N17.9    Closed compression fracture of L1 lumbar vertebra, sequela S32.010S    Acute midline low back pain with right-sided sciatica M54.41    Acute exacerbation of chronic low back pain M54.5, G89.29    Iron deficiency anemia D50.9    Rectal bleeding K62.5    Elevated LFTs R79.89    Closed wedge compression fracture of fifth thoracic vertebra (HCC) S22.050A    Closed wedge compression fracture of T6 vertebra (HCC) S22.050A    Closed wedge compression fracture of T9 vertebra (HCC) S22.070A    Closed wedge compression fracture of T10 vertebra (HCC) S22.070A       Past Medical History:        Diagnosis Date    Asbestos exposure     Atrial fibrillation ([address]     takes xarelto    Basal cell carcinoma of nose     CAD (coronary artery disease)     CKD (chronic kidney disease) stage 2, GFR 60-89 ml/min     Hyperlipidemia     Hypertension     MDRO (multiple drug resistant organisms) resistance 2/19/2014    E.  Coli urine    Nephrolithiasis     Osteoarthritis     Ovarian cyst     removed    Oxygen dependent     2 LNC mainly at night, during the day as needed    Palpitations     Peritoneal mesothelioma ([facility] [address] 1999    treated at [facility] PONV (postoperative nausea and vomiting)     Prolonged emergence from general anesthesia     SOB (shortness of breath) on exertion        Past Surgical History:        Procedure Laterality Date    ANKLE FRACTURE SURGERY Right 2013    APPENDECTOMY  1964    BIOPSY / LIGATION TEMPORAL ARTERY Bilateral 2/12/2021    TEMPORAL ARTERY BIOPSY performed by Lele Wilson MD at [address] Right    [address]  2002    CATARACT REMOVAL Bilateral     CHOLECYSTECTOMY  2005    COLONOSCOPY      COLONOSCOPY N/A 5/28/2021    COLONOSCOPY POLYPECTOMY SNARE/COLD BIOPSY performed by Arnold Cohen MD at [address]  April 2002   [address]    LIVER BIOPSY  2000    LIVER BIOPSY      NASAL ENDOSCOPY Bilateral 11/25/2014    lt nasal cautery with packing    PHOTODYNAMIC THERAPY  April 2000 & Jan. 2002    for peritoneal Mesothelioma    [address]    REVISION TOTAL HIP ARTHROPLASTY Right 1998    ROTATOR CUFF REPAIR  2006    SKIN CANCER EXCISION Bilateral 2006    ankle    SKIN CANCER EXCISION Left Oct. 2014    shoulder    SPINE SURGERY N/A 4/15/2021    KYPHOPLASTY L1 performed by Brayden Frazier MD at [address] [place] [address] ARTHROPLASTY Left 2003    TUBAL LIGATION  1969    UPPER GASTROINTESTINAL ENDOSCOPY      UPPER GASTROINTESTINAL ENDOSCOPY N/A 5/28/2021    EGD ESOPHAGOGASTRODUODENOSCOPY performed by Juan Antonio Ball MD at [facility]         Social History:    Social History     Tobacco Use    Smoking status: Never Smoker    Smokeless tobacco: Never Used   Substance Use Topics    Alcohol use: Yes     Comment: social                                Counseling given: Not Answered      Vital Signs (Current): There were no vitals filed for this visit.                                            BP Readings from Last 3 Encounters:   06/02/21 121/70   05/28/21 104/63   04/23/21 98/68       NPO Status:                                                                                 BMI:   Wt Readings from Last 3 Encounters:   06/02/21 156 lb 8.4 oz (71 kg)   04/23/21 153 lb 14.1 oz (69.8 kg)   04/21/21 155 lb (70.3 kg)     There is no height or weight on file to calculate BMI.    CBC:   Lab Results   Component Value Date    WBC 8.3 06/02/2021    RBC 2.99 06/02/2021    RBC 4.17 02/08/2012    HGB 8.8 06/02/2021    HCT 27.1 06/02/2021    MCV 90.6 06/02/2021    RDW 17.1 06/02/2021     06/02/2021     02/08/2012       CMP:   Lab Results   Component Value Date     06/02/2021    K 4.7 06/02/2021     06/02/2021    CO2 29 06/02/2021    BUN 20 06/02/2021    CREATININE 0.74 06/02/2021    GFRAA >60 06/02/2021    LABGLOM >60 06/02/2021    GLUCOSE 128 06/02/2021    GLUCOSE 135 02/08/2012    PROT 4.7 06/02/2021    CALCIUM 8.7 06/02/2021    BILITOT 0.46 06/02/2021    ALKPHOS 106 06/02/2021    AST 16 06/02/2021    ALT 41 06/02/2021       POC Tests:   No results for input(s): POCGLU, POCNA, POCK, POCCL, POCBUN, POCHEMO, POCHCT in the last 72 hours. Coags:   Lab Results   Component Value Date    PROTIME 27.8 06/02/2021    INR 2.6 06/02/2021    APTT 29.0 05/28/2021       HCG (If Applicable): No results found for: PREGTESTUR, PREGSERUM, HCG, HCGQUANT     ABGs: No results found for: PHART, PO2ART, VFY8PDT, TUR0UPX, BEART, I0WBWQCA     Type & Screen (If Applicable):  No results found for: LABABO, LABRH    Drug/Infectious Status (If Applicable):  Lab Results   Component Value Date    HEPCAB NONREACTIVE 05/27/2021       COVID-19 Screening (If Applicable):   Lab Results   Component Value Date    COVID19 Not Detected 04/15/2021    COVID19 Not Detected 02/08/2021    COVID19 Not Detected 07/17/2020           Anesthesia Evaluation  Patient summary reviewed and Nursing notes reviewed history of anesthetic complications (Delayed emergence): Airway: Mallampati: II  TM distance: >3 FB   Neck ROM: full  Mouth opening: > = 3 FB Dental: normal exam         Pulmonary:normal exam  breath sounds clear to auscultation  (+) COPD (on home oxygen @2L):  shortness of breath (Oxygen dependent):                            ROS comment: H/o Asbestosis exposure   Cardiovascular:    (+) hypertension:, valvular problems/murmurs: AI and MR, CAD:, CABG/stent: no interval change, dysrhythmias: atrial fibrillation, CHF:, JENKINS:, hyperlipidemia      ECG reviewed  Rhythm: regular  Rate: normal                 ROS comment: Left ventricle is normal in size and wall thickness. Global left ventricular systolic function is normal. Estimated LV EF >55 %. No obvious wall motion abnormality seen. Both atria are normal in size. Normal right ventricular size and function.   Mild to moderate aortic insufficiency. Mild mitral regurgitation. Mild tricuspid regurgitation. Mild pulmonic insufficiency. No pulmonary hypertension. Estimated right ventricular systolic pressure is 53VVTQ. No significant pericardial effusion is seen. Normal aortic root dimension. Neuro/Psych:   (+) neuromuscular disease:,              ROS comment: Closed wedge compression fracture of multiple thoracic vertebra     Stable compression fracture of L1 body with previous kyphoplasty     Multilevel spondylosis and degenerative disc disease. GI/Hepatic/Renal:   (+) GERD:, liver disease (Abnormal LFTs):, renal disease: CRI and kidney stones,          ROS comment: H/o Rectal bleeding      Patient has an NG tube which she states was used for the prep. Denies any nausea or vomiting. Endo/Other:    (+) blood dyscrasia: anticoagulation therapy and anemia, arthritis: OA and rheumatoid. , electrolyte abnormalities (hypokalemia - K 3.4 - being replaced), malignancy/cancer (peritoneal mesothelioma). ROS comment: On steroids  Uses walker for ambulation Abdominal:           Vascular: negative vascular ROS. Anesthesia Plan      general     ASA 4       Induction: intravenous. MIPS: Postoperative opioids intended and Prophylactic antiemetics administered. Anesthetic plan and risks discussed with patient. Plan discussed with CRNA.             Possible post op ventilation discussed with patient and daughter because of her oxygen use, advanced age and debility      Janet Silva MD   6/2/2021

## 2021-06-02 NOTE — PLAN OF CARE
Problem: Falls - Risk of:  Goal: Will remain free from falls  Description: Will remain free from falls  6/2/2021 0358 by Jose Holman RN  Outcome: Ongoing  Note: No falls noted this shift. Patient ambulates with x1 staff assistance and walker without difficulty. Bed kept in low position. Safe environment maintained. Bedside table & call light in reach. Uses call light appropriately when needing assistance. Problem: Pain:  Goal: Pain level will decrease  Description: Pain level will decrease  6/2/2021 0358 by Jose Holman RN  Outcome: Ongoing  Note: Pt medicated with pain medication prn. Assessed all pain characteristics including level, type, location, frequency, and onset. Non-pharmacologic interventions offered to pt as well. Pt states pain is tolerable at this time. Will continue to monitor. Problem: Cardiac Output - Decreased:  Goal: Hemodynamic stability will improve  Description: Hemodynamic stability will improve  6/2/2021 0358 by Jose Holman RN  Outcome: Ongoing  Note:   Vitals:    06/01/21 1200 06/01/21 1922 06/02/21 0000 06/02/21 0330   BP: 111/81 (!) 111/92 126/87    Pulse: 92 99 86    Resp: 18 18 18    Temp: 98.4 °F (36.9 °C) 97.9 °F (36.6 °C) 97.3 °F (36.3 °C)    TempSrc: Oral Oral Oral    SpO2: 97% 93% 91%    Weight:    156 lb 8.4 oz (71 kg)   Height:         Pt Afib on telemetry; adequate urine output; will continue to monitor hemodynamic stability. Problem: Skin Integrity:  Goal: Will show no infection signs and symptoms  Description: Will show no infection signs and symptoms  6/2/2021 0358 by Jose Holman RN  Outcome: Ongoing  Note: Skin assessment complete. [employer] applied PRN. Turned and repositioned every two hours per self. Area kept free from moisture. Proper nourishment and fluids encouraged, as appropriate. Will continue to monitor for additional needs and changes in skin breakdown.

## 2021-06-02 NOTE — PLAN OF CARE
Please fill out the MRI Screening form and fax to dept @ [phone]. Any questions. .please call [facility] @ [phone]. MRI exam will be scheduled after receiving the completed screening form.  Thank you!!

## 2021-06-02 NOTE — PROGRESS NOTES
Progress Note    6/2/2021   12:06 PM    Name:  David Ralph  MRN:    [id number]     Acct:     [de-identified]   Room:  2115/2115-01  IP Day: 15     Admit Date: 5/19/2021  9:49 AM  PCP: Caitlyn Wharton DO    Subjective:     C/C:   Chief Complaint   Patient presents with    Back Pain       Interval History: Status: not changed. Patient has thoracic spine pain. Is on Percocet. Denies chest pain shortness of breath blood in stool. Recent vital signs reviewed and stable. Hemoglobin 8.8, INR 2.6. x-ray thoracic report shows compression fracture T5-T6 T9-T10.    ROS:   all 10 systems reviewed and are negative except as noted    Review of Systems   Constitutional: Negative for chills and fatigue. HENT: Negative for drooling, mouth sores, sneezing and trouble swallowing. Eyes: Negative for redness and itching. Respiratory: Negative for cough, chest tightness and shortness of breath. Cardiovascular: Negative for chest pain, palpitations and leg swelling. Gastrointestinal: Negative for abdominal pain, blood in stool, nausea and vomiting. Endocrine: Negative for heat intolerance and polyphagia. Genitourinary: Negative for difficulty urinating, flank pain and pelvic pain. Musculoskeletal: Positive for back pain (Thoracic). Negative for arthralgias, joint swelling and neck stiffness. Skin: Negative for color change and pallor. Allergic/Immunologic: Negative for food allergies. Neurological: Negative for dizziness, seizures and headaches. Hematological: Does not bruise/bleed easily. Psychiatric/Behavioral: Negative for agitation, behavioral problems and suicidal ideas. The patient is not hyperactive. Medications: Allergies:    Allergies   Allergen Reactions    Adhesive Tape Rash    Meperidine Nausea Only    Demerol Nausea Only    Ultram [Tramadol Hcl]      Hot flashes    Zocor [Simvastatin]      Muscle aches    Codeine      \"out of it\"    Fentanyl Rash       Current Meds: vitamin D3 (CHOLECALCIFEROL) tablet 1,000 Units, Daily  famotidine (PEPCID) tablet 10 mg, Nightly  lidocaine viscous hcl (XYLOCAINE) 2 % solution 5 mL, Q3H PRN  predniSONE (DELTASONE) tablet 10 mg, Daily  nystatin (MYCOSTATIN) 855793 UNIT/ML suspension 500,000 Units, 4x Daily  hydrocortisone-aloe 1 % cream, BID  potassium chloride (KLOR-CON M) extended release tablet 40 mEq, PRN   Or  potassium bicarb-citric acid (EFFER-K) effervescent tablet 40 mEq, PRN   Or  potassium chloride 10 mEq/100 mL IVPB (Peripheral Line), PRN  dextrose 50 % IV solution, PRN  oxyCODONE-acetaminophen (PERCOCET) 5-325 MG per tablet 1 tablet, Q4H PRN  warfarin (COUMADIN) daily dosing (placeholder), RX Placeholder  baclofen (LIORESAL) tablet 5 mg, TID PRN  polyethylene glycol (GLYCOLAX) packet 17 g, Daily  perflutren lipid microspheres (DEFINITY) injection 2.2 mg, ONCE PRN  sodium chloride flush 0.9 % injection 5-40 mL, 2 times per day  sodium chloride flush 0.9 % injection 5-40 mL, PRN  0.9 % sodium chloride infusion, PRN  acetaminophen (TYLENOL) tablet 650 mg, Q4H PRN  carvedilol (COREG) tablet 6.25 mg, BID  naloxone (NARCAN) injection 0.4 mg, PRN  atorvastatin (LIPITOR) tablet 40 mg, Daily  ferrous sulfate 300 (60 Fe) MG/5ML syrup 300 mg, Daily  pantoprazole (PROTONIX) tablet 40 mg, QAM AC  oxybutynin (DITROPAN-XL) extended release tablet 15 mg, Daily  polyethylene glycol (GLYCOLAX) packet 17 g, Daily PRN  rOPINIRole (REQUIP) tablet 0.5 mg, 4x Daily  sertraline (ZOLOFT) tablet 50 mg, Daily  amiodarone (CORDARONE) tablet 100 mg, BID        Data:     Code Status:  Full Code    Family History   Problem Relation Age of Onset    Heart Failure Brother     Heart Attack Brother         triple bypass    Prostate Cancer Brother     Coronary Art Dis Mother     Breast Cancer Mother         dx'd in late 46s   Unk [place] Emphysema Father     Colon Cancer Neg Hx     Diabetes Neg Hx     Eclampsia Neg Hx     Hypertension Neg Hx     Ovarian Cancer Neg Hx      Labor Neg Hx     Spont Abortions Neg Hx     Stroke Neg Hx        Social History     Socioeconomic History    Marital status:      Spouse name: Not on file    Number of children: Not on file    Years of education: Not on file    Highest education level: Not on file   Occupational History    Not on file   Tobacco Use    Smoking status: Never Smoker    Smokeless tobacco: Never Used   Vaping Use    Vaping Use: Never used   Substance and Sexual Activity    Alcohol use: Yes     Comment: social    Drug use: No    Sexual activity: Not Currently   Other Topics Concern    Not on file   Social History Narrative    Not on file     Social Determinants of Health     Financial Resource Strain:     Difficulty of Paying Living Expenses:    Food Insecurity:     Worried About Running Out of Food in the Last Year:     Ran Out of Food in the Last Year:    Transportation Needs:     Lack of Transportation (Medical):  Lack of Transportation (Non-Medical):    Physical Activity:     Days of Exercise per Week:     Minutes of Exercise per Session:    Stress:     Feeling of Stress :    Social Connections:     Frequency of Communication with Friends and Family:     Frequency of Social Gatherings with Friends and Family:     Attends [employer] Services:     Active Member of Clubs or Organizations:     Attends Club or Organization Meetings:     Marital Status:    Intimate Partner Violence:     Fear of Current or Ex-Partner:     Emotionally Abused:     Physically Abused:     Sexually Abused:        I/O (24Hr): Intake/Output Summary (Last 24 hours) at 2021 1206  Last data filed at 2021 0502  Gross per 24 hour   Intake 150 ml   Output 100 ml   Net 50 ml     Radiology:  XR THORACIC SPINE (3 VIEWS)    Result Date: 2021  Compression fracture of T5, T6, T9, T10 vertebral bodies with 30% loss of normal height at T6.  Stable compression fracture of L1 body with previous kyphoplasty Multilevel spondylosis and degenerative disc disease. Facet arthropathy     XR LUMBAR SPINE (2-3 VIEWS)    Result Date: 6/1/2021  Compression fracture of T5, T6, T9, T10 vertebral bodies with 30% loss of normal height at T6. Stable compression fracture of L1 body with previous kyphoplasty Multilevel spondylosis and degenerative disc disease. Facet arthropathy     XR ABDOMEN (KUB) (SINGLE AP VIEW)    Result Date: 5/27/2021  Enteric tube needs further advancement, approximately 5 cm. Additional findings, as above. The findings were sent to the Radiology Results [address] at 2:47 p.m. on 5/27/2021to be communicated to a licensed caregiver. XR CHEST PORTABLE    Result Date: 5/31/2021  Bibasilar atelectasis. Labs:  Recent Results (from the past 24 hour(s))   Urinalysis Reflex to Culture    Collection Time: 06/01/21  6:56 PM    Specimen: Urine, clean catch   Result Value Ref Range    Color, UA YELLOW YELLOW    Turbidity UA CLOUDY (A) CLEAR    Glucose, Ur NEGATIVE NEGATIVE    Bilirubin Urine NEGATIVE NEGATIVE    Ketones, Urine NEGATIVE NEGATIVE    Specific Gravity, UA 1.024 1.000 - 1.030    Urine Hgb NEGATIVE NEGATIVE    pH, UA 6.0 5.0 - 8.0    Protein, UA TRACE (A) NEGATIVE    Urobilinogen, Urine Normal Normal    Nitrite, Urine NEGATIVE NEGATIVE    Leukocyte Esterase, Urine SMALL (A) NEGATIVE    Urinalysis Comments NOT REPORTED    Microscopic Urinalysis    Collection Time: 06/01/21  6:56 PM   Result Value Ref Range    -          WBC, UA 10 TO 20 /HPF    RBC, UA None /HPF    Casts UA NOT REPORTED /LPF    Crystals, UA NOT REPORTED None /HPF    Epithelial Cells UA NOT REPORTED /HPF    Renal Epithelial, UA NOT REPORTED 0 /HPF    Bacteria, UA MANY (A) None    Mucus, UA NOT REPORTED None    Trichomonas, UA NOT REPORTED None    Amorphous, UA NOT REPORTED None    Other Observations UA NOT REPORTED NOT REQ.     Yeast, UA NOT REPORTED None   CBC with DIFF    Collection Time: 06/02/21  4:41 AM   Result Value Ref Range WBC 8.3 3.5 - 11.0 k/uL    RBC 2.99 (L) 4.0 - 5.2 m/uL    Hemoglobin 8.8 (L) 12.0 - 16.0 g/dL    Hematocrit 27.1 (L) 36 - 46 %    MCV 90.6 80 - 100 fL    MCH 29.4 26 - 34 pg    MCHC 32.5 31 - 37 g/dL    RDW 17.1 (H) 11.5 - 14.9 %    Platelets 606 071 - 160 k/uL    MPV 8.3 6.0 - 12.0 fL    NRBC Automated NOT REPORTED per 100 WBC    Differential Type NOT REPORTED     Seg Neutrophils 82 (H) 36 - 66 %    Lymphocytes 8 (L) 24 - 44 %    Monocytes 10 (H) 1 - 7 %    Eosinophils % 0 0 - 4 %    Basophils 0 0 - 2 %    Immature Granulocytes NOT REPORTED 0 %    Segs Absolute 6.90 1.3 - 9.1 k/uL    Absolute Lymph # 0.60 (L) 1.0 - 4.8 k/uL    Absolute Mono # 0.80 0.1 - 1.3 k/uL    Absolute Eos # 0.00 0.0 - 0.4 k/uL    Basophils Absolute 0.00 0.0 - 0.2 k/uL    Absolute Immature Granulocyte NOT REPORTED 0.00 - 0.30 k/uL    WBC Morphology NOT REPORTED     RBC Morphology NOT REPORTED     Platelet Estimate NOT REPORTED    Comprehensive Metabolic Panel w/ Reflex to MG    Collection Time: 06/02/21  4:41 AM   Result Value Ref Range    Glucose 128 (H) 70 - 99 mg/dL    BUN 20 8 - 23 mg/dL    CREATININE 0.74 0.50 - 0.90 mg/dL    Bun/Cre Ratio NOT REPORTED 9 - 20    Calcium 8.7 8.6 - 10.4 mg/dL    Sodium 138 135 - 144 mmol/L    Potassium 4.7 3.7 - 5.3 mmol/L    Chloride 103 98 - 107 mmol/L    CO2 29 20 - 31 mmol/L    Anion Gap 6 (L) 9 - 17 mmol/L    Alkaline Phosphatase 106 (H) 35 - 104 U/L    ALT 41 (H) 5 - 33 U/L    AST 16 <32 U/L    Total Bilirubin 0.46 0.3 - 1.2 mg/dL    Total Protein 4.7 (L) 6.4 - 8.3 g/dL    Albumin 2.8 (L) 3.5 - 5.2 g/dL    Albumin/Globulin Ratio NOT REPORTED 1.0 - 2.5    GFR Non-African American >60 >60 mL/min    GFR African American >60 >60 mL/min    GFR Comment          GFR Staging NOT REPORTED    PROTIME-INR    Collection Time: 06/02/21  4:41 AM   Result Value Ref Range    Protime 27.8 (H) 11.8 - 14.6 sec    INR 2.6        Physical Examination:        Vitals:  /69   Pulse 97   Temp 98.1 °F (36.7 °C) (Oral) Resp 18   Ht 5' 1.5\" (1.562 m)   Wt 156 lb 8.4 oz (71 kg)   SpO2 94%   BMI 29.10 kg/m²   Temp (24hrs), Av.8 °F (36.6 °C), Min:97.3 °F (36.3 °C), Max:98.1 °F (36.7 °C)    No results for input(s): POCGLU in the last 72 hours. Physical Exam  Vitals reviewed. HENT:      Head: Normocephalic. Right Ear: External ear normal.      Left Ear: External ear normal.      Nose: Nose normal.      Mouth/Throat:      Mouth: Mucous membranes are moist.      Pharynx: Oropharynx is clear. Eyes:      Conjunctiva/sclera: Conjunctivae normal.   Cardiovascular:      Rate and Rhythm: Normal rate and regular rhythm. Pulses: Normal pulses. Heart sounds: Normal heart sounds. Pulmonary:      Effort: Pulmonary effort is normal.      Breath sounds: Normal breath sounds. Abdominal:      General: Bowel sounds are normal.      Palpations: Abdomen is soft. Musculoskeletal:         General: No deformity. Cervical back: Normal range of motion and neck supple. Right lower leg: No edema. Left lower leg: No edema. Comments: Tender thoracic spine. Decreased flexion extension of thoracic spine   Skin:     General: Skin is warm. Capillary Refill: Capillary refill takes less than 2 seconds. Coloration: Skin is not jaundiced. Neurological:      General: No focal deficit present. Mental Status: She is alert. Mental status is at baseline.    Psychiatric:         Mood and Affect: Mood normal.         Behavior: Behavior normal.         Assessment:        Primary Problem  Atrial fibrillation with RVR (HCC)     Principal Problem:    Atrial fibrillation with RVR (HCC)  Active Problems:    Hypertension    Mixed hyperlipidemia    Chronic diastolic heart failure (HCC)    Chronic respiratory failure (HCC)    Gastroesophageal reflux disease without esophagitis    Pulmonary hypertension (HCC)    YOGESH (acute kidney injury) (HCC)    Closed compression fracture of L1 lumbar vertebra, sequela    Acute midline low back pain with right-sided sciatica    Acute exacerbation of chronic low back pain    Iron deficiency anemia    Rectal bleeding    Elevated LFTs    Closed wedge compression fracture of fifth thoracic vertebra (HCC)    Closed wedge compression fracture of T6 vertebra (HCC)    Closed wedge compression fracture of T9 vertebra (HCC)    Closed wedge compression fracture of T10 vertebra (HCC)  Resolved Problems:    * No resolved hospital problems. *      Past Medical History:   Diagnosis Date    Asbestos exposure     Atrial fibrillation ([address]     takes xarelto    Basal cell carcinoma of nose     CAD (coronary artery disease)     CKD (chronic kidney disease) stage 2, GFR 60-89 ml/min     Hyperlipidemia     Hypertension     MDRO (multiple drug resistant organisms) resistance 2/19/2014    E. Coli urine    Nephrolithiasis     Osteoarthritis     Ovarian cyst     removed    Oxygen dependent     2 LNC mainly at night, during the day as needed    Palpitations     Peritoneal mesothelioma ([facility] [address] 1999    [facility] at [facility] PONV (postoperative nausea and vomiting)     Prolonged emergence from general anesthesia     SOB (shortness of breath) on exertion         Plan:        1. MRI thoracic spine  2. Consult pain management Dr. Robert Augustine  3. Start vitamin D 1000 international units daily  4. Prolia IV infusion therapy as outpatient for compression fractures  5. Discussed with patient and nurse  1. PPI Protonix 40 mg p.o. daily  2. Goal INR 2-3  3. May start on heparin drip if kyphoplasty plan per pain management  4. DVT Prophylaxis on Coumadin INR therapeutic  5. EPCs  6. PT/OT to evaluate and treat  7. Pain control  8. Replace electrolytes as per sliding scale  9. Home medications reviewed and appropriate medications continued  10.  Reviewed labs and imaging studies from last 24 hours and results explained to patient      Electronically signed by Marysol Spaulding MD

## 2021-06-02 NOTE — PROGRESS NOTES
Pain Management Inpatient Progress Note      Patient: Gilbert Butler    Location: 2115/2115-01    CHIEF COMPLAINT: Mid-low Back pain    HISTORY OFPRESENT ILLNESS:    The patient is a 81 y.o. female who is being seen  for back pain by patient's physician. Has lumbar stenosis with radiculopathy    HPI    81years old female patient was referred to pain clinic earlier for lower back pain. Currently the patient was admitted 14 days ago for chief complaint back pain. Pain located in low back, 9 out of 10, sharp, intermittent aggravated by movement. Nonradiating. Had MRI on presentation which showed L1 vertebroplasty with remote vertebral body compression fracture about 50% height loss. L1/L2 mild, L2/L3 moderate, L3/L4 moderate central canal stenosis secondary to encroachment by posterior disc osteophyte complex. L1/L2 moderate bilateral, L2/L3 mild bilateral, L3/L4 moderate bilateral, L4/L5 moderate bilateral neuroforaminal stenosis secondary to encroachment by disc osteophyte complex. L3/L4 to L5/S1 mild bilateral facet degenerative arthrosis. Was found to have A. fib on presentation with shortness of breath and palpitations    Patient has a past medical history significant for A. fib on Xarelto, CHF, basal cell carcinoma, chronic back pain with kyphoplasty at L1 on 4/15/2021. She had recent caudal epidural straight injection 5/24 by our service with Dr. Senthil Briseno. Currently the patient is being seen by cardiology for A. fib and CHF  GI for iron deficiency anemia, underwent colonoscopy 5/28 showed severe diverticulosis and EGD showed GERD  Pain management consulted due to multi level compression fractures thoracic spine found on xrays.    Had Prolia IV infusion, vitamin D1 1000 units daily   New MRI of thoracic and lumbar spine pending    Past Medical History:        Diagnosis Date    Asbestos exposure     Atrial fibrillation ([address]     takes xarelto    Basal cell carcinoma of nose     CAD (coronary artery disease)     CKD (chronic kidney disease) stage 2, GFR 60-89 ml/min     Hyperlipidemia     Hypertension     MDRO (multiple drug resistant organisms) resistance 2/19/2014    E.  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    [facility] at [facility] PONV (postoperative nausea and vomiting)     Prolonged emergence from general anesthesia     SOB (shortness of breath) on exertion        Past Surgical History:        Procedure Laterality Date    ANKLE FRACTURE SURGERY Right 2013    APPENDECTOMY  1964    BIOPSY / LIGATION TEMPORAL ARTERY Bilateral 2/12/2021    TEMPORAL ARTERY BIOPSY performed by Amber Reyna MD at [facility] Right    [address]  2002    CATARACT REMOVAL Bilateral     CHOLECYSTECTOMY  2005    COLONOSCOPY      COLONOSCOPY N/A 5/28/2021    COLONOSCOPY POLYPECTOMY SNARE/COLD BIOPSY performed by Roula Degroot MD at [address]  April 2002   [address] [facility]    LIVER BIOPSY  2000    LIVER BIOPSY      NASAL ENDOSCOPY Bilateral 11/25/2014    lt nasal cautery with packing    PHOTODYNAMIC THERAPY  April 2000 & Jan. 2002    for peritoneal Mesothelioma    RECTOCELE REPAIR  1997    REVISION TOTAL HIP ARTHROPLASTY Right 1998    ROTATOR CUFF REPAIR  2006    SKIN CANCER EXCISION Bilateral 2006    ankle    SKIN CANCER EXCISION Left Oct. 2014    shoulder    SPINE SURGERY N/A 4/15/2021    KYPHOPLASTY L1 performed by Catarina Alfaro MD at [address] Left 2003    TUBAL LIGATION  1969    UPPER GASTROINTESTINAL ENDOSCOPY      UPPER GASTROINTESTINAL ENDOSCOPY N/A 5/28/2021    EGD ESOPHAGOGASTRODUODENOSCOPY performed by Roula Degroot MD at [facility] EXTRACTION         Home Medications:   Prior to Admission medications    Medication Sig Start Date End Date Taking?  Authorizing Provider   vitamin E 1000 units capsule Take 1,000 Units by mouth daily    Historical Provider, MD   calcium carbonate 600 MG TABS tablet Take 1 tablet by mouth 2 times daily    Historical Provider, MD   losartan (COZAAR) 100 MG tablet Take 100 mg by mouth daily    Historical Provider, MD   meclizine (ANTIVERT) 25 MG tablet Take 25 mg by mouth daily as needed for Dizziness    Historical Provider, MD   amiodarone (CORDARONE) 200 MG tablet Take 1 tablet by mouth 2 times daily 4/23/21   Adam Echevarria MD   metoprolol tartrate (LOPRESSOR) 25 MG tablet Take 1 tablet by mouth 2 times daily 4/23/21   Adam Echevarria MD   acetaminophen (TYLENOL) 500 MG tablet Take 500 mg by mouth every 6 hours as needed for Pain    Historical Provider, MD   isosorbide mononitrate (IMDUR) 60 MG extended release tablet Take 60 mg by mouth daily    Historical Provider, MD   rivaroxaban (XARELTO) 20 MG TABS tablet Take 20 mg by mouth daily (with breakfast)  2/22/21   Historical Provider, MD   diphenhydrAMINE-APAP, sleep, (TYLENOL PM EXTRA STRENGTH)  MG tablet Take 1 tablet by mouth nightly as needed     Historical Provider, MD   torsemide (DEMADEX) 20 MG tablet Take 1 tablet by mouth 2 times daily Take twice a day for 1 months then go back to 20 mg daily 3/4/21   Sukumar Vargas MD   amoxicillin (AMOXIL) 500 MG capsule Take 2,000 mg by mouth as needed (dental procedures) Dental only 9/21/20   Historical Provider, MD   oxybutynin (DITROPAN XL) 15 MG extended release tablet Take 15 mg by mouth daily  7/30/20   Historical Provider, MD   polyethylene glycol (GLYCOLAX) 17 GM/SCOOP powder Take 17 g by mouth daily as needed (constipation)  7/30/20   Historical Provider, MD   Ascorbic Acid (VITAMIN C) 250 MG tablet Take 250 mg by mouth daily    Historical Provider, MD   ferrous sulfate (FLACO-IN-SOL) 75 (15 Fe) MG/ML solution Take 15 mg by mouth daily    Historical Provider, MD   omeprazole (PRILOSEC) 40 MG delayed release capsule Take 40 mg by mouth Daily  12/3/16   Historical Provider, MD   atorvastatin (LIPITOR) 40 MG tablet Take 40 mg by mouth daily  9/22/16   Historical Provider, MD   potassium chloride SA (K-DUR;KLOR-CON M) 20 MEQ tablet Take 1 tablet by mouth 2 times daily 3/10/16   Sukumar Vargas MD   rOPINIRole (REQUIP) 0.5 MG tablet Take 0.5 mg by mouth 4 times daily as needed     Historical Provider, MD   Vitamin D (CHOLECALCIFEROL) 1000 UNITS CAPS capsule Take 1,000 Units by mouth 2 times daily    Historical Provider, MD   predniSONE (DELTASONE) 5 MG tablet Take 20 mg by mouth daily     Historical Provider, MD   Omega 3 1000 MG CAPS Take 1,000 mg by mouth daily     Historical Provider, MD   sertraline (ZOLOFT) 50 MG tablet Take 50 mg by mouth daily. Historical Provider, MD       Allergies:  Adhesive tape, Meperidine, Demerol, Ultram [tramadol hcl], Zocor [simvastatin], Codeine, and Fentanyl    Social History:  Social History     Socioeconomic History    Marital status:      Spouse name: Not on file    Number of children: Not on file    Years of education: Not on file    Highest education level: Not on file   Occupational History    Not on file   Tobacco Use    Smoking status: Never Smoker    Smokeless tobacco: Never Used   Vaping Use    Vaping Use: Never used   Substance and Sexual Activity    Alcohol use: Yes     Comment: social    Drug use: No    Sexual activity: Not Currently   Other Topics Concern    Not on file   Social History Narrative    Not on file     Social Determinants of Health     Financial Resource Strain:     Difficulty of Paying Living Expenses:    Food Insecurity:     Worried About Running Out of Food in the Last Year:     [address] in the Last Year:    Transportation Needs:     Lack of Transportation (Medical):      Lack of Transportation (Non-Medical): Physical Activity:     Days of Exercise per Week:     Minutes of Exercise per Session:    Stress:     Feeling of Stress :    Social Connections:     Frequency of Communication with Friends and Family:     Frequency of Social Gatherings with Friends and Family:     Attends [employer] Services:     Active Member of Clubs or Organizations:     Attends Club or Organization Meetings:     Marital Status:    Intimate Partner Violence:     Fear of Current or Ex-Partner:     Emotionally Abused:     Physically Abused:     Sexually Abused:        Family History:      Problem Relation Age of Onset    Heart Failure Brother     Heart Attack Brother         triple bypass    Prostate Cancer Brother     Coronary Art Dis Mother     Breast Cancer Mother         dx'd in late 46s   Garcia Emphysema Father     Colon Cancer Neg Hx     Diabetes Neg Hx     Eclampsia Neg Hx     Hypertension Neg Hx     Ovarian Cancer Neg Hx      Labor Neg Hx     Spont Abortions Neg Hx     Stroke Neg Hx        REVIEW OF SYSTEMS:  Review of Systems   Constitutional: Negative for activity change, appetite change, chills, diaphoresis, fatigue, fever and unexpected weight change. Respiratory: Positive for shortness of breath. Negative for apnea, cough, choking, chest tightness, wheezing and stridor. Cardiovascular: Negative for chest pain and palpitations. Gastrointestinal: Negative for abdominal distention, abdominal pain and vomiting. Musculoskeletal: Positive for arthralgias, back pain and gait problem. Negative for joint swelling. Skin: Negative for pallor, rash and wound. Neurological: Positive for weakness. Psychiatric/Behavioral: Negative. PHYSICAL EXAM:  Vital Signs:  /70   Pulse 107   Temp 98.6 °F (37 °C) (Oral)   Resp 18   Ht 5' 1.5\" (1.562 m)   Wt 156 lb 8.4 oz (71 kg)   SpO2 90%   BMI 29.10 kg/m²     Physical Exam  Vitals and nursing note reviewed.    Constitutional:       Appearance: Normal appearance. Cardiovascular:      Rate and Rhythm: Rhythm irregular. Pulmonary:      Breath sounds: Normal breath sounds. Abdominal:      Tenderness: There is no abdominal tenderness. Musculoskeletal:         General: Tenderness present. Neurological:      Mental Status: She is alert and oriented to person, place, and time. Psychiatric:         Mood and Affect: Mood normal.       Back Exam     Tenderness   The patient is experiencing tenderness in the thoracic. Range of Motion   Extension: abnormal   Flexion: abnormal   Lateral bend right: abnormal   Lateral bend left: abnormal     Muscle Strength   Right Quadriceps:  3/5   Left Quadriceps:  3/5   Right Hamstrings:  3/5   Left Hamstrings:  3/5     Reflexes   Patellar: normal  Achilles: normal  Biceps: normal  Babinski's sign: normal             DATA:  CBC:   Lab Results   Component Value Date    WBC 8.3 06/02/2021    RBC 2.99 06/02/2021    RBC 4.17 02/08/2012    HGB 8.8 06/02/2021    HCT 27.1 06/02/2021    MCV 90.6 06/02/2021    MCH 29.4 06/02/2021    MCHC 32.5 06/02/2021    RDW 17.1 06/02/2021     06/02/2021     02/08/2012    MPV 8.3 06/02/2021     CMP:    Lab Results   Component Value Date     06/02/2021    K 4.7 06/02/2021     06/02/2021    CO2 29 06/02/2021    BUN 20 06/02/2021    CREATININE 0.74 06/02/2021    GFRAA >60 06/02/2021    LABGLOM >60 06/02/2021    GLUCOSE 128 06/02/2021    GLUCOSE 135 02/08/2012    PROT 4.7 06/02/2021    LABALBU 2.8 06/02/2021    LABALBU 4.1 01/11/2012    CALCIUM 8.7 06/02/2021    BILITOT 0.46 06/02/2021    ALKPHOS 106 06/02/2021    AST 16 06/02/2021    ALT 41 06/02/2021       IMPRESSION  -Compression fracture of T5, T6, T9 and T10 with 30% loss of normal height at T6    RECOMMENDATIONS:  -We will discuss with attending physician.   We will proceed with kyphoplasty if okay with cardiology, INR less than 1.5 (last INR 2.6), will repeat INR.   -Labs reviewed, vital signs reviewed   -Replace electrolytes per primary team  -A. fib management per primary and cardiology  -Patient on Percocet 5-3 2 5 mg every 4 hours as needed and Tylenol    Electronically signed byAbdon Esqueda MD on 6/2/2021 at 1:50 PM    NAME:  Annabella Allen  MRN: [id number]   YOB: 1935   Date: 6/2/2021   Age: 81 y.o. Gender: female       Annabella Allen is 81 y.o. ,female, referred because of Back Pain        I Performed a history and physical examination of the patient and discussed management with the resident/ Physician Assistant/ Nurse Practitioner. I reviewed the Resident/ Physician Assistant/ Nurse Practitioner note and agree with the documented findings and plan of care. Any areas of disagreement are noted on the chart. I was present for any key portions of any procedure. I have documented in the chart those procedures where I was not present during key Portions. I agree with the chief complaint, past medical history, past surgical history, Social & family history, Allergies, medications as documented unless noted below. I have personally evaluated this patient and have completed at least one if not all key elements of the (History, physical exam and plan of care). Additional findings are added to the note above    Diagnosis:   1. Acute exacerbation of chronic low back pain    2. Lumbar radiculopathy    3. Lumbar spondylosis    4. Age-related osteoporosis with current pathological fracture, sequela    5. Paroxysmal atrial fibrillation (HCC)    6. Nontraumatic compression fracture of T5 vertebra, initial encounter ([address]    7. Non-traumatic compression fracture of T6 thoracic vertebra, initial encounter ([address]    8. Non-traumatic compression fracture of T9 thoracic vertebra, initial encounter ([address]    9. Non-traumatic compression fracture of T10 thoracic vertebra, initial encounter ([address]          Plan of Care:     Patient has compression fractures at the levels of T6, T5, T9, T10. . We are awaiting the results of the MRI which is going to be done this evening for confirmation of the fractures. I tentatively discussed with her regarding kyphoplasty and she would like to undergo the procedure as soon as possible. . These fractures are severely symptomatic and is not responding well to the conservative measures including activity modification, oral medications and other conservative therapies. Patient's activities of daily living are severely compromised due to pain and inability to ambulate. We discussed Morbidity  associated with vertebral fractures often includes continued pain, decreased physical function, deformity, and potentially social isolation, all of which negatively impact the individual's overall quality of  life. Mortality is also associated with vertebral fractures, as (women) diagnosed with a compression fracture of the spine have a 15% higher mortality rate than those who do not experience fractures. The treatment options were discussed with the patient. These include activity modification like bed rest, physical therapy, using the brace, medications to control the pain, and surgical options including kyphoplasty and open surgery. The kyphoplasty procedure was explained in detail to the patient including the risks like bleeding, infection ,nerve damage, damage to the spinal cord, paralysis, fractures of the bones, extravasation of the cement, pneumothorax, cardiac arrest and stroke. After discussion with the patient and the family, they decided to proceed with kyphoplasty. Patient understands and wanted to undergo the procedure in the near feature. We will schedule for the procedure once medical clearance is obtained. We also need to confirm the fractures with MRI which is going to be done today. Decision Making Process : Patient's health history and referral records thoroughly reviewed before focused physical examination and discussion with patient.    Over 50% of today's visit is spent on examining the patient and counseling. Level of complexity of date to be reviewed is Moderate. The chart date reviewed include the following: Imaging Reports. Summary of Care. Time spent reviewing with patient the below reports:   Medication safety, Treatment options. Level of diagnosis and management options of this case is multiple: involving the following management options: Interventions as needed, medication management as appropriate, future visits, activity modification, heat/ice as needed, Urine drug screen as required. [x]The patient's questions were answered to the best of my abilities.         Nick Ochoa MD on 6/2/2021 at 10:02 PM

## 2021-06-03 NOTE — PROGRESS NOTES
Spoke to Dr. Celso Woo and he agreed that they would wait on kyphoplasty until after Dr. Lori Shay was able to evaluate the patient.

## 2021-06-03 NOTE — PROGRESS NOTES
DATE: 6/3/2021    NAME: Miguelina Bliss  MRN: [id number]   : 1935      Patient not seen this date for Physical Therapy due to:    Surgery/Procedure: Pt has T5, T6, T9, T10 compression fxs and is awaiting kyphoplasty. PT defered at this time.       Electronically signed by Karina Narayanan PT on 6/3/2021 at 9:42 AM

## 2021-06-03 NOTE — PROGRESS NOTES
[facility] called to inform that they cannot do needle  aspiration until INR 1.5 or below.  Will let Dr Kelli Lau know

## 2021-06-03 NOTE — PROGRESS NOTES
Writer notified Surgery that patient is awaiting evaluation by Dr. Jun Culver before procedure will be performed.

## 2021-06-03 NOTE — PROGRESS NOTES
Date:   6/3/2021  Patient name: Sherri Hernandez  Date of admission:  5/19/2021  9:49 AM  MRN:   [id number]  YOB: 1935  PCP: Drea Avila DO    Reason for Admission: Atrial fibrillation with RVR [facility]) [I48.91]    Cardiology follow-up: A. fib with RVR       Impression      A. fib with RVR  Diastolic CHF  Hypertension  COPD, hypoxia, on oxygen at home  Hyperlipidemia  Exertional shortness of breath, hypoxia  History of CAD, stent placement LAD 2002  Polymyalgia rheumatica  Moderate aortic regurgitation  Giant cell arteritis  chronic anemia  history of peritoneal/omental mesothelioma surgery done at [address] [place] [address]  Dry mouth, dry eyes  Multiple hip surgeries, bilateral  Chronic pain syndrome  L1 kyphoplasty 4/15/2021  Hemorrhoids, rectal bleeding    2D echo 5/21/2021  Normal LV size and wall thickness ejection fraction more than 55% normal size LA and RA, RVSP 21, no significant pericardial effusion     ECG 5/19/2021  A. fib/a flutter heart rate 159, nonspecific ST-T changes     X-ray lumbar spine 5/19/2021  Unchanged appearance of the lumbar spine, status post vertebroplasty at L1, no new osseous abnormality  Moderate stool burden, previously administered oral contrast is present in the distal colon     Abdominal x-ray 5/27/2021 enteric tube needs further advancement approximately 5 cm.     MRI lumbar spine 6/2/2021  Abnormal endplate edema mild disc space T2 signal identified at T12 and L1 discitis/osteomyelitis  Moderate to severe multilevel degenerative changes     Lab work   6/1/2021  Sodium 139, potassium 4.3, BUN 19, creatinine 0.66, glucose 113, calcium 8.4, albumin 2.7 WBC 8.4, hemoglobin 8.3, platelets 020  INR 3.7  6/3/2021  Hemoglobin 8.8, WBC 10.7, platelets 415 INR 2.0, CRP 21.5  Glucose 147, albumin 2.7, sodium 136, potassium 5.2, BUN 21, creatinine 0.75     History of present illness  81years old female who recently had L1 kyphoplasty presented to the ER with a chief monitoring encounter     Chronic diastolic heart failure (HCC)     Chronic respiratory failure (HCC)     Giant cell arteritis (HCC)     Paroxysmal atrial fibrillation (HCC)     Urinary frequency     Vitamin D deficiency     Age-related osteoporosis with current pathological fracture     Atrial fibrillation with RVR (HCC)     Gastroesophageal reflux disease without esophagitis     Pulmonary hypertension (HCC)     Lumbosacral spondylosis without myelopathy     YGOESH (acute kidney injury) [address] [address]     Closed compression fracture of L1 lumbar vertebra, sequela     Acute midline low back pain with right-sided sciatica     Acute exacerbation of chronic low back pain     Iron deficiency anemia     Rectal bleeding     Elevated LFTs     Closed wedge compression fracture of fifth thoracic vertebra (HCC)     Closed wedge compression fracture of T6 vertebra (HCC)     Closed wedge compression fracture of T9 vertebra (HCC)     Closed wedge compression fracture of T10 vertebra ([address]      Plan of Treatment:   Medication checked  Continue current dose of Coreg, amiodarone, Lipitor   Surgery and pain management notes reviewed      Electronically signed by Lg Rios MD on 6/3/2021 at 12:35 PM

## 2021-06-03 NOTE — PROGRESS NOTES
Pain Management Inpatient Progress Note      Patient: Zehra Butler    Location: 2115/2115-01    CHIEF COMPLAINT: Mid-low Back pain    HISTORY OFPRESENT ILLNESS:    The patient is a 81 y.o. female who is being seen  for back pain by patient's physician. Has lumbar stenosis with radiculopathy    Back Pain  Associated symptoms include weakness. Pertinent negatives include no abdominal pain, chest pain or fever. 81years old female patient was referred to pain clinic earlier for lower back pain. Currently the patient was admitted 14 days ago for chief complaint back pain. Pain located in low back, 9 out of 10, sharp, intermittent aggravated by movement. Nonradiating. Had MRI on presentation which showed L1 vertebroplasty with remote vertebral body compression fracture about 50% height loss. L1/L2 mild, L2/L3 moderate, L3/L4 moderate central canal stenosis secondary to encroachment by posterior disc osteophyte complex. L1/L2 moderate bilateral, L2/L3 mild bilateral, L3/L4 moderate bilateral, L4/L5 moderate bilateral neuroforaminal stenosis secondary to encroachment by disc osteophyte complex. L3/L4 to L5/S1 mild bilateral facet degenerative arthrosis. Was found to have A. fib on presentation with shortness of breath and palpitations  Patient has a past medical history significant for A. fib on Xarelto, CHF, basal cell carcinoma, chronic back pain with kyphoplasty at L1 on 4/15/2021. She had recent caudal epidural straight injection 5/24 by our service with Dr. Alexi Johnson. Currently the patient is being seen by cardiology for A. fib and CHF  GI for iron deficiency anemia, underwent colonoscopy 5/28 showed severe diverticulosis and EGD showed GERD  Pain management consulted due to multi level compression fractures thoracic spine found on xrays. Had Prolia IV infusion, vitamin D1 1000 units daily   MRI thoracic and lumbar spine done yesterday.     Interval development: patient's pain is stable, no worsening. No febrile episodes. No new symptoms reported     Past Medical History:        Diagnosis Date    Asbestos exposure     Atrial fibrillation ([address]     takes xarelto    Basal cell carcinoma of nose     CAD (coronary artery disease)     CKD (chronic kidney disease) stage 2, GFR 60-89 ml/min     Hyperlipidemia     Hypertension     MDRO (multiple drug resistant organisms) resistance 2/19/2014    E.  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    [facility] PONV (postoperative nausea and vomiting)     Prolonged emergence from general anesthesia     SOB (shortness of breath) on exertion        Past Surgical History:        Procedure Laterality Date    ANKLE FRACTURE SURGERY Right 2013    APPENDECTOMY  1964    BIOPSY / LIGATION TEMPORAL ARTERY Bilateral 2/12/2021    TEMPORAL ARTERY BIOPSY performed by Bassem Garibay MD at [address] Right    [address]  2002    CATARACT REMOVAL Bilateral     CHOLECYSTECTOMY  2005    COLONOSCOPY      COLONOSCOPY N/A 5/28/2021    COLONOSCOPY POLYPECTOMY SNARE/COLD BIOPSY performed by Ilsa Aase, MD at [address]  April 2002   [address] [facility] [address] [facility]    LIVER BIOPSY  2000    LIVER BIOPSY      NASAL ENDOSCOPY Bilateral 11/25/2014    lt nasal cautery with packing    PHOTODYNAMIC THERAPY  April 2000 & Jan. 2002    for peritoneal Mesothelioma    RECTOCELE REPAIR  1997    REVISION TOTAL HIP ARTHROPLASTY Right 1998    ROTATOR CUFF REPAIR  2006    SKIN CANCER EXCISION Bilateral 2006    ankle    SKIN CANCER EXCISION Left Oct. 2014    shoulder    SPINE SURGERY N/A 4/15/2021    KYPHOPLASTY L1 performed by [place]ger, [place] at [address] [address] [facility] [place] Left 2003    TUBAL LIGATION  1969    UPPER GASTROINTESTINAL ENDOSCOPY      UPPER GASTROINTESTINAL ENDOSCOPY N/A 5/28/2021    EGD ESOPHAGOGASTRODUODENOSCOPY performed by Amanda Montesinos MD at [address] [facility] Medications:   Prior to Admission medications    Medication Sig Start Date End Date Taking?  Authorizing Provider   vitamin E 1000 units capsule Take 1,000 Units by mouth daily    Historical Provider, MD   calcium carbonate 600 MG TABS tablet Take 1 tablet by mouth 2 times daily    Historical Provider, MD   losartan (COZAAR) 100 MG tablet Take 100 mg by mouth daily    Historical Provider, MD   meclizine (ANTIVERT) 25 MG tablet Take 25 mg by mouth daily as needed for Dizziness    Historical Provider, MD   amiodarone (CORDARONE) 200 MG tablet Take 1 tablet by mouth 2 times daily 4/23/21   lGo Singh MD   metoprolol tartrate (LOPRESSOR) 25 MG tablet Take 1 tablet by mouth 2 times daily 4/23/21   Glo Singh MD   acetaminophen (TYLENOL) 500 MG tablet Take 500 mg by mouth every 6 hours as needed for Pain    Historical Provider, MD   isosorbide mononitrate (IMDUR) 60 MG extended release tablet Take 60 mg by mouth daily    Historical Provider, MD   rivaroxaban (XARELTO) 20 MG TABS tablet Take 20 mg by mouth daily (with breakfast)  2/22/21   Historical Provider, MD   diphenhydrAMINE-APAP, sleep, (TYLENOL PM EXTRA STRENGTH)  MG tablet Take 1 tablet by mouth nightly as needed     Historical Provider, MD   torsemide (DEMADEX) 20 MG tablet Take 1 tablet by mouth 2 times daily Take twice a day for 1 months then go back to 20 mg daily 3/4/21   Tonny Akins MD   amoxicillin (AMOXIL) 500 MG capsule Take 2,000 mg by mouth as needed (dental procedures) Dental only 9/21/20   Historical Provider, MD   oxybutynin (DITROPAN XL) 15 MG extended release tablet Take 15 mg by mouth daily  7/30/20   Historical Provider, MD   polyethylene glycol (GLYCOLAX) 17 GM/SCOOP powder Take 17 g by mouth daily as needed (constipation)  7/30/20   Historical Provider, MD   Ascorbic Acid (VITAMIN C) 250 MG tablet Take 250 mg by mouth daily    Historical Provider, MD   ferrous sulfate (FLACO-IN-SOL) 75 (15 Fe) MG/ML solution Take 15 mg by mouth daily    Historical Provider, MD   omeprazole (PRILOSEC) 40 MG delayed release capsule Take 40 mg by mouth Daily  12/3/16   Historical Provider, MD   atorvastatin (LIPITOR) 40 MG tablet Take 40 mg by mouth daily  9/22/16   Historical Provider, MD   potassium chloride SA (K-DUR;KLOR-CON M) 20 MEQ tablet Take 1 tablet by mouth 2 times daily 3/10/16   Jenine Gowers, MD   rOPINIRole (REQUIP) 0.5 MG tablet Take 0.5 mg by mouth 4 times daily as needed     Historical Provider, MD   Vitamin D (CHOLECALCIFEROL) 1000 UNITS CAPS capsule Take 1,000 Units by mouth 2 times daily    Historical Provider, MD   predniSONE (DELTASONE) 5 MG tablet Take 20 mg by mouth daily     Historical Provider, MD   Omega 3 1000 MG CAPS Take 1,000 mg by mouth daily     Historical Provider, MD   sertraline (ZOLOFT) 50 MG tablet Take 50 mg by mouth daily.       Historical Provider, MD       Allergies:  Adhesive tape, Meperidine, Demerol, Ultram [tramadol hcl], Zocor [simvastatin], Codeine, and Fentanyl    Social History:  Social History     Socioeconomic History    Marital status:      Spouse name: Not on file    Number of children: Not on file    Years of education: Not on file    Highest education level: Not on file   Occupational History    Not on file   Tobacco Use    Smoking status: Never Smoker    Smokeless tobacco: Never Used   Vaping Use    Vaping Use: Never used   Substance and Sexual Activity    Alcohol use: Yes     Comment: social    Drug use: No    Sexual activity: Not Currently   Other Topics Concern    Not on file   Social History Narrative    Not on file     Social Determinants of Health     Financial Resource Strain:     Difficulty of Paying Living Expenses:    Food Insecurity:     Worried About Running Out of Food in the Last Year:     [address] in the Last Year:    Transportation Needs:     Lack of Transportation (Medical):  Lack of Transportation (Non-Medical):    Physical Activity:     Days of Exercise per Week:     Minutes of Exercise per Session:    Stress:     Feeling of Stress :    Social Connections:     Frequency of Communication with Friends and Family:     Frequency of Social Gatherings with Friends and Family:     Attends Zoroastrianism Services:     Active Member of Clubs or Organizations:     Attends Club or Organization Meetings:     Marital Status:    Intimate Partner Violence:     Fear of Current or Ex-Partner:     Emotionally Abused:     Physically Abused:     Sexually Abused:        Family History:      Problem Relation Age of Onset    Heart Failure Brother     Heart Attack Brother         triple bypass    Prostate Cancer Brother     Coronary Art Dis Mother     Breast Cancer Mother         dx'd in late 46s   Jannell Outhouse Emphysema Father     Colon Cancer Neg Hx     Diabetes Neg Hx     Eclampsia Neg Hx     Hypertension Neg Hx     Ovarian Cancer Neg Hx      Labor Neg Hx     Spont Abortions Neg Hx     Stroke Neg Hx        REVIEW OF SYSTEMS:  Review of Systems   Constitutional: Negative for activity change, appetite change, chills, diaphoresis, fatigue, fever and unexpected weight change. Respiratory: Positive for shortness of breath. Negative for apnea, cough, choking, chest tightness, wheezing and stridor. Cardiovascular: Negative for chest pain and palpitations. Gastrointestinal: Negative for abdominal distention, abdominal pain and vomiting. Musculoskeletal: Positive for arthralgias, back pain and gait problem. Negative for joint swelling. Skin: Negative for pallor, rash and wound. Neurological: Positive for weakness. Psychiatric/Behavioral: Negative.         PHYSICAL EXAM:  Vital Signs:  BP 94/63   Pulse 95 Temp 98.2 °F (36.8 °C) (Oral)   Resp 16   Ht 5' 1.5\" (1.562 m)   Wt 156 lb 8.4 oz (71 kg)   SpO2 91%   BMI 29.10 kg/m²     Physical Exam  Vitals and nursing note reviewed. Constitutional:       Appearance: Normal appearance. Cardiovascular:      Rate and Rhythm: Rhythm irregular. Pulmonary:      Breath sounds: Normal breath sounds. Abdominal:      Tenderness: There is no abdominal tenderness. Musculoskeletal:         General: Tenderness present. Neurological:      Mental Status: She is alert and oriented to person, place, and time. Psychiatric:         Mood and Affect: Mood normal.       Back Exam     Tenderness   The patient is experiencing tenderness in the thoracic.     Range of Motion   Extension: abnormal   Flexion: abnormal   Lateral bend right: abnormal   Lateral bend left: abnormal     Muscle Strength   Right Quadriceps:  3/5   Left Quadriceps:  3/5   Right Hamstrings:  3/5   Left Hamstrings:  3/5     Reflexes   Patellar: normal  Achilles: normal  Biceps: normal  Babinski's sign: normal             DATA:  CBC:   Lab Results   Component Value Date    WBC 10.7 06/03/2021    RBC 3.02 06/03/2021    RBC 4.17 02/08/2012    HGB 8.8 06/03/2021    HCT 27.4 06/03/2021    MCV 90.7 06/03/2021    MCH 29.0 06/03/2021    MCHC 32.0 06/03/2021    RDW 17.5 06/03/2021     06/03/2021     02/08/2012    MPV 8.0 06/03/2021     CMP:    Lab Results   Component Value Date     06/03/2021    K 5.2 06/03/2021     06/03/2021    CO2 25 06/03/2021    BUN 21 06/03/2021    CREATININE 0.75 06/03/2021    GFRAA >60 06/03/2021    LABGLOM >60 06/03/2021    GLUCOSE 147 06/03/2021    GLUCOSE 135 02/08/2012    PROT 4.9 06/03/2021    LABALBU 2.7 06/03/2021    LABALBU 4.1 01/11/2012    CALCIUM 8.6 06/03/2021    BILITOT 0.39 06/03/2021    ALKPHOS 99 06/03/2021    AST 17 06/03/2021    ALT 34 06/03/2021       IMPRESSION  -Compression fracture of T5, T6, T9 and T10 with 30% loss of normal height at T6  - Abnormal endplate edema and disc space T2 signal identified at T12-L1 with abnormal signal involving the bilateral psoas muscle with possible discitis, osteomyelitis/ compression fractures. RECOMMENDATIONS:  -Will hold off kypho-plasty due to suspicion of discitis/osteomyeleitis signals found on MRI   -WBC increased to 10 K from 8K CRP around 20   -Consult ID   -Kypho-plasty after clearance from ID and cardiology    -Labs reviewed, vital signs reviewed. -Replace electrolytes per primary team.  -A. fib management per primary and cardiology.  -Patient on Percocet 5-3 2 5 mg every 4 hours as needed and Tylenol    Electronically signed byAdbon Duke MD on 6/3/2021 at 12:18 Lily Bowles MD on 6/3/2021 at 12:18 PM      NAME:  Maurisio Rasmussen  MRN: [id number]   YOB: 1935   Date: 6/4/2021   Age: 81 y.o. Gender: female       Maurisio Rasmussen is 81 y.o. ,female, referred because of Back Pain        I Performed a history and physical examination of the patient and discussed management with the resident/ Physician Assistant/ Nurse Practitioner. I reviewed the Resident/ Physician Assistant/ Nurse Practitioner note and agree with the documented findings and plan of care. Any areas of disagreement are noted on the chart. I was present for any key portions of any procedure. I have documented in the chart those procedures where I was not present during key Portions. I agree with the chief complaint, past medical history, past surgical history, Social & family history, Allergies, medications as documented unless noted below. I have personally evaluated this patient and have completed at least one if not all key elements of the (History, physical exam and plan of care). Additional findings are added to the note above    Diagnosis:   1. Acute exacerbation of chronic low back pain    2. Lumbar radiculopathy    3. Lumbar spondylosis    4.  Age-related osteoporosis with current pathological fracture, sequela    5. Paroxysmal atrial fibrillation (HCC)    6. Nontraumatic compression fracture of T5 vertebra, initial encounter ([facility] [address]    7. Non-traumatic compression fracture of T6 thoracic vertebra, initial encounter ([address]    8. Non-traumatic compression fracture of T9 thoracic vertebra, initial encounter ([address]    9. Non-traumatic compression fracture of T10 thoracic vertebra, initial encounter ([address]          Plan of Care:   Patient MRI showed edema at T8 12 and L1 levels. There is also signs of for possible discitis and osteomyelitis of the vertebrae. Insert this time we decided not to proceed with the kyphoplasty. This was extensively discussed with the patient and she agrees with the treatment plan. ID was called in for consult. I did talk to Dr. Farhad Macias she may order possible bone biopsy. Blood cultures were ordered. This time we will observe the patient and plan accordingly. Decision Making Process : Patient's health history and referral records thoroughly reviewed before focused physical examination and discussion with patient. Over 50% of today's visit is spent on examining the patient and counseling. Level of complexity of date to be reviewed is Moderate. The chart date reviewed include the following: Imaging Reports. Summary of Care. Time spent reviewing with patient the below reports:   Medication safety, Treatment options. Level of diagnosis and management options of this case is multiple: involving the following management options: Interventions as needed, medication management as appropriate, future visits, activity modification, heat/ice as needed, Urine drug screen as required. [x]The patient's questions were answered to the best of my abilities. This note was created using voice recognition software. There may be inaccuracies of transcription  that are inadvertently overlooked prior to the signature.   There is any questions about the transcription

## 2021-06-03 NOTE — PROGRESS NOTES
RN called and updated Dr. Areli Schneider on patient's MRI results, CRP and sed rate. Received orders to consult Dr. Bri Locke, get blood cultures drawn, and start rocephin and get a CBC in the morning. Orders entered at this time. Chary Herrera also stated he would like to hold off on kyphoplasty until Dr. Bri Locke has evaluated the patient. Writer then sent message to Dr. Jamar Victor via [employer] at this time updating him of results of CRP and sed rate and also Dr. Mercedes Lee orders.

## 2021-06-03 NOTE — PLAN OF CARE
Problem: Falls - Risk of:  Goal: Will remain free from falls  Description: Will remain free from falls  Outcome: Met This Shift  Note: Pt assessed as a fall risk this shift. Remains free from falls and accidental injury at this time. Fall precautions in place, including falling star sign and fall risk band on pt. Floor free from obstacles, and bed is locked and in lowest position. Adequate lighting provided. Bed alarm activated. Will continue to monitor needs during hourly rounding, and reinforce education on use of call light. Goal: Absence of physical injury  Description: Absence of physical injury  Outcome: Met This Shift     Problem: Pain:  Goal: Pain level will decrease  Description: Pain level will decrease  Outcome: Ongoing  Note: Patient given PRN pain medication as ordered and prescribed. Patient repositioned as needed.    Goal: Control of acute pain  Description: Control of acute pain  Outcome: Ongoing  Goal: Control of chronic pain  Description: Control of chronic pain  Outcome: Ongoing     Problem: Cardiac Output - Decreased:  Goal: Hemodynamic stability will improve  Description: Hemodynamic stability will improve  Outcome: Ongoing     Problem: Nutrition  Goal: Optimal nutrition therapy  Outcome: Ongoing     Problem: Skin Integrity:  Goal: Will show no infection signs and symptoms  Description: Will show no infection signs and symptoms  Outcome: Ongoing  Goal: Absence of new skin breakdown  Description: Absence of new skin breakdown  Outcome: Ongoing     Problem: Musculor/Skeletal Functional Status  Goal: Highest potential functional level  Outcome: Ongoing  Goal: Absence of falls  Outcome: Ongoing

## 2021-06-03 NOTE — CONSULTS
[facility] -   Infectious diseases evaluation  admission date 5/19/2021    reason for consultation:   Osteomyelitis/discitis    Impression :   Current:  · Back pain with T12-L1 finding on MRI concerning for fracture versus discitis/osteomyelitis  · Possible UTI  · Atrial fibrillation on chronic anticoagulation  · Chronic diastolic heart failure  · Hypertension  · Hyperlipidemia  · Compression fracture of L1 status post kyphoplasty 4/15/2021  · Coronary artery disease  · History of ESBL producing E. coli bacteriuria  · History of nephrolithiasis      Recommendations   · IR for aspiration biopsy  · Continue IV ceftriaxone increase the dose to 2 g daily  · Follow blood and urine culture  · Discussed with Dr. Gabriela Canas   · MRIs reviewed and discussed with Dr. Charla Motta  · Follow cultures and adjust antibiotics as needed  · Follow CBC and renal function    ·   History of Present Illness:   Initial history:  Denver Meneses is a 81y.o.-year-old female was initially admitted to [facility] 5/18/2021 for A. fib with rapid vascular response, hospital course was complicated by bloody stool, GI consulted had EGD and colonoscopy done then was discharged to acute rehab. She developed worsening lower back pain for several days , severe, sharp, constant radiates to the thighs, aggravated by movement, no alleviating factors. She denied lower extremity weakness, no bowel or urinary incontinence. She denied any fever or chills, denied nausea or vomiting no other complaints.   Urinalysis 6/1/2021 showed 10-20 WBC, small leukocyte esterase  6/2/2021 thoracic and lumbar spine MRI reviewed suggestive of T12 marrow edema and T12-L1 disc space edema concerning for discitis/osteomyelitis or fracture  The patient was readmitted to [facility].  She received lumbar steroid epidural injection on 5/24/2021  History L1 compression fracture status post kyphoplasty of L1 on 4/15/2021    Interval changes  6/3/2021   Patient Vitals for the past 8 hrs:   BP Temp Temp src Pulse Resp SpO2   06/03/21 1200 94/63 98.2 °F (36.8 °C) Oral 95 16 91 %   06/03/21 0730 104/71 97.7 °F (36.5 °C) Oral 104 16 90 %             I have personally reviewed the past medical history, past surgical history, medications, social history, and family history, and I haveupdated the database accordingly. Allergies:   Adhesive tape, Meperidine, Demerol, Ultram [tramadol hcl], Zocor [simvastatin], Codeine, and Fentanyl     Review of Systems:     Review of Systems  As per history present illness, other than above 12 system review was negative. Physical Examination :       Physical Exam  Constitutional:       General: She is not in acute distress. HENT:      Head: Normocephalic and atraumatic. Right Ear: External ear normal.      Left Ear: External ear normal.      Mouth/Throat:      Pharynx: Oropharynx is clear. No oropharyngeal exudate. Eyes:      General: No scleral icterus. Conjunctiva/sclera: Conjunctivae normal.   Cardiovascular:      Rate and Rhythm: Rhythm irregular. Heart sounds: No murmur heard. Pulmonary:      Effort: Pulmonary effort is normal. No respiratory distress. Breath sounds: No wheezing. Abdominal:      General: Abdomen is flat. There is no distension. Palpations: Abdomen is soft. Tenderness: There is no abdominal tenderness. Musculoskeletal:      Cervical back: Neck supple. No rigidity. Right lower leg: No edema. Left lower leg: No edema. Skin:     General: Skin is warm. Coloration: Skin is not jaundiced. Findings: No bruising. Neurological:      General: No focal deficit present. Mental Status: She is alert and oriented to person, place, and time.          Past Medical History:     Past Medical History:   Diagnosis Date    Asbestos exposure     Atrial fibrillation ([address]     takes xarelto    Basal cell carcinoma of nose  CAD (coronary artery disease)     CKD (chronic kidney disease) stage 2, GFR 60-89 ml/min     Hyperlipidemia     Hypertension     MDRO (multiple drug resistant organisms) resistance 2/19/2014    E.  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    [facility] at [facility] PONV (postoperative nausea and vomiting)     Prolonged emergence from general anesthesia     SOB (shortness of breath) on exertion        Past Surgical  History:     Past Surgical History:   Procedure Laterality Date    ANKLE FRACTURE SURGERY Right 2013    APPENDECTOMY  1964    BIOPSY / LIGATION TEMPORAL ARTERY Bilateral 2/12/2021    TEMPORAL ARTERY BIOPSY performed by Amber Reyna MD at [address] Right    [address]  2002    CATARACT REMOVAL Bilateral     CHOLECYSTECTOMY  2005    COLONOSCOPY      COLONOSCOPY N/A 5/28/2021    COLONOSCOPY POLYPECTOMY SNARE/COLD BIOPSY performed by Roula Degroot MD at [address]  April 2002   [address]    LIVER BIOPSY  2000    LIVER BIOPSY      NASAL ENDOSCOPY Bilateral 11/25/2014    lt nasal cautery with packing    PHOTODYNAMIC THERAPY  April 2000 & Jan. 2002    for peritoneal Mesothelioma    [address]    REVISION TOTAL HIP ARTHROPLASTY Right 1998    ROTATOR CUFF REPAIR  2006    SKIN CANCER EXCISION Bilateral 2006    ankle    SKIN CANCER EXCISION Left Oct. 2014    shoulder    SPINE SURGERY N/A 4/15/2021    KYPHOPLASTY L1 performed by Catarina Alfaro MD at [address] Left 2003   [address] [facility].    UPPER GASTROINTESTINAL ENDOSCOPY      UPPER GASTROINTESTINAL ENDOSCOPY N/A 5/28/2021    EGD ESOPHAGOGASTRODUODENOSCOPY performed by Roula Degroot MD at [facility]         Medications:      cefTRIAXone (ROCEPHIN) IV  1,000 mg Intravenous Q24H    [START ON 6/4/2021] Vitamin D  2,000 Units Oral Daily    calcium carbonate  500 mg Oral BID    famotidine  10 mg Oral Nightly    predniSONE  10 mg Oral Daily    nystatin  5 mL Oral 4x Daily    hydrocortisone-aloe   Topical BID    warfarin (COUMADIN) daily dosing (placeholder)   Other RX Placeholder    polyethylene glycol  17 g Oral Daily    sodium chloride flush  5-40 mL Intravenous 2 times per day    carvedilol  6.25 mg Oral BID    atorvastatin  40 mg Oral Daily    ferrous sulfate  300 mg Oral Daily    pantoprazole  40 mg Oral QAM AC    oxybutynin  15 mg Oral Daily    rOPINIRole  0.5 mg Oral 4x Daily    sertraline  50 mg Oral Daily    amiodarone  100 mg Oral BID       Social History:     Social History     Socioeconomic History    Marital status:      Spouse name: Not on file    Number of children: Not on file    Years of education: Not on file    Highest education level: Not on file   Occupational History    Not on file   Tobacco Use    Smoking status: Never Smoker    Smokeless tobacco: Never Used   Vaping Use    Vaping Use: Never used   Substance and Sexual Activity    Alcohol use: Yes     Comment: social    Drug use: No    Sexual activity: Not Currently   Other Topics Concern    Not on file   Social History Narrative    Not on file     Social Determinants of Health     Financial Resource Strain:     Difficulty of Paying Living Expenses:    Food Insecurity:     Worried About Running Out of Food in the Last Year:     Ran Out of Food in the Last Year:    Transportation Needs:     Lack of Transportation (Medical):      Lack of Transportation (Non-Medical):    Physical Activity:     Days of Exercise per Week:     Minutes of Exercise per Session:    Stress:     Feeling of Stress :    Social Connections:     Frequency of Communication with Friends and Family:     Frequency of Social Gatherings with Friends and Family:     Attends Gnosticist Services:     Active Member of Clubs or Organizations:     Attends Club or Organization Meetings:     Marital Status:    Intimate Partner Violence:     Fear of Current or Ex-Partner:     Emotionally Abused:     Physically Abused:     Sexually Abused:        Family History:     Family History   Problem Relation Age of Onset    Heart Failure Brother     Heart Attack Brother         triple bypass    Prostate Cancer Brother     Coronary Art Dis Mother     Breast Cancer Mother         dx'd in late 46s   [employer] Emphysema Father     Colon Cancer Neg Hx     Diabetes Neg Hx     Eclampsia Neg Hx     Hypertension Neg Hx     Ovarian Cancer Neg Hx      Labor Neg Hx     Spont Abortions Neg Hx     Stroke Neg Hx       Medical Decision Making:   I have independently reviewed/ordered the following labs:    CBC with Differential:   Recent Labs     21  0514   WBC 8.3 10.7   HGB 8.8* 8.8*   HCT 27.1* 27.4*    243   LYMPHOPCT 8* 6*   MONOPCT 10* 7     BMP:  Recent Labs     21  0514    136   K 4.7 5.2    104   CO2 29 25   BUN 20 21   CREATININE 0.74 0.75     Hepatic Function Panel:   Recent Labs     21  04421  0514   PROT 4.7* 4.9*   LABALBU 2.8* 2.7*   BILITOT 0.46 0.39   ALKPHOS 106* 99   ALT 41* 34*   AST 16 17     No results for input(s): RPR in the last 72 hours. No results for input(s): HIV in the last 72 hours. No results for input(s): BC in the last 72 hours. Lab Results   Component Value Date    CREATININE 0.75 2021    GLUCOSE 147 2021    GLUCOSE 135 2012       Detailed results: Thank you for allowing us to participate in the care of this patient. Please call with questions.     This note is created with the assistance of a speech recognition program.  While intending to generate adocument that actually reflects the content of the visit, the document can still have some errors including those of syntax and sound a like substitutions which may escape proof reading. It such instances, actual meaningcan be extrapolated by contextual diversion.     Marni Cameron MD  Office: [phone]  Perfect serve / office [phone]

## 2021-06-03 NOTE — PLAN OF CARE
Problem: Falls - Risk of:  Goal: Will remain free from falls  Description: Will remain free from falls  6/3/2021 1608 by Andrea Hawk RN  Outcome: Met This Shift     Problem: Falls - Risk of:  Goal: Absence of physical injury  Description: Absence of physical injury  6/3/2021 1608 by Andrea Hawk RN  Outcome: Met This Shift     Problem: Cardiac Output - Decreased:  Goal: Hemodynamic stability will improve  Description: Hemodynamic stability will improve  6/3/2021 1608 by Andrea Hawk RN  Outcome: Met This Shift     Problem: Musculor/Skeletal Functional Status  Goal: Absence of falls  6/3/2021 1608 by Andrea Hawk RN  Outcome: Met This Shift     Problem: Pain:  Goal: Pain level will decrease  Description: Pain level will decrease  6/3/2021 1608 by Andrea Hawk RN  Outcome: Ongoing     Problem: Pain:  Goal: Control of acute pain  Description: Control of acute pain  6/3/2021 1608 by Andrea Hawk RN  Outcome: Ongoing     Problem: Pain:  Goal: Control of chronic pain  Description: Control of chronic pain  6/3/2021 1608 by Andrea Hawk RN  Outcome: Ongoing

## 2021-06-03 NOTE — PROGRESS NOTES
RN relayed MRI results to Dr. Faith Chow via secure message on perfect serve. Dr. Faith Chow placed orders for ESR and CRP and stated to update Dr. Mercy Herring after those results came back so that a deicision can be made on whether to continue with kyphoplasty at this time. RN to call Dr. Mercy Herring when results are back.

## 2021-06-03 NOTE — PROGRESS NOTES
Progress Note    6/3/2021   1:46 PM    Name:  Terrell Orozco  MRN:    [id number]     Acct:     [de-identified]   Room:  2115/2115-01  IP Day: 13     Admit Date: 5/19/2021  9:49 AM  PCP: Radha Baugh DO    Subjective:     C/C:   Chief Complaint   Patient presents with    Back Pain       Interval History: Status: not changed. Patient back pain is well controlled with pain meds. Patient denies urinary, bowel incontinence or retention. Patient denies chest pain shortness of breath melena. Vital signs reviewed and stable. Recent labs reviewed hemoglobin 8.8 INR 2    ROS:   all 10 systems reviewed and are negative except as noted    Review of Systems   Constitutional: Negative for chills, fatigue and fever. HENT: Negative for drooling, mouth sores, sneezing and trouble swallowing. Eyes: Negative for redness and itching. Respiratory: Negative for cough, chest tightness and shortness of breath. Cardiovascular: Negative for chest pain, palpitations and leg swelling. Gastrointestinal: Negative for abdominal pain, blood in stool, nausea and vomiting. Endocrine: Negative for heat intolerance and polyphagia. Genitourinary: Negative for difficulty urinating, flank pain and pelvic pain. Musculoskeletal: Positive for back pain (Thoracic). Negative for arthralgias, joint swelling and neck stiffness. Skin: Negative for color change and pallor. Allergic/Immunologic: Negative for food allergies. Neurological: Negative for dizziness, seizures and headaches. Hematological: Does not bruise/bleed easily. Psychiatric/Behavioral: Negative for agitation, behavioral problems and suicidal ideas. The patient is not hyperactive. Medications: Allergies:    Allergies   Allergen Reactions    Adhesive Tape Rash    Meperidine Nausea Only    Demerol Nausea Only    Ultram [Tramadol Hcl]      Hot flashes    Zocor [Simvastatin]      Muscle aches    Codeine      \"out of it\"    Fentanyl Rash Current Meds: [START ON 6/4/2021] Vitamin D (CHOLECALCIFEROL) tablet 2,000 Units, Daily  calcium carbonate (TUMS) chewable tablet 500 mg, BID  [START ON 6/4/2021] cefTRIAXone (ROCEPHIN) 2000 mg IVPB in D5W 50ml minibag, Q24H  famotidine (PEPCID) tablet 10 mg, Nightly  predniSONE (DELTASONE) tablet 10 mg, Daily  nystatin (MYCOSTATIN) 233429 UNIT/ML suspension 500,000 Units, 4x Daily  hydrocortisone-aloe 1 % cream, BID  potassium chloride (KLOR-CON M) extended release tablet 40 mEq, PRN   Or  potassium bicarb-citric acid (EFFER-K) effervescent tablet 40 mEq, PRN   Or  potassium chloride 10 mEq/100 mL IVPB (Peripheral Line), PRN  dextrose 50 % IV solution, PRN  oxyCODONE-acetaminophen (PERCOCET) 5-325 MG per tablet 1 tablet, Q4H PRN  warfarin (COUMADIN) daily dosing (placeholder), RX Placeholder  baclofen (LIORESAL) tablet 5 mg, TID PRN  polyethylene glycol (GLYCOLAX) packet 17 g, Daily  perflutren lipid microspheres (DEFINITY) injection 2.2 mg, ONCE PRN  sodium chloride flush 0.9 % injection 5-40 mL, 2 times per day  sodium chloride flush 0.9 % injection 5-40 mL, PRN  0.9 % sodium chloride infusion, PRN  acetaminophen (TYLENOL) tablet 650 mg, Q4H PRN  carvedilol (COREG) tablet 6.25 mg, BID  naloxone (NARCAN) injection 0.4 mg, PRN  atorvastatin (LIPITOR) tablet 40 mg, Daily  ferrous sulfate 300 (60 Fe) MG/5ML syrup 300 mg, Daily  pantoprazole (PROTONIX) tablet 40 mg, QAM AC  oxybutynin (DITROPAN-XL) extended release tablet 15 mg, Daily  polyethylene glycol (GLYCOLAX) packet 17 g, Daily PRN  rOPINIRole (REQUIP) tablet 0.5 mg, 4x Daily  sertraline (ZOLOFT) tablet 50 mg, Daily  amiodarone (CORDARONE) tablet 100 mg, BID        Data:     Code Status:  Full Code    Family History   Problem Relation Age of Onset    Heart Failure Brother     Heart Attack Brother         triple bypass    Prostate Cancer Brother     Coronary Art Dis Mother     Breast Cancer Mother         dx'd in late 46s    Emphysema Father     bodies with 30% loss of normal height at T6. Stable compression fracture of L1 body with previous kyphoplasty Multilevel spondylosis and degenerative disc disease. Facet arthropathy     XR LUMBAR SPINE (2-3 VIEWS)    Result Date: 6/1/2021  Compression fracture of T5, T6, T9, T10 vertebral bodies with 30% loss of normal height at T6. Stable compression fracture of L1 body with previous kyphoplasty Multilevel spondylosis and degenerative disc disease. Facet arthropathy     XR ABDOMEN (KUB) (SINGLE AP VIEW)    Result Date: 5/27/2021  Enteric tube needs further advancement, approximately 5 cm. Additional findings, as above. The findings were sent to the Radiology Results [id number] at 2:47 p.m. on 5/27/2021to be communicated to a licensed caregiver. MRI THORACIC SPINE WO CONTRAST    Result Date: 6/2/2021  1. Marrow edema at T12 with edema in the T12-L1 intervertebral disc space and edema in the L1 vertebral body. This could be related to trauma/fracture, but discitis/osteomyelitis cannot be excluded. Post-contrast imaging may be useful for further characterization. 2. Multilevel degenerative changes of the thoracic spine. MRI LUMBAR SPINE WO CONTRAST    Result Date: 6/2/2021  Abnormal endplate edema and disc space T2 signal identified at T12-L1. Associated with abnormal signal involving the bilateral psoas muscle. Although this can be seen could be related to compression fracture, findings are worrisome underlying discitis osteomyelitis. Correlation with sedimentation rate and CRP is recommended. If indicated, contrast may be beneficial. Otherwise stable capitalize changes at L1 with 50 % loss of height and retropulsion into the canal. Moderate severe multilevel degenerate changes The findings were sent to the Radiology Results [id number] at 10:17 pm on 6/2/2021to be communicated to a licensed caregiver. XR CHEST PORTABLE    Result Date: 5/31/2021  Bibasilar atelectasis. Labs:  Recent Results (from the past 24 hour(s))   Sedimentation Rate    Collection Time: 06/03/21 12:11 AM   Result Value Ref Range    Sed Rate 5 0 - 30 mm   C-REACTIVE PROTEIN    Collection Time: 06/03/21 12:11 AM   Result Value Ref Range    CRP 21.5 (H) 0.0 - 5.0 mg/L   Culture, Blood 1    Collection Time: 06/03/21  2:38 AM    Specimen: Blood   Result Value Ref Range    Specimen Description . BLOOD     Special Requests NOT REPORTED     Culture NO GROWTH 2 HOURS    Culture, Blood 1    Collection Time: 06/03/21  2:38 AM    Specimen: Blood   Result Value Ref Range    Specimen Description . BLOOD     Special Requests NOT REPORTED     Culture NO GROWTH 2 HOURS    CBC with DIFF    Collection Time: 06/03/21  5:14 AM   Result Value Ref Range    WBC 10.7 3.5 - 11.0 k/uL    RBC 3.02 (L) 4.0 - 5.2 m/uL    Hemoglobin 8.8 (L) 12.0 - 16.0 g/dL    Hematocrit 27.4 (L) 36 - 46 %    MCV 90.7 80 - 100 fL    MCH 29.0 26 - 34 pg    MCHC 32.0 31 - 37 g/dL    RDW 17.5 (H) 11.5 - 14.9 %    Platelets 505 028 - 832 k/uL    MPV 8.0 6.0 - 12.0 fL    NRBC Automated NOT REPORTED per 100 WBC    Differential Type NOT REPORTED     Seg Neutrophils 87 (H) 36 - 66 %    Lymphocytes 6 (L) 24 - 44 %    Monocytes 7 1 - 7 %    Eosinophils % 0 0 - 4 %    Basophils 0 0 - 2 %    Immature Granulocytes NOT REPORTED 0 %    Segs Absolute 9.30 (H) 1.3 - 9.1 k/uL    Absolute Lymph # 0.60 (L) 1.0 - 4.8 k/uL    Absolute Mono # 0.80 0.1 - 1.3 k/uL    Absolute Eos # 0.00 0.0 - 0.4 k/uL    Basophils Absolute 0.00 0.0 - 0.2 k/uL    Absolute Immature Granulocyte NOT REPORTED 0.00 - 0.30 k/uL    WBC Morphology NOT REPORTED     RBC Morphology NOT REPORTED     Platelet Estimate NOT REPORTED    Comprehensive Metabolic Panel w/ Reflex to MG    Collection Time: 06/03/21  5:14 AM   Result Value Ref Range    Glucose 147 (H) 70 - 99 mg/dL    BUN 21 8 - 23 mg/dL    CREATININE 0.75 0.50 - 0.90 mg/dL    Bun/Cre Ratio NOT REPORTED 9 - 20    Calcium 8.6 8.6 - 10.4 mg/dL Sodium 136 135 - 144 mmol/L    Potassium 5.2 3.7 - 5.3 mmol/L    Chloride 104 98 - 107 mmol/L    CO2 25 20 - 31 mmol/L    Anion Gap 7 (L) 9 - 17 mmol/L    Alkaline Phosphatase 99 35 - 104 U/L    ALT 34 (H) 5 - 33 U/L    AST 17 <32 U/L    Total Bilirubin 0.39 0.3 - 1.2 mg/dL    Total Protein 4.9 (L) 6.4 - 8.3 g/dL    Albumin 2.7 (L) 3.5 - 5.2 g/dL    Albumin/Globulin Ratio NOT REPORTED 1.0 - 2.5    GFR Non-African American >60 >60 mL/min    GFR African American >60 >60 mL/min    GFR Comment          GFR Staging NOT REPORTED    PROTIME-INR    Collection Time: 21  5:14 AM   Result Value Ref Range    Protime 22.2 (H) 11.8 - 14.6 sec    INR 2.0        Physical Examination:        Vitals:  BP 94/63   Pulse 95   Temp 98.2 °F (36.8 °C) (Oral)   Resp 16   Ht 5' 1.5\" (1.562 m)   Wt 156 lb 8.4 oz (71 kg)   SpO2 91%   BMI 29.10 kg/m²   Temp (24hrs), Av.8 °F (29.3 °C), Min:32 °F (0 °C), Max:98.2 °F (36.8 °C)    No results for input(s): POCGLU in the last 72 hours. Physical Exam  Vitals reviewed. HENT:      Head: Normocephalic. Right Ear: External ear normal.      Left Ear: External ear normal.      Nose: Nose normal.      Mouth/Throat:      Mouth: Mucous membranes are moist.      Pharynx: Oropharynx is clear. Eyes:      Conjunctiva/sclera: Conjunctivae normal.   Cardiovascular:      Rate and Rhythm: Normal rate and regular rhythm. Pulses: Normal pulses. Heart sounds: Normal heart sounds. Pulmonary:      Effort: Pulmonary effort is normal.      Breath sounds: Normal breath sounds. Abdominal:      General: Bowel sounds are normal.      Palpations: Abdomen is soft. Musculoskeletal:         General: Tenderness (Thoracic spine) present. Cervical back: Normal range of motion and neck supple. Right lower leg: No edema. Left lower leg: No edema. Skin:     General: Skin is warm. Capillary Refill: Capillary refill takes less than 2 seconds.       Coloration: Skin is not MRI thoracic report  2. Consult ID  3. Discussed with Dr. Yaw Marquez on the floor  4. Use vitamin D 2000 international units daily  5. CBC, CMP  6. Calcium carbonate 500 mg p.o. twice daily  7. Prolia subcu set up for outpatient for thoracic, lumbar compression fractures  1. Discussed with nurse and family in the room  2. DVT Prophylaxis on Coumadin INR therapeutic  3. EPCs  4. PT/OT to evaluate and treat  5. Pain control  6. Replace electrolytes as per sliding scale  7. Home medications reviewed and appropriate medications continued  8.  Reviewed labs and imaging studies from last 24 hours and results explained to patient      Electronically signed by William Luna MD

## 2021-06-03 NOTE — PROGRESS NOTES
[facility] [address]                 PATIENT NAME: John Butler     TODAY'S DATE: 6/3/2021, 11:05 AM    SUBJECTIVE:    Pt seen and examined. Afebrile, VSS. Patient having some back pain this morning, just had some pain medication. Denies abdominal pain, SOB, N/V. Passing a lot of flatus, still no BM. No rectal bleeding noted. Hemoglobin stable. MRI results noted; kyphoplasty on hold. ID consulted. OBJECTIVE:   VITALS:  /71   Pulse 104   Temp 97.7 °F (36.5 °C) (Oral)   Resp 16   Ht 5' 1.5\" (1.562 m)   Wt 156 lb 8.4 oz (71 kg)   SpO2 90%   BMI 29.10 kg/m²      INTAKE/OUTPUT:      Intake/Output Summary (Last 24 hours) at 6/3/2021 1105  Last data filed at 6/3/2021 0512  Gross per 24 hour   Intake 70 ml   Output 200 ml   Net -130 ml                 CONSTITUTIONAL:  awake and alert.   No acute distress  HEART:   RRR  LUNGS:   Decreased air entry at bases, no wheezing   ABDOMEN:   Abdomen soft, non-tender, non-distended  EXTREMITIES:   Pedal edema b/l    Data:  CBC:   Lab Results   Component Value Date    WBC 10.7 06/03/2021    RBC 3.02 06/03/2021    RBC 4.17 02/08/2012    HGB 8.8 06/03/2021    HCT 27.4 06/03/2021    MCV 90.7 06/03/2021    MCH 29.0 06/03/2021    MCHC 32.0 06/03/2021    RDW 17.5 06/03/2021     06/03/2021     02/08/2012    MPV 8.0 06/03/2021     BMP:    Lab Results   Component Value Date     06/03/2021    K 5.2 06/03/2021     06/03/2021    CO2 25 06/03/2021    BUN 21 06/03/2021    LABALBU 2.7 06/03/2021    LABALBU 4.1 01/11/2012    CREATININE 0.75 06/03/2021    CALCIUM 8.6 06/03/2021    GFRAA >60 06/03/2021    LABGLOM >60 06/03/2021    GLUCOSE 147 06/03/2021    GLUCOSE 135 02/08/2012       Radiology Review:      MRI THORACIC SPINE WO CONTRAST [[id number]] Collected: 06/02/21 2105      Order Status: Completed Updated: 06/02/21 2226     Narrative:       EXAMINATION:   MRI OF THE THORACIC SPINE WITHOUT CONTRAST  6/2/2021 8:52 pm TECHNIQUE:   Multiplanar multisequence MRI of the thoracic spine was performed without the   administration of intravenous contrast.     COMPARISON:   05/19/2021     HISTORY:   ORDERING SYSTEM PROVIDED HISTORY: compression fracture   TECHNOLOGIST PROVIDED HISTORY:   compression fracture   Reason for Exam: compression fracture   Additional signs and symptoms: patient c/o back pain; to have surgery   tomorrow 6/3     FINDINGS:   BONES/ALIGNMENT: There is kyphosis of the thoracic spine without significant   listhesis.  There is anterior wedging of multiple thoracic vertebral bodies,   most pronounced at T5, T6, and T7.  There is minimal anterior wedging with   marrow edema at T12.  There is T2 hyperintensity and STIR hyperintensity in   the adjacent T12-L1 disc space.  Degenerative marrow signal changes are noted   throughout the thoracic spine.  There is kyphoplasty at L1 with hardware   artifact. SPINAL CORD: No abnormal cord signal is seen. SOFT TISSUES: No paraspinal mass identified.  There are bilateral pleural   effusions. DEGENERATIVE CHANGES: Multilevel degenerative changes without significant   spinal canal stenosis.      Impression:       1. Marrow edema at T12 with edema in the T12-L1 intervertebral disc space and   edema in the L1 vertebral body.  This could be related to trauma/fracture,   but discitis/osteomyelitis cannot be excluded.  Post-contrast imaging may be   useful for further characterization.    2. Multilevel degenerative changes of the thoracic spine.      MRI LUMBAR SPINE WO CONTRAST [[id number]] Collected: 06/02/21 2140     Order Status: Completed Updated: 06/02/21 2220     Narrative:       EXAMINATION:   MRI OF THE LUMBAR SPINE WITHOUT CONTRAST, 6/2/2021 8:52 pm     TECHNIQUE:   Multiplanar multisequence MRI of the lumbar spine was performed without the   administration of intravenous contrast.     COMPARISON:   05/19/2021     HISTORY:   ORDERING SYSTEM PROVIDED HISTORY: compression fracture   TECHNOLOGIST PROVIDED HISTORY:   compression fracture   Reason for Exam: compression fracture   Additional signs and symptoms: back pain     FINDINGS:   BONES/ALIGNMENT: Chronic compression fracture at L1 identified status post   kyphoplasty.  There is up to or BE to 50% loss vertebral height.  There is   edema identified at the T12-L1 disc space.  There is edema identified   involving inferior endplate of Q17.  Please refer to separate dictated MRI   thoracic spine.  There is retropulsion of the central canal at L1 noted again   identified which is chronic.  No new compression fracture involving the   lumbar spine. SPINAL CORD: The conus terminates at L1-L2. [place] SOFT TISSUES: There is increased STIR signal identified involving bilateral   psoas muscles.  T2 hyperintensity identified both kidneys. L1-L2: Degenerate disc space disease with loss of disc space height and   signal.  Posterior disc osteophyte complex formation identified.  There is   mild canal narrowing.  Moderate bilateral foraminal narrowing. L2-L3: Disease identified.  Mild moderate canal stenosis.  Mild bilateral   foraminal narrowing. L3-L4: Severe disc space disease identified.  Circumferential disc bulge. Moderate canal narrowing.  Moderate neural foramina.  Mild facet arthropathy. L4-L5: Severe disc space disease with circumferential bulge. .  Moderate   foraminal narrowing.  Mild facet arthropathy. L5-S1: Minimal disc bulge.  Moderate facet arthropathy.      Impression:       Abnormal endplate edema and disc space T2 signal identified at T12-L1. Associated with abnormal signal involving the bilateral psoas muscle.    Although this can be seen could be related to compression fracture, findings   are worrisome underlying discitis osteomyelitis.  Correlation with   sedimentation rate and CRP is recommended.  If indicated, contrast may be   beneficial.     Otherwise stable capitalize changes at L1 with 50 % loss of height and   retropulsion into the canal.     Moderate severe multilevel degenerate changes     The findings were sent to the Radiology Results [address] at 10:17   pm on 6/2/2021to be communicated to a licensed caregiver. ASSESSMENT     Principal Problem:    Atrial fibrillation with RVR (HCC)  Active Problems:    Hypertension    Mixed hyperlipidemia    Chronic diastolic heart failure (HCC)    Chronic respiratory failure (HCC)    Gastroesophageal reflux disease without esophagitis    Pulmonary hypertension (HCC)    YOGESH (acute kidney injury) ([facility])    Closed compression fracture of L1 lumbar vertebra, sequela    Acute midline low back pain with right-sided sciatica    Acute exacerbation of chronic low back pain    Iron deficiency anemia    Rectal bleeding    Elevated LFTs    Closed wedge compression fracture of fifth thoracic vertebra (HCC)    Closed wedge compression fracture of T6 vertebra (HCC)    Closed wedge compression fracture of T9 vertebra (HCC)    Closed wedge compression fracture of T10 vertebra (HCC)  Resolved Problems:    * No resolved hospital problems. *      Plan  1. Diet as tolerated  2. Miralax daily, prune juice  3. Surgically stable  4. Kyphoplasty on hold until ID has evaluated the patient per primary   5. Continue medical management   6. Discussed with nursing staff. Kyphoplasty on hold because of possible osteomyelitis? 7. Hemoglobin stable at 8.8. Passing flatus. No BM. Getting more stool softeners.  WBC count is normal. BMP is normal.    Electronically signed by Buford Mortimer, PA-C  [id number] [address]

## 2021-06-04 PROBLEM — M46.45 DISCITIS OF THORACOLUMBAR REGION: Status: ACTIVE | Noted: 2021-01-01

## 2021-06-04 PROBLEM — N30.00 ACUTE CYSTITIS WITHOUT HEMATURIA: Status: ACTIVE | Noted: 2021-01-01

## 2021-06-04 NOTE — PLAN OF CARE
Problem: Falls - Risk of:  Goal: Will remain free from falls  Description: Will remain free from falls  6/4/2021 1702 by Josi Paige RN  Outcome: Ongoing     Problem: Falls - Risk of:  Goal: Absence of physical injury  Description: Absence of physical injury  6/4/2021 1702 by Josi Paige RN  Outcome: Ongoing     Problem: Pain:  Goal: Pain level will decrease  Description: Pain level will decrease  6/4/2021 1702 by Josi Paige RN  Outcome: Ongoing     Problem: Pain:  Goal: Control of acute pain  Description: Control of acute pain  Outcome: Ongoing     Problem: Pain:  Goal: Control of chronic pain  Description: Control of chronic pain  Outcome: Ongoing     Problem: Cardiac Output - Decreased:  Goal: Hemodynamic stability will improve  Description: Hemodynamic stability will improve  Outcome: Ongoing     Problem: Musculor/Skeletal Functional Status  Goal: Highest potential functional level  Outcome: Ongoing     Problem: Musculor/Skeletal Functional Status  Goal: Absence of falls  Outcome: Ongoing

## 2021-06-04 NOTE — PROGRESS NOTES
Patient off unit for IR spine biopsy. Discussed with nursing staff. No bowel movement yet. Yesterday patient was passing flatus regularly. No reported N/V this morning. No new surgical issues. Will assess patient after procedure. Surgically stable. Daily stool softeners. Continue medical management. For bone biopsy and PICC line today. Continue medical management. Diet as tolerated. Blood work was reviewed. Hemoglobin is stable. WBC count is normal.  BMP is unremarkable.     Yumiko Chiu PA-C  [address] [facility]

## 2021-06-04 NOTE — PROGRESS NOTES
Comprehensive Nutrition Assessment    Type and Reason for Visit:  Reassess    Nutrition Recommendations/Plan: Continue diet and supplements as ordered. Nutrition Assessment:  Pt states she is eating somewhat better, today at lunch ate tuna salad on crackers, cottage cheese, peaches and drinking the Ensure High Protein. Malnutrition Assessment:  Malnutrition Status: At risk for malnutrition (Comment)    Context:  Acute Illness     Findings of the 6 clinical characteristics of malnutrition:  Energy Intake:  Mild decrease in energy intake (Comment)  Weight Loss:  No significant weight loss     Body Fat Loss:  No significant body fat loss     Muscle Mass Loss:  No significant muscle mass loss    Fluid Accumulation:  No significant fluid accumulation     Strength:  Not Performed    Estimated Daily Nutrient Needs:  Energy (kcal):  5845-2188 (22-25kcal/kg); Weight Used for Energy Requirements:  Admission     Protein (g):  1.3g/kg= 65 g; Weight Used for Protein Requirements:  Ideal        Fluid (ml/day):  3529-3406; Method Used for Fluid Requirements:  1 ml/kcal      Nutrition Related Findings:  Edema; +2 pitting BLE's. Labs & meds reviewed. Wounds:  None       Current Nutrition Therapies:    ADULT DIET; Regular; 3 carb choices (45 gm/meal)  Adult Oral Nutrition Supplement; Standard High Calorie/High Protein Oral Supplement    Anthropometric Measures:  · Height: 5' 1.5\" (156.2 cm)  · Current Body Weight: 158 lb (71.7 kg)   · Admission Body Weight: 150 lb (68 kg)    · Usual Body Weight: 160 lb (72.6 kg) (2/21)     · Ideal Body Weight: 108 lbs; % Ideal Body Weight     · BMI: 29.4  · BMI Categories: Obese Class 1 (BMI 30.0-34. 9)       Nutrition Diagnosis:   · Inadequate oral intake related to  (current medical condition) as evidenced by other (comment) (intake gradually improving)      Nutrition Interventions:   Food and/or Nutrient Delivery:  Continue Current Diet, Continue Oral Nutrition

## 2021-06-04 NOTE — PROGRESS NOTES
Progress Note    6/4/2021   3:52 PM    Name:  Ck Moss  MRN:    [id number]     Acct:     [de-identified]   Room:  2115/2115-01  IP Day: 12     Admit Date: 5/19/2021  9:49 AM  PCP: Ayden Van DO    Subjective:     C/C:   Chief Complaint   Patient presents with    Back Pain       Interval History: Status: not changed. Patient had bone biopsy done by IR today, thoracic spine pain well controlled with pain meds. Vital signs reviewed and stable. Recent labs reviewed    ROS:   all 10 systems reviewed and are negative except as noted    Review of Systems   Constitutional: Negative for chills and fatigue. HENT: Negative for drooling, mouth sores, sneezing and trouble swallowing. Eyes: Negative for redness and itching. Respiratory: Negative for cough, chest tightness and shortness of breath. Cardiovascular: Negative for chest pain, palpitations and leg swelling. Gastrointestinal: Negative for abdominal pain, blood in stool, nausea and vomiting. Endocrine: Negative for heat intolerance and polyphagia. Genitourinary: Negative for difficulty urinating, flank pain and pelvic pain. Musculoskeletal: Positive for back pain. Negative for arthralgias, joint swelling and neck stiffness. Skin: Negative for color change and pallor. Allergic/Immunologic: Negative for food allergies. Neurological: Negative for dizziness, seizures and headaches. Hematological: Does not bruise/bleed easily. Psychiatric/Behavioral: Negative for agitation, behavioral problems and suicidal ideas. The patient is not hyperactive. Medications: Allergies:    Allergies   Allergen Reactions    Adhesive Tape Rash    Meperidine Nausea Only    Demerol Nausea Only    Ultram [Tramadol Hcl]      Hot flashes    Zocor [Simvastatin]      Muscle aches    Codeine      \"out of it\"    Fentanyl Rash       Current Meds: acetaminophen (TYLENOL) tablet 650 mg, Q4H PRN  warfarin (COUMADIN) tablet 2 mg, Once  enoxaparin (LOVENOX) injection 70 mg, BID  Vitamin D (CHOLECALCIFEROL) tablet 2,000 Units, Daily  calcium carbonate (TUMS) chewable tablet 500 mg, BID  cefTRIAXone (ROCEPHIN) 2000 mg IVPB in D5W 50ml minibag, Q24H  famotidine (PEPCID) tablet 10 mg, Nightly  predniSONE (DELTASONE) tablet 10 mg, Daily  nystatin (MYCOSTATIN) 256473 UNIT/ML suspension 500,000 Units, 4x Daily  hydrocortisone-aloe 1 % cream, BID  potassium chloride (KLOR-CON M) extended release tablet 40 mEq, PRN   Or  potassium bicarb-citric acid (EFFER-K) effervescent tablet 40 mEq, PRN   Or  potassium chloride 10 mEq/100 mL IVPB (Peripheral Line), PRN  dextrose 50 % IV solution, PRN  oxyCODONE-acetaminophen (PERCOCET) 5-325 MG per tablet 1 tablet, Q4H PRN  warfarin (COUMADIN) daily dosing (placeholder), RX Placeholder  baclofen (LIORESAL) tablet 5 mg, TID PRN  polyethylene glycol (GLYCOLAX) packet 17 g, Daily  perflutren lipid microspheres (DEFINITY) injection 2.2 mg, ONCE PRN  sodium chloride flush 0.9 % injection 5-40 mL, 2 times per day  sodium chloride flush 0.9 % injection 5-40 mL, PRN  0.9 % sodium chloride infusion, PRN  acetaminophen (TYLENOL) tablet 650 mg, Q4H PRN  carvedilol (COREG) tablet 6.25 mg, BID  naloxone (NARCAN) injection 0.4 mg, PRN  atorvastatin (LIPITOR) tablet 40 mg, Daily  ferrous sulfate 300 (60 Fe) MG/5ML syrup 300 mg, Daily  pantoprazole (PROTONIX) tablet 40 mg, QAM AC  oxybutynin (DITROPAN-XL) extended release tablet 15 mg, Daily  polyethylene glycol (GLYCOLAX) packet 17 g, Daily PRN  rOPINIRole (REQUIP) tablet 0.5 mg, 4x Daily  sertraline (ZOLOFT) tablet 50 mg, Daily  amiodarone (CORDARONE) tablet 100 mg, BID        Data:     Code Status:  Full Code    Family History   Problem Relation Age of Onset    Heart Failure Brother     Heart Attack Brother         triple bypass    Prostate Cancer Brother     Coronary Art Dis Mother     Breast Cancer Mother         dx'd in late 46s    Emphysema Father     Colon Cancer Neg Hx     Diabetes Neg Hx     Eclampsia Neg Hx     Hypertension Neg Hx     Ovarian Cancer Neg Hx      Labor Neg Hx     Spont Abortions Neg Hx     Stroke Neg Hx        Social History     Socioeconomic History    Marital status:      Spouse name: Not on file    Number of children: Not on file    Years of education: Not on file    Highest education level: Not on file   Occupational History    Not on file   Tobacco Use    Smoking status: Never Smoker    Smokeless tobacco: Never Used   Vaping Use    Vaping Use: Never used   Substance and Sexual Activity    Alcohol use: Yes     Comment: social    Drug use: No    Sexual activity: Not Currently   Other Topics Concern    Not on file   Social History Narrative    Not on file     Social Determinants of Health     Financial Resource Strain:     Difficulty of Paying Living Expenses:    Food Insecurity:     Worried About Running Out of Food in the Last Year:     Ran Out of Food in the Last Year:    Transportation Needs:     Lack of Transportation (Medical):  Lack of Transportation (Non-Medical):    Physical Activity:     Days of Exercise per Week:     Minutes of Exercise per Session:    Stress:     Feeling of Stress :    Social Connections:     Frequency of Communication with Friends and Family:     Frequency of Social Gatherings with Friends and Family:     Attends [employer] Services:     Active Member of Clubs or Organizations:     Attends Club or Organization Meetings:     Marital Status:    Intimate Partner Violence:     Fear of Current or Ex-Partner:     Emotionally Abused:     Physically Abused:     Sexually Abused:        I/O (24Hr):     Intake/Output Summary (Last 24 hours) at 2021 1552  Last data filed at 6/3/2021 1826  Gross per 24 hour   Intake 237 ml   Output --   Net 237 ml     Radiology:  XR THORACIC SPINE (3 VIEWS)    Result Date: 2021  Compression fracture of T5, T6, T9, T10 vertebral bodies with 30% loss of normal height at T6. Stable compression fracture of L1 body with previous kyphoplasty Multilevel spondylosis and degenerative disc disease. Facet arthropathy     XR LUMBAR SPINE (2-3 VIEWS)    Result Date: 6/1/2021  Compression fracture of T5, T6, T9, T10 vertebral bodies with 30% loss of normal height at T6. Stable compression fracture of L1 body with previous kyphoplasty Multilevel spondylosis and degenerative disc disease. Facet arthropathy     MRI THORACIC SPINE WO CONTRAST    Result Date: 6/2/2021  1. Marrow edema at T12 with edema in the T12-L1 intervertebral disc space and edema in the L1 vertebral body. This could be related to trauma/fracture, but discitis/osteomyelitis cannot be excluded. Post-contrast imaging may be useful for further characterization. 2. Multilevel degenerative changes of the thoracic spine. MRI LUMBAR SPINE WO CONTRAST    Result Date: 6/2/2021  Abnormal endplate edema and disc space T2 signal identified at T12-L1. Associated with abnormal signal involving the bilateral psoas muscle. Although this can be seen could be related to compression fracture, findings are worrisome underlying discitis osteomyelitis. Correlation with sedimentation rate and CRP is recommended. If indicated, contrast may be beneficial. Otherwise stable capitalize changes at L1 with 50 % loss of height and retropulsion into the canal. Moderate severe multilevel degenerate changes The findings were sent to the Radiology Results [address] at 10:17 pm on 6/2/2021to be communicated to a licensed caregiver. XR CHEST PORTABLE    Result Date: 5/31/2021  Bibasilar atelectasis. CT NEEDLE BIOPSY SPINE    Result Date: 6/4/2021  Technically successful CT-guided biopsy of the T12-L1 disc space. Sample was sent as requested by the ordering service.        Labs:  Recent Results (from the past 24 hour(s))   PROTIME-INR    Collection Time: 06/03/21 10:08 PM   Result Value Ref Range    Protime 20.2 (H) 11.8 - 14.6 sec    INR 1.7    CBC with DIFF    Collection Time: 06/04/21  5:11 AM   Result Value Ref Range    WBC 8.4 3.5 - 11.0 k/uL    RBC 3.02 (L) 4.0 - 5.2 m/uL    Hemoglobin 8.9 (L) 12.0 - 16.0 g/dL    Hematocrit 28.7 (L) 36 - 46 %    MCV 94.7 80 - 100 fL    MCH 29.4 26 - 34 pg    MCHC 31.1 31 - 37 g/dL    RDW 17.8 (H) 11.5 - 14.9 %    Platelets 241 500 - 852 k/uL    MPV 8.2 6.0 - 12.0 fL    NRBC Automated NOT REPORTED per 100 WBC    Differential Type NOT REPORTED     Seg Neutrophils 86 (H) 36 - 66 %    Lymphocytes 7 (L) 24 - 44 %    Monocytes 7 1 - 7 %    Eosinophils % 0 0 - 4 %    Basophils 0 0 - 2 %    Immature Granulocytes NOT REPORTED 0 %    Segs Absolute 7.20 1.3 - 9.1 k/uL    Absolute Lymph # 0.60 (L) 1.0 - 4.8 k/uL    Absolute Mono # 0.60 0.1 - 1.3 k/uL    Absolute Eos # 0.00 0.0 - 0.4 k/uL    Basophils Absolute 0.00 0.0 - 0.2 k/uL    Absolute Immature Granulocyte NOT REPORTED 0.00 - 0.30 k/uL    WBC Morphology NOT REPORTED     RBC Morphology NOT REPORTED     Platelet Estimate NOT REPORTED    Comprehensive Metabolic Panel w/ Reflex to MG    Collection Time: 06/04/21  5:11 AM   Result Value Ref Range    Glucose 105 (H) 70 - 99 mg/dL    BUN 20 8 - 23 mg/dL    CREATININE 0.71 0.50 - 0.90 mg/dL    Bun/Cre Ratio NOT REPORTED 9 - 20    Calcium 8.6 8.6 - 10.4 mg/dL    Sodium 138 135 - 144 mmol/L    Potassium 4.4 3.7 - 5.3 mmol/L    Chloride 105 98 - 107 mmol/L    CO2 24 20 - 31 mmol/L    Anion Gap 9 9 - 17 mmol/L    Alkaline Phosphatase 99 35 - 104 U/L    ALT 32 5 - 33 U/L    AST 18 <32 U/L    Total Bilirubin 0.40 0.3 - 1.2 mg/dL    Total Protein 4.9 (L) 6.4 - 8.3 g/dL    Albumin 2.6 (L) 3.5 - 5.2 g/dL    Albumin/Globulin Ratio NOT REPORTED 1.0 - 2.5    GFR Non-African American >60 >60 mL/min    GFR African American >60 >60 mL/min    GFR Comment          GFR Staging NOT REPORTED    PROTIME-INR    Collection Time: 06/04/21  5:11 AM   Result Value Ref Range    Protime 17.8 (H) 11.8 - 14.6 sec    INR 1.5    Culture, CSF    Collection Time: 21 10:20 AM    Specimen: CSF   Result Value Ref Range    Specimen Description . CSF T12 L1 DISC SPACE SPINAL ASPIRATE     Special Requests NOT REPORTED     Direct Exam NOT REPORTED     Culture       DUE TO THE SPECIMEN TYPE, THE ORDER WAS CANCELED AND REORDERED. PLEASE REFER TO: AEROBIC AND ANAEROBIC CULTURE. Culture, Anaerobic and Aerobic    Collection Time: 21 10:20 AM    Specimen: Fluid   Result Value Ref Range    Specimen Description . FLUID T12 L1 DISC SPACE ASPIRATE     Special Requests NOT REPORTED     Direct Exam MANY NEUTROPHILS (A)     Direct Exam NO BACTERIA SEEN     Culture PENDING        Physical Examination:        Vitals:  /67   Pulse 95   Temp 97.7 °F (36.5 °C) (Oral)   Resp 18   Ht 5' 1.5\" (1.562 m)   Wt 158 lb 4.6 oz (71.8 kg)   SpO2 95%   BMI 29.42 kg/m²   Temp (24hrs), Av.7 °F (36.5 °C), Min:97.4 °F (36.3 °C), Max:98.2 °F (36.8 °C)    No results for input(s): POCGLU in the last 72 hours. Physical Exam  Vitals reviewed. HENT:      Head: Normocephalic. Right Ear: External ear normal.      Left Ear: External ear normal.      Nose: Nose normal.      Mouth/Throat:      Mouth: Mucous membranes are moist.      Pharynx: Oropharynx is clear. Eyes:      Conjunctiva/sclera: Conjunctivae normal.   Cardiovascular:      Rate and Rhythm: Normal rate and regular rhythm. Pulses: Normal pulses. Heart sounds: Normal heart sounds. Pulmonary:      Effort: Pulmonary effort is normal.      Breath sounds: Normal breath sounds. Abdominal:      General: Bowel sounds are normal.      Palpations: Abdomen is soft. Tenderness: There is no abdominal tenderness. There is no right CVA tenderness or left CVA tenderness. Musculoskeletal:         General: Tenderness (Thoracic spine) present. No deformity. Cervical back: Normal range of motion and neck supple. Skin:     General: Skin is warm.       Capillary Refill: Capillary refill takes less than 2 seconds. Coloration: Skin is not jaundiced. Neurological:      General: No focal deficit present. Mental Status: She is alert. Mental status is at baseline. Psychiatric:         Mood and Affect: Mood normal.         Behavior: Behavior normal.         Assessment:        Primary Problem  Atrial fibrillation with RVR (HCC)     Principal Problem:    Atrial fibrillation with RVR (HCC)  Active Problems:    Hypertension    Mixed hyperlipidemia    Chronic diastolic heart failure (HCC)    Chronic respiratory failure (HCC)    Gastroesophageal reflux disease without esophagitis    Pulmonary hypertension (HCC)    YOGESH (acute kidney injury) (HCC)    Closed compression fracture of L1 lumbar vertebra, sequela    Acute midline low back pain with right-sided sciatica    Acute exacerbation of chronic low back pain    Iron deficiency anemia    Rectal bleeding    Elevated LFTs    Closed wedge compression fracture of fifth thoracic vertebra (HCC)    Closed wedge compression fracture of T6 vertebra (HCC)    Closed wedge compression fracture of T9 vertebra (HCC)    Closed wedge compression fracture of T10 vertebra (HCC)    Discitis of thoracolumbar region    Acute cystitis without hematuria  Resolved Problems:    * No resolved hospital problems. *      Past Medical History:   Diagnosis Date    Asbestos exposure     Atrial fibrillation ([address]     takes xarelto    Basal cell carcinoma of nose     CAD (coronary artery disease)     CKD (chronic kidney disease) stage 2, GFR 60-89 ml/min     Hyperlipidemia     Hypertension     MDRO (multiple drug resistant organisms) resistance 2/19/2014    E.  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 IV Rocephin 2 g per ID  2. PICC line today  3. Patient require Rocephin 2 g daily for 6 weeks per ID recommendation  1. INR subtherapeutic  2. Pharmacy to dose Coumadin  3. CBC, CMP  4. Culture positive for E. Coli  5. Follow urine culture DVT results  6. Percocet 1 pill every 4 hours as needed for moderate-to-severe pain  7. DVT Prophylaxis Lovenox full therapeutic dose twice daily subcu  8. EPCs  9. PT/OT to evaluate and treat  10. Pain control  11. Replace electrolytes as per sliding scale  12. Home medications reviewed and appropriate medications continued  13.  Reviewed labs and imaging studies from last 24 hours and results explained to patient      Electronically signed by Adeline Lisa MD

## 2021-06-04 NOTE — BRIEF OP NOTE
Brief Postoperative Note    Deysi Butler  YOB: 1935  [id number]    Pre-operative Diagnosis: Discitis/osteo    Post-operative Diagnosis: Same    Procedure: PICC placement    Anesthesia: Local    Surgeons/Assistants: Junito    Estimated Blood Loss: less than 50     Complications: None    Specimens: Was Not Obtained    Electronically signed by Gaudecnio Allison MD on 6/4/2021 at 4:13 PM

## 2021-06-04 NOTE — PROGRESS NOTES
Physician Progress Note      Olivia Martínez  CSN #:                  [id number]  :                       1935  ADMIT DATE:       2021 9:49 AM  DISCH DATE:  RESPONDING  PROVIDER #:        Jazmín Fletcher MD          QUERY TEXT:    Pt admitted with atrial fibrillation and pain from known lumbar fracture s/p   kyphoplasty. Pt noted to now have multiple thoracic compression fractures. If   possible, please document in progress notes and discharge summary if you are   evaluating and/or treating any of the following: The medical record reflects the following:  Risk Factors: chronic steroid use, post menopausal female aged 81, possible   osteomyelitis of thoracic spine  Clinical Indicators: diffuse osteopenia on thoracic spine xray on ;  new   compression fractures of T5, T6, T9, and T10 (per  T spine xray) not   present on diagnostic testing on admission  and occurring in the absence   of new trauma and in presence of possible osteomyelitis;   thoracic spine   xray:  Osteopenia without thoracic compression;  T spine MRI:  No acute   abnormality of the thoracic spine;   T spine MRI: Marrow edema at T12 with   edema in the T12-L1 intervertebral disc space and  edema in the L1 vertebral body. ? This could be related to trauma/fracture, but   discitis/osteomyelitis cannot be excluded  Treatment: orthopedic consult, pain management, ID consult, diagnostic   testing, IV antibiotics, vitamin D and plans for Prolia as outpatient  Options provided:  -- Pathological thoracic compression fractures  -- Osteoporotic thoracic compression fractures  -- Other - I will add my own diagnosis  -- Disagree - Not applicable / Not valid  -- Disagree - Clinically unable to determine / Unknown  -- Refer to Clinical Documentation Reviewer    PROVIDER RESPONSE TEXT:    This patient has osteoporotic fractures of thoracic vertebra.     Query created by: Truman Durant on 2021 8:14 AM      Electronically signed by:  Anabella Lindquist MD 6/4/2021 2:10 PM

## 2021-06-04 NOTE — PROGRESS NOTES
DATE: 2021    NAME: Mindy Yan  MRN: [id number]   : 1935      Patient not seen this date for Physical Therapy due to: Other: Pt is awaiting Dr Mayte Evans c/s, planned for this am. Also waiting for needle aspiration. Will check back later today and complete session as appropriate.       Electronically signed by Mildred Montalvo PT on 2021 at 8:15 AM

## 2021-06-04 NOTE — PROGRESS NOTES
Physical Medicine & Rehabilitation  Progress Note    6/4/2021 3:10 PM     CC: Ambulatory and ADL dysfunction due to back pain    A 71-year-old female with history of atrial fibrillation CHF, basal carcinoma and chronic back pain with kyphoplasty of L1 on 4/15/2021 admitted [facility] on 5/19/2021 with progressive worsening low back pain over 10 days. She has had recent kyphoplasty of thoracic vertebra. Pain management-Dr. Gail Ballesteros performed caudal epidural steroid injection 5/24. Cardiology saw the patient for atrial fibrillation CHF. Nicki Meeks GI evaluation for iron deficiency meet anemia, performed colonoscopy 5/28 showed severe diverticulosis EGD showed consistent with GERD. Noted  increased weakness, had thoracic x-ray showing multiple compression fractures. MRI thoracic and lumbar spine done possible discitis/osteomyelitis. Seen by ID. Underwent aspiration biopsy yesterday, currently on IV ceftriaxone-awaiting results. .  Pain management-compression fractures T5, T6, T9, T10-, MRI showed edema T8, T12 and L1 -following  kyphoplasty however currently on hold. Seen by orthopedics-continue antibiotics      X-ray thoracic spine 6/1/2021  Impression:        Compression fracture of T5, T6, T9, T10 vertebral bodies with 30% loss of   normal height at T6. Stable compression fracture of L1 body with previous kyphoplasty     Multilevel spondylosis and degenerative disc disease. Facet arthropathy      MRI thoracic spine 6/2/2021  Impression:        1. Marrow edema at T12 with edema in the T12-L1 intervertebral disc space and   edema in the L1 vertebral body.  This could be related to trauma/fracture,   but discitis/osteomyelitis cannot be excluded.  Post-contrast imaging may be   useful for further characterization. 2. Multilevel degenerative changes of the thoracic spine     MRI lumbar spine 6/2/2021  Impression:        Abnormal endplate edema and disc space T2 signal identified at T12-L1.    Associated with abnormal signal involving the bilateral psoas muscle. Although this can be seen could be related to compression fracture, findings   are worrisome underlying discitis osteomyelitis.  Correlation with   sedimentation rate and CRP is recommended.  If indicated, contrast may be   beneficial.     Otherwise stable capitalize changes at L1 with 50 % loss of height and   retropulsion into the canal.     Moderate severe multilevel degenerate changes              MRI thoracic spine 5/19  1. No acute abnormality of the thoracic spine. 2. Chronic mild compression deformity of the L1 vertebral body status post   prior vertebroplasty. 3. Mild degenerative changes in the lower cervical and upper thoracic spine. 4. Small layering left pleural effusion. 5. Incidental subcentimeter left thyroid nodule.  No further follow-up is   warranted per guidelines below.            MRI lumbar spine 5/19   1. L1 vertebroplasty in association with remote vertebral body compression   fracture with approximately 50% height loss. 2. Associated retropulsion of posterior L1 cortex with associated borderline   central canal stenosis. 3. L1/L2 mild, L2/L3 moderate, L3/L4 moderate central canal stenosis   secondary to encroachment by posterior disc osteophyte complex. 4. L1/L2 moderate bilateral, L2/L3 mild bilateral, L3/L4 moderate bilateral,   L4/L5 moderate bilateral neural foraminal stenosis secondary to encroachment   by disc osteophyte complex. 5. L3/L4-L5/S1 mild bilateral facet degenerative arthrosis.             Subjective:   Continues with mid back pain. Continues to feel weak    ROS:  Denies fevers, chills, sweats. No chest pain, palpitations, lightheadedness. Denies coughing, wheezing or shortness of breath. Denies abdominal pain, nausea, diarrhea or constipation. No new areas of joint pain. Denies new areas of numbness or weakness. Denies new anxiety or depression issues.   No new skin assistance  Functional Mobility Comments: with Minimal verbal cues for pacing     Bed mobility  Rolling to Left: Unable to assess  Rolling to Right: Stand by assistance  Supine to Sit: Moderate assistance  Sit to Supine: Moderate assistance  Scootin Person assistance, Maximal assistance  Comment: Pt sitting in bedside chair upon arrival.  Transfers  Sit to stand: Moderate assistance  Stand to sit: Moderate assistance  Transfer Comments: with Minimal verbal cues for hand placement and safety                   ST:            Objective:  /67   Pulse 95   Temp 97.7 °F (36.5 °C) (Oral)   Resp 18   Ht 5' 1.5\" (1.562 m)   Wt 158 lb 4.6 oz (71.8 kg)   SpO2 95%   BMI 29.42 kg/m²  I Body mass index is 29.42 kg/m². I   Wt Readings from Last 1 Encounters:   21 158 lb 4.6 oz (71.8 kg)      Temp (24hrs), Av.7 °F (36.5 °C), Min:97.4 °F (36.3 °C), Max:98.2 °F (36.8 °C)         GEN: well developed, well nourished, no acute distress  HEENT: Normocephalic atraumatic, EOMI, mucous membranes pink and moist  CV: RRR, no murmurs, rubs or gallops  PULM: CTAB, no rales or rhonchi. Respirations WNL and unlabored  ABD: soft, NT, ND, +BS and equal  NEURO: A&O x3. Sensation intact to light touch. MSK: 4/5 distal lower extremity and upper extremity, proximal lower extremities 3+ to 4-/5  EXTREMITIES: No calf tenderness to palpation bilaterally. No edema BLEs  SKIN: warm dry and intact with good turgor  PSYCH: appropriately interactive. Affect WNL.          Medications   Scheduled Meds:   enoxaparin  1 mg/kg Subcutaneous BID    Vitamin D  2,000 Units Oral Daily    calcium carbonate  500 mg Oral BID    cefTRIAXone (ROCEPHIN) IV  2,000 mg Intravenous Q24H    famotidine  10 mg Oral Nightly    predniSONE  10 mg Oral Daily    nystatin  5 mL Oral 4x Daily    hydrocortisone-aloe   Topical BID    warfarin (COUMADIN) daily dosing (placeholder)   Other RX Placeholder    polyethylene glycol  17 g Oral Daily    sodium chloride flush  5-40 mL Intravenous 2 times per day    carvedilol  6.25 mg Oral BID    atorvastatin  40 mg Oral Daily    ferrous sulfate  300 mg Oral Daily    pantoprazole  40 mg Oral QAM AC    oxybutynin  15 mg Oral Daily    rOPINIRole  0.5 mg Oral 4x Daily    sertraline  50 mg Oral Daily    amiodarone  100 mg Oral BID     Continuous Infusions:   sodium chloride       PRN Meds:.acetaminophen, potassium chloride **OR** potassium alternative oral replacement **OR** potassium chloride, dextrose, oxyCODONE-acetaminophen, baclofen, perflutren lipid microspheres, sodium chloride flush, sodium chloride, acetaminophen, naloxone, polyethylene glycol     Diagnostics:     CBC:   Recent Labs     06/02/21 0441 06/03/21  0514 06/04/21  0511   WBC 8.3 10.7 8.4   RBC 2.99* 3.02* 3.02*   HGB 8.8* 8.8* 8.9*   HCT 27.1* 27.4* 28.7*   MCV 90.6 90.7 94.7   RDW 17.1* 17.5* 17.8*    243 236     BMP:   Recent Labs     06/02/21 0441 06/03/21  0514 06/04/21  0511    136 138   K 4.7 5.2 4.4    104 105   CO2 29 25 24   BUN 20 21 20   CREATININE 0.74 0.75 0.71     BNP: No results for input(s): BNP in the last 72 hours. PT/INR:   Recent Labs     06/03/21 0514 06/03/21 2208 06/04/21  0511   PROTIME 22.2* 20.2* 17.8*   INR 2.0 1.7 1.5     APTT: No results for input(s): APTT in the last 72 hours. CARDIAC ENZYMES: No results for input(s): CKMB, CKMBINDEX, TROPONINT in the last 72 hours. Invalid input(s): CKTOTAL;3  FASTING LIPID PANEL:  Lab Results   Component Value Date    CHOL 198 01/21/2021    HDL 43 01/21/2021    TRIG 231 (H) 01/21/2021     LIVER PROFILE:   Recent Labs     06/02/21 0441 06/03/21  0514 06/04/21  0511   AST 16 17 18   ALT 41* 34* 32   BILITOT 0.46 0.39 0.40   ALKPHOS 106* 99 99        I/O (24Hr):     Intake/Output Summary (Last 24 hours) at 6/4/2021 1510  Last data filed at 6/3/2021 1826  Gross per 24 hour   Intake 237 ml   Output --   Net 237 ml       Glu last 24 hour  No results for compression fractures  2. Therapy: No therapies since 6/1-compression fractures/discitis  3. Medical Necessity: As above  4. Support: Lives with her spouse  5. Rehab Recommendation:  Will need updated physical and Occupational Therapy needs-monitor progress    -Discitis/vertebral fractures-clarify antibiotics, questionable kyphoplasty  -Clarify prednisone taper      6. DVT Prophylaxis:  Lovenox to Coumadin     It was my pleasure to evaluate Luisa A Schimming today. Please call with questions. Rina Han. Simone Raymond MD       This note is created with the assistance of a speech recognition program.  While intending to generate a document that actually reflects the content of the visit, the document can still have some errors including those of syntax and sound a like substitutions which may escape proof reading.   In such instances, actual meaning can be extrapolated by contextual diversion

## 2021-06-04 NOTE — CONSULTS
(placeholder)   Other RX Placeholder    polyethylene glycol  17 g Oral Daily    sodium chloride flush  5-40 mL Intravenous 2 times per day    carvedilol  6.25 mg Oral BID    atorvastatin  40 mg Oral Daily    ferrous sulfate  300 mg Oral Daily    pantoprazole  40 mg Oral QAM AC    oxybutynin  15 mg Oral Daily    rOPINIRole  0.5 mg Oral 4x Daily    sertraline  50 mg Oral Daily    amiodarone  100 mg Oral BID     PRN Meds:acetaminophen, potassium chloride **OR** potassium alternative oral replacement **OR** potassium chloride, dextrose, oxyCODONE-acetaminophen, baclofen, perflutren lipid microspheres, sodium chloride flush, sodium chloride, acetaminophen, naloxone, polyethylene glycol      Data:  CBC:   Recent Labs     06/02/21  0441 06/03/21  0514 06/04/21  0511   WBC 8.3 10.7 8.4   RBC 2.99* 3.02* 3.02*   HGB 8.8* 8.8* 8.9*   HCT 27.1* 27.4* 28.7*   MCV 90.6 90.7 94.7   RDW 17.1* 17.5* 17.8*    243 236     BNP: No results for input(s): BNP in the last 72 hours.   PT/INR:   Recent Labs     06/03/21  0514 06/03/21  2208 06/04/21  0511   PROTIME 22.2* 20.2* 17.8*   INR 2.0 1.7 1.5       Assessment/Plan:  Principal Problem:    Atrial fibrillation with RVR ([facility])  Active Problems:    Hypertension    Mixed hyperlipidemia    Chronic diastolic heart failure (HCC)    Chronic respiratory failure (HCC)    Gastroesophageal reflux disease without esophagitis    Pulmonary hypertension (HCC)    YOGESH (acute kidney injury) (HCC)    Closed compression fracture of L1 lumbar vertebra, sequela    Acute midline low back pain with right-sided sciatica    Acute exacerbation of chronic low back pain    Iron deficiency anemia    Rectal bleeding    Elevated LFTs    Closed wedge compression fracture of fifth thoracic vertebra (HCC)    Closed wedge compression fracture of T6 vertebra (HCC)    Closed wedge compression fracture of T9 vertebra (HCC)    Closed wedge compression fracture of T10 vertebra (HCC)  Resolved Problems:    * No resolved hospital problems.  *      Recommend 6 weeks IV ABX   Fu CRP and Sed rate      Electronically signed by Pippa Ramirez MD on 6/4/2021 at 2:20 PM    Rounding Hospitalist

## 2021-06-04 NOTE — PROGRESS NOTES
Date:   6/4/2021  Patient name: Sadie Huynh  Date of admission:  5/19/2021  9:49 AM  MRN:   [id number]  YOB: 1935  PCP: Yarelis Mccloud DO    Reason for Admission: Atrial fibrillation with RVR [facility]) [I48.91]    Cardiology follow-up: A. fib with RVR       Impression      A. fib with RVR  Diastolic CHF  Hypertension  COPD, hypoxia, on oxygen at home  Hyperlipidemia  Exertional shortness of breath, hypoxia  History of CAD, stent placement LAD 2002  Polymyalgia rheumatica  Moderate aortic regurgitation  Giant cell arteritis  chronic anemia  history of peritoneal/omental mesothelioma surgery done at [address] [place] [address]  Dry mouth, dry eyes  Multiple hip surgeries, bilateral  Chronic pain syndrome  L1 kyphoplasty 4/15/2021  Hemorrhoids, rectal bleeding    2D echo 5/21/2021  Normal LV size and wall thickness ejection fraction more than 55% normal size LA and RA, RVSP 21, no significant pericardial effusion     ECG 5/19/2021  A. fib/a flutter heart rate 159, nonspecific ST-T changes     X-ray lumbar spine 5/19/2021  Unchanged appearance of the lumbar spine, status post vertebroplasty at L1, no new osseous abnormality  Moderate stool burden, previously administered oral contrast is present in the distal colon     Abdominal x-ray 5/27/2021 enteric tube needs further advancement approximately 5 cm.     MRI lumbar spine 6/2/2021  Abnormal endplate edema mild disc space T2 signal identified at T12 and L1 discitis/osteomyelitis  Moderate to severe multilevel degenerative changes     Lab work   6/1/2021  Sodium 139, potassium 4.3, BUN 19, creatinine 0.66, glucose 113, calcium 8.4, albumin 2.7 WBC 8.4, hemoglobin 8.3, platelets 211  INR 3.7  6/3/2021  Hemoglobin 8.8, WBC 10.7, platelets 070 INR 2.0, CRP 21.5  Glucose 147, albumin 2.7, sodium 136, potassium 5.2, BUN 21, creatinine 0.75    History of present illness  81years old female who recently had L1 kyphoplasty presented to the ER with a chief 20   CREATININE 0.74 0.75 0.71   GLUCOSE 128* 147* 105*     Hepatic:   Recent Labs     06/02/21  0441 06/03/21  0514 06/04/21  0511   AST 16 17 18   ALT 41* 34* 32   BILITOT 0.46 0.39 0.40   ALKPHOS 106* 99 99     Troponin: No results for input(s): TROPONINI in the last 72 hours. BNP: No results for input(s): BNP in the last 72 hours. Lipids: No results for input(s): CHOL, HDL in the last 72 hours. Invalid input(s): LDLCALCU  INR:   Recent Labs     06/03/21  0514 06/03/21  2208 06/04/21  0511   INR 2.0 1.7 1.5       Objective:   Vitals: /67   Pulse 95   Temp 97.7 °F (36.5 °C) (Oral)   Resp 18   Ht 5' 1.5\" (1.562 m)   Wt 158 lb 4.6 oz (71.8 kg)   SpO2 95%   BMI 29.42 kg/m²   General appearance: alert and cooperative with exam  HEENT: Head: Normal, normocephalic, atraumatic. Neck: supple, symmetrical, trachea midline  Lungs: Expansion is poor, breath sounds diminished at bases  Heart: irregularly irregular rhythm  Abdomen: Obese bowel sounds present  Extremities: Homans sign is negative, no sign of DVT  Neurologic: Mental status: Alert, oriented, thought content appropriate    EKG: atrial fibrillation, rate . ECHO: reviewed.    Ejection fraction: 55%, mild to moderate AR    Assessment / Acute Cardiac Problems:     A. fib with RVR heart rate control improved  Patient admitted with a severe back pain, recent kyphoplasty  Diastolic CHF  COPD, hypoxia on oxygen  Anemia  Severe protein malnutrition    6/4/2021  She had T12-L1 needle aspiration 2 mL bloody fluid  Back pain is stable    Patient Active Problem List:     Hypertension     Nephrolithiasis     Mixed hyperlipidemia     Osteoarthritis     Peritoneal mesothelioma ([address]     Ovarian cyst     Basal cell carcinoma of nose     Bimalleolar ankle fracture     Epistaxis, recurrent     Cervical spondylosis     Trochanteric bursitis, right hip     Status post bilateral total hip replacement     Medication monitoring encounter     Chronic diastolic heart failure (HCC)     Chronic respiratory failure (HCC)     Giant cell arteritis (HCC)     Paroxysmal atrial fibrillation (HCC)     Urinary frequency     Vitamin D deficiency     Age-related osteoporosis with current pathological fracture     Atrial fibrillation with RVR (HCC)     Gastroesophageal reflux disease without esophagitis     Pulmonary hypertension (HCC)     Lumbosacral spondylosis without myelopathy     YOGESH (acute kidney injury) ([facility] [address]     Closed compression fracture of L1 lumbar vertebra, sequela     Acute midline low back pain with right-sided sciatica     Acute exacerbation of chronic low back pain     Iron deficiency anemia     Rectal bleeding     Elevated LFTs     Closed wedge compression fracture of fifth thoracic vertebra (HCC)     Closed wedge compression fracture of T6 vertebra (HCC)     Closed wedge compression fracture of T9 vertebra (HCC)     Closed wedge compression fracture of T10 vertebra ([facility] [address]      Plan of Treatment:   Medication checked  Continue current dose of beta-blocker, amiodarone, Lipitor  Is also on antibiotics and steroids  ID notes reviewed      Electronically signed by Melva Wilson MD on 6/4/2021 at 12:31 PM

## 2021-06-04 NOTE — PLAN OF CARE
Problem: Falls - Risk of:  Goal: Will remain free from falls  Description: Will remain free from falls  6/4/2021 0332 by Jada Diamond RN  Outcome: Ongoing  Note: Pt assessed as a fall risk this shift. Remains free from falls and accidental injury at this time. Fall precautions in place, including falling star sign and fall risk band on pt. Floor free from obstacles, and bed is locked and in lowest position. Adequate lighting provided. Pt encouraged to call before getting OOB for any need. Bed alarm activated. Will continue to monitor needs during hourly rounding, and reinforce education on use of call light. Problem: Falls - Risk of:  Goal: Absence of physical injury  Description: Absence of physical injury  6/4/2021 0332 by Jada Diamond RN  Outcome: Ongoing  Note: Pt remains free from any physical injury this RN's shift. Will monitor      Problem: Pain:  Goal: Pain level will decrease  Description: Pain level will decrease  6/4/2021 0332 by Jada Diamond RN  Outcome: Ongoing  Note: Pt has complained of back pain for this RN and received percocet. Will monitor      Problem: Skin Integrity:  Goal: Will show no infection signs and symptoms  Description: Will show no infection signs and symptoms  Outcome: Ongoing  Note: Pt shows no signs or symptoms of infection at this time. VS stable, alert and oriented x4. Hand hygiene utilized upon entry and exit. Will continue to monitor.

## 2021-06-04 NOTE — BRIEF OP NOTE
Brief Postoperative Note    Luisa Butler  YOB: 1935  [id number]    Pre-operative Diagnosis: Worsening back pain; suspected 12-L1 discitis oesteomyelitis    Post-operative Diagnosis: Same    Procedure: CT guided T12-L1 disc aspiration    Anesthesia: Moderate Sedation    Surgeons/Assistants: Junito    Estimated Blood Loss: less than 50     Complications: None    Specimens: Was Obtained: 2 ml of bloody fluid    Findings: No postprocedural hematoma, hemorrhage or pneumothorax    Electronically signed by Gaudencio Allison MD on 6/4/2021 at 10:31 AM

## 2021-06-04 NOTE — PROGRESS NOTES
Pharmacy Note  Warfarin Consult follow-up      Recent Labs     06/03/21  0514 06/03/21  2208 06/04/21  0511   INR 2.0 1.7 1.5     Recent Labs     06/02/21  0441 06/03/21  0514 06/04/21  0511   HGB 8.8* 8.8* 8.9*   HCT 27.1* 27.4* 28.7*    243 236       Significant Drug-Drug Interactions:  New warfarin drug-drug interactions: none  Discontinued drug-drug interactions: none  Current warfarin drug-drug interactions: ropinirole, sertraline, prednisone, amiodarone, atorvastatin, rocephin      Date             INR        Dose given previous day  Dose scheduled for today  6/4/2021          1.5       none      2 mg        Notes:    Patient was given 2.5 mg of po vitamin k yesterday so INR will be somewhat retarded for a while. Plan:  will start with 2 mg dose, since 3 and 4 mg dose caused an INR super therapeutic of 4.9 . Daily PT/INR while inpatient. Laury Umaña. Ph.  6/4/2021  3:17 PM

## 2021-06-04 NOTE — PROGRESS NOTES
[facility] -   Infectious diseases evaluation  admission date 5/19/2021    reason for consultation:   Osteomyelitis/discitis    Impression :   Current:  · Back pain with T12-L1 finding on MRI concerning for fracture versus discitis/osteomyelitis status post IR guided aspiration 6/4/2021  · UTI  · Atrial fibrillation on chronic anticoagulation  · Chronic diastolic heart failure  · Hypertension  · Hyperlipidemia  · Compression fracture of L1 status post kyphoplasty 4/15/2021  · Coronary artery disease  · History of ESBL producing E. coli bacteriuria  · History of nephrolithiasis      Recommendations   · Follow biopsy results  · Continue IV ceftriaxone 2 g daily likely for 6 weeks  · PICC line  · Follow cultures and adjust antibiotics as needed  · Follow CBC and renal function  · Discussed with daughter at the bedside    History of Present Illness:   Initial history:  Annabella Allen is a 81y.o.-year-old female was initially admitted to [facility] 5/18/2021 for A. fib with rapid vascular response, hospital course was complicated by bloody stool, GI consulted had EGD and colonoscopy done then was discharged to acute rehab. She developed worsening lower back pain for several days , severe, sharp, constant radiates to the thighs, aggravated by movement, no alleviating factors. She denied lower extremity weakness, no bowel or urinary incontinence.     Urinalysis 6/1/2021 showed 10-20 WBC, small leukocyte esterase  6/2/2021 thoracic and lumbar spine MRI reviewed suggestive of T12 marrow edema and T12-L1 disc space edema concerning for discitis/osteomyelitis or fracture  The patient was readmitted to [facility].  She received lumbar steroid epidural injection on 5/24/2021  History L1 compression fracture status post kyphoplasty of L1 on 4/15/2021    Interval changes  6/4/2021   A CT-guided aspiration biopsy was done earlier today, pain seems under control, denied any fever or chills, denied nausea or vomiting no other complaints. Urine culture 6/1/2021 grew E. coli that was pansensitive  Patient Vitals for the past 8 hrs:   BP Temp Temp src Pulse Resp SpO2 Weight   06/04/21 1227 114/67 97.7 °F (36.5 °C) Oral 95 18 95 % --   06/04/21 1047 114/72 98.2 °F (36.8 °C) Oral 97 20 97 % --   06/04/21 1025 118/71 -- -- 110 15 99 % --   06/04/21 1019 121/81 -- -- 107 16 100 % --   06/04/21 1014 109/70 -- -- 105 17 100 % --   06/04/21 1009 107/67 -- -- 109 17 99 % --   06/04/21 1005 104/73 -- -- 108 18 100 % --   06/04/21 1000 104/83 -- -- 106 15 97 % --   06/04/21 0954 (!) 121/91 -- -- 127 17 99 % --   06/04/21 0943 (!) 137/95 -- -- 118 22 96 % --   06/04/21 0732 107/73 97.4 °F (36.3 °C) Oral 101 18 91 % --   06/04/21 1138 -- -- -- -- -- -- 158 lb 4.6 oz (71.8 kg)             I have personally reviewed the past medical history, past surgical history, medications, social history, and family history, and I haveupdated the database accordingly. Allergies:   Adhesive tape, Meperidine, Demerol, Ultram [tramadol hcl], Zocor [simvastatin], Codeine, and Fentanyl     Review of Systems:     Review of Systems  As per history present illness, other than above 12 system review was negative. Physical Examination :       Physical Exam  Constitutional:       General: She is not in acute distress. HENT:      Head: Normocephalic and atraumatic. Right Ear: External ear normal.      Left Ear: External ear normal.      Mouth/Throat:      Pharynx: Oropharynx is clear. No oropharyngeal exudate. Eyes:      General: No scleral icterus. Conjunctiva/sclera: Conjunctivae normal.   Cardiovascular:      Rate and Rhythm: Rhythm irregular. Heart sounds: No murmur heard. Pulmonary:      Effort: Pulmonary effort is normal. No respiratory distress. Breath sounds: No wheezing. Abdominal:      General: Abdomen is flat. There is no distension.       Palpations: Abdomen is soft.      Tenderness: There is no abdominal tenderness. Musculoskeletal:      Cervical back: Neck supple. No rigidity. Right lower leg: No edema. Left lower leg: No edema. Skin:     General: Skin is warm. Coloration: Skin is not jaundiced. Findings: No bruising. Neurological:      General: No focal deficit present. Mental Status: She is alert and oriented to person, place, and time. Past Medical History:     Past Medical History:   Diagnosis Date    Asbestos exposure     Atrial fibrillation ([address]     takes xarelto    Basal cell carcinoma of nose     CAD (coronary artery disease)     CKD (chronic kidney disease) stage 2, GFR 60-89 ml/min     Hyperlipidemia     Hypertension     MDRO (multiple drug resistant organisms) resistance 2/19/2014    E.  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    treated at [facility] (postoperative nausea and vomiting)     Prolonged emergence from general anesthesia     SOB (shortness of breath) on exertion        Past Surgical  History:     Past Surgical History:   Procedure Laterality Date    ANKLE FRACTURE SURGERY Right 2013    APPENDECTOMY  1964    BIOPSY / LIGATION TEMPORAL ARTERY Bilateral 2/12/2021    TEMPORAL ARTERY BIOPSY performed by Morris Dan MD at [address] Right    [address]  2002    CATARACT REMOVAL Bilateral     CHOLECYSTECTOMY  2005    COLONOSCOPY      COLONOSCOPY N/A 5/28/2021    COLONOSCOPY POLYPECTOMY SNARE/COLD BIOPSY performed by Jacy Donnelly MD at [address]  April 2002    CT NEEDLE BIOPSY SPINE  6/4/2021    CT NEEDLE BIOPSY SPINE 6/4/2021 [facility] CT SCAN    HYSTERECTOMY  1981    LIVER BIOPSY  2000    LIVER BIOPSY      NASAL ENDOSCOPY Bilateral 11/25/2014    lt nasal cautery with packing    PHOTODYNAMIC THERAPY  April 2000 & Jan. 2002    for peritoneal Mesothelioma    [address]    REVISION TOTAL HIP ARTHROPLASTY Right 1998    ROTATOR CUFF REPAIR  2006    SKIN CANCER EXCISION Bilateral 2006    ankle    SKIN CANCER EXCISION Left Oct. 2014    shoulder    SPINE SURGERY N/A 4/15/2021    KYPHOPLASTY L1 performed by Skye Bejarano MD at [facility] ARTHROPLASTY Left 2003    TUBAL LIGATION  1969    UPPER GASTROINTESTINAL ENDOSCOPY      UPPER GASTROINTESTINAL ENDOSCOPY N/A 5/28/2021    EGD ESOPHAGOGASTRODUODENOSCOPY performed by Fran Figueroa MD at [facility]         Medications:      Vitamin D  2,000 Units Oral Daily    calcium carbonate  500 mg Oral BID    cefTRIAXone (ROCEPHIN) IV  2,000 mg Intravenous Q24H    famotidine  10 mg Oral Nightly    predniSONE  10 mg Oral Daily    nystatin  5 mL Oral 4x Daily    hydrocortisone-aloe   Topical BID    warfarin (COUMADIN) daily dosing (placeholder)   Other RX Placeholder    polyethylene glycol  17 g Oral Daily    sodium chloride flush  5-40 mL Intravenous 2 times per day    carvedilol  6.25 mg Oral BID    atorvastatin  40 mg Oral Daily    ferrous sulfate  300 mg Oral Daily    pantoprazole  40 mg Oral QAM AC    oxybutynin  15 mg Oral Daily    rOPINIRole  0.5 mg Oral 4x Daily    sertraline  50 mg Oral Daily    amiodarone  100 mg Oral BID       Social History:     Social History     Socioeconomic History    Marital status:      Spouse name: Not on file    Number of children: Not on file    Years of education: Not on file    Highest education level: Not on file   Occupational History    Not on file   Tobacco Use    Smoking status: Never Smoker    Smokeless tobacco: Never Used   Vaping Use    Vaping Use: Never used   Substance and Sexual Activity    Alcohol use: Yes     Comment: social  Drug use: No    Sexual activity: Not Currently   Other Topics Concern    Not on file   Social History Narrative    Not on file     Social Determinants of Health     Financial Resource Strain:     Difficulty of Paying Living Expenses:    Food Insecurity:     Worried About Running Out of Food in the Last Year:     [address] in the Last Year:    Transportation Needs:     Lack of Transportation (Medical):      Lack of Transportation (Non-Medical):    Physical Activity:     Days of Exercise per Week:     Minutes of Exercise per Session:    Stress:     Feeling of Stress :    Social Connections:     Frequency of Communication with Friends and Family:     Frequency of Social Gatherings with Friends and Family:     Attends Yarsani Services:     Active Member of Clubs or Organizations:     Attends Club or Organization Meetings:     Marital Status:    Intimate Partner Violence:     Fear of Current or Ex-Partner:     Emotionally Abused:     Physically Abused:     Sexually Abused:        Family History:     Family History   Problem Relation Age of Onset    Heart Failure Brother     Heart Attack Brother         triple bypass    Prostate Cancer Brother     Coronary Art Dis Mother     Breast Cancer Mother         dx'd in late 46s   Connye Sole Emphysema Father     Colon Cancer Neg Hx     Diabetes Neg Hx     Eclampsia Neg Hx     Hypertension Neg Hx     Ovarian Cancer Neg Hx      Labor Neg Hx     Spont Abortions Neg Hx     Stroke Neg Hx       Medical Decision Making:   I have independently reviewed/ordered the following labs:    CBC with Differential:   Recent Labs     21  0514 21  0511   WBC 10.7 8.4   HGB 8.8* 8.9*   HCT 27.4* 28.7*    236   LYMPHOPCT 6* 7*   MONOPCT 7 7     BMP:  Recent Labs     21  0514 21  0511    138   K 5.2 4.4    105   CO2 25 24   BUN 21 20   CREATININE 0.75 0.71     Hepatic Function Panel:   Recent Labs     21  0514 06/04/21  0511   PROT 4.9* 4.9*   LABALBU 2.7* 2.6*   BILITOT 0.39 0.40   ALKPHOS 99 99   ALT 34* 32   AST 17 18     No results for input(s): RPR in the last 72 hours. No results for input(s): HIV in the last 72 hours. No results for input(s): BC in the last 72 hours. Lab Results   Component Value Date    CREATININE 0.71 06/04/2021    GLUCOSE 105 06/04/2021    GLUCOSE 135 02/08/2012       Detailed results: Thank you for allowing us to participate in the care of this patient. Please call with questions. This note is created with the assistance of a speech recognition program.  While intending to generate adocument that actually reflects the content of the visit, the document can still have some errors including those of syntax and sound a like substitutions which may escape proof reading. It such instances, actual meaningcan be extrapolated by contextual diversion.     Lexa Chery MD  Office: [phone]  Perfect serve / office [phone]

## 2021-06-05 NOTE — PROGRESS NOTES
[facility] -   Infectious diseases evaluation  admission date 5/19/2021    reason for consultation:   Osteomyelitis/discitis    Impression :   Current:  · Back pain with T12-L1 finding on MRI concerning for fracture versus discitis/osteomyelitis status post IR guided aspiration 6/4/2021  · UTI  · Elevated CRP  · Positive Blood Cx-1/2 GP Rods. · Atrial fibrillation on chronic anticoagulation  · Chronic diastolic heart failure  · Hypertension  · Hyperlipidemia  · Compression fracture of L1 status post kyphoplasty 4/15/2021  · Coronary artery disease  · History of ESBL producing E. coli bacteriuria  · History of nephrolithiasis      Recommendations   · Continue Ceftriaxone 2 gm IV daily likely x 6-weeks until approximately 7/14/21. · Repeat BC x 2.  · Follow final sensitivity from Blood cx, spinal aspirate. Adjust antibiotics. · Follow CBC and renal function  · Discussed with patient. History of Present Illness:   Initial history:  Sebastien Gandhi is a 81y.o.-year-old female was initially admitted to [facility] 5/18/2021 for A. fib with rapid vascular response, hospital course was complicated by bloody stool, GI consulted had EGD and colonoscopy done then was discharged to acute rehab. She developed worsening lower back pain for several days , severe, sharp, constant radiates to the thighs, aggravated by movement, no alleviating factors. She denied lower extremity weakness, no bowel or urinary incontinence.     Urinalysis 6/1/2021 showed 10-20 WBC, small leukocyte esterase  6/2/2021 thoracic and lumbar spine MRI reviewed suggestive of T12 marrow edema and T12-L1 disc space edema concerning for discitis/osteomyelitis or fracture  The patient was readmitted to [facility].  She received lumbar steroid epidural injection on 5/24/2021  History L1 compression fracture status post kyphoplasty of L1 on 4/15/2021    Interval changes  6/5/2021 Afebrile. PICC placed 6/4/21. Feeling tires today. Back pain at 4-5/10. Denies any fever, chills, n/v/d, dysuria. States she is drinking well. Food does not taste good. Patient Vitals for the past 8 hrs:   BP Temp Temp src Pulse Resp SpO2   06/05/21 0815 111/65 98.5 °F (36.9 °C) Oral 99 16 92 %   06/05/21 0715 -- (!) 32 °F (0 °C) -- -- -- --     Labs:  Cre: 0.71-0.69  CRP: 21.5  WBC: 10.7-8.4-7.7    Micro:  6/1 Urine cx-E. Coli  6/3 BC 1/2-Gram Positive Rods  6/4 CSF T12 L1 space spinal aspirate-Many neutrophils to date. I have personally reviewed the past medical history, past surgical history, medications, social history, and family history, and I haveupdated the database accordingly. Allergies:   Adhesive tape, Meperidine, Demerol, Ultram [tramadol hcl], Zocor [simvastatin], Codeine, and Fentanyl     Review of Systems:     Review of Systems   All other systems reviewed and are negative. Physical Examination :       Physical Exam  Vitals and nursing note reviewed. Constitutional:       General: She is not in acute distress. Appearance: Normal appearance. HENT:      Head: Normocephalic and atraumatic. Right Ear: External ear normal.      Left Ear: External ear normal.      Nose: Nose normal.      Mouth/Throat:      Mouth: Mucous membranes are moist.      Pharynx: Oropharynx is clear. No oropharyngeal exudate. Eyes:      General: No scleral icterus. Extraocular Movements: Extraocular movements intact. Conjunctiva/sclera: Conjunctivae normal.      Pupils: Pupils are equal, round, and reactive to light. Cardiovascular:      Rate and Rhythm: Rhythm irregular. Heart sounds: No murmur heard. Pulmonary:      Effort: Pulmonary effort is normal. No respiratory distress. Breath sounds: No wheezing. Abdominal:      General: Abdomen is flat. Bowel sounds are normal. There is no distension. Palpations: Abdomen is soft. Tenderness:  There is no ANGIOPLASTY WITH STENT PLACEMENT  April 2002    CT NEEDLE BIOPSY SPINE  6/4/2021    CT NEEDLE BIOPSY SPINE 6/4/2021 [facility] CT SCAN    HYSTERECTOMY  1981    LIVER BIOPSY  2000    LIVER BIOPSY      NASAL ENDOSCOPY Bilateral 11/25/2014    lt nasal cautery with packing    PHOTODYNAMIC THERAPY  April 2000 & Jan. 2002    for peritoneal Mesothelioma    [address]    REVISION TOTAL HIP ARTHROPLASTY Right 1998    ROTATOR CUFF REPAIR  2006    SKIN CANCER EXCISION Bilateral 2006    ankle    SKIN CANCER EXCISION Left Oct. 2014    shoulder    SPINE SURGERY N/A 4/15/2021    KYPHOPLASTY L1 performed by Lalitha Uriostegui MD at [facility] Right 1990   [address] [facility] ARTHROPLASTY Left 2003    TUBAL LIGATION  1969    UPPER GASTROINTESTINAL ENDOSCOPY      UPPER GASTROINTESTINAL ENDOSCOPY N/A 5/28/2021    EGD ESOPHAGOGASTRODUODENOSCOPY performed by Jerry Harrington MD at [facility]         Medications:      warfarin  2 mg Oral Once    enoxaparin  1 mg/kg Subcutaneous BID    sodium chloride flush  10 mL Intravenous 2 times per day    Vitamin D  2,000 Units Oral Daily    calcium carbonate  500 mg Oral BID    cefTRIAXone (ROCEPHIN) IV  2,000 mg Intravenous Q24H    famotidine  10 mg Oral Nightly    predniSONE  10 mg Oral Daily    nystatin  5 mL Oral 4x Daily    hydrocortisone-aloe   Topical BID    warfarin (COUMADIN) daily dosing (placeholder)   Other RX Placeholder    polyethylene glycol  17 g Oral Daily    sodium chloride flush  5-40 mL Intravenous 2 times per day    carvedilol  6.25 mg Oral BID    atorvastatin  40 mg Oral Daily    ferrous sulfate  300 mg Oral Daily    pantoprazole  40 mg Oral QAM AC    oxybutynin  15 mg Oral Daily    rOPINIRole  0.5 mg Oral 4x Daily    sertraline  50 mg Oral Daily    amiodarone  100 mg Oral BID       Social History:     Social History     Socioeconomic History    Marital status:      Spouse name: Not on file    Number of children: Not on file    Years of education: Not on file    Highest education level: Not on file   Occupational History    Not on file   Tobacco Use    Smoking status: Never Smoker    Smokeless tobacco: Never Used   Vaping Use    Vaping Use: Never used   Substance and Sexual Activity    Alcohol use: Yes     Comment: social    Drug use: No    Sexual activity: Not Currently   Other Topics Concern    Not on file   Social History Narrative    Not on file     Social Determinants of Health     Financial Resource Strain:     Difficulty of Paying Living Expenses:    Food Insecurity:     Worried About Running Out of Food in the Last Year:     [address] in the Last Year:    Transportation Needs:     Lack of Transportation (Medical):      Lack of Transportation (Non-Medical):    Physical Activity:     Days of Exercise per Week:     Minutes of Exercise per Session:    Stress:     Feeling of Stress :    Social Connections:     Frequency of Communication with Friends and Family:     Frequency of Social Gatherings with Friends and Family:     Attends Mor[employer] Services:     Active Member of Clubs or Organizations:     Attends Club or Organization Meetings:     Marital Status:    Intimate Partner Violence:     Fear of Current or Ex-Partner:     Emotionally Abused:     Physically Abused:     Sexually Abused:        Family History:     Family History   Problem Relation Age of Onset    Heart Failure Brother     Heart Attack Brother         triple bypass    Prostate Cancer Brother     Coronary Art Dis Mother     Breast Cancer Mother         dx'd in late 46s   [facility] Emphysema Father     Colon Cancer Neg Hx     Diabetes Neg Hx     Eclampsia Neg Hx     Hypertension Neg Hx     Ovarian Cancer Neg Hx      Labor Neg Hx     Spont Abortions Neg Hx     Stroke Neg Hx       Medical Decision Making:   I have independently reviewed/ordered the following labs:    CBC with Differential:   Recent Labs     06/04/21  0511 06/05/21 0447   WBC 8.4 7.7   HGB 8.9* 8.0*   HCT 28.7* 24.7*    252   LYMPHOPCT 7* 6*   MONOPCT 7 8*     BMP:  Recent Labs     06/04/21  0511 06/05/21 0447    141   K 4.4 4.2    107   CO2 24 29   BUN 20 22   CREATININE 0.71 0.69     Hepatic Function Panel:   Recent Labs     06/04/21  0511 06/05/21 0447   PROT 4.9* 4.7*   LABALBU 2.6* 2.4*   BILITOT 0.40 0.35   ALKPHOS 99 95   ALT 32 28   AST 18 17     No results for input(s): RPR in the last 72 hours. No results for input(s): HIV in the last 72 hours. No results for input(s): BC in the last 72 hours. Lab Results   Component Value Date    CREATININE 0.69 06/05/2021    GLUCOSE 134 06/05/2021    GLUCOSE 135 02/08/2012       Detailed results: Thank you for allowing us to participate in the care of this patient. Please call with questions. This note is created with the assistance of a speech recognition program.  While intending to generate adocument that actually reflects the content of the visit, the document can still have some errors including those of syntax and sound a like substitutions which may escape proof reading. It such instances, actual meaningcan be extrapolated by contextual diversion.     CANDIS Manley - CNP  Office: [phone]  Perfect serve / office [phone]

## 2021-06-05 NOTE — PROGRESS NOTES
Date:   6/5/2021  Patient name: Clyde Gill  Date of admission:  5/19/2021  9:49 AM  MRN:   [id number]  YOB: 1935  PCP: Gilberto Ervin DO    Reason for Admission: Atrial fibrillation with RVR [facility]) [I48.91]    Cardiology follow-up: A. fib with RVR       Impression      A. fib with RVR  Diastolic CHF  Hypertension  COPD, hypoxia, on oxygen at home  Hyperlipidemia  Exertional shortness of breath, hypoxia  History of CAD, stent placement LAD 2002  Polymyalgia rheumatica  Moderate aortic regurgitation  Giant cell arteritis  chronic anemia  history of peritoneal/omental mesothelioma surgery done at [address] [place] [address]  Dry mouth, dry eyes  Multiple hip surgeries, bilateral  Chronic pain syndrome  L1 kyphoplasty 4/15/2021  Hemorrhoids, rectal bleeding    2D echo 5/21/2021  Normal LV size and wall thickness ejection fraction more than 55% normal size LA and RA, RVSP 21, no significant pericardial effusion     ECG 5/19/2021  A. fib/a flutter heart rate 159, nonspecific ST-T changes     X-ray lumbar spine 5/19/2021  Unchanged appearance of the lumbar spine, status post vertebroplasty at L1, no new osseous abnormality  Moderate stool burden, previously administered oral contrast is present in the distal colon     Abdominal x-ray 5/27/2021 enteric tube needs further advancement approximately 5 cm.     MRI lumbar spine 6/2/2021  Abnormal endplate edema mild disc space T2 signal identified at T12 and L1 discitis/osteomyelitis  Moderate to severe multilevel degenerative changes     Lab work   6/1/2021  Sodium 139, potassium 4.3, BUN 19, creatinine 0.66, glucose 113, calcium 8.4, albumin 2.7 WBC 8.4, hemoglobin 8.3, platelets 110  INR 3.7  6/3/2021  Hemoglobin 8.8, WBC 10.7, platelets 665 INR 2.0, CRP 21.5  Glucose 147, albumin 2.7, sodium 136, potassium 5.2, BUN 21, creatinine 0.75  Lab work 6/5/2021: WBC 7.7, hemoglobin 8.0, platelets 469, glucose 134, BUN 22, creatinine 0.69, calcium 8.2, sodium 141, potassium 4.2, alkaline phosphatase 95, ALT 20, AST 17, GFR> 60, albumin 2.4, INR 1.2. History of present illness  81years old female who recently had L1 kyphoplasty presented to the ER with a chief complaint of back pain.  She had a kyphoplasty about a month ago for compression fracture L1.  Her pain got very severe.  She has been also having dizziness and she had a near syncope about 10 days ago.  No chest pain no palpitation.   On admission her ECG showed A. fib with RVR heart rate 159.  She was started on Cardizem infusion.  She developed hypotension and dose of Cardizem infusion was reduced to 5 mg/h.  Later on it was discontinued and started on carvedilol 6.25 mg twice a day.  Heart rate improved significantly     Current evaluation   Patient seen and examined   Medications and labs checked  Patient had a CT-guided T12-L1 disc aspiration, 2 mL of bloody fluid on 6/4/2021  She was resting with a couple of pillows eating lunch  Denied any significant back pain    Afebrile oxygen saturation 92%  ECG monitor is A. fib heart rate 112-120      Medications:   Scheduled Meds:   warfarin  2 mg Oral Once    enoxaparin  1 mg/kg Subcutaneous BID    sodium chloride flush  10 mL Intravenous 2 times per day    Vitamin D  2,000 Units Oral Daily    calcium carbonate  500 mg Oral BID    cefTRIAXone (ROCEPHIN) IV  2,000 mg Intravenous Q24H    famotidine  10 mg Oral Nightly    predniSONE  10 mg Oral Daily    nystatin  5 mL Oral 4x Daily    hydrocortisone-aloe   Topical BID    warfarin (COUMADIN) daily dosing (placeholder)   Other RX Placeholder    polyethylene glycol  17 g Oral Daily    sodium chloride flush  5-40 mL Intravenous 2 times per day    carvedilol  6.25 mg Oral BID    atorvastatin  40 mg Oral Daily    ferrous sulfate  300 mg Oral Daily    pantoprazole  40 mg Oral QAM AC    oxybutynin  15 mg Oral Daily    rOPINIRole  0.5 mg Oral 4x Daily    sertraline  50 mg Oral Daily    amiodarone  100 mg Oral BID     Continuous Infusions:   sodium chloride       CBC:   Recent Labs     06/03/21 0514 06/04/21  0511 06/05/21 0447   WBC 10.7 8.4 7.7   HGB 8.8* 8.9* 8.0*    236 252     BMP:    Recent Labs     06/03/21  0514 06/04/21  0511 06/05/21 0447    138 141   K 5.2 4.4 4.2    105 107   CO2 25 24 29   BUN 21 20 22   CREATININE 0.75 0.71 0.69   GLUCOSE 147* 105* 134*     Hepatic:   Recent Labs     06/03/21 0514 06/04/21  0511 06/05/21 0447   AST 17 18 17   ALT 34* 32 28   BILITOT 0.39 0.40 0.35   ALKPHOS 99 99 95     Troponin: No results for input(s): TROPONINI in the last 72 hours. BNP: No results for input(s): BNP in the last 72 hours. Lipids: No results for input(s): CHOL, HDL in the last 72 hours. Invalid input(s): LDLCALCU  INR:   Recent Labs     06/03/21 2208 06/04/21 0511 06/05/21 0447   INR 1.7 1.5 1.2       Objective:   Vitals: /65   Pulse 99   Temp 98.5 °F (36.9 °C) (Oral)   Resp 16   Ht 5' 1.5\" (1.562 m)   Wt 158 lb 4.6 oz (71.8 kg)   SpO2 92%   BMI 29.42 kg/m²   General appearance: alert and cooperative with exam  HEENT: Head: Normal, normocephalic, atraumatic. Neck: supple, symmetrical, trachea midline  Lungs: Expansion is poor, breath sounds diminished at bases  Heart: irregularly irregular rhythm  Abdomen: Obese bowel sounds present  Extremities: Homans sign is negative, no sign of DVT  Neurologic: Mental status: Alert, oriented, thought content appropriate    EKG: atrial fibrillation, rate . ECHO: reviewed.    Ejection fraction: 55%, mild to moderate AR    Assessment / Acute Cardiac Problems:     A. fib with RVR heart rate control improved  Patient admitted with a severe back pain, recent kyphoplasty  Diastolic CHF  COPD, hypoxia on oxygen  Anemia  Severe protein malnutrition    6/5/2021  She had T12-L1 needle aspiration 2 mL bloody fluid  Back pain is stable    Patient Active Problem List:     Hypertension     Nephrolithiasis     Mixed hyperlipidemia     Osteoarthritis     Peritoneal mesothelioma ([facility] [address]     Ovarian cyst     Basal cell carcinoma of nose     Bimalleolar ankle fracture     Epistaxis, recurrent     Cervical spondylosis     Trochanteric bursitis, right hip     Status post bilateral total hip replacement     Medication monitoring encounter     Chronic diastolic heart failure (HCC)     Chronic respiratory failure (HCC)     Giant cell arteritis (HCC)     Paroxysmal atrial fibrillation (HCC)     Urinary frequency     Vitamin D deficiency     Age-related osteoporosis with current pathological fracture     Atrial fibrillation with RVR (HCC)     Gastroesophageal reflux disease without esophagitis     Pulmonary hypertension (HCC)     Lumbosacral spondylosis without myelopathy     YOGESH (acute kidney injury) ([facility] [address]     Closed compression fracture of L1 lumbar vertebra, sequela     Acute midline low back pain with right-sided sciatica     Acute exacerbation of chronic low back pain     Iron deficiency anemia     Rectal bleeding     Elevated LFTs     Closed wedge compression fracture of fifth thoracic vertebra (HCC)     Closed wedge compression fracture of T6 vertebra (HCC)     Closed wedge compression fracture of T9 vertebra (HCC)     Closed wedge compression fracture of T10 vertebra (HCC)      Plan of Treatment:   Medication checked  Continue current dose of beta-blocker, amiodarone, Lipitor, Coumadin, and Lovenox  Is also on antibiotics and steroids  ID notes reviewed      Electronically signed by CANDIS Urias CNP on 6/5/2021 at 11:36 AM

## 2021-06-05 NOTE — PLAN OF CARE
Problem: Falls - Risk of:  Goal: Will remain free from falls  Description: Will remain free from falls  6/5/2021 1600 by Eladia Calvo RN  Outcome: met     Problem: Pain:  Goal: Pain level will decrease  Description: Pain level will decrease  6/5/2021 1600 by Eladia Calvo RN  Outcome: Ongoing   Meds and non pharm utilized for chronic pain    Problem: Cardiac Output - Decreased:  Goal: Hemodynamic stability will improve  Description: Hemodynamic stability will improve  Outcome: Ongoing   Meds given as ordered. Vitals monitored throughout shift. Heart monitor applied    Problem: Nutrition  Goal: Optimal nutrition therapy  Outcome: Ongoing  Encourage fluids     Problem: Skin Integrity:  Goal: Will show no infection signs and symptoms  Description: Will show no infection signs and symptoms  Outcome: Ongoing   Head to toe charted, no new signs of breakdown.  Precautions in place    Problem: Musculor/Skeletal Functional Status  Goal: Highest potential functional level  Outcome: Ongoing   Encourage ROM while in bed

## 2021-06-05 NOTE — PROGRESS NOTES
Patient was seen and examined. Awake alert in no acute distress. Eating breakfast.  No BM. Feels like she is going to have a BM. Afebrile vital signs are stable. Abdomen is soft. Extremity nontender. Blood work was reviewed. Hemoglobin is dropped to 8 from 8 9. WBC count is normal.  No signs of active GI bleed. BMP normal.    Continue diet as tolerated. Fleets enema. Await BM.

## 2021-06-05 NOTE — PROGRESS NOTES
(TYLENOL) tablet 650 mg, Q4H PRN  enoxaparin (LOVENOX) injection 70 mg, BID  sodium chloride flush 0.9 % injection 10 mL, 2 times per day  sodium chloride flush 0.9 % injection 10 mL, PRN  Vitamin D (CHOLECALCIFEROL) tablet 2,000 Units, Daily  calcium carbonate (TUMS) chewable tablet 500 mg, BID  cefTRIAXone (ROCEPHIN) 2000 mg IVPB in D5W 50ml minibag, Q24H  famotidine (PEPCID) tablet 10 mg, Nightly  predniSONE (DELTASONE) tablet 10 mg, Daily  nystatin (MYCOSTATIN) 559231 UNIT/ML suspension 500,000 Units, 4x Daily  hydrocortisone-aloe 1 % cream, BID  potassium chloride (KLOR-CON M) extended release tablet 40 mEq, PRN   Or  potassium bicarb-citric acid (EFFER-K) effervescent tablet 40 mEq, PRN   Or  potassium chloride 10 mEq/100 mL IVPB (Peripheral Line), PRN  dextrose 50 % IV solution, PRN  oxyCODONE-acetaminophen (PERCOCET) 5-325 MG per tablet 1 tablet, Q4H PRN  warfarin (COUMADIN) daily dosing (placeholder), RX Placeholder  baclofen (LIORESAL) tablet 5 mg, TID PRN  polyethylene glycol (GLYCOLAX) packet 17 g, Daily  perflutren lipid microspheres (DEFINITY) injection 2.2 mg, ONCE PRN  sodium chloride flush 0.9 % injection 5-40 mL, 2 times per day  sodium chloride flush 0.9 % injection 5-40 mL, PRN  0.9 % sodium chloride infusion, PRN  acetaminophen (TYLENOL) tablet 650 mg, Q4H PRN  carvedilol (COREG) tablet 6.25 mg, BID  naloxone (NARCAN) injection 0.4 mg, PRN  atorvastatin (LIPITOR) tablet 40 mg, Daily  ferrous sulfate 300 (60 Fe) MG/5ML syrup 300 mg, Daily  pantoprazole (PROTONIX) tablet 40 mg, QAM AC  oxybutynin (DITROPAN-XL) extended release tablet 15 mg, Daily  polyethylene glycol (GLYCOLAX) packet 17 g, Daily PRN  rOPINIRole (REQUIP) tablet 0.5 mg, 4x Daily  sertraline (ZOLOFT) tablet 50 mg, Daily  amiodarone (CORDARONE) tablet 100 mg, BID        Data:     Code Status:  Full Code    Family History   Problem Relation Age of Onset    Heart Failure Brother     Heart Attack Brother         triple bypass    Prostate Cancer Brother     Coronary Art Dis Mother     Breast Cancer Mother         dx'd in late 46s   Garcia Emphysema Father     Colon Cancer Neg Hx     Diabetes Neg Hx     Eclampsia Neg Hx     Hypertension Neg Hx     Ovarian Cancer Neg Hx      Labor Neg Hx     Spont Abortions Neg Hx     Stroke Neg Hx        Social History     Socioeconomic History    Marital status:      Spouse name: Not on file    Number of children: Not on file    Years of education: Not on file    Highest education level: Not on file   Occupational History    Not on file   Tobacco Use    Smoking status: Never Smoker    Smokeless tobacco: Never Used   Vaping Use    Vaping Use: Never used   Substance and Sexual Activity    Alcohol use: Yes     Comment: social    Drug use: No    Sexual activity: Not Currently   Other Topics Concern    Not on file   Social History Narrative    Not on file     Social Determinants of Health     Financial Resource Strain:     Difficulty of Paying Living Expenses:    Food Insecurity:     Worried About Running Out of Food in the Last Year:     Ran Out of Food in the Last Year:    Transportation Needs:     Lack of Transportation (Medical):  Lack of Transportation (Non-Medical):    Physical Activity:     Days of Exercise per Week:     Minutes of Exercise per Session:    Stress:     Feeling of Stress :    Social Connections:     Frequency of Communication with Friends and Family:     Frequency of Social Gatherings with Friends and Family:     Attends Taoism Services:     Active Member of Clubs or Organizations:     Attends Club or Organization Meetings:     Marital Status:    Intimate Partner Violence:     Fear of Current or Ex-Partner:     Emotionally Abused:     Physically Abused:     Sexually Abused:        I/O (24Hr):     Intake/Output Summary (Last 24 hours) at 2021 1410  Last data filed at 2021 1121  Gross per 24 hour   Intake --   Output 500 ml   Net -500 ml     Radiology:  XR THORACIC SPINE (3 VIEWS)    Result Date: 6/1/2021  Compression fracture of T5, T6, T9, T10 vertebral bodies with 30% loss of normal height at T6. Stable compression fracture of L1 body with previous kyphoplasty Multilevel spondylosis and degenerative disc disease. Facet arthropathy     XR LUMBAR SPINE (2-3 VIEWS)    Result Date: 6/1/2021  Compression fracture of T5, T6, T9, T10 vertebral bodies with 30% loss of normal height at T6. Stable compression fracture of L1 body with previous kyphoplasty Multilevel spondylosis and degenerative disc disease. Facet arthropathy     MRI THORACIC SPINE WO CONTRAST    Result Date: 6/2/2021  1. Marrow edema at T12 with edema in the T12-L1 intervertebral disc space and edema in the L1 vertebral body. This could be related to trauma/fracture, but discitis/osteomyelitis cannot be excluded. Post-contrast imaging may be useful for further characterization. 2. Multilevel degenerative changes of the thoracic spine. MRI LUMBAR SPINE WO CONTRAST    Result Date: 6/2/2021  Abnormal endplate edema and disc space T2 signal identified at T12-L1. Associated with abnormal signal involving the bilateral psoas muscle. Although this can be seen could be related to compression fracture, findings are worrisome underlying discitis osteomyelitis. Correlation with sedimentation rate and CRP is recommended. If indicated, contrast may be beneficial. Otherwise stable capitalize changes at L1 with 50 % loss of height and retropulsion into the canal. Moderate severe multilevel degenerate changes The findings were sent to the Radiology Results [address] at 10:17 pm on 6/2/2021to be communicated to a licensed caregiver. IR FLUORO GUIDED CVA DEVICE PLMT/REPLACE/REMOVAL    Result Date: 6/4/2021  Successful ultrasound and fluoroscopy guided right basilic vein 5 [place] power injectable dual-lumen PICC placement. Ready for use.      XR CHEST PORTABLE    Result Date: 5/31/2021  Bibasilar atelectasis. CT NEEDLE BIOPSY SPINE    Result Date: 6/4/2021  Technically successful CT-guided biopsy of the T12-L1 disc space. Sample was sent as requested by the ordering service.        Labs:  Recent Results (from the past 24 hour(s))   CBC with DIFF    Collection Time: 06/05/21  4:47 AM   Result Value Ref Range    WBC 7.7 3.5 - 11.0 k/uL    RBC 2.75 (L) 4.0 - 5.2 m/uL    Hemoglobin 8.0 (L) 12.0 - 16.0 g/dL    Hematocrit 24.7 (L) 36 - 46 %    MCV 89.9 80 - 100 fL    MCH 29.2 26 - 34 pg    MCHC 32.5 31 - 37 g/dL    RDW 17.8 (H) 11.5 - 14.9 %    Platelets 509 582 - 106 k/uL    MPV 8.1 6.0 - 12.0 fL    NRBC Automated NOT REPORTED per 100 WBC    Differential Type NOT REPORTED     Seg Neutrophils 86 (H) 36 - 66 %    Lymphocytes 6 (L) 24 - 44 %    Monocytes 8 (H) 1 - 7 %    Eosinophils % 0 0 - 4 %    Basophils 0 0 - 2 %    Immature Granulocytes NOT REPORTED 0 %    Segs Absolute 6.70 1.3 - 9.1 k/uL    Absolute Lymph # 0.40 (L) 1.0 - 4.8 k/uL    Absolute Mono # 0.60 0.1 - 1.3 k/uL    Absolute Eos # 0.00 0.0 - 0.4 k/uL    Basophils Absolute 0.00 0.0 - 0.2 k/uL    Absolute Immature Granulocyte NOT REPORTED 0.00 - 0.30 k/uL    WBC Morphology NOT REPORTED     RBC Morphology NOT REPORTED     Platelet Estimate NOT REPORTED    Comprehensive Metabolic Panel w/ Reflex to MG    Collection Time: 06/05/21  4:47 AM   Result Value Ref Range    Glucose 134 (H) 70 - 99 mg/dL    BUN 22 8 - 23 mg/dL    CREATININE 0.69 0.50 - 0.90 mg/dL    Bun/Cre Ratio NOT REPORTED 9 - 20    Calcium 8.2 (L) 8.6 - 10.4 mg/dL    Sodium 141 135 - 144 mmol/L    Potassium 4.2 3.7 - 5.3 mmol/L    Chloride 107 98 - 107 mmol/L    CO2 29 20 - 31 mmol/L    Anion Gap 5 (L) 9 - 17 mmol/L    Alkaline Phosphatase 95 35 - 104 U/L    ALT 28 5 - 33 U/L    AST 17 <32 U/L    Total Bilirubin 0.35 0.3 - 1.2 mg/dL    Total Protein 4.7 (L) 6.4 - 8.3 g/dL    Albumin 2.4 (L) 3.5 - 5.2 g/dL    Albumin/Globulin Ratio NOT REPORTED 1.0 - 2.5    GFR Non-African American >60 >60 mL/min    GFR African American >60 >60 mL/min    GFR Comment          GFR Staging NOT REPORTED    PROTIME-INR    Collection Time: 21  4:47 AM   Result Value Ref Range    Protime 15.4 (H) 11.8 - 14.6 sec    INR 1.2        Physical Examination:        Vitals:  /65   Pulse 99   Temp 98.5 °F (36.9 °C) (Oral)   Resp 16   Ht 5' 1.5\" (1.562 m)   Wt 158 lb 4.6 oz (71.8 kg)   SpO2 92%   BMI 29.42 kg/m²   Temp (24hrs), Av °F (29.4 °C), Min:32 °F (0 °C), Max:98.6 °F (37 °C)    No results for input(s): POCGLU in the last 72 hours. Physical Exam  Vitals reviewed. HENT:      Head: Normocephalic. Right Ear: External ear normal.      Left Ear: External ear normal.      Nose: Nose normal.      Mouth/Throat:      Mouth: Mucous membranes are moist.      Pharynx: Oropharynx is clear. Eyes:      Conjunctiva/sclera: Conjunctivae normal.   Cardiovascular:      Rate and Rhythm: Normal rate and regular rhythm. Pulses: Normal pulses. Heart sounds: Normal heart sounds. Pulmonary:      Effort: Pulmonary effort is normal.      Breath sounds: Normal breath sounds. Abdominal:      General: Bowel sounds are normal.      Palpations: Abdomen is soft. Musculoskeletal:         General: Tenderness (Lower thoracic spine) present. No deformity. Cervical back: Normal range of motion and neck supple. Right lower leg: No edema. Left lower leg: No edema. Skin:     General: Skin is warm. Capillary Refill: Capillary refill takes less than 2 seconds. Coloration: Skin is not jaundiced. Neurological:      General: No focal deficit present. Mental Status: She is alert. Mental status is at baseline.    Psychiatric:         Mood and Affect: Mood normal.         Behavior: Behavior normal.         Assessment:        Primary Problem  Atrial fibrillation with RVR (HCC)     Principal Problem:    Atrial fibrillation with RVR (HCC)  Active Problems: Hypertension    Mixed hyperlipidemia    Chronic diastolic heart failure (HCC)    Chronic respiratory failure (HCC)    Gastroesophageal reflux disease without esophagitis    Pulmonary hypertension (HCC)    YOGESH (acute kidney injury) (HCC)    Closed compression fracture of L1 lumbar vertebra, sequela    Acute midline low back pain with right-sided sciatica    Acute exacerbation of chronic low back pain    Iron deficiency anemia    Rectal bleeding    Elevated LFTs    Closed wedge compression fracture of fifth thoracic vertebra (HCC)    Closed wedge compression fracture of T6 vertebra (HCC)    Closed wedge compression fracture of T9 vertebra (HCC)    Closed wedge compression fracture of T10 vertebra (HCC)    Discitis of thoracolumbar region    Acute cystitis without hematuria    Positive blood culture    Lumbar radiculopathy    Elevated C-reactive protein (CRP)  Resolved Problems:    * No resolved hospital problems. *      Past Medical History:   Diagnosis Date    Asbestos exposure     Atrial fibrillation ([address]     takes xarelto    Basal cell carcinoma of nose     CAD (coronary artery disease)     CKD (chronic kidney disease) stage 2, GFR 60-89 ml/min     Hyperlipidemia     Hypertension     MDRO (multiple drug resistant organisms) resistance 2/19/2014    E. 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    treated at [facility] PONV (postoperative nausea and vomiting)     Prolonged emergence from general anesthesia     SOB (shortness of breath) on exertion         Plan:        1. IV Rocephin 2 g daily per ID for T12 osteomyelitis. 2. IR guided spinal fluid aspirate pending  3. Urine culture positive sensitive to IV Rocephin  4. Pharmacy to dose Coumadin INR subtherapeutic  5. Bridged with full dose Lovenox subcu  6. Goal INR 2-3  1.  DVT Prophylaxis Lovenox  2. EPCs  3. PT/OT to evaluate and treat  4. Pain control  5. Replace electrolytes as per sliding scale  6. Home medications reviewed and appropriate medications continued  7.  Reviewed labs and imaging studies from last 24 hours and results explained to patient      Electronically signed by Brandy Vuong MD

## 2021-06-05 NOTE — PROGRESS NOTES
hyperlipidemia     Osteoarthritis     Peritoneal mesothelioma ([facility] [address]     Ovarian cyst     Basal cell carcinoma of nose     Bimalleolar ankle fracture     Epistaxis, recurrent     Cervical spondylosis     Trochanteric bursitis, right hip     Status post bilateral total hip replacement     Medication monitoring encounter     Chronic diastolic heart failure (HCC)     Chronic respiratory failure (HCC)     Giant cell arteritis (HCC)     Paroxysmal atrial fibrillation (HCC)     Urinary frequency     Vitamin D deficiency     Age-related osteoporosis with current pathological fracture     Atrial fibrillation with RVR (HCC)     Gastroesophageal reflux disease without esophagitis     Pulmonary hypertension (HCC)     Lumbosacral spondylosis without myelopathy     YOGESH (acute kidney injury) ([facility] [address]     Closed compression fracture of L1 lumbar vertebra, sequela     Acute midline low back pain with right-sided sciatica     Acute exacerbation of chronic low back pain     Iron deficiency anemia     Rectal bleeding     Elevated LFTs     Closed wedge compression fracture of fifth thoracic vertebra (HCC)     Closed wedge compression fracture of T6 vertebra (HCC)     Closed wedge compression fracture of T9 vertebra (HCC)     Closed wedge compression fracture of T10 vertebra (HCC)      Plan of Treatment:   Medication checked  Continue current dose of beta-blocker, amiodarone, Lipitor, Coumadin, and Lovenox  Is also on antibiotics and steroids  ID notes reviewed    Reviewed lab work, assessment, medications with Dr. Scotty Gould.       Electronically signed by CANDIS Murillo CNP on 6/5/2021 at 1:16 PM

## 2021-06-05 NOTE — CARE COORDINATION
Social work; referral faxed to [facility]. Will need discharge readmit at discharge if pt is accepted.  Geremias adams

## 2021-06-06 NOTE — PROGRESS NOTES
[facility] -   Infectious diseases evaluation  admission date 5/19/2021    reason for consultation:   Osteomyelitis/discitis    Impression :   Current:  · Back pain with T12-L1 finding on MRI concerning for fracture versus discitis/osteomyelitis status post IR guided aspiration 6/4/2021  · UTI - E-coli & Klebsiella pneumoniae, sensitive to Ceftriaxone  · Elevated CRP  · Positive Blood Cx-1/2 GP Rods. · Atrial fibrillation on chronic anticoagulation  · Chronic diastolic heart failure  · Hypertension  · Hyperlipidemia  · Compression fracture of L1 status post kyphoplasty 4/15/2021  · Coronary artery disease  · History of ESBL producing E. coli bacteriuria  · History of nephrolithiasis      Recommendations   · Continue Ceftriaxone 2 gm IV daily likely x 6-weeks until approximately 7/14/21. · Repeat BC x 2.  · Follow final sensitivity from Blood cx, spinal aspirate. Adjust antibiotics. · Follow CBC and renal function  · Discussed with patient. History of Present Illness:   Initial history:  Arianne Brenner is a 81y.o.-year-old female was initially admitted to [facility] 5/18/2021 for A. fib with rapid vascular response, hospital course was complicated by bloody stool, GI consulted had EGD and colonoscopy done then was discharged to acute rehab. She developed worsening lower back pain for several days , severe, sharp, constant radiates to the thighs, aggravated by movement, no alleviating factors. She denied lower extremity weakness, no bowel or urinary incontinence.     Urinalysis 6/1/2021 showed 10-20 WBC, small leukocyte esterase  6/2/2021 thoracic and lumbar spine MRI reviewed suggestive of T12 marrow edema and T12-L1 disc space edema concerning for discitis/osteomyelitis or fracture  The patient was readmitted to [facility].  She received lumbar steroid epidural injection on 5/24/2021  History L1 compression fracture status post kyphoplasty of L1 on 4/15/2021    Interval changes  6/6/2021   Afebrile. SpO2 99% 3l/nc  PICC placed 6/4/21. Back pain with movement 8/10  Denies any fever, chills, n/v/d, dysuria. States she is drinking well. Spouse at bedside    Patient Vitals for the past 8 hrs:   BP Temp Temp src Pulse Resp SpO2   06/06/21 0800 106/77 98 °F (36.7 °C) Oral 100 18 99 %     Labs:  Cre: 0.71-0.69-0.62  CRP: 21.5  WBC: 10.7-8.4-7.7-6.8    Micro:  6/1 Urine cx-E. Coli >100,000 & Klebsiella pneumoniae >100,000  6/3 BC 1/2-Gram Positive Rods  6/4 CSF T12 L1 space spinal aspirate-Many neutrophils to date, await final result  6/5 Blood Cx - negative to date  I have personally reviewed the past medical history, past surgical history, medications, social history, and family history, and I haveupdated the database accordingly. Allergies:   Adhesive tape, Meperidine, Demerol, Ultram [tramadol hcl], Zocor [simvastatin], Codeine, and Fentanyl     Review of Systems:     Review of Systems   Constitutional: Negative. HENT: Negative. Eyes: Negative. Respiratory: Negative. Cardiovascular: Negative. Gastrointestinal: Negative. Genitourinary: Negative. Musculoskeletal: Negative. Skin: Negative. Neurological: Negative. Psychiatric/Behavioral: Negative. All other systems reviewed and are negative. Physical Examination :       Physical Exam  Vitals and nursing note reviewed. Constitutional:       General: She is not in acute distress. Appearance: Normal appearance. HENT:      Head: Normocephalic and atraumatic. Right Ear: External ear normal.      Left Ear: External ear normal.      Nose: Nose normal.      Mouth/Throat:      Mouth: Mucous membranes are moist.      Pharynx: Oropharynx is clear. Eyes:      Conjunctiva/sclera: Conjunctivae normal.   Cardiovascular:      Rate and Rhythm: Tachycardia present. Rhythm irregular. Heart sounds: No murmur heard.      Pulmonary:      Effort: Pulmonary effort is normal. No respiratory distress. Abdominal:      General: Bowel sounds are normal. There is no distension. Palpations: Abdomen is soft. Tenderness: There is no abdominal tenderness. Genitourinary:     Comments: No rosario. Musculoskeletal:      Cervical back: Normal range of motion and neck supple. Right lower leg: No edema. Left lower leg: No edema. Skin:     General: Skin is warm and dry. Findings: Bruising present. Neurological:      Mental Status: She is alert and oriented to person, place, and time. Psychiatric:         Behavior: Behavior normal.         Past Medical History:     Past Medical History:   Diagnosis Date    Asbestos exposure     Atrial fibrillation ([address]     takes xarelto    Basal cell carcinoma of nose     CAD (coronary artery disease)     CKD (chronic kidney disease) stage 2, GFR 60-89 ml/min     Hyperlipidemia     Hypertension     MDRO (multiple drug resistant organisms) resistance 2/19/2014    E.  Coli urine    Nephrolithiasis     Osteoarthritis     Ovarian cyst     removed    Oxygen dependent     2 LNC mainly at night, during the day as needed    Palpitations     Peritoneal mesothelioma ([facility] [address] 1999    treated at [facility] PONV (postoperative nausea and vomiting)     Prolonged emergence from general anesthesia     SOB (shortness of breath) on exertion        Past Surgical  History:     Past Surgical History:   Procedure Laterality Date    ANKLE FRACTURE SURGERY Right 2013    APPENDECTOMY  1964    BIOPSY / LIGATION TEMPORAL ARTERY Bilateral 2/12/2021    TEMPORAL ARTERY BIOPSY performed by Gerri Boswell MD at [address] Right    [address]  2002    CATARACT REMOVAL Bilateral     CHOLECYSTECTOMY  2005    COLONOSCOPY      COLONOSCOPY N/A 5/28/2021    COLONOSCOPY POLYPECTOMY SNARE/COLD BIOPSY performed by Isela Rodríguez MD at [address] [facility] April 2002    CT NEEDLE BIOPSY SPINE  6/4/2021    CT NEEDLE BIOPSY SPINE 6/4/2021 [facility] CT SCAN    HYSTERECTOMY  1981    LIVER BIOPSY  2000    LIVER BIOPSY      NASAL ENDOSCOPY Bilateral 11/25/2014    lt nasal cautery with packing    PHOTODYNAMIC THERAPY  April 2000 & Jan. 2002    for peritoneal Mesothelioma    [address]    REVISION TOTAL HIP ARTHROPLASTY Right 1998    ROTATOR CUFF REPAIR  2006    SKIN CANCER EXCISION Bilateral 2006    ankle    SKIN CANCER EXCISION Left Oct. 2014    shoulder    SPINE SURGERY N/A 4/15/2021    KYPHOPLASTY L1 performed by Baljinder Chase MD at [address] [place] [address] Right 1990   [address] [facility] ARTHROPLASTY Left 2003    TUBAL LIGATION  1969    UPPER GASTROINTESTINAL ENDOSCOPY      UPPER GASTROINTESTINAL ENDOSCOPY N/A 5/28/2021    EGD ESOPHAGOGASTRODUODENOSCOPY performed by Jacy Donnelly MD at [facility]         Medications:      warfarin  2 mg Oral Once    midodrine  5 mg Oral TID WC    enoxaparin  1 mg/kg Subcutaneous BID    sodium chloride flush  10 mL Intravenous 2 times per day    Vitamin D  2,000 Units Oral Daily    calcium carbonate  500 mg Oral BID    cefTRIAXone (ROCEPHIN) IV  2,000 mg Intravenous Q24H    famotidine  10 mg Oral Nightly    predniSONE  10 mg Oral Daily    nystatin  5 mL Oral 4x Daily    hydrocortisone-aloe   Topical BID    warfarin (COUMADIN) daily dosing (placeholder)   Other RX Placeholder    polyethylene glycol  17 g Oral Daily    sodium chloride flush  5-40 mL Intravenous 2 times per day    carvedilol  6.25 mg Oral BID    atorvastatin  40 mg Oral Daily    ferrous sulfate  300 mg Oral Daily    pantoprazole  40 mg Oral QAM AC    oxybutynin  15 mg Oral Daily    rOPINIRole  0.5 mg Oral 4x Daily    sertraline  50 mg Oral Daily    amiodarone  100 mg Oral BID       Social History:     Social History     Socioeconomic History    Marital status:      Spouse name: Not on file    Number of children: Not on file    Years of education: Not on file    Highest education level: Not on file   Occupational History    Not on file   Tobacco Use    Smoking status: Never Smoker    Smokeless tobacco: Never Used   Vaping Use    Vaping Use: Never used   Substance and Sexual Activity    Alcohol use: Yes     Comment: social    Drug use: No    Sexual activity: Not Currently   Other Topics Concern    Not on file   Social History Narrative    Not on file     Social Determinants of Health     Financial Resource Strain:     Difficulty of Paying Living Expenses:    Food Insecurity:     Worried About Running Out of Food in the Last Year:     [address] in the Last Year:    Transportation Needs:     Lack of Transportation (Medical):      Lack of Transportation (Non-Medical):    Physical Activity:     Days of Exercise per Week:     Minutes of Exercise per Session:    Stress:     Feeling of Stress :    Social Connections:     Frequency of Communication with Friends and Family:     Frequency of Social Gatherings with Friends and Family:     Attends [employer] Services:     Active Member of Clubs or Organizations:     Attends Club or Organization Meetings:     Marital Status:    Intimate Partner Violence:     Fear of Current or Ex-Partner:     Emotionally Abused:     Physically Abused:     Sexually Abused:        Family History:     Family History   Problem Relation Age of Onset    Heart Failure Brother     Heart Attack Brother         triple bypass    Prostate Cancer Brother     Coronary Art Dis Mother     Breast Cancer Mother         dx'd in late 46s   [facility] Emphysema Father     Colon Cancer Neg Hx     Diabetes Neg Hx     Eclampsia Neg Hx     Hypertension Neg Hx     Ovarian Cancer Neg Hx      Labor Neg Hx     Spont Abortions Neg Hx     Stroke Neg Hx       Medical Decision Making:   I have independently reviewed/ordered the following labs:    CBC with Differential:   Recent Labs     06/05/21  0447 06/06/21 0451   WBC 7.7 6.8   HGB 8.0* 7.8*   HCT 24.7* 24.1*    241   LYMPHOPCT 6* 7*   MONOPCT 8* 9*     BMP:  Recent Labs     06/05/21 0447 06/06/21 0451    139   K 4.2 4.3    106   CO2 29 28   BUN 22 24*   CREATININE 0.69 0.62     Hepatic Function Panel:   Recent Labs     06/05/21 0447 06/06/21 0451   PROT 4.7* 4.5*   LABALBU 2.4* 2.3*   BILITOT 0.35 0.32   ALKPHOS 95 90   ALT 28 23   AST 17 20     No results for input(s): RPR in the last 72 hours. No results for input(s): HIV in the last 72 hours. No results for input(s): BC in the last 72 hours. Lab Results   Component Value Date    CREATININE 0.62 06/06/2021    GLUCOSE 115 06/06/2021    GLUCOSE 135 02/08/2012       Detailed results: Thank you for allowing us to participate in the care of this patient. Please call with questions. This note is created with the assistance of a speech recognition program.  While intending to generate adocument that actually reflects the content of the visit, the document can still have some errors including those of syntax and sound a like substitutions which may escape proof reading. It such instances, actual meaningcan be extrapolated by contextual diversion.     CANDIS Jimenez - CNP  Office: [phone]  Perfect serve / office [phone]

## 2021-06-06 NOTE — PROGRESS NOTES
Patient was seen and examined. Awake alert in no acute distress. Afebrile vital signs are stable. Had a small bowel movement. No blood. Awaiting biopsy results abdomen is benign. Extremity bruising and edema. Blood work was reviewed. WBC count is normal.  Hemoglobin is stable at 7.8. BMP is normal.    Continue medical management. No signs of active bleeding at this time. Stool softeners. Await pathology results of the biopsy done by interventional radiology.

## 2021-06-06 NOTE — PROGRESS NOTES
Pharmacy Note  Warfarin Consult follow-up      Recent Labs     06/04/21  0511 06/05/21  0447 06/06/21  0451   INR 1.5 1.2 1.5     Recent Labs     06/04/21  0511 06/05/21  0447 06/06/21  0451   HGB 8.9* 8.0* 7.8*   HCT 28.7* 24.7* 24.1*    252 241       Significant Drug-Drug Interactions:  Current warfarin drug-drug interactions: ropinirole, sertraline, prednisone, amiodarone, atorvastatin, rocephin, enoxaparin        Date             INR        Dose given previous day  Dose scheduled for today  6/6/2021            1.5        2  mg         2 mg        Notes:  Give 2 mg dose today. Daily PT/INR while inpatient.   Yamini Case RPh  6/6/2021  7:11 AM

## 2021-06-06 NOTE — CARE COORDINATION
ONGOING DISCHARGE PLANNING NOTE:    Writer reviewed LSW notes, and discharge plan is ARU. Therapy is on. Electronically signed by Howard Ashton RN on 6/6/2021 at 10:04 AM      Update [phone] Spoke with patient and family at the bedside and they are also open to SNF on discharge for the patient. They stated that they would like to stay in the [facility] but would also consider [facility]. and that the patients daughter Gladis Li (cell number [phone]) would be the . LSW please reach out to Gladis Li Monday morning.     Electronically signed by Howard Ashton RN on 6/6/2021 at 1:17 PM

## 2021-06-06 NOTE — DISCHARGE INSTR - DIET

## 2021-06-06 NOTE — PROGRESS NOTES
Progress Note    6/6/2021   2:34 PM    Name:  Arianne Brenner  MRN:    [id number]     Acct:     [de-identified]   Room:  2115/2115-01  IP Day: 25     Admit Date: 5/19/2021  9:49 AM  PCP: Suma Chavez DO    Subjective:     C/C:   Chief Complaint   Patient presents with    Back Pain       Interval History: Status: not changed. Patient denies chest pain shortness of breath nausea vomiting or bleeding per rectum patient back pain is well controlled with pain meds. Vital signs reviewed and stable. Recent labs reviewed hemoglobin 7.8, INR 1.5    ROS:   all 10 systems reviewed and are negative except as noted    Review of Systems   Constitutional: Negative for chills and fatigue. HENT: Negative for drooling, mouth sores, sneezing and trouble swallowing. Eyes: Negative for redness and itching. Respiratory: Negative for cough, chest tightness and shortness of breath. Cardiovascular: Negative for chest pain, palpitations and leg swelling. Gastrointestinal: Negative for abdominal pain, anal bleeding, blood in stool, nausea, rectal pain and vomiting. Endocrine: Negative for heat intolerance and polyphagia. Genitourinary: Negative for difficulty urinating, flank pain and pelvic pain. Musculoskeletal: Negative for arthralgias, joint swelling and neck stiffness. Skin: Negative for color change and pallor. Allergic/Immunologic: Negative for food allergies. Neurological: Negative for dizziness, seizures and headaches. Hematological: Does not bruise/bleed easily. Psychiatric/Behavioral: Negative for agitation, behavioral problems and suicidal ideas. The patient is not hyperactive. Medications: Allergies:    Allergies   Allergen Reactions    Adhesive Tape Rash    Meperidine Nausea Only    Demerol Nausea Only    Ultram [Tramadol Hcl]      Hot flashes    Zocor [Simvastatin]      Muscle aches    Codeine      \"out of it\"    Fentanyl Rash       Current Meds: warfarin (COUMADIN) tablet 2 mg, Once  midodrine (PROAMATINE) tablet 5 mg, TID WC  acetaminophen (TYLENOL) tablet 650 mg, Q4H PRN  enoxaparin (LOVENOX) injection 70 mg, BID  sodium chloride flush 0.9 % injection 10 mL, 2 times per day  sodium chloride flush 0.9 % injection 10 mL, PRN  Vitamin D (CHOLECALCIFEROL) tablet 2,000 Units, Daily  calcium carbonate (TUMS) chewable tablet 500 mg, BID  cefTRIAXone (ROCEPHIN) 2000 mg IVPB in D5W 50ml minibag, Q24H  famotidine (PEPCID) tablet 10 mg, Nightly  predniSONE (DELTASONE) tablet 10 mg, Daily  nystatin (MYCOSTATIN) 302781 UNIT/ML suspension 500,000 Units, 4x Daily  hydrocortisone-aloe 1 % cream, BID  potassium chloride (KLOR-CON M) extended release tablet 40 mEq, PRN   Or  potassium bicarb-citric acid (EFFER-K) effervescent tablet 40 mEq, PRN   Or  potassium chloride 10 mEq/100 mL IVPB (Peripheral Line), PRN  dextrose 50 % IV solution, PRN  oxyCODONE-acetaminophen (PERCOCET) 5-325 MG per tablet 1 tablet, Q4H PRN  warfarin (COUMADIN) daily dosing (placeholder), RX Placeholder  baclofen (LIORESAL) tablet 5 mg, TID PRN  polyethylene glycol (GLYCOLAX) packet 17 g, Daily  perflutren lipid microspheres (DEFINITY) injection 2.2 mg, ONCE PRN  sodium chloride flush 0.9 % injection 5-40 mL, 2 times per day  sodium chloride flush 0.9 % injection 5-40 mL, PRN  0.9 % sodium chloride infusion, PRN  acetaminophen (TYLENOL) tablet 650 mg, Q4H PRN  carvedilol (COREG) tablet 6.25 mg, BID  naloxone (NARCAN) injection 0.4 mg, PRN  atorvastatin (LIPITOR) tablet 40 mg, Daily  ferrous sulfate 300 (60 Fe) MG/5ML syrup 300 mg, Daily  pantoprazole (PROTONIX) tablet 40 mg, QAM AC  oxybutynin (DITROPAN-XL) extended release tablet 15 mg, Daily  polyethylene glycol (GLYCOLAX) packet 17 g, Daily PRN  rOPINIRole (REQUIP) tablet 0.5 mg, 4x Daily  sertraline (ZOLOFT) tablet 50 mg, Daily  amiodarone (CORDARONE) tablet 100 mg, BID        Data:     Code Status:  Full Code    Family History   Problem Relation Age of Onset    Heart Failure Brother     Heart Attack Brother         triple bypass    Prostate Cancer Brother     Coronary Art Dis Mother     Breast Cancer Mother         dx'd in late 46s   [employer] Emphysema Father     Colon Cancer Neg Hx     Diabetes Neg Hx     Eclampsia Neg Hx     Hypertension Neg Hx     Ovarian Cancer Neg Hx      Labor Neg Hx     Spont Abortions Neg Hx     Stroke Neg Hx        Social History     Socioeconomic History    Marital status:      Spouse name: Not on file    Number of children: Not on file    Years of education: Not on file    Highest education level: Not on file   Occupational History    Not on file   Tobacco Use    Smoking status: Never Smoker    Smokeless tobacco: Never Used   Vaping Use    Vaping Use: Never used   Substance and Sexual Activity    Alcohol use: Yes     Comment: social    Drug use: No    Sexual activity: Not Currently   Other Topics Concern    Not on file   Social History Narrative    Not on file     Social Determinants of Health     Financial Resource Strain:     Difficulty of Paying Living Expenses:    Food Insecurity:     Worried About Running Out of Food in the Last Year:     [address] in the Last Year:    Transportation Needs:     Lack of Transportation (Medical):  Lack of Transportation (Non-Medical):    Physical Activity:     Days of Exercise per Week:     Minutes of Exercise per Session:    Stress:     Feeling of Stress :    Social Connections:     Frequency of Communication with Friends and Family:     Frequency of Social Gatherings with Friends and Family:     Attends [employer] Services:     Active Member of Clubs or Organizations:     Attends Club or Organization Meetings:     Marital Status:    Intimate Partner Violence:     Fear of Current or Ex-Partner:     Emotionally Abused:     Physically Abused:     Sexually Abused:        I/O (24Hr):     Intake/Output Summary (Last 24 hours) at 2021 4177  Last data filed at 6/6/2021 1120  Gross per 24 hour   Intake 50 ml   Output 1000 ml   Net -950 ml     Radiology:  XR THORACIC SPINE (3 VIEWS)    Result Date: 6/1/2021  Compression fracture of T5, T6, T9, T10 vertebral bodies with 30% loss of normal height at T6. Stable compression fracture of L1 body with previous kyphoplasty Multilevel spondylosis and degenerative disc disease. Facet arthropathy     XR LUMBAR SPINE (2-3 VIEWS)    Result Date: 6/1/2021  Compression fracture of T5, T6, T9, T10 vertebral bodies with 30% loss of normal height at T6. Stable compression fracture of L1 body with previous kyphoplasty Multilevel spondylosis and degenerative disc disease. Facet arthropathy     MRI THORACIC SPINE WO CONTRAST    Result Date: 6/2/2021  1. Marrow edema at T12 with edema in the T12-L1 intervertebral disc space and edema in the L1 vertebral body. This could be related to trauma/fracture, but discitis/osteomyelitis cannot be excluded. Post-contrast imaging may be useful for further characterization. 2. Multilevel degenerative changes of the thoracic spine. MRI LUMBAR SPINE WO CONTRAST    Result Date: 6/2/2021  Abnormal endplate edema and disc space T2 signal identified at T12-L1. Associated with abnormal signal involving the bilateral psoas muscle. Although this can be seen could be related to compression fracture, findings are worrisome underlying discitis osteomyelitis. Correlation with sedimentation rate and CRP is recommended. If indicated, contrast may be beneficial. Otherwise stable capitalize changes at L1 with 50 % loss of height and retropulsion into the canal. Moderate severe multilevel degenerate changes The findings were sent to the Radiology Results [address] at 10:17 pm on 6/2/2021to be communicated to a licensed caregiver.      IR FLUORO GUIDED CVA DEVICE PLMT/REPLACE/REMOVAL    Result Date: 6/4/2021  Successful ultrasound and fluoroscopy guided right basilic vein 5 [place] power injectable dual-lumen PICC placement. Ready for use. XR CHEST PORTABLE    Result Date: 5/31/2021  Bibasilar atelectasis. CT NEEDLE BIOPSY SPINE    Result Date: 6/4/2021  Technically successful CT-guided biopsy of the T12-L1 disc space. Sample was sent as requested by the ordering service.        Labs:  Recent Results (from the past 24 hour(s))   CBC with DIFF    Collection Time: 06/06/21  4:51 AM   Result Value Ref Range    WBC 6.8 3.5 - 11.0 k/uL    RBC 2.67 (L) 4.0 - 5.2 m/uL    Hemoglobin 7.8 (L) 12.0 - 16.0 g/dL    Hematocrit 24.1 (L) 36 - 46 %    MCV 90.5 80 - 100 fL    MCH 29.4 26 - 34 pg    MCHC 32.5 31 - 37 g/dL    RDW 17.5 (H) 11.5 - 14.9 %    Platelets 275 688 - 357 k/uL    MPV 8.1 6.0 - 12.0 fL    NRBC Automated NOT REPORTED per 100 WBC    Differential Type NOT REPORTED     Immature Granulocytes NOT REPORTED 0 %    Absolute Immature Granulocyte NOT REPORTED 0.00 - 0.30 k/uL    WBC Morphology NOT REPORTED     RBC Morphology NOT REPORTED     Platelet Estimate NOT REPORTED     Seg Neutrophils 84 (H) 36 - 66 %    Lymphocytes 7 (L) 24 - 44 %    Monocytes 9 (H) 1 - 7 %    Eosinophils % 0 0 - 4 %    Basophils 0 0 - 2 %    Segs Absolute 5.70 1.3 - 9.1 k/uL    Absolute Lymph # 0.50 (L) 1.0 - 4.8 k/uL    Absolute Mono # 0.60 0.1 - 1.3 k/uL    Absolute Eos # 0.00 0.0 - 0.4 k/uL    Basophils Absolute 0.00 0.0 - 0.2 k/uL   Comprehensive Metabolic Panel w/ Reflex to MG    Collection Time: 06/06/21  4:51 AM   Result Value Ref Range    Glucose 115 (H) 70 - 99 mg/dL    BUN 24 (H) 8 - 23 mg/dL    CREATININE 0.62 0.50 - 0.90 mg/dL    Bun/Cre Ratio NOT REPORTED 9 - 20    Calcium 8.1 (L) 8.6 - 10.4 mg/dL    Sodium 139 135 - 144 mmol/L    Potassium 4.3 3.7 - 5.3 mmol/L    Chloride 106 98 - 107 mmol/L    CO2 28 20 - 31 mmol/L    Anion Gap 5 (L) 9 - 17 mmol/L    Alkaline Phosphatase 90 35 - 104 U/L    ALT 23 5 - 33 U/L    AST 20 <32 U/L    Total Bilirubin 0.32 0.3 - 1.2 mg/dL    Total Protein 4.5 (L) 6.4 - 8.3 g/dL    Albumin 2.3 (L) 3.5 - 5.2 g/dL    Albumin/Globulin Ratio NOT REPORTED 1.0 - 2.5    GFR Non-African American >60 >60 mL/min    GFR African American >60 >60 mL/min    GFR Comment          GFR Staging NOT REPORTED    PROTIME-INR    Collection Time: 21  4:51 AM   Result Value Ref Range    Protime 18.5 (H) 11.8 - 14.6 sec    INR 1.5        Physical Examination:        Vitals:  /68   Pulse 107   Temp 98.6 °F (37 °C) (Oral)   Resp 16   Ht 5' 5.15\" (1.655 m)   Wt 165 lb 5.5 oz (75 kg)   SpO2 92%   BMI 27.39 kg/m²   Temp (24hrs), Av °F (36.7 °C), Min:97.7 °F (36.5 °C), Max:98.6 °F (37 °C)    No results for input(s): POCGLU in the last 72 hours. Physical Exam  Vitals reviewed. HENT:      Head: Normocephalic. Right Ear: External ear normal.      Left Ear: External ear normal.      Nose: Nose normal.      Mouth/Throat:      Mouth: Mucous membranes are moist.      Pharynx: Oropharynx is clear. Eyes:      Conjunctiva/sclera: Conjunctivae normal.   Cardiovascular:      Rate and Rhythm: Normal rate and regular rhythm. Pulses: Normal pulses. Heart sounds: Normal heart sounds. Pulmonary:      Effort: Pulmonary effort is normal.      Breath sounds: Normal breath sounds. Abdominal:      General: Bowel sounds are normal.      Palpations: Abdomen is soft. Musculoskeletal:         General: Tenderness (Thoracic spine) present. No deformity. Cervical back: Normal range of motion and neck supple. Skin:     General: Skin is warm. Capillary Refill: Capillary refill takes less than 2 seconds. Coloration: Skin is not jaundiced. Neurological:      General: No focal deficit present. Mental Status: She is alert. Mental status is at baseline.    Psychiatric:         Mood and Affect: Mood normal.         Behavior: Behavior normal.         Assessment:        Primary Problem  Atrial fibrillation with RVR (HCC)     Principal Problem:    Atrial fibrillation with RVR (HCC)  Active Problems: INR subtherapeutic  8. Cussed with patient and family at bedside  9. Protonix 40 mg p.o. daily  10. Pharmacy to dose Coumadin  1. DVT Prophylaxis on Lovenox for bridging  2. EPCs  3. PT/OT to evaluate and treat  4. Pain control  5. Replace electrolytes as per sliding scale  6. Home medications reviewed and appropriate medications continued  7.  Reviewed labs and imaging studies from last 24 hours and results explained to patient      Electronically signed by Vicky Bo MD

## 2021-06-06 NOTE — PROGRESS NOTES
Notified  Rishi De that [facility] is now a SNF at d/c. She is looking into it to see if she will qualify. House supervisor notified of new order for thoracic corset.  She is attempting to locate

## 2021-06-06 NOTE — PLAN OF CARE
Problem: Falls - Risk of:  Goal: Will remain free from falls  Description: Will remain free from falls  6/6/2021 1557 by Anabella Cadena RN  Outcome: met     Problem: Pain:  Goal: Pain level will decrease  Description: Pain level will decrease  6/6/2021 1557 by Anabella Cadena RN  Outcome: Ongoing   Non pharm measures utilized    Problem: Cardiac Output - Decreased:  Goal: Hemodynamic stability will improve  Description: Hemodynamic stability will improve  Outcome: Ongoing   Heart monitor on throughout shift    Problem: Musculor/Skeletal Functional Status  Goal: Highest potential functional level  Outcome: Ongoing   Encourage repositioning. Problem: Nutrition  Goal: Optimal nutrition therapy  Outcome: Ongoing  Adequate oral intake. Ensures added for protein to increase hgb    Problem: Skin Integrity:  Goal: Will show no infection signs and symptoms  Description: Will show no infection signs and symptoms  Outcome: Ongoing   Head to toe charted. No new signs of breakdown    Problem: Musculor/Skeletal Functional Status  Goal: Highest potential functional level  Outcome: Ongoing   ROM encouraged while in bed.

## 2021-06-06 NOTE — PROGRESS NOTES
Date:   6/6/2021  Patient name: Nneka Waters  Date of admission:  5/19/2021  9:49 AM  MRN:   [id number]  YOB: 1935  PCP: Bull Li DO    Reason for Admission: Atrial fibrillation with RVR [facility]) [I48.91]    Cardiology follow-up: A. fib with RVR       Impression      A. fib with RVR  Diastolic CHF  Hypertension  COPD, hypoxia, on oxygen at home  Hyperlipidemia  Exertional shortness of breath, hypoxia  History of CAD, stent placement LAD 2002  Polymyalgia rheumatica  Moderate aortic regurgitation  Giant cell arteritis  chronic anemia  history of peritoneal/omental mesothelioma surgery done at [address] [place] [address]  Dry mouth, dry eyes  Multiple hip surgeries, bilateral  Chronic pain syndrome  L1 kyphoplasty 4/15/2021  Hemorrhoids, rectal bleeding     2D echo 5/21/2021  Normal LV size and wall thickness ejection fraction more than 55% normal size LA and RA, RVSP 21, no significant pericardial effusion    ECG 5/19/2021  A. fib/a flutter heart rate 159, nonspecific ST-T changes     X-ray lumbar spine 5/19/2021  Unchanged appearance of the lumbar spine, status post vertebroplasty at L1, no new osseous abnormality  Moderate stool burden, previously administered oral contrast is present in the distal colon     Abdominal x-ray 5/27/2021 enteric tube needs further advancement approximately 5 cm.     MRI lumbar spine 6/2/2021  Abnormal endplate edema mild disc space T2 signal identified at T12 and L1 discitis/osteomyelitis  Moderate to severe multilevel degenerative changes     Lab work   6/1/2021  Sodium 139, potassium 4.3, BUN 19, creatinine 0.66, glucose 113, calcium 8.4, albumin 2.7 WBC 8.4, hemoglobin 8.3, platelets 790  INR 3.7  6/3/2021  Hemoglobin 8.8, WBC 10.7, platelets 181 INR 2.0, CRP 21.5  Glucose 147, albumin 2.7, sodium 136, potassium 5.2, BUN 21, creatinine 0.75  Lab work 6/5/2021: WBC 7.7, hemoglobin 8.0, platelets 676, glucose 134, BUN 22, creatinine 0.69, calcium 8.2, sodium 141, potassium 4.2, alkaline phosphatase 95, ALT 20, AST 17, GFR> 60, albumin 2.4, INR 1.2. Lab work 6/6/2021  Sodium 139, potassium 4.3, BUN 24, creatinine 0.62, glucose 115, albumin 2.3  WBC 6.8, hemoglobin 7.8, platelet 026     History of present illness  81years old female who recently had L1 kyphoplasty presented to the ER with a chief complaint of back pain.  She had a kyphoplasty about a month ago for compression fracture L1.  Her pain got very severe.  She has been also having dizziness and she had a near syncope about 10 days ago.  No chest pain no palpitation.   On admission her ECG showed A. fib with RVR heart rate 159.  She was started on Cardizem infusion.  She developed hypotension and dose of Cardizem infusion was reduced to 5 mg/h.  Later on it was discontinued and started on carvedilol 6.25 mg twice a day.  Heart rate improved significantly    Current evaluation   Patient seen and examined   Medications and labs checked  Patient had a CT-guided T12-L1 disc aspiration, 2 mL of bloody fluid on 6/4/2021  Was resting on chair  Continue to have back pain particularly when she moves  He had Fleet enema yesterday only small bowel movement  Patient was on oxygen by nasal cannula, oxygen saturation 98%  ECG monitor A. fib heart rate 120    Drop in hemoglobin to 7.8  INR 1.5      Medications:   Scheduled Meds:   warfarin  2 mg Oral Once    enoxaparin  1 mg/kg Subcutaneous BID    sodium chloride flush  10 mL Intravenous 2 times per day    Vitamin D  2,000 Units Oral Daily    calcium carbonate  500 mg Oral BID    cefTRIAXone (ROCEPHIN) IV  2,000 mg Intravenous Q24H    famotidine  10 mg Oral Nightly    predniSONE  10 mg Oral Daily    nystatin  5 mL Oral 4x Daily    hydrocortisone-aloe   Topical BID    warfarin (COUMADIN) daily dosing (placeholder)   Other RX Placeholder    polyethylene glycol  17 g Oral Daily    sodium chloride flush  5-40 mL Intravenous 2 times per day    carvedilol  6.25 mg Oral BID    atorvastatin  40 mg Oral Daily    ferrous sulfate  300 mg Oral Daily    pantoprazole  40 mg Oral QAM AC    oxybutynin  15 mg Oral Daily    rOPINIRole  0.5 mg Oral 4x Daily    sertraline  50 mg Oral Daily    amiodarone  100 mg Oral BID     Continuous Infusions:   sodium chloride       CBC:   Recent Labs     06/04/21 0511 06/05/21 0447 06/06/21 0451   WBC 8.4 7.7 6.8   HGB 8.9* 8.0* 7.8*    252 241     BMP:    Recent Labs     06/04/21  0511 06/05/21 0447 06/06/21 0451    141 139   K 4.4 4.2 4.3    107 106   CO2 24 29 28   BUN 20 22 24*   CREATININE 0.71 0.69 0.62   GLUCOSE 105* 134* 115*     Hepatic:   Recent Labs     06/04/21 0511 06/05/21 0447 06/06/21 0451   AST 18 17 20   ALT 32 28 23   BILITOT 0.40 0.35 0.32   ALKPHOS 99 95 90     Troponin: No results for input(s): TROPONINI in the last 72 hours. BNP: No results for input(s): BNP in the last 72 hours. Lipids: No results for input(s): CHOL, HDL in the last 72 hours. Invalid input(s): LDLCALCU  INR:   Recent Labs     06/04/21 0511 06/05/21 0447 06/06/21 0451   INR 1.5 1.2 1.5       Objective:   Vitals: /77   Pulse 100   Temp 98 °F (36.7 °C) (Oral)   Resp 18   Ht 5' 5.15\" (1.655 m)   Wt 165 lb 5.5 oz (75 kg)   SpO2 99%   BMI 27.39 kg/m²   General appearance: alert and cooperative with exam  HEENT: Head: Normal, normocephalic, atraumatic. Neck: Neck is supple neck veins difficult to assess  Lungs: Poor chest expansion bilateral crackles at bases  Heart: irregularly irregular rhythm  Abdomen: Obese soft bowel sounds present  Extremities: Homans sign is negative, no sign of DVT  Neurologic: Mental status: Alert, oriented, thought content appropriate    EKG: A. fib with RVR. Heart rate 115-120  ECHO: reviewed.    Ejection fraction: 55%, mild to moderate AR    Assessment / Acute Cardiac Problems:     A. fib with RVR heart rate control improved  Patient admitted with a severe back pain, recent kyphoplasty  Diastolic CHF  COPD, hypoxia on oxygen  Anemia  Severe protein malnutrition    6/6/2000 21 drop in hemoglobin to 7.8 continue to have A. fib with RVR        Patient Active Problem List:     Hypertension     Nephrolithiasis     Mixed hyperlipidemia     Osteoarthritis     Peritoneal mesothelioma ([facility] [address]     Ovarian cyst     Basal cell carcinoma of nose     Bimalleolar ankle fracture     Epistaxis, recurrent     Cervical spondylosis     Trochanteric bursitis, right hip     Status post bilateral total hip replacement     Medication monitoring encounter     Chronic diastolic heart failure (HCC)     Chronic respiratory failure (HCC)     Giant cell arteritis (HCC)     Paroxysmal atrial fibrillation (HCC)     Urinary frequency     Vitamin D deficiency     Age-related osteoporosis with current pathological fracture     Atrial fibrillation with RVR (HCC)     Gastroesophageal reflux disease without esophagitis     Pulmonary hypertension (HCC)     Lumbosacral spondylosis without myelopathy     YOGESH (acute kidney injury) ([facility] [address]     Closed compression fracture of L1 lumbar vertebra, sequela     Acute midline low back pain with right-sided sciatica     Acute exacerbation of chronic low back pain     Iron deficiency anemia     Rectal bleeding     Elevated LFTs     Closed wedge compression fracture of fifth thoracic vertebra (HCC)     Closed wedge compression fracture of T6 vertebra (HCC)     Closed wedge compression fracture of T9 vertebra (HCC)     Closed wedge compression fracture of T10 vertebra (HCC)     Discitis of thoracolumbar region     Acute cystitis without hematuria     Positive blood culture     Lumbar radiculopathy     Elevated C-reactive protein (CRP)      Plan of Treatment:   Medication checked  Patient is on Lovenox and on warfarin  Increase carvedilol to 12.5 mg a day  Add midodrine 5 mg 3 times a day  Continue current dose of Lipitor and amiodarone    Electronically signed by Kirill Piña MD on 6/6/2021 at 8:22 AM

## 2021-06-07 NOTE — CARE COORDINATION
DISCHARGE PLANNING NOTE:    I spoke with [facility] and they do have thoracic corsets on hand. I faxed them the patients facesheet and order for thoracic corset. They will deliver it to the patients bedside at some point today. Plan is for this patient to go to SNF - LSW notified to reach out to daughter Alistair Medrano for choice. Possible Osteo - may need IV antibiotics.     Electronically signed by Clarice Norton RN on 6/7/2021 at 11:10 AM

## 2021-06-07 NOTE — PROGRESS NOTES
[facility] -   Infectious diseases evaluation  admission date 5/19/2021    reason for consultation:   Osteomyelitis/discitis    Impression :   Current:  · Back pain with T12-L1 finding on MRI concerning for fracture versus discitis/osteomyelitis status post IR guided aspiration 6/4/2021 no growth on cultures today  · UTI  · Atrial fibrillation on chronic anticoagulation  · Chronic diastolic heart failure  · Hypertension  · Hyperlipidemia  · Compression fracture of L1 status post kyphoplasty 4/15/2021  · Coronary artery disease  · History of ESBL producing E. coli bacteriuria  · History of nephrolithiasis      Recommendations     · Continue IV ceftriaxone 2 g daily until 7/21/2021  · PICC line  · Follow final cultures and adjust antibiotics as needed  · Follow CBC, BUN/creatinine and liver enzymes weekly while on IV antibiotics   · Follow C-reactive protein and sedimentation rate    History of Present Illness:   Initial history:  Emmanuel Wren is a 81y.o.-year-old female was initially admitted to [facility] 5/18/2021 for A. fib with rapid vascular response, hospital course was complicated by bloody stool, GI consulted had EGD and colonoscopy done then was discharged to acute rehab. She developed worsening lower back pain for several days , severe, sharp, constant radiates to the thighs, aggravated by movement, no alleviating factors. She denied lower extremity weakness, no bowel or urinary incontinence.     Urinalysis 6/1/2021 showed 10-20 WBC, small leukocyte esterase  6/2/2021 thoracic and lumbar spine MRI reviewed suggestive of T12 marrow edema and T12-L1 disc space edema concerning for discitis/osteomyelitis or fracture  The patient was readmitted to [facility].  She received lumbar steroid epidural injection on 5/24/2021  History L1 compression fracture status post kyphoplasty of L1 on 4/15/2021    Interval changes  6/7/2021   A CT-guided aspiration biopsy was done 6/4/2021 with no growth on culture to date. The patient is up to the chair, still complaining of back pain, bilateral lower extremity edema denied fever or chills. She was evaluated by cardiology for A. fib with rapid ventricular response, IV Lasix was added  Urine culture 6/1/2021 grew E. coli that was pansensitive and  Klebsiella pneumonia that was resistant to ampicillin, sensitive to cefazolin  Patient Vitals for the past 8 hrs:   BP Temp Temp src Pulse Resp SpO2   06/07/21 0730 108/66 97.9 °F (36.6 °C) Oral 102 16 94 %             I have personally reviewed the past medical history, past surgical history, medications, social history, and family history, and I haveupdated the database accordingly. Allergies:   Adhesive tape, Meperidine, Demerol, Ultram [tramadol hcl], Zocor [simvastatin], Codeine, and Fentanyl     Review of Systems:     Review of Systems  As per history present illness, other than above 12 system review was negative. Physical Examination :       Physical Exam  Constitutional:       General: She is not in acute distress. HENT:      Head: Normocephalic and atraumatic. Right Ear: External ear normal.      Left Ear: External ear normal.      Mouth/Throat:      Pharynx: Oropharynx is clear. No oropharyngeal exudate. Eyes:      General: No scleral icterus. Conjunctiva/sclera: Conjunctivae normal.   Cardiovascular:      Rate and Rhythm: Rhythm irregular. Heart sounds: No murmur heard. Pulmonary:      Effort: Pulmonary effort is normal. No respiratory distress. Breath sounds: No wheezing. Abdominal:      General: Abdomen is flat. There is no distension. Palpations: Abdomen is soft. Tenderness: There is no abdominal tenderness. Musculoskeletal:      Cervical back: Neck supple. No rigidity. Right lower leg: No edema. Left lower leg: No edema. Skin:     General: Skin is warm.       Coloration: Skin is not jaundiced. Findings: No bruising. Neurological:      General: No focal deficit present. Mental Status: She is alert and oriented to person, place, and time. Past Medical History:     Past Medical History:   Diagnosis Date    Asbestos exposure     Atrial fibrillation ([address]     takes xarelto    Basal cell carcinoma of nose     CAD (coronary artery disease)     CKD (chronic kidney disease) stage 2, GFR 60-89 ml/min     Hyperlipidemia     Hypertension     MDRO (multiple drug resistant organisms) resistance 2/19/2014    E.  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    treated at [facility] PONV (postoperative nausea and vomiting)     Prolonged emergence from general anesthesia     SOB (shortness of breath) on exertion        Past Surgical  History:     Past Surgical History:   Procedure Laterality Date    ANKLE FRACTURE SURGERY Right 2013    APPENDECTOMY  1964    BIOPSY / LIGATION TEMPORAL ARTERY Bilateral 2/12/2021    TEMPORAL ARTERY BIOPSY performed by Landon Ríos MD at [address] [place] [address] Drive Right    [address]  2002    CATARACT REMOVAL Bilateral     CHOLECYSTECTOMY  2005    COLONOSCOPY      COLONOSCOPY N/A 5/28/2021    COLONOSCOPY POLYPECTOMY SNARE/COLD BIOPSY performed by Amador Villanueva MD at [facility]  April 2002    CT NEEDLE BIOPSY SPINE  6/4/2021    CT NEEDLE BIOPSY SPINE 6/4/2021 [facility] CT SCAN    HYSTERECTOMY  1981    LIVER BIOPSY  2000    LIVER BIOPSY      NASAL ENDOSCOPY Bilateral 11/25/2014    lt nasal cautery with packing    PHOTODYNAMIC THERAPY  April 2000 & Jan. 2002    for peritoneal Mesothelioma    [address]    REVISION TOTAL HIP ARTHROPLASTY Right 1998    ROTATOR CUFF REPAIR  2006    SKIN CANCER EXCISION Bilateral 2006    ankle    SKIN CANCER EXCISION Left Oct. 2014    shoulder    SPINE SURGERY N/A 4/15/2021    KYPHOPLASTY L1 performed by Truman Hunter MD at [address] [place] [address] ARTHROPLASTY Left 2003    TUBAL LIGATION  1969    UPPER GASTROINTESTINAL ENDOSCOPY      UPPER GASTROINTESTINAL ENDOSCOPY N/A 5/28/2021    EGD ESOPHAGOGASTRODUODENOSCOPY performed by Lisa Johnson MD at [facility]         Medications:      warfarin  2 mg Oral Once    furosemide  20 mg Intravenous BID    midodrine  10 mg Oral TID WC    enoxaparin  1 mg/kg Subcutaneous BID    sodium chloride flush  10 mL Intravenous 2 times per day    Vitamin D  2,000 Units Oral Daily    calcium carbonate  500 mg Oral BID    cefTRIAXone (ROCEPHIN) IV  2,000 mg Intravenous Q24H    famotidine  10 mg Oral Nightly    predniSONE  10 mg Oral Daily    nystatin  5 mL Oral 4x Daily    hydrocortisone-aloe   Topical BID    warfarin (COUMADIN) daily dosing (placeholder)   Other RX Placeholder    polyethylene glycol  17 g Oral Daily    sodium chloride flush  5-40 mL Intravenous 2 times per day    carvedilol  6.25 mg Oral BID    atorvastatin  40 mg Oral Daily    ferrous sulfate  300 mg Oral Daily    pantoprazole  40 mg Oral QAM AC    oxybutynin  15 mg Oral Daily    rOPINIRole  0.5 mg Oral 4x Daily    sertraline  50 mg Oral Daily    amiodarone  100 mg Oral BID       Social History:     Social History     Socioeconomic History    Marital status:      Spouse name: Not on file    Number of children: Not on file    Years of education: Not on file    Highest education level: Not on file   Occupational History    Not on file   Tobacco Use    Smoking status: Never Smoker    Smokeless tobacco: Never Used   Vaping Use    Vaping Use: Never used   Substance and Sexual Activity    Alcohol use: Yes     Comment: social    Drug use: No    Sexual activity: Not Currently   Other Topics Concern    Not on file   Social History Narrative    Not on file     Social Determinants of Health     Financial Resource Strain:     Difficulty of Paying Living Expenses:    Food Insecurity:     Worried About Running Out of Food in the Last Year:     [address] in the Last Year:    Transportation Needs:     Lack of Transportation (Medical):      Lack of Transportation (Non-Medical):    Physical Activity:     Days of Exercise per Week:     Minutes of Exercise per Session:    Stress:     Feeling of Stress :    Social Connections:     Frequency of Communication with Friends and Family:     Frequency of Social Gatherings with Friends and Family:     Attends [employer] Services:     Active Member of Clubs or Organizations:     Attends Club or Organization Meetings:     Marital Status:    Intimate Partner Violence:     Fear of Current or Ex-Partner:     Emotionally Abused:     Physically Abused:     Sexually Abused:        Family History:     Family History   Problem Relation Age of Onset    Heart Failure Brother     Heart Attack Brother         triple bypass    Prostate Cancer Brother     Coronary Art Dis Mother     Breast Cancer Mother         dx'd in late 46s   Reinaldo Dickerson Emphysema Father     Colon Cancer Neg Hx     Diabetes Neg Hx     Eclampsia Neg Hx     Hypertension Neg Hx     Ovarian Cancer Neg Hx      Labor Neg Hx     Spont Abortions Neg Hx     Stroke Neg Hx       Medical Decision Making:   I have independently reviewed/ordered the following labs:    CBC with Differential:   Recent Labs     211 21   WBC 6.8 8.6   HGB 7.8* 8.6*   HCT 24.1* 27.0*    262   LYMPHOPCT 7* 6*   MONOPCT 9* 9*     BMP:  Recent Labs     211 21  045    137   K 4.3 4.4    103   CO2 28 26   BUN 24* 26*   CREATININE 0.62 0.69     Hepatic Function Panel:   Recent Labs     211 21  0459   PROT 4.5* 4.9*   LABALBU 2. 3* 2.3*   BILITOT 0.32 0.42   ALKPHOS 90 98   ALT 23 27   AST 20 20     No results for input(s): RPR in the last 72 hours. No results for input(s): HIV in the last 72 hours. No results for input(s): BC in the last 72 hours. Lab Results   Component Value Date    CREATININE 0.69 06/07/2021    GLUCOSE 128 06/07/2021    GLUCOSE 135 02/08/2012       Detailed results: Thank you for allowing us to participate in the care of this patient. Please call with questions. This note is created with the assistance of a speech recognition program.  While intending to generate adocument that actually reflects the content of the visit, the document can still have some errors including those of syntax and sound a like substitutions which may escape proof reading. It such instances, actual meaningcan be extrapolated by contextual diversion.     Gely Reid MD  Office: [phone]  Perfect serve / office [phone]

## 2021-06-07 NOTE — PLAN OF CARE
Problem: Falls - Risk of:  Goal: Will remain free from falls  Description: Will remain free from falls  6/7/2021 1937 by Russell Bansal RN  Outcome: Met This Shift  Note: No falls this shift. Pt used call light appropriately. Thoracic corset obtained and fitted. 6/7/2021 1921 by Russell Bansal RN  Outcome: Met This Shift  Note: No falls this shift. Thoracic brace obtained, fitted and therapy assisted with fitting. Pt lying in bed majority of the day, brace off while in bed. Pt used call light appropriately. Problem: Skin Integrity:  Goal: Absence of new skin breakdown  Description: Absence of new skin breakdown  Outcome: Met This Shift  Note: No new skin breakdown noted this shift. Waffle mattress continues to bed.  Repositioned with staff assist.

## 2021-06-07 NOTE — PROGRESS NOTES
8.0, platelets 192, glucose 134, BUN 22, creatinine 0.69, calcium 8.2, sodium 141, potassium 4.2, alkaline phosphatase 95, ALT 20, AST 17, GFR> 60, albumin 2.4, INR 1.2. Lab work 6/6/2021  Sodium 139, potassium 4.3, BUN 24, creatinine 0.62, glucose 115, albumin 2.3  WBC 6.8, hemoglobin 7.8, platelet 368  7/3/2478  Sodium 137, potassium 4.4, BUN 26, creatinine 0.69, glucose 128, albumin 2.3,   hemoglobin 8.6, WBC 8.6, platelets 717  INR 2.0    History of present illness  81years old female who recently had L1 kyphoplasty presented to the ER with a chief complaint of back pain.  She had a kyphoplasty about a month ago for compression fracture L1.  Her pain got very severe.  She has been also having dizziness and she had a near syncope about 10 days ago.  No chest pain no palpitation.   On admission her ECG showed A. fib with RVR heart rate 159.  She was started on Cardizem infusion.  She developed hypotension and dose of Cardizem infusion was reduced to 5 mg/h.  Later on it was discontinued and started on carvedilol 6.25 mg twice a day.  Heart rate improved significantly     Current evaluation   Patient seen and examined   Medications and labs checked   She was resting on chair  She is able to stand with the help  Continue to have back pain on movement  Complaining of increasing swelling over the legs  No chest pain no palpitation   No bowel movement today    INR 2.0  Medications:   Scheduled Meds:   warfarin  2 mg Oral Once    midodrine  5 mg Oral TID     enoxaparin  1 mg/kg Subcutaneous BID    sodium chloride flush  10 mL Intravenous 2 times per day    Vitamin D  2,000 Units Oral Daily    calcium carbonate  500 mg Oral BID    cefTRIAXone (ROCEPHIN) IV  2,000 mg Intravenous Q24H    famotidine  10 mg Oral Nightly    predniSONE  10 mg Oral Daily    nystatin  5 mL Oral 4x Daily    hydrocortisone-aloe   Topical BID    warfarin (COUMADIN) daily dosing (placeholder)   Other RX Placeholder    polyethylene glycol  17 g Oral Daily    sodium chloride flush  5-40 mL Intravenous 2 times per day    carvedilol  6.25 mg Oral BID    atorvastatin  40 mg Oral Daily    ferrous sulfate  300 mg Oral Daily    pantoprazole  40 mg Oral QAM AC    oxybutynin  15 mg Oral Daily    rOPINIRole  0.5 mg Oral 4x Daily    sertraline  50 mg Oral Daily    amiodarone  100 mg Oral BID     Continuous Infusions:   sodium chloride       CBC:   Recent Labs     06/05/21 0447 06/06/21 0451 06/07/21 0459   WBC 7.7 6.8 8.6   HGB 8.0* 7.8* 8.6*    241 262     BMP:    Recent Labs     06/05/21 0447 06/06/21 0451 06/07/21 0459    139 137   K 4.2 4.3 4.4    106 103   CO2 29 28 26   BUN 22 24* 26*   CREATININE 0.69 0.62 0.69   GLUCOSE 134* 115* 128*     Hepatic:   Recent Labs     06/05/21 0447 06/06/21 0451 06/07/21 0459   AST 17 20 20   ALT 28 23 27   BILITOT 0.35 0.32 0.42   ALKPHOS 95 90 98     Troponin: No results for input(s): TROPONINI in the last 72 hours. BNP: No results for input(s): BNP in the last 72 hours. Lipids: No results for input(s): CHOL, HDL in the last 72 hours. Invalid input(s): LDLCALCU  INR:   Recent Labs     06/05/21 0447 06/06/21 0451 06/07/21 0459   INR 1.2 1.5 2.0       Objective:   Vitals: /66   Pulse 102   Temp 97.9 °F (36.6 °C) (Oral)   Resp 16   Ht 5' 5.15\" (1.655 m)   Wt 165 lb 5.5 oz (75 kg)   SpO2 94%   BMI 27.39 kg/m²   General appearance: Pale and tired looking female  HEENT: Facial edema noted  Neck: Neck veins difficult to assess  Lungs: This expansion is poor, breath sounds diminished bases, bilateral crackles  Heart: irregularly irregular rhythm  Abdomen: Obese bowel sounds present  Extremities: 2+ edema  Neurologic: Mental status: Alert, oriented, thought content appropriate    EKG: atrial fibrillation, rate   ECHO: reviewed.    Ejection fraction: 55%, mild to moderate aortic regurgitation    Assessment / Acute Cardiac Problems:   A. fib with RVR heart rate control improved  Patient admitted with a severe back pain, recent kyphoplasty  Diastolic CHF  COPD, hypoxia on oxygen  Anemia  Severe protein malnutrition    6/7/2021  Continue to have back pain, increasing swelling over the legs, no bowel movement    Patient Active Problem List:     Hypertension     Nephrolithiasis     Mixed hyperlipidemia     Osteoarthritis     Peritoneal mesothelioma ([facility]     Ovarian cyst     Basal cell carcinoma of nose     Bimalleolar ankle fracture     Epistaxis, recurrent     Cervical spondylosis     Trochanteric bursitis, right hip     Status post bilateral total hip replacement     Medication monitoring encounter     Chronic diastolic heart failure (HCC)     Chronic respiratory failure (HCC)     Giant cell arteritis (HCC)     Paroxysmal atrial fibrillation (HCC)     Urinary frequency     Vitamin D deficiency     Age-related osteoporosis with current pathological fracture     Atrial fibrillation with RVR (HCC)     Gastroesophageal reflux disease without esophagitis     Pulmonary hypertension (HCC)     Lumbosacral spondylosis without myelopathy     YOGESH (acute kidney injury) ([facility]     Closed compression fracture of L1 lumbar vertebra, sequela     Acute midline low back pain with right-sided sciatica     Acute exacerbation of chronic low back pain     Iron deficiency anemia     Rectal bleeding     Elevated LFTs     Closed wedge compression fracture of fifth thoracic vertebra (HCC)     Closed wedge compression fracture of T6 vertebra (HCC)     Closed wedge compression fracture of T9 vertebra (HCC)     Closed wedge compression fracture of T10 vertebra (HCC)     Discitis of thoracolumbar region     Acute cystitis without hematuria     Positive blood culture     Lumbar radiculopathy     Elevated C-reactive protein (CRP)      Plan of Treatment:   Medication checked  Add Lasix 20 mg IV twice a day  Increase midodrine to 10 mg 3 times a day  Continue Coreg 6.25 mg twice a day  Okay to stop Lovenox once INR is above 2.5    Electronically signed by Lynda Phipps MD on 6/7/2021 at 11:33 AM

## 2021-06-07 NOTE — PROGRESS NOTES
[facility]                 PATIENT NAME: Zoe Butler     TODAY'S DATE: 6/7/2021, 12:52 PM    SUBJECTIVE:    Pt seen and examined. Afebrile, VSS. No leukocytosis, hemoglobin stable. Patient states back pain is about the same. Patient also c/o two tongue ulcers that have been bothering her while she eats. She does use nystatin swish and spit [id number]. Denies abdominal pain. Had a small bowel movement over weekend, passing flatus regularly. Tolerating diet, no N/V.      OBJECTIVE:   VITALS:  /66   Pulse 102   Temp 97.9 °F (36.6 °C) (Oral)   Resp 16   Ht 5' 5.15\" (1.655 m)   Wt 165 lb 5.5 oz (75 kg)   SpO2 94%   BMI 27.39 kg/m²      INTAKE/OUTPUT:      Intake/Output Summary (Last 24 hours) at 6/7/2021 1252  Last data filed at 6/7/2021 0900  Gross per 24 hour   Intake 550 ml   Output 700 ml   Net -150 ml                 CONSTITUTIONAL:  awake and alert.   No acute distress  HEART:   RRR  LUNGS:   Decreased air entry at bases, no wheezing   ABDOMEN:   Abdomen soft, non-tender, non-distended  EXTREMITIES:   Pedal edema b/l    Data:  CBC:   Lab Results   Component Value Date    WBC 8.6 06/07/2021    RBC 2.97 06/07/2021    RBC 4.17 02/08/2012    HGB 8.6 06/07/2021    HCT 27.0 06/07/2021    MCV 91.2 06/07/2021    MCH 28.9 06/07/2021    MCHC 31.7 06/07/2021    RDW 17.8 06/07/2021     06/07/2021     02/08/2012    MPV 8.3 06/07/2021     BMP:    Lab Results   Component Value Date     06/07/2021    K 4.4 06/07/2021     06/07/2021    CO2 26 06/07/2021    BUN 26 06/07/2021    LABALBU 2.3 06/07/2021    LABALBU 4.1 01/11/2012    CREATININE 0.69 06/07/2021    CALCIUM 8.4 06/07/2021    GFRAA >60 06/07/2021    LABGLOM >60 06/07/2021    GLUCOSE 128 06/07/2021    GLUCOSE 135 02/08/2012       Radiology Review:  No new images to review      ASSESSMENT     Principal Problem:    Atrial fibrillation with RVR ([address]  Active Problems:    Hypertension    Mixed hyperlipidemia    Chronic diastolic heart failure (HCC)    Chronic respiratory failure (HCC)    Gastroesophageal reflux disease without esophagitis    Pulmonary hypertension (HCC)    YOGESH (acute kidney injury) (HCC)    Closed compression fracture of L1 lumbar vertebra, sequela    Acute midline low back pain with right-sided sciatica    Acute exacerbation of chronic low back pain    Iron deficiency anemia    Rectal bleeding    Elevated LFTs    Closed wedge compression fracture of fifth thoracic vertebra (HCC)    Closed wedge compression fracture of T6 vertebra (HCC)    Closed wedge compression fracture of T9 vertebra (HCC)    Closed wedge compression fracture of T10 vertebra (HCC)    Discitis of thoracolumbar region    Acute cystitis without hematuria    Positive blood culture    Lumbar radiculopathy    Elevated C-reactive protein (CRP)  Resolved Problems:    * No resolved hospital problems. *      Plan  1. Diet as tolerated  2. Stool softeners  3. Surgically stable  4. Continue medical management   5. Awaiting biopsy pathology results  6. Discharge planning   7. No acute general surgical issues. Continue medical management. Await acute rehab consultation.       Electronically signed by Beto Del Castillo PA-C  [address] [address]

## 2021-06-07 NOTE — PROGRESS NOTES
Physician Progress Note      Reynaldo WALKER #:                  [id number]  :                       1935  ADMIT DATE:       2021 9:49 AM  DISCH DATE:  RESPONDING  PROVIDER #:        Eligio Meyer MD          QUERY TEXT:    Pt admitted with atrial fibrillation and back pain from lumbar fracture s/p   kyphoplasty. Pt noted to have undergone epidural spinal injection on  and   now has evidence of discitis/osteomyelitis. If possible, please document in   progress notes and discharge summary:    The medical record reflects the following:  Risk Factors: chronic steroids, advanced age of 81, epidural spinal injection   at L3-4 of Triamcinolone on   Clinical Indicators:  6/2 T spine MRI: Marrow edema at T12 with edema in the   T12-L1 intervertebral disc space and  edema in the L1 vertebral body. This could be related to trauma/fracture, but   discitis/osteomyelitis cannot be excluded; These findings were not present   on thoracic MRI on admission   Treatment: ID consult, 6 weeks IV antibiotics, Disc space aspiration of T12-L1   on  with pending culture  Options provided:  -- Thoracolumbar discitis as a postprocedural complication of epidural spinal   injection  -- Thoracolumbar discitis related to other incidental risk factor and not a   postprocedural complication, Please document incidental risk factor.   -- Other - I will add my own diagnosis  -- Disagree - Not applicable / Not valid  -- Disagree - Clinically unable to determine / Unknown  -- Refer to Clinical Documentation Reviewer    PROVIDER RESPONSE TEXT:    Thoracolumbar discitis is related to other incidental risk factor and not a   postprocedural complication chronic prednisone use    Query created by: Sherri Worthington on 2021 9:24 AM      Electronically signed by:  Eligio Meyer MD 2021 11:33 AM

## 2021-06-07 NOTE — PROGRESS NOTES
Progress Note    6/7/2021   3:23 PM    Name:  Serena Cortez  MRN:    [id number]     Acct:     [de-identified]   Room:  2115/2115-01  IP Day: 23     Admit Date: 5/19/2021  9:49 AM  PCP: Tahmina Bhakta DO    Subjective:     C/C:   Chief Complaint   Patient presents with    Back Pain       Interval History: Status: not changed. Patient thoracic pain is well controlled with pain meds. Denies chest pain shortness of breath vomiting or tarry stools. Vital signs reviewed and stable. Vital signs reviewed hemoglobin 8.6. Repeat blood culture from 6/5/2021 no growth for 2 days    ROS:   all 10 systems reviewed and are negative except as noted    Review of Systems   Constitutional: Negative for chills and fatigue. HENT: Negative for drooling, mouth sores, sneezing and trouble swallowing. Eyes: Negative for redness and itching. Respiratory: Negative for cough, chest tightness and shortness of breath. Cardiovascular: Negative for chest pain, palpitations and leg swelling. Gastrointestinal: Negative for abdominal pain, blood in stool, nausea and vomiting. Endocrine: Negative for heat intolerance and polyphagia. Genitourinary: Negative for difficulty urinating, flank pain and pelvic pain. Musculoskeletal: Positive for back pain (Thoracic). Negative for arthralgias, joint swelling and neck stiffness. Skin: Negative for color change and pallor. Allergic/Immunologic: Negative for food allergies. Neurological: Negative for dizziness, seizures and headaches. Hematological: Does not bruise/bleed easily. Psychiatric/Behavioral: Negative for agitation, behavioral problems and suicidal ideas. The patient is not hyperactive. Medications: Allergies:    Allergies   Allergen Reactions    Adhesive Tape Rash    Meperidine Nausea Only    Demerol Nausea Only    Ultram [Tramadol Hcl]      Hot flashes    Zocor [Simvastatin]      Muscle aches    Codeine      \"out of it\"    Fentanyl Rash Current Meds: warfarin (COUMADIN) tablet 2 mg, Once  furosemide (LASIX) injection 20 mg, BID  midodrine (PROAMATINE) tablet 10 mg, TID WC  magic (miracle) mouthwash with nystatin, TID PRN  sodium chloride flush 0.9 % injection 10 mL, 2 times per day  sodium chloride flush 0.9 % injection 10 mL, PRN  Vitamin D (CHOLECALCIFEROL) tablet 2,000 Units, Daily  calcium carbonate (TUMS) chewable tablet 500 mg, BID  cefTRIAXone (ROCEPHIN) 2000 mg IVPB in D5W 50ml minibag, Q24H  famotidine (PEPCID) tablet 10 mg, Nightly  predniSONE (DELTASONE) tablet 10 mg, Daily  nystatin (MYCOSTATIN) 191097 UNIT/ML suspension 500,000 Units, 4x Daily  hydrocortisone-aloe 1 % cream, BID  potassium chloride (KLOR-CON M) extended release tablet 40 mEq, PRN   Or  potassium bicarb-citric acid (EFFER-K) effervescent tablet 40 mEq, PRN   Or  potassium chloride 10 mEq/100 mL IVPB (Peripheral Line), PRN  dextrose 50 % IV solution, PRN  oxyCODONE-acetaminophen (PERCOCET) 5-325 MG per tablet 1 tablet, Q4H PRN  warfarin (COUMADIN) daily dosing (placeholder), RX Placeholder  baclofen (LIORESAL) tablet 5 mg, TID PRN  polyethylene glycol (GLYCOLAX) packet 17 g, Daily  perflutren lipid microspheres (DEFINITY) injection 2.2 mg, ONCE PRN  sodium chloride flush 0.9 % injection 5-40 mL, 2 times per day  sodium chloride flush 0.9 % injection 5-40 mL, PRN  0.9 % sodium chloride infusion, PRN  acetaminophen (TYLENOL) tablet 650 mg, Q4H PRN  carvedilol (COREG) tablet 6.25 mg, BID  naloxone (NARCAN) injection 0.4 mg, PRN  atorvastatin (LIPITOR) tablet 40 mg, Daily  ferrous sulfate 300 (60 Fe) MG/5ML syrup 300 mg, Daily  pantoprazole (PROTONIX) tablet 40 mg, QAM AC  oxybutynin (DITROPAN-XL) extended release tablet 15 mg, Daily  polyethylene glycol (GLYCOLAX) packet 17 g, Daily PRN  rOPINIRole (REQUIP) tablet 0.5 mg, 4x Daily  sertraline (ZOLOFT) tablet 50 mg, Daily  amiodarone (CORDARONE) tablet 100 mg, BID        Data:     Code Status:  Full Code    Family History   Problem Relation Age of Onset    Heart Failure Brother     Heart Attack Brother         triple bypass    Prostate Cancer Brother     Coronary Art Dis Mother     Breast Cancer Mother         dx'd in late 46s   Reinaldo Dickerson Emphysema Father     Colon Cancer Neg Hx     Diabetes Neg Hx     Eclampsia Neg Hx     Hypertension Neg Hx     Ovarian Cancer Neg Hx      Labor Neg Hx     Spont Abortions Neg Hx     Stroke Neg Hx        Social History     Socioeconomic History    Marital status:      Spouse name: Not on file    Number of children: Not on file    Years of education: Not on file    Highest education level: Not on file   Occupational History    Not on file   Tobacco Use    Smoking status: Never Smoker    Smokeless tobacco: Never Used   Vaping Use    Vaping Use: Never used   Substance and Sexual Activity    Alcohol use: Yes     Comment: social    Drug use: No    Sexual activity: Not Currently   Other Topics Concern    Not on file   Social History Narrative    Not on file     Social Determinants of Health     Financial Resource Strain:     Difficulty of Paying Living Expenses:    Food Insecurity:     Worried About Running Out of Food in the Last Year:     Ran Out of Food in the Last Year:    Transportation Needs:     Lack of Transportation (Medical):  Lack of Transportation (Non-Medical):    Physical Activity:     Days of Exercise per Week:     Minutes of Exercise per Session:    Stress:     Feeling of Stress :    Social Connections:     Frequency of Communication with Friends and Family:     Frequency of Social Gatherings with Friends and Family:     Attends Jain Services:     Active Member of Clubs or Organizations:     Attends Club or Organization Meetings:     Marital Status:    Intimate Partner Violence:     Fear of Current or Ex-Partner:     Emotionally Abused:     Physically Abused:     Sexually Abused:        I/O (24Hr):     Intake/Output Summary (Last 24 hours) at 6/7/2021 1523  Last data filed at 6/7/2021 0900  Gross per 24 hour   Intake 310 ml   Output 700 ml   Net -390 ml     Radiology:  XR THORACIC SPINE (3 VIEWS)    Result Date: 6/1/2021  Compression fracture of T5, T6, T9, T10 vertebral bodies with 30% loss of normal height at T6. Stable compression fracture of L1 body with previous kyphoplasty Multilevel spondylosis and degenerative disc disease. Facet arthropathy     XR LUMBAR SPINE (2-3 VIEWS)    Result Date: 6/1/2021  Compression fracture of T5, T6, T9, T10 vertebral bodies with 30% loss of normal height at T6. Stable compression fracture of L1 body with previous kyphoplasty Multilevel spondylosis and degenerative disc disease. Facet arthropathy     MRI THORACIC SPINE WO CONTRAST    Result Date: 6/2/2021  1. Marrow edema at T12 with edema in the T12-L1 intervertebral disc space and edema in the L1 vertebral body. This could be related to trauma/fracture, but discitis/osteomyelitis cannot be excluded. Post-contrast imaging may be useful for further characterization. 2. Multilevel degenerative changes of the thoracic spine. MRI LUMBAR SPINE WO CONTRAST    Result Date: 6/2/2021  Abnormal endplate edema and disc space T2 signal identified at T12-L1. Associated with abnormal signal involving the bilateral psoas muscle. Although this can be seen could be related to compression fracture, findings are worrisome underlying discitis osteomyelitis. Correlation with sedimentation rate and CRP is recommended. If indicated, contrast may be beneficial. Otherwise stable capitalize changes at L1 with 50 % loss of height and retropulsion into the canal. Moderate severe multilevel degenerate changes The findings were sent to the Radiology Results [address] at 10:17 pm on 6/2/2021to be communicated to a licensed caregiver.      IR FLUORO GUIDED CVA DEVICE PLMT/REPLACE/REMOVAL    Result Date: 6/4/2021  Successful ultrasound and fluoroscopy guided right basilic vein 5 Georgian power injectable dual-lumen PICC placement. Ready for use. CT NEEDLE BIOPSY SPINE    Result Date: 6/4/2021  Technically successful CT-guided biopsy of the T12-L1 disc space. Sample was sent as requested by the ordering service.        Labs:  Recent Results (from the past 24 hour(s))   CBC with DIFF    Collection Time: 06/07/21  4:59 AM   Result Value Ref Range    WBC 8.6 3.5 - 11.0 k/uL    RBC 2.97 (L) 4.0 - 5.2 m/uL    Hemoglobin 8.6 (L) 12.0 - 16.0 g/dL    Hematocrit 27.0 (L) 36 - 46 %    MCV 91.2 80 - 100 fL    MCH 28.9 26 - 34 pg    MCHC 31.7 31 - 37 g/dL    RDW 17.8 (H) 11.5 - 14.9 %    Platelets 359 404 - 992 k/uL    MPV 8.3 6.0 - 12.0 fL    NRBC Automated NOT REPORTED per 100 WBC    Differential Type NOT REPORTED     Seg Neutrophils 85 (H) 36 - 66 %    Lymphocytes 6 (L) 24 - 44 %    Monocytes 9 (H) 1 - 7 %    Eosinophils % 0 0 - 4 %    Basophils 0 0 - 2 %    Immature Granulocytes NOT REPORTED 0 %    Segs Absolute 7.30 1.3 - 9.1 k/uL    Absolute Lymph # 0.50 (L) 1.0 - 4.8 k/uL    Absolute Mono # 0.70 0.1 - 1.3 k/uL    Absolute Eos # 0.00 0.0 - 0.4 k/uL    Basophils Absolute 0.00 0.0 - 0.2 k/uL    Absolute Immature Granulocyte NOT REPORTED 0.00 - 0.30 k/uL    WBC Morphology NOT REPORTED     RBC Morphology NOT REPORTED     Platelet Estimate NOT REPORTED    Comprehensive Metabolic Panel w/ Reflex to MG    Collection Time: 06/07/21  4:59 AM   Result Value Ref Range    Glucose 128 (H) 70 - 99 mg/dL    BUN 26 (H) 8 - 23 mg/dL    CREATININE 0.69 0.50 - 0.90 mg/dL    Bun/Cre Ratio NOT REPORTED 9 - 20    Calcium 8.4 (L) 8.6 - 10.4 mg/dL    Sodium 137 135 - 144 mmol/L    Potassium 4.4 3.7 - 5.3 mmol/L    Chloride 103 98 - 107 mmol/L    CO2 26 20 - 31 mmol/L    Anion Gap 8 (L) 9 - 17 mmol/L    Alkaline Phosphatase 98 35 - 104 U/L    ALT 27 5 - 33 U/L    AST 20 <32 U/L    Total Bilirubin 0.42 0.3 - 1.2 mg/dL    Total Protein 4.9 (L) 6.4 - 8.3 g/dL    Albumin 2.3 (L) 3.5 - 5.2 g/dL Albumin/Globulin Ratio NOT REPORTED 1.0 - 2.5    GFR Non-African American >60 >60 mL/min    GFR African American >60 >60 mL/min    GFR Comment          GFR Staging NOT REPORTED    PROTIME-INR    Collection Time: 21  4:59 AM   Result Value Ref Range    Protime 22.1 (H) 11.8 - 14.6 sec    INR 2.0        Physical Examination:        Vitals:  /76   Pulse 102   Temp 98.4 °F (36.9 °C) (Oral)   Resp 16   Ht 5' 5.15\" (1.655 m)   Wt 165 lb 5.5 oz (75 kg)   SpO2 91%   BMI 27.39 kg/m²   Temp (24hrs), Av.2 °F (36.8 °C), Min:97.9 °F (36.6 °C), Max:98.6 °F (37 °C)    No results for input(s): POCGLU in the last 72 hours. Physical Exam  Vitals reviewed. HENT:      Head: Normocephalic. Right Ear: External ear normal.      Left Ear: External ear normal.      Nose: Nose normal.      Mouth/Throat:      Mouth: Mucous membranes are moist.      Pharynx: Oropharynx is clear. Eyes:      Conjunctiva/sclera: Conjunctivae normal.   Cardiovascular:      Rate and Rhythm: Normal rate and regular rhythm. Pulses: Normal pulses. Heart sounds: Normal heart sounds. Pulmonary:      Effort: Pulmonary effort is normal.      Breath sounds: Normal breath sounds. Abdominal:      General: Bowel sounds are normal.      Palpations: Abdomen is soft. Musculoskeletal:         General: Tenderness (thoracic) present. No deformity. Cervical back: Normal range of motion and neck supple. Skin:     General: Skin is warm. Capillary Refill: Capillary refill takes less than 2 seconds. Coloration: Skin is not jaundiced. Neurological:      General: No focal deficit present. Mental Status: She is alert. Mental status is at baseline.    Psychiatric:         Mood and Affect: Mood normal.         Behavior: Behavior normal.         Assessment:        Primary Problem  Atrial fibrillation with RVR (HCC)     Principal Problem:    Atrial fibrillation with RVR (HCC)  Active Problems:    Hypertension    Mixed hyperlipidemia    Chronic diastolic heart failure (HCC)    Chronic respiratory failure (HCC)    Gastroesophageal reflux disease without esophagitis    Pulmonary hypertension (HCC)    YOGESH (acute kidney injury) (HCC)    Closed compression fracture of L1 lumbar vertebra, sequela    Acute midline low back pain with right-sided sciatica    Acute exacerbation of chronic low back pain    Iron deficiency anemia    Rectal bleeding    Elevated LFTs    Closed wedge compression fracture of fifth thoracic vertebra (HCC)    Closed wedge compression fracture of T6 vertebra (HCC)    Closed wedge compression fracture of T9 vertebra (HCC)    Closed wedge compression fracture of T10 vertebra (HCC)    Discitis of thoracolumbar region    Acute cystitis without hematuria    Positive blood culture    Lumbar radiculopathy    Elevated C-reactive protein (CRP)  Resolved Problems:    * No resolved hospital problems. *      Past Medical History:   Diagnosis Date    Asbestos exposure     Atrial fibrillation ([address]     takes xarelto    Basal cell carcinoma of nose     CAD (coronary artery disease)     CKD (chronic kidney disease) stage 2, GFR 60-89 ml/min     Hyperlipidemia     Hypertension     MDRO (multiple drug resistant organisms) resistance 2/19/2014    E. 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 IV Rocephin 2 g daily per ID  2. IR guided spinal fluid aspirate culture pending  3. Discussed with Dr. Jun Culver with ID on the floor  4. Pain management input noted  5. 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   6. Thoracic corset with ambulation  1. Discontinue Lovenox  2. DVT Prophylaxis on Coumadin INR therapeutic  3. EPCs  4. PT/OT to evaluate and treat  5. Pain control  6. Replace electrolytes as per sliding scale  7. Home medications reviewed and appropriate medications continued  8.  Reviewed labs and imaging studies from last 24 hours and results explained to patient      Electronically signed by Vicky Bo MD

## 2021-06-07 NOTE — PROGRESS NOTES
[address]    INPATIENT OCCUPATIONAL THERAPY  PROGRESS NOTE  Date: 2021  Patient Name: Familia Bazan      Room: -  MRN: [id number]    : 1935  (81 y.o.) Gender: female     Discharge Recommendations: The patient would benefit from an intensive level of therapy after discharge from the facility. They should be able to tolerate 3-hours of Combined OT/PT/ST over 5 days/week or at least 900 minutes of  Combined Therapy over 7 days. Referring Practitioner: Susana Bennett MD  Diagnosis: Atrial fibrillation with RVR  General  Chart Reviewed: Yes, Orders, Progress Notes, History and Physical, Imaging  Patient assessed for rehabilitation services?: Yes  Family / Caregiver Present: No  Referring Practitioner: Susana Bennett MD  Diagnosis: Atrial fibrillation with RVR    Restrictions  Restrictions/Precautions: Contact Precautions, Fall Risk  Implants present? : Metal implants (Bilat STACI, cardiac stent, h/o R ankle ORIF)  Other position/activity restrictions: left leg 1/2\" shorter than the right (S/P BILATERAL THRs), O2 per NC PRN  Required Braces or Orthoses  Spinal Other: thoracic corset  Required Braces or Orthoses?: Yes (thoracic corset)      Subjective  Subjective: Pt is resting in bed and agreeable to tx  Comments: Nataliia warren tx. tx focuses on edu this date  Patient Currently in Pain: Yes  Pain Level: 7 (pain increases to 9/10 with activity)  Pain Location: Back  Pain Orientation: Upper; Lower  Overall Orientation Status: Within Functional Limits  Patient Observation  Observations: O2, 2.5L, NC  Pain Assessment  Pain Assessment: 0-10  Pain Level: 7 (pain increases to 9/10 with activity)  Pain Type: Surgical pain, Acute pain  Pain Location: Back  Pain Orientation: Upper, Lower  Pain Descriptors: Aching  Clinical Progression: Not changed  Response to Pain Intervention: Patient Satisfied    Objective  Cognition  Overall Cognitive Status: [facility]     Balance  Standing Balance: Unable to assess(comment) (pt declines)         ADL  Feeding: Setup  Grooming: Supervision  UE Bathing: Stand by assistance  LE Bathing: Moderate assistance  UE Dressing: Stand by assistance  LE Dressing: Maximum assistance  Toileting: Maximum assistance  Additional Comments: ADL scores based on skilled reasoning, clinical observations, and patient report unless otherwise noted. pt limited by increased pain with movement. pt educated on AE/DME to increase independence during ADLs, ie reacher, shower chair, toilet riser     Transfers  Transfer Comments: pt educated on log rolling tech to increase back safety and comfort during bed mobility. pt verbalizes understanding                             Assessment  Performance deficits / Impairments: Decreased functional mobility ; Decreased ADL status; Decreased strength;Decreased safe awareness;Decreased endurance;Decreased balance;Decreased high-level IADLs  Prognosis: Good  Discharge Recommendations: Patient would benefit from continued therapy after discharge  Activity Tolerance: Patient Tolerated treatment well  Safety Devices in place: Yes  Type of devices: All fall risk precautions in place; Bed alarm in place;Call light within reach;Gait belt;Patient at risk for falls; Left in bed;Nurse notified             Patient Education: OT POC, AE/DME, home safety/fall prevention, back precautions, log rolling tech     Learner:patient  Method: demonstration and explanation       Outcome: acknowledged understanding     Plan  Safety Devices  Safety Devices in place: Yes  Type of devices:  All fall risk precautions in place, Bed alarm in place, Call light within reach, Gait belt, Patient at risk for falls, Left in bed, Nurse notified  Plan  Times per week: 5-7  Times per day: Daily  Current Treatment Recommendations: Self-Care / ADL, Home Management Training, Strengthening, Balance Training, Functional Mobility Training, Endurance Training, Safety Education & Training, Patient/Caregiver Education & Training, Equipment Evaluation, Education, & procurement      Goals  Short term goals  Time Frame for Short term goals: By discharge  Short term goal 1: Pt will verbalize/demonstrate Good understanding of assistive equipment/durable medical equipment/modified techniques for increased independence with self-care and mobility. Short term goal 2: Pt will perform bed mobility with SBA to sit edge of bed unsupported for 10+ minutes while engaging in self-care. Short term goal 3: Pt will perform upper body bathing/dressing with supervision and lower body bathing/dressing with Minimal assist.  Short term goal 4: Pt will perform stand pivot transfer with Minimal assist, RW, and Good safety. Short term goal 5: Pt will actively participate in 15+ minutes of therapeutic exercise/functional activities to promote increased independence with self-care and mobility.     OT Individual Minutes  Time In: 7528  Time Out: 1700  Minutes: 25      Electronically signed by NISREEN Payne on 6/7/21 at 5:20 PM EDT

## 2021-06-07 NOTE — PLAN OF CARE
Problem: Falls - Risk of:  Goal: Will remain free from falls  Description: Will remain free from falls  6/7/2021 0417 by Trevon Fierro RN  Outcome: Ongoing  Note: Pt free of falls. Call light and bedside table within reach, bed locked and in lowest position. Problem: Pain:  Goal: Pain level will decrease  Description: Pain level will decrease  6/7/2021 0417 by Trevon iFerro RN  Outcome: Ongoing  Note: Pt received medication today to help decrease pain.       Problem: Skin Integrity:  Goal: Absence of new skin breakdown  Description: Absence of new skin breakdown  6/7/2021 0417 by Trevon Fierro RN  Outcome: Ongoing  Note: Pt absent of any new skin breakdown

## 2021-06-07 NOTE — PROGRESS NOTES
Pharmacy Note  Warfarin Consult follow-up      Recent Labs     06/05/21  0447 06/06/21  0451 06/07/21  0459   INR 1.2 1.5 2.0     Recent Labs     06/05/21  0447 06/06/21  0451 06/07/21  0459   HGB 8.0* 7.8* 8.6*   HCT 24.7* 24.1* 27.0*    241 262       Significant Drug-Drug Interactions:  New warfarin drug-drug interactions: none  Discontinued drug-drug interactions: none  Current warfarin drug-drug interactions: Ropinirole, Sertraline, Prednisone, Amiodarone, Atorvastatin, Rocephin, Enoxaparin      Date             INR        Dose given previous day  Dose scheduled for today  6/7/2021            2.0       2 mg           2 mg        Notes:                   INR within goal range of 2-3, will give 2 mg tonight, may need to consider 1 mg tomorrow due to multiple drug interactions and somewhat rapid increase in INR. Daily PT/INR while inpatient.      Kayleen Dorado RPH,PharmD,  6/7/2021, 9:28 AM

## 2021-06-07 NOTE — PROGRESS NOTES
Pain Management Inpatient Progress Note      Patient: Bradford Butler    Location: 2115/2115-01    CHIEF COMPLAINT: Mid-low Back pain    HISTORY OFPRESENT ILLNESS:    The patient is a 81 y.o. female who is being seen  for back pain by patient's physician. Has lumbar stenosis with radiculopathy    Back Pain  Associated symptoms include weakness. Pertinent negatives include no abdominal pain, chest pain or fever. Patient is an 81-year-old female who is being seen in consultation for back pain. Patient's plain x-rays showed compression fractures in the thoracic region and an MRI showed swelling at the levels of T12 and L1 levels. She previously had undergone kyphoplasty at the level of L1. MRI was also consistent with discitis. A bone biopsy was done and is negative for infection. Patient is being treated with antibiotics for possible osteomyelitis and discitis. Patient is complaining of weakness in the back and asking for a back brace. Past Medical History:        Diagnosis Date    Asbestos exposure     Atrial fibrillation ([address]     takes xarelto    Basal cell carcinoma of nose     CAD (coronary artery disease)     CKD (chronic kidney disease) stage 2, GFR 60-89 ml/min     Hyperlipidemia     Hypertension     MDRO (multiple drug resistant organisms) resistance 2/19/2014    E.  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    treated at [facility] PONV (postoperative nausea and vomiting)     Prolonged emergence from general anesthesia     SOB (shortness of breath) on exertion        Past Surgical History:        Procedure Laterality Date    ANKLE FRACTURE SURGERY Right 2013    APPENDECTOMY  1964    BIOPSY / LIGATION TEMPORAL ARTERY Bilateral 2/12/2021    TEMPORAL ARTERY BIOPSY performed by Juanjose Kang MD at [address] [facility]  BREAST BIOPSY Right     CARDIAC CATHETERIZATION  2002    CATARACT REMOVAL Bilateral     CHOLECYSTECTOMY  2005    COLONOSCOPY      COLONOSCOPY N/A 5/28/2021    COLONOSCOPY POLYPECTOMY SNARE/COLD BIOPSY performed by Jacy Donnelly MD at [address] [facility] [address]  April 2002    CT NEEDLE BIOPSY SPINE  6/4/2021    CT NEEDLE BIOPSY SPINE 6/4/2021 [facility] CT SCAN    HYSTERECTOMY  1981    LIVER BIOPSY  2000    LIVER BIOPSY      NASAL ENDOSCOPY Bilateral 11/25/2014    lt nasal cautery with packing    PHOTODYNAMIC THERAPY  April 2000 & Jan. 2002    for peritoneal Mesothelioma    [address]    REVISION TOTAL HIP ARTHROPLASTY Right 1998    ROTATOR CUFF REPAIR  2006    SKIN CANCER EXCISION Bilateral 2006    ankle    SKIN CANCER EXCISION Left Oct. 2014    shoulder    SPINE SURGERY N/A 4/15/2021    KYPHOPLASTY L1 performed by Baljinder Chase MD at [address] Left 2003    TUBAL LIGATION  1969    UPPER GASTROINTESTINAL ENDOSCOPY      UPPER GASTROINTESTINAL ENDOSCOPY N/A 5/28/2021    EGD ESOPHAGOGASTRODUODENOSCOPY performed by Jacy Donnelly MD at [address] [facility] Medications:   Prior to Admission medications    Medication Sig Start Date End Date Taking?  Authorizing Provider   vitamin E 1000 units capsule Take 1,000 Units by mouth daily    Historical Provider, MD   calcium carbonate 600 MG TABS tablet Take 1 tablet by mouth 2 times daily    Historical Provider, MD   losartan (COZAAR) 100 MG tablet Take 100 mg by mouth daily    Historical Provider, MD   meclizine (ANTIVERT) 25 MG tablet Take 25 mg by mouth daily as needed for Dizziness    Historical Provider, MD   amiodarone (CORDARONE) 200 MG tablet Take 1 tablet by mouth 2 times daily 4/23/21   Lillie Garcia MD   metoprolol tartrate (LOPRESSOR) 25 MG tablet Take 1 tablet by mouth 2 times daily 4/23/21   Vasiliy Bates Natalie Garrett MD   acetaminophen (TYLENOL) 500 MG tablet Take 500 mg by mouth every 6 hours as needed for Pain    Historical Provider, MD   isosorbide mononitrate (IMDUR) 60 MG extended release tablet Take 60 mg by mouth daily    Historical Provider, MD   rivaroxaban (XARELTO) 20 MG TABS tablet Take 20 mg by mouth daily (with breakfast)  2/22/21   Historical Provider, MD   diphenhydrAMINE-APAP, sleep, (TYLENOL PM EXTRA STRENGTH)  MG tablet Take 1 tablet by mouth nightly as needed     Historical Provider, MD   torsemide (DEMADEX) 20 MG tablet Take 1 tablet by mouth 2 times daily Take twice a day for 1 months then go back to 20 mg daily 3/4/21   Kalyn Fajardo MD   amoxicillin (AMOXIL) 500 MG capsule Take 2,000 mg by mouth as needed (dental procedures) Dental only 9/21/20   Historical Provider, MD   oxybutynin (DITROPAN XL) 15 MG extended release tablet Take 15 mg by mouth daily  7/30/20   Historical Provider, MD   polyethylene glycol (GLYCOLAX) 17 GM/SCOOP powder Take 17 g by mouth daily as needed (constipation)  7/30/20   Historical Provider, MD   Ascorbic Acid (VITAMIN C) 250 MG tablet Take 250 mg by mouth daily    Historical Provider, MD   ferrous sulfate (FLACO-IN-SOL) 75 (15 Fe) MG/ML solution Take 15 mg by mouth daily    Historical Provider, MD   omeprazole (PRILOSEC) 40 MG delayed release capsule Take 40 mg by mouth Daily  12/3/16   Historical Provider, MD   atorvastatin (LIPITOR) 40 MG tablet Take 40 mg by mouth daily  9/22/16   Historical Provider, MD   potassium chloride SA (K-DUR;KLOR-CON M) 20 MEQ tablet Take 1 tablet by mouth 2 times daily 3/10/16   Kalyn Fajardo MD   rOPINIRole (REQUIP) 0.5 MG tablet Take 0.5 mg by mouth 4 times daily as needed     Historical Provider, MD   Vitamin D (CHOLECALCIFEROL) 1000 UNITS CAPS capsule Take 1,000 Units by mouth 2 times daily    Historical Provider, MD   predniSONE (DELTASONE) 5 MG tablet Take 20 mg by mouth daily     Historical Provider, MD   Omega 3 1000 MG CAPS Take 1,000 mg by mouth daily     Historical Provider, MD   sertraline (ZOLOFT) 50 MG tablet Take 50 mg by mouth daily. Historical Provider, MD       Allergies:  Adhesive tape, Meperidine, Demerol, Ultram [tramadol hcl], Zocor [simvastatin], Codeine, and Fentanyl    Social History:  Social History     Socioeconomic History    Marital status:      Spouse name: Not on file    Number of children: Not on file    Years of education: Not on file    Highest education level: Not on file   Occupational History    Not on file   Tobacco Use    Smoking status: Never Smoker    Smokeless tobacco: Never Used   Vaping Use    Vaping Use: Never used   Substance and Sexual Activity    Alcohol use: Yes     Comment: social    Drug use: No    Sexual activity: Not Currently   Other Topics Concern    Not on file   Social History Narrative    Not on file     Social Determinants of Health     Financial Resource Strain:     Difficulty of Paying Living Expenses:    Food Insecurity:     Worried About Running Out of Food in the Last Year:     [address] in the Last Year:    Transportation Needs:     Lack of Transportation (Medical):      Lack of Transportation (Non-Medical):    Physical Activity:     Days of Exercise per Week:     Minutes of Exercise per Session:    Stress:     Feeling of Stress :    Social Connections:     Frequency of Communication with Friends and Family:     Frequency of Social Gatherings with Friends and Family:     Attends Jainism Services:     Active Member of Clubs or Organizations:     Attends Club or Organization Meetings:     Marital Status:    Intimate Partner Violence:     Fear of Current or Ex-Partner:     Emotionally Abused:     Physically Abused:     Sexually Abused:        Family History:      Problem Relation Age of Onset    Heart Failure Brother     Heart Attack Brother         triple bypass    Prostate Cancer Brother     Coronary Art Dis Mother    [facility] Patellar: normal  Achilles: normal  Biceps: normal  Babinski's sign: normal             DATA:  CBC:   Lab Results   Component Value Date    WBC 8.6 06/07/2021    RBC 2.97 06/07/2021    RBC 4.17 02/08/2012    HGB 8.6 06/07/2021    HCT 27.0 06/07/2021    MCV 91.2 06/07/2021    MCH 28.9 06/07/2021    MCHC 31.7 06/07/2021    RDW 17.8 06/07/2021     06/07/2021     02/08/2012    MPV 8.3 06/07/2021     CMP:    Lab Results   Component Value Date     06/07/2021    K 4.4 06/07/2021     06/07/2021    CO2 26 06/07/2021    BUN 26 06/07/2021    CREATININE 0.69 06/07/2021    GFRAA >60 06/07/2021    LABGLOM >60 06/07/2021    GLUCOSE 128 06/07/2021    GLUCOSE 135 02/08/2012    PROT 4.9 06/07/2021    LABALBU 2.3 06/07/2021    LABALBU 4.1 01/11/2012    CALCIUM 8.4 06/07/2021    BILITOT 0.42 06/07/2021    ALKPHOS 98 06/07/2021    AST 20 06/07/2021    ALT 27 06/07/2021       IMPRESSION  -Compression fracture of T5, T6, T9 and T10 with 30% loss of normal height at T6  - Abnormal endplate edema and disc space T2 signal identified at T12-L1 with abnormal signal involving the bilateral psoas muscle with possible discitis, osteomyelitis/ compression fractures. RECOMMENDATIONS:  - kypho-plasty canceled  due to suspicion of discitis/osteomyeleitis signals found on MRI  - ID consulted. Patient on Abx till 21/7/2021 through PICC  - Will re-evaluate after antibiotics with MRI and decide on Kypho-plasty   -Labs reviewed, vital signs reviewed. -Replace electrolytes per primary team.  -A. fib management per primary and cardiology.  -Patient on Percocet 5-3 2 5 mg every 4 hours as needed and Tylenol    Electronically signed byAbdon Fajardo MD on 6/7/2021 at 1:13 PM    Zulema Rome MD on 6/7/2021 at 1:13 PM      NAME:  Ant Ring  MRN: [id number]   YOB: 1935   Date: 6/8/2021   Age: 81 y.o.   Gender: female       Ant Ring is 81 y.o. ,female, referred because of Back Pain I Performed a history and physical examination of the patient and discussed management with the resident/ Physician Assistant/ Nurse Practitioner. I reviewed the Resident/ Physician Assistant/ Nurse Practitioner note and agree with the documented findings and plan of care. Any areas of disagreement are noted on the chart. I was present for any key portions of any procedure. I have documented in the chart those procedures where I was not present during key Portions. I agree with the chief complaint, past medical history, past surgical history, Social & family history, Allergies, medications as documented unless noted below. I have personally evaluated this patient and have completed at least one if not all key elements of the (History, physical exam and plan of care). Additional findings are added to the note above    Diagnosis:   1. Acute exacerbation of chronic low back pain    2. Lumbar radiculopathy    3. Lumbar spondylosis    4. Age-related osteoporosis with current pathological fracture, sequela    5. Paroxysmal atrial fibrillation (HCC)    6. Nontraumatic compression fracture of T5 vertebra, initial encounter ([facility] [address] [address]    7. Non-traumatic compression fracture of T6 thoracic vertebra, initial encounter ([facility] [address] [facility]    8. Non-traumatic compression fracture of T9 thoracic vertebra, initial encounter ([facility] [address]    9. Non-traumatic compression fracture of T10 thoracic vertebra, initial encounter [facility])          Plan of Care: This being followed by ID. At this time there is not much further to offer. We need to wait for the infection to be under control before planning any further future procedures. This was explained to the patient and patient understands. Decision Making Process : Patient's health history and referral records thoroughly reviewed before focused physical examination and discussion with patient. Over 50% of today's visit is spent on examining the patient and counseling.      Level of complexity of date to be reviewed is Moderate. The chart date reviewed include the following: Imaging Reports. Summary of Care. Time spent reviewing with patient the below reports:   Medication safety, Treatment options. Level of diagnosis and management options of this case is multiple: involving the following management options: Interventions as needed, medication management as appropriate, future visits, activity modification, heat/ice as needed, Urine drug screen as required. [x]The patient's questions were answered to the best of my abilities.       Vaughn Payne MD on 6/8/2021 at 6:21 AM

## 2021-06-08 PROBLEM — R53.81 DEBILITY: Status: ACTIVE | Noted: 2021-01-01

## 2021-06-08 NOTE — PROGRESS NOTES
Pharmacy Note  Warfarin Consult follow-up      Recent Labs     06/06/21  0451 06/07/21  0459 06/08/21  0507   INR 1.5 2.0 2.6     Recent Labs     06/06/21  0451 06/07/21  0459 06/08/21  0507   HGB 7.8* 8.6* 8.1*   HCT 24.1* 27.0* 25.1*    [id number]       Significant Drug-Drug Interactions:  New warfarin drug-drug interactions: none  Discontinued drug-drug interactions: Enoxaparin  Current warfarin drug-drug interactions: Ropinirole, Sertraline, Prednisone, Amiodarone, Rocephin      Date             INR        Dose given previous day  Dose scheduled for today  6/8/2021            2.6       2 mg           hold        Notes:                   INR increasing quickly, 1.5->2-->2.6, will hold dose tonight and likely resume at 1 mg tomorrow if INR appropriate. Pharmacy to follow. Daily PT/INR while inpatient.      Nicole Kimball RPH,PharmD,  6/8/2021, 8:05 AM

## 2021-06-08 NOTE — PLAN OF CARE
Problem: Falls - Risk of:  Goal: Will remain free from falls  Description: Will remain free from falls  6/8/2021 1607 by Gerald Mcpherson RN  Outcome: Ongoing  Note: The patient remained free from falls this shift, call light within reach, bed in locked and lowest position. Side rails up x2. Continue to monitor closely. Problem: Pain:  Goal: Pain level will decrease  Description: Pain level will decrease  6/8/2021 1607 by Gerald Mcpherson RN  Outcome: Ongoing  Note: Pt medicated with pain medication prn. Assessed all pain characteristics including level, type, location, frequency, and onset. Non-pharmacologic interventions offered to pt as well. Pt states pain is tolerable at this time.  Will continue to monitor

## 2021-06-08 NOTE — PROGRESS NOTES
Physical Medicine & Rehabilitation  Progress Note        Admitting Physician:  Val Cordova MD    Primary Care Provider:  Thom Orozco DO     Chief Complaint:  Back pain    Brief History: This is a follow up to the initial consult on Ms. Victorina Clancy who is a 81 y.o. right handed female admitted to [facility] on 5/19/2021 with Back Pain      She had recent L1 kyphoplasty prior to presentation. Caudal epidural steroid injection was done on 5/24/21 by Dr. Domingo Elizabeth. MRI thoracic and lumbar spine showed marrow edema at T12 with edema in the T12-L1 intervertebral disc space and edema in the L1 vertebral body, related to trauma/fracture or discitis/osteomyelitis. She underwent IR guided aspiration on 6/4/21 and culture is showing no growth at this time. ID is recommending IV ceftriaxone for 6 weeks. Subjective:  She currently reports back pain with activity as well as some dizziness with sitting or standing. She also notes constipation. She denies any numbness/tingling and any changes in bladder control. ROS:  Review of Systems   Constitutional: Negative for fever. Eyes: Negative for blurred vision and double vision. Respiratory: Negative for shortness of breath. Cardiovascular: Negative for chest pain. Gastrointestinal: Positive for constipation. Genitourinary:        No change in bladder control   Musculoskeletal: Positive for back pain. Neurological: Positive for dizziness. Negative for sensory change. Rehabilitation:   Progressing in therapies.     PT:  Restrictions/Precautions: Contact Precautions, Fall Risk  Implants present? : Metal implants (Bilat STACI, cardiac stent, h/o R ankle ORIF)  Other position/activity restrictions: left leg 1/2\" shorter than the right (S/P BILATERAL THRs), O2 per NC PRN  Required Braces or Orthoses  Spinal Other: thoracic corset   Transfers  Sit to Stand: 2 Person Assistance, Moderate Assistance  Stand to sit: Minimal Assistance  Stand Pivot Transfers: Contact guard assistance  Comment: Pt needs Mod A x2 to stand upright enough to position henrietta steady paddles. Pt can then perform sit to stand using UEs to pull on henrietta stedy frame with CGA. Pt demo's ability to stand upright in henrietat steady CGA for 3-4 mins  WB Status: left leg 1/2\" shorter than the right (S/P BILATERAL THRs)  Ambulation 1  Surface: level tile  Device: Rolling Walker  Other Apparatus: O2 (2 L)  Assistance: Contact guard assistance  Quality of Gait: Slow and steady, no LOB  Gait Deviations: Slow Manuela, Decreased step length, Decreased step height  Distance: 5' x 4 (1 rest break in between activity)  Comments: Cues to stay inside of walker    Transfers  Sit to Stand: 2 Person Assistance, Moderate Assistance  Stand to sit: Minimal Assistance  Stand Pivot Transfers: Contact guard assistance  Comment: Pt needs Mod A x2 to stand upright enough to position henrietta steady paddles. Pt can then perform sit to stand using UEs to pull on henrietta stedy frame with CGA.  Pt demo's ability to stand upright in henrietta steady CGA for 3-4 mins  Ambulation  Ambulation?: No  WB Status: left leg 1/2\" shorter than the right (S/P BILATERAL THRs)  Ambulation 1  Surface: level tile  Device: Rolling Walker  Other Apparatus: O2 (2 L)  Assistance: Contact guard assistance  Quality of Gait: Slow and steady, no LOB  Gait Deviations: Slow Manuela, Decreased step length, Decreased step height  Distance: 5' x 4 (1 rest break in between activity)  Comments: Cues to stay inside of walker    Surface: level tile  Ambulation 1  Surface: level tile  Device: Rolling Walker  Other Apparatus: O2 (2 L)  Assistance: Contact guard assistance  Quality of Gait: Slow and steady, no LOB  Gait Deviations: Slow Manuela, Decreased step length, Decreased step height  Distance: 5' x 4 (1 rest break in between activity)  Comments: Cues to stay inside of walker    OT:  ADL  Feeding: Setup  Grooming: Supervision  UE Bathing: Stand by assistance  LE Bathing: Moderate assistance  UE Dressing: Stand by assistance  LE Dressing: Maximum assistance  Toileting: Maximum assistance  Additional Comments: ADL scores based on skilled reasoning, clinical observations, and patient report unless otherwise noted. pt limited by increased pain with movement. pt educated on AE/DME to increase independence during ADLs, ie reacher, shower chair, toilet riser         Balance  Sitting Balance: Contact guard assistance  Standing Balance: Unable to assess(comment) (pt declines)   Standing Balance  Time: 30 seconds  Activity: BUE support on RW  Functional Mobility  Functional - Mobility Device: Rolling Walker  Activity:  (2 steps to head of bed)  Assist Level: Moderate assistance  Functional Mobility Comments: with Minimal verbal cues for pacing     Bed mobility  Rolling to Left: Moderate assistance  Rolling to Right: Minimal assistance  Supine to Sit: 2 Person assistance, Moderate assistance  Sit to Supine: 2 Person assistance, Maximum assistance  Scootin Person assistance  Comment: Pt needs max cues for log rolling technique to prevent twisting during bed mobility. Pt repeatedly rolls to don thoracic corset prior to sitting up  Transfers  Sit to stand: Moderate assistance  Stand to sit: Moderate assistance  Transfer Comments: pt educated on log rolling tech to increase back safety and comfort during bed mobility.  pt verbalizes understanding                 SLP:      Current Medications:   Current Facility-Administered Medications: furosemide (LASIX) injection 20 mg, 20 mg, Intravenous, BID  midodrine (PROAMATINE) tablet 10 mg, 10 mg, Oral, TID WC  magic (miracle) mouthwash with nystatin, 5 mL, Swish & Spit, TID PRN  sodium chloride flush 0.9 % injection 10 mL, 10 mL, Intravenous, 2 times per day  sodium chloride flush 0.9 % injection 10 mL, 10 mL, Intravenous, PRN  Vitamin D (CHOLECALCIFEROL) tablet 2,000 Units, 2,000 Units, Oral, Daily  calcium carbonate (TUMS) chewable tablet 500 mg, 500 mg, Oral, BID  cefTRIAXone (ROCEPHIN) 2000 mg IVPB in D5W 50ml minibag, 2,000 mg, Intravenous, Q24H  famotidine (PEPCID) tablet 10 mg, 10 mg, Oral, Nightly  predniSONE (DELTASONE) tablet 10 mg, 10 mg, Oral, Daily  nystatin (MYCOSTATIN) 600797 UNIT/ML suspension 500,000 Units, 5 mL, Oral, 4x Daily  hydrocortisone-aloe 1 % cream, , Topical, BID  potassium chloride (KLOR-CON M) extended release tablet 40 mEq, 40 mEq, Oral, PRN **OR** potassium bicarb-citric acid (EFFER-K) effervescent tablet 40 mEq, 40 mEq, Oral, PRN **OR** potassium chloride 10 mEq/100 mL IVPB (Peripheral Line), 10 mEq, Intravenous, PRN  dextrose 50 % IV solution, 25 g, Intravenous, PRN  oxyCODONE-acetaminophen (PERCOCET) 5-325 MG per tablet 1 tablet, 1 tablet, Oral, Q4H PRN  warfarin (COUMADIN) daily dosing (placeholder), , Other, RX Placeholder  baclofen (LIORESAL) tablet 5 mg, 5 mg, Oral, TID PRN  polyethylene glycol (GLYCOLAX) packet 17 g, 17 g, Oral, Daily  perflutren lipid microspheres (DEFINITY) injection 2.2 mg, 2 mL, Intravenous, ONCE PRN  sodium chloride flush 0.9 % injection 5-40 mL, 5-40 mL, Intravenous, 2 times per day  sodium chloride flush 0.9 % injection 5-40 mL, 5-40 mL, Intravenous, PRN  0.9 % sodium chloride infusion, 25 mL, Intravenous, PRN  acetaminophen (TYLENOL) tablet 650 mg, 650 mg, Oral, Q4H PRN  carvedilol (COREG) tablet 6.25 mg, 6.25 mg, Oral, BID  naloxone (NARCAN) injection 0.4 mg, 0.4 mg, Intravenous, PRN  atorvastatin (LIPITOR) tablet 40 mg, 40 mg, Oral, Daily  ferrous sulfate 300 (60 Fe) MG/5ML syrup 300 mg, 300 mg, Oral, Daily  pantoprazole (PROTONIX) tablet 40 mg, 40 mg, Oral, QAM AC  oxybutynin (DITROPAN-XL) extended release tablet 15 mg, 15 mg, Oral, Daily  polyethylene glycol (GLYCOLAX) packet 17 g, 17 g, Oral, Daily PRN  rOPINIRole (REQUIP) tablet 0.5 mg, 0.5 mg, Oral, 4x Daily  sertraline (ZOLOFT) tablet 50 mg, 50 mg, Oral, Daily  amiodarone (CORDARONE) tablet 100 mg, 100 mg, Oral, BID    Objective:  /78   Pulse 103   Temp 97.5 °F (36.4 °C) (Oral)   Resp 17   Ht 5' 5.15\" (1.655 m)   Wt 167 lb 1.7 oz (75.8 kg)   SpO2 100%   BMI 27.68 kg/m²       GEN: Well developed, well nourished, no acute distress  HEENT: NCAT. EOMI. Hearing grossly intact. Mucous membranes pink and moist.  RESP: Normal breath sounds with no wheezing, rales, or rhonchi. Respirations WNL and unlabored. CV:  Irregularly irregular rhythm, regular rate. No murmurs, rubs, or gallops. ABD: Soft, non-distended, BS+ and equal.  NEURO: Alert. Speech fluent. Sensation to light touch intact in all limbs. MSK:  Muscle tone and bulk are normal bilaterally. Strength 4+/5 in bilateral upper limbs. Strength 4+/5 with bilateral ankle dorsiflexion and plantarflexion. LIMBS: No edema in bilateral lower limbs. SKIN: Warm and dry with good turgor. PSYCH: Mood WNL. Affect WNL. Appropriately interactive. Diagnostics:     CBC:   Recent Labs     06/06/21  0451 06/07/21  0459 06/08/21  0507   WBC 6.8 8.6 7.5   RBC 2.67* 2.97* 2.80*   HGB 7.8* 8.6* 8.1*   HCT 24.1* 27.0* 25.1*   MCV 90.5 91.2 89.5   RDW 17.5* 17.8* 17.2*    262 247     BMP:   Recent Labs     06/06/21  0451 06/07/21  0459 06/08/21  0507    137 141   K 4.3 4.4 4.2    103 106   CO2 28 26 27   BUN 24* 26* 30*   CREATININE 0.62 0.69 0.77     BNP: No results for input(s): BNP in the last 72 hours. PT/INR:   Recent Labs     06/06/21  0451 06/07/21  0459 06/08/21  0507   PROTIME 18.5* 22.1* 27.6*   INR 1.5 2.0 2.6     APTT: No results for input(s): APTT in the last 72 hours. CARDIAC ENZYMES: No results for input(s): CKMB, CKMBINDEX, TROPONINT in the last 72 hours.     Invalid input(s): CKTOTAL;3  FASTING LIPID PANEL:  Lab Results   Component Value Date    CHOL 198 01/21/2021    HDL 43 01/21/2021    TRIG 231 (H) 01/21/2021     LIVER PROFILE:   Recent Labs     06/06/21  0451 06/07/21  0459 06/08/21  0507   AST 20 20 17   ALT 23 27 25   BILITOT 0.32 0. 42 0.32   ALKPHOS 90 98 98       Impression:    1. Low back pain with concern for T12-L1 discitis/osteomyelitis  2. Anemia  3. Atrial fibrillation  4. CAD  5. HTN  6. HLD  7. CKD  8. History of peritoneal mesothelioma    Recommendations:    1. Diagnosis:  Low back pain with concern for T12-L1 discitis/osteomyelitis  2. Therapy: Has PT/OT needs  3. Medical Necessity: As above  4. Support: Lives with spouse, has supportive family  11. Rehab Recommendation:  The patient will benefit from acute inpatient rehabilitation once medically stable per primary service. Anticipate she will be able to tolerate 3 hours of therapy per day in rehabilitation. The patient requires multidisciplinary rehabilitation treatment including medical management by a PM&R physician, 24 hour rehabilitation nursing, physical therapy, occupational therapy, rehabilitation social work, and nutrition services. Patient and family also require education in post-hospital precautions and home exercise routine, adaptive techniques and deficit compensation strategies, strengthening and conditioning, equipment prescription and instructions in use.   6. DVT Prophylaxis: On coumadin      Electronically signed by Twan Dickerson MD on 6/8/2021 at 10:42 AM

## 2021-06-08 NOTE — PROGRESS NOTES
Patient arrives to floor from PCU. Patient oriented to room and call light system. Resting comfortably in bed, no distress is seen. No complaints of pain. Admission database complete. Will continue to monitor.

## 2021-06-08 NOTE — PROGRESS NOTES
Report given to [facility]. Pt transferred to [place] [phone] with all documented belongings. Family travelled with pt. No distress at time of transfer. PICC intact for o/p IV atb.  .Electronically signed by Yeimy Sanders RN on 6/8/2021 at 5:04 PM

## 2021-06-08 NOTE — CARE COORDINATION
[facility]: RITA ROSARIOA W  Acute Inpatient Rehab Preadmission Assessment    Patient Name: Alysa Antonio        MRN: [id number]    : 1935  (81 y.o.)  Gender: female     Admitted from:   [address] [place] [address]  [][facility]   [][facility]   [][facility]   []Outside Admission - Location:                                 [x]Initial         []Updated    Date of Onset / Admission to the acute hospital:   2021    Inpatient Rehabilitation Admitting Diagnosis:  Low back pain with concern for T12-L1 discitis/osteomyelitis    Did patient have surgery? []No  [x]Yes:     2021: PICC placement  2021: Transanal Colonoscopy with polypectomy (snare cautery)    Physicians:  Yudi Pastor MD   Physician   Cardiology     Teddy Hollins MD   Physician   Family Medicine     Valarie Webb MD   Physician   Specialty:  Gastroenterology     India Sheikh MD   Physician   General Surgery     Arik Gong MD   Physician   Infectious Disease     Syd Estrada MD   Physician   Specialty:  Orthopedic Surgery     Santino Almaraz MD   Physician   Pain Management       Risk for clinical complications: Moderate    Co-morbidities:  1. Low back pain with concern for T12-L1 discitis/osteomyelitis  2. Anemia  3. Atrial fibrillation  4. CAD  5. HTN  6. HLD  7. CKD  8.  History of peritoneal mesothelioma    Financial Information  Primary insurance:  [x]Medicare [] Medicare HMO  []Commercial insurance    []Medicaid   [] Medicaid HMO []Workers Compensation   []Personal Pay    Secondary Insurance:  []Medicare [] Medicare HMO  [x]Commercial insurance    []Medicaid  []Medicaid HMO  []Workers Compensation []None    Precautions:   []Cardiac Precautions:    []Total hip precautions:    []Weight Bearing status:  [x]Safety Precautions/Concerns:  Fall Risk, General Precautions, left leg 1/2\" shorter than the right (S/P BILATERAL THRs), O2 per NC PRN  []Visually impaired:     []Hard of Hearing:      Isolation Precautions: [x]Yes  []No  If Yes:  [] Droplet  [x]Contact []Airborne     []VRE     []MRSA     []C-diff         [] TB       [] ESBL [x] MDRO          [] Other:        Physiatrist:  []   Dr. Paolo De La Rosa     []   Dr. Kala Martin  [x]   Dr. Steven Argueta  []   Dr. Joshua Little    Patients Occupation: Retired    Reviewed Lab and Diagnostic reports from Current Admission: Yes    Patients Prior Functional  Level: Prior Function  ADL Assistance: Independent  Homemaking Assistance: Needs assistance (daugthers assisting w/ laundry, bringing in meals, cleaning and grocery shopping, spouse assists as needed)  Ambulation Assistance: Independent (uses crutches PRN with hip pain)  Transfer Assistance: Independent  Additional Comments: large supportive family assist at D/C    History of current illness, per PM&R Consult:   Simon Wilson is a 81 y.o. RHD female admitted to [facility] on 5/19/2021.       Patient with progressively worsening low back pain over prior 10 days. She had recent kyphoplasty of thoracic vertebra.      Pain management consulted - adjusting Percocet with improved pain control. Dr. Bee Colindres performed caudal epidural steroid injection on 5/24/21.     Cardiology consulted for atrial fibrillation and CHF history.      Orthopedics consulted for chronic back pain with radicular symptoms.      Gastroenterology consulted for iron deficiency anemia. Dr. Milton Arnold performed colonoscopy 5/28/21 which showed severe diverticulosis, 1 polyp, no bleeding, small internal hemorrhoids. EGD performed same day showed changes consistent with GERD. They approved restarting anticoagulation post-procedure and signed off. Pharmacy managing INR.      She reports significant improvement in her back pain since caudal injection. She does continue to have weakness in upper arms and thighs.      Prognosis: Fair    Current functional status for upper extremity ADLs:  UE Bathing: Stand by assistance  UE Dressing: Stand by assistance    Current functional status for lower extremity ADLs:  LE Bathing: Moderate assistance  LE Dressing: Maximum assistance    Current functional status for bed, chair, wheelchair transfers:  Transfers  Sit to Stand: 2 Person Assistance, Minimal Assistance (min A sit<>stand at [address] stedy level)  Stand to sit: 2 Person Assistance, Minimal Assistance ([address] stedy to recliner)  Bed to Chair: 2 Person Assistance, Dependent/Total (use of henrietta stedy bed->recliner chair)  Stand Pivot Transfers: Contact guard assistance  Comment: demo's strong use of UEs pulling on henrietta stedy with sit->stand transfer.  Pt is educated to push through LEs with emphasis on quad control during standing and to maintain upright posture    Current functional status for toilet transfers:    2 Person Assistance;Dependent/Total (use of henrietta stedy     Current functional status for locomotion:  Ambulation  Ambulation?: No  WB Status: left leg 1/2\" shorter than the right (S/P BILATERAL THRs)  Ambulation 1  Surface: level tile  Device: Rolling Walker  Other Apparatus: O2 (2 L)  Assistance: Contact guard assistance  Quality of Gait: Slow and steady, no LOB  Gait Deviations: Slow Manuela, Decreased step length, Decreased step height  Distance: 5' x 4 (1 rest break in between activity)  Comments: Cues to stay inside of walker    Current functional status for comprehension: Complete independence    Current functional status for expression: Complete independence    Current functional status for social interaction: Complete independence    Current functional status for problem solving: Complete independence    Current functional status for memory: Complete independence    Current Deficits R/T Impairment: Impaired Functional Mobility and Decreased ADLs    Required Therapy:   [x] Physical Therapy  [x] Occupational Therapy   [] Speech Therapy, as appropriate    Additional Services:    [x]     [] Recreational Therapy, as appropriate    [x] Nutrition    [] Dialysis  [] Cultural Needs Identified  [x] Equipment: Ardelle Barton, Bed Alarm   [] Other    Rehab Justification:  Needs 3 hrs therapy per day or 15 hours per week:  Yes  Identified Rehab Nursing needs: Yes  Intense Interdisciplinary need:  Yes  Need for 24 hr physician supervision:  Yes  Measurable improved quality of life:  Yes  Willingness to participate:  Yes  Medical Necessity:  Yes  Patient able to tolerate care proposed:  Yes    Expected Discharge Destination/Functional Level:  Home with assist  Expected length of time to achieve that level of improvement: 1-2 weeks  Expected Post Discharge Treatments: Home with possible Home Care    Other information relevant to patient's care needs:  N/A    Acute Inpatient Rehabilitation Disclosure Statement will be provided to patient upon admission to ARU with patient's verbalization of understanding. I have reviewed and concur with the findings and results of the pre-admission screening assessment completed by the Inpatient Rehabilitation Admissions Coordinator.

## 2021-06-08 NOTE — DISCHARGE SUMMARY
Discharge Summary      Patient ID: Mindy Yan    MRN: [id number]     Acct:  [de-identified]       Patient's PCP: Grover Evangelista DO    Admit Date: 5/19/2021     Discharge Date:   6/8/2021    Admitting Physician: Malena Perez MD    Discharge Physician: Shailesh Shannon MD     Discharge Diagnoses:    Primary Problem  Atrial fibrillation with RVR [facility])    Principal Problem:    Atrial fibrillation with RVR ([facility] [address]  Active Problems:    Hypertension    Mixed hyperlipidemia    Chronic diastolic heart failure (HCC)    Chronic respiratory failure (HCC)    Gastroesophageal reflux disease without esophagitis    Pulmonary hypertension (HCC)    YOGESH (acute kidney injury) ([facility] [address]    Closed compression fracture of L1 lumbar vertebra, sequela    Acute midline low back pain with right-sided sciatica    Acute exacerbation of chronic low back pain    Iron deficiency anemia    Rectal bleeding    Elevated LFTs    Closed wedge compression fracture of fifth thoracic vertebra (HCC)    Closed wedge compression fracture of T6 vertebra (HCC)    Closed wedge compression fracture of T9 vertebra (HCC)    Closed wedge compression fracture of T10 vertebra (HCC)    Discitis of thoracolumbar region    Acute cystitis without hematuria    Positive blood culture    Lumbar radiculopathy    Elevated C-reactive protein (CRP)  Resolved Problems:    * No resolved hospital problems. *    Past Medical History:   Diagnosis Date    Asbestos exposure     Atrial fibrillation ([facility] [address]     takes xarelto    Basal cell carcinoma of nose     CAD (coronary artery disease)     CKD (chronic kidney disease) stage 2, GFR 60-89 ml/min     Hyperlipidemia     Hypertension     MDRO (multiple drug resistant organisms) resistance 2/19/2014    E.  Coli urine    Nephrolithiasis     Osteoarthritis     Ovarian cyst     removed    Oxygen dependent     2 LNC mainly at night, during the day as needed    Palpitations     Peritoneal mesothelioma ([facility] [address] 1999    treated IP CONSULT TO PRIMARY CARE PROVIDER  IP CONSULT TO CARDIOLOGY  IP CONSULT TO PAIN MANAGEMENT  IP CONSULT TO ORTHOPEDIC SURGERY  IP CONSULT TO CARDIOLOGY  PHARMACY TO DOSE WARFARIN  IP CONSULT TO GI  IP CONSULT TO GENERAL SURGERY  IP CONSULT TO PHYSICAL MEDICINE REHAB  IP CONSULT TO ORTHOPEDIC SURGERY  IP CONSULT TO PAIN MANAGEMENT  IP CONSULT TO INFECTIOUS DISEASES  IP CONSULT TO PHYSICAL MEDICINE REHAB  PHARMACY TO DOSE WARFARIN    Significant Diagnostic Studies: as above, and as follows:    Treatments: as above    Disposition: ARU    Discharged Condition: Stable    Follow Up:  Yarelis Mccloud DO in two weeks  ID in 1 week  Cardiology in 2 weeks  INR daily  Monitor CBC and CMP twice weekly    Discharge Medications:    Piedad Venegas   Home Medication Instructions [id number]    Printed on:06/08/21 8753   Medication Information                      acetaminophen (TYLENOL) 500 MG tablet  Take 500 mg by mouth every 6 hours as needed for Pain             amiodarone (CORDARONE) 200 MG tablet  Take 1 tablet by mouth 2 times daily             amoxicillin (AMOXIL) 500 MG capsule  Take 2,000 mg by mouth as needed (dental procedures) Dental only             Ascorbic Acid (VITAMIN C) 250 MG tablet  Take 250 mg by mouth daily             atorvastatin (LIPITOR) 40 MG tablet  Take 40 mg by mouth daily              calcium carbonate 600 MG TABS tablet  Take 1 tablet by mouth 2 times daily             diphenhydrAMINE-APAP, sleep, (TYLENOL PM EXTRA STRENGTH)  MG tablet  Take 1 tablet by mouth nightly as needed              ferrous sulfate (FLACO-IN-SOL) 75 (15 Fe) MG/ML solution  Take 15 mg by mouth daily             isosorbide mononitrate (IMDUR) 60 MG extended release tablet  Take 60 mg by mouth daily             losartan (COZAAR) 100 MG tablet  Take 100 mg by mouth daily             meclizine (ANTIVERT) 25 MG tablet  Take 25 mg by mouth daily as needed for Dizziness             metoprolol tartrate (LOPRESSOR) 25 MG tablet  Take 1 tablet by mouth 2 times daily             Omega 3 1000 MG CAPS  Take 1,000 mg by mouth daily              omeprazole (PRILOSEC) 40 MG delayed release capsule  Take 40 mg by mouth Daily              oxybutynin (DITROPAN XL) 15 MG extended release tablet  Take 15 mg by mouth daily              polyethylene glycol (GLYCOLAX) 17 GM/SCOOP powder  Take 17 g by mouth daily as needed (constipation)              potassium chloride SA (K-DUR;KLOR-CON M) 20 MEQ tablet  Take 1 tablet by mouth 2 times daily             predniSONE (DELTASONE) 5 MG tablet  Take 20 mg by mouth daily              rivaroxaban (XARELTO) 20 MG TABS tablet  Take 20 mg by mouth daily (with breakfast)              rOPINIRole (REQUIP) 0.5 MG tablet  Take 0.5 mg by mouth 4 times daily as needed              sertraline (ZOLOFT) 50 MG tablet  Take 50 mg by mouth daily. torsemide (DEMADEX) 20 MG tablet  Take 1 tablet by mouth 2 times daily Take twice a day for 1 months then go back to 20 mg daily             Vitamin D (CHOLECALCIFEROL) 1000 UNITS CAPS capsule  Take 1,000 Units by mouth 2 times daily             vitamin E 1000 units capsule  Take 1,000 Units by mouth daily                          Time Spent on discharge is more than  35 min in the examination, evaluation, counseling and review of medications and discharge plan.       Electronically signed by James Parks MD     Copy sent to Dr. Faustina Nolan DO

## 2021-06-08 NOTE — PROGRESS NOTES
Physical Therapy  Facility/Department: [facility] PROGRESSIVE CARE  Daily Treatment Note  NAME: Uzma Kee  : 1935  MRN: [id number]    Date of Service: 2021    Discharge Recommendations:  Patient would benefit from continued therapy after discharge        Assessment   Body structures, Functions, Activity limitations: Decreased functional mobility ; Decreased strength;Decreased endurance;Decreased balance; Increased pain;Decreased safe awareness;Decreased posture  Assessment: continue per POC to maxmize potential for safe D/C  Treatment Diagnosis: impaired mobility due to low back pain  Specific instructions for Next Treatment: Log roll with bed mobility, don corset prior to bed mobility  PT Education: Precautions; Equipment;Transfer Training;General Safety  Patient Education: Corset fit/ adjustment/ precautions  Barriers to Learning: none  REQUIRES PT FOLLOW UP: Yes  Activity Tolerance  Activity Tolerance: Patient limited by pain; Patient limited by endurance  Activity Tolerance: O2 during session, 1 episode of dizziness- improved when sitting in recliner chair     Patient Diagnosis(es): The primary encounter diagnosis was Acute exacerbation of chronic low back pain. Diagnoses of Lumbar radiculopathy, Lumbar spondylosis, Age-related osteoporosis with current pathological fracture, sequela, Paroxysmal atrial fibrillation (HCC), Nontraumatic compression fracture of T5 vertebra, initial encounter ([facility] [address] Non-traumatic compression fracture of T6 thoracic vertebra, initial encounter [facility]), Non-traumatic compression fracture of T9 thoracic vertebra, initial encounter ([address] Non-traumatic compression fracture of T10 thoracic vertebra, initial encounter ([address] Discitis of thoracolumbar region, and Osteomyelitis of thoracic spine ([address] were also pertinent to this visit.      has a past medical history of Asbestos exposure, Atrial fibrillation ([address] Basal cell carcinoma of nose, CAD (coronary artery disease), CKD (chronic kidney disease) stage 2, GFR 60-89 ml/min, Hyperlipidemia, Hypertension, MDRO (multiple drug resistant organisms) resistance, Nephrolithiasis, Osteoarthritis, Ovarian cyst, Oxygen dependent, Palpitations, Peritoneal mesothelioma ([address] PONV (postoperative nausea and vomiting), Prolonged emergence from general anesthesia, and SOB (shortness of breath) on exertion. has a past surgical history that includes Tubal ligation (1969); Hysterectomy (1981); Colonoscopy; Total hip arthroplasty (Right, 1990); Rectocele repair (1997); liver biopsy (2000); Upper gastrointestinal endoscopy; Photodynamic Therapy (April 2000 & Jan. 2002); Total hip arthroplasty (Left, 2003); Revision total hip arthroplasty (Right, 1998); Rotator cuff repair (2006); Ankle fracture surgery (Right, 2013); Cataract removal (Bilateral); Cardiac catheterization (2002); liver biopsy; Coronary angioplasty with stent (April 2002); [place] tooth extraction; Appendectomy (1964); Cholecystectomy (2005); Skin cancer excision (Bilateral, 2006); Skin cancer excision (Left, Oct. 2014); nasal endoscopy (Bilateral, 11/25/2014); BIOPSY / LIGATION TEMPORAL ARTERY (Bilateral, 2/12/2021); Breast biopsy (Right); Spine surgery (N/A, 4/15/2021); Upper gastrointestinal endoscopy (N/A, 5/28/2021); Colonoscopy (N/A, 5/28/2021); and CT NEEDLE BIOPSY SPINE (6/4/2021). Restrictions  Restrictions/Precautions  Restrictions/Precautions: Contact Precautions, Fall Risk  Required Braces or Orthoses?: Yes  Implants present? : Metal implants (Bilat STACI, h/o ankle ORIF, cardiac stent)  Required Braces or Orthoses  Spinal Other: thoracic corset  Position Activity Restriction  Other position/activity restrictions: left leg 1/2\" shorter than the right (S/P BILATERAL THRs), O2 per NC PRN  Subjective   General  Chart Reviewed: Yes  Additional Pertinent Hx: Associated symptoms include weakness. Pertinent negatives include no abdominal pain, chest pain or fever.   81years old female Intervention(s): Ambulation/Increased Activity;Repositioned  Vital Signs  Patient Currently in Pain: Yes       Orientation  Orientation  Overall Orientation Status: Within Functional Limits  Cognition      Objective   Bed mobility  Rolling to Left: Moderate assistance  Rolling to Right: Moderate assistance  Supine to Sit: 2 Person assistance; Moderate assistance  Comment: Needs assist and cues for log rolling technique. Pt required repeated rolling for donning thoracic corset  Transfers  Sit to Stand: 2 Person Assistance;Minimal Assistance (min A sit<>stand at [address] [facility] [address] level)  Stand to sit: 2 Person Assistance;Minimal Assistance (henrietta stedy to recliner)  Bed to Chair: 2 Person Assistance;Dependent/Total (use of henrietta stedy bed->recliner chair)  Comment: demo's strong use of UEs pulling on henrietta stedy with sit->stand transfer. Pt is educated to push through LEs with emphasis on quad control during standing and to maintain upright posture        Balance  Posture: Fair  Sitting - Static: Fair  Sitting - Dynamic: Fair;-  Standing - Static: Fair;-  Standing - Dynamic: Fair;-  Comments: improved sitting balance today as pt is able to tolerate sitting upright with brace on.  Standing balance assessed while in henrietta stedy  Exercises  Heelslides: 15x  Hip Abduction: 15x  Knee Short Arc Quad: 15x  Ankle Pumps: 15x with orange Tband resistance  Comments: supine exs AROM to AAROM Bilat LEs         Other Activities: Other (see comment)  Comment: Donning and education of thoracic corset, repeated sit<> henrietta steady with emphasis on standing endurance              G-Code     OutComes Score                                                     AM-PAC Score             Goals  Short term goals  Time Frame for Short term goals: 5-7 treatments/ week  Short term goal 1: pt to increase LE strength to [facility] to maximize mobility  Short term goal 2: pt to demonstrate good technique for log rolling, balance and LE ROM and strengthening exercises as tolerated  Short term goal 3: pt to demo good sitting balance and fair (+) standing balance with least restrictive device to increase safety  Short term goal 4: pt to demonstrate improved bed mobility with log rolling using rail w/ min A x1  Short term goal 5: pt to demo sit<>stand transfer with least restrictive device min A x1  Short term goal 6: pt to transfer bed<>chair with henrietta stedy min A x1  Short term goal 7: . Short term goal 8: . Short term goal 9: .  Patient Goals   Patient goals : control pain    Plan    Plan  Times per week: 6-7 treatments/ week  Times per day: Daily  Specific instructions for Next Treatment: Log roll with bed mobility, don corset prior to bed mobility  Current Treatment Recommendations: Strengthening, Functional Mobility Training, Safety Education & Training, Transfer Training, ROM, Balance Training, Endurance Training, Patient/Caregiver Education & Training, Positioning, Equipment Evaluation, Education, & procurement, Home Exercise Program  Safety Devices  Type of devices: Patient at risk for falls, Nurse notified, Gait belt, Call light within reach, Left in chair (Shreya RN notified after session.  Pt left in recliner with LEs elevated and multiple pillows for support/comfort)     Therapy Time   Individual Concurrent Group Co-treatment   Time In 0950         Time Out [address] [place] [address]

## 2021-06-08 NOTE — PROGRESS NOTES
Orders reviewed with Dr. Maurisio Moss. IV lasix changed to PO. Will address the rest of the orders tomorrow.

## 2021-06-08 NOTE — PROGRESS NOTES
[facility] -   Infectious diseases evaluation  admission date 5/19/2021    reason for consultation:   Osteomyelitis/discitis    Impression :   Current:  · Back pain with T12-L1 finding on MRI concerning for fracture versus discitis/osteomyelitis status post IR guided aspiration 6/4/2021 no growth on cultures to date  · UTI  · 1 of 2 blood culture grew diphtheroids on 6/3/2021, repeat blood cultures 6/5/2021 no growth likely contamination  · Atrial fibrillation on chronic anticoagulation  · Chronic diastolic heart failure  · Hypertension  · Hyperlipidemia  · Compression fracture of L1 status post kyphoplasty 4/15/2021  · Coronary artery disease  · History of ESBL producing E. coli bacteriuria  · History of nephrolithiasis      Recommendations     · Continue IV ceftriaxone 2 g daily until 7/21/2021  · PICC line was placed  · Follow final cultures and adjust antibiotics as needed  · Follow CBC, BUN/creatinine and liver enzymes weekly while on IV antibiotics   · Follow C-reactive protein and sedimentation rate  · Discussed with Dr. Vel Phoenix       History of Present Illness:   Initial history:  Camila Rodriguez is a 81y.o.-year-old female was initially admitted to [facility] 5/18/2021 for A. fib with rapid vascular response, hospital course was complicated by bloody stool, GI consulted had EGD and colonoscopy done then was discharged to acute rehab. She developed worsening lower back pain for several days , severe, sharp, constant radiates to the thighs, aggravated by movement, no alleviating factors. She denied lower extremity weakness, no bowel or urinary incontinence.     Urinalysis 6/1/2021 showed 10-20 WBC, small leukocyte esterase  6/2/2021 thoracic and lumbar spine MRI reviewed suggestive of T12 marrow edema and T12-L1 disc space edema concerning for discitis/osteomyelitis or fracture  The patient was readmitted to [facility].  She received lumbar steroid epidural injection on 5/24/2021  History L1 compression fracture status post kyphoplasty of L1 on 4/15/2021    Interval changes  6/8/2021   A CT-guided aspiration biopsy was done 6/4/2021 with no growth on culture to date. The patient is up to the chair, back pain under control, denied fever, denied cough or shortness of breath, no new complaints  Urine culture 6/1/2021 grew E. coli that was pansensitive and  Klebsiella pneumonia that was resistant to ampicillin, sensitive to cefazolin  Patient Vitals for the past 8 hrs:   BP Temp Temp src Pulse Resp SpO2   06/08/21 0745 103/78 97.5 °F (36.4 °C) Oral 103 17 100 %             I have personally reviewed the past medical history, past surgical history, medications, social history, and family history, and I haveupdated the database accordingly. Allergies:   Adhesive tape, Meperidine, Demerol, Ultram [tramadol hcl], Zocor [simvastatin], Codeine, and Fentanyl     Review of Systems:     Review of Systems  As per history present illness, other than above 12 system review was negative. Physical Examination :       Physical Exam  Constitutional:       General: She is not in acute distress. HENT:      Head: Normocephalic and atraumatic. Right Ear: External ear normal.      Left Ear: External ear normal.      Mouth/Throat:      Pharynx: Oropharynx is clear. No oropharyngeal exudate. Eyes:      General: No scleral icterus. Conjunctiva/sclera: Conjunctivae normal.   Cardiovascular:      Rate and Rhythm: Rhythm irregular. Heart sounds: No murmur heard. Pulmonary:      Effort: Pulmonary effort is normal. No respiratory distress. Breath sounds: No wheezing. Abdominal:      General: Abdomen is flat. There is no distension. Palpations: Abdomen is soft. Tenderness: There is no abdominal tenderness. Musculoskeletal:      Cervical back: Neck supple. No rigidity. Right lower leg: No edema.       Left lower leg: No edema.   Skin:     General: Skin is warm. Coloration: Skin is not jaundiced. Findings: No bruising. Neurological:      General: No focal deficit present. Mental Status: She is alert and oriented to person, place, and time. Past Medical History:     Past Medical History:   Diagnosis Date    Asbestos exposure     Atrial fibrillation ([address]     takes xarelto    Basal cell carcinoma of nose     CAD (coronary artery disease)     CKD (chronic kidney disease) stage 2, GFR 60-89 ml/min     Hyperlipidemia     Hypertension     MDRO (multiple drug resistant organisms) resistance 2/19/2014    E.  Coli urine    Nephrolithiasis     Osteoarthritis     Ovarian cyst     removed    Oxygen dependent     2 LNC mainly at night, during the day as needed    Palpitations     Peritoneal mesothelioma ([facility] [address] 1999    treated at [facility] PONV (postoperative nausea and vomiting)     Prolonged emergence from general anesthesia     SOB (shortness of breath) on exertion        Past Surgical  History:     Past Surgical History:   Procedure Laterality Date    ANKLE FRACTURE SURGERY Right 2013    APPENDECTOMY  1964    BIOPSY / LIGATION TEMPORAL ARTERY Bilateral 2/12/2021    TEMPORAL ARTERY BIOPSY performed by Bradly Wilhelm MD at [address] Right    [address]  2002    CATARACT REMOVAL Bilateral     CHOLECYSTECTOMY  2005    COLONOSCOPY      COLONOSCOPY N/A 5/28/2021    COLONOSCOPY POLYPECTOMY SNARE/COLD BIOPSY performed by Jerry Harrington MD at [address]  April 2002    CT NEEDLE BIOPSY SPINE  6/4/2021    CT NEEDLE BIOPSY SPINE 6/4/2021 [facility] CT SCAN    HYSTERECTOMY  1981    LIVER BIOPSY  2000    LIVER BIOPSY      NASAL ENDOSCOPY Bilateral 11/25/2014    lt nasal cautery with packing    PHOTODYNAMIC THERAPY  April 2000 & Jan. 2002    for peritoneal Mesothelioma    [address] ARTHROPLASTY Right 1998    ROTATOR CUFF REPAIR  2006    SKIN CANCER EXCISION Bilateral 2006    ankle    SKIN CANCER EXCISION Left Oct. 2014    shoulder    SPINE SURGERY N/A 4/15/2021    KYPHOPLASTY L1 performed by Shahram Alejandra MD at [address] [place] [address] ARTHROPLASTY Left 2003    TUBAL LIGATION  1969    UPPER GASTROINTESTINAL ENDOSCOPY      UPPER GASTROINTESTINAL ENDOSCOPY N/A 5/28/2021    EGD ESOPHAGOGASTRODUODENOSCOPY performed by Nicole Mike MD at [facility]         Medications:      furosemide  20 mg Intravenous BID    midodrine  10 mg Oral TID WC    sodium chloride flush  10 mL Intravenous 2 times per day    Vitamin D  2,000 Units Oral Daily    calcium carbonate  500 mg Oral BID    cefTRIAXone (ROCEPHIN) IV  2,000 mg Intravenous Q24H    famotidine  10 mg Oral Nightly    predniSONE  10 mg Oral Daily    nystatin  5 mL Oral 4x Daily    hydrocortisone-aloe   Topical BID    warfarin (COUMADIN) daily dosing (placeholder)   Other RX Placeholder    polyethylene glycol  17 g Oral Daily    sodium chloride flush  5-40 mL Intravenous 2 times per day    carvedilol  6.25 mg Oral BID    atorvastatin  40 mg Oral Daily    ferrous sulfate  300 mg Oral Daily    pantoprazole  40 mg Oral QAM AC    oxybutynin  15 mg Oral Daily    rOPINIRole  0.5 mg Oral 4x Daily    sertraline  50 mg Oral Daily    amiodarone  100 mg Oral BID       Social History:     Social History     Socioeconomic History    Marital status:      Spouse name: Not on file    Number of children: Not on file    Years of education: Not on file    Highest education level: Not on file   Occupational History    Not on file   Tobacco Use    Smoking status: Never Smoker    Smokeless tobacco: Never Used   Vaping Use    Vaping Use: Never used   Substance and Sexual Activity    Alcohol use: Yes     Comment: social    Drug use: No    Sexual activity: Not 2. 3* 2.5*   BILITOT 0.42 0.32   ALKPHOS 98 98   ALT 27 25   AST 20 17     No results for input(s): RPR in the last 72 hours. No results for input(s): HIV in the last 72 hours. No results for input(s): BC in the last 72 hours. Lab Results   Component Value Date    CREATININE 0.77 06/08/2021    GLUCOSE 123 06/08/2021    GLUCOSE 135 02/08/2012       Detailed results: Thank you for allowing us to participate in the care of this patient. Please call with questions. This note is created with the assistance of a speech recognition program.  While intending to generate adocument that actually reflects the content of the visit, the document can still have some errors including those of syntax and sound a like substitutions which may escape proof reading. It such instances, actual meaningcan be extrapolated by contextual diversion.     Maria Del Carmen President, MD  Office: [phone]  Perfect serve / office [phone]

## 2021-06-08 NOTE — PROGRESS NOTES
Pharmacy Note  Warfarin Consult    Tomy Willams is a 81 y.o. female for whom pharmacy has been consulted to manage warfarin therapy. Consulting Physician: Demetris Collins  Reason for Admission: discitis/osteomyelitis     Warfarin dose prior to admission: new dosing but has been on 2 mg daily. Warfarin indication: Afib  Target INR range: 2-3     Past Medical History:   Diagnosis Date    Asbestos exposure     Atrial fibrillation ([facility] [address]     takes xarelto    Basal cell carcinoma of nose     CAD (coronary artery disease)     CKD (chronic kidney disease) stage 2, GFR 60-89 ml/min     Hyperlipidemia     Hypertension     MDRO (multiple drug resistant organisms) resistance 2/19/2014    E. Coli urine    Nephrolithiasis     Osteoarthritis     Ovarian cyst     removed    Oxygen dependent     2 LNC mainly at night, during the day as needed    Palpitations     Peritoneal mesothelioma ([facility] [address] 1999    treated at [facility]    PONV (postoperative nausea and vomiting)     Prolonged emergence from general anesthesia     SOB (shortness of breath) on exertion                 Recent Labs     06/08/21  0507   INR 2.6     Recent Labs     06/06/21  0451 06/07/21  0459 06/08/21  0507   HGB 7.8* 8.6* 8.1*   HCT 24.1* 27.0* 25.1*    262 247       Current warfarin drug-drug interactions: rocephin, amiodarone, requip, acetaminophen      Date             INR        Dose   6/8/2021          2.6      2 mg    Plan:  lower dose since INR in rising on the 2 mg doses. Daily PT/INR while inpatient. Nehemias Schmitt. Ph.  6/8/2021  6:03 PM      Thank you for the consult. Will continue to follow.

## 2021-06-08 NOTE — PLAN OF CARE
Problem: Falls - Risk of:  Goal: Will remain free from falls  Description: Will remain free from falls  6/8/2021 0449 by Squire Lefort, RN  Outcome: Ongoing  Note: Patient remained free from falls. Call light within reach. Problem: Pain:  Goal: Pain level will decrease  Description: Pain level will decrease  6/8/2021 0449 by Squire Lefort, RN  Outcome: Ongoing  Note: Patient given pain medication as ordered. Problem: Skin Integrity:  Goal: Absence of new skin breakdown  Description: Absence of new skin breakdown  6/8/2021 0449 by Squire Lefort, RN  Outcome: Ongoing  Note: No new alterations in skin integrity.

## 2021-06-08 NOTE — PROGRESS NOTES
Progress Note    6/8/2021   1:22 PM    Name:  Arianne Brenner  MRN:    [id number]     Acct:     [de-identified]   Room:  2115/2115-01  IP Day: 21     Admit Date: 5/19/2021  9:49 AM  PCP: Suma Chavez DO    Subjective:     C/C:   Chief Complaint   Patient presents with    Back Pain       Interval History: Status: not changed. Patient thoracic pain is well controlled with pain meds patient denies palpitation cough chest pain shortness of breath hematemesis or melena. Vital signs reviewed. Recent labs reviewed hemoglobin 8.1, INR 2.6    ROS:   all 10 systems reviewed and are negative except as noted    Review of Systems   Constitutional: Negative for chills and fatigue. HENT: Negative for drooling, mouth sores, sneezing and trouble swallowing. Eyes: Negative for redness and itching. Respiratory: Negative for cough, chest tightness and shortness of breath. Cardiovascular: Negative for chest pain, palpitations and leg swelling. Gastrointestinal: Negative for abdominal pain, blood in stool, nausea and vomiting. Endocrine: Negative for heat intolerance and polyphagia. Genitourinary: Negative for difficulty urinating, flank pain and pelvic pain. Musculoskeletal: Positive for back pain (Thoracic). Negative for arthralgias, joint swelling and neck stiffness. Skin: Negative for color change and pallor. Allergic/Immunologic: Negative for food allergies. Neurological: Negative for dizziness, seizures and headaches. Hematological: Does not bruise/bleed easily. Psychiatric/Behavioral: Negative for agitation, behavioral problems and suicidal ideas. The patient is not hyperactive. Medications: Allergies:    Allergies   Allergen Reactions    Adhesive Tape Rash    Meperidine Nausea Only    Demerol Nausea Only    Ultram [Tramadol Hcl]      Hot flashes    Zocor [Simvastatin]      Muscle aches    Codeine      \"out of it\"    Fentanyl Rash       Current Meds: furosemide (LASIX) injection 20 mg, BID  midodrine (PROAMATINE) tablet 10 mg, TID WC  magic (miracle) mouthwash with nystatin, TID PRN  sodium chloride flush 0.9 % injection 10 mL, 2 times per day  sodium chloride flush 0.9 % injection 10 mL, PRN  Vitamin D (CHOLECALCIFEROL) tablet 2,000 Units, Daily  calcium carbonate (TUMS) chewable tablet 500 mg, BID  cefTRIAXone (ROCEPHIN) 2000 mg IVPB in D5W 50ml minibag, Q24H  famotidine (PEPCID) tablet 10 mg, Nightly  predniSONE (DELTASONE) tablet 10 mg, Daily  nystatin (MYCOSTATIN) 470955 UNIT/ML suspension 500,000 Units, 4x Daily  hydrocortisone-aloe 1 % cream, BID  potassium chloride (KLOR-CON M) extended release tablet 40 mEq, PRN   Or  potassium bicarb-citric acid (EFFER-K) effervescent tablet 40 mEq, PRN   Or  potassium chloride 10 mEq/100 mL IVPB (Peripheral Line), PRN  dextrose 50 % IV solution, PRN  oxyCODONE-acetaminophen (PERCOCET) 5-325 MG per tablet 1 tablet, Q4H PRN  warfarin (COUMADIN) daily dosing (placeholder), RX Placeholder  baclofen (LIORESAL) tablet 5 mg, TID PRN  polyethylene glycol (GLYCOLAX) packet 17 g, Daily  perflutren lipid microspheres (DEFINITY) injection 2.2 mg, ONCE PRN  sodium chloride flush 0.9 % injection 5-40 mL, 2 times per day  sodium chloride flush 0.9 % injection 5-40 mL, PRN  0.9 % sodium chloride infusion, PRN  acetaminophen (TYLENOL) tablet 650 mg, Q4H PRN  carvedilol (COREG) tablet 6.25 mg, BID  naloxone (NARCAN) injection 0.4 mg, PRN  atorvastatin (LIPITOR) tablet 40 mg, Daily  ferrous sulfate 300 (60 Fe) MG/5ML syrup 300 mg, Daily  pantoprazole (PROTONIX) tablet 40 mg, QAM AC  oxybutynin (DITROPAN-XL) extended release tablet 15 mg, Daily  polyethylene glycol (GLYCOLAX) packet 17 g, Daily PRN  rOPINIRole (REQUIP) tablet 0.5 mg, 4x Daily  sertraline (ZOLOFT) tablet 50 mg, Daily  amiodarone (CORDARONE) tablet 100 mg, BID        Data:     Code Status:  Full Code    Family History   Problem Relation Age of Onset    Heart Failure Brother     Heart Attack Brother         triple bypass    Prostate Cancer Brother     Coronary Art Dis Mother     Breast Cancer Mother         dx'd in late 46s   Lilyan Montelva Emphysema Father     Colon Cancer Neg Hx     Diabetes Neg Hx     Eclampsia Neg Hx     Hypertension Neg Hx     Ovarian Cancer Neg Hx      Labor Neg Hx     Spont Abortions Neg Hx     Stroke Neg Hx        Social History     Socioeconomic History    Marital status:      Spouse name: Not on file    Number of children: Not on file    Years of education: Not on file    Highest education level: Not on file   Occupational History    Not on file   Tobacco Use    Smoking status: Never Smoker    Smokeless tobacco: Never Used   Vaping Use    Vaping Use: Never used   Substance and Sexual Activity    Alcohol use: Yes     Comment: social    Drug use: No    Sexual activity: Not Currently   Other Topics Concern    Not on file   Social History Narrative    Not on file     Social Determinants of Health     Financial Resource Strain:     Difficulty of Paying Living Expenses:    Food Insecurity:     Worried About Running Out of Food in the Last Year:     [address] in the Last Year:    Transportation Needs:     Lack of Transportation (Medical):  Lack of Transportation (Non-Medical):    Physical Activity:     Days of Exercise per Week:     Minutes of Exercise per Session:    Stress:     Feeling of Stress :    Social Connections:     Frequency of Communication with Friends and Family:     Frequency of Social Gatherings with Friends and Family:     Attends Zoroastrianism Services:     Active Member of Clubs or Organizations:     Attends Club or Organization Meetings:     Marital Status:    Intimate Partner Violence:     Fear of Current or Ex-Partner:     Emotionally Abused:     Physically Abused:     Sexually Abused:        I/O (24Hr):     Intake/Output Summary (Last 24 hours) at 2021 1322  Last data filed at 2021 1845  Gross per 24 hour   Intake 290 ml   Output --   Net 290 ml     Radiology:  XR THORACIC SPINE (3 VIEWS)    Result Date: 6/1/2021  Compression fracture of T5, T6, T9, T10 vertebral bodies with 30% loss of normal height at T6. Stable compression fracture of L1 body with previous kyphoplasty Multilevel spondylosis and degenerative disc disease. Facet arthropathy     XR LUMBAR SPINE (2-3 VIEWS)    Result Date: 6/1/2021  Compression fracture of T5, T6, T9, T10 vertebral bodies with 30% loss of normal height at T6. Stable compression fracture of L1 body with previous kyphoplasty Multilevel spondylosis and degenerative disc disease. Facet arthropathy     MRI THORACIC SPINE WO CONTRAST    Result Date: 6/2/2021  1. Marrow edema at T12 with edema in the T12-L1 intervertebral disc space and edema in the L1 vertebral body. This could be related to trauma/fracture, but discitis/osteomyelitis cannot be excluded. Post-contrast imaging may be useful for further characterization. 2. Multilevel degenerative changes of the thoracic spine. MRI LUMBAR SPINE WO CONTRAST    Result Date: 6/2/2021  Abnormal endplate edema and disc space T2 signal identified at T12-L1. Associated with abnormal signal involving the bilateral psoas muscle. Although this can be seen could be related to compression fracture, findings are worrisome underlying discitis osteomyelitis. Correlation with sedimentation rate and CRP is recommended. If indicated, contrast may be beneficial. Otherwise stable capitalize changes at L1 with 50 % loss of height and retropulsion into the canal. Moderate severe multilevel degenerate changes The findings were sent to the Radiology Results [address] at 10:17 pm on 6/2/2021to be communicated to a licensed caregiver. IR FLUORO GUIDED CVA DEVICE PLMT/REPLACE/REMOVAL    Result Date: 6/4/2021  Successful ultrasound and fluoroscopy guided right basilic vein 5 [place] power injectable dual-lumen PICC placement. Ready for use. CT NEEDLE BIOPSY SPINE    Result Date: 6/4/2021  Technically successful CT-guided biopsy of the T12-L1 disc space. Sample was sent as requested by the ordering service.        Labs:  Recent Results (from the past 24 hour(s))   CBC with DIFF    Collection Time: 06/08/21  5:07 AM   Result Value Ref Range    WBC 7.5 3.5 - 11.0 k/uL    RBC 2.80 (L) 4.0 - 5.2 m/uL    Hemoglobin 8.1 (L) 12.0 - 16.0 g/dL    Hematocrit 25.1 (L) 36 - 46 %    MCV 89.5 80 - 100 fL    MCH 28.8 26 - 34 pg    MCHC 32.1 31 - 37 g/dL    RDW 17.2 (H) 11.5 - 14.9 %    Platelets 062 808 - 620 k/uL    MPV 8.2 6.0 - 12.0 fL    NRBC Automated NOT REPORTED per 100 WBC    Differential Type NOT REPORTED     Seg Neutrophils 86 (H) 36 - 66 %    Lymphocytes 7 (L) 24 - 44 %    Monocytes 7 1 - 7 %    Eosinophils % 0 0 - 4 %    Basophils 0 0 - 2 %    Immature Granulocytes NOT REPORTED 0 %    Segs Absolute 6.30 1.3 - 9.1 k/uL    Absolute Lymph # 0.50 (L) 1.0 - 4.8 k/uL    Absolute Mono # 0.50 0.1 - 1.3 k/uL    Absolute Eos # 0.00 0.0 - 0.4 k/uL    Basophils Absolute 0.00 0.0 - 0.2 k/uL    Absolute Immature Granulocyte NOT REPORTED 0.00 - 0.30 k/uL    WBC Morphology NOT REPORTED     RBC Morphology NOT REPORTED     Platelet Estimate NOT REPORTED    Comprehensive Metabolic Panel w/ Reflex to MG    Collection Time: 06/08/21  5:07 AM   Result Value Ref Range    Glucose 123 (H) 70 - 99 mg/dL    BUN 30 (H) 8 - 23 mg/dL    CREATININE 0.77 0.50 - 0.90 mg/dL    Bun/Cre Ratio NOT REPORTED 9 - 20    Calcium 8.2 (L) 8.6 - 10.4 mg/dL    Sodium 141 135 - 144 mmol/L    Potassium 4.2 3.7 - 5.3 mmol/L    Chloride 106 98 - 107 mmol/L    CO2 27 20 - 31 mmol/L    Anion Gap 8 (L) 9 - 17 mmol/L    Alkaline Phosphatase 98 35 - 104 U/L    ALT 25 5 - 33 U/L    AST 17 <32 U/L    Total Bilirubin 0.32 0.3 - 1.2 mg/dL    Total Protein 4.8 (L) 6.4 - 8.3 g/dL    Albumin 2.5 (L) 3.5 - 5.2 g/dL    Albumin/Globulin Ratio NOT REPORTED 1.0 - 2.5    GFR Non-African American >60 >60 mL/min    GFR  >60 >60 mL/min    GFR Comment          GFR Staging NOT REPORTED    PROTIME-INR    Collection Time: 21  5:07 AM   Result Value Ref Range    Protime 27.6 (H) 11.8 - 14.6 sec    INR 2.6        Physical Examination:        Vitals:  /78   Pulse 103   Temp 97.5 °F (36.4 °C) (Oral)   Resp 17   Ht 5' 5.15\" (1.655 m)   Wt 167 lb 1.7 oz (75.8 kg)   SpO2 100%   BMI 27.68 kg/m²   Temp (24hrs), Av.8 °F (36.6 °C), Min:97.3 °F (36.3 °C), Max:98.4 °F (36.9 °C)    No results for input(s): POCGLU in the last 72 hours. Physical Exam  Vitals reviewed. HENT:      Head: Normocephalic. Right Ear: External ear normal.      Left Ear: External ear normal.      Nose: Nose normal.      Mouth/Throat:      Mouth: Mucous membranes are moist.      Pharynx: Oropharynx is clear. Eyes:      Conjunctiva/sclera: Conjunctivae normal.   Cardiovascular:      Rate and Rhythm: Normal rate and regular rhythm. Pulses: Normal pulses. Heart sounds: Normal heart sounds. Pulmonary:      Effort: Pulmonary effort is normal.      Breath sounds: Normal breath sounds. Abdominal:      General: Bowel sounds are normal.      Palpations: Abdomen is soft. Musculoskeletal:         General: Tenderness (Thoracic) present. Cervical back: Normal range of motion and neck supple. Right lower leg: No edema. Left lower leg: No edema. Skin:     General: Skin is warm. Capillary Refill: Capillary refill takes less than 2 seconds. Coloration: Skin is not jaundiced. Neurological:      General: No focal deficit present. Mental Status: She is alert. Mental status is at baseline.    Psychiatric:         Mood and Affect: Mood normal.         Behavior: Behavior normal.         Assessment:        Primary Problem  Atrial fibrillation with RVR (HCC)     Principal Problem:    Atrial fibrillation with RVR (HCC)  Active Problems:    Hypertension    Mixed hyperlipidemia    Chronic diastolic heart failure (HCC)    Chronic respiratory failure (HCC)    Gastroesophageal reflux disease without esophagitis    Pulmonary hypertension (HCC)    YOGESH (acute kidney injury) (HCC)    Closed compression fracture of L1 lumbar vertebra, sequela    Acute midline low back pain with right-sided sciatica    Acute exacerbation of chronic low back pain    Iron deficiency anemia    Rectal bleeding    Elevated LFTs    Closed wedge compression fracture of fifth thoracic vertebra (HCC)    Closed wedge compression fracture of T6 vertebra (HCC)    Closed wedge compression fracture of T9 vertebra (HCC)    Closed wedge compression fracture of T10 vertebra (HCC)    Discitis of thoracolumbar region    Acute cystitis without hematuria    Positive blood culture    Lumbar radiculopathy    Elevated C-reactive protein (CRP)  Resolved Problems:    * No resolved hospital problems. *      Past Medical History:   Diagnosis Date    Asbestos exposure     Atrial fibrillation ([address]     takes xarelto    Basal cell carcinoma of nose     CAD (coronary artery disease)     CKD (chronic kidney disease) stage 2, GFR 60-89 ml/min     Hyperlipidemia     Hypertension     MDRO (multiple drug resistant organisms) resistance 2/19/2014    E. 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    [facility] at [facility]ON[facility] (postoperative nausea and vomiting)     Prolonged emergence from general anesthesia     SOB (shortness of breath) on exertion         Plan:        1. IV Rocephin 2 g daily till 7/21/2021 per ID  2. IR guided spinal fluid aspirate culture pending  1. Thoracic corset for thoracic vertebral compression fractures. 2. ARU discharge planning in progress  3. Discussed the case with Dr. Holli Khan on the phone  4. Discussed with nurse, patient and family at bedside  5.  INR daily  6. CBC, CMP  7. DVT Prophylaxis on Coumadin INR therapeutic  8. EPCs  9. PT/OT to evaluate and treat  10. Pain control  11. Replace electrolytes as per sliding scale  12. Home medications reviewed and appropriate medications continued  13.  Reviewed labs and imaging studies from last 24 hours and results explained to patient      Electronically signed by Norma Jason MD

## 2021-06-09 PROBLEM — E44.0 MODERATE MALNUTRITION (HCC): Status: ACTIVE | Noted: 2021-01-01

## 2021-06-09 NOTE — PLAN OF CARE
Problem: Skin Integrity:  Goal: Will show no infection signs and symptoms  Description: Will show no infection signs and symptoms  6/9/2021 1622 by Vazquez Cortes RN  Outcome: Ongoing  6/9/2021 0248 by Beltran Ashby RN  Outcome: Ongoing  Goal: Absence of new skin breakdown  Description: Absence of new skin breakdown  6/9/2021 1622 by Vazquez Cortes RN  Outcome: Ongoing  6/9/2021 0248 by Beltran Ashby RN  Outcome: Ongoing     Problem: Falls - Risk of:  Goal: Will remain free from falls  Description: Will remain free from falls  6/9/2021 1622 by Vazquez Cortes RN  Outcome: Ongoing  6/9/2021 0248 by Beltran Ashby RN  Outcome: Met This Shift  Goal: Absence of physical injury  Description: Absence of physical injury  6/9/2021 1622 by Vazquez Cortes RN  Outcome: Ongoing  6/9/2021 0248 by Beltran Ashby RN  Outcome: Met This Shift     Problem: Nutrition  Goal: Optimal nutrition therapy  Outcome: Ongoing     Problem: Musculor/Skeletal Functional Status  Goal: Highest potential functional level  Outcome: Ongoing  Goal: Absence of falls  Outcome: Ongoing

## 2021-06-09 NOTE — PROGRESS NOTES
[address] [facility]   Acute Rehabilitation OT Evaluation  Date: 21  Patient Name: Nedra Ibarra       Room: 9324/8069-78  MRN: [id number]  Account: [de-identified]   : 1935  (81 y.o.) Gender: female     Referring Practitioner: Dr. Jono Zarate  Diagnosis: Debility  Additional Pertinent Hx: A fib, CAD, CKD, CHF, HTN, COPD    Treatment Diagnosis: Inablity to care for self. Past Medical History:  has a past medical history of Asbestos exposure, Atrial fibrillation ([facility] [address] Basal cell carcinoma of nose, CAD (coronary artery disease), CKD (chronic kidney disease) stage 2, GFR 60-89 ml/min, Hyperlipidemia, Hypertension, MDRO (multiple drug resistant organisms) resistance, Nephrolithiasis, Osteoarthritis, Ovarian cyst, Oxygen dependent, Palpitations, Peritoneal mesothelioma ([address] PONV (postoperative nausea and vomiting), Prolonged emergence from general anesthesia, and SOB (shortness of breath) on exertion. Past Surgical History:   has a past surgical history that includes Tubal ligation (); Hysterectomy (); Colonoscopy; Total hip arthroplasty (Right, ); Rectocele repair (); liver biopsy (); Upper gastrointestinal endoscopy; Photodynamic Therapy (2000 & 2002); Total hip arthroplasty (Left, ); Revision total hip arthroplasty (Right, ); Rotator cuff repair (); Ankle fracture surgery (Right, ); Cataract removal (Bilateral); Cardiac catheterization (); liver biopsy; Coronary angioplasty with stent (2002); Morton tooth extraction; Appendectomy (); Cholecystectomy (); Skin cancer excision (Bilateral, ); Skin cancer excision (Left, Oct. 2014); nasal endoscopy (Bilateral, 2014); BIOPSY / LIGATION TEMPORAL ARTERY (Bilateral, 2021); Breast biopsy (Right); Spine surgery (N/A, 4/15/2021); Upper gastrointestinal endoscopy (N/A, 2021);  Colonoscopy (N/A, 2021); and CT NEEDLE BIOPSY SPINE (6/4/2021). Restrictions  Restrictions/Precautions: Contact Precautions, Fall Risk  Implants present? : Metal implants (Bilat STACI, h/o ankle ORIF, cardiac stent)  Other position/activity restrictions: left leg 1/2\" shorter than the right (S/P BILATERAL THRs), O2 per NC PRN PICC line R UE  Required Braces or Orthoses  Spinal Other: thoracic corset  Required Braces or Orthoses?: Yes    Vitals  Temp: 98.5 °F (36.9 °C)  Pulse: 99  Resp: 18  BP: 99/76  Height: 5' 5\" (165.1 cm)  Weight: 167 lb 1.7 oz (75.8 kg)  BMI (Calculated): 27.9  Oxygen Therapy  SpO2: 100 %  O2 Device: Nasal cannula  O2 Flow Rate (L/min): 2 L/min  Level of Consciousness: Alert (0)    Subjective  Subjective: Patient stated, \" yes I know what occupational therapy is. \"  Comments: Patient sitting up in W/C, cooperative and agreeable to OT evaluation. Patient's 02 at beginning of session at 100%, HR at 107, 02 at end of session at 98% , HR at 103. Orientation  Overall Orientation Status: Within Functional Limits  Vision  Vision: Within Functional Limits (Patient reported she had implants after cataract surgery which corrected her vision .)  Hearing  Hearing: Within functional limits  Social/Functional History  Lives With: Spouse  Type of Home:  (Condo, 2 level , lives on the first floor)  Home Layout: Two level, Able to Live on Main level with bedroom/bathroom  Home Access: Stairs to enter with rails  Entrance Stairs - Number of Steps: 2 steps entrance from the garage or front door, usually use the garage entrance.   Entrance Stairs - Rails: Both  Bathroom Shower/Tub: Walk-in shower, Doors  Bathroom Toilet: Handicap height  Bathroom Equipment: Built-in shower seat, Grab bars around toilet  Bathroom Accessibility: Walker accessible  Home Equipment: Crutches, Oxygen, Long-handled shoehorn, Reacher (O2 at night , usually at 2 liters)  Receives Help From: Family ( helps, also patient's  4 children live within 10 miles from patient.)  ADL Assistance: Independent  Homemaking Assistance: Needs assistance (Patient's daughters assist  patient with laundry, and also bring in meals at times,but patient stated she likes to cook. 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, puzzles, and reading  IADL Comments: sleeps in a flat bed, with several pillows  Additional Comments: Large supportive family assist at D/C       Objective      Cognition  Overall Cognitive Status: WFL   Sensation  Overall Sensation Status: WFL (Patient denied any tingling or numbness.)     UE Function  Hand Dominance  Hand Dominance: Right        LUE Strength  LUE Strength Comment: Did not assess proximal strength secondary to thoracic compression fractures. Left Hand Strength -  (lbs)  Handle Setting 2: 18 # ( norm 31#-50#)  LUE Tone: Normotonic     LUE AROM (degrees)  LUE General AROM: Did not assess above 90 degree shoulder flexion secondary to thoracic compression fractures, elbow flexion/extension WFL, increased edema in left elbow area. Left Hand AROM (degrees)  Left Hand AROM: WFL  RUE Strength  RUE Strength Comment: Did not assess proximal strength secondary to thoracic compression fractures.    Right Hand Strength -  (lbs)  Handle Setting 2: 18# ( Norm 31#-50#)  RUE Tone: Normotonic     RUE AROM (degrees)  RUE General AROM: Did not assess above 90 degree shoulder flexion secondary to thoracic compression fractures, elbow flexion/ extension WFL     Right Hand AROM (degrees)  Right Hand AROM: WFL              Left 9 Hole Peg Test Time (secs): 31.91  Right 9-Hole Peg Test: Impaired  Right 9 Hole Peg Test Time (secs): 38.55  Fine Motor Comment: Norm for 9 Hole Peg Test = 22-31 seconds         Fine Motor Skills  Coordination  Movements Are Fluid And Coordinated: Yes                      Hand Assessment Comment: Impaired bilateral  strength with patient stating she was having difficulty at home opening jars and bottles. Mobility                                Activity Tolerance: Patient Tolerated treatment well  Activity Tolerance: Patient tolerated session well from seated position in W/C. ADL     ADL  Feeding: Setup  Grooming: Stand by assistance (completed in seated position in W/C at sink to bathe face, and complete oral hygiene.)  UE Bathing: Minimal assistance  LE Bathing: Maximum assistance (Patient able to demonstrate lower body bathing in seated position at MAX A to bathe quad area only.)  UE Dressing: Moderate assistance  LE Dressing: Dependent/Total  Toileting: Dependent/Total  Additional Comments: ADL scores not otherwise documented based on skilled observation and clinical reasoning secondary to patient sitting up in W/C during evaluation, and patient has precaution of having her thoracic brace on when OOB. Goals  Patient Goals   Patient goals : Patient stated, \" to be able to take care of myself. \"  Short term goals  Time Frame for Short term goals: In one week, patient will:  Short term goal 1: complete upper body bathing/dressing including donning of thoracic corset at MOD A in supine position with reported pain at 5/10 or less, and 3 or less verbal cues for energy conservation. Short term goal 2: complete lower body bathing/dressing with AE in supine position or seated  at MOD A with reported pain at 5/10 or less, and 3 or less verbal cues for energy conservation. Short term goal 3: complete toileting at [address] [place] [address] with reported pain at 5/10 or less, and 3 or less verbal cues for energy conservation.   Short term goal 4: increase  strength evidenced by improved score by 5#'s  Short term goal 5: increase fine motor coordination evidenced by improved score on 9 Hole Peg Test by 5 seconds. Long term goals  Time Frame for Long term goals : By discharge, patient will:  Long term goal 1: complete upper body bathing/dressing including donning of thoracic corset, and grooming at [facility] with reported pain of 2/10 or less, and 0 verbal cues for energy conservation. Long term goal 2: complete lower body bathing/dressing in seated position at [facility] with AE , with reported pain of 2/10 or less, and 0 verbal cues for energy conservation. Long term goal 3: complete functional transfers/ mobility with LRD at [facility] with reported pain of 2/10 or less, and 0 verbal cues for energy conservation. Long term goal 4: complete toileting at [facility] reporting 2/10 pain or less. Long term goal 5: complete simple meal preparation at [facility] with reported pain at 2/10 or less, and 0 verbal cues for energy conservation. Assessment  Performance deficits / Impairments: Decreased functional mobility , Decreased balance, Decreased ADL status, Decreased endurance, Decreased high-level IADLs, Decreased strength  Assessment: Secondary to above deficits, skilled occupational theapy is warranted to improve patient's endurance, strength, and balance facilitating increased independence and safety in ADL's. IADL's , and functional mobility/ transfers for safest discharge home. Treatment Diagnosis: Inablity to care for self.   Prognosis: Good  Decision Making: Medium Complexity  Exam: 6 areas of altered performance  REQUIRES OT FOLLOW UP: Yes  Discharge Recommendations: Continue to assess pending progress  Plan  Times per week: 5-7 days a week  Times per day: Twice a day  Current Treatment Recommendations: Safety Education & Training, Balance Training, Self-Care / ADL, Patient/Caregiver Education & Training, Functional Mobility Training, Home Management Training, Endurance Training, Strengthening  OT Education  OT Education: ADL Adaptive Strategies  Patient Education: In am evaluation patient education provided addressing role of OT, plan of care, and adaptive strategies/ techniques. In afternoon session, patient education provided to patient and patient's family (  and daughter) explaining purpose, care, and donning  of compression stockings. Patient stated understanding.         OT Individual Minutes  Time In : 9131  Time Out: 4524  Minutes: 63    OT Individual Minutes  Time In: 7909  Time Out: [phone]  Minutes: 34    Electronically signed by Elio Saenz on 6/9/21 at 6:51 PM EDT

## 2021-06-09 NOTE — PROGRESS NOTES
Comprehensive Nutrition Assessment    Type and Reason for Visit:  Initial, Consult (wt loss, poor appetite, eval and treat)    Nutrition Recommendations/Plan: Modify current diet to 3-4g Na. Provide Ensure Enlive x 2 and Magic Cup 1 x daily. Provide cafeteria menu options. Nutrition Assessment:  Pt admitted with debility related to back pain. Med hx: COPD, diastolic CHF, hyperlipidemia, HTN, GERD, CAD, CKD stage 3; Pt previously reported metallic salty taste in mouth, (6/4) PICC placed. Pt states appetite has decreased; consuming less than 50% of meals. Pt states mouth sores make it difficult to eat and states she sometimes has a hard time swallowing. Declined need for altered diet texture. No hx of DM; carbohydrate control to be discontinued but pt/daughter would like a low salt diet provided. Pt also requested increased variety of foods. Malnutrition Assessment:  Malnutrition Status: Moderate malnutrition    Context:  Acute Illness     Findings of the 6 clinical characteristics of malnutrition:  Energy Intake:  1 - 75% or less of estimated energy requirements for 7 or more days  Weight Loss:   (Suspect dry wt loss is greater than 7.5%)     Body Fat Loss:  Unable to assess     Muscle Mass Loss:  Unable to assess    Fluid Accumulation:  7 - Moderate to Severe Extremities   Strength:  Not Performed    Estimated Daily Nutrient Needs:  Energy (kcal):  9693-7297 kcals /d using 1.3-1.5 factor; Weight Used for Energy Requirements:  Current     Protein (g):  74 g/d using 1.4 g/kg; Weight Used for Protein Requirements:  Ideal          Nutrition Related Findings:  Edema: (6/7)- LUE +1, non-pitting (6/9)- RLE, LLE +4, pitting. Labs: Glu-121. Meds: Lasix, Coumadin. Other labs and meds reviewed. Wounds:  None       Current Nutrition Therapies:    ADULT DIET; Regular;  No Added Salt (3-4 gm)  Adult Oral Nutrition Supplement; Standard High Calorie/High Protein Oral Supplement  Adult Oral Nutrition Supplement; Frozen Oral Supplement    Anthropometric Measures:  · Height: 5' 5\" (165.1 cm)  · Current Body Weight: 167 lb 1.7 oz (75.8 kg)   · Admission Body Weight: 167 lb 1.7 oz (75.8 kg)    · Usual Body Weight: 160 lb (72.6 kg) (Pt unintentionally lost wt, states dry wt less than 140#)     · Ideal Body Weight: 125 lbs; % Ideal Body Weight 133.7 %   · BMI: 27.8  · BMI Categories: Overweight (BMI 25.0-29. 9)       Nutrition Diagnosis:   · Moderate malnutrition related to  (current medical condition) as evidenced by intake 26-50%, intake 0-25%, weight loss, localized or generalized fluid accumulation    Nutrition Interventions:   Food and/or Nutrient Delivery:  Modify Oral Nutrition Supplement, Modify Current Diet  Nutrition Education/Counseling:  No recommendation at this time   Coordination of Nutrition Care:  Continue to monitor while inpatient    Goals:  PO intake %       Nutrition Monitoring and Evaluation:   Behavioral-Environmental Outcomes:  None Identified   Food/Nutrient Intake Outcomes:  Food and Nutrient Intake, Supplement Intake  Physical Signs/Symptoms Outcomes:  Biochemical Data, Nutrition Focused Physical Findings, Fluid Status or Edema, Skin, Weight, Chewing or Swallowing     Discharge Planning: Too soon to determine     Electronically signed by Shine Vital on 6/9/21 at 12:52 PM EDT    Reviewed and approved by:  Calderon Mir R.D., L.D.   Phone: [phone]

## 2021-06-09 NOTE — PROGRESS NOTES
Physical Therapy    Facility/Department: [facility]  Initial Assessment    NAME: Ghassan Butler  : 1935  MRN: [id number]    Date of Service: 2021    Discharge Recommendations:  Patient would benefit from continued therapy after discharge, Home with assist PRN   PT Equipment Recommendations  Equipment Needed: No    Assessment   Body structures, Functions, Activity limitations: Decreased functional mobility ; Decreased strength;Decreased endurance;Decreased balance; Increased pain;Decreased safe awareness;Decreased posture  Assessment: Pt present with genralized weakness, has increased back pain with mobility, will conitnue POC to maximize reahb potential for safe DC home. Treatment Diagnosis: impaired mobility due to low back pain/ weakness  Specific instructions for Next Treatment: Log roll with bed mobility, don corset prior to bed mobility  Prognosis: Good  Decision Making: Medium Complexity  History: Recent Kyphoplasty at L1 in . Pt has T5,T6, T9, T10 compression fxs and L1 discitis/ osteomyelitis.,  Exam: ROM, MMT, balance and mobility assessments  Barriers to Learning: none  REQUIRES PT FOLLOW UP: Yes  Activity Tolerance  Activity Tolerance: Patient limited by pain; Patient limited by endurance; Patient limited by fatigue       Patient Diagnosis(es): There were no encounter diagnoses. has a past medical history of Asbestos exposure, Atrial fibrillation ([address] Basal cell carcinoma of nose, CAD (coronary artery disease), CKD (chronic kidney disease) stage 2, GFR 60-89 ml/min, Hyperlipidemia, Hypertension, MDRO (multiple drug resistant organisms) resistance, Nephrolithiasis, Osteoarthritis, Ovarian cyst, Oxygen dependent, Palpitations, Peritoneal mesothelioma ([address] PONV (postoperative nausea and vomiting), Prolonged emergence from general anesthesia, and SOB (shortness of breath) on exertion. has a past surgical history that includes Tubal ligation ();  Hysterectomy (); trauma/fracture or discitis/osteomyelitis. She underwent IR guided aspiration on 6/4/21 and culture is showing no growth at this time. ID is recommending IV ceftriaxone for 6 weeks. Pt admitted to rehab unit on 6/8/21. Response To Previous Treatment: Patient with no complaints from previous session. Family / Caregiver Present: No  Referring Practitioner: Dr Fox Mart  Referral Date : 06/08/21  Follows Commands: Within Functional Limits  Pain Screening  Patient Currently in Pain: Denies  Pain Assessment  Clinical Progression: Not changed  Response to Pain Intervention: Asleep with RR greater than 10  POSS Score (Patient Ctrl Analgesia): 1  Vital Signs  BP Location: Left upper arm  Level of Consciousness: Alert (0)  Patient Currently in Pain: Denies  Oxygen Therapy  O2 Device: Nasal cannula  O2 Flow Rate (L/min): 3 L/min       Orientation  Orientation  Overall Orientation Status: Within Functional Limits  Social/Functional History  Social/Functional History  Lives With: Spouse  Type of Home:  ([facility], 2 level , lives on the [place] floor)  Home Layout: Two level, Able to Live on Main level with bedroom/bathroom  Home Access: Stairs to enter with rails  Entrance Stairs - Number of Steps: 2 steps entrance from the garage or front door, usually use the garage entrance.   Entrance Stairs - Rails: Both  Bathroom Shower/Tub: Walk-in shower, Doors  Bathroom Toilet: Handicap height  Bathroom Equipment: Built-in shower seat, Grab bars around toilet  Bathroom Accessibility: Walker accessible  Home Equipment: Crutches, Oxygen, Long-handled shoehorn, Reacher (O2 at night , usually at 2 liters)  Receives Help From: Family ( helps, also 4 children within 10 miles.)  ADL Assistance: Independent  Homemaking Assistance: Needs assistance (Daughters assisting with laundry, helps with brining in meals,)  Homemaking Responsibilities: Yes  Ambulation Assistance: Independent (crutches for hip pain,)  Transfer Assistance: - Static: Fair;-  Standing - Dynamic:  (NA)        Plan   Plan  Times per week: 1.5 hr/day, 5 to 7 days/week. Specific instructions for Next Treatment: Log roll with bed mobility, don corset prior to bed mobility  Current Treatment Recommendations: Strengthening, Functional Mobility Training, Safety Education & Training, Transfer Training, ROM, Balance Training, Endurance Training, Patient/Caregiver Education & Training, Positioning, Equipment Evaluation, Education, & procurement, Home Exercise Program, Wheelchair Mobility Training, Gait Training, Stair training  Safety Devices  Type of devices: Patient at risk for falls, Gait belt, Call light within reach, Left in chair    G-Code       OutComes Score                                                  AM-PAC Score             Goals  Short term goals  Time Frame for Short term goals: 10 days  Short term goal 1: pt to increase LE strength  by 1MMG to improve strength. Short term goal 2: pt to demonstrate good technique for log rolling, balance and strengthening exercises as tolerated  Short term goal 3: pt to demo good sitting balance and fair  standing balance with least restrictive device to increase safety  Short term goal 4: pt to demonstrate improved bed mobility with log rolling using rail w/ min A x1  Short term goal 5: pt to demo sit<>stand transfer with rolling walker mod A  Short term goal 6: pt to transfer bed<>chair with rolling walker min A  to mod . Short term goal 7: pt able to ambulate with rolling walker distance of  20 ftx 2, min a x 1 to 2 person. Short term goal 8: . Short term goal 9: .  Long term goals  Time Frame for Long term goals : By DC  Long term goal 1: Pt able to perform sit<>stand and pivot transfers with RW at [address] [facility] term goal 2: Pt able to ambulate with rolling walker distance of 50 ft or more, SBA/CGA, level surfaces.   Long term goal 3: Pt able to go up and down 4 to 5 steps with 2 rails, min A   Long term goal 4: Pt able to propel w/c distance of 50 to 100 ft, level surfaces, SBA  Long term goal 5: Pt able to perform supine<>sit at CGA/min A  Long term goal 6: Educate family/pt in safe mobility /steps for safe discharge home. Long term goal 7: Ambulate distance of 30 to 50 ft for 2MWT with rolling walker, to improve strength/overall fucntion. Patient Goals   Patient goals :  Move better       Therapy Time   Individual Concurrent Group Co-treatment   Time In 0920         Time Out 1022         Minutes 62         Timed Code Treatment Minutes: [facility] Nacho Pimentel, PT

## 2021-06-09 NOTE — PROGRESS NOTES
nasal endoscopy (Bilateral, 2014); BIOPSY / LIGATION TEMPORAL ARTERY (Bilateral, 2021); Breast biopsy (Right); Spine surgery (N/A, 4/15/2021); Upper gastrointestinal endoscopy (N/A, 2021); Colonoscopy (N/A, 2021); and CT NEEDLE BIOPSY SPINE (2021). Additional Pertinent Hx: Per PM& R note: Ms. Jessica Stanley who is a 81 y.o. right handed female admitted to [facility] on 2021 with Back Pain. She had recent L1 kyphoplasty prior to presentation. Caudal epidural steroid injection was done on 21 by Dr. Eimly Hager. MRI thoracic and lumbar spine showed marrow edema at T12 with edema in the T12-L1 intervertebral disc space and edema in the L1 vertebral body, related to trauma/fracture or discitis/osteomyelitis. She underwent IR guided aspiration on 21 and culture is showing no growth at this time. ID is recommending IV ceftriaxone for 6 weeks. Pt admitted to rehab unit on 21. Overall Orientation Status: Within Functional Limits  Restrictions/Precautions  Restrictions/Precautions: Contact Precautions; Fall Risk  Required Braces or Orthoses?: Yes  Implants present? : Metal implants (Bilat STACI, h/o ankle ORIF, cardiac stent)  Required Braces or Orthoses  Spinal Other: thoracic corset  Position Activity Restriction  Other position/activity restrictions: left leg 1/2\" shorter than the right (S/P BILATERAL THRs), O2 per NC PRN PICC line R UE    Subjective: Pt reports having pain only when moving  Comments: Pt is pleasant and cooperative. Pt reports that pt is able to chelle/doff brace at EOB, however no orders are in clarifying that. PT Eileen will varify from nursing. Vital Signs  Patient Currently in Pain: Denies        Oxygen Therapy  SpO2: 91 %  O2 Device: None (Room air)          Bed Mobility:   Rolling: Minimal assistance  Supine to Sit: Unable to assess  Sit to Supine: Moderate assistance (assist with bilat LE)  Scootin Person assistance; Moderate assistance (to Provided:  Plan of Care  Learner:patient  Method: demonstration and explanation       Outcome: needs reinforcement     Goals  Short term goals  Time Frame for Short term goals: 10 days  Short term goal 1: pt to increase LE strength  by 1MMG to improve strength. Short term goal 2: pt to demonstrate good technique for log rolling, balance and strengthening exercises as tolerated  Short term goal 3: pt to demo good sitting balance and fair  standing balance with least restrictive device to increase safety  Short term goal 4: pt to demonstrate improved bed mobility with log rolling using rail w/ min A x1  Short term goal 5: pt to demo sit<>stand transfer with rolling walker mod A  Short term goal 6: pt to transfer bed<>chair with rolling walker min A  to mod . Short term goal 7: pt able to ambulate with rolling walker distance of  20 ftx 2, min a x 1 to 2 person. Short term goal 8: . Short term goal 9: .  Long term goals  Time Frame for Long term goals : By DC  Long term goal 1: Pt able to perform sit<>stand and pivot transfers with RW at [address] [facility] term goal 2: Pt able to ambulate with rolling walker distance of 50 ft or more, SBA/CGA, level surfaces. Long term goal 3: Pt able to go up and down 4 to 5 steps with 2 rails, min A   Long term goal 4: Pt able to propel w/c distance of 50 to 100 ft, level surfaces, SBA  Long term goal 5: Pt able to perform supine<>sit at CGA/min A  Long term goal 6: Educate family/pt in safe mobility /steps for safe discharge home. Long term goal 7: Ambulate distance of 30 to 50 ft for 2MWT with rolling walker, to improve strength/overall fucntion.     PT Individual Minutes  Time In: 3832  Time Out: 8612  Minutes: 46    Electronically signed by Lennox Deeds, PTA on 6/9/21 at 4:05 PM EDT

## 2021-06-09 NOTE — H&P
Physical Medicine & Rehabilitation History and Physical  [facility] Acute Rehabilitation Unit     Primary care provider: Bernarda Adame DO     Chief Complaint and Reason for Rehabilitation Admission:   ADL and Mobility deficits secondary to Lumbar radiculopathy and osteomyelitis    History of Present Illness:  Ari Morataya  is a 81 y.o. right-handed     female admitted to the [address] [place] [address] unit on 6/8/2021. She was originally admitted to [facility] on 5/19/21 for worsening back pain. Patient with progressively worsening low back pain over prior 10 days. She had recent kyphoplasty of thoracic vertebra.      Pain management consulted - adjusting Percocet with improved pain control. Dr. Michelle Lynn performed caudal epidural steroid injection on 5/24/21.     Cardiology consulted for atrial fibrillation and CHF history.      Orthopedics consulted for chronic back pain with radicular symptoms.      Gastroenterology consulted for iron deficiency anemia. Dr. Armando Levine performed colonoscopy 5/28/21 which showed severe diverticulosis, 1 polyp, no bleeding, small internal hemorrhoids. EGD performed same day showed changes consistent with GERD. They approved restarting anticoagulation post-procedure and signed off. Pharmacy managing INR. She had IR guided aspiration performed 6/4/21 with cultures showing no growth. ID following and recommending 6 weeks IV ceftriaxone. She is currently requiring assistance for self-care activities and mobility prompting this admission.     Premorbid function:  Modified Independent    Current Function:  PT:  Restrictions/Precautions: Contact Precautions, Fall Risk  Implants present? : Metal implants (Bilat STACI, h/o ankle ORIF, cardiac stent)  Other position/activity restrictions: left leg 1/2\" shorter than the right (S/P BILATERAL THRs), O2 per NC PRN PICC line R UE  Required Braces or Orthoses  Spinal Other: thoracic corset Transfers  Sit to Stand: 2 Person Assistance, Moderate Assistance (from w/c)  Stand to sit: 2 Person Assistance, Minimal Assistance Aisha Goodman)  Bed to Chair: 2 Person Assistance, Dependent/Total, Moderate assistance (use of henrietta stedy wheel chair to bed)  Comment: Pt has moderate to severe posterior lean. HR at 130 when stood up, sao2 98%, HR subsides to  'when seated in chair. WB Status: left leg 1/2\" shorter than the right (S/P BILATERAL THRs)    Transfers  Sit to Stand: 2 Person Assistance, Moderate Assistance (from w/c)  Stand to sit: 2 Person Assistance, Minimal Assistance Aisha Goodman)  Bed to Chair: 2 Person Assistance, Dependent/Total, Moderate assistance (use of henrietta stedy wheel chair to bed)  Comment: Pt has moderate to severe posterior lean. HR at 130 when stood up, sao2 98%, HR subsides to  'when seated in chair. Ambulation  Ambulation?: No  WB Status: left leg 1/2\" shorter than the right (S/P BILATERAL THRs)                 OT:   ADL  Feeding: Setup  Grooming: Stand by assistance (completed in seated position in W/C at sink to bathe face, and complete oral hygiene.)  UE Bathing: Minimal assistance  LE Bathing: Maximum assistance (Patient able to demonstrate lower body bathing in seated position at MAX A to bathe quad area only.)  UE Dressing: Moderate assistance  LE Dressing: Dependent/Total  Toileting: Dependent/Total  Additional Comments: ADL scores not otherwise documented based on skilled observation and clinical reasoning secondary to patient sitting up in W/C during evaluation, and patient has precaution of having her thoracic brace on when OOB.                       Bed mobility  Rolling to Left: Moderate assistance  Rolling to Right: Moderate assistance  Supine to Sit: Moderate assistance, 2 Person assistance  Sit to Supine: 2 Person assistance, Maximum assistance  Scootin Person assistance, Moderate assistance (to [facility])  Comment: Pt needs assist to bed B LE, cues for log rolling. Multiple times rolling win bed sied to sdie to don/doff brief, per  aand donning back corset. SPEECH:      Past Medical History:      Diagnosis Date    Asbestos exposure     Atrial fibrillation ([address]     takes xarelto    Basal cell carcinoma of nose     CAD (coronary artery disease)     CKD (chronic kidney disease) stage 2, GFR 60-89 ml/min     Hyperlipidemia     Hypertension     MDRO (multiple drug resistant organisms) resistance 2014    E.  Coli urine    Nephrolithiasis     Osteoarthritis     Ovarian cyst     removed    Oxygen dependent     2 LNC mainly at night, during the day as needed    Palpitations     Peritoneal mesothelioma ([address]     treated at [facility] PONV (postoperative nausea and vomiting)     Prolonged emergence from general anesthesia     SOB (shortness of breath) on exertion        Past Surgical History:      Procedure Laterality Date    ANKLE FRACTURE SURGERY Right 2013    APPENDECTOMY  1964    BIOPSY / LIGATION TEMPORAL ARTERY Bilateral 2021    TEMPORAL ARTERY BIOPSY performed by Cecilia Mckinley MD at [address] Right    [address]      CATARACT REMOVAL Bilateral     CHOLECYSTECTOMY  2005    COLONOSCOPY      COLONOSCOPY N/A 2021    COLONOSCOPY POLYPECTOMY SNARE/COLD BIOPSY performed by Debo Vaughn MD at [address]  2002    CT NEEDLE BIOPSY SPINE  2021    CT NEEDLE BIOPSY SPINE 2021 [address] SCAN    HYSTERECTOMY      LIVER BIOPSY      LIVER BIOPSY      NASAL ENDOSCOPY Bilateral 2014    lt nasal cautery with packing    PHOTODYNAMIC THERAPY  2000 & 2002    for peritoneal Mesothelioma    RECTOCELE REPAIR      REVISION TOTAL HIP ARTHROPLASTY Right 1998    ROTATOR CUFF REPAIR  2006    SKIN CANCER EXCISION Bilateral     ankle    SKIN CANCER EXCISION Left Oct. 2014    shoulder    SPINE SURGERY N/A 4/15/2021    KYPHOPLASTY L1 performed by Gogo Tejeda MD at [address] Left 2003    TUBAL LIGATION  1969    UPPER GASTROINTESTINAL ENDOSCOPY      UPPER GASTROINTESTINAL ENDOSCOPY N/A 5/28/2021    EGD ESOPHAGOGASTRODUODENOSCOPY performed by Bernard Finnegan MD at [facility]         Allergies:    Adhesive tape, Meperidine, Demerol, Ultram [tramadol hcl], Zocor [simvastatin], Codeine, and Fentanyl    Medications   Scheduled Meds:   chlorhexidine  15 mL Mouth/Throat BID    furosemide  20 mg Oral BID    calcium carbonate  500 mg Oral BID    cefTRIAXone (ROCEPHIN) IV  2,000 mg Intravenous Q24H    midodrine  10 mg Oral TID WC    Vitamin D  2,000 Units Oral Daily    amiodarone  100 mg Oral BID    atorvastatin  40 mg Oral Daily    carvedilol  6.25 mg Oral BID    famotidine  10 mg Oral Nightly    ferrous sulfate  300 mg Oral Daily    hydrocortisone-aloe   Topical BID    nystatin  5 mL Oral 4x Daily    oxybutynin  15 mg Oral Daily    pantoprazole  40 mg Oral QAM AC    predniSONE  10 mg Oral Daily    rOPINIRole  0.5 mg Oral 4x Daily    sertraline  50 mg Oral Daily    [START ON 5/24/2022] warfarin (COUMADIN) daily dosing (placeholder)   Other RX Placeholder    polyethylene glycol  17 g Oral Daily     Continuous Infusions:  PRN Meds:.magic (miracle) mouthwash with nystatin, baclofen, dextrose, lidocaine viscous hcl, oxyCODONE-acetaminophen, polyethylene glycol, acetaminophen, senna, bisacodyl     Social History:  Lives With: Spouse  Type of Home:  ((condo (2 story))  Home Layout: Two level, Able to Live on Main level with bedroom/bathroom  Home Access: Stairs to enter with rails  Entrance Stairs - Number of Steps: 2  Entrance Stairs - Rails: Both  Bathroom Shower/Tub: Walk-in shower, Doors  Bathroom Toilet: Handicap height  Bathroom Equipment: Built-in shower seat, Grab bars around toilet  Home Equipment: Wheelchair-manual, Crutches, Oxygen, Long-handled shoehorn, Reacher (O2 at 2 L PRN)  ADL Assistance: Independent  Homemaking Assistance: Needs assistance (daugthers assisting w/ laundry, bringing in meals, cleaning and grocery shopping, spouse assists as needed)  Homemaking Responsibilities: No  Ambulation Assistance: Independent (uses crutches)  Social History     Socioeconomic History    Marital status:      Spouse name: Not on file    Number of children: Not on file    Years of education: Not on file    Highest education level: Not on file   Occupational History    Not on file   Tobacco Use    Smoking status: Never Smoker    Smokeless tobacco: Never Used   Vaping Use    Vaping Use: Never used   Substance and Sexual Activity    Alcohol use: Yes     Comment: social    Drug use: No    Sexual activity: Not Currently   Other Topics Concern    Not on file   Social History Narrative    Not on file     Social Determinants of Health     Financial Resource Strain:     Difficulty of Paying Living Expenses:    Food Insecurity:     Worried About Running Out of Food in the Last Year:     [address] in the Last Year:    Transportation Needs:     Lack of Transportation (Medical):      Lack of Transportation (Non-Medical):    Physical Activity:     Days of Exercise per Week:     Minutes of Exercise per Session:    Stress:     Feeling of Stress :    Social Connections:     Frequency of Communication with Friends and Family:     Frequency of Social Gatherings with Friends and Family:     Attends [employer] Services:     Active Member of Clubs or Organizations:     Attends Club or Organization Meetings:     Marital Status:    Intimate Partner Violence:     Fear of Current or Ex-Partner:     Emotionally Abused:     Physically Abused:     Sexually Abused:        Family History:       Problem Relation Age of Onset    Heart Failure Brother     Heart Attack Brother         triple input(s): BNP in the last 72 hours. PT/INR:   Recent Labs     06/07/21  0459 06/08/21  0507 06/09/21  0712   PROTIME 22.1* 27.6* 36.0*   INR 2.0 2.6 3.7     APTT: No results for input(s): APTT in the last 72 hours. CARDIAC ENZYMES: No results for input(s): CKMB, CKMBINDEX, TROPONINT in the last 72 hours. Invalid input(s): CKTOTAL;3  FASTING LIPID PANEL:  Lab Results   Component Value Date    CHOL 198 01/21/2021    HDL 43 01/21/2021    TRIG 231 (H) 01/21/2021     LIVER PROFILE:   Recent Labs     06/07/21 0459 06/08/21  0507 06/08/21  1848   AST 20 17 17   ALT 27 25 25   BILITOT 0.42 0.32 0.33   ALKPHOS 98 98 103          Review of Systems:  CONSTITUTIONAL:  Denies fevers, chills, sweats or fatigue. EYES:  Denies diplopia, blind spots, blurring. HEENT:  Denies hearing loss, trouble chewing or swallowing. RESPIRATORY:  No wheezing, coughing, shortness of breath. CARDIOVASCULAR:  Denies chest pain, palpitations, lightheadedness. GASTROINTESTINAL:  Denies heartburn, nausea, constipation, diarrhea, abdominal pain. GENITOURINARY:  No urgency, frequency, incontinence, dysuria. ENDOCRINE:  Denies hot or cold intolerance. MUSCULOSKELETAL:  Denies focal joint pain, neck pain. Reports aching moderate back pain. NEUROLOGICAL:  Denies focal numbness, tingling, balance loss, headache. BEHAVIOR/PSYCH:  Denies depression, anxiety, memory loss, insomnia. SKIN:  No ulcers, rash, bruises. Physical Exam:  /68   Pulse 78   Temp 98.5 °F (36.9 °C) (Oral)   Resp 18   Ht 5' 5\" (1.651 m)   Wt 167 lb 1.7 oz (75.8 kg)   SpO2 100%   BMI 27.81 kg/m²     GEN: Well developed, well nourished, in NAD  HEENT:  NCAT. PERRL. EOMI. Mucous membranes pink and moist.   PULM:  Clear to ausculation. No rales or rhonchi. Respirations WNL and unlabored. CV:  Regular rate rhythm. No murmurs or gallops. GI:  Abdomen soft. Nontender. Non-distended. BS + and equal.    NEUROLOGICAL: A&O x3.  Sensation intact to light touch. DTRs 2+. MSK:  Functional ROM all extremities. Motor testing 4+/5 key muscles BUE and BLEs. Mildly impaired L hip flexion at 4-/5. SKIN: Warm dry and intact. Good turgor. EXTREMITIES:  No calf tenderness to palpation. No edema BLEs. Ana Marshall PSYCH: Mood WNL. Appropriately interactive. Affect WNL. Principal Diagnosis/plan:  The patient is a 81y.o. year old with ADL and Mobility deficits secondary to lumbar radiculopathy and osteomyelitis. She will require close medical monitoring for the comorbidities listed below. She will benefit from intensive interdisciplinary therapies and rehab nursing care and is appropriate for inpatient rehabilitation. The post admission physician evaluation (TARAH) is consistent with the pre-admission assessment. See above findings to reflect the elements required in the TARAH. Patient's admitting condition is consistent with the findings of the preadmission assessment by the rehabilitation admissions coordinator. Diagnoses/plan:    1. Closed compression fracture L1/Lumbar radiculopathy and T12-L1 discitis/osteomyelitis:  PT/OT for gait, mobility, strengthening, endurance, ADLs, and self care. S/p caudal epidural steroid injection 5/24. On Ceftriaxone until 7/21/21 - monitoring CBC and CMP twice weekly. On baclofen and Percocet prn  2. HTN/CAD/Hyperlipidemia: on atorvastatin,   3. Chronic diastolic heart failure/pulmonary hypertension: on carvedilol, furosemide. Has midodrine for hypotension. 4. Chronic Respiratory Failure: on O2 NC  5. Atrial Fibrillation with RVR: on amiodarone and warfarin. For cardiology follow up 2 weeks. 6. Iron Deficiency Anemia: on ferrous sulfate repletion  7. YOGESH on CKD: improved monitoring renal function  8. OAB: on oxybutynin XL  9. RLS: on ropinirole  10. Depression: on sertraline  11. Pharyngitis: has viscous lidocaine and magic mouthwash prn  12. Bowel Management: Miralax daily, senokot prn, dulcolax prn.   13. DVT Prophylaxis:

## 2021-06-09 NOTE — PATIENT CARE CONFERENCE
[address]   ACUTE REHABILITATION  TEAM CONFERENCE NOTE  Date: 6/10/21  Patient Name: Shaggy        Room: 175/0830-01  MRN: [id number]       : 1935  (81 y.o.)     Gender: female   Referring Practitioner: Dr Lillian Robledo [R53.81]        NURSING  Bladder  Incontinent Daily  Bowel   Always Continent  Date of Last BM: 6/10/2021  Intervention    Both Bowel & Bladder Program     Wounds/Incisions/Ulcers: No skin issues identified  Medication Education Program: Patient able to manage medications and being educated by nursing  Pain: percocet    Fall Risk:  Falling star program initiated    PHYSICAL THERAPY  Bed mobility  Rolling to Left: Moderate assistance  Rolling to Right: Moderate assistance  Supine to Sit: Moderate assistance;2 Person assistance  Sit to Supine: 2 Person assistance;Maximum assistance  Scootin Person assistance; Moderate assistance (to [facility])  Comment: Pt needs assist to bed B LE, cues for log rolling. Multiple times rolling win bed sied to sdie to don/doff brief, per  aand donning back corset. Transfers:  Sit to Stand: 2 Person Assistance; Moderate Assistance (from w/c)  Stand to sit: 2 Person Assistance;Minimal Assistance Rocio Wilson  Bed to Chair: 2 Person Assistance;Dependent/Total;Moderate assistance (use of henrietta Volvedy wheel chair to bed)    WB Status: left leg 1/2\" shorter than the right (S/P BILATERAL THRs)           Equipment Needed: TBD  Pt present with genralized weakness, has increased back pain with mobility, will conitnue POC to maximize reahb potential for safe DC home. Goals  Time Frame for Short term goals: 10 days  Short term goal 1: pt to increase LE strength  by 1MMG to improve strength.   Short term goal 2: pt to demonstrate good technique for log rolling, balance and strengthening exercises as tolerated  Short term goal 3: pt to demo good sitting balance and fair  standing balance with least restrictive device to increase safety  Short term goal 4: pt to demonstrate improved bed mobility with log rolling using rail w/ min A x1  Short term goal 5: pt to demo sit<>stand transfer with rolling walker mod A  Short term goal 6: pt to transfer bed<>chair with rolling walker min A  to mod . Short term goal 7: pt able to ambulate with rolling walker distance of  20 ftx 2, min a x 1 to 2 person. Short term goal 8: . Short term goal 9: .      OCCUPATIONAL THERAPY  SELF CARE      Eating   5  Setup or clean-up assistance     Setup   Oral Hygiene   4  Assistance Needed: Supervision or touching assistance     Stand by assistance (completed in seated position in W/C at sink to bathe face, and complete oral hygiene.)   Shower/Bathe Self   2  Assistance Needed: Substantial/maximal assistance (Patient sitting up in W/C with thoracic corset on, was able to bathe bilateral upper extremity ( patting technique) and quad area of bilateral lower extremities.)      UE Bathing: Minimal assistance  LE Bathing: Maximum assistance (Patient able to demonstrate lower body bathing in seated position at MAX A to bathe quad area only.)   Upper Body Dressing        Already out of bed with thoracic corset donned - needs brace on when OOB    Moderate assistance   Lower Body Dressing           Putting On/Taking Off Footwear   1  Assistance Needed: Dependent  secondary to patient reporting pain upon reaching distal to bilateral knees. Dependent/Total   Toilet Transfer   1  Assistance Needed: Dependent  Patient requires Max A for  Javonlakhwinder bhandari for transfers. Toileting Hygiene   1 Assistance Needed: Dependent  hygiene perfromed i bed and/or MADELAINE Stedy - Stress Urinary Incontinence   Dependent/Total       Assessment: Secondary to above deficits, skilled occupational theapy is warranted to improve patient's endurance, strength, and balance facilitating increased independence and safety in ADL's.  IADL's , and functional mobility/ transfers for safest discharge home.    Short term goals  Time Frame for Short term goals: In one week, patient will:  Short term goal 1: complete upper body bathing/dressing including donning of thoracic corset at MOD A in supine position with reported pain at 5/10 or less, and 3 or less verbal cues for energy conservation. Short term goal 2: complete lower body bathing/dressing with AE in supine position or seated  at MOD A with reported pain at 5/10 or less, and 3 or less verbal cues for energy conservation. Short term goal 3: complete toileting at [address] [place] [address] with reported pain at 5/10 or less, and 3 or less verbal cues for energy conservation. Short term goal 4: increase  strength evidenced by improved score by 5#'s  Short term goal 5: increase fine motor coordination evidenced by improved score on 9 Hole Peg Test by 5 seconds. SPEECH THERAPY      NUTRITION  Weight: 167 lb 1.7 oz (75.8 kg) / Body mass index is 27.81 kg/m². Diet Rx: 3-4 gm Na, Ensure High Protein x 2, Magic Cup x 1 daily. Pt met criteria for moderate malnutrition at time of admission. Appetite and intake have been decreased. Mouth sores appear to be contributing factors; pt receives Nystatin. Will attempt to honor preferences and monitor intake. Please see nutrition note for details. SOCIAL WORK ASSESSMENT  Assessment: Pt lives with there  with their children living near. Pt plans to return home with [facility]  Pre-Admission Status:  Lives With: Spouse  Type of Home:  ([facility], 2 level , lives on the [address] [place] floor)  Home Layout: Two level, Able to Live on Main level with bedroom/bathroom  Home Access: Stairs to enter with rails  Entrance Stairs - Number of Steps: 2 steps entrance from the garage or front door, usually use the garage entrance.   Entrance Stairs - Rails: Both  Bathroom Shower/Tub: Walk-in shower, Doors  Bathroom Toilet: Handicap height  Bathroom Equipment: Built-in shower seat, Grab bars around toilet  Bathroom Accessibility:

## 2021-06-09 NOTE — PLAN OF CARE
Dr. Billie Clark notified of consult. No new orders needed at this time. Dr. Billie Clark will see patient tomorrow.

## 2021-06-09 NOTE — CONSULTS
Consult for medical management      Name: Shaggy Farris  MRN: [id number]     Acct: [[id number]]  Room: [address] [place] [address]    Admit Date: 6/8/2021  PCP: Michell Anderson DO      Chief Complaint:     Debility    History Obtained From:     patient, electronic medical record    History of Present Illness:      Shaggy Fraris is a  81 y.o.  female CHF, PAF, on respiratory failure, thoracic 5, 6, 9, 10 compression fracture presents with debility. Patient denies chest pain shortness of breath palpitations lightheadedness nausea vomiting abdominal pain hematemesis melena, fever or chills thoracic back pain is well controlled with pain meds and thoracic corset. Past Medical History:     Past Medical History:   Diagnosis Date    Asbestos exposure     Atrial fibrillation ([address]     takes xarelto    Basal cell carcinoma of nose     CAD (coronary artery disease)     CKD (chronic kidney disease) stage 2, GFR 60-89 ml/min     Hyperlipidemia     Hypertension     MDRO (multiple drug resistant organisms) resistance 2/19/2014    E.  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    treated at [facility] PONV (postoperative nausea and vomiting)     Prolonged emergence from general anesthesia     SOB (shortness of breath) on exertion         Past Surgical History:     Past Surgical History:   Procedure Laterality Date    ANKLE FRACTURE SURGERY Right 2013    APPENDECTOMY  1964    BIOPSY / LIGATION TEMPORAL ARTERY Bilateral 2/12/2021    TEMPORAL ARTERY BIOPSY performed by Gissell Baker MD at [address] [place] [address] Drive Right    [address]  2002    CATARACT REMOVAL Bilateral     CHOLECYSTECTOMY  2005    COLONOSCOPY      COLONOSCOPY N/A 5/28/2021    COLONOSCOPY POLYPECTOMY SNARE/COLD BIOPSY performed by Skip Roque MD at [facility]    CORONARY ANGIOPLASTY WITH STENT PLACEMENT  April 2002    CT NEEDLE BIOPSY SPINE  6/4/2021    CT NEEDLE BIOPSY SPINE 6/4/2021 [facility] CT SCAN    HYSTERECTOMY  1981    LIVER BIOPSY  2000    LIVER BIOPSY      NASAL ENDOSCOPY Bilateral 11/25/2014    lt nasal cautery with packing    PHOTODYNAMIC THERAPY  April 2000 & Jan. 2002    for peritoneal Mesothelioma    RECTOCELE REPAIR  1997    REVISION TOTAL HIP ARTHROPLASTY Right 1998    ROTATOR CUFF REPAIR  2006    SKIN CANCER EXCISION Bilateral 2006    ankle    SKIN CANCER EXCISION Left Oct. 2014    shoulder    SPINE SURGERY N/A 4/15/2021    KYPHOPLASTY L1 performed by Ana Maria Nelson MD at [address] [place] [address] ARTHROPLASTY Left 2003    TUBAL LIGATION  1969    UPPER GASTROINTESTINAL ENDOSCOPY      UPPER GASTROINTESTINAL ENDOSCOPY N/A 5/28/2021    EGD ESOPHAGOGASTRODUODENOSCOPY performed by Palmer Shukla MD at [address]          Medications Prior to Admission:       Prior to Admission medications    Medication Sig Start Date End Date Taking?  Authorizing Provider   vitamin E 1000 units capsule Take 1,000 Units by mouth daily    Historical Provider, MD   calcium carbonate 600 MG TABS tablet Take 1 tablet by mouth 2 times daily    Historical Provider, MD   losartan (COZAAR) 100 MG tablet Take 100 mg by mouth daily    Historical Provider, MD   meclizine (ANTIVERT) 25 MG tablet Take 25 mg by mouth daily as needed for Dizziness    Historical Provider, MD   amiodarone (CORDARONE) 200 MG tablet Take 1 tablet by mouth 2 times daily 4/23/21   Natan Meeks MD   metoprolol tartrate (LOPRESSOR) 25 MG tablet Take 1 tablet by mouth 2 times daily 4/23/21   Natan Meeks MD   acetaminophen (TYLENOL) 500 MG tablet Take 500 mg by mouth every 6 hours as needed for Pain    Historical Provider, MD   isosorbide mononitrate (IMDUR) 60 MG extended release tablet Take 60 mg by mouth daily Historical Provider, MD   rivaroxaban (XARELTO) 20 MG TABS tablet Take 20 mg by mouth daily (with breakfast)  2/22/21   Historical Provider, MD   diphenhydrAMINE-APAP, sleep, (TYLENOL PM EXTRA STRENGTH)  MG tablet Take 1 tablet by mouth nightly as needed     Historical Provider, MD   torsemide (DEMADEX) 20 MG tablet Take 1 tablet by mouth 2 times daily Take twice a day for 1 months then go back to 20 mg daily 3/4/21   Jaquelin Morales MD   amoxicillin (AMOXIL) 500 MG capsule Take 2,000 mg by mouth as needed (dental procedures) Dental only 9/21/20   Historical Provider, MD   oxybutynin (DITROPAN XL) 15 MG extended release tablet Take 15 mg by mouth daily  7/30/20   Historical Provider, MD   polyethylene glycol (GLYCOLAX) 17 GM/SCOOP powder Take 17 g by mouth daily as needed (constipation)  7/30/20   Historical Provider, MD   Ascorbic Acid (VITAMIN C) 250 MG tablet Take 250 mg by mouth daily    Historical Provider, MD   ferrous sulfate (FLACO-IN-SOL) 75 (15 Fe) MG/ML solution Take 15 mg by mouth daily    Historical Provider, MD   omeprazole (PRILOSEC) 40 MG delayed release capsule Take 40 mg by mouth Daily  12/3/16   Historical Provider, MD   atorvastatin (LIPITOR) 40 MG tablet Take 40 mg by mouth daily  9/22/16   Historical Provider, MD   potassium chloride SA (K-DUR;KLOR-CON M) 20 MEQ tablet Take 1 tablet by mouth 2 times daily 3/10/16   Jaquelin Morales MD   rOPINIRole (REQUIP) 0.5 MG tablet Take 0.5 mg by mouth 4 times daily as needed     Historical Provider, MD   Vitamin D (CHOLECALCIFEROL) 1000 UNITS CAPS capsule Take 1,000 Units by mouth 2 times daily    Historical Provider, MD   predniSONE (DELTASONE) 5 MG tablet Take 20 mg by mouth daily     Historical Provider, MD   Omega 3 1000 MG CAPS Take 1,000 mg by mouth daily     Historical Provider, MD   sertraline (ZOLOFT) 50 MG tablet Take 50 mg by mouth daily.       Historical Provider, MD        Allergies:       Adhesive tape, Meperidine, Demerol, Ultram Resp 18   Ht 5' 5\" (1.651 m)   Wt 167 lb 1.7 oz (75.8 kg)   SpO2 100%   BMI 27.81 kg/m²   Temp (24hrs), Av.6 °F (37 °C), Min:98.3 °F (36.8 °C), Max:99.2 °F (37.3 °C)      Physical Exam  Vitals reviewed. HENT:      Head: Normocephalic. Right Ear: External ear normal.      Left Ear: External ear normal.      Nose: Nose normal.      Mouth/Throat:      Mouth: Mucous membranes are moist.      Pharynx: Oropharynx is clear. Eyes:      Conjunctiva/sclera: Conjunctivae normal.   Cardiovascular:      Rate and Rhythm: Normal rate and regular rhythm. Pulses: Normal pulses. Heart sounds: Normal heart sounds. Pulmonary:      Effort: Pulmonary effort is normal.      Breath sounds: Normal breath sounds. No rales. Abdominal:      General: Bowel sounds are normal.      Palpations: Abdomen is soft. Musculoskeletal:         General: Tenderness (Thoracic spine) present. No deformity. Cervical back: Normal range of motion and neck supple. Right lower leg: Edema (1+) present. Left lower leg: Edema (1+) present. Skin:     General: Skin is warm. Capillary Refill: Capillary refill takes less than 2 seconds. Coloration: Skin is not jaundiced. Neurological:      General: No focal deficit present. Mental Status: She is alert. Mental status is at baseline.    Psychiatric:         Mood and Affect: Mood normal.         Behavior: Behavior normal.             Data:     Recent Results (from the past 24 hour(s))   Comprehensive Metabolic Panel w/ Reflex to MG    Collection Time: 21  6:48 PM   Result Value Ref Range    Glucose 154 (H) 70 - 99 mg/dL    BUN 30 (H) 8 - 23 mg/dL    CREATININE 0.91 (H) 0.50 - 0.90 mg/dL    Bun/Cre Ratio NOT REPORTED 9 - 20    Calcium 8.2 (L) 8.6 - 10.4 mg/dL    Sodium 141 135 - 144 mmol/L    Potassium 3.8 3.7 - 5.3 mmol/L    Chloride 104 98 - 107 mmol/L    CO2 27 20 - 31 mmol/L    Anion Gap 10 9 - 17 mmol/L    Alkaline Phosphatase 103 35 - 104 U/L ALT 25 5 - 33 U/L    AST 17 <32 U/L    Total Bilirubin 0.33 0.3 - 1.2 mg/dL    Total Protein 5.0 (L) 6.4 - 8.3 g/dL    Albumin 2.5 (L) 3.5 - 5.2 g/dL    Albumin/Globulin Ratio NOT REPORTED 1.0 - 2.5    GFR Non- 59 (L) >60 mL/min    GFR African American >60 >60 mL/min    GFR Comment          GFR Staging NOT REPORTED    PROTIME-INR    Collection Time: 06/09/21  7:12 AM   Result Value Ref Range    Protime 36.0 (H) 11.8 - 14.6 sec    INR 3.7    Basic Metabolic Panel w/ Reflex to MG    Collection Time: 06/09/21  7:12 AM   Result Value Ref Range    Glucose 121 (H) 70 - 99 mg/dL    BUN 32 (H) 8 - 23 mg/dL    CREATININE 0.97 (H) 0.50 - 0.90 mg/dL    Bun/Cre Ratio NOT REPORTED 9 - 20    Calcium 8.4 (L) 8.6 - 10.4 mg/dL    Sodium 143 135 - 144 mmol/L    Potassium 4.3 3.7 - 5.3 mmol/L    Chloride 106 98 - 107 mmol/L    CO2 31 20 - 31 mmol/L    Anion Gap 6 (L) 9 - 17 mmol/L    GFR Non-African American 55 (L) >60 mL/min    GFR African American >60 >60 mL/min    GFR Comment          GFR Staging NOT REPORTED    CBC    Collection Time: 06/09/21  7:12 AM   Result Value Ref Range    WBC 7.6 3.5 - 11.0 k/uL    RBC 2.83 (L) 4.0 - 5.2 m/uL    Hemoglobin 8.4 (L) 12.0 - 16.0 g/dL    Hematocrit 25.2 (L) 36 - 46 %    MCV 89.1 80 - 100 fL    MCH 29.7 26 - 34 pg    MCHC 33.3 31 - 37 g/dL    RDW 18.0 (H) 11.5 - 14.9 %    Platelets 191 348 - 979 k/uL    MPV 8.0 6.0 - 12.0 fL    NRBC Automated NOT REPORTED per 100 WBC       Assesment:     Primary Problem  Debility    Principal Problem:    Debility  Active Problems:    Chronic diastolic heart failure (HCC)    Chronic respiratory failure (HCC)    Paroxysmal atrial fibrillation (HCC)    Pulmonary hypertension (HCC)    Iron deficiency anemia  Resolved Problems:    * No resolved hospital problems. *      Plan:     1. Restart home medications  2. DVT prophylaxis on Coumadin INR supratherapeutic          Hold Coumadin  3. Increase Lasix to 20 mg p.o. twice daily  4. Coreg 6.25 mg p.o.

## 2021-06-09 NOTE — CARE COORDINATION
CASE MANAGEMENT NOTE:    Admission Date:  6/8/2021 Tyesha Rosales is a 81 y.o.  female    Admitted for : Debility [R53.81]    Met with:  Patient and     Support system: family    PCP:  Camelia Strickland DO                                 Insurance:  Medicare and BCBS      Current Residence/ Living Arrangements:  independently at home             Current Services PTA:  No    Is patient agreeable to VNS: Yes    Freedom of choice provided:  Yes    List of [address] provided: No    VNS chosen:  Yes- already with [address]    DME:  bedside commode and crutches    Home Oxygen: Yes    Nebulizer: No    CPAP/BIPAP: No    Supplier: unknown    Potential Assistance Needed: Yes    SNF needed: No    Freedom of choice and list provided: NA    Pharmacy:  [facility]       Does Patient want to use MEDS to BEDS? No    Is patient currently receiving oral anticoagulation therapy? Yes coumadin    Is the Patient an [facility] with Readmission Risk Score greater than 14%? Yes  If yes, pt needs a follow up appointment made within 7 days. Family Members/Caregivers that pt would like involved in their care:    Yes    If yes, list name here:   Josi Garcia) and children [facility]    Transportation Provider:  Family        Handicap placard:  Pt already has one          Is patient in Isolation/One on One/Altered Mental Status? No  If yes, skip next question. If no, would they like an I-Pad to  use? No  If yes, call [phone]. PHQ-9: 2 minimal depression. Pt identified little interest in doing things several days as well as poor appetite. Pt denied Si or thoughts of self harm. Mental Health History: Pt stated she takes Zoloft and has done so for some time. She would have moments of tearful episodes in the past and believes the medications are effective    Substance use: pt denied any current use.  She use to socially drink alcohol but stopped due to her medications    Discharge Plan:  Pt lives with there  with their children living near. Pt plans to return home with [address]                  Electronically signed by: WILMER Mckeon on 6/9/2021 at 9:30 AM

## 2021-06-09 NOTE — PLAN OF CARE
Dr. Ocasio Marking notified of consult. He has no plan to do another kypho on this patient for another 6-8 weeks. He will see patient tomorrow. No new orders.

## 2021-06-10 NOTE — FLOWSHEET NOTE
Patient in bed, while daughter Rodrigo Deluca brush her hair. Patient recognized Govind Shoemaker from Med Surg, talked about her busy day and how much better she is feeling. Patient has good support through her family and [employer] friends. SC will continued to offer spiritual and emotional support. 06/09/21 1830   Encounter Summary   Services provided to: Patient and family together  (Abebe Deluca)   Referral/Consult From: [facility] [address]; Children;Family members; Anabaptist/rhina community;Friends/neighbors   Continue Visiting   (6/9/21)   Complexity of Encounter Low   Length of Encounter 15 minutes   Spiritual Assessment Completed Yes   Routine   Type Initial   Assessment Calm; Approachable   Intervention Active listening;Explored feelings, thoughts, concerns;Nurtured hope;Prayer;Sustaining presence/ Ministry of presence; Discussed illness/injury and it's impact   Visited by Tobias Segovia

## 2021-06-10 NOTE — PROGRESS NOTES
Physical Medicine & Rehabilitation  Progress Note      Subjective:      81year-old female with lumbar radiculopathy and osteomyelitis. Patient is well, and has had no acute complaints or problems. She reports good sleep. She reports some impaired appetite. No new issues with bowel or bladder. Her back pain is controlled and she is doing well with therapy. ROS:  Denies fevers, chills, sweats. No chest pain, palpitations, lightheadedness. Denies coughing, wheezing or shortness of breath. Denies abdominal pain, nausea, diarrhea or constipation. No new areas of joint pain. Denies new areas of numbness or weakness. Denies new anxiety or depression issues. No new skin problems. Rehabilitation:   Progressing in therapies. PT:  Restrictions/Precautions: Contact Precautions, Fall Risk  Implants present? : Metal implants (Bilat STACI, h/o ankle ORIF, cardiac stent)  Other position/activity restrictions: left leg 1/2\" shorter than the right (S/P BILATERAL THRs), O2 per NC PRN PICC line R UE  Required Braces or Orthoses  Spinal Other: thoracic corset   Transfers  Sit to Stand: 2 Person Assistance, Moderate Assistance (from w/c)  Stand to sit: 2 Person Assistance, Minimal Assistance Mariela Gerber)  Bed to Chair: 2 Person Assistance, Dependent/Total, Moderate assistance (use of henrietta stedy wheel chair to bed)  Comment: Pt has moderate to severe posterior lean. HR at 130 when stood up, sao2 98%, HR subsides to  'when seated in chair. WB Status: left leg 1/2\" shorter than the right (S/P BILATERAL THRs)    Transfers  Sit to Stand: 2 Person Assistance, Moderate Assistance (from w/c)  Stand to sit: 2 Person Assistance, Minimal Assistance Mariela Nance  Bed to Chair: 2 Person Assistance, Dependent/Total, Moderate assistance (use of henrietta stedy wheel chair to bed)  Comment: Pt has moderate to severe posterior lean. HR at 130 when stood up, sao2 98%, HR subsides to  'when seated in chair. Ambulation  Ambulation?: No  WB Status: left leg 1/2\" shorter than the right (S/P BILATERAL THRs)               OT:  ADL  Feeding: Setup  Grooming: Stand by assistance (completed in seated position in W/C at sink to bathe face, and complete oral hygiene.)  UE Bathing: Minimal assistance  LE Bathing: Maximum assistance (Patient able to demonstrate lower body bathing in seated position at MAX A to bathe quad area only.)  UE Dressing: Moderate assistance  LE Dressing: Dependent/Total  Toileting: Dependent/Total  Additional Comments: ADL scores not otherwise documented based on skilled observation and clinical reasoning secondary to patient sitting up in W/C during evaluation, and patient has precaution of having her thoracic brace on when OOB. Bed mobility  Rolling to Left: Moderate assistance  Rolling to Right: Moderate assistance  Supine to Sit: Moderate assistance, 2 Person assistance  Sit to Supine: 2 Person assistance, Maximum assistance  Scootin Person assistance, Moderate assistance (to [facility])  Comment: Pt needs assist to bed B LE, cues for log rolling. Multiple times rolling win bed sied to sdie to don/doff brief, per  aand donning back corset. SPEECH:      Objective:  BP 99/66   Pulse 73   Temp 97.9 °F (36.6 °C) (Oral)   Resp 18   Ht 5' 5\" (1.651 m)   Wt 167 lb 1.7 oz (75.8 kg)   SpO2 98%   BMI 27.81 kg/m²       GEN: Well developed, well nourished, in NAD  HEENT:  NCAT. PERRL. EOMI. Mucous membranes pink and moist.   PULM:  Clear to ausculation. No rales or rhonchi. Respirations WNL and unlabored. CV:  Regular rate rhythm. No murmurs or gallops. GI:  Abdomen soft. Nontender. Non-distended. BS + and equal.    NEUROLOGICAL: A&O x3. Sensation intact to light touch. MSK:  Functional ROM all extremities. Motor testing 4+/5 key muscles BUE and BLEs. Mildly impaired L hip flexion at 4-/5. SKIN: Warm dry and intact. Good turgor.   EXTREMITIES:  No calf tenderness to palpation. No edema BLEs. Hugh Walker PSYCH: Mood WNL. Appropriately interactive. Affect WNL. Diagnostics:     CBC:   Recent Labs     06/08/21  0507 06/09/21  0712   WBC 7.5 7.6   RBC 2.80* 2.83*   HGB 8.1* 8.4*   HCT 25.1* 25.2*   MCV 89.5 89.1   RDW 17.2* 18.0*    254     BMP:   Recent Labs     06/08/21  0507 06/08/21  1848 06/09/21  0712    141 143   K 4.2 3.8 4.3    104 106   CO2 27 27 31   BUN 30* 30* 32*   CREATININE 0.77 0.91* 0.97*   GLUCOSE 123* 154* 121*     BNP: No results for input(s): BNP in the last 72 hours. PT/INR:   Recent Labs     06/08/21  0507 06/09/21  0712 06/10/21  0712   PROTIME 27.6* 36.0* 35.7*   INR 2.6 3.7 3.7     APTT: No results for input(s): APTT in the last 72 hours. CARDIAC ENZYMES: No results for input(s): CKMB, CKMBINDEX, TROPONINT in the last 72 hours.     Invalid input(s): CKTOTAL;3  FASTING LIPID PANEL:  Lab Results   Component Value Date    CHOL 198 01/21/2021    HDL 43 01/21/2021    TRIG 231 (H) 01/21/2021     LIVER PROFILE:   Recent Labs     06/08/21 0507 06/08/21 1848   AST 17 17   ALT 25 25   BILITOT 0.32 0.33   ALKPHOS 98 103        Current Medications:   Current Facility-Administered Medications: chlorhexidine (PERIDEX) 0.12 % solution 15 mL, 15 mL, Mouth/Throat, BID  furosemide (LASIX) tablet 20 mg, 20 mg, Oral, BID  calcium carbonate (TUMS) chewable tablet 500 mg, 500 mg, Oral, BID  cefTRIAXone (ROCEPHIN) 2000 mg IVPB in D5W 50ml minibag, 2,000 mg, Intravenous, Q24H  magic (miracle) mouthwash with nystatin, 5 mL, Swish & Spit, TID PRN  midodrine (PROAMATINE) tablet 10 mg, 10 mg, Oral, TID WC  Vitamin D (CHOLECALCIFEROL) tablet 2,000 Units, 2,000 Units, Oral, Daily  amiodarone (CORDARONE) tablet 100 mg, 100 mg, Oral, BID  atorvastatin (LIPITOR) tablet 40 mg, 40 mg, Oral, Daily  baclofen (LIORESAL) tablet 5 mg, 5 mg, Oral, TID PRN  carvedilol (COREG) tablet 6.25 mg, 6.25 mg, Oral, BID  dextrose 50 % IV solution, 25 g, Intravenous, PRN  famotidine (PEPCID) tablet 10 mg, 10 mg, Oral, Nightly  ferrous sulfate 300 (60 Fe) MG/5ML syrup 300 mg, 300 mg, Oral, Daily  hydrocortisone-aloe 1 % cream, , Topical, BID  lidocaine viscous hcl (XYLOCAINE) 2 % solution 5 mL, 5 mL, Mouth/Throat, Q3H PRN  nystatin (MYCOSTATIN) 259186 UNIT/ML suspension 500,000 Units, 5 mL, Oral, 4x Daily  oxybutynin (DITROPAN-XL) extended release tablet 15 mg, 15 mg, Oral, Daily  oxyCODONE-acetaminophen (PERCOCET) 5-325 MG per tablet 1 tablet, 1 tablet, Oral, Q4H PRN  pantoprazole (PROTONIX) tablet 40 mg, 40 mg, Oral, QAM AC  polyethylene glycol (GLYCOLAX) packet 17 g, 17 g, Oral, Daily PRN  predniSONE (DELTASONE) tablet 10 mg, 10 mg, Oral, Daily  rOPINIRole (REQUIP) tablet 0.5 mg, 0.5 mg, Oral, 4x Daily  sertraline (ZOLOFT) tablet 50 mg, 50 mg, Oral, Daily  [START ON 5/24/2022] warfarin (COUMADIN) daily dosing (placeholder), , Other, RX Placeholder  acetaminophen (TYLENOL) tablet 650 mg, 650 mg, Oral, Q4H PRN  polyethylene glycol (GLYCOLAX) packet 17 g, 17 g, Oral, Daily  senna (SENOKOT) tablet 17.2 mg, 2 tablet, Oral, Daily PRN  bisacodyl (DULCOLAX) suppository 10 mg, 10 mg, Rectal, Daily PRN      Impression/Plan:   Impaired ADLs, gait, and mobility due to:      1. Closed compression fracture L1/Lumbar radiculopathy and T12-L1 discitis/osteomyelitis:  PT/OT for gait, mobility, strengthening, endurance, ADLs, and self care. S/p caudal epidural steroid injection 5/24. On Ceftriaxone until 7/21/21 - monitoring CBC and CMP twice weekly. On baclofen and Percocet prn  2. HTN/CAD/Hyperlipidemia: on atorvastatin,   3. Chronic diastolic heart failure/pulmonary hypertension: on carvedilol, furosemide. Has midodrine for hypotension. 4. Chronic Respiratory Failure: on O2 NC  5. Atrial Fibrillation with RVR: on amiodarone and warfarin. For cardiology follow up 2 weeks. 6. Iron Deficiency Anemia: on ferrous sulfate repletion  7. YOGESH on CKD: improved monitoring renal function  8.  OAB: on oxybutynin XL  9. RLS: on ropinirole  10. Depression: on sertraline  11. Pharyngitis: has viscous lidocaine and magic mouthwash prn  12. Bowel Management: Miralax daily, senokot prn, dulcolax prn. 13. DVT Prophylaxis:  Coumadin with INR goal 2-3, SCD's while in bed and GRETA's while in bed. INR supratherapeutic - pharmacy holding dose again today. 15. [facility] for medical management      Electronically signed by Janak Bell MD on 6/10/2021 at 9:48 AM      This note is created with the assistance of a speech recognition program.  While intending to generate a document that actually reflects the content of the visit, the document can still have some errors including those of syntax and sound a like substitutions which may escape proof reading. In such instances, actual meaning can be extrapolated by contextual diversion.

## 2021-06-10 NOTE — PROGRESS NOTES
Physical Therapy  Facility/Department: [facility] ACUTE REHAB  Daily Treatment Note  NAME: Reagan Orozco  : 1935  MRN: [id number]    Date of Service: 6/10/2021    Discharge Recommendations:  Patient would benefit from continued therapy after discharge, Home with assist PRN   PT Equipment Recommendations  Equipment Needed: No    Assessment   Body structures, Functions, Activity limitations: Decreased functional mobility ; Decreased strength;Decreased endurance;Decreased balance; Increased pain;Decreased safe awareness;Decreased posture  Treatment Diagnosis: impaired mobility due to low back pain/ weakness  Specific instructions for Next Treatment: Log roll with bed mobility, don corset prior to bed mobility  Prognosis: Good  Barriers to Learning: none  REQUIRES PT FOLLOW UP: Yes  Activity Tolerance  Activity Tolerance: Patient limited by pain; Patient limited by endurance; Patient limited by fatigue     Patient Diagnosis(es): There were no encounter diagnoses. has a past medical history of Asbestos exposure, Atrial fibrillation ([address] Basal cell carcinoma of nose, CAD (coronary artery disease), CKD (chronic kidney disease) stage 2, GFR 60-89 ml/min, Hyperlipidemia, Hypertension, MDRO (multiple drug resistant organisms) resistance, Nephrolithiasis, Osteoarthritis, Ovarian cyst, Oxygen dependent, Palpitations, Peritoneal mesothelioma ([address] PONV (postoperative nausea and vomiting), Prolonged emergence from general anesthesia, and SOB (shortness of breath) on exertion. has a past surgical history that includes Tubal ligation (); Hysterectomy (); Colonoscopy; Total hip arthroplasty (Right, ); Rectocele repair (); liver biopsy (); Upper gastrointestinal endoscopy; Photodynamic Therapy (2000 & 2002); Total hip arthroplasty (Left, ); Revision total hip arthroplasty (Right, ); Rotator cuff repair (); Ankle fracture surgery (Right, 2013); Cataract removal (Bilateral);  Cardiac Thoracic corset prn depending on pt comfort per PT Eileen  Pain Screening  Patient Currently in Pain: Denies  Pain Assessment  Pain Assessment: 0-10  Pain Level: 7  Pain Type: Surgical pain  Pain Location: Back  Pain Orientation: Lower  Pain Descriptors: Aching; Sharp  Pain Frequency: Continuous  Pain Onset: On-going  Clinical Progression: Gradually improving  Functional Pain Assessment: Prevents or interferes some active activities and ADLs  Non-Pharmaceutical Pain Intervention(s): Repositioned;Rest;Emotional support; Ambulation/Increased Activity; Distraction  Response to Pain Intervention: None  Multiple Pain Sites: No  Vital Signs  Patient Currently in Pain: Denies  Oxygen Therapy  SpO2: 100 %  Pulse Oximeter Device Location: Finger  O2 Device: Nasal cannula (2 L)  Patient Observation  Observations: O2, 2.5L, NC       Orientation  Orientation  Overall Orientation Status: Within Functional Limits  Cognition      Objective   Bed mobility  Scooting: Moderate assistance  Transfers  Sit to Stand: 2 Person Assistance; Moderate Assistance  Stand to sit: 2 Person Assistance;Minimal Assistance  Bed to Chair: 2 Person Assistance;Dependent/Total;Moderate assistance  Stand Pivot Transfers: Moderate Assistance;2 Person Assistance (Yosef Bernal)  Comment: Sit>Stand completed 3x in // bars. VC required 75% of the time for UE and LE placement. Pt denies any dizziness, but fatigues after ~20 seconds. Stairs/Curb  Stairs?: No     Balance  Posture: Fair  Sitting - Static: Fair  Sitting - Dynamic: Poor  Standing - Static: Fair;-  Exercises  Hip Abduction: x15 B LE  Knee Long Arc Quad: x15 B LE  Knee Active Range of Motion: x15 w/ Green TB B LE  Comments: Attempted LAQ w/ 3#, but unable to complete due to limited ROM and fatigue. ROM unchanged when weight removed, but increased pt was able to increase repetitions. Other exercises  Other exercises 1: Seated bilat LE exercises, A/AAROM x15  Other exercises 2: Transfer back to bed. Recommendations: Strengthening, Functional Mobility Training, Safety Education & Training, Transfer Training, ROM, Balance Training, Endurance Training, Patient/Caregiver Education & Training, Positioning, Equipment Evaluation, Education, & procurement, Home Exercise Program, Wheelchair Mobility Training, Gait Training, Stair training  Safety Devices  Type of devices: Patient at risk for falls, Gait belt, Call light within reach, Left in chair        06/10/21 0924 06/10/21 1445   PT Individual Minutes   Time In 0924 1500   Time Out 3620 [id number]   [id number] 19 15   PT Concurrent Minutes   Time In  --  1960   Time Out  --  1500   Minutes  --  [address], PTA

## 2021-06-10 NOTE — PLAN OF CARE
Individualized Plan of Care  [facility]    Rehabilitation physician: Dr Zeeshan Leonardo Date: 6/8/2021     Rehabilitation Diagnosis: Debility [R53.81]     Rehabilitation impairments: self care, mobility and pain management    Factors facilitating achievement of predicted outcomes: Family support, Motivated, Cooperative, Pleasant, Good insight into deficits and Has homemaker services  Barriers to the achievement of predicted outcomes: Pain, Decreased endurance, Lower extremity weakness, Incontinence of bladder, Medical complications and Medication managment    Patient Goals: Improve independence with mobility, Increase overall strength and endurance, Increase independence with activities of daily living, Integrate appropriate pain management plan, Assure adequate nutritional option for discharge, Continence of bowel and bladder and Provide appropriate patient and family education      NURSING:  Nursing goals for [facility] while on the rehabilitation unit will include:  Continence of bowel and bladder, Adequate number of bowel movements, Urinate with no urinary retention >300ml in bladder, Maintain O2 SATs at an acceptable level during stay, Effective pain management while on the rehabilitation unit, Establish adequate pain control plan for discharge, Absence of skin breakdown while on the rehabilitation unit, Avoidance of any hospital acquired infections, Freedom from injury during hospitalization and Complete education with patient/family with understanding demonstrated regarding disease process and resultant impairment     In order to achieve these goals, nursing interventions may include bowel/bladder training, education for medical assistive devices, medication education, O2 saturation management, energy conservation, stress management techniques, fall prevention, alarms protocol, seating and positioning, skin/wound care, pressure relief instruction, dressing changes, infection protection, DVT prophylaxis, assistance with safe transfers , and/or assistance with bathroom activities and hygiene. PHYSICAL THERAPY:  Goals:        Short term goals  Time Frame for Short term goals: 10 days  Short term goal 1: pt to increase LE strength  by 1MMG to improve strength. Short term goal 2: pt to demonstrate good technique for log rolling, balance and strengthening exercises as tolerated  Short term goal 3: pt to demo good sitting balance and fair  standing balance with least restrictive device to increase safety  Short term goal 4: pt to demonstrate improved bed mobility with log rolling using rail w/ min A x1  Short term goal 5: pt to demo sit<>stand transfer with rolling walker mod A  Short term goal 6: pt to transfer bed<>chair with rolling walker min A  to mod . Short term goal 7: pt able to ambulate with rolling walker distance of  20 ftx 2, min a x 1 to 2 person. Short term goal 8: . Short term goal 9: .  Long term goals  Time Frame for Long term goals : By DC  Long term goal 1: Pt able to perform sit<>stand and pivot transfers with RW at [facility] term goal 2: Pt able to ambulate with rolling walker distance of 50 ft or more, SBA/CGA, level surfaces. Long term goal 3: Pt able to go up and down 4 to 5 steps with 2 rails, min A   Long term goal 4: Pt able to propel w/c distance of 50 to 100 ft, level surfaces, SBA  Long term goal 5: Pt able to perform supine<>sit at CGA/min A  Long term goal 6: Educate family/pt in safe mobility /steps for safe discharge home. Long term goal 7: Ambulate distance of 30 to 50 ft for 2MWT with rolling walker, to improve strength/overall fucntion.     Plan of Care: Pt to be seen by physical therapy services 1 Hour 30 Minutes per day at least 5 out of 7 days per week     Anticipated interventions may include therapeutic exercises, gait training, neuromuscular re-ed, transfer training, community reintegration, bed mobility, w/c mobility and training. OCCUPATIONAL THERAPY:  Goals:             Short term goals  Time Frame for Short term goals: In one week, patient will:  Short term goal 1: complete upper body bathing/dressing including donning of thoracic corset at MOD A in supine position with reported pain at 5/10 or less, and 3 or less verbal cues for energy conservation. Short term goal 2: complete lower body bathing/dressing with AE in supine position or seated  at [facility] [place] with reported pain at 5/10 or less, and 3 or less verbal cues for energy conservation. Short term goal 3: complete toileting at [address] [place] [address] with reported pain at 5/10 or less, and 3 or less verbal cues for energy conservation. Short term goal 4: increase  strength evidenced by improved score by 5#'s  Short term goal 5: increase fine motor coordination evidenced by improved score on 9 Hole Peg Test by 5 seconds. Long term goals  Time Frame for Long term goals : By discharge, patient will:  Long term goal 1: complete upper body bathing/dressing including donning of thoracic corset, and grooming at [facility] with reported pain of 2/10 or less, and 0 verbal cues for energy conservation. Long term goal 2: complete lower body bathing/dressing in seated position at [facility] with AE , with reported pain of 2/10 or less, and 0 verbal cues for energy conservation. Long term goal 3: complete functional transfers/ mobility with LRD at [facility] A with reported pain of 2/10 or less, and 0 verbal cues for energy conservation. Long term goal 4: complete toileting at [facility] reporting 2/10 pain or less. Long term goal 5: complete simple meal preparation at [facility] with reported pain at 2/10 or less, and 0 verbal cues for energy conservation.     Plan of Care: Patient to be seen by occupational therapy services 1 Hour 30 Minutes per day at least 5 out of 7 days per week     Anticipated interventions may include ADL and IADL retraining, strengthening, safety education and training, patient/caregiver education and training, equipment evaluation/ training/procurement, neuromuscular reeducation, wheelchair mobility training. SPEECH THERAPY:   Goals:                      Plan of Care:     CASE MANAGEMENT:  Goals:   Assist patient/family with discharge planning, patient/family counseling,  and coordination with insurance during the inpatient rehabilitation stay. Other members of the multidisciplinary rehabilitation team that will be involved in the patient's plan of care include recreational therapy, dietary, respiratory therapy, and neuropsychology. Medical issues being managed closely and that require 24 hour availability of a physician:  Bowel/Bladder function, Pain management, Infection protection, DVT prophylaxis, Fall precautions, Fluid/Electrolyte balance, Nutritional status, Respiratory needs, Anemia and History of heart disease                                           Physician anticipated functional outcomes: Improved independence with functional measures   Estimated length of stay for this admission 2 weeks  Medical Prognosis: Fair  Anticipated disposition: Home. The potential to achieve the above medical and rehabilitative goals is fair. This plan of care has been developed with the assistance and input of the multidisciplinary rehabilitation team.  The plan was reviewed with the patient on 6/10/2021. The patient has had the opportunity to provide input to the therapy team.    I have reviewed this Individualized Plan of Care and agree with its contents. Above documentation has been expanded, modified, adjusted to reflect the findings of my evaluations and goals for the patient.     Physician:  Electronically signed by Reynaldo Mast MD on 6/10/21 at 9:51 AM EDT

## 2021-06-10 NOTE — PROGRESS NOTES
[address]    ACUTE REHABILITATION OCCUPATIONAL THERAPY  DAILY NOTE    Date: 6/10/21  Patient Name: Isamar Khan      Room: 2898/2823-53    MRN: [id number]   : 1935  (81 y.o.)  Gender: female   Referring Practitioner: Dr. Trevor Lopez  Diagnosis: Debility  Additional Pertinent Hx: A fib, CAD, CKD, CHF, HTN, COPD    Restrictions  Restrictions/Precautions: Contact Precautions, Fall Risk  Implants present? : Metal implants (Bilat STACI, h/o ankle ORIF, cardiac stent)  Other position/activity restrictions: left leg 1/2\" shorter than the right (S/P BILATERAL THRs), O2 per NC PRN PICC line R UE  Required Braces or Orthoses  Spinal Other: thoracic corset (Prn based off patients comfort, per PT Eileen)   Required Braces or Orthoses?: Yes    Subjective  Subjective: \"I am tired but I have to do it\"  Comments: Pt was pleasant and agreeable to occupational therapy. Per JANA Prajapati July, updated order: Throacic corset on as needed  Patient Currently in Pain: Yes  Pain Level: 6 (6 sitting EOB; 0 initally when resting)  Pain Location: Back  Pain Orientation: Lower  Restrictions/Precautions: Contact Precautions; Fall Risk  Overall Orientation Status: Within Functional Limits  Patient Observation  Observations: O2, 2.5L, NC  Pain Assessment  Pain Assessment: 0-10  Pain Level: 6 (6 sitting EOB; 0 initally when resting)  Pain Type: Surgical pain  Pain Location: Back  Pain Orientation: Lower    Objective  Cognition  Overall Cognitive Status: WFL  Perception  Overall Perceptual Status: WFL  Balance  Sitting Balance: Minimal assistance (Min A sitting EOB due to posterior lean)  Standing Balance: Dependent/Total (henrietta stedy)  Bed mobility  Rolling to Left: Moderate assistance  Rolling to Right: Moderate assistance  Supine to Sit: Maximum assistance  Scooting: Moderate assistance  Comment: Verbal cues for log rolling technique. Needs assistance bending BLE to complete bed mobility.    Transfers  Sit to stand: 2 Person assistance; Moderate assistance  Stand to sit: 2 Person assistance; Moderate assistance  Transfer Comments: Pt completed transfers with use of henrietta stedy and 2 person assistance. Pt required verbal cues for hand placement and safety  Standing Balance  Time: 30 seconds x 3  Activity: transfer, toileting, lower body self care with use of henrietta stedy  Comment: verbal cues for hand placement and safety with use of henrietta stedy. 2 person A. Additional Activities: PM: pt completed balloon tap while sitting in wheelchair to increase strength and overall activity tolerance required to complete self care tasks. Pt required frequent rest breaks due to fatigue. Pts O2 monitored throughout with levels maintaining above 90%                 ADL  Equipment Provided: Reacher;Sock aid  UE Bathing: Minimal assistance (Supine in bed )  LE Bathing: Dependent/Total (henrietta stedy for bottom hygiene)  UE Dressing: Moderate assistance (Supine in bed. A to pull down shirt/don brace log rolling)  LE Dressing: Dependent/Total (with henrietta stedy)  Toileting: Dependent/Total (with henrietta stedy)  Additional Comments: Pt completed upper body bathing/dressing while supine in bed due to needing thoracic corset on before OOB activity. Pt able to assist with log rolling side to side to complete. Lower body self care and toileting completed with use of henrietta stedy and 2 person A. PM: Pt educated on use of reacher to assist with lower body dressing. Pt demonstrated good understanding and use of reacher to complete task with assistance to thread BLE due to decreased strength. Pt required assistance lifting BLE at this time. Pt educated on use of reacher to doff socks and sock aid to don socks. Pt demonstrated good understanding but still required assistance at this time. Pt would benefit from continued education on AE to assist with lower body self care tasks.            Assessment  Performance deficits / Impairments: Decreased functional mobility ;Decreased balance;Decreased ADL status; Decreased endurance;Decreased high-level IADLs;Decreased strength  Prognosis: Good  Discharge Recommendations: Continue to assess pending progress  Activity Tolerance: Patient limited by fatigue;Patient limited by pain  Safety Devices in place: Yes  Type of devices: Left in chair;Gait belt;Patient at risk for falls (With PT)          Patient Education: Log rolling techniques, safety with transfers, AE to increase independence with self care tasks  Patient Goals   Patient goals : Patient stated, \" to be able to take care of myself. \"  Learner:patient  Method: demonstration and explanation       Outcome: demonstrated understanding and needs reinforcement        Plan  Plan  Times per week: 5-7 days a week  Times per day: Twice a day  Current Treatment Recommendations: Safety Education & Training, Balance Training, Self-Care / ADL, Patient/Caregiver Education & Training, Functional Mobility Training, Home Management Training, Endurance Training, Strengthening  Patient Goals   Patient goals : Patient stated, \" to be able to take care of myself. \"  Short term goals  Time Frame for Short term goals: In one week, patient will:  Short term goal 1: complete upper body bathing/dressing including donning of thoracic corset at MOD A in supine position with reported pain at 5/10 or less, and 3 or less verbal cues for energy conservation. Short term goal 2: complete lower body bathing/dressing with AE in supine position or seated  at MOD A with reported pain at 5/10 or less, and 3 or less verbal cues for energy conservation. Short term goal 3: complete toileting at [address] [place] [address] with reported pain at 5/10 or less, and 3 or less verbal cues for energy conservation. Short term goal 4: increase  strength evidenced by improved score by 5#'s  Short term goal 5: increase fine motor coordination evidenced by improved score on 9 Hole Peg Test by 5 seconds.   Long term goals  Time

## 2021-06-11 NOTE — PROGRESS NOTES
Physical Therapy  [address] [facility] [place] [facility]   Acute Rehabilitation Physical Therapy Progress Note    Date: 21  Patient Name: Nai Henley       Room: 2933/1025-24  MRN: [id number]   Account: [de-identified]   : 1935  (81 y.o.) Gender: female     Referring Practitioner: Dr. Nolberto Johns  Diagnosis: Debility  Past Medical History:  has a past medical history of Asbestos exposure, Atrial fibrillation ([address] Basal cell carcinoma of nose, CAD (coronary artery disease), CKD (chronic kidney disease) stage 2, GFR 60-89 ml/min, Hyperlipidemia, Hypertension, MDRO (multiple drug resistant organisms) resistance, Nephrolithiasis, Osteoarthritis, Ovarian cyst, Oxygen dependent, Palpitations, Peritoneal mesothelioma ([address] PONV (postoperative nausea and vomiting), Prolonged emergence from general anesthesia, and SOB (shortness of breath) on exertion. Past Surgical History:   has a past surgical history that includes Tubal ligation (); Hysterectomy (); Colonoscopy; Total hip arthroplasty (Right, ); Rectocele repair (); liver biopsy (); Upper gastrointestinal endoscopy; Photodynamic Therapy (2000 & 2002); Total hip arthroplasty (Left, ); Revision total hip arthroplasty (Right, ); Rotator cuff repair (); Ankle fracture surgery (Right, ); Cataract removal (Bilateral); Cardiac catheterization (); liver biopsy; Coronary angioplasty with stent (2002); [place] tooth extraction; Appendectomy (); Cholecystectomy (); Skin cancer excision (Bilateral, ); Skin cancer excision (Left, Oct. 2014); nasal endoscopy (Bilateral, 2014); BIOPSY / LIGATION TEMPORAL ARTERY (Bilateral, 2021); Breast biopsy (Right); Spine surgery (N/A, 4/15/2021); Upper gastrointestinal endoscopy (N/A, 2021); Colonoscopy (N/A, 2021); and CT NEEDLE BIOPSY SPINE (2021).   Additional Pertinent Hx: Per PM& R note: Ms. Nai Henley who is a 81 y.o. right handed female admitted to [address] [place] [address] on 5/19/2021 with Back Pain. She had recent L1 kyphoplasty prior to presentation. Caudal epidural steroid injection was done on 5/24/21 by Dr. Jorge L Bain. MRI thoracic and lumbar spine showed marrow edema at T12 with edema in the T12-L1 intervertebral disc space and edema in the L1 vertebral body, related to trauma/fracture or discitis/osteomyelitis. She underwent IR guided aspiration on 6/4/21 and culture is showing no growth at this time. ID is recommending IV ceftriaxone for 6 weeks. Pt admitted to rehab unit on 6/8/21. Overall Orientation Status: Within Functional Limits  Restrictions/Precautions  Restrictions/Precautions: Contact Precautions; Fall Risk  Required Braces or Orthoses?: Yes  Implants present? : Metal implants (Bilat STACI, h/o ankle ORIF, cardiac stent)  Required Braces or Orthoses  Spinal Other: thoracic corset (Prn based off patients comfort, per PT Eileen)   Position Activity Restriction  Other position/activity restrictions: left leg 1/2\" shorter than the right (S/P BILATERAL THRs), O2 per NC PRN PICC line R UE    Subjective: Patient was pleasant today with no new complaints. Comments: Thoracic corset prn depending on pt comfort per PT Eileen    Vital Signs  Patient Currently in Pain: Denies        Oxygen Therapy  O2 Device: Nasal cannula  O2 Flow Rate (L/min): 2 L/min          Bed Mobility:   Rolling: Minimal assistance  Sit to Supine: Moderate assistance  Scooting: Moderate assistance      Transfers:  Sit to Stand: 2 Person Assistance; Moderate Assistance  Stand to sit: 2 Person Assistance;Minimal Assistance  Bed to Chair: 2 Person Assistance;Dependent/Total;Moderate assistance              Ambulation 1  Surface: level tile  Device: Parallel Bars  Other Apparatus: O2 (2L)  Assistance: Moderate assistance;2 Person assistance (2nd person CGA for safety.)  Quality of Gait: Slow pace with writer blocking knees and instructing weight shift.   Gait Deviations: Slow Manuela;Decreased step length;Decreased step height  Distance: 8 ft in // bars        Stairs/Curb  Stairs?: No               BALANCE Posture: Fair  Sitting - Static: Fair  Sitting - Dynamic: Poor  Standing - Static: Fair;-    EXERCISES    Other exercises 1: Seated bilat LE exercises, A/AAROM x15  Other exercises 2: Transfer back to bed. Other exercises 3: Reciprocal walking seated in WC   Other exercises 4: UBE 5 min Fwd/retro  Other exercises 5: Standing tolerance in // bars 1-2 min tolerance. Activity Tolerance: Patient limited by pain, Patient limited by endurance, Patient limited by fatigue  PT Equipment Recommendations  Equipment Needed: No           Current Treatment Recommendations: Strengthening, Functional Mobility Training, Safety Education & Training, Transfer Training, ROM, Balance Training, Endurance Training, Patient/Caregiver Education & Training, Positioning, Equipment Evaluation, Education, & procurement, Home Exercise Program, Wheelchair Mobility Training, Gait Training, Stair training    Conditions Requiring Skilled Therapeutic Intervention  Body structures, Functions, Activity limitations: Decreased functional mobility ; Decreased strength;Decreased endurance;Decreased balance; Increased pain;Decreased safe awareness;Decreased posture  Treatment Diagnosis: impaired mobility due to low back pain/ weakness  Prognosis: Good  Barriers to Learning: none  REQUIRES PT FOLLOW UP: Yes  Discharge Recommendations: Patient would benefit from continued therapy after discharge;Home with assist PRN    Goals  Short term goals  Time Frame for Short term goals: 10 days  Short term goal 1: pt to increase LE strength  by 1MMG to improve strength.   Short term goal 2: pt to demonstrate good technique for log rolling, balance and strengthening exercises as tolerated  Short term goal 3: pt to demo good sitting balance and fair  standing balance with least restrictive device to increase safety  Short term goal 4: pt to demonstrate improved bed mobility with log rolling using rail w/ min A x1  Short term goal 5: pt to demo sit<>stand transfer with rolling walker mod A  Short term goal 6: pt to transfer bed<>chair with rolling walker min A  to mod . Short term goal 7: pt able to ambulate with rolling walker distance of  20 ftx 2, min a x 1 to 2 person. Short term goal 8: . Short term goal 9: .  Long term goals  Time Frame for Long term goals : By DC  Long term goal 1: Pt able to perform sit<>stand and pivot transfers with RW at [facility] term goal 2: Pt able to ambulate with rolling walker distance of 50 ft or more, SBA/CGA, level surfaces. Long term goal 3: Pt able to go up and down 4 to 5 steps with 2 rails, min A   Long term goal 4: Pt able to propel w/c distance of 50 to 100 ft, level surfaces, SBA  Long term goal 5: Pt able to perform supine<>sit at CGA/min A  Long term goal 6: Educate family/pt in safe mobility /steps for safe discharge home. Long term goal 7: Ambulate distance of 30 to 50 ft for 2MWT with rolling walker, to improve strength/overall fucntion.        06/11/21 1050 06/11/21 1500   PT Individual Minutes   Time In 1050 1500   Time Out [id number]   Minutes 45 45       Electronically signed by Natasha Melchor PTA on 6/11/21 at 5:32 PM EDT

## 2021-06-11 NOTE — PROGRESS NOTES
placment and safety with henrietta bhandari          ADL  Grooming: Stand by assistance  UE Bathing: Contact guard assistance ( seated at sink)   LE Bathing: Dependent/Total ( standing in henrietta for buttocks and lele)   UE Dressing: Minimal assistance( don/doff [place] shirt )   LE Dressing: Dependent/Total ( don/doff pants, brief and TEDS)   Toileting: Dependent/Total   Additional Comments: Pt seated at sink for ADL activities. Standing for LBB and LBD in henrietta bhandari. Requiring increased time for all activity. Assessment  Performance deficits / Impairments: Decreased functional mobility ; Decreased balance;Decreased ADL status; Decreased endurance;Decreased high-level IADLs;Decreased strength  Assessment: Pts  present in PM session. Writer, pt and  pressent, discussed home safety, EC, and DME for home safety.  and pt state good understanding of education    Prognosis: Good  Discharge Recommendations: Continue to assess pending progress  Activity Tolerance: Patient limited by fatigue;Patient limited by pain  Safety Devices in place: Yes  Type of devices: Left in chair;Gait belt;Patient at risk for falls          Patient Education:  Patient Goals   Patient goals : Patient stated, \" to be able to take care of myself. \"  Learner:patient  Method: demonstration and explanation       Outcome: acknowledged understanding         Plan  Plan  Times per week: 5-7 days a week  Times per day: Twice a day  Current Treatment Recommendations: Safety Education & Training, Balance Training, Self-Care / ADL, Patient/Caregiver Education & Training, Functional Mobility Training, Home Management Training, Endurance Training, Strengthening  Patient Goals   Patient goals : Patient stated, \" to be able to take care of myself. \"  Short term goals  Time Frame for Short term goals:  In one week, patient will:  Short term goal 1: complete upper body bathing/dressing including donning of thoracic corset at MOD A in supine position with reported pain at 5/10 or less, and 3 or less verbal cues for energy conservation. Short term goal 2: complete lower body bathing/dressing with AE in supine position or seated  at [facility] with reported pain at 5/10 or less, and 3 or less verbal cues for energy conservation. Short term goal 3: complete toileting at [address] [place] [address] with reported pain at 5/10 or less, and 3 or less verbal cues for energy conservation. Short term goal 4: increase  strength evidenced by improved score by 5#'s  Short term goal 5: increase fine motor coordination evidenced by improved score on 9 Hole Peg Test by 5 seconds. Long term goals  Time Frame for Long term goals : By discharge, patient will:  Long term goal 1: complete upper body bathing/dressing including donning of thoracic corset, and grooming at [facility] with reported pain of 2/10 or less, and 0 verbal cues for energy conservation. Long term goal 2: complete lower body bathing/dressing in seated position at [facility] with AE , with reported pain of 2/10 or less, and 0 verbal cues for energy conservation. Long term goal 3: complete functional transfers/ mobility with LRD at [facility] with reported pain of 2/10 or less, and 0 verbal cues for energy conservation. Long term goal 4: complete toileting at [facility] reporting 2/10 pain or less. Long term goal 5: complete simple meal preparation at [facility] with reported pain at 2/10 or less, and 0 verbal cues for energy conservation.        06/11/21 0730 06/11/21 1454   OT Individual Minutes   Time In 0730 1400   Time Out 0850 1415   Minutes 80 15       Electronically signed by NISREEN Solomon on 6/11/21 at 2:59 PM EDT

## 2021-06-11 NOTE — PROGRESS NOTES
Physical Medicine & Rehabilitation  Progress Note      Subjective:      81year-old female with lumbar radiculopathy and osteomyelitis. Patient is doing well today. She reports back pain improves at rest and is responding to medication. She denies any new issues with sleep, appetite, bowel, or bladder. ROS:  Denies fevers, chills, sweats. No chest pain, palpitations, lightheadedness. Denies coughing, wheezing or shortness of breath. Denies abdominal pain, nausea, diarrhea or constipation. No new areas of joint pain. Denies new areas of numbness or weakness. Denies new anxiety or depression issues. No new skin problems. Rehabilitation:   Progressing in therapies. PT:  Restrictions/Precautions: Contact Precautions, Fall Risk  Implants present? : Metal implants (Bilat STACI, h/o ankle ORIF, cardiac stent)  Other position/activity restrictions: left leg 1/2\" shorter than the right (S/P BILATERAL THRs), O2 per NC PRN PICC line R UE  Required Braces or Orthoses  Spinal Other: thoracic corset (Prn based off patients comfort, per PT Eileen)    Transfers  Sit to Stand: 2 Person Assistance, Moderate Assistance  Stand to sit: 2 Person Assistance, Minimal Assistance  Bed to Chair: 2 Person Assistance, Dependent/Total, Moderate assistance  Stand Pivot Transfers: Moderate Assistance, 2 Person Assistance ([place])  Comment: Sit>Stand completed 3x in // bars. VC required 75% of the time for UE and LE placement. Pt denies any dizziness, but fatigues after ~20 seconds. WB Status: left leg 1/2\" shorter than the right (S/P BILATERAL THRs)    Transfers  Sit to Stand: 2 Person Assistance, Moderate Assistance  Stand to sit: 2 Person Assistance, Minimal Assistance  Bed to Chair: 2 Person Assistance, Dependent/Total, Moderate assistance  Stand Pivot Transfers: Moderate Assistance, 2 Person Assistance (Hoa Cliche)  Comment: Sit>Stand completed 3x in // bars. VC required 75% of the time for UE and LE placement.  Pt denies any dizziness, but fatigues after ~20 seconds. Ambulation  Ambulation?: No  WB Status: left leg 1/2\" shorter than the right (S/P BILATERAL THRs)               OT:  ADL  Equipment Provided: Reacher, Sock aid  Feeding: Setup  Grooming: Stand by assistance  UE Bathing: Contact guard assistance  LE Bathing: Dependent/Total  UE Dressing: Minimal assistance  LE Dressing: Dependent/Total  Toileting: Dependent/Total  Additional Comments: Pt seated at sink for ADL activities. Standing for LBB and LBD in henrietta stedy          Balance  Sitting Balance: Minimal assistance (seated EOB with pillow support )  Standing Balance: Dependent/Total (standing with henrietta stedy )   Standing Balance  Time: 1-2 min x3   Activity: standing in henrietta stedy   Comment: verbla cues for hand placment and safety with henrietta stedy         Bed mobility  Rolling to Left: Moderate assistance  Rolling to Right: Moderate assistance  Supine to Sit: Maximum assistance (cues for log rolling )  Sit to Supine: 2 Person assistance, Maximum assistance  Scooting: Moderate assistance  Comment: Verbal cues for log rolling technique. Needs assistance bending BLE to complete bed mobility. Transfers  Sit to stand: 2 Person assistance, Moderate assistance  Stand to sit: 2 Person assistance, Moderate assistance  Transfer Comments: using henrietta stedy, cues for safet y            Wheelchair Bed Transfers  Wheelchair/Bed - Technique:  (using henrietta stedy )  Equipment Used: Bed, Wheelchair  Level of Asssistance: Dependent/Total (using henrietta stedy )    SPEECH:      Objective:  /72   Pulse 70   Temp 98.4 °F (36.9 °C) (Oral)   Resp 18   Ht 5' 5\" (1.651 m)   Wt 167 lb 1.7 oz (75.8 kg)   SpO2 94%   BMI 27.81 kg/m²       GEN: Well developed, well nourished, in NAD  HEENT:  NCAT. PERRL. EOMI. Mucous membranes pink and moist.   PULM:  Clear to ausculation. No rales or rhonchi. Respirations WNL and unlabored. CV:  Regular rate rhythm. No murmurs or gallops.   GI: Abdomen soft. Nontender. Non-distended. BS + and equal.    NEUROLOGICAL: A&O x3. Sensation intact to light touch. MSK:  Functional ROM all extremities. Motor testing 4+/5 key muscles BUE and BLEs. Mildly impaired L hip flexion at 4-/5. SKIN: Warm dry and intact. Good turgor. EXTREMITIES:  No calf tenderness to palpation. No edema BLEs. Idania Colder PSYCH: Mood WNL. Appropriately interactive. Affect WNL. Diagnostics:     CBC:   Recent Labs     06/09/21  0712   WBC 7.6   RBC 2.83*   HGB 8.4*   HCT 25.2*   MCV 89.1   RDW 18.0*        BMP:   Recent Labs     06/08/21  1848 06/09/21  0712    143   K 3.8 4.3    106   CO2 27 31   BUN 30* 32*   CREATININE 0.91* 0.97*   GLUCOSE 154* 121*     BNP: No results for input(s): BNP in the last 72 hours. PT/INR:   Recent Labs     06/09/21  0712 06/10/21  0712 06/11/21  0645   PROTIME 36.0* 35.7* 40.9*   INR 3.7 3.7 4.4     APTT: No results for input(s): APTT in the last 72 hours. CARDIAC ENZYMES: No results for input(s): CKMB, CKMBINDEX, TROPONINT in the last 72 hours.     Invalid input(s): CKTOTAL;3  FASTING LIPID PANEL:  Lab Results   Component Value Date    CHOL 198 01/21/2021    HDL 43 01/21/2021    TRIG 231 (H) 01/21/2021     LIVER PROFILE:   Recent Labs     06/08/21  1848   AST 17   ALT 25   BILITOT 0.33   ALKPHOS 103        Current Medications:   Current Facility-Administered Medications: chlorhexidine (PERIDEX) 0.12 % solution 15 mL, 15 mL, Mouth/Throat, BID  furosemide (LASIX) tablet 20 mg, 20 mg, Oral, BID  calcium carbonate (TUMS) chewable tablet 500 mg, 500 mg, Oral, BID  cefTRIAXone (ROCEPHIN) 2000 mg IVPB in D5W 50ml minibag, 2,000 mg, Intravenous, Q24H  magic (miracle) mouthwash with nystatin, 5 mL, Swish & Spit, TID PRN  midodrine (PROAMATINE) tablet 10 mg, 10 mg, Oral, TID WC  Vitamin D (CHOLECALCIFEROL) tablet 2,000 Units, 2,000 Units, Oral, Daily  amiodarone (CORDARONE) tablet 100 mg, 100 mg, Oral, BID  atorvastatin (LIPITOR) tablet 40 mg, 40 mg, Oral, Daily  baclofen (LIORESAL) tablet 5 mg, 5 mg, Oral, TID PRN  carvedilol (COREG) tablet 6.25 mg, 6.25 mg, Oral, BID  dextrose 50 % IV solution, 25 g, Intravenous, PRN  famotidine (PEPCID) tablet 10 mg, 10 mg, Oral, Nightly  ferrous sulfate 300 (60 Fe) MG/5ML syrup 300 mg, 300 mg, Oral, Daily  hydrocortisone-aloe 1 % cream, , Topical, BID  nystatin (MYCOSTATIN) 477877 UNIT/ML suspension 500,000 Units, 5 mL, Oral, 4x Daily  oxybutynin (DITROPAN-XL) extended release tablet 15 mg, 15 mg, Oral, Daily  oxyCODONE-acetaminophen (PERCOCET) 5-325 MG per tablet 1 tablet, 1 tablet, Oral, Q4H PRN  pantoprazole (PROTONIX) tablet 40 mg, 40 mg, Oral, QAM AC  polyethylene glycol (GLYCOLAX) packet 17 g, 17 g, Oral, Daily PRN  predniSONE (DELTASONE) tablet 10 mg, 10 mg, Oral, Daily  rOPINIRole (REQUIP) tablet 0.5 mg, 0.5 mg, Oral, 4x Daily  sertraline (ZOLOFT) tablet 50 mg, 50 mg, Oral, Daily  [START ON 5/24/2022] warfarin (COUMADIN) daily dosing (placeholder), , Other, RX Placeholder  acetaminophen (TYLENOL) tablet 650 mg, 650 mg, Oral, Q4H PRN  polyethylene glycol (GLYCOLAX) packet 17 g, 17 g, Oral, Daily  senna (SENOKOT) tablet 17.2 mg, 2 tablet, Oral, Daily PRN  bisacodyl (DULCOLAX) suppository 10 mg, 10 mg, Rectal, Daily PRN      Impression/Plan:   Impaired ADLs, gait, and mobility due to:      1. Closed compression fracture L1/Lumbar radiculopathy and T12-L1 discitis/osteomyelitis:  PT/OT for gait, mobility, strengthening, endurance, ADLs, and self care. S/p caudal epidural steroid injection 5/24. On Ceftriaxone until 7/21/21 - monitoring CBC and CMP twice weekly. On baclofen and Percocet prn  2. HTN/CAD/Hyperlipidemia: on atorvastatin,   3. Chronic diastolic heart failure/pulmonary hypertension: on carvedilol, furosemide. Has midodrine for hypotension. 4. Chronic Respiratory Failure: on O2 NC  5. Atrial Fibrillation with RVR: on amiodarone and warfarin. For cardiology follow up 2 weeks.    6. Iron Deficiency Anemia: on ferrous sulfate repletion  7. YOGESH on CKD: improved monitoring renal function  8. OAB: on oxybutynin XL  9. RLS: on ropinirole  10. Depression: on sertraline  11. Pharyngitis: has viscous lidocaine and magic mouthwash prn  12. Bowel Management: Miralax daily, senokot prn, dulcolax prn. 13. DVT Prophylaxis:  Coumadin with INR goal 2-3, SCD's while in bed and GRETA's while in bed. INR supratherapeutic - pharmacy holding dose again today. 15. [facility] for medical management      Electronically signed by Akila Anne MD on 6/11/2021 at 12:28 PM      This note is created with the assistance of a speech recognition program.  While intending to generate a document that actually reflects the content of the visit, the document can still have some errors including those of syntax and sound a like substitutions which may escape proof reading. In such instances, actual meaning can be extrapolated by contextual diversion.

## 2021-06-11 NOTE — PROGRESS NOTES
Pharmacy Note  Warfarin Consult follow-up      Recent Labs     06/09/21  0712 06/10/21  0712 06/11/21  0645   INR 3.7 3.7 4.4     Recent Labs     06/09/21  0712   HGB 8.4*   HCT 25.2*          Significant Drug-Drug Interactions:  Current warfarin drug-drug interactions: ceftriaxone, amiodarone, atorvastatin, prednisone, sertraline, ropinirole      Date             INR        Dose given previous day  Dose scheduled for today  6/11/2021            4.4        hold         hold        Notes:  Hold dose today. Daily PT/INR while inpatient.   Michelle Ritter RPh  6/11/2021  8:00 AM

## 2021-06-11 NOTE — PLAN OF CARE
Problem: Skin Integrity:  Goal: Will show no infection signs and symptoms  Description: Will show no infection signs and symptoms  6/11/2021 0233 by Vic Piper RN  Outcome: Ongoing     Problem: Skin Integrity:  Goal: Absence of new skin breakdown  Description: Absence of new skin breakdown  6/11/2021 0233 by Vic Piper RN  Outcome: Ongoing     Problem: Falls - Risk of:  Goal: Will remain free from falls  Description: Will remain free from falls  6/11/2021 0233 by Vic Piper RN  Outcome: Ongoing     Problem: Falls - Risk of:  Goal: Absence of physical injury  Description: Absence of physical injury  6/11/2021 0233 by Vic Piper RN  Outcome: Ongoing     Problem: Nutrition  Goal: Optimal nutrition therapy  6/11/2021 0233 by Vic Piper RN  Outcome: Ongoing     Problem: Musculor/Skeletal Functional Status  Goal: Absence of falls  6/11/2021 0233 by Vic Piper RN  Outcome: Ongoing     Problem: Pain:  Goal: Pain level will decrease  Description: Pain level will decrease  6/11/2021 0233 by Vic Piper RN  Outcome: Ongoing

## 2021-06-11 NOTE — PROGRESS NOTES
Progress Note    6/11/2021   9:33 AM    Name:  Nedra Ibarra  MRN:    [id number]     Acct:     [de-identified]   Room:  [address] [place] [address] Day: 3     Admit Date: 6/8/2021  5:26 PM  PCP: Holly Jameson DO    Subjective:     C/C: No chief complaint on file. Interval History: Status: not changed. Patient's pain is controlled    ROS:   all 10 systems reviewed and are negative except as noted    Review of Systems   Constitutional: Negative for appetite change and fatigue. HENT: Negative for congestion, postnasal drip and sore throat. Eyes: Negative for pain and discharge. Respiratory: Negative for apnea, chest tightness and stridor. Cardiovascular: Negative for palpitations. Gastrointestinal: Negative for anal bleeding and rectal pain. Endocrine: Negative for polydipsia and polyphagia. Genitourinary: Negative for decreased urine volume, flank pain and hematuria. Musculoskeletal: Negative for joint swelling and neck stiffness. Skin: Negative for rash. Allergic/Immunologic: Negative for food allergies. Neurological: Negative for seizures, facial asymmetry and speech difficulty. Hematological: Does not bruise/bleed easily. Psychiatric/Behavioral: Negative for behavioral problems and suicidal ideas. The patient is not hyperactive. Medications: Allergies:    Allergies   Allergen Reactions    Adhesive Tape Rash    Meperidine Nausea Only    Demerol Nausea Only    Ultram [Tramadol Hcl]      Hot flashes    Zocor [Simvastatin]      Muscle aches    Codeine      \"out of it\"    Fentanyl Rash       Current Meds: chlorhexidine (PERIDEX) 0.12 % solution 15 mL, BID  furosemide (LASIX) tablet 20 mg, BID  calcium carbonate (TUMS) chewable tablet 500 mg, BID  cefTRIAXone (ROCEPHIN) 2000 mg IVPB in D5W 50ml minibag, Q24H  magic (miracle) mouthwash with nystatin, TID PRN  midodrine (PROAMATINE) tablet 10 mg, TID WC  Vitamin D (CHOLECALCIFEROL) tablet 2,000 Units, Daily  amiodarone (CORDARONE) tablet 100 mg, BID  atorvastatin (LIPITOR) tablet 40 mg, Daily  baclofen (LIORESAL) tablet 5 mg, TID PRN  carvedilol (COREG) tablet 6.25 mg, BID  dextrose 50 % IV solution, PRN  famotidine (PEPCID) tablet 10 mg, Nightly  ferrous sulfate 300 (60 Fe) MG/5ML syrup 300 mg, Daily  hydrocortisone-aloe 1 % cream, BID  nystatin (MYCOSTATIN) 233581 UNIT/ML suspension 500,000 Units, 4x Daily  oxybutynin (DITROPAN-XL) extended release tablet 15 mg, Daily  oxyCODONE-acetaminophen (PERCOCET) 5-325 MG per tablet 1 tablet, Q4H PRN  pantoprazole (PROTONIX) tablet 40 mg, QAM AC  polyethylene glycol (GLYCOLAX) packet 17 g, Daily PRN  predniSONE (DELTASONE) tablet 10 mg, Daily  rOPINIRole (REQUIP) tablet 0.5 mg, 4x Daily  sertraline (ZOLOFT) tablet 50 mg, Daily  [START ON 2022] warfarin (COUMADIN) daily dosing (placeholder), RX Placeholder  acetaminophen (TYLENOL) tablet 650 mg, Q4H PRN  polyethylene glycol (GLYCOLAX) packet 17 g, Daily  senna (SENOKOT) tablet 17.2 mg, Daily PRN  bisacodyl (DULCOLAX) suppository 10 mg, Daily PRN        Data:     Code Status:  Full Code    Family History   Problem Relation Age of Onset    Heart Failure Brother     Heart Attack Brother         triple bypass    Prostate Cancer Brother     Coronary Art Dis Mother     Breast Cancer Mother         dx'd in late 46s   P[place]a Elliott Emphysema Father     Colon Cancer Neg Hx     Diabetes Neg Hx     Eclampsia Neg Hx     Hypertension Neg Hx     Ovarian Cancer Neg Hx      Labor Neg Hx     Spont Abortions Neg Hx     Stroke Neg Hx        Social History     Socioeconomic History    Marital status:      Spouse name: Not on file    Number of children: Not on file    Years of education: Not on file    Highest education level: Not on file   Occupational History    Not on file   Tobacco Use    Smoking status: Never Smoker    Smokeless tobacco: Never Used   Vaping Use    Vaping Use: Never used   Substance and Sexual Activity    Alcohol use: Yes     Comment: social    Drug use: No    Sexual activity: Not Currently   Other Topics Concern    Not on file   Social History Narrative    Not on file     Social Determinants of Health     Financial Resource Strain:     Difficulty of Paying Living Expenses:    Food Insecurity:     Worried About Running Out of Food in the Last Year:     Donnie Hebert in the Last Year:    Transportation Needs:     Lack of Transportation (Medical):  Lack of Transportation (Non-Medical):    Physical Activity:     Days of Exercise per Week:     Minutes of Exercise per Session:    Stress:     Feeling of Stress :    Social Connections:     Frequency of Communication with Friends and Family:     Frequency of Social Gatherings with Friends and Family:     Attends [employer] Services:     Active Member of Clubs or Organizations:     Attends Club or Organization Meetings:     Marital Status:    Intimate Partner Violence:     Fear of Current or Ex-Partner:     Emotionally Abused:     Physically Abused:     Sexually Abused:        I/O (24Hr): No intake or output data in the 24 hours ending 21 0933  Radiology:  IR FLUORO GUIDED CVA DEVICE PLMT/REPLACE/REMOVAL    Result Date: 2021  Successful ultrasound and fluoroscopy guided right basilic vein 5 Faroese power injectable dual-lumen PICC placement. Ready for use. CT NEEDLE BIOPSY SPINE    Result Date: 2021  Technically successful CT-guided biopsy of the T12-L1 disc space. Sample was sent as requested by the ordering service.        Labs:  Recent Results (from the past 24 hour(s))   PROTIME-INR    Collection Time: 21  6:45 AM   Result Value Ref Range    Protime 40.9 (H) 11.8 - 14.6 sec    INR 4.4        Physical Examination:        Vitals:  /72   Pulse 70   Temp 98.4 °F (36.9 °C) (Oral)   Resp 18   Ht 5' 5\" (1.651 m)   Wt 167 lb 1.7 oz (75.8 kg)   SpO2 94%   BMI 27.81 kg/m²   Temp (24hrs), Av.3 °F (36.3 °C), Min:96 °F (35.6 °C), Max:98.4 °F (36.9 °C)    No results for input(s): POCGLU in the last 72 hours. Physical Exam  Vitals reviewed. Constitutional:       Appearance: She is well-developed. HENT:      Head: Normocephalic and atraumatic. Nose: Nose normal.   Eyes:      General: Lids are normal.   Neck:      Trachea: No tracheal deviation. Cardiovascular:      Rate and Rhythm: Normal rate and regular rhythm. Heart sounds: Normal heart sounds, S1 normal and S2 normal.   Pulmonary:      Effort: Pulmonary effort is normal. No respiratory distress. Breath sounds: Normal breath sounds. Abdominal:      General: Bowel sounds are normal. There is no distension. Palpations: Abdomen is soft. Tenderness: There is no abdominal tenderness. There is no guarding. Musculoskeletal:         General: No tenderness or deformity. Lymphadenopathy:      Cervical: No cervical adenopathy. Upper Body:      Right upper body: No supraclavicular or epitrochlear adenopathy. Left upper body: No supraclavicular or epitrochlear adenopathy. Skin:     General: Skin is warm and dry. Capillary Refill: Capillary refill takes less than 2 seconds. Neurological:      Mental Status: She is alert. Motor: No abnormal muscle tone or seizure activity. Psychiatric:         Speech: Speech normal.         Behavior: Behavior normal.         Judgment: Judgment normal.         Assessment:        Primary Problem  Debility     Principal Problem:    Debility  Active Problems:    Chronic diastolic heart failure (HCC)    Chronic respiratory failure (HCC)    Paroxysmal atrial fibrillation (HCC)    Pulmonary hypertension (HCC)    Iron deficiency anemia    Moderate malnutrition (HCC)  Resolved Problems:    * No resolved hospital problems.  *      Past Medical History:   Diagnosis Date    Asbestos exposure     Atrial fibrillation ([facility] [address]     takes xarelto    Basal cell carcinoma of nose     CAD (coronary artery

## 2021-06-12 NOTE — PROGRESS NOTES
bars    Transfers  Sit to Stand: 2 Person Assistance, Moderate Assistance  Stand to sit: 2 Person Assistance, Minimal Assistance  Bed to Chair: 2 Person Assistance, Dependent/Total  Stand Pivot Transfers: Moderate Assistance, 2 Person Assistance (Nicolette Montero)  Comment: Sit>Stand completed 3x in // bars. VC required 75% of the time for UE and LE placement. Pt denies any dizziness, but fatigues after ~20 seconds. Ambulation  Ambulation?: No (patient had increased unsteadiness this date; deferred amb )  WB Status: left leg 1/2\" shorter than the right (S/P BILATERAL THRs)  Ambulation 1  Surface: level tile  Device: Parallel Bars  Other Apparatus: O2 (2L)  Assistance: Moderate assistance, 2 Person assistance (2nd person CGA for safety.)  Quality of Gait: Slow pace with writer blocking knees and instructing weight shift. Gait Deviations: Slow Manuela, Decreased step length, Decreased step height  Distance: 8 ft in // bars    Surface: level tile  Ambulation 1  Surface: level tile  Device: Parallel Bars  Other Apparatus: O2 (2L)  Assistance: Moderate assistance, 2 Person assistance (2nd person CGA for safety.)  Quality of Gait: Slow pace with writer blocking knees and instructing weight shift. Gait Deviations: Slow Manuela, Decreased step length, Decreased step height  Distance: 8 ft in // bars    OT:  ADL  Equipment Provided: Reacher, Sock aid  Feeding: Setup  Grooming: Stand by assistance, Increased time to complete, Setup  UE Bathing: Stand by assistance, Setup, Increased time to complete  LE Bathing: None  UE Dressing: Minimal assistance  LE Dressing: None  Toileting: None  Additional Comments: pt reqiures VCs for initian of tasks this date, Pt seated at sink for ADL activities.  axtended time for UB bathing/dressing and grooming, did not complete LB bathing/dressing 2* fatigue this date         Balance  Sitting Balance: Minimal assistance  Standing Balance: Dependent/Total (standing with henrietta bhandari )   Standing Intravenous, PRN  famotidine (PEPCID) tablet 10 mg, 10 mg, Oral, Nightly  ferrous sulfate 300 (60 Fe) MG/5ML syrup 300 mg, 300 mg, Oral, Daily  hydrocortisone-aloe 1 % cream, , Topical, BID  nystatin (MYCOSTATIN) 079450 UNIT/ML suspension 500,000 Units, 5 mL, Oral, 4x Daily  oxybutynin (DITROPAN-XL) extended release tablet 15 mg, 15 mg, Oral, Daily  oxyCODONE-acetaminophen (PERCOCET) 5-325 MG per tablet 1 tablet, 1 tablet, Oral, Q4H PRN  pantoprazole (PROTONIX) tablet 40 mg, 40 mg, Oral, QAM AC  polyethylene glycol (GLYCOLAX) packet 17 g, 17 g, Oral, Daily PRN  predniSONE (DELTASONE) tablet 10 mg, 10 mg, Oral, Daily  rOPINIRole (REQUIP) tablet 0.5 mg, 0.5 mg, Oral, 4x Daily  sertraline (ZOLOFT) tablet 50 mg, 50 mg, Oral, Daily  [START ON 5/24/2022] warfarin (COUMADIN) daily dosing (placeholder), , Other, RX Placeholder  acetaminophen (TYLENOL) tablet 650 mg, 650 mg, Oral, Q4H PRN  polyethylene glycol (GLYCOLAX) packet 17 g, 17 g, Oral, Daily  senna (SENOKOT) tablet 17.2 mg, 2 tablet, Oral, Daily PRN  bisacodyl (DULCOLAX) suppository 10 mg, 10 mg, Rectal, Daily PRN      Objective:  /65   Pulse 78   Temp 98.3 °F (36.8 °C) (Oral)   Resp 18   Ht 5' 5\" (1.651 m)   Wt 167 lb 1.7 oz (75.8 kg)   SpO2 100%   BMI 27.81 kg/m²       GEN: Well developed, well nourished, no acute distress  HEENT: NCAT. EOM grossly intact. Hearing grossly intact. Mucous membranes pink and moist.  RESP: Normal breath sounds with no wheezing, rales, or rhonchi. Respirations WNL and unlabored. CV: Regular rate and rhythm. No murmurs, rubs, or gallops. ABD: Soft, non-distended, BS+ and equal.  NEURO: Alert. Speech fluent. Sensation to light touch intact. MSK:  Muscle tone and bulk are normal bilaterally. Strength 4+/5 in bilateral upper limbs. Strength 4+/5 with bilateral ankle dorsiflexion and plantarflexion. LIMBS: No edema in bilateral lower limbs. SKIN: Warm and dry with good turgor. PSYCH: Mood WNL. Affect WNL. Appropriately interactive. Diagnostics:     CBC: No results for input(s): WBC, RBC, HGB, HCT, MCV, RDW, PLT in the last 72 hours. BMP: No results for input(s): NA, K, CL, CO2, PHOS, BUN, CREATININE, GLUCOSE in the last 72 hours. Invalid input(s): CA  BNP: No results for input(s): BNP in the last 72 hours. PT/INR:   Recent Labs     06/10/21  0712 06/11/21  0645 06/12/21  0745   PROTIME 35.7* 40.9* 42.2*   INR 3.7 4.4 4.5     APTT: No results for input(s): APTT in the last 72 hours. CARDIAC ENZYMES: No results for input(s): CKMB, CKMBINDEX, TROPONINT in the last 72 hours. Invalid input(s): CKTOTAL;3  FASTING LIPID PANEL:  Lab Results   Component Value Date    CHOL 198 01/21/2021    HDL 43 01/21/2021    TRIG 231 (H) 01/21/2021     LIVER PROFILE: No results for input(s): AST, ALT, ALB, BILIDIR, BILITOT, ALKPHOS in the last 72 hours. Impression/Plan:   Impaired ADLs, gait, and mobility due to:    1. Closed compression fracture L1/Lumbar radiculopathy and T12-L1 discitis/osteomyelitis:  PT/OT for gait, mobility, strengthening, endurance, ADLs, and self care. S/p caudal epidural steroid injection 5/24. On Ceftriaxone until 7/21/21 - monitoring CBC and CMP twice weekly. On baclofen and Percocet prn  2. HTN/CAD/Hyperlipidemia: On atorvastatin  3. Chronic diastolic heart failure/pulmonary hypertension: On carvedilol, furosemide. Has midodrine for hypotension. 4. Chronic Respiratory Failure: On O2 NC  5. Atrial Fibrillation with RVR: On amiodarone and warfarin. For cardiology follow up 2 weeks. 6. Thrush: On nystatin swish and swallow  7. Iron Deficiency Anemia: On ferrous sulfate repletion  8. YOGESH on CKD: Improved, monitoring renal function  9. Overactive bladder: On oxybutynin XL  10. RLS: On ropinirole  11. Depression: On sertraline  12. Pharyngitis: Has viscous lidocaine and magic mouthwash prn  13. Bowel Management: Miralax daily, senokot prn, dulcolax prn.   14. DVT Prophylaxis:  Coumadin with INR goal 2-3, SCD's while in bed and GRETA's while in bed. INR supratherapeutic - pharmacy holding dose again today.   13.  [facility] for medical management      Electronically signed by Kirstin Blanchard MD on 6/12/2021 at 6:40 PM

## 2021-06-12 NOTE — PROGRESS NOTES
Patient ID:Luisa Butler is 81 y.o.   : 1935 MRN: [id number]    Admit date: 2021  Primary Care Physician: Holly Jameson DO   Patient Care Team:  Holly Jameson DO as PCP - Yovani Ramirez MD as Orthopedic Surgeon (Orthopedic Surgery)  Radha Palafox DO as Consulting Physician (Obstetrics & Gynecology)  Tel: [phone]  PHARMACY TO DOSE WARFARIN  IP CONSULT TO DIETITIAN  IP CONSULT TO SOCIAL WORK  IP CONSULT TO [address] [place] [address]  Admitting Physician: Gareth Womack MD   Code Status:  Full Code  ADULT DIET; Regular; No Added Salt (3-4 gm)  Adult Oral Nutrition Supplement; Standard High Calorie/High Protein Oral Supplement  Adult Oral Nutrition Supplement; Frozen Oral Supplement    Progress Note      HPI: Nedra Ibarra is a 81 y.o.  female who presents with No chief complaint on file. Patient on midodrine for chronic hypotension and postural dizziness. Denies chest pain, shortness of breath or cough. Problem List: Principal Problem:    Debility  Active Problems:    Chronic diastolic heart failure (HCC)    Chronic respiratory failure (HCC)    Paroxysmal atrial fibrillation (HCC)    Pulmonary hypertension (HCC)    Iron deficiency anemia    Moderate malnutrition (HCC)  Resolved Problems:    * No resolved hospital problems.  *      Debility    Assessment:    T12-L1 discitis/osteomyelitis  Low back pain with L1 compression fracture status post vertebroplasty  Chronic diastolic heart failure  Chronic respiratory failure     Paroxysmal atrial fibrillation     Hypertension  Pulmonary hypertension   Iron deficiency anemia  Moderate malnutrition  Debility      Plan:    IV rocephin for osteomyelitis  On  lasix  Midodrine for hypotension  Pharmacy monitoring INR     PT/OT    Pain control    Anticipated Disposition upon discharge:     [] Home  [] Home with [place] [id number] ( [facility])   [] SNF ([facility])  [] LTAC ([address])      PAST MEDICAL HISTORY    has a past medical history of Asbestos exposure, Atrial fibrillation ([address] Basal cell carcinoma of nose, CAD (coronary artery disease), CKD (chronic kidney disease) stage 2, GFR 60-89 ml/min, Hyperlipidemia, Hypertension, MDRO (multiple drug resistant organisms) resistance, Nephrolithiasis, Osteoarthritis, Ovarian cyst, Oxygen dependent, Palpitations, Peritoneal mesothelioma ([address] PONV (postoperative nausea and vomiting), Prolonged emergence from general anesthesia, and SOB (shortness of breath) on exertion. SURGICAL HISTORY      has a past surgical history that includes Tubal ligation (1969); Hysterectomy (1981); Colonoscopy; Total hip arthroplasty (Right, 1990); Rectocele repair (1997); liver biopsy (2000); Upper gastrointestinal endoscopy; Photodynamic Therapy (April 2000 & Jan. 2002); Total hip arthroplasty (Left, 2003); Revision total hip arthroplasty (Right, 1998); Rotator cuff repair (2006); Ankle fracture surgery (Right, 2013); Cataract removal (Bilateral); Cardiac catheterization (2002); liver biopsy; Coronary angioplasty with stent (April 2002); [facility] tooth extraction; Appendectomy (1964); Cholecystectomy (2005); Skin cancer excision (Bilateral, 2006); Skin cancer excision (Left, Oct. 2014); nasal endoscopy (Bilateral, 11/25/2014); BIOPSY / LIGATION TEMPORAL ARTERY (Bilateral, 2/12/2021); Breast biopsy (Right); Spine surgery (N/A, 4/15/2021); Upper gastrointestinal endoscopy (N/A, 5/28/2021); Colonoscopy (N/A, 5/28/2021); and CT NEEDLE BIOPSY SPINE (6/4/2021).     CURRENT MEDICATIONS        chlorhexidine  15 mL Mouth/Throat BID    furosemide  20 mg Oral BID    calcium carbonate  500 mg Oral BID    cefTRIAXone (ROCEPHIN) IV  2,000 mg Intravenous Q24H    midodrine  10 mg Oral TID WC    Vitamin D  2,000 Units Oral Daily    amiodarone  100 mg Oral BID    atorvastatin  40 mg Oral Daily    carvedilol  6.25 mg Oral BID    famotidine  10 mg Oral Nightly    ferrous sulfate  300 mg (DULCOLAX) suppository 10 mg, Daily PRN          HOME MEDICATIONS     Medications Prior to Admission: vitamin E 1000 units capsule, Take 1,000 Units by mouth daily  calcium carbonate 600 MG TABS tablet, Take 1 tablet by mouth 2 times daily  losartan (COZAAR) 100 MG tablet, Take 100 mg by mouth daily  meclizine (ANTIVERT) 25 MG tablet, Take 25 mg by mouth daily as needed for Dizziness  amiodarone (CORDARONE) 200 MG tablet, Take 1 tablet by mouth 2 times daily  metoprolol tartrate (LOPRESSOR) 25 MG tablet, Take 1 tablet by mouth 2 times daily  acetaminophen (TYLENOL) 500 MG tablet, Take 500 mg by mouth every 6 hours as needed for Pain  isosorbide mononitrate (IMDUR) 60 MG extended release tablet, Take 60 mg by mouth daily  rivaroxaban (XARELTO) 20 MG TABS tablet, Take 20 mg by mouth daily (with breakfast)   diphenhydrAMINE-APAP, sleep, (TYLENOL PM EXTRA STRENGTH)  MG tablet, Take 1 tablet by mouth nightly as needed   torsemide (DEMADEX) 20 MG tablet, Take 1 tablet by mouth 2 times daily Take twice a day for 1 months then go back to 20 mg daily  amoxicillin (AMOXIL) 500 MG capsule, Take 2,000 mg by mouth as needed (dental procedures) Dental only  oxybutynin (DITROPAN XL) 15 MG extended release tablet, Take 15 mg by mouth daily   polyethylene glycol (GLYCOLAX) 17 GM/SCOOP powder, Take 17 g by mouth daily as needed (constipation)   Ascorbic Acid (VITAMIN C) 250 MG tablet, Take 250 mg by mouth daily  ferrous sulfate (FLACO-IN-SOL) 75 (15 Fe) MG/ML solution, Take 15 mg by mouth daily  omeprazole (PRILOSEC) 40 MG delayed release capsule, Take 40 mg by mouth Daily   atorvastatin (LIPITOR) 40 MG tablet, Take 40 mg by mouth daily   potassium chloride SA (K-DUR;KLOR-CON M) 20 MEQ tablet, Take 1 tablet by mouth 2 times daily  rOPINIRole (REQUIP) 0.5 MG tablet, Take 0.5 mg by mouth 4 times daily as needed   Vitamin D (CHOLECALCIFEROL) 1000 UNITS CAPS capsule, Take 1,000 Units by mouth 2 times daily  predniSONE (DELTASONE) 5 MG tablet, Take 20 mg by mouth daily   Omega 3 1000 MG CAPS, Take 1,000 mg by mouth daily   sertraline (ZOLOFT) 50 MG tablet, Take 50 mg by mouth daily. ALLERGIES     is allergic to adhesive tape, meperidine, demerol, ultram [tramadol hcl], zocor [simvastatin], codeine, and fentanyl. Allergies   Allergen Reactions    Adhesive Tape Rash    Meperidine Nausea Only    Demerol Nausea Only    Ultram [Tramadol Hcl]      Hot flashes    Zocor [Simvastatin]      Muscle aches    Codeine      \"out of it\"    Fentanyl Rash       SOCIAL HISTORY      reports that she has never smoked. She has never used smokeless tobacco. She reports current alcohol use. She reports that she does not use drugs. Vitals:    21 1802 21 2215 21 0730 21 1230   BP: (!) 93/54 (!) 109/59 97/60 105/63   Pulse: 96 87 96 99   Resp: 18  18 18   Temp: 97.5 °F (36.4 °C)  98.2 °F (36.8 °C) 98.1 °F (36.7 °C)   TempSrc: Oral  Oral Oral   SpO2: 100%  100% 99%   Weight:       Height:         Body mass index is 27.81 kg/m². /63   Pulse 99   Temp 98.1 °F (36.7 °C) (Oral)   Resp 18   Ht 5' 5\" (1.651 m)   Wt 167 lb 1.7 oz (75.8 kg)   SpO2 99%   BMI 27.81 kg/m²   Temp (24hrs), Av.9 °F (36.6 °C), Min:97.5 °F (36.4 °C), Max:98.2 °F (36.8 °C)    Pulse Ox: SpO2  Av.7 %  Min: 99 %  Max: 100 %  Supplemental O2: O2 Flow Rate (L/min): 2 L/min   Oxygen Therapy  SpO2: 99 %  Pulse Oximeter Device Location: Right  O2 Device: Nasal cannula  O2 Flow Rate (L/min): 2 L/min    Patient Vitals for the past 96 hrs (Last 3 readings):   Weight   21 1745 167 lb 1.7 oz (75.8 kg)     Admission weight: 167 lb 1.7 oz (75.8 kg)  Wt Readings from Last 3 Encounters:   21 167 lb 1.7 oz (75.8 kg)   21 167 lb 1.7 oz (75.8 kg)   21 153 lb 14.1 oz (69.8 kg)    (encounters)    /O (24Hr): No intake or output data in the 24 hours ending 21 1301    Vitals reviewed.     H&N: atraumatic, normocephalic, anicteric, no conjunctivitis, EOM normal, no lid lag or exophthalmos, nose and ears appear normal, trachea mid line, no stridor,  Chest: no respiratory distress, normal effort, fairly good air entry, no wheezes,    Heart: normal heart sounds, regular rate and rhythm, no murmurs. gallops or rubs,  Abdomen: BS present, non-tender, no masses or organomegaly detected,   Extremities: no peripheral edema, no cyanosis. no oncholysis. Skin: no rash, no erythema, no jaundice,   CNS: grossly normal without cranial nerve deficits, no abnormal coordination or tone, Psychiatric: normal mood, affect. LABS:    CBC: No results for input(s): WBC, RBC, HGB, HCT, MCV, MCH, MCHC, RDW, PLT, MPV in the last 72 hours. MAG: No results for input(s): MG in the last 72 hours. CMP: No results for input(s): NA, K, CL, CO2, BUN, CREATININE, GLUCOSE, CALCIUM, PROT, LABALBU, BILITOT, ALKPHOS, AST, ALT in the last 72 hours. No results for input(s): LIPASE, AMYLASE in the last 72 hours. Phosphorus:  No results for input(s): PHOS in the last 72 hours. Albumin: No results for input(s): LABALBU in the last 72 hours.     U/A:  Lab Results   Component Value Date    COLORU YELLOW 06/01/2021    PHUR 6.0 06/01/2021    WBCUA 10 TO 20 06/01/2021    RBCUA None 06/01/2021    MUCUS NOT REPORTED 06/01/2021    TRICHOMONAS NOT REPORTED 06/01/2021    YEAST NOT REPORTED 06/01/2021    BACTERIA MANY 06/01/2021    CLARITYU Clear 05/30/2019    SPECGRAV 1.024 06/01/2021    LEUKOCYTESUR SMALL 06/01/2021    UROBILINOGEN Normal 06/01/2021    BILIRUBINUR NEGATIVE 06/01/2021    GLUCOSEU NEGATIVE 06/01/2021    AMORPHOUS NOT REPORTED 06/01/2021       Recent Labs     06/10/21  0712 06/11/21  0645 06/12/21  0745   INR 3.7 4.4 4.5     Lab Results   Component Value Date    DDIMER 2.38 (H) 12/17/2014     Lab Results   Component Value Date    BNP NOT REPORTED 08/22/2014     troponins  Lab Results   Component Value Date    TROPONINI <0.01 02/19/2014         D Dimer: No results for input(s): DDIMER in the last 72 hours. Troponin T No results for input(s): TROPONINT in the last 72 hours. ProBNP   No results found for requested labs within last 30 days. ProBNP Invalid input(s): PRO-BNP  Lactic acid:Invalid input(s): LACTIC ACID      PT/INR:   Recent Labs     06/10/21  0712 06/11/21  0645 06/12/21  0745   PROTIME 35.7* 40.9* 42.2*   INR 3.7 4.4 4.5     APTT: No results for input(s): APTT in the last 72 hours. ESR:   Lab Results   Component Value Date    SEDRATE 5 06/03/2021     CRP:   Lab Results   Component Value Date    CRP 21.5 (H) 06/03/2021     Folate and B12:   Lab Results   Component Value Date    ZVICPNWK39 6410 (H) 05/25/2021   ,   Lab Results   Component Value Date    FOLATE 17.6 05/25/2021     Thyroid Studies:   Lab Results   Component Value Date    TSH 0.55 04/21/2021     D-Dimer:   Lab Results   Component Value Date    DDIMER 2.38 (H) 12/17/2014       Lactic acid: No components found for: LACT     Troponin T No results for input(s): TROPHS in the last 72 hours. Troponins:     No components found for: TROP    Lab Results   Component Value Date    TROPONINI <0.01 02/19/2014       No results found for requested labs within last 720 hours. Cardiac Enzymes:    No results found for requested labs within last 720 hours.     No results found for: CKMB    ProBNP:    No components found for: NTBNP      U/A:  Lab Results   Component Value Date    COLORU YELLOW 06/01/2021    PHUR 6.0 06/01/2021    WBCUA 10 TO 20 06/01/2021    RBCUA None 06/01/2021    MUCUS NOT REPORTED 06/01/2021    TRICHOMONAS NOT REPORTED 06/01/2021    YEAST NOT REPORTED 06/01/2021    BACTERIA MANY 06/01/2021    CLARITYU Clear 05/30/2019    SPECGRAV 1.024 06/01/2021    LEUKOCYTESUR SMALL 06/01/2021    UROBILINOGEN Normal 06/01/2021    BILIRUBINUR NEGATIVE 06/01/2021    GLUCOSEU NEGATIVE 06/01/2021    AMORPHOUS NOT REPORTED 06/01/2021       Urine Sodium:     Lab Results   Component Value Date    REJI <20 Echocardiography Report (TTE)  Patient Name [facility]   Date of Study                 05/21/2021               MARQUEZ A   Date of      1935  Gender                        Female  Birth   Age          81 year(s)  Race                             Room Number  2115        Height:                       62 inch, 157.48 cm   Corporate ID [id number]    Weight:                       150 pounds, 68 kg  #   Patient Acct [de-identified]   BSA:           1.69 m^2       BMI:      27.44  #                                                                kg/m^2   MR #         [id number]      Sonographer                   Nataliia Barragan   Accession #  [id number]  Interpreting Physician        Donna Suazo   Fellow                   Referring Nurse Practitioner   Interpreting             Referring Physician           Liz Nieto, *  Fellow  Type of Study   TTE procedure:2D Echocardiogram, M-Mode, Doppler, Color Doppler. Procedure Date Date: 05/21/2021 Start: 11:56 AM Study Location: [address] [place] [address] Technical Quality: Fair visualization Indications:Chest pain. History / Tech. Comments: AFib, Atrial ablation, CAD, CKD, HLD, HTN, Stent Patient Status: Inpatient Height: 62 inches Weight: 150 pounds BSA: 1.69 m^2 BMI: 27.44 kg/m^2 Rhythm: Sinus tachycardia HR: 103 bpm BP: 126/78 mmHg CONCLUSIONS Summary Left ventricle is normal in size and wall thickness. Global left ventricular systolic function is normal. Estimated LV EF >55 %. No obvious wall motion abnormality seen. Both atria are normal in size. Normal right ventricular size and function. Mild to moderate aortic insufficiency. Mild mitral regurgitation. Mild tricuspid regurgitation. Mild pulmonic insufficiency. No pulmonary hypertension. Estimated right ventricular systolic pressure is 44EDGF. No significant pericardial effusion is seen. Normal aortic root dimension.  Signature ---------------------------------------------------------------------------- Electronically signed by Nataliia Barragan(Sonographer) on 2021 12:48  PM ---------------------------------------------------------------------------- ----------------------------------------------------------------------------  Electronically signed by William Garza(Interpreting physician) on  2021 12:50 PM ---------------------------------------------------------------------------- FINDINGS Left Atrium Left atrium is normal in size. Left Ventricle Left ventricle is normal in size and wall thickness. Global left ventricular systolic function is normal. Estimated LV EF >55 %. No obvious wall motion abnormality seen. Right Atrium Right atrium is normal in size. Right Ventricle Normal right ventricular size and function. Mitral Valve Moderate calcification of the mitral annulus. Mild mitral regurgitation. Aortic Valve No obvious valvular abnormality seen. Mild to moderate aortic insufficiency. Tricuspid Valve No obvious valvular abnormality. Mild tricuspid regurgitation. No pulmonary hypertension. Estimated right ventricular systolic pressure is 24ONFB. Pulmonic Valve No obvious valvular abnormality seen. Mild pulmonic insufficiency. Pericardial Effusion No significant pericardial effusion is seen. Pleural Effusion No pleural effusion seen. Miscellaneous Normal aortic root dimension.  M-mode / 2D Measurements & Calculations:   LVIDd:3.58 cm(3.7 - 5.6 cm)      Diastolic YAILGJ:61.6 ml  OXIVE:9.88 cm(2.2 - 4.0 cm)      Systolic QKFVSA:27.3 ml  IWAJ:4.33 cm(0.6 - 1.1 cm)       Aortic Root:2.6 cm(2.0 - 3.7 cm)  LVPWd:0.81 cm(0.6 - 1.1 cm)      LA Dimension: 3.7 cm(1.9 - 4.0 cm)  Fractional Shortenin.39 %    LA volume/Index: 43.6 ml /26m^2  Calculated LVEF (%): 77.78 %     LVOT:2 cm   Mitral:                                Aortic   Peak E-Wave: 0.96 m/s                  Peak Velocity: 1.19 m/s                                         Peak Gradient: 5.66 mmHg  Peak Gradient: 3.66 mmHg  Deceleration Time: 141 msec   Tricuspid:                             Pulmonic:   Estimated RVSP: 21 mmHg  Peak TR Velocity: 2.15 m/s  Peak TR Gradient: 18.49 mmHg  Estimated RA Pressure: 3 mmHg                                         Estimated PASP: 21.49 mmHg  Lateral Wall E' velocity:0.05 m/s    XR THORACIC SPINE (3 VIEWS)    Result Date: 6/1/2021  EXAMINATION: THREE XRAY VIEWS OF THE LUMBAR SPINE; THREE XRAY VIEWS OF THE THORACIC SPINE 6/1/2021 12:34 pm COMPARISON: 05/19/2021 HISTORY: ORDERING SYSTEM PROVIDED HISTORY: pain TECHNOLOGIST PROVIDED HISTORY: pain Reason for Exam: PT states back pain and hx of kyphoplasty Acuity: Unknown Type of Exam: Unknown Additional signs and symptoms: PT states back pain and hx of kyphoplasty FINDINGS: Diffuse osteopenia. Stable compression fracture of L1 body with previous kyphoplasty. Compression fracture of T5, T6, T9, T10 vertebral bodies with 30% loss of normal height at T6. No subluxations are seen. There is disc space narrowing with eburnation of the vertebral endplates at multiple levels throughout the thoracic spine and also at the L1-2, L2-3, L3-4, L4-5 levels. There is sclerosis of the facet joints in the mid and lower lumbar spine. The posterior elements are otherwise intact. The paraspinal soft tissues are unremarkable except for atherosclerotic changes. Bilateral hip prostheses. Postsurgical changes overlying the left abdomen. Compression fracture of T5, T6, T9, T10 vertebral bodies with 30% loss of normal height at T6. Stable compression fracture of L1 body with previous kyphoplasty Multilevel spondylosis and degenerative disc disease. Facet arthropathy     XR THORACIC SPINE (3 VIEWS)    Result Date: 5/19/2021  EXAMINATION: THREE XRAY VIEWS OF THE THORACIC SPINE 5/19/2021 9:31 am COMPARISON: None.  HISTORY: ORDERING SYSTEM PROVIDED HISTORY: Pain TECHNOLOGIST PROVIDED HISTORY: Pain Reason for Exam: hx of recent back surgery, pt still having back pain Acuity: Unknown Type of Exam: Unknown FINDINGS: The regional skeleton was osteopenic. Post vertebroplasty changes involve L1. No thoracic compression, fannie or retro listhesis were noted. Decrease in disc height was noted at all thoracic discs. Osteopenia without thoracic compression, fannie or retrolisthesis. Decrease in disc height at all thoracic disc levels. Post vertebroplasty changes involve L1. No change has occurred from the lumbar spine series dated 04/19/2021. XR LUMBAR SPINE (2-3 VIEWS)    Result Date: 6/1/2021  EXAMINATION: THREE XRAY VIEWS OF THE LUMBAR SPINE; THREE XRAY VIEWS OF THE THORACIC SPINE 6/1/2021 12:34 pm COMPARISON: 05/19/2021 HISTORY: ORDERING SYSTEM PROVIDED HISTORY: pain TECHNOLOGIST PROVIDED HISTORY: pain Reason for Exam: PT states back pain and hx of kyphoplasty Acuity: Unknown Type of Exam: Unknown Additional signs and symptoms: PT states back pain and hx of kyphoplasty FINDINGS: Diffuse osteopenia. Stable compression fracture of L1 body with previous kyphoplasty. Compression fracture of T5, T6, T9, T10 vertebral bodies with 30% loss of normal height at T6. No subluxations are seen. There is disc space narrowing with eburnation of the vertebral endplates at multiple levels throughout the thoracic spine and also at the L1-2, L2-3, L3-4, L4-5 levels. There is sclerosis of the facet joints in the mid and lower lumbar spine. The posterior elements are otherwise intact. The paraspinal soft tissues are unremarkable except for atherosclerotic changes. Bilateral hip prostheses. Postsurgical changes overlying the left abdomen. Compression fracture of T5, T6, T9, T10 vertebral bodies with 30% loss of normal height at T6. Stable compression fracture of L1 body with previous kyphoplasty Multilevel spondylosis and degenerative disc disease.  Facet arthropathy     XR LUMBAR SPINE (2-3 VIEWS)    Result Date: 5/19/2021  EXAMINATION: THREE XRAY VIEWS OF THE LUMBAR SPINE 5/19/2021 10:31 am COMPARISON: CT approximately 5 cm. Additional findings, as above. The findings were sent to the Radiology Results [id number] at 2:47 p.m. on 5/27/2021to be communicated to a licensed caregiver. MRI THORACIC SPINE WO CONTRAST    Result Date: 6/2/2021  EXAMINATION: MRI OF THE THORACIC SPINE WITHOUT CONTRAST  6/2/2021 8:52 pm TECHNIQUE: Multiplanar multisequence MRI of the thoracic spine was performed without the administration of intravenous contrast. COMPARISON: 05/19/2021 HISTORY: ORDERING SYSTEM PROVIDED HISTORY: compression fracture TECHNOLOGIST PROVIDED HISTORY: compression fracture Reason for Exam: compression fracture Additional signs and symptoms: patient c/o back pain; to have surgery tomorrow 6/3 FINDINGS: BONES/ALIGNMENT: There is kyphosis of the thoracic spine without significant listhesis. There is anterior wedging of multiple thoracic vertebral bodies, most pronounced at T5, T6, and T7. There is minimal anterior wedging with marrow edema at T12. There is T2 hyperintensity and STIR hyperintensity in the adjacent T12-L1 disc space. Degenerative marrow signal changes are noted throughout the thoracic spine. There is kyphoplasty at L1 with hardware artifact. SPINAL CORD: No abnormal cord signal is seen. SOFT TISSUES: No paraspinal mass identified. There are bilateral pleural effusions. DEGENERATIVE CHANGES: Multilevel degenerative changes without significant spinal canal stenosis. 1. Marrow edema at T12 with edema in the T12-L1 intervertebral disc space and edema in the L1 vertebral body. This could be related to trauma/fracture, but discitis/osteomyelitis cannot be excluded. Post-contrast imaging may be useful for further characterization. 2. Multilevel degenerative changes of the thoracic spine.      MRI THORACIC SPINE WO CONTRAST    Result Date: 5/19/2021  EXAMINATION: MRI OF THE THORACIC SPINE WITHOUT CONTRAST  5/19/2021 7:48 pm TECHNIQUE: Multiplanar multisequence MRI of the thoracic spine was performed without the administration of intravenous contrast. COMPARISON: None. HISTORY: ORDERING SYSTEM PROVIDED HISTORY: severe pain TECHNOLOGIST PROVIDED HISTORY: Patient is not responding well the pain with increasing dose of self-harm morphine severe pain Reason for Exam: severe back pain FINDINGS: BONES/ALIGNMENT: Thoracic vertebral heights are maintained. Alignment is normal.  No marrow edema or suspect osseous lesion is seen. There is a chronic mild compression deformity of the L1 vertebral body with intravertebral cement. SPINAL CORD: The visualized spinal cord has normal signal and morphology. No evidence of mass or abnormal fluid collection within the spinal canal. SOFT TISSUES: Small layering left pleural effusion. Incidental 9 mm left thyroid nodule. DEGENERATIVE CHANGES: Moderate disc height loss and desiccation. Mild spinal canal stenosis at the C6-7 through T3-4 levels secondary to disc bulges. No significant neural foraminal narrowing. 1. No acute abnormality of the thoracic spine. 2. Chronic mild compression deformity of the L1 vertebral body status post prior vertebroplasty. 3. Mild degenerative changes in the lower cervical and upper thoracic spine. 4. Small layering left pleural effusion. 5. Incidental subcentimeter left thyroid nodule. No further follow-up is warranted per guidelines below. RECOMMENDATIONS: Subcentimeter incidental left thyroid nodule. No follow-up imaging is recommended. Reference: J Am Trell Radiol.  2015 Feb;12(2): 143-50     MRI LUMBAR SPINE WO CONTRAST    Result Date: 6/2/2021  EXAMINATION: MRI OF THE LUMBAR SPINE WITHOUT CONTRAST, 6/2/2021 8:52 pm TECHNIQUE: Multiplanar multisequence MRI of the lumbar spine was performed without the administration of intravenous contrast. COMPARISON: 05/19/2021 HISTORY: ORDERING SYSTEM PROVIDED HISTORY: compression fracture TECHNOLOGIST PROVIDED HISTORY: compression fracture Reason for Exam: compression fracture Additional signs caregiver. MRI LUMBAR SPINE WO CONTRAST    Result Date: 5/19/2021  EXAMINATION: MRI OF THE LUMBAR SPINE WITHOUT CONTRAST, 5/19/2021 7:48 pm TECHNIQUE: Multiplanar multisequence MRI of the lumbar spine was performed without the administration of intravenous contrast. COMPARISON: Lumbar spine 2-3 views May 19, 2021. CT lumbar spine without contrast April 4, 2021. HISTORY: ORDERING SYSTEM PROVIDED HISTORY: Severe low back pain TECHNOLOGIST PROVIDED HISTORY: Severe low back pain Reason for Exam: Severe low back pain FINDINGS: BONES/ALIGNMENT: L1 vertebroplasty is noted in setting of chronic compression fracture with approximately 50% height loss. Associated L1 retropulsion of posterior cortex is identified which contours the ventral thecal sac narrowing the midline AP thecal sac diameter to 10 mm consistent with borderline central canal stenosis. SPINAL CORD: The conus terminates normally. SOFT TISSUES: No paraspinal mass identified. L1-L2: Disc desiccation is noted with mild disc height loss in association with posterior disc osteophyte complex which indents the ventral thecal sac narrowing the midline AP thecal sac diameter to 9 mm consistent with mild central canal stenosis. Disc osteophyte complex encroaches upon and causes moderate bilateral neural foraminal stenosis. L2-L3: Disc desiccation is noted with mild disc height loss in association with posterior disc osteophyte complex which indents the ventral thecal sac narrowing the midline AP thecal sac diameter to 8 mm consistent with moderate central canal stenosis. Disc osteophyte complex encroaches upon and causes mild bilateral neural foraminal stenosis. L3-L4: Severe disc height loss is present at this level in association with posterior disc osteophyte complex which indents the ventral thecal sac narrowing the midline AP thecal sac diameter to 8 mm consistent with moderate central canal stenosis.   Disc osteophyte complex encroaches upon and causes adjustment of the mA/kV was utilized to reduce the radiation dose to as low as reasonably achievable. CONTRAST: NONE SEDATION: 1 mg versed and 0 fentanyl were titrated intravenously for moderate sedation monitored under my direction. Total intra service time of sedation was 40 minutes. The patient's vital signs were monitored throughout the procedure and recorded in the patient's medical record by the nurse. FLUOROSCOPY DOSE AND TYPE OR TIME AND EXPOSURES: 140 mGy-cm DLP DESCRIPTION OF PROCEDURE: Informed consent was obtained after a detailed explanation of the procedure including risks, benefits, and alternatives. Universal protocol was observed. While the patient was position prone, after localizing, marking and anesthetizing the skin overlying the left lower thoracic region with 1% lidocaine, a small incision was made. Under fluoro-CT guidance, a 20 gauge Chiba needle was advanced into the T12-L1 disc space via trans costal transverse approach. CT images confirmed appropriate tip of the biopsy needle within the disc space. 2 cc of bloody fluid was aspirated. Needle was removed and the skin site was dressed appropriately. The patient tolerated the procedure well and left the department in stable condition. FINDINGS: Postprocedural CT demonstrated no evidence of hematoma, hemorrhage or pneumothorax. Technically successful CT-guided biopsy of the T12-L1 disc space. Sample was sent as requested by the ordering service. FLUORO FOR SURGICAL PROCEDURES    Result Date: 5/24/2021  Radiology exam is complete. No Radiologist dictation. Please follow up with ordering provider. CT CHEST ABDOMEN PELVIS WO CONTRAST    Result Date: 5/14/2021  EXAMINATION: CT OF THE CHEST, ABDOMEN, AND PELVIS WITHOUT CONTRAST 5/14/2021 11:32 am TECHNIQUE: CT of the chest, abdomen and pelvis was performed without the administration of intravenous contrast. Multiplanar reformatted images are provided for review.  Dose modulation, iterative reconstruction, and/or weight based adjustment of the mA/kV was utilized to reduce the radiation dose to as low as reasonably achievable. COMPARISON: 04/24/2020, 03/18/2019 HISTORY: ORDERING SYSTEM PROVIDED HISTORY: Mesothelioma of peritoneum [facility]) TECHNOLOGIST PROVIDED HISTORY: Reason for Exam: patient states this is a follow up for cancer Additional signs and symptoms: patient states this is a follow up for cancer FINDINGS: Chest: Mediastinum: The cardiac mediastinal contours appear unchanged. Enlargement of the main pulmonary artery measuring 48 mm is noted, suggestive of pulmonary hypertension. No pericardial effusion. No lymphadenopathy. Lungs/pleura: No acute airspace disease identified. No effusion. Minimal subsegmental atelectasis in the lung bases. The central airway is patent. Soft Tissues/Bones: No acute findings. Abdomen/Pelvis: Organs: Evaluation of the abdominal and pelvic viscera is limited in the absence of contrast.   The liver, biliary tree, pancreas, spleen, adrenals and kidneys appear stable. Nodularity of the adrenal glands again demonstrated, a chronic finding. Bilateral renal cysts are noted, consistent with a combination of simple and hemorrhagic/proteinaceous cysts. An exophytic lobulated complex cyst arising from the lower pole the left kidney is demonstrated, better characterized as a cyst on prior contrast imaging. GI/Bowel: Diverticulosis. Wall thickening of the sigmoid colon is noted. Moderate distal colonic stool burden. No evidence for bowel obstruction. There is adjacent complex fluid or soft tissue thickening in the pelvis near this area, a stable chronic finding. Pelvis: Streak artifact from bilateral hip arthroplasties noted. The bladder appears decompressed. Peritoneum/Retroperitoneum: Mild induration of the peritoneal fat in the left upper quadrant again demonstrated, a stable finding.   Presumed pleural thickening versus complex fluid in the

## 2021-06-12 NOTE — PROGRESS NOTES
Physical Therapy  Facility/Department: [facility] ACUTE REHAB  Daily Treatment Note  NAME: Kiara Kennedy  : 1935  MRN: [id number]    Date of Service: 2021    Discharge Recommendations:  Patient would benefit from continued therapy after discharge, Home with assist PRN   PT Equipment Recommendations  Equipment Needed: No    Assessment   Body structures, Functions, Activity limitations: Decreased functional mobility ; Decreased strength;Decreased endurance;Decreased balance; Increased pain;Decreased safe awareness;Decreased posture  Treatment Diagnosis: impaired mobility due to low back pain/ weakness  Specific instructions for Next Treatment: Log roll with bed mobility, don corset prior to bed mobility  History: Recent Kyphoplasty at L1 in . Pt has T5,T6, T9, T10 compression fxs and L1 discitis/ osteomyelitis.,  REQUIRES PT FOLLOW UP: Yes  Activity Tolerance  Activity Tolerance: Patient limited by pain; Patient limited by endurance; Patient limited by fatigue     Patient Diagnosis(es): There were no encounter diagnoses. has a past medical history of Asbestos exposure, Atrial fibrillation ([address] Basal cell carcinoma of nose, CAD (coronary artery disease), CKD (chronic kidney disease) stage 2, GFR 60-89 ml/min, Hyperlipidemia, Hypertension, MDRO (multiple drug resistant organisms) resistance, Nephrolithiasis, Osteoarthritis, Ovarian cyst, Oxygen dependent, Palpitations, Peritoneal mesothelioma ([address] PONV (postoperative nausea and vomiting), Prolonged emergence from general anesthesia, and SOB (shortness of breath) on exertion. has a past surgical history that includes Tubal ligation (); Hysterectomy (); Colonoscopy; Total hip arthroplasty (Right, ); Rectocele repair (); liver biopsy (); Upper gastrointestinal endoscopy; Photodynamic Therapy (2000 & 2002); Total hip arthroplasty (Left, ); Revision total hip arthroplasty (Right, );  Rotator cuff repair (); Ankle fracture surgery (Right, 2013); Cataract removal (Bilateral); Cardiac catheterization (2002); liver biopsy; Coronary angioplasty with stent (April 2002); [place] tooth extraction; Appendectomy (1964); Cholecystectomy (2005); Skin cancer excision (Bilateral, 2006); Skin cancer excision (Left, Oct. 2014); nasal endoscopy (Bilateral, 11/25/2014); BIOPSY / LIGATION TEMPORAL ARTERY (Bilateral, 2/12/2021); Breast biopsy (Right); Spine surgery (N/A, 4/15/2021); Upper gastrointestinal endoscopy (N/A, 5/28/2021); Colonoscopy (N/A, 5/28/2021); and CT NEEDLE BIOPSY SPINE (6/4/2021). Restrictions  Restrictions/Precautions  Restrictions/Precautions: Contact Precautions, Fall Risk  Required Braces or Orthoses?: Yes  Implants present? : Metal implants  Required Braces or Orthoses  Spinal Other: thoracic corset (Prn based off patients comfort, per PT Eileen)   Position Activity Restriction  Other position/activity restrictions: left leg 1/2\" shorter than the right (S/P BILATERAL THRs), O2 per NC PRN PICC line R UE  Subjective   General  Chart Reviewed: Yes  Additional Pertinent Hx: Per PM& R note: Ms. Nedra Ibarra who is a 81 y.o. right handed female admitted to [facility] on 5/19/2021 with Back Pain. She had recent L1 kyphoplasty prior to presentation. Caudal epidural steroid injection was done on 5/24/21 by Dr. Carlitos Ramon. MRI thoracic and lumbar spine showed marrow edema at T12 with edema in the T12-L1 intervertebral disc space and edema in the L1 vertebral body, related to trauma/fracture or discitis/osteomyelitis. She underwent IR guided aspiration on 6/4/21 and culture is showing no growth at this time. ID is recommending IV ceftriaxone for 6 weeks. Pt admitted to rehab unit on 6/8/21. Response To Previous Treatment: Patient with no complaints from previous session.   Family / Caregiver Present: No  Referring Practitioner: Dr Nilda Valencia: Patient reports sleeping well throughout the night General Comment  Comments: Thoracic corset prn depending on pt comfort per PT Eileen  Pain Screening  Patient Currently in Pain: Yes  Pain Assessment  Pain Assessment: 0-10  Pain Level: 7  Pain Type: Surgical pain  Pain Location: Back  Pain Orientation: Lower  Pain Descriptors: Aching  Vital Signs  Patient Currently in Pain: Yes  Oxygen Therapy  SpO2: 96 %  Pulse Oximeter Device Mode: Intermittent  Pulse Oximeter Device Location: Finger  O2 Device: Nasal cannula  O2 Flow Rate (L/min): 3 L/min  Patient Observation  Observations: O2 trialed on 2L, patient dropped to 86% SpO2; put patient back on 3L and SpO2 went to 96%       Orientation  Orientation  Overall Orientation Status: Within Functional Limits  Objective   Bed mobility  Rolling to Left: Minimal assistance  Supine to Sit: Minimal assistance; Moderate assistance;2 Person assistance  Transfers  Sit to Stand: 2 Person Assistance; Moderate Assistance  Stand to sit: 2 Person Assistance;Minimal Assistance  Bed to Chair: 2 Person Assistance;Dependent/Total  Comment: Sit>Stand completed 3x in // bars. VC required 75% of the time for UE and LE placement. Pt denies any dizziness, but fatigues after ~20 seconds. Ambulation  Ambulation?: No (patient had increased unsteadiness this date; deferred amb )  WB Status: left leg 1/2\" shorter than the right (S/P BILATERAL THRs)  Stairs/Curb  Stairs?: No        Other exercises  Other exercises?: Yes  Other exercises 1: Seated bilat LE exercises, A/AAROM x15  Other exercises 2: bed mobility during PM session and donned back brace once on side of bed   Other exercises 4: UBE 5 min Fwd/retro  Other exercises 5: Standing tolerance in // bars 1-2 min tolerance x3      Goals  Short term goals  Time Frame for Short term goals: 10 days  Short term goal 1: pt to increase LE strength  by 1MMG to improve strength.   Short term goal 2: pt to demonstrate good technique for log rolling, balance and strengthening exercises as tolerated  Short term goal 3: pt to demo good sitting balance and fair  standing balance with least restrictive device to increase safety  Short term goal 4: pt to demonstrate improved bed mobility with log rolling using rail w/ min A x1  Short term goal 5: pt to demo sit<>stand transfer with rolling walker mod A  Short term goal 6: pt to transfer bed<>chair with rolling walker min A  to mod . Short term goal 7: pt able to ambulate with rolling walker distance of  20 ftx 2, min a x 1 to 2 person. Short term goal 8: . Short term goal 9: .  Long term goals  Time Frame for Long term goals : By DC  Long term goal 1: Pt able to perform sit<>stand and pivot transfers with RW at [address] [facility] term goal 2: Pt able to ambulate with rolling walker distance of 50 ft or more, SBA/CGA, level surfaces. Long term goal 3: Pt able to go up and down 4 to 5 steps with 2 rails, min A   Long term goal 4: Pt able to propel w/c distance of 50 to 100 ft, level surfaces, SBA  Long term goal 5: Pt able to perform supine<>sit at CGA/min A  Long term goal 6: Educate family/pt in safe mobility /steps for safe discharge home. Long term goal 7: Ambulate distance of 30 to 50 ft for 2MWT with rolling walker, to improve strength/overall fucntion. Patient Goals   Patient goals : Move better    Plan    Plan  Times per week: 1.5 hr/day, 5 to 7 days/week.   Specific instructions for Next Treatment: Log roll with bed mobility, don corset prior to bed mobility  Current Treatment Recommendations: Strengthening, Functional Mobility Training, Safety Education & Training, Transfer Training, ROM, Balance Training, Endurance Training, Patient/Caregiver Education & Training, Positioning, Equipment Evaluation, Education, & procurement, Home Exercise Program, Wheelchair Mobility Training, Gait Training, Stair training  Safety Devices  Type of devices: Patient at risk for falls, Gait belt, Call light within reach, Left in chair        06/12/21 0845 06/12/21 1427   PT Individual Minutes   Time In 1943 4842   Time Out 1790 1481   Minutes 59 38     Electronically signed by Richard Quinonez PTA on 6/12/21 at 4:01 PM EDT

## 2021-06-12 NOTE — PROGRESS NOTES
Pharmacy Note  Warfarin Consult follow-up      Recent Labs     06/10/21  0712 06/11/21  0645 06/12/21  0745   INR 3.7 4.4 4.5     No results for input(s): HGB, HCT, PLT in the last 72 hours. Significant Drug-Drug Interactions:  New warfarin drug-drug interactions: none  Discontinued drug-drug interactions: none  Current warfarin drug-drug interactions: ceftriaxone, amiodarone, atorvastatin, prednisone, sertraline, ropinirole      Date             INR        Dose given previous day  Dose scheduled for today  6/12/2021            4.5       hold         hold           Notes: INR supratherapeutic. Hold dose today. Daily PT/INR while inpatient.      Anthony Buckley, PharmD, 6/12/2021 3:58 PM

## 2021-06-12 NOTE — PROGRESS NOTES
[facility]    ACUTE REHABILITATION OCCUPATIONAL THERAPY  DAILY NOTE    Date: 21  Patient Name: Suzi Boswell      Room: 5252/2206-73    MRN: [id number]   : 1935  (81 y.o.)  Gender: female   Referring Practitioner: Dr. Chance Moore  Diagnosis: Debility  Additional Pertinent Hx: A fib, CAD, CKD, CHF, HTN, COPD    Restrictions  Restrictions/Precautions: Contact Precautions, Fall Risk  Implants present? : Metal implants  Other position/activity restrictions: left leg 1/2\" shorter than the right (S/P BILATERAL THRs), O2 per NC PRN PICC line R UE  Required Braces or Orthoses  Spinal Other: thoracic corset (Prn based off patients comfort, per PT Eileen)   Required Braces or Orthoses?: Yes  Equipment Used: Bed, Wheelchair    Subjective  Subjective: \"I'm feeling very tired today. \" fatigues quickly  Comments: pt alert and cooperative, pt up in chair as writer enters room in AM/PM  Patient Currently in Pain: Yes  Pain Level: 7  Pain Location: Back  Pain Orientation: Lower  Restrictions/Precautions: Contact Precautions; Fall Risk  Overall Orientation Status: Within Functional Limits  Patient Observation  Observations: O2, 1L, NC  Pain Assessment  Pain Assessment: 0-10  Pain Level: 7  Pain Type: Surgical pain  Pain Location: Back  Pain Orientation: Lower  Pain Descriptors: Aching    Objective  Cognition  Overall Cognitive Status: WFL  Perception  Overall Perceptual Status: WFL  Balance  Sitting Balance: Minimal assistance  Standing Balance: Dependent/Total (standing with henrietta stedy )  Bed mobility  Sit to Supine: 2 Person assistance; Moderate assistance (cues for log rolling )  Transfers  Sit to stand: 2 Person assistance;Maximum assistance  Stand to sit: 2 Person assistance;Minimal assistance  Transfer Comments: using henrietta stedy, cues for safet y  Standing Balance  Time: 1-2 min  Activity: standing in henrietta stedy   Comment: verbal cues for hand placment and safety with henrietta stedy   Functional Mobility  Functional - Mobility Device: Wheelchair  Activity: To/From therapy gym; To/from bathroom  Assist Level: Maximum assistance (min A to bathroom, max A from bathroom 2* increased fatigue)  Functional Mobility Comments: very slow movements     Type of ROM/Therapeutic Exercise  Type of ROM/Therapeutic Exercise: Free weights  Comment: BUE, x 10 reps to increase strength and overall activity tolerance required to complete self care tasks. Pt required frequent rest breaks due to fatigue. Exercises  Scapular Protraction: x  Scapular Retraction: x  Shoulder Flexion: x  Shoulder Extension: x  Horizontal ABduction: x  Horizontal ADduction: x  Elbow Flexion: x  Elbow Extension: x  Grasp/Release: BUE, hand gripper set on first hole x 20 reps to increase hand strength and independence during ADLs                       ADL  Feeding: Setup  Grooming: Stand by assistance; Increased time to complete;Setup  UE Bathing: Stand by assistance;Setup; Increased time to complete  LE Bathing: None  UE Dressing: Minimal assistance  LE Dressing: None  Toileting: None  Additional Comments: pt reqiures VCs for initian of tasks this date, Pt seated at sink for ADL activities. axtended time for UB bathing/dressing and grooming, did not complete LB bathing/dressing 2* fatigue this date          Assessment  Performance deficits / Impairments: Decreased functional mobility ; Decreased balance;Decreased ADL status; Decreased endurance;Decreased high-level IADLs;Decreased strength  Assessment: Pts  present in PM session.   Prognosis: Good  Discharge Recommendations: Continue to assess pending progress  Activity Tolerance: Patient limited by fatigue;Patient limited by pain  Safety Devices in place: Yes  Type of devices: Left in chair;Gait belt;Patient at risk for falls (spouse present for PM tx)          Patient Education: OT POC, activity promotion, EC during ADLs  Patient Goals   Patient goals : Patient stated, \" to be able to take care of myself. \"  Learner:patient  Method: demonstration and explanation       Outcome: acknowledged understanding and needs reinforcement        Plan  Plan  Times per week: 5-7 days a week  Times per day: Twice a day  Current Treatment Recommendations: Safety Education & Training, Balance Training, Self-Care / ADL, Patient/Caregiver Education & Training, Functional Mobility Training, Home Management Training, Endurance Training, Strengthening  Patient Goals   Patient goals : Patient stated, \" to be able to take care of myself. \"  Short term goals  Time Frame for Short term goals: In one week, patient will:  Short term goal 1: complete upper body bathing/dressing including donning of thoracic corset at MOD A in supine position with reported pain at 5/10 or less, and 3 or less verbal cues for energy conservation. Short term goal 2: complete lower body bathing/dressing with AE in supine position or seated  at MOD A with reported pain at 5/10 or less, and 3 or less verbal cues for energy conservation. Short term goal 3: complete toileting at [address] [place] [address] with reported pain at 5/10 or less, and 3 or less verbal cues for energy conservation. Short term goal 4: increase  strength evidenced by improved score by 5#'s  Short term goal 5: increase fine motor coordination evidenced by improved score on 9 Hole Peg Test by 5 seconds. Long term goals  Time Frame for Long term goals : By discharge, patient will:  Long term goal 1: complete upper body bathing/dressing including donning of thoracic corset, and grooming at SBA with reported pain of 2/10 or less, and 0 verbal cues for energy conservation. Long term goal 2: complete lower body bathing/dressing in seated position at [facility] with AE , with reported pain of 2/10 or less, and 0 verbal cues for energy conservation.   Long term goal 3: complete functional transfers/ mobility with LRD at [facility] AT [facility] A with reported pain of 2/10 or less, and 0 verbal cues for energy conservation. Long term goal 4: complete toileting at [facility] reporting 2/10 pain or less. Long term goal 5: complete simple meal preparation at [facility] with reported pain at 2/10 or less, and 0 verbal cues for energy conservation.        Electronically signed by NISREEN Hansen on 6/12/21 at 4:38 PM EDT         06/12/21 1027 06/12/21 1558   OT Individual Minutes   Time In 5961 6085   Time Out 5820 [id number]   [facility] HospitalEDWINA [facility] [phone]

## 2021-06-13 NOTE — PROGRESS NOTES
Patient ID:Luisa Butler is 81 y.o.   : 1935 MRN: [id number]    Admit date: 2021  Primary Care Physician: Gaston Montilla DO   Patient Care Team:  Gaston Montilla DO as PCP - Stacy Marte MD as Orthopedic Surgeon (Orthopedic Surgery)  Iván Johnston DO as Consulting Physician (Obstetrics & Gynecology)  Tel: [phone]  PHARMACY TO DOSE WARFARIN  IP CONSULT TO DIETITIAN  IP CONSULT TO SOCIAL WORK  IP CONSULT TO [address] [place] [address]  Admitting Physician: Donny Wu MD   Code Status:  Full Code  ADULT DIET; Regular; No Added Salt (3-4 gm)  Adult Oral Nutrition Supplement; Standard High Calorie/High Protein Oral Supplement  Adult Oral Nutrition Supplement; Frozen Oral Supplement    Progress Note      HPI: Janet Hernandez is a 81 y.o.  female who presents with No chief complaint on file. Patient denies chest pain, shortness of breath or cough. Discussed with nursing due to leg swelling will convert Lasix to 40 mg every morning instead of 20 mg twice daily    Problem List: Principal Problem:    Debility  Active Problems:    Chronic diastolic heart failure (HCC)    Chronic respiratory failure (HCC)    Paroxysmal atrial fibrillation (HCC)    Pulmonary hypertension (HCC)    Iron deficiency anemia    Moderate malnutrition (HCC)  Resolved Problems:    * No resolved hospital problems.  *      Debility    Assessment:    T12-L1 discitis/osteomyelitis  Low back pain with L1 compression fracture status post vertebroplasty  Chronic diastolic heart failure  Chronic respiratory failure     Paroxysmal atrial fibrillation on warfarin    Warfarin on hold for supratherapeutic INR    Hypertension  Pulmonary hypertension   Iron deficiency anemia  Moderate malnutrition  Debility        Plan:     IV rocephin for osteomyelitis  Change Lasix to 40 mg every morning as noted above  Midodrine for hypotension  Pharmacy holding Coumadin as INR 4.6    PT/OT    Pain control       Anticipated Disposition upon discharge:     [] Home  [] Home with [address] ([facility])   [] SNF ([facility])  [] LTAC ([address])      [address]    has a past medical history of Asbestos exposure, Atrial fibrillation ([address] Basal cell carcinoma of nose, CAD (coronary artery disease), CKD (chronic kidney disease) stage 2, GFR 60-89 ml/min, Hyperlipidemia, Hypertension, MDRO (multiple drug resistant organisms) resistance, Nephrolithiasis, Osteoarthritis, Ovarian cyst, Oxygen dependent, Palpitations, Peritoneal mesothelioma ([address] PONV (postoperative nausea and vomiting), Prolonged emergence from general anesthesia, and SOB (shortness of breath) on exertion. SURGICAL HISTORY      has a past surgical history that includes Tubal ligation (1969); Hysterectomy (1981); Colonoscopy; Total hip arthroplasty (Right, 1990); Rectocele repair (1997); liver biopsy (2000); Upper gastrointestinal endoscopy; Photodynamic Therapy (April 2000 & Jan. 2002); Total hip arthroplasty (Left, 2003); Revision total hip arthroplasty (Right, 1998); Rotator cuff repair (2006); Ankle fracture surgery (Right, 2013); Cataract removal (Bilateral); Cardiac catheterization (2002); liver biopsy; Coronary angioplasty with stent (April 2002); Strum tooth extraction; Appendectomy (1964); Cholecystectomy (2005); Skin cancer excision (Bilateral, 2006); Skin cancer excision (Left, Oct. 2014); nasal endoscopy (Bilateral, 11/25/2014); BIOPSY / LIGATION TEMPORAL ARTERY (Bilateral, 2/12/2021); Breast biopsy (Right); Spine surgery (N/A, 4/15/2021); Upper gastrointestinal endoscopy (N/A, 5/28/2021); Colonoscopy (N/A, 5/28/2021); and CT NEEDLE BIOPSY SPINE (6/4/2021).     CURRENT MEDICATIONS        [START ON 6/14/2021] furosemide  40 mg Oral Daily    chlorhexidine  15 mL Mouth/Throat BID    calcium carbonate  500 mg Oral BID    cefTRIAXone (ROCEPHIN) IV  2,000 mg Intravenous Q24H    midodrine  10 mg Oral TID     Vitamin D (CHOLECALCIFEROL) 1000 UNITS CAPS capsule, Take 1,000 Units by mouth 2 times daily  predniSONE (DELTASONE) 5 MG tablet, Take 20 mg by mouth daily   Omega 3 1000 MG CAPS, Take 1,000 mg by mouth daily   sertraline (ZOLOFT) 50 MG tablet, Take 50 mg by mouth daily. ALLERGIES     is allergic to adhesive tape, meperidine, demerol, ultram [tramadol hcl], zocor [simvastatin], codeine, and fentanyl. Allergies   Allergen Reactions    Adhesive Tape Rash    Meperidine Nausea Only    Demerol Nausea Only    Ultram [Tramadol Hcl]      Hot flashes    Zocor [Simvastatin]      Muscle aches    Codeine      \"out of it\"    Fentanyl Rash       SOCIAL HISTORY      reports that she has never smoked. She has never used smokeless tobacco. She reports current alcohol use. She reports that she does not use drugs. Vitals:    21 1230 21 1301 21 2230 21 0720   BP: 105/63 107/65 120/69 95/72   Pulse: 99 78 106 52   Resp: 18 18 18 16   Temp: 98.1 °F (36.7 °C) 98.3 °F (36.8 °C) 98.4 °F (36.9 °C) 97.6 °F (36.4 °C)   TempSrc: Oral Oral  Oral   SpO2: 99% 100% 96% 100%   Weight:       Height:         Body mass index is 27.81 kg/m². BP 95/72   Pulse 52   Temp 97.6 °F (36.4 °C) (Oral)   Resp 16   Ht 5' 5\" (1.651 m)   Wt 167 lb 1.7 oz (75.8 kg)   SpO2 100%   BMI 27.81 kg/m²   Temp (24hrs), Av °F (36.7 °C), Min:97.6 °F (36.4 °C), Max:98.4 °F (36.9 °C)    Pulse Ox: SpO2  Av %  Min: 96 %  Max: 100 %  Supplemental O2: O2 Flow Rate (L/min): 2 L/min   Oxygen Therapy  SpO2: 100 %  Pulse Oximeter Device Mode: Intermittent  Pulse Oximeter Device Location: Left  O2 Device: Nasal cannula  O2 Flow Rate (L/min): 2 L/min    No data found. Admission weight: 167 lb 1.7 oz (75.8 kg)  Wt Readings from Last 3 Encounters:   21 167 lb 1.7 oz (75.8 kg)   21 167 lb 1.7 oz (75.8 kg)   21 153 lb 14.1 oz (69.8 kg)    (encounters)    /O (24Hr):   No intake or output data in the 24 hours ending 21 4908 [facility] reviewed. H&N: atraumatic, normocephalic, anicteric, no conjunctivitis, EOM normal, no lid lag or exophthalmos, nose and ears appear normal, trachea mid line, no stridor,  Chest: no respiratory distress, normal effort, fairly good air entry, no wheezes,    Heart: normal heart sounds, regular rate and rhythm, no murmurs. gallops or rubs,  Abdomen: BS present, non-tender, no masses or organomegaly detected,   Extremities: 2+BLE peripheral edema, no cyanosis. no oncholysis. Skin: no rash, no erythema, no jaundice,   CNS: grossly normal without cranial nerve deficits, no abnormal coordination or tone, Psychiatric: normal mood, affect. LABS:    CBC: No results for input(s): WBC, RBC, HGB, HCT, MCV, MCH, MCHC, RDW, PLT, MPV in the last 72 hours. MAG: No results for input(s): MG in the last 72 hours. CMP: No results for input(s): NA, K, CL, CO2, BUN, CREATININE, GLUCOSE, CALCIUM, PROT, LABALBU, BILITOT, ALKPHOS, AST, ALT in the last 72 hours. No results for input(s): LIPASE, AMYLASE in the last 72 hours. Phosphorus:  No results for input(s): PHOS in the last 72 hours. Albumin: No results for input(s): LABALBU in the last 72 hours.     U/A:  Lab Results   Component Value Date    COLORU YELLOW 06/01/2021    PHUR 6.0 06/01/2021    WBCUA 10 TO 20 06/01/2021    RBCUA None 06/01/2021    MUCUS NOT REPORTED 06/01/2021    TRICHOMONAS NOT REPORTED 06/01/2021    YEAST NOT REPORTED 06/01/2021    BACTERIA MANY 06/01/2021    CLARITYU Clear 05/30/2019    SPECGRAV 1.024 06/01/2021    LEUKOCYTESUR SMALL 06/01/2021    UROBILINOGEN Normal 06/01/2021    BILIRUBINUR NEGATIVE 06/01/2021    GLUCOSEU NEGATIVE 06/01/2021    AMORPHOUS NOT REPORTED 06/01/2021       Recent Labs     06/11/21  0645 06/12/21  0745 06/13/21  0609   INR 4.4 4.5 4.6*     Lab Results   Component Value Date    DDIMER 2.38 (H) 12/17/2014     Lab Results   Component Value Date    BNP NOT REPORTED 08/22/2014     troponins  Lab Results Component Value Date    TROPONINI <0.01 02/19/2014         D Dimer: No results for input(s): DDIMER in the last 72 hours. Troponin T No results for input(s): TROPONINT in the last 72 hours. ProBNP   No results found for requested labs within last 30 days. ProBNP Invalid input(s): PRO-BNP  Lactic acid:Invalid input(s): LACTIC ACID      PT/INR:   Recent Labs     06/11/21  0645 06/12/21  0745 06/13/21  0609   PROTIME 40.9* 42.2* 42.6*   INR 4.4 4.5 4.6*     APTT: No results for input(s): APTT in the last 72 hours. ESR:   Lab Results   Component Value Date    SEDRATE 5 06/03/2021     CRP:   Lab Results   Component Value Date    CRP 21.5 (H) 06/03/2021     Folate and B12:   Lab Results   Component Value Date    [id number] 9620 (H) 05/25/2021   ,   Lab Results   Component Value Date    FOLATE 17.6 05/25/2021     Thyroid Studies:   Lab Results   Component Value Date    TSH 0.55 04/21/2021     D-Dimer:   Lab Results   Component Value Date    DDIMER 2.38 (H) 12/17/2014       Lactic acid: No components found for: LACT     Troponin T No results for input(s): TROPHS in the last 72 hours. Troponins:     No components found for: TROP    Lab Results   Component Value Date    TROPONINI <0.01 02/19/2014       No results found for requested labs within last 720 hours. Cardiac Enzymes:    No results found for requested labs within last 720 hours.     No results found for: CKMB    ProBNP:    No components found for: NTBNP      U/A:  Lab Results   Component Value Date    COLORU YELLOW 06/01/2021    PHUR 6.0 06/01/2021    WBCUA 10 TO 20 06/01/2021    RBCUA None 06/01/2021    MUCUS NOT REPORTED 06/01/2021    TRICHOMONAS NOT REPORTED 06/01/2021    YEAST NOT REPORTED 06/01/2021    BACTERIA MANY 06/01/2021    CLARITYU Clear 05/30/2019    SPECGRAV 1.024 06/01/2021    LEUKOCYTESUR SMALL 06/01/2021    UROBILINOGEN Normal 06/01/2021    BILIRUBINUR NEGATIVE 06/01/2021    GLUCOSEU NEGATIVE 06/01/2021    AMORPHOUS NOT REPORTED 06/01/2021       Urine Sodium:     Lab Results   Component Value Date    REJI <20 05/20/2021     Urine Potassium:  No results found for: KUR  Urine Chloride:  No results found for: CLUR  Urine Osmolarity:   Lab Results   Component Value Date    OSMOU 492 05/20/2021     Urine Protein:   No results found for: TPU  Urine Creatinine:     Lab Results   Component Value Date    LABCREA 128.8 02/25/2021     Urine Eosinophils:  No components found for: UEOS    Thyroid Studies:   No components found for: T4,  FREE  No results found for: T3  Lab Results   Component Value Date    TSH 0.55 04/21/2021       Lab Results   Component Value Date    TSH 0.55 04/21/2021       HbA1c  No components found for: HA1CC    Lipids:  Lab Results   Component Value Date    HDL 43 01/21/2021    VLDL NOT REPORTED (H) 01/21/2021         CRP:   Lab Results   Component Value Date    CRP 21.5 (H) 06/03/2021     ESR:   Lab Results   Component Value Date    SEDRATE 5 06/03/2021       Folate and B12:   Lab Results   Component Value Date    [id number] 1644 (H) 05/25/2021   ,   Lab Results   Component Value Date    FOLATE 17.6 05/25/2021       No components found for: 3WBC, 3RBC, 3HEMOGLOBIN, 3HEMATOCRIT, 3PLATELETS, 3MCV, 3SEGS, 3STAB, 3LYMP, 3MONOC, 3EOS, 3BASO, 3ASEG, 3MYEL, 3PRMY, 3BLAS, 3PLTE    Recent Results (from the past 24 hour(s))   PROTIME-INR    Collection Time: 06/13/21  6:09 AM   Result Value Ref Range    Protime 42.6 (H) 11.8 - 14.6 sec    INR 4.6 (HH)        Blood Culture: No results for input(s): BC, BLOODCULT2, ORG in the last 72 hours. GRAM STAIN  No results for input(s): LABGRAM, LABANAE, ORG, WNDABS in the last 72 hours.     Resp Culture Brief : No results found for: CULTRESP    Body Fluid : No results found for: BFCX    MRSA : No results found for: [facility]    Urine Culture Brief : No results found for: LABURIN     Organism Brief :   Lab Results   Component Value Date    [facility] EC 02/19/2014    ORG PRMI 02/19/2014       ECHO Complete 2D W Doppler W Color    Result Date: 5/21/2021  [facility] Transthoracic Echocardiography Report (TTE)  Patient Name [facility]   Date of Study                 05/21/2021               MARQUEZ A   Date of      1935  Gender                        Female  Birth   Age          81 year(s)  Race                             Room Number  2115        Height:                       62 inch, 157.48 cm   Corporate ID [id number]    Weight:                       150 pounds, 68 kg  #   Patient Acct [de-identified]   BSA:           1.69 m^2       BMI:      27.44  #                                                                kg/m^2   MR #         [id number]      Sonographer                   DustinerlinNataliia   Accession #  [id number]  Interpreting Physician        Maico De Guzman   Fellow                   Referring Nurse Practitioner   Interpreting             Referring Physician           Corie Grubbs, *  Fellow  Type of Study   TTE procedure:2D Echocardiogram, M-Mode, Doppler, Color Doppler. Procedure Date Date: 05/21/2021 Start: 11:56 AM Study Location: [facility] Technical Quality: Fair visualization Indications:Chest pain. History / Tech. Comments: AFib, Atrial ablation, CAD, CKD, HLD, HTN, Stent Patient Status: Inpatient Height: 62 inches Weight: 150 pounds BSA: 1.69 m^2 BMI: 27.44 kg/m^2 Rhythm: Sinus tachycardia HR: 103 bpm BP: 126/78 mmHg CONCLUSIONS Summary Left ventricle is normal in size and wall thickness. Global left ventricular systolic function is normal. Estimated LV EF >55 %. No obvious wall motion abnormality seen. Both atria are normal in size. Normal right ventricular size and function. Mild to moderate aortic insufficiency. Mild mitral regurgitation. Mild tricuspid regurgitation. Mild pulmonic insufficiency. No pulmonary hypertension. Estimated right ventricular systolic pressure is [id number]. No significant pericardial effusion is seen. Normal aortic root dimension.  Signature ----------------------------------------------------------------------------  Electronically signed by Nataliia Barragan(Sonographer) on 2021 12:48  PM ---------------------------------------------------------------------------- ----------------------------------------------------------------------------  Electronically signed by William Garza(Interpreting physician) on  2021 12:50 PM ---------------------------------------------------------------------------- FINDINGS Left Atrium Left atrium is normal in size. Left Ventricle Left ventricle is normal in size and wall thickness. Global left ventricular systolic function is normal. Estimated LV EF >55 %. No obvious wall motion abnormality seen. Right Atrium Right atrium is normal in size. Right Ventricle Normal right ventricular size and function. Mitral Valve Moderate calcification of the mitral annulus. Mild mitral regurgitation. Aortic Valve No obvious valvular abnormality seen. Mild to moderate aortic insufficiency. Tricuspid Valve No obvious valvular abnormality. Mild tricuspid regurgitation. No pulmonary hypertension. Estimated right ventricular systolic pressure is 27SSLA. Pulmonic Valve No obvious valvular abnormality seen. Mild pulmonic insufficiency. Pericardial Effusion No significant pericardial effusion is seen. Pleural Effusion No pleural effusion seen. Miscellaneous Normal aortic root dimension.  M-mode / 2D Measurements & Calculations:   LVIDd:3.58 cm(3.7 - 5.6 cm)      Diastolic LGCLTA:78.7 ml  KENYJ:3.20 cm(2.2 - 4.0 cm)      Systolic CVXEXH:43.1 ml  SWBW:1.08 cm(0.6 - 1.1 cm)       Aortic Root:2.6 cm(2.0 - 3.7 cm)  LVPWd:0.81 cm(0.6 - 1.1 cm)      LA Dimension: 3.7 cm(1.9 - 4.0 cm)  Fractional Shortenin.39 %    LA volume/Index: 43.6 ml /26m^2  Calculated LVEF (%): 77.78 %     LVOT:2 cm   Mitral:                                Aortic   Peak E-Wave: 0.96 m/s                  Peak Velocity: 1.19 m/s Peak Gradient: 5.66 mmHg  Peak Gradient: 3.66 mmHg  Deceleration Time: 141 msec   Tricuspid:                             Pulmonic:   Estimated RVSP: 21 mmHg  Peak TR Velocity: 2.15 m/s  Peak TR Gradient: 18.49 mmHg  Estimated RA Pressure: 3 mmHg                                         Estimated PASP: 21.49 mmHg  Lateral Wall E' velocity:0.05 m/s    XR THORACIC SPINE (3 VIEWS)    Result Date: 6/1/2021  EXAMINATION: THREE XRAY VIEWS OF THE LUMBAR SPINE; THREE XRAY VIEWS OF THE THORACIC SPINE 6/1/2021 12:34 pm COMPARISON: 05/19/2021 HISTORY: ORDERING SYSTEM PROVIDED HISTORY: pain TECHNOLOGIST PROVIDED HISTORY: pain Reason for Exam: PT states back pain and hx of kyphoplasty Acuity: Unknown Type of Exam: Unknown Additional signs and symptoms: PT states back pain and hx of kyphoplasty FINDINGS: Diffuse osteopenia. Stable compression fracture of L1 body with previous kyphoplasty. Compression fracture of T5, T6, T9, T10 vertebral bodies with 30% loss of normal height at T6. No subluxations are seen. There is disc space narrowing with eburnation of the vertebral endplates at multiple levels throughout the thoracic spine and also at the L1-2, L2-3, L3-4, L4-5 levels. There is sclerosis of the facet joints in the mid and lower lumbar spine. The posterior elements are otherwise intact. The paraspinal soft tissues are unremarkable except for atherosclerotic changes. Bilateral hip prostheses. Postsurgical changes overlying the left abdomen. Compression fracture of T5, T6, T9, T10 vertebral bodies with 30% loss of normal height at T6. Stable compression fracture of L1 body with previous kyphoplasty Multilevel spondylosis and degenerative disc disease. Facet arthropathy     XR THORACIC SPINE (3 VIEWS)    Result Date: 5/19/2021  EXAMINATION: THREE XRAY VIEWS OF THE THORACIC SPINE 5/19/2021 9:31 am COMPARISON: None.  HISTORY: ORDERING SYSTEM PROVIDED HISTORY: Pain TECHNOLOGIST PROVIDED HISTORY: Pain Reason for Exam: hx of recent back surgery, pt still having back pain Acuity: Unknown Type of Exam: Unknown FINDINGS: The regional skeleton was osteopenic. Post vertebroplasty changes involve L1. No thoracic compression, fannie or retro listhesis were noted. Decrease in disc height was noted at all thoracic discs. Osteopenia without thoracic compression, fannie or retrolisthesis. Decrease in disc height at all thoracic disc levels. Post vertebroplasty changes involve L1. No change has occurred from the lumbar spine series dated 04/19/2021. XR LUMBAR SPINE (2-3 VIEWS)    Result Date: 6/1/2021  EXAMINATION: THREE XRAY VIEWS OF THE LUMBAR SPINE; THREE XRAY VIEWS OF THE THORACIC SPINE 6/1/2021 12:34 pm COMPARISON: 05/19/2021 HISTORY: ORDERING SYSTEM PROVIDED HISTORY: pain TECHNOLOGIST PROVIDED HISTORY: pain Reason for Exam: PT states back pain and hx of kyphoplasty Acuity: Unknown Type of Exam: Unknown Additional signs and symptoms: PT states back pain and hx of kyphoplasty FINDINGS: Diffuse osteopenia. Stable compression fracture of L1 body with previous kyphoplasty. Compression fracture of T5, T6, T9, T10 vertebral bodies with 30% loss of normal height at T6. No subluxations are seen. There is disc space narrowing with eburnation of the vertebral endplates at multiple levels throughout the thoracic spine and also at the L1-2, L2-3, L3-4, L4-5 levels. There is sclerosis of the facet joints in the mid and lower lumbar spine. The posterior elements are otherwise intact. The paraspinal soft tissues are unremarkable except for atherosclerotic changes. Bilateral hip prostheses. Postsurgical changes overlying the left abdomen. Compression fracture of T5, T6, T9, T10 vertebral bodies with 30% loss of normal height at T6. Stable compression fracture of L1 body with previous kyphoplasty Multilevel spondylosis and degenerative disc disease.  Facet arthropathy     XR LUMBAR SPINE (2-3 VIEWS)    Result Date: 5/19/2021  EXAMINATION: Probable atelectasis left base. Enteric tube needs further advancement, approximately 5 cm. Additional findings, as above. The findings were sent to the Radiology Results [address] at 2:47 p.m. on 5/27/2021to be communicated to a licensed caregiver. MRI THORACIC SPINE WO CONTRAST    Result Date: 6/2/2021  EXAMINATION: MRI OF THE THORACIC SPINE WITHOUT CONTRAST  6/2/2021 8:52 pm TECHNIQUE: Multiplanar multisequence MRI of the thoracic spine was performed without the administration of intravenous contrast. COMPARISON: 05/19/2021 HISTORY: ORDERING SYSTEM PROVIDED HISTORY: compression fracture TECHNOLOGIST PROVIDED HISTORY: compression fracture Reason for Exam: compression fracture Additional signs and symptoms: patient c/o back pain; to have surgery tomorrow 6/3 FINDINGS: BONES/ALIGNMENT: There is kyphosis of the thoracic spine without significant listhesis. There is anterior wedging of multiple thoracic vertebral bodies, most pronounced at T5, T6, and T7. There is minimal anterior wedging with marrow edema at T12. There is T2 hyperintensity and STIR hyperintensity in the adjacent T12-L1 disc space. Degenerative marrow signal changes are noted throughout the thoracic spine. There is kyphoplasty at L1 with hardware artifact. SPINAL CORD: No abnormal cord signal is seen. SOFT TISSUES: No paraspinal mass identified. There are bilateral pleural effusions. DEGENERATIVE CHANGES: Multilevel degenerative changes without significant spinal canal stenosis. 1. Marrow edema at T12 with edema in the T12-L1 intervertebral disc space and edema in the L1 vertebral body. This could be related to trauma/fracture, but discitis/osteomyelitis cannot be excluded. Post-contrast imaging may be useful for further characterization. 2. Multilevel degenerative changes of the thoracic spine.      MRI THORACIC SPINE WO CONTRAST    Result Date: 5/19/2021  EXAMINATION: MRI OF THE THORACIC SPINE WITHOUT CONTRAST 5/19/2021 7:48 pm TECHNIQUE: Multiplanar multisequence MRI of the thoracic spine was performed without the administration of intravenous contrast. COMPARISON: None. HISTORY: ORDERING SYSTEM PROVIDED HISTORY: severe pain TECHNOLOGIST PROVIDED HISTORY: Patient is not responding well the pain with increasing dose of self-harm morphine severe pain Reason for Exam: severe back pain FINDINGS: BONES/ALIGNMENT: Thoracic vertebral heights are maintained. Alignment is normal.  No marrow edema or suspect osseous lesion is seen. There is a chronic mild compression deformity of the L1 vertebral body with intravertebral cement. SPINAL CORD: The visualized spinal cord has normal signal and morphology. No evidence of mass or abnormal fluid collection within the spinal canal. SOFT TISSUES: Small layering left pleural effusion. Incidental 9 mm left thyroid nodule. DEGENERATIVE CHANGES: Moderate disc height loss and desiccation. Mild spinal canal stenosis at the C6-7 through T3-4 levels secondary to disc bulges. No significant neural foraminal narrowing. 1. No acute abnormality of the thoracic spine. 2. Chronic mild compression deformity of the L1 vertebral body status post prior vertebroplasty. 3. Mild degenerative changes in the lower cervical and upper thoracic spine. 4. Small layering left pleural effusion. 5. Incidental subcentimeter left thyroid nodule. No further follow-up is warranted per guidelines below. RECOMMENDATIONS: Subcentimeter incidental left thyroid nodule. No follow-up imaging is recommended. Reference: J Am Trell Radiol.  2015 Feb;12(2): 143-50     MRI LUMBAR SPINE WO CONTRAST    Result Date: 6/2/2021  EXAMINATION: MRI OF THE LUMBAR SPINE WITHOUT CONTRAST, 6/2/2021 8:52 pm TECHNIQUE: Multiplanar multisequence MRI of the lumbar spine was performed without the administration of intravenous contrast. COMPARISON: 05/19/2021 HISTORY: ORDERING SYSTEM PROVIDED HISTORY: compression fracture TECHNOLOGIST PROVIDED HISTORY: compression fracture Reason for Exam: compression fracture Additional signs and symptoms: back pain FINDINGS: BONES/ALIGNMENT: Chronic compression fracture at L1 identified status post kyphoplasty. There is up to or BE to 50% loss vertebral height. There is edema identified at the T12-L1 disc space. There is edema identified involving inferior endplate of G01. Please refer to separate dictated MRI thoracic spine. There is retropulsion of the central canal at L1 noted again identified which is chronic. No new compression fracture involving the lumbar spine. SPINAL CORD: The conus terminates at L1-L2. Gilbert Couch SOFT TISSUES: There is increased STIR signal identified involving bilateral psoas muscles. T2 hyperintensity identified both kidneys. L1-L2: Degenerate disc space disease with loss of disc space height and signal.  Posterior disc osteophyte complex formation identified. There is mild canal narrowing. Moderate bilateral foraminal narrowing. L2-L3: Disease identified. Mild moderate canal stenosis. Mild bilateral foraminal narrowing. L3-L4: Severe disc space disease identified. Circumferential disc bulge. Moderate canal narrowing. Moderate neural foramina. Mild facet arthropathy. L4-L5: Severe disc space disease with circumferential bulge. .  Moderate foraminal narrowing. Mild facet arthropathy. L5-S1: Minimal disc bulge. Moderate facet arthropathy. Abnormal endplate edema and disc space T2 signal identified at T12-L1. Associated with abnormal signal involving the bilateral psoas muscle. Although this can be seen could be related to compression fracture, findings are worrisome underlying discitis osteomyelitis. Correlation with sedimentation rate and CRP is recommended.   If indicated, contrast may be beneficial. Otherwise stable capitalize changes at L1 with 50 % loss of height and retropulsion into the canal. Moderate severe multilevel degenerate changes The findings were sent to the [facility]iology [facility] at 10:17 pm on 6/2/2021to be communicated to a licensed caregiver. MRI LUMBAR SPINE WO CONTRAST    Result Date: 5/19/2021  EXAMINATION: MRI OF THE LUMBAR SPINE WITHOUT CONTRAST, 5/19/2021 7:48 pm TECHNIQUE: Multiplanar multisequence MRI of the lumbar spine was performed without the administration of intravenous contrast. COMPARISON: Lumbar spine 2-3 views May 19, 2021. CT lumbar spine without contrast April 4, 2021. HISTORY: ORDERING SYSTEM PROVIDED HISTORY: Severe low back pain TECHNOLOGIST PROVIDED HISTORY: Severe low back pain Reason for Exam: Severe low back pain FINDINGS: BONES/ALIGNMENT: L1 vertebroplasty is noted in setting of chronic compression fracture with approximately 50% height loss. Associated L1 retropulsion of posterior cortex is identified which contours the ventral thecal sac narrowing the midline AP thecal sac diameter to 10 mm consistent with borderline central canal stenosis. SPINAL CORD: The conus terminates normally. SOFT TISSUES: No paraspinal mass identified. L1-L2: Disc desiccation is noted with mild disc height loss in association with posterior disc osteophyte complex which indents the ventral thecal sac narrowing the midline AP thecal sac diameter to 9 mm consistent with mild central canal stenosis. Disc osteophyte complex encroaches upon and causes moderate bilateral neural foraminal stenosis. L2-L3: Disc desiccation is noted with mild disc height loss in association with posterior disc osteophyte complex which indents the ventral thecal sac narrowing the midline AP thecal sac diameter to 8 mm consistent with moderate central canal stenosis. Disc osteophyte complex encroaches upon and causes mild bilateral neural foraminal stenosis.  L3-L4: Severe disc height loss is present at this level in association with posterior disc osteophyte complex which indents the ventral thecal sac narrowing the midline AP thecal sac diameter to 8 mm consistent with Michael Dhillon MD on 6/13/2021 at 2:25 PM   Answering service [phone]

## 2021-06-13 NOTE — PROGRESS NOTES
Physical Medicine & Rehabilitation  Progress Note      Subjective:      Nedra Ibarra is a 81 y.o. female with lumbar radiculopathy and discitis/osteomyelitis. She reports doing okay today. She notes feeling tired, although she slept okay last night. She reports ongoing \"soreness\" in the back. She denies any other acute concerns. INR 4.6 today. ROS:  Denies fevers, chills, sweats. No chest pain, palpitations, lightheadedness. Denies coughing, wheezing or shortness of breath. Denies abdominal pain, nausea, diarrhea or constipation. No new areas of joint pain. Denies new areas of numbness or weakness. Denies new anxiety or depression issues. No new skin problems. Rehabilitation:   Progressing in therapies. PT:  Restrictions/Precautions: Contact Precautions, Fall Risk  Implants present? : Metal implants  Other position/activity restrictions: left leg 1/2\" shorter than the right (S/P BILATERAL THRs), O2 per NC PRN PICC line R UE  Required Braces or Orthoses  Spinal Other: thoracic corset (Prn based off patients comfort, per PT Eileen)    Transfers  Sit to Stand: 2 Person Assistance, Moderate Assistance  Stand to sit: 2 Person Assistance, Minimal Assistance  Bed to Chair: 2 Person Assistance, Dependent/Total  Stand Pivot Transfers: Moderate Assistance, 2 Person Assistance (Delmar Medel)  Comment: Sit>Stand completed 3x in // bars. VC required 75% of the time for UE and LE placement. WB Status: left leg 1/2\" shorter than the right (S/P BILATERAL THRs)  Ambulation 1  Surface: level tile  Device: Parallel Bars  Other Apparatus: O2 (2L)  Assistance: Moderate assistance, 2 Person assistance  Quality of Gait: Slow pace with writer blocking knees and instructing weight shift.   Gait Deviations: Slow Manuela, Decreased step length, Decreased step height  Distance: 8 ft in // bars x3    Transfers  Sit to Stand: 2 Person Assistance, Moderate Assistance  Stand to sit: 2 Person Assistance, Minimal max A from bathroom 2* increased fatigue)  Functional Mobility Comments: very slow movements     Bed mobility  Rolling to Left: Minimal assistance  Rolling to Right: Moderate assistance  Supine to Sit: Minimal assistance, Moderate assistance, 2 Person assistance  Sit to Supine: 2 Person assistance, Moderate assistance (cues for log rolling )  Scooting: Moderate assistance  Comment: Verbal cues for log rolling technique. Needs assistance bending BLE to complete bed mobility.    Transfers  Sit to stand: 2 Person assistance, Maximum assistance  Stand to sit: 2 Person assistance, Minimal assistance  Transfer Comments: using henrietta stedy, cues for safet y            Wheelchair Bed Transfers  Wheelchair/Bed - Technique:  (using henrietta stedy )  Equipment Used: Bed, Wheelchair  Level of Asssistance: Dependent/Total (using henrietta stedy )    SPEECH:      Current Medications:   Current Facility-Administered Medications: furosemide (LASIX) tablet 40 mg, 40 mg, Oral, Daily  chlorhexidine (PERIDEX) 0.12 % solution 15 mL, 15 mL, Mouth/Throat, BID  calcium carbonate (TUMS) chewable tablet 500 mg, 500 mg, Oral, BID  cefTRIAXone (ROCEPHIN) 2000 mg IVPB in D5W 50ml minibag, 2,000 mg, Intravenous, Q24H  magic (miracle) mouthwash with nystatin, 5 mL, Swish & Spit, TID PRN  midodrine (PROAMATINE) tablet 10 mg, 10 mg, Oral, TID WC  Vitamin D (CHOLECALCIFEROL) tablet 2,000 Units, 2,000 Units, Oral, Daily  amiodarone (CORDARONE) tablet 100 mg, 100 mg, Oral, BID  atorvastatin (LIPITOR) tablet 40 mg, 40 mg, Oral, Daily  baclofen (LIORESAL) tablet 5 mg, 5 mg, Oral, TID PRN  carvedilol (COREG) tablet 6.25 mg, 6.25 mg, Oral, BID  dextrose 50 % IV solution, 25 g, Intravenous, PRN  famotidine (PEPCID) tablet 10 mg, 10 mg, Oral, Nightly  ferrous sulfate 300 (60 Fe) MG/5ML syrup 300 mg, 300 mg, Oral, Daily  hydrocortisone-aloe 1 % cream, , Topical, BID  oxybutynin (DITROPAN-XL) extended release tablet 15 mg, 15 mg, Oral, Daily  oxyCODONE-acetaminophen (PERCOCET) 5-325 MG per tablet 1 tablet, 1 tablet, Oral, Q4H PRN  pantoprazole (PROTONIX) tablet 40 mg, 40 mg, Oral, QAM AC  polyethylene glycol (GLYCOLAX) packet 17 g, 17 g, Oral, Daily PRN  predniSONE (DELTASONE) tablet 10 mg, 10 mg, Oral, Daily  rOPINIRole (REQUIP) tablet 0.5 mg, 0.5 mg, Oral, 4x Daily  sertraline (ZOLOFT) tablet 50 mg, 50 mg, Oral, Daily  [START ON 5/24/2022] warfarin (COUMADIN) daily dosing (placeholder), , Other, RX Placeholder  acetaminophen (TYLENOL) tablet 650 mg, 650 mg, Oral, Q4H PRN  polyethylene glycol (GLYCOLAX) packet 17 g, 17 g, Oral, Daily  senna (SENOKOT) tablet 17.2 mg, 2 tablet, Oral, Daily PRN  bisacodyl (DULCOLAX) suppository 10 mg, 10 mg, Rectal, Daily PRN      Objective:  /73   Pulse 94   Temp 97.5 °F (36.4 °C)   Resp 18   Ht 5' 5\" (1.651 m)   Wt 167 lb 1.7 oz (75.8 kg)   SpO2 99%   BMI 27.81 kg/m²       GEN: Well developed, well nourished, no acute distress  HEENT: NCAT. EOMI. Hearing grossly intact. Mucous membranes pink and moist.  No discoloration of the tongue. RESP: Normal breath sounds with no wheezing, rales, or rhonchi. Respirations WNL and unlabored. CV: Regular rate and rhythm. No murmurs, rubs, or gallops. ABD: Soft, non-distended, BS+ and equal.  NEURO:  Alert. Oriented to person and place, not able to state the date. Speech fluent. No facial droop. Symmetrical shoulder shrug. Midline tongue protrusion. Sensation to light touch intact. MSK:  Muscle tone and bulk are normal bilaterally. Strength 4+/5 in bilateral upper limbs. Able to extend the bilateral knees and extend the bilateral toes against gravity. LIMBS: No edema in bilateral lower limbs. SKIN: Warm and dry with good turgor. PSYCH: Mood WNL. Affect WNL. Appropriately interactive.     Diagnostics:     CBC:   Recent Labs     06/13/21  1436   WBC 6.1   RBC 3.12*   HGB 8.9*   HCT 28.2*   MCV 90.4   RDW 18.1*        BMP:   Recent Labs     06/13/21  1436      K 3.9    CO2 27   BUN 50*   CREATININE 1.33*   GLUCOSE 155*     BNP: No results for input(s): BNP in the last 72 hours. PT/INR:   Recent Labs     06/11/21  0645 06/12/21  0745 06/13/21  0609   PROTIME 40.9* 42.2* 42.6*   INR 4.4 4.5 4.6*     APTT: No results for input(s): APTT in the last 72 hours. CARDIAC ENZYMES: No results for input(s): CKMB, CKMBINDEX, TROPONINT in the last 72 hours. Invalid input(s): CKTOTAL;3  FASTING LIPID PANEL:  Lab Results   Component Value Date    CHOL 198 01/21/2021    HDL 43 01/21/2021    TRIG 231 (H) 01/21/2021     LIVER PROFILE:   Recent Labs     06/13/21  1436   AST 26   ALT 26   BILITOT 0.58   ALKPHOS 139*          Impression/Plan:   Impaired ADLs, gait, and mobility due to:    1. Closed compression fracture L1/Lumbar radiculopathy and T12-L1 discitis/osteomyelitis:  PT/OT for gait, mobility, strengthening, endurance, ADLs, and self care. S/p caudal epidural steroid injection 5/24. On Ceftriaxone until 7/21/21 - monitoring CBC and CMP twice weekly. On baclofen and Percocet prn  2. HTN/CAD/Hyperlipidemia: On atorvastatin  3. Chronic diastolic heart failure/pulmonary hypertension: On carvedilol, furosemide. Has midodrine for hypotension. 4. Chronic Respiratory Failure: On O2 NC  5. Atrial Fibrillation with RVR: On amiodarone and warfarin. For cardiology follow up 2 weeks. 6. Thrush: On nystatin swish and swallow - completed 14-day course, discontinued 6/13.  7. Iron Deficiency Anemia: On ferrous sulfate repletion  8. YOGESH on CKD: Improved, monitoring renal function  9. Overactive bladder: On oxybutynin XL  10. RLS: On ropinirole  11. Depression: On sertraline  12. Pharyngitis: Has viscous lidocaine and magic mouthwash prn  13. Bowel Management: Miralax daily, senokot prn, dulcolax prn. 14. DVT Prophylaxis:  Coumadin with INR goal 2-3, SCD's while in bed and GRETA's while in bed. INR supratherapeutic - pharmacy holding dose again today.   13. [facility] for medical management      Electronically signed by Enrique Lechuga MD on 6/14/2021 at 12:08 AM

## 2021-06-13 NOTE — PROGRESS NOTES
Pharmacy Note  Warfarin Consult follow-up      Recent Labs     06/11/21  0645 06/12/21  0745 06/13/21  0609   INR 4.4 4.5 4.6*     No results for input(s): HGB, HCT, PLT in the last 72 hours. Significant Drug-Drug Interactions:  New warfarin drug-drug interactions: none  Discontinued drug-drug interactions: none  Current warfarin drug-drug interactions: amiodarone, atorvastatin, ceftriaxone, sertraline, ropinirole, prednisone      Date             INR        Dose given previous day  Dose scheduled for today  6/13/2021            4.6       none           none  Notes:                   INR remains supra therapeutic, no warfarin since 6/8  Daily PT/INR while inpatient. Erick Sampson, [facility].  [address] 6/13/2021 7:02 AM

## 2021-06-13 NOTE — PROGRESS NOTES
Physical Therapy  Facility/Department: [facility]  Daily Treatment Note  NAME: Daniela Rodrigez  : 1935  MRN: [id number]    Date of Service: 2021    Discharge Recommendations:  Patient would benefit from continued therapy after discharge, Home with assist PRN   PT Equipment Recommendations  Equipment Needed: No    Assessment   Body structures, Functions, Activity limitations: Decreased functional mobility ; Decreased strength;Decreased endurance;Decreased balance; Increased pain;Decreased safe awareness;Decreased posture  Treatment Diagnosis: impaired mobility due to low back pain/ weakness  History: Recent Kyphoplasty at L1 in . Pt has T5,T6, T9, T10 compression fxs and L1 discitis/ osteomyelitis.,  REQUIRES PT FOLLOW UP: Yes  Activity Tolerance  Activity Tolerance: Patient limited by pain; Patient limited by endurance; Patient limited by fatigue  Activity Tolerance: Patient had increased fatigue this afternoon     Patient Diagnosis(es): There were no encounter diagnoses. has a past medical history of Asbestos exposure, Atrial fibrillation ([address] Basal cell carcinoma of nose, CAD (coronary artery disease), CKD (chronic kidney disease) stage 2, GFR 60-89 ml/min, Hyperlipidemia, Hypertension, MDRO (multiple drug resistant organisms) resistance, Nephrolithiasis, Osteoarthritis, Ovarian cyst, Oxygen dependent, Palpitations, Peritoneal mesothelioma ([address] PONV (postoperative nausea and vomiting), Prolonged emergence from general anesthesia, and SOB (shortness of breath) on exertion. has a past surgical history that includes Tubal ligation (); Hysterectomy (); Colonoscopy; Total hip arthroplasty (Right, ); Rectocele repair (); liver biopsy (); Upper gastrointestinal endoscopy; Photodynamic Therapy (2000 & 2002); Total hip arthroplasty (Left, ); Revision total hip arthroplasty (Right, ); Rotator cuff repair ();  Ankle fracture surgery (Right, 2013); Cataract removal (Bilateral); Cardiac catheterization (2002); liver biopsy; Coronary angioplasty with stent (April 2002); [place] tooth extraction; Appendectomy (1964); Cholecystectomy (2005); Skin cancer excision (Bilateral, 2006); Skin cancer excision (Left, Oct. 2014); nasal endoscopy (Bilateral, 11/25/2014); BIOPSY / LIGATION TEMPORAL ARTERY (Bilateral, 2/12/2021); Breast biopsy (Right); Spine surgery (N/A, 4/15/2021); Upper gastrointestinal endoscopy (N/A, 5/28/2021); Colonoscopy (N/A, 5/28/2021); and CT NEEDLE BIOPSY SPINE (6/4/2021). Restrictions  Restrictions/Precautions  Restrictions/Precautions: Contact Precautions, Fall Risk  Required Braces or Orthoses?: Yes  Implants present? : Metal implants  Required Braces or Orthoses  Spinal Other: thoracic corset (Prn based off patients comfort, per PT Eileen)   Position Activity Restriction  Other position/activity restrictions: left leg 1/2\" shorter than the right (S/P BILATERAL THRs), O2 per NC PRN PICC line R UE  Subjective   General  Chart Reviewed: Yes  Additional Pertinent Hx: Per PM& R note: Ms. Daniela Rodrigez who is a 81 y.o. right handed female admitted to [address] [place] [address] on 5/19/2021 with Back Pain. She had recent L1 kyphoplasty prior to presentation. Caudal epidural steroid injection was done on 5/24/21 by Dr. Ramandeep Avelar. MRI thoracic and lumbar spine showed marrow edema at T12 with edema in the T12-L1 intervertebral disc space and edema in the L1 vertebral body, related to trauma/fracture or discitis/osteomyelitis. She underwent IR guided aspiration on 6/4/21 and culture is showing no growth at this time. ID is recommending IV ceftriaxone for 6 weeks. Pt admitted to rehab unit on 6/8/21. Response To Previous Treatment: Patient with no complaints from previous session.   Family / Caregiver Present: Yes ( and daughter in PM )  Referring Practitioner: Dr Marlen Mesa: Patient states sleeping well throughout the night General Comment  Comments: Thoracic corset prn depending on pt comfort per PT Eileen  Pain Screening  Patient Currently in Pain: Yes  Pain Assessment  Pain Assessment: 0-10  Pain Level: 8  Vital Signs  Patient Currently in Pain: Yes  Oxygen Therapy  SpO2: 100 %  Pulse Oximeter Device Mode: Intermittent  Pulse Oximeter Device Location: Finger  O2 Device: Nasal cannula  O2 Flow Rate (L/min): 2 L/min  Patient Observation  Observations: O2, 2L, NC       Orientation  Orientation  Overall Orientation Status: Within Functional Limits  Objective      Transfers  Sit to Stand: 2 Person Assistance; Moderate Assistance  Stand to sit: 2 Person Assistance;Minimal Assistance  Comment: Sit>Stand completed 3x in // bars. VC required 75% of the time for UE and LE placement. Ambulation  Ambulation?: Yes  WB Status: left leg 1/2\" shorter than the right (S/P BILATERAL THRs)  Ambulation 1  Surface: level tile  Device: Parallel Bars  Other Apparatus: O2 (2L)  Assistance: Moderate assistance;2 Person assistance  Quality of Gait: Slow pace with writer blocking knees and instructing weight shift. Gait Deviations: Slow Manuela;Decreased step length;Decreased step height  Distance: 8 ft in // bars x3  Stairs/Curb  Stairs?: No  Wheelchair Activities  Propulsion: Yes  Propulsion 1  Propulsion: Manual  Level: Level Tile  Method: RLE;LLE  Level of Assistance: Moderate assistance  Description/ Details: assisted with L LE advancement; instructed patient in reciprical pattern to simmulate walking   Distance: 30ft        Other exercises  Other exercises?: Yes  Other exercises 1: Seated bilat LE exercises, A/AAROM x15  Other exercises 4: UBE 5 min Fwd/retro  Other exercises 5: Standing tolerance in // bars 1-2 min tolerance x3      Goals  Short term goals  Time Frame for Short term goals: 10 days  Short term goal 1: pt to increase LE strength  by 1MMG to improve strength.   Short term goal 2: pt to demonstrate good technique for log rolling, balance and strengthening exercises as tolerated  Short term goal 3: pt to demo good sitting balance and fair  standing balance with least restrictive device to increase safety  Short term goal 4: pt to demonstrate improved bed mobility with log rolling using rail w/ min A x1  Short term goal 5: pt to demo sit<>stand transfer with rolling walker mod A  Short term goal 6: pt to transfer bed<>chair with rolling walker min A  to mod . Short term goal 7: pt able to ambulate with rolling walker distance of  20 ftx 2, min a x 1 to 2 person. Short term goal 8: . Short term goal 9: .  Long term goals  Time Frame for Long term goals : By DC  Long term goal 1: Pt able to perform sit<>stand and pivot transfers with RW at [facility] term goal 2: Pt able to ambulate with rolling walker distance of 50 ft or more, SBA/CGA, level surfaces. Long term goal 3: Pt able to go up and down 4 to 5 steps with 2 rails, min A   Long term goal 4: Pt able to propel w/c distance of 50 to 100 ft, level surfaces, SBA  Long term goal 5: Pt able to perform supine<>sit at CGA/min A  Long term goal 6: Educate family/pt in safe mobility /steps for safe discharge home. Long term goal 7: Ambulate distance of 30 to 50 ft for 2MWT with rolling walker, to improve strength/overall fucntion. Patient Goals   Patient goals : Move better    Plan    Plan  Times per week: 1.5 hr/day, 5 to 7 days/week.     Current Treatment Recommendations: Strengthening, Functional Mobility Training, Safety Education & Training, Transfer Training, ROM, Balance Training, Endurance Training, Patient/Caregiver Education & Training, Positioning, Equipment Evaluation, Education, & procurement, Home Exercise Program, Wheelchair Mobility Training, Gait Training, Stair training  Safety Devices  Type of devices: Patient at risk for falls, Gait belt, Call light within reach, Left in chair        06/13/21 0839 06/13/21 1344   PT Individual Minutes   Time In [id number]   Time Out [phone] Minutes 59 33     Electronically signed by Nellie Krause PTA on 6/13/21 at 4:38 PM EDT

## 2021-06-14 PROBLEM — Z79.01 CURRENT USE OF LONG TERM ANTICOAGULATION: Status: ACTIVE | Noted: 2021-01-01

## 2021-06-14 NOTE — PATIENT CARE CONFERENCE
[address]   ACUTE REHABILITATION  TEAM CONFERENCE NOTE  Date: 6/15/21  Patient Name: Delfino Mcclain       Room: 9169/4647-41  MRN: [id number]       : 1935  (81 y.o.)     Gender: female   Referring Practitioner: Dr Yovanny Johnston [R53.81]  Diagnosis: Debility     NURSING  Bladder  Not Applicable - Device in Use (Indwelling Catheter, Condom Catheter or Ostomy)  Bowel   Occasionally Incontinent  Date of Last BM: 6/10/2021  Intervention    Both Bowel & Bladder Program     Wounds/Incisions/Ulcers: No skin issues identified  Medication Education Program: Patient currently unable to manage medications and family being educated  Pain: Patient's pain is currently controlled with -  tylenol    Fall Risk:  Falling star program initiated    PHYSICAL THERAPY  Bed mobility  Scooting: Moderate assistance  Bed Mobility  Rolling: Minimal assistance  Supine to Sit: Unable to assess  Sit to Supine: Moderate assistance  Scooting: Moderate assistance    Transfers:  Sit to Stand: 2 Person Assistance; Moderate Assistance  Stand to sit: 2 Person Assistance;Minimal Assistance  Bed to Chair: Dependent/Total;Moderate assistance;2 Person Assistance (Increased confusion and fatigue.)    WB Status: left leg 1/2\" shorter than the right (S/P BILATERAL THRs)  Ambulation 1 (21)  Surface: level tile  Device: Parallel Bars  Other Apparatus: O2 (2L)  Assistance: Moderate assistance;2 Person assistance  Quality of Gait: Slow pace with writer blocking knees and instructing weight shift. Gait Deviations: Slow Manuela;Decreased step length;Decreased step height  Distance: 8 ft in // bars x3    Equipment Needed: No    Pt present with genralized weakness, has increased back pain with mobility, will conitnue POC to maximize reahb potential for safe DC home. Goals  Time Frame for Short term goals: 10 days  Short term goal 1: pt to increase LE strength  by 1MMG to improve strength.   Short term goal 2: pt to donning of thoracic corset at MOD A in supine position with reported pain at 5/10 or less, and 3 or less verbal cues for energy conservation. Short term goal 2: complete lower body bathing/dressing with AE in supine position or seated  at MOD A with reported pain at 5/10 or less, and 3 or less verbal cues for energy conservation. Short term goal 3: complete toileting at [address] [facility] [address] with reported pain at 5/10 or less, and 3 or less verbal cues for energy conservation. Short term goal 4: increase  strength evidenced by improved score by 5#'s  Short term goal 5: increase fine motor coordination evidenced by improved score on 9 Hole Peg Test by 5 seconds. SPEECH THERAPY      NUTRITION  Weight: 167 lb 1.7 oz (75.8 kg) / Body mass index is 27.81 kg/m². (6/14) Glu 108  Diet: No Added Salt with supplements on all trays-Observed pt with lunch tray (6/14) consuming small amounts. Nursing documents intake between %. Please encourage pts intake. Please see nutrition note for details. SOCIAL WORK ASSESSMENT  Assessment:   Pt lives with her ; their children living near. Pt plans to return home with [facility]  Pre-Admission Status:  Lives With: Spouse  Type of Home:  (Condo, 2 level , lives on the first floor)  Home Layout: Two level, Able to Live on Main level with bedroom/bathroom  Home Access: Stairs to enter with rails  Entrance Stairs - Number of Steps: 2 steps entrance from the garage or front door, usually use the garage entrance.   Entrance Stairs - Rails: Both  Bathroom Shower/Tub: Walk-in shower, Doors  Bathroom Toilet: Handicap height  Bathroom Equipment: Built-in shower seat, Grab bars around toilet  Bathroom Accessibility: Walker accessible  Home Equipment: Crutches, Oxygen, Long-handled shoehorn, Reacher (O2 at night , usually at 2 liters)  Receives Help From: Family ( helps, also patient's  4 children live within 10 miles from patient.)  ADL Assistance: Independent  Homemaking Assistance: Needs assistance (Patient's daughters assist  patient with laundry, and also bring in meals at times,but patient stated she likes to cook. 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, puzzles, and reading  IADL Comments: sleeps in a flat bed, with several pillows  Additional Comments: Large supportive family assist at D/C     Family Education: Family Education initiated and Ongoing    Percentage Risk for Readmission: High 28% - 100%   Risk of Unplanned Readmission:  46 %    Critical Items: None        Problem / Barrier Intervention / Plan  Results   Impaired functional mobility Strengthening, functional mobility training including stair training with pt/family, use of AD, balance and endurance tasks     Impaired ability to care for self   Training in use of devices and modified care strategies as needed for self care tasks                                        Total Self Care Score    Total Mobility Score  Admission Score:  15      Admission Score:  17  Goal:  30/42         Goal:  49/90   `  Discharge Plan   Estimated Discharge Date: 6/24/21  Home evaluation needed?  Home Evaluation Indication (NO, Requires ReEval, YES/Date): No home evaluation need indicated for patient at this time  Overnight or Day Pass: No  Factors facilitating achievement of predicted outcomes: Family support, Motivated, Cooperative and Pleasant  Barriers to the achievement of predicted outcomes: Pain, Confusion, Decreased endurance, Skin Care and Medication managment    Functional Goals at discharge:  Predicted Outcome: Home with familyPATIENT'S LEVEL OF ASSISTANCE: Contact Guard / Touching Assistance  Discharge therapy goals:  PT: Long term goals  Time Frame for Long term goals : By DC  Long term goal 1: Pt able to perform sit<>stand and pivot transfers with RW at [address] [facility] term goal 2: Pt able to ambulate with rolling walker distance of 50 ft or more, SBA/CGA, level surfaces. Long term goal 3: Pt able to go up and down 4 to 5 steps with 2 rails, min A   Long term goal 4: Pt able to propel w/c distance of 50 to 100 ft, level surfaces, SBA  Long term goal 5: Pt able to perform supine<>sit at CGA/min A  Long term goal 6: Educate family/pt in safe mobility /steps for safe discharge home. Long term goal 7: Ambulate distance of 30 to 50 ft for 2MWT with rolling walker, to improve strength/overall fucntion. OT:Long term goals  Time Frame for Long term goals : By discharge, patient will:  Long term goal 1: complete upper body bathing/dressing including donning of thoracic corset, and grooming at [facility] with reported pain of 2/10 or less, and 0 verbal cues for energy conservation. Long term goal 2: complete lower body bathing/dressing in seated position at [facility] with AE , with reported pain of 2/10 or less, and 0 verbal cues for energy conservation. Long term goal 3: complete functional transfers/ mobility with LRD at [facility] AT [facility] A with reported pain of 2/10 or less, and 0 verbal cues for energy conservation. Long term goal 4: complete toileting at [facility] reporting 2/10 pain or less. Long term goal 5: complete simple meal preparation at [facility] with reported pain at 2/10 or less, and 0 verbal cues for energy conservation.   ST:     Team Members Present at Conference:  :  Bjorn Hardwick [facility]  Occupational Therapist: Stefan Downey OT   [address] PT  Speech Therapist:  N/A  Nurse: Ezequiel Stone RN   Dietary/Nutrition: Christiano Barba RD, LD  Pastoral Care: Jaelyn Haq  CMG: Laura Kelsey, JANA    I approve the established interdisciplinary plan of care as documented within the medical record [facility].     Dagmar Altamirano MD

## 2021-06-14 NOTE — FLOWSHEET NOTE
Writer visited with patient, her , and daughter Doyle Tyler. Spouse thanked writer for serving patient communion last week. Patient was not feeling well today answering \"lousy\" when writer asked how she was. She was discouraged and emotionally disheartened. Daughter said patient had a setback earlier today but they were going to be getting her up for some additional therapy. Writer provided listening presence and will follow up again this week.      06/14/21 0221   Encounter Summary   Services provided to: Patient and family together   Referral/Consult From: [facility]   Continue Visiting   (6-14-21)   Complexity of Encounter Moderate   Length of Encounter 15 minutes   Spiritual/Pentecostalism   Type Spiritual support   Assessment Approachable   Intervention Active listening;Explored feelings, thoughts, concerns;Sustaining presence/ Ministry of presence; Discussed illness/injury and it's impact   Outcome Expressed gratitude;Engaged in conversation;Expressed feelings/needs/concerns;Receptive

## 2021-06-14 NOTE — PLAN OF CARE
Nutrition Problem #1: Moderate malnutrition  Intervention: Food and/or Nutrient Delivery: Continue Oral Nutrition Supplement, Continue Current Diet  Nutritional Goals: PO intake %

## 2021-06-14 NOTE — PROGRESS NOTES
[address]   OCCUPATIONAL THERAPY MISSED TREATMENT NOTE   ACUTE REHAB  Date: 21  Patient Name: Ashley Garces       Room: 1172/4110-54  MRN: [id number]   Account #: [de-identified]    : 1935  (81 y.o.)  Gender: female   Referring Practitioner: Dr. Millan Last  Diagnosis: Kennyth Begin TREATMENT:  Patient refusal   -   Pt originally agreeable to participate in therapy after she finished her breakfast. Pt requiring increased time for self feeding this date. Writer noted increased fatigue, confusion and perseveration during AM encounter. Pt states after 30 min of self feeding \" I can't do this, I'm in to much pain\" refusing OT. RN made aware of pts increased confusion.        NISREEN Lawrence

## 2021-06-14 NOTE — PROGRESS NOTES
Comprehensive Nutrition Assessment    Type and Reason for Visit:  Reassess    Nutrition Recommendations/Plan: Will continue to provide No Added Salt diet with Ensure Enlive/ Magic Cup supplements to increase intake. Nutrition Assessment:  Pt is consuming small amounts of food provided (less than 50%). Daughter is encouraging intake. Pt is receiving supplements with each tray as well as blended soups to try to increase intake. Malnutrition Assessment:  Malnutrition Status: Moderate malnutrition    Context:  Acute Illness     Findings of the 6 clinical characteristics of malnutrition:  Energy Intake:  1 - 75% or less of estimated energy requirements for 7 or more days  Weight Loss:   (Suspect dry wt loss is greater than 7.5%)     Body Fat Loss:  Unable to assess     Muscle Mass Loss:  Unable to assess    Fluid Accumulation:  7 - Moderate to Severe Extremities   Strength:  Not Performed    Estimated Daily Nutrient Needs:  Energy (kcal):  4587-9378 kcals /d using 1.3-1.5 factor; Weight Used for Energy Requirements:  Current     Protein (g):  74 g/d using 1.4 g/kg; Weight Used for Protein Requirements:  Ideal          Nutrition Related Findings:  +3 edema BLE, Labs (6/14) BUN/Cr 55/1.43, Glu 108, Meds: Lactulose, Prednisone, BM 6/10      Wounds:  None       Current Nutrition Therapies:    ADULT DIET; Regular;  No Added Salt (3-4 gm)  Adult Oral Nutrition Supplement; Standard High Calorie/High Protein Oral Supplement  Adult Oral Nutrition Supplement; Frozen Oral Supplement    Anthropometric Measures:  · Height: 5' 5\" (165.1 cm)  · Current Body Weight: 167 lb 1.7 oz (75.8 kg)   · Admission Body Weight: 167 lb 1.7 oz (75.8 kg)    · Usual Body Weight: 160 lb (72.6 kg) (Pt unintentionally lost wt, pt states dry wt less than 140#)     Ideal Body Weight: 125 lbs;     Nutrition Diagnosis:   · Moderate malnutrition related to  (current medical condition) as evidenced by intake 26-50%, intake 0-25%, weight loss, localized or generalized fluid accumulation    Nutrition Interventions:   Food and/or Nutrient Delivery:  Continue Oral Nutrition Supplement, Continue Current Diet  Nutrition Education/Counseling:  No recommendation at this time   Coordination of Nutrition Care:  Continue to monitor while inpatient    Goals:  PO intake %       Nutrition Monitoring and Evaluation:   Food/Nutrient Intake Outcomes:  Food and Nutrient Intake, Supplement Intake  Physical Signs/Symptoms Outcomes:  Biochemical Data, GI Status, Skin, Weight, Fluid Status or Edema     Discharge Planning:    Continue current diet     Electronically signed by Olga Lidia Yang RD, LD on 6/14/21 at 2:42 PM EDT    Contact: [phone]

## 2021-06-14 NOTE — PROGRESS NOTES
Progress Note    6/14/2021   2:35 PM    Name:  Alysia Chavez  MRN:    [id number]     Acct:     [de-identified]   Room:  [address] [place] [address]  IP Day: 6     Admit Date: 6/8/2021  5:26 PM  PCP: Veola Favre, DO    Subjective:     C/C: Debility    Interval History: Status: not changed. Thoracic spine pain well controlled with pain meds. patient has episodes of intermittent confusion. Denies chest pain shortness of breath nausea vomiting dysuria. Vital signs reviewed and stable. Recent labs reviewed creatinine elevated at 1.43, ammonia elevated at 76, hemoglobin stable at 8.9. Blood sugar readings stable    ROS:   all 10 systems reviewed and are negative except as noted    Review of Systems   Constitutional: Negative for chills, fatigue and fever. HENT: Negative for drooling, mouth sores, sneezing and trouble swallowing. Eyes: Negative for redness and itching. Respiratory: Negative for cough, chest tightness and shortness of breath. Cardiovascular: Negative for chest pain, palpitations and leg swelling. Gastrointestinal: Negative for abdominal pain, blood in stool, nausea and vomiting. Endocrine: Negative for heat intolerance and polyphagia. Genitourinary: Negative for difficulty urinating, dysuria, flank pain and pelvic pain. Musculoskeletal: Negative for arthralgias, joint swelling and neck stiffness. Skin: Negative for color change and pallor. Allergic/Immunologic: Negative for food allergies. Neurological: Negative for dizziness, seizures and headaches. Hematological: Does not bruise/bleed easily. Psychiatric/Behavioral: Positive for confusion (Intermittent). Negative for agitation, behavioral problems and suicidal ideas. The patient is not hyperactive. Medications: Allergies:    Allergies   Allergen Reactions    Adhesive Tape Rash    Meperidine Nausea Only    Demerol Nausea Only    Ultram [Tramadol Hcl]      Hot flashes    Zocor [Simvastatin]      Muscle aches    Codeine      \"out of it\"    Fentanyl Rash       Current Meds: rOPINIRole (REQUIP) tablet 0.5 mg, TID  phytonadione (VITAMIN K) tablet 2.5 mg, Once  lactulose (CHRONULAC) 10 GM/15ML solution 10 g, TID  [Held by provider] furosemide (LASIX) tablet 40 mg, Daily  chlorhexidine (PERIDEX) 0.12 % solution 15 mL, BID  calcium carbonate (TUMS) chewable tablet 500 mg, BID  cefTRIAXone (ROCEPHIN) 2000 mg IVPB in D5W 50ml minibag, Q24H  magic (miracle) mouthwash with nystatin, TID PRN  midodrine (PROAMATINE) tablet 10 mg, TID WC  Vitamin D (CHOLECALCIFEROL) tablet 2,000 Units, Daily  amiodarone (CORDARONE) tablet 100 mg, BID  atorvastatin (LIPITOR) tablet 40 mg, Daily  baclofen (LIORESAL) tablet 5 mg, TID PRN  carvedilol (COREG) tablet 6.25 mg, BID  dextrose 50 % IV solution, PRN  famotidine (PEPCID) tablet 10 mg, Nightly  ferrous sulfate 300 (60 Fe) MG/5ML syrup 300 mg, Daily  hydrocortisone-aloe 1 % cream, BID  oxyCODONE-acetaminophen (PERCOCET) 5-325 MG per tablet 1 tablet, Q4H PRN  pantoprazole (PROTONIX) tablet 40 mg, QAM AC  polyethylene glycol (GLYCOLAX) packet 17 g, Daily PRN  predniSONE (DELTASONE) tablet 10 mg, Daily  sertraline (ZOLOFT) tablet 50 mg, Daily  [START ON 2022] warfarin (COUMADIN) daily dosing (placeholder), RX Placeholder  acetaminophen (TYLENOL) tablet 650 mg, Q4H PRN  polyethylene glycol (GLYCOLAX) packet 17 g, Daily  senna (SENOKOT) tablet 17.2 mg, Daily PRN  bisacodyl (DULCOLAX) suppository 10 mg, Daily PRN        Data:     Code Status:  Full Code    Family History   Problem Relation Age of Onset    Heart Failure Brother     Heart Attack Brother         triple bypass    Prostate Cancer Brother     Coronary Art Dis Mother     Breast Cancer Mother         dx'd in late 46s   Christen Morgan Emphysema Father     Colon Cancer Neg Hx     Diabetes Neg Hx     Eclampsia Neg Hx     Hypertension Neg Hx     Ovarian Cancer Neg Hx      Labor Neg Hx     Spont Abortions Neg Hx     Stroke Neg Hx Social History     Socioeconomic History    Marital status:      Spouse name: Not on file    Number of children: Not on file    Years of education: Not on file    Highest education level: Not on file   Occupational History    Not on file   Tobacco Use    Smoking status: Never Smoker    Smokeless tobacco: Never Used   Vaping Use    Vaping Use: Never used   Substance and Sexual Activity    Alcohol use: Yes     Comment: social    Drug use: No    Sexual activity: Not Currently   Other Topics Concern    Not on file   Social History Narrative    Not on file     Social Determinants of Health     Financial Resource Strain:     Difficulty of Paying Living Expenses:    Food Insecurity:     Worried About Running Out of Food in the Last Year:     [address] in the Last Year:    Transportation Needs:     Lack of Transportation (Medical):  Lack of Transportation (Non-Medical):    Physical Activity:     Days of Exercise per Week:     Minutes of Exercise per Session:    Stress:     Feeling of Stress :    Social Connections:     Frequency of Communication with Friends and Family:     Frequency of Social Gatherings with Friends and Family:     Attends Spiritism Services:     Active Member of Clubs or Organizations:     Attends Club or Organization Meetings:     Marital Status:    Intimate Partner Violence:     Fear of Current or Ex-Partner:     Emotionally Abused:     Physically Abused:     Sexually Abused:        I/O (24Hr): No intake or output data in the 24 hours ending 06/14/21 1435  Radiology:  No results found.     Labs:  Recent Results (from the past 24 hour(s))   CBC with DIFF    Collection Time: 06/13/21  2:36 PM   Result Value Ref Range    WBC 6.1 3.5 - 11.0 k/uL    RBC 3.12 (L) 4.0 - 5.2 m/uL    Hemoglobin 8.9 (L) 12.0 - 16.0 g/dL    Hematocrit 28.2 (L) 36 - 46 %    MCV 90.4 80 - 100 fL    MCH 28.6 26 - 34 pg    MCHC 31.6 31 - 37 g/dL    RDW 18.1 (H) 11.5 - 14.9 % Platelets 294 209 - 520 k/uL    MPV 8.1 6.0 - 12.0 fL    NRBC Automated NOT REPORTED per 100 WBC    Differential Type NOT REPORTED     Seg Neutrophils 90 (H) 36 - 66 %    Lymphocytes 6 (L) 24 - 44 %    Monocytes 4 1 - 7 %    Eosinophils % 0 0 - 4 %    Basophils 0 0 - 2 %    Immature Granulocytes NOT REPORTED 0 %    Segs Absolute 5.40 1.3 - 9.1 k/uL    Absolute Lymph # 0.40 (L) 1.0 - 4.8 k/uL    Absolute Mono # 0.30 0.1 - 1.3 k/uL    Absolute Eos # 0.00 0.0 - 0.4 k/uL    Basophils Absolute 0.00 0.0 - 0.2 k/uL    Absolute Immature Granulocyte NOT REPORTED 0.00 - 0.30 k/uL    WBC Morphology NOT REPORTED     RBC Morphology NOT REPORTED     Platelet Estimate NOT REPORTED    Comprehensive Metabolic Panel w/ Reflex to MG    Collection Time: 06/13/21  2:36 PM   Result Value Ref Range    Glucose 155 (H) 70 - 99 mg/dL    BUN 50 (H) 8 - 23 mg/dL    CREATININE 1.33 (H) 0.50 - 0.90 mg/dL    Bun/Cre Ratio NOT REPORTED 9 - 20    Calcium 8.1 (L) 8.6 - 10.4 mg/dL    Sodium 139 135 - 144 mmol/L    Potassium 3.9 3.7 - 5.3 mmol/L    Chloride 102 98 - 107 mmol/L    CO2 27 20 - 31 mmol/L    Anion Gap 10 9 - 17 mmol/L    Alkaline Phosphatase 139 (H) 35 - 104 U/L    ALT 26 5 - 33 U/L    AST 26 <32 U/L    Total Bilirubin 0.58 0.3 - 1.2 mg/dL    Total Protein 5.1 (L) 6.4 - 8.3 g/dL    Albumin 2.3 (L) 3.5 - 5.2 g/dL    Albumin/Globulin Ratio NOT REPORTED 1.0 - 2.5    GFR Non- 38 (L) >60 mL/min    GFR  46 (L) >60 mL/min    GFR Comment          GFR Staging NOT REPORTED    PROTIME-INR    Collection Time: 06/14/21  6:51 AM   Result Value Ref Range    Protime 43.1 (H) 11.8 - 14.6 sec    INR 4.7 (HH)    Basic Metabolic Panel w/ Reflex to MG    Collection Time: 06/14/21  6:51 AM   Result Value Ref Range    Glucose 108 (H) 70 - 99 mg/dL    BUN 55 (H) 8 - 23 mg/dL    CREATININE 1.43 (H) 0.50 - 0.90 mg/dL    Bun/Cre Ratio NOT REPORTED 9 - 20    Calcium 8.1 (L) 8.6 - 10.4 mg/dL    Sodium 142 135 - 144 mmol/L    Potassium 3.8 3.7 - 5.3 mmol/L    Chloride 106 98 - 107 mmol/L    CO2 27 20 - 31 mmol/L    Anion Gap 9 9 - 17 mmol/L    GFR Non-African American 35 (L) >60 mL/min    GFR  42 (L) >60 mL/min    GFR Comment          GFR Staging NOT REPORTED    Ammonia    Collection Time: 21  1:11 PM   Result Value Ref Range    Ammonia 76 (H) 11 - 51 umol/L       Physical Examination:        Vitals:  /63   Pulse 97   Temp 97.8 °F (36.6 °C) (Oral)   Resp 20   Ht 5' 5\" (1.651 m)   Wt 167 lb 1.7 oz (75.8 kg)   SpO2 100%   BMI 27.81 kg/m²   Temp (24hrs), Av.7 °F (36.5 °C), Min:97.5 °F (36.4 °C), Max:97.8 °F (36.6 °C)    No results for input(s): POCGLU in the last 72 hours. Physical Exam  Vitals reviewed. HENT:      Head: Normocephalic. Right Ear: External ear normal.      Left Ear: External ear normal.      Nose: Nose normal.      Mouth/Throat:      Mouth: Mucous membranes are moist.      Pharynx: Oropharynx is clear. Eyes:      Conjunctiva/sclera: Conjunctivae normal.   Cardiovascular:      Rate and Rhythm: Normal rate and regular rhythm. Pulses: Normal pulses. Heart sounds: Normal heart sounds. Pulmonary:      Effort: Pulmonary effort is normal.      Breath sounds: Normal breath sounds. No wheezing. Abdominal:      General: Bowel sounds are normal.      Palpations: Abdomen is soft. Tenderness: There is no abdominal tenderness. There is no right CVA tenderness or left CVA tenderness. Musculoskeletal:         General: Tenderness (Thoracic spine) present. No deformity. Cervical back: Normal range of motion and neck supple. Right lower leg: No edema. Left lower leg: No edema. Skin:     General: Skin is warm. Capillary Refill: Capillary refill takes less than 2 seconds. Coloration: Skin is not jaundiced. Neurological:      General: No focal deficit present. Mental Status: She is alert. Mental status is at baseline.    Psychiatric:         Mood and

## 2021-06-14 NOTE — PROGRESS NOTES
Physical Therapy  [address] [facility]   Acute Rehabilitation Physical Therapy Progress Note    Date: 21  Patient Name: Ari Morataya       Room: 0290/3552-58  MRN: [id number]   Account: [de-identified]   : 1935  (81 y.o.) Gender: female     Referring Practitioner: Dr. Millie Parker  Diagnosis: Debility  Past Medical History:  has a past medical history of Asbestos exposure, Atrial fibrillation ([facility] [address] Basal cell carcinoma of nose, CAD (coronary artery disease), CKD (chronic kidney disease) stage 2, GFR 60-89 ml/min, Hyperlipidemia, Hypertension, MDRO (multiple drug resistant organisms) resistance, Nephrolithiasis, Osteoarthritis, Ovarian cyst, Oxygen dependent, Palpitations, Peritoneal mesothelioma ([address] PONV (postoperative nausea and vomiting), Prolonged emergence from general anesthesia, and SOB (shortness of breath) on exertion. Past Surgical History:   has a past surgical history that includes Tubal ligation (); Hysterectomy (); Colonoscopy; Total hip arthroplasty (Right, ); Rectocele repair (); liver biopsy (); Upper gastrointestinal endoscopy; Photodynamic Therapy (2000 & 2002); Total hip arthroplasty (Left, ); Revision total hip arthroplasty (Right, ); Rotator cuff repair (); Ankle fracture surgery (Right, ); Cataract removal (Bilateral); Cardiac catheterization (); liver biopsy; Coronary angioplasty with stent (2002); [place] tooth extraction; Appendectomy (); Cholecystectomy (); Skin cancer excision (Bilateral, ); Skin cancer excision (Left, Oct. 2014); nasal endoscopy (Bilateral, 2014); BIOPSY / LIGATION TEMPORAL ARTERY (Bilateral, 2021); Breast biopsy (Right); Spine surgery (N/A, 4/15/2021); Upper gastrointestinal endoscopy (N/A, 2021); Colonoscopy (N/A, 2021); and CT NEEDLE BIOPSY SPINE (2021).   Additional Pertinent Hx: Per PM& R note: Ms. Ari Morataya who is a 81 y.o. right handed female admitted to [address] [place] [address] on 5/19/2021 with Back Pain. She had recent L1 kyphoplasty prior to presentation. Caudal epidural steroid injection was done on 5/24/21 by Dr. Ahsan Norris. MRI thoracic and lumbar spine showed marrow edema at T12 with edema in the T12-L1 intervertebral disc space and edema in the L1 vertebral body, related to trauma/fracture or discitis/osteomyelitis. She underwent IR guided aspiration on 6/4/21 and culture is showing no growth at this time. ID is recommending IV ceftriaxone for 6 weeks. Pt admitted to rehab unit on 6/8/21. Overall Orientation Status: Within Functional Limits  Restrictions/Precautions  Restrictions/Precautions: Contact Precautions; Fall Risk  Required Braces or Orthoses?: Yes  Implants present? : Metal implants  Required Braces or Orthoses  Spinal Other: thoracic corset (Prn based off patients comfort, per PT Eileen)   Position Activity Restriction  Other position/activity restrictions: left leg 1/2\" shorter than the right (S/P BILATERAL THRs), O2 per NC PRN PICC line R UE    Subjective: Patient very fatigue and demonstrating confusion. Spouse reports \" this isn't like her\". RN aware. (Patient came down to gym for PM session but not AM.)  Comments: Thoracic corset prn depending on pt comfort per PT Eileen    Vital Signs  Pulse: 78  Heart Rate Source: Monitor  Patient Currently in Pain: Yes  Pain Assessment: 0-10  Pain Level: 9  Pain Type: Surgical pain  Pain Location: Back     Oxygen Therapy  SpO2: 92 %  O2 Device: Nasal cannula  O2 Flow Rate (L/min): 2 L/min          Bed Mobility:   Rolling: Minimal assistance  Supine to Sit: Unable to assess  Sit to Supine: Moderate assistance  Scooting: Moderate assistance      Transfers:  Sit to Stand: 2 Person Assistance; Moderate Assistance  Stand to sit: 2 Person Assistance;Minimal Assistance  Bed to Chair: Dependent/Total;Moderate assistance;2 Person Assistance (Increased confusion and fatigue.) Stairs/Curb  Stairs?: No              Wheelchair Activities  Propulsion: No        EXERCISES    Other exercises?: Yes  Other exercises 1: Transfer with Mariela Gerber to bed. Other exercises 2: Bed mobility with education. Other exercises 3: UBE 5 min Fwd;  Required cues to keep on task. Increased fatigue and falling asleep. Activity Tolerance: Patient limited by pain, Patient limited by endurance, Patient limited by fatigue  Activity Tolerance: Patient had increased fatigue this afternoon  PT Equipment Recommendations  Equipment Needed: No         Current Treatment Recommendations: Strengthening, Functional Mobility Training, Safety Education & Training, Transfer Training, ROM, Balance Training, Endurance Training, Patient/Caregiver Education & Training, Positioning, Equipment Evaluation, Education, & procurement, Home Exercise Program, Wheelchair Mobility Training, Gait Training, Stair training    Conditions Requiring Skilled Therapeutic Intervention  Body structures, Functions, Activity limitations: Decreased functional mobility ; Decreased strength;Decreased endurance;Decreased balance; Increased pain;Decreased safe awareness;Decreased posture  Treatment Diagnosis: impaired mobility due to low back pain/ weakness  History: Recent Kyphoplasty at L1 in April' 21. Pt has T5,T6, T9, T10 compression fxs and L1 discitis/ osteomyelitis.,  REQUIRES PT FOLLOW UP: Yes  Discharge Recommendations: Patient would benefit from continued therapy after discharge;Home with assist PRN    Goals  Short term goals  Time Frame for Short term goals: 10 days  Short term goal 1: pt to increase LE strength  by 1MMG to improve strength.   Short term goal 2: pt to demonstrate good technique for log rolling, balance and strengthening exercises as tolerated  Short term goal 3: pt to demo good sitting balance and fair  standing balance with least restrictive device to increase safety  Short term goal 4: pt to demonstrate improved bed mobility with log rolling using rail w/ min A x1  Short term goal 5: pt to demo sit<>stand transfer with rolling walker mod A  Short term goal 6: pt to transfer bed<>chair with rolling walker min A  to mod . Short term goal 7: pt able to ambulate with rolling walker distance of  20 ftx 2, min a x 1 to 2 person. Short term goal 8: . Short term goal 9: .  Long term goals  Time Frame for Long term goals : By DC  Long term goal 1: Pt able to perform sit<>stand and pivot transfers with RW at [facility] term goal 2: Pt able to ambulate with rolling walker distance of 50 ft or more, SBA/CGA, level surfaces. Long term goal 3: Pt able to go up and down 4 to 5 steps with 2 rails, min A   Long term goal 4: Pt able to propel w/c distance of 50 to 100 ft, level surfaces, SBA  Long term goal 5: Pt able to perform supine<>sit at CGA/min A  Long term goal 6: Educate family/pt in safe mobility /steps for safe discharge home. Long term goal 7: Ambulate distance of 30 to 50 ft for 2MWT with rolling walker, to improve strength/overall fucntion. 06/14/21 1030 06/14/21 1510   PT Individual Minutes   Time In  --  1510   Time Out  --  1550   Minutes  --  40   Minute Variance   Variance 50  --    Reason Illness  (Patient reports not feeling well.  Increased fatigue.)  --        Electronically signed by Abhinav Lira PTA on 6/14/21 at 4:09 PM EDT

## 2021-06-14 NOTE — PROGRESS NOTES
[address] [facility]    ACUTE REHABILITATION OCCUPATIONAL THERAPY  DAILY NOTE    Date: 21  Patient Name: Suzi Boswell      Room: 3910/4737-33    MRN: [id number]   : 1935  (81 y.o.)  Gender: female   Referring Practitioner: Dr. Chance Moore  Diagnosis: Debility  Additional Pertinent Hx: A fib, CAD, CKD, CHF, HTN, COPD    Restrictions  Restrictions/Precautions: Contact Precautions, Fall Risk  Implants present? : Metal implants  Other position/activity restrictions: left leg 1/2\" shorter than the right (S/P BILATERAL THRs), O2 per NC PRN PICC line R UE  Required Braces or Orthoses  Spinal Other: thoracic corset (Prn based off patients comfort, per PT Eileen)   Required Braces or Orthoses?: Yes  Equipment Used: Bed, Wheelchair    Subjective  Subjective: In AM session pt demo increased confusion, pain and fatigue. Family present in PM session and agreeing pt is acting \" not her self\"    Patient Currently in Pain: Yes  Pain Level: 9  Pain Location: Back  Restrictions/Precautions: Contact Precautions; Fall Risk  Overall Orientation Status: Within Functional Limits  Patient Observation  Observations: O2, 2L, NC  Pain Assessment  Pain Assessment: 0-10  Pain Level: 9  Pain Type: Surgical pain  Pain Location: Back  Pain Orientation: Left    Objective     Balance  Sitting Balance: Minimal assistance (seated on EOB and toilet )  Standing Balance: Dependent/Total (standing in henrietta stedy )  Bed mobility  Supine to Sit: Moderate assistance  Transfers  Sit to stand: 2 Person assistance;Maximum assistance  Stand to sit: 2 Person assistance;Minimal assistance  Transfer Comments: using henrietta stedy, cues for safe sitting and correct hand placement   Standing Balance  Time: 1-2 min x2   Activity: standing in henrietta stedy   Comment: verbal cues for hand placment and safety with henrietta stedy   Toilet Transfers  Toilet - Technique:  (using henrietta stedy )  Equipment Used: Raised toilet seat with rails  Toilet Transfer: Dependent/Total  Toilet Transfers Comments: using henrietta stedy          ADL  LE Dressing: Dependent/Total  Toileting: Dependent/Total  Additional Comments: Pt completed toileting this date with use of henrietta stedy x2 assist. Requiring help with donning/doffing of LB clothing and assist with hygiene. Assessment  Performance deficits / Impairments: Decreased functional mobility ; Decreased balance;Decreased ADL status; Decreased endurance;Decreased high-level IADLs;Decreased strength  Prognosis: Good  Discharge Recommendations: Continue to assess pending progress  Activity Tolerance: Patient limited by fatigue;Patient limited by pain  Safety Devices in place: Yes  Type of devices: Left in chair;Gait belt;Patient at risk for falls          Patient Education:  Patient Goals   Patient goals : Patient stated, \" to be able to take care of myself. \"  Learner:patient  Method: demonstration and explanation       Outcome: acknowledged understanding        Plan  Plan  Times per week: 5-7 days a week  Times per day: Twice a day  Current Treatment Recommendations: Safety Education & Training, Balance Training, Self-Care / ADL, Patient/Caregiver Education & Training, Functional Mobility Training, Home Management Training, Endurance Training, Strengthening  Patient Goals   Patient goals : Patient stated, \" to be able to take care of myself. \"  Short term goals  Time Frame for Short term goals: In one week, patient will:  Short term goal 1: complete upper body bathing/dressing including donning of thoracic corset at MOD A in supine position with reported pain at 5/10 or less, and 3 or less verbal cues for energy conservation. Short term goal 2: complete lower body bathing/dressing with AE in supine position or seated  at MOD A with reported pain at 5/10 or less, and 3 or less verbal cues for energy conservation.   Short term goal 3: complete toileting at [address] [place] [address] with reported pain at 5/10 or less, and 3 or less verbal cues for energy conservation. Short term goal 4: increase  strength evidenced by improved score by 5#'s  Short term goal 5: increase fine motor coordination evidenced by improved score on 9 Hole Peg Test by 5 seconds. Long term goals  Time Frame for Long term goals : By discharge, patient will:  Long term goal 1: complete upper body bathing/dressing including donning of thoracic corset, and grooming at [facility] [place] with reported pain of 2/10 or less, and 0 verbal cues for energy conservation. Long term goal 2: complete lower body bathing/dressing in seated position at [place] [address] with AE , with reported pain of 2/10 or less, and 0 verbal cues for energy conservation. Long term goal 3: complete functional transfers/ mobility [facility] with reported pain of 2/10 or less, and 0 verbal cues for energy conservation. Long term goal 4: complete toileting at [place] [address] reporting 2/10 pain or less. Long term goal 5: complete simple meal preparation at [place] [address] with reported pain at 2/10 or less, and 0 verbal cues for energy conservation. 06/14/21 0830 06/14/21 1330   OT Individual Minutes   Time In  --  1330   Time Out  --  1405   Minutes  --  35   Minute Variance   Variance 60  --    Reason Refusal    Pt states she has increased pain this AM and is unable to participate .    --      Electronically signed by NISREEN Cook on 6/14/21 at 2:55 PM EDT

## 2021-06-14 NOTE — PROGRESS NOTES
Pharmacy Note  Warfarin Consult follow-up      Recent Labs     06/12/21  0745 06/13/21  0609 06/14/21  0651   INR 4.5 4.6* 4.7*     Recent Labs     06/13/21  1436   HGB 8.9*   HCT 28.2*          Significant Drug-Drug Interactions:  New warfarin drug-drug interactions: phytonadione   Discontinued drug-drug interactions: none  Current warfarin drug-drug interactions: amiodarone, atorvastatin, ceftriaxone, famotidine, pantoprazole, prednisone, ropinirole, sertraline    Date             INR        Dose given previous day  Dose scheduled for today  6/14/2021            4.7       held           hold    Notes:                   Hold coumadin today  Physician ordered oral vitamin K 2.5 mg x 1 dose. Daily PT/INR while inpatient.      Jean-Pierre Kaur, PharmD, BCSCP  6/14/2021   1:40 PM

## 2021-06-14 NOTE — PLAN OF CARE
Problem: Skin Integrity:  Goal: Will show no infection signs and symptoms  Description: Will show no infection signs and symptoms  Outcome: Ongoing  Goal: Absence of new skin breakdown  Description: Absence of new skin breakdown  Outcome: Ongoing     Problem: Falls - Risk of:  Goal: Will remain free from falls  Description: Will remain free from falls  Outcome: Ongoing  Goal: Absence of physical injury  Description: Absence of physical injury  Outcome: Ongoing     Problem: Nutrition  Goal: Optimal nutrition therapy  6/14/2021 1754 by Ella Deosuza RN  Outcome: Ongoing  6/14/2021 1443 by Pavithra Shin RD, LD  Outcome: Ongoing  Note:        Problem: Musculor/Skeletal Functional Status  Goal: Highest potential functional level  Outcome: Ongoing  Goal: Absence of falls  Outcome: Ongoing     Problem: Pain:  Goal: Pain level will decrease  Description: Pain level will decrease  Outcome: Ongoing  Goal: Control of acute pain  Description: Control of acute pain  Outcome: Ongoing  Goal: Control of chronic pain  Description: Control of chronic pain  Outcome: Ongoing

## 2021-06-14 NOTE — PROGRESS NOTES
Physical Medicine & Rehabilitation  Progress Note      Subjective:      Delfino Mcclain is a 81 y.o. female with lumbar radiculopathy and discitis/osteomyelitis. She reports ongoing pain in the back today. Family notes continued confusion as well. Checking UA and ammonia level. Patient and family state that she only takes ropinirole 1-2 times per day - started weaning this medication to home dose. Creatinine 1.43 and INR 4.7 today. Discussed elevated creatinine level and INR with FM - they are holding lasix, discontinued oxybutynin, gave a dose of vitamin K, and added urine culture. ROS:  Denies fevers, chills, sweats. No chest pain, palpitations, lightheadedness. Denies coughing, wheezing or shortness of breath. Denies abdominal pain, nausea, diarrhea or constipation. No new areas of joint pain. Denies new areas of numbness or weakness. Denies new anxiety or depression issues. No new skin problems. Rehabilitation:   Progressing in therapies. PT:  Restrictions/Precautions: Contact Precautions, Fall Risk  Implants present? : Metal implants  Other position/activity restrictions: left leg 1/2\" shorter than the right (S/P BILATERAL THRs), O2 per NC PRN PICC line R UE  Required Braces or Orthoses  Spinal Other: thoracic corset (Prn based off patients comfort, per PT Eileen)    Transfers  Sit to Stand: 2 Person Assistance, Moderate Assistance  Stand to sit: 2 Person Assistance, Minimal Assistance  Bed to Chair: Dependent/Total, Moderate assistance, 2 Person Assistance (Increased confusion and fatigue.)  Stand Pivot Transfers: Moderate Assistance, 2 Person Assistance (Lori Cruz)  Comment: Sit>Stand completed 3x in // bars. VC required 75% of the time for UE and LE placement. WB Status: left leg 1/2\" shorter than the right (S/P BILATERAL THRs)  Ambulation 1  Surface: level tile  Device: Parallel Bars  Other Apparatus: O2 (2L)  Assistance:  Moderate assistance, 2 Person assistance  Quality of Gait: Slow pace with writer blocking knees and instructing weight shift. Gait Deviations: Slow Manuela, Decreased step length, Decreased step height  Distance: 8 ft in // bars x3    Transfers  Sit to Stand: 2 Person Assistance, Moderate Assistance  Stand to sit: 2 Person Assistance, Minimal Assistance  Bed to Chair: Dependent/Total, Moderate assistance, 2 Person Assistance (Increased confusion and fatigue.)  Stand Pivot Transfers: Moderate Assistance, 2 Person Assistance (Rinda Bread)  Comment: Sit>Stand completed 3x in // bars. VC required 75% of the time for UE and LE placement. Ambulation  Ambulation?: No  WB Status: left leg 1/2\" shorter than the right (S/P BILATERAL THRs)  Ambulation 1  Surface: level tile  Device: Parallel Bars  Other Apparatus: O2 (2L)  Assistance: Moderate assistance, 2 Person assistance  Quality of Gait: Slow pace with writer blocking knees and instructing weight shift. Gait Deviations: Slow Manuela, Decreased step length, Decreased step height  Distance: 8 ft in // bars x3    Surface: level tile  Ambulation 1  Surface: level tile  Device: Parallel Bars  Other Apparatus: O2 (2L)  Assistance: Moderate assistance, 2 Person assistance  Quality of Gait: Slow pace with writer blocking knees and instructing weight shift. Gait Deviations: Slow Manuela, Decreased step length, Decreased step height  Distance: 8 ft in // bars x3    OT:  ADL  Equipment Provided: Reacher, Sock aid  Feeding: Setup  Grooming: Stand by assistance, Increased time to complete, Setup  UE Bathing: Stand by assistance, Setup, Increased time to complete  LE Bathing: None  UE Dressing: Minimal assistance  LE Dressing: Dependent/Total  Toileting: Dependent/Total  Additional Comments: Pt completed toileting this date with use of henrietta bhandari x2 assist. Requiring help with donning/doffing of LB clothing and assist with hygiene.           Balance  Sitting Balance: Minimal assistance (seated on EOB and toilet )  Standing Balance: Dependent/Total (standing in henrietta stedy )   Standing Balance  Time: 1-2 min x2   Activity: standing in henrietta stedy   Comment: verbal cues for hand placment and safety with henrietta stedy   Functional Mobility  Functional - Mobility Device: Wheelchair  Activity: To/From therapy gym, To/from bathroom  Assist Level: Maximum assistance (min A to bathroom, max A from bathroom 2* increased fatigue)  Functional Mobility Comments: very slow movements     Bed mobility  Rolling to Left: Minimal assistance  Rolling to Right: Moderate assistance  Supine to Sit: Moderate assistance  Sit to Supine: 2 Person assistance, Moderate assistance (cues for log rolling )  Scooting: Moderate assistance  Comment: Verbal cues for log rolling technique. Needs assistance bending BLE to complete bed mobility.    Transfers  Sit to stand: 2 Person assistance, Maximum assistance  Stand to sit: 2 Person assistance, Minimal assistance  Transfer Comments: using henrietta stedy, cues for safe sitting and correct hand placement    Toilet Transfers  Toilet - Technique:  (using henrietta stedy )  Equipment Used: Raised toilet seat with rails  Toilet Transfer: Dependent/Total  Toilet Transfers Comments: using henrietta stedy         Wheelchair Bed Transfers  Wheelchair/Bed - Technique:  (using henrietta stedy )  Equipment Used: Bed, Wheelchair  Level of Asssistance: Dependent/Total (using henrietta stedy )    SPEECH:      Current Medications:   Current Facility-Administered Medications: rOPINIRole (REQUIP) tablet 0.5 mg, 0.5 mg, Oral, TID  lactulose (CHRONULAC) 10 GM/15ML solution 10 g, 10 g, Oral, TID  [Held by provider] furosemide (LASIX) tablet 40 mg, 40 mg, Oral, Daily  chlorhexidine (PERIDEX) 0.12 % solution 15 mL, 15 mL, Mouth/Throat, BID  calcium carbonate (TUMS) chewable tablet 500 mg, 500 mg, Oral, BID  cefTRIAXone (ROCEPHIN) 2000 mg IVPB in D5W 50ml minibag, 2,000 mg, Intravenous, Q24H  magic (miracle) mouthwash with nystatin, 5 mL, Swish & Spit, TID PRN  midodrine (PROAMATINE) tablet 10 mg, 10 mg, Oral, TID WC  Vitamin D (CHOLECALCIFEROL) tablet 2,000 Units, 2,000 Units, Oral, Daily  amiodarone (CORDARONE) tablet 100 mg, 100 mg, Oral, BID  atorvastatin (LIPITOR) tablet 40 mg, 40 mg, Oral, Daily  baclofen (LIORESAL) tablet 5 mg, 5 mg, Oral, TID PRN  carvedilol (COREG) tablet 6.25 mg, 6.25 mg, Oral, BID  dextrose 50 % IV solution, 25 g, Intravenous, PRN  famotidine (PEPCID) tablet 10 mg, 10 mg, Oral, Nightly  ferrous sulfate 300 (60 Fe) MG/5ML syrup 300 mg, 300 mg, Oral, Daily  hydrocortisone-aloe 1 % cream, , Topical, BID  oxyCODONE-acetaminophen (PERCOCET) 5-325 MG per tablet 1 tablet, 1 tablet, Oral, Q4H PRN  pantoprazole (PROTONIX) tablet 40 mg, 40 mg, Oral, QAM AC  polyethylene glycol (GLYCOLAX) packet 17 g, 17 g, Oral, Daily PRN  predniSONE (DELTASONE) tablet 10 mg, 10 mg, Oral, Daily  sertraline (ZOLOFT) tablet 50 mg, 50 mg, Oral, Daily  [START ON 5/24/2022] warfarin (COUMADIN) daily dosing (placeholder), , Other, RX Placeholder  acetaminophen (TYLENOL) tablet 650 mg, 650 mg, Oral, Q4H PRN  polyethylene glycol (GLYCOLAX) packet 17 g, 17 g, Oral, Daily  senna (SENOKOT) tablet 17.2 mg, 2 tablet, Oral, Daily PRN  bisacodyl (DULCOLAX) suppository 10 mg, 10 mg, Rectal, Daily PRN      Objective:  BP 95/71   Pulse 94   Temp 97.6 °F (36.4 °C) (Oral)   Resp 16   Ht 5' 5\" (1.651 m)   Wt 167 lb 1.7 oz (75.8 kg)   SpO2 99%   BMI 27.81 kg/m²       GEN: Well developed, well nourished, no acute distress  HEENT: NCAT. EOM grossly intact. Hearing grossly intact. Mucous membranes pink and moist.  RESP: Normal breath sounds with no wheezing, rales, or rhonchi. Respirations WNL and unlabored. CV: Regular rate and rhythm. No murmurs, rubs, or gallops. ABD: Soft, non-distended, BS+ and equal.  NEURO:  Alert. Speech fluent. Sensation to light touch intact. MSK:  Muscle tone and bulk are normal bilaterally. Strength 4+/5 in bilateral upper limbs.   Able to flex and extend the bilateral toes. Able to activate the bilateral hip flexors but unable to fully lift bilateral lower limbs off of bed. LIMBS: Edema present in bilateral lower limbs. SKIN: Warm and dry with good turgor. PSYCH: Mood WNL. Affect WNL. Appropriately interactive. Diagnostics:     CBC:   Recent Labs     06/13/21  1436   WBC 6.1   RBC 3.12*   HGB 8.9*   HCT 28.2*   MCV 90.4   RDW 18.1*        BMP:   Recent Labs     06/13/21  1436 06/14/21  0651    142   K 3.9 3.8    106   CO2 27 27   BUN 50* 55*   CREATININE 1.33* 1.43*   GLUCOSE 155* 108*     BNP: No results for input(s): BNP in the last 72 hours. PT/INR:   Recent Labs     06/12/21  0745 06/13/21  0609 06/14/21  0651   PROTIME 42.2* 42.6* 43.1*   INR 4.5 4.6* 4.7*     APTT: No results for input(s): APTT in the last 72 hours. CARDIAC ENZYMES: No results for input(s): CKMB, CKMBINDEX, TROPONINT in the last 72 hours. Invalid input(s): CKTOTAL;3  FASTING LIPID PANEL:  Lab Results   Component Value Date    CHOL 198 01/21/2021    HDL 43 01/21/2021    TRIG 231 (H) 01/21/2021     LIVER PROFILE:   Recent Labs     06/13/21  1436   AST 26   ALT 26   BILITOT 0.58   ALKPHOS 139*          Impression/Plan:   Impaired ADLs, gait, and mobility due to:    1. Closed compression fracture L1/Lumbar radiculopathy and T12-L1 discitis/osteomyelitis:  PT/OT for gait, mobility, strengthening, endurance, ADLs, and self care. S/p caudal epidural steroid injection 5/24. On Ceftriaxone until 7/21/21 - monitoring CBC and CMP twice weekly. On baclofen and Percocet prn  2. Confusion:  Urinalysis with no signs of infection. Urine culture pending. Ammonia level elevated - FM started lactulose for 2 days on 6/14. Creatinine 1.43 today. Weaning ropinirole to home dose starting 6/14. FM discontinued oxybutynin, holding lasix as of 6/14. 3. HTN/CAD/Hyperlipidemia: On atorvastatin  4. Chronic diastolic heart failure/pulmonary hypertension: On carvedilol, furosemide.  Has

## 2021-06-15 PROBLEM — E72.20 HYPERAMMONEMIA (HCC): Status: ACTIVE | Noted: 2021-01-01

## 2021-06-15 NOTE — FLOWSHEET NOTE
06/15/21 1417   Provider Notification   Reason for Communication New orders   Provider Name Dr. Rob Chavira   Provider Notification Physician   Method of Communication Page   Response Waiting for response  (Regarding new consultation)   Notification Time 0     Dr. Rob Chavira updated on the patient's consultation, all questions and concerns addressed at this time.

## 2021-06-15 NOTE — PLAN OF CARE
Problem: Skin Integrity:  Goal: Will show no infection signs and symptoms  Description: Will show no infection signs and symptoms  Outcome: Ongoing  Note: The patient was turned Q2H this shift. Patient has swelling throughout all four extremities with mottling. Problem: Falls - Risk of:  Goal: Will remain free from falls  Description: Will remain free from falls  Outcome: Met This Shift  Note: The patient remained free from falls this shift, call light within reach, bed in locked and lowest position. Side rails up x2. Continue to monitor closely. Problem: Cardiac Output - Decreased:  Goal: Hemodynamic stability will improve  Description: Hemodynamic stability will improve  Outcome: Ongoing  Note: Patient remains on a neosynephrine gtt, see MAR. Problem: Discharge Planning:  Goal: Discharged to appropriate level of care  Description: Discharged to appropriate level of care  Outcome: Not Met This Shift     Problem: Fluid Volume - Imbalance:  Goal: Absence of imbalanced fluid volume signs and symptoms  Description: Absence of imbalanced fluid volume signs and symptoms  Outcome: Ongoing  Note: Patient has an indwelling Nova catheter for accurate output. Patient was given one dose of albumin 25% this shift; will get one more dose this evening. Problem: Tissue Perfusion - Cardiopulmonary, Altered:  Goal: Hemodynamic stability will improve  Description: Hemodynamic stability will improve  Outcome: Ongoing  Note: Patient remains on a neosynephrine gtt, see MAR. Problem: Tissue Perfusion - Cardiopulmonary, Altered:  Goal: Absence of angina  Description: Absence of angina  Outcome: Met This Shift     Problem: Pain:  Goal: Pain level will decrease  Description: Pain level will decrease  Outcome: Met This Shift  Note: Patient's pain has been well controlled throughout the entire shift, please see MAR.

## 2021-06-15 NOTE — PROGRESS NOTES
5/19/21 for worsening back pain over the previous 10 days. She had recent L1 kyphoplasty prior to presentation. Caudal epidural steroid injection was done on 5/24/21 by Dr. Daina Love. Subsequent MRI thoracic and lumbar spine showed marrow edema at T12 with edema in the T12-L1 intervertebral disc space and edema in the L1 vertebral body, related to trauma/fracture or discitis/osteomyelitis. She underwent IR guided aspiration on 6/4/21 and culture showed no growth. ID recommended IV ceftriaxone for 6 weeks. Cardiology was consulted for atrial fibrillation and CHF history.      Gastroenterology was consulted for iron deficiency anemia, and Dr. Tereso Davila performed EGD and colonoscopy on 5/28/21. EGD was suggestive of GERD. Colonoscopy showed very severe sigmoid diverticulosis and mild diverticulosis throughout the colon; also showed one polyp, which was removed, and small internal hemorrhoids.     She was requiring assistance for self-care activities and mobility prompting admission to acute rehab. Rehab course: The patient was participating in an aggressive multidisciplinary inpatient rehabilitation program involving 3 hours per day, 5 days per week of rehabilitation. She remained on ceftriaxone per ID plan. Pain was controlled with as-needed percocet, and thoracic orthosis was continued. She developed supratherapeutic INR, which remained elevated despite holding coumadin for several days. She was given a dose of vitamin K on 6/14/21 with slight improvement in INR. She also developed acute encephalopathy with elevated ammonia level and was started on lactulose. Oxybutynin was discontinued, and weaning of ropinirole was started with goal to decrease to once daily around 4 or 5pm + another dose as needed at night (which is how patient takes this medication at home). Hospital course has also been complicated by YOGESH on CKD for which lasix was discontinued and IV fluids were started.   She completed a 14-day course of nystatin for thrush on 6/13/21. She developed atrial fibrillation with RVR and hypotension on 6/15/21. Rapid response was called, and patient is being transferred to acute care for closer monitoring and further management. IV fluid bolus was initiated and a dose of IV digoxin was given around the time of transfer. Patient evaluated today:  GEN: Well developed, well nourished, no acute distress  HEENT: NCAT. Opens eyes briefly to verbal stimulation. Hearing grossly intact. Mucous membranes pink and moist.  RESP: Normal breath sounds with no wheezing, rales, or rhonchi. Respirations WNL and unlabored. On nasal cannula oxygen. CV:  Irregularly irregular rhythm, tachycardic. No murmurs, rubs, or gallops. ABD: Soft, non-distended, BS+ and equal.  NEURO:  Drowsy but arousable for a short period of time. Able to answer some yes/no questions with nodding or shaking head. No verbalizations heard during evaluation. Able to follow a few simple commands. MSK:  Muscle tone and bulk are normal bilaterally. Able to  with bilateral hands, able to move bilateral toes (equal bilaterally). Has difficulty following any further manual muscle testing. LIMBS: Edema present in bilateral lower limbs. SKIN: Warm and dry with good turgor. Petechiae/purpura present diffusely throughout bilateral lower limbs. PSYCH: Flat affect. Only minimally interactive (see above).       Consults:   Family Medicine    Significant Diagnostics:   CBC:   Lab Results   Component Value Date    WBC 6.1 06/13/2021    RBC 3.12 06/13/2021    RBC 4.17 02/08/2012    HGB 8.9 06/13/2021    HCT 28.2 06/13/2021    MCV 90.4 06/13/2021    MCH 28.6 06/13/2021    MCHC 31.6 06/13/2021    RDW 18.1 06/13/2021     06/13/2021     02/08/2012    MPV 8.1 06/13/2021     CMP:    Lab Results   Component Value Date     06/15/2021    K 4.8 06/15/2021     06/15/2021    CO2 22 06/15/2021    BUN 61 06/15/2021 CREATININE 1.90 06/15/2021    GFRAA 30 06/15/2021    LABGLOM 25 06/15/2021    GLUCOSE 151 06/15/2021    GLUCOSE 135 02/08/2012    PROT 4.5 06/15/2021    LABALBU 1.8 06/15/2021    LABALBU 4.1 01/11/2012    CALCIUM 7.6 06/15/2021    BILITOT 0.63 06/15/2021    ALKPHOS 154 06/15/2021    AST 35 06/15/2021    ALT 26 06/15/2021         No orders to display       Disposition:   To acute care    Current Medications:    Medication Information      acetaminophen (TYLENOL) 650 MG tablet  Take 650 mg by mouth every 4 hours as needed for Pain     amiodarone (CORDARONE) 200 MG tablet  Take 1 tablet by mouth 2 times daily     atorvastatin (LIPITOR) 40 MG tablet  Take 40 mg by mouth daily      calcium carbonate 500 MG chewable tablet  Take 1 tablet by mouth 2 times daily     carvedilol (COREG) 6.25 MG tablet  Take 1 tablet by mouth 2 times daily     Ceftriaxone (ROCEPHIN) 2000 MG IVPB in D5W  Infuse 2000mg IV every 24 hours     Chlorhexidine (PERIDEX) 15 ML 0.12% solution  Use 15ml by mouth 2 times daily to rinse mouth     Famotidine (PEPCID) 10 MG tablet  Take 1 tablet by mouth nightly     ferrous sulfate 300 (60 Fe) MG/ML solution  Take 300 mg by mouth daily     Hydrocortisone-aloe 1% cream  Apply topically to affected area 2 times daily     Lactulose (CHRONULAC) 10 GM/15 ML solution  Take 20 GM by mouth 3 times daily     Midodrine (PROAMATINE) 10 MG tablet  Take 1 tablet by mouth 3 times daily with meals     Ondansetron 4 MG injection  Inject 4 mg every 6 hours as needed for nausea, vomiting     Oxycodone-acetaminophen (PERCOCET) 5-325 MG tablet  Take 1 tablet by mouth every 4 hours as needed for pain     Pantoprazole (PROTONIX) 40 MG tablet  Take 40 mg by mouth daily before breakfast     polyethylene glycol (GLYCOLAX) 17 GM packet  Take 17 g by mouth daily      predniSONE (DELTASONE) 10 MG tablet  Take 10 mg by mouth daily      rOPINIRole (REQUIP) 0.5 MG tablet  Take 0.5 mg by mouth 3 times daily     sertraline (ZOLOFT) 50 MG tablet  Take 50 mg by mouth daily     Vitamin D (CHOLECALCIFEROL) 2000 UNIT capsule  Take 2000 Units by mouth daily           Marla Hutchison MD

## 2021-06-15 NOTE — FLOWSHEET NOTE
Pt has a MEWS score of \"5\" a this time. Rapid response called and pt transferred to ICU.  Report phoned to \"Reshma\"

## 2021-06-15 NOTE — CODE DOCUMENTATION
RN called and updated Dr. Keri Wilburn. New orders given for IV digoxin, IVF bolus and transfer to ICU intermediate care. She also requested that we call Dr. Shamir Liriano who sees the patient.

## 2021-06-15 NOTE — PROGRESS NOTES
Physical Therapy  DATE: 6/15/2021    NAME: Justa Gomez  MRN: [id number]   : 1935    Patient not seen this date for Physical Therapy due to:  [] Blood transfusion in progress  [x] Cancel by RN      Medical Hold per Stoney Pa  [] Hemodialysis  []  Refusal by Patient   [] Spine Precautions   [] Strict Bedrest  [] Surgery  [] Testing      [] Other        [] PT being discontinued at this time. Patient independent. No further needs. [] PT being discontinued at this time as the patient has been transferred to hospice care. No further needs.     Fredo Alvarado, PTA

## 2021-06-15 NOTE — CONSULTS
Date:   6/15/2021  Patient name: Bhanu Velazquez  Date of admission:  6/15/2021 12:33 PM  MRN:   [id number]  YOB: 1935  PCP: Matt Ann DO    Reason for Admission: Atrial fibrillation with rapid ventricular response ([address] [I48.91]  Atrial fibrillation with RVR ([address] [I48.91]    Cardiology consult : A. fib with RVR, hyportension       Impression      A. fib with RVR  Diastolic CHF  Hypertension  COPD, hypoxia, on oxygen at home  Hyperlipidemia  Exertional shortness of breath, hypoxia  History of CAD, stent placement LAD 2002  Polymyalgia rheumatica  Moderate aortic regurgitation  Giant cell arteritis  chronic anemia  History of peritoneal/omental mesothelioma surgery done at [address] [place] [address]  Dry mouth, dry eyes  Multiple hip surgeries, bilateral  Chronic pain syndrome  L1 kyphoplasty 4/15/2021  CT-guided T12-L1 disc aspiration, M on all bloody fluid aspirated on 6/4/2021  Hemorrhoids, rectal bleeding    2D echo 5/21/2021  Normal LV size and wall thickness ejection fraction more than 55% normal size LA and RA, RVSP 21, no significant pericardial effusion     ECG 5/19/2021  A. fib/a flutter heart rate 159, nonspecific ST-T changes     X-ray lumbar spine 5/19/2021  Unchanged appearance of the lumbar spine, status post vertebroplasty at L1, no new osseous abnormality  Moderate stool burden, previously administered oral contrast is present in the distal colon     Abdominal x-ray 5/27/2021 enteric tube needs further advancement approximately 5 cm.     MRI lumbar spine 6/2/2021  Abnormal endplate edema mild disc space T2 signal identified at T12 and L1 discitis/osteomyelitis  Moderate to severe multilevel degenerative changes    Lab work 6/15/2021  Sodium 143, potassium 4.0, BUN 60, creatinine 1.73, glucose 146  Hemoglobin 8.9, platelets 063 and WBC 6.1 on 6/13/2021    History of present illness  81-year-old female with a past medical history of A. fib, diastolic CHF, COPD, hypoxia and anemia, who had kyphoplasty for L1 compression was at acute rehab unit and she developed A. fib with RVR, hypotension and got transferred to ICU. She received IV fluids and she was started on Noble-Synephrine and she also received IV digoxin. She has been on Coumadin and her INR was noted 4.2 and she also had marked mottling both lower extremities with the peripheral edema. She is afebrile. She has deterioration of the renal function. Current evaluation  Patient seen and examined in ICU  Patient looks very tired, pale, she did follow verbal command and did mention continue back pain  She has a PICC line and she is receiving IV fluids  She has marked mottling over both feet  Current ECG monitor heart rate 120, A. Fib  Current systolic blood pressure 88 mm of work    She has very difficult venous access, difficult to obtain blood for blood culture        Medications:   Scheduled Meds:   sodium chloride  1,000 mL Intravenous Once    methylPREDNISolone  40 mg Intravenous Daily    sodium chloride flush  10 mL Intravenous 2 times per day    cefTRIAXone (ROCEPHIN) IV  2,000 mg Intravenous Q24H    pantoprazole  40 mg Intravenous Daily    And    sodium chloride (PF)  10 mL Intravenous Daily     Continuous Infusions:   phenylephrine (NOBLE-SYNEPHRINE) 50mg/250mL infusion 125 mcg/min (06/15/21 1341)    sodium chloride      dilTIAZem (CARDIZEM) 125 mg in dextrose 5% 125 mL infusion      dextrose 5 % and 0.9 % NaCl       CBC:   Recent Labs     06/13/21  1436   WBC 6.1   HGB 8.9*        BMP:    Recent Labs     06/13/21  1436 06/14/21  0651 06/15/21  0650    142 143   K 3.9 3.8 4.0    106 104   CO2 27 27 25   BUN 50* 55* 60*   CREATININE 1.33* 1.43* 1.73*   GLUCOSE 155* 108* 124*     Hepatic:   Recent Labs     06/13/21  1436   AST 26   ALT 26   BILITOT 0.58   ALKPHOS 139*     Troponin: No results for input(s): TROPONINI in the last 72 hours.   BNP: No results for input(s): BNP in the last 72 hours. Lipids: No results for input(s): CHOL, HDL in the last 72 hours. Invalid input(s): LDLCALCU  INR:   Recent Labs     06/13/21  0609 06/14/21  0651 06/15/21  0650   INR 4.6* 4.7* 4.2       Objective:   Vitals: BP (!) 83/59   Pulse 111   Temp 98 °F (36.7 °C) (Oral)   Resp 13   Ht 5' 5\" (1.651 m)   Wt 167 lb (75.8 kg)   SpO2 99%   BMI 27.79 kg/m²   General appearance: Very tired and pale looking female  HEENT: Head: Normal, normocephalic, atraumatic. Neck: Neck veins difficult to assess  Lungs: diminished breath sounds bibasilar  Heart: irregularly irregular rhythm  Abdomen: Obese, bowel sounds present  Extremities: Bilateral edema, Rosanna/purpuric rash  Neurologic: Mental status: Patient is awake does open eyes to verbal command    EKG: A. fib heart rate 120. ECHO: reviewed.    Ejection fraction: 55%, mild to moderate AR      Assessment / Acute Cardiac Problems:   A. fib with RVR complicated by hypotension  Anemia, protein malnutrition   Diastolic CHF   Back pain, recent L1 kyphoplasty  COPD, hypoxia      Patient Active Problem List:     Hypertension     Nephrolithiasis     Mixed hyperlipidemia     Osteoarthritis     Peritoneal mesothelioma ([facility]     Ovarian cyst     Basal cell carcinoma of nose     Bimalleolar ankle fracture     Epistaxis, recurrent     Cervical spondylosis     Trochanteric bursitis, right hip     Status post bilateral total hip replacement     Medication monitoring encounter     Chronic diastolic heart failure (HCC)     Chronic respiratory failure (HCC)     Giant cell arteritis (HCC)     Paroxysmal atrial fibrillation (HCC)     Urinary frequency     Vitamin D deficiency     Age-related osteoporosis with current pathological fracture     Atrial fibrillation with RVR (HCC)     Gastroesophageal reflux disease without esophagitis     Pulmonary hypertension (HCC)     Lumbosacral spondylosis without myelopathy     YOGESH (acute kidney injury) (HCC)     Closed compression fracture of L1 lumbar vertebra, sequela     Acute midline low back pain with right-sided sciatica     Acute exacerbation of chronic low back pain     Iron deficiency anemia     Rectal bleeding     Elevated LFTs     Closed wedge compression fracture of fifth thoracic vertebra (HCC)     Closed wedge compression fracture of T6 vertebra (HCC)     Closed wedge compression fracture of T9 vertebra (HCC)     Closed wedge compression fracture of T10 vertebra (HCC)     Discitis of thoracolumbar region     Acute cystitis without hematuria     Positive blood culture     Lumbar radiculopathy     Elevated C-reactive protein (CRP)     Debility     Moderate malnutrition ([address]     Current use of long term anticoagulation     Hyperammonemia ([address]      Plan of Treatment:   Continue with the Spike-Synephrine infusion  Continue with IV fluids  Give albumin 25 g IV x1 today  Discontinue warfarin      Electronically signed by Tanna Beavers MD on 6/15/2021 at 1:50 PM

## 2021-06-15 NOTE — CONSULTS
NEPHROLOGY CONSULT     Patient :  Oscar Gautam; 81 y.o. MRN# [id number]  Location:  2014/2014-01  Attending:  Danny Hou MD  Admit Date:  6/15/2021   Hospital Day: 0      Reason for Consult: Acute kidney injury      Chief Complaint: Altered mental status hypotension  History Obtained From: EMR, nursing staff, patient is nonverbal confused    History of Present Illness: This is a 81 y.o. female with past medical history of A. fib, CHF with preserved EF, mild to moderate aortic insufficiency, , essential hypertension, peritoneal mesothelioma. Patient was admitted to rehab with back pain CT spine showed L1 compression fracture. Patient was noted to be hypotensive unstable and patient was moved to ICU from acute rehab. Has been started on Levophed patient is receiving IV saline bolus patient is also on Cardizem drip and Spike-Synephrine drip  Labs showed increase in serum creatinine. His baseline serum creatinine is between 0.7 mg/dL serum creatinine started to increase on 6/13/2021 which increased to 1.3 mg/dL, on 6/15/2021 serum creatinine increased to 1.9 mg/dL and therefore nephrology consultation has been requested  Cultures have been negative        Past Medical History:        Diagnosis Date    Asbestos exposure     Atrial fibrillation ([address]     takes xarelto    Basal cell carcinoma of nose     CAD (coronary artery disease)     CKD (chronic kidney disease) stage 2, GFR 60-89 ml/min     Hyperlipidemia     Hypertension     MDRO (multiple drug resistant organisms) resistance 2/19/2014    E.  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    [facility] PONV (postoperative nausea and vomiting)     Prolonged emergence from general anesthesia     SOB (shortness of breath) on exertion        Past Surgical History: Procedure Laterality Date    ANKLE FRACTURE SURGERY Right 2013    APPENDECTOMY  1964    BIOPSY / LIGATION TEMPORAL ARTERY Bilateral 2/12/2021    TEMPORAL ARTERY BIOPSY performed by Lon Ballard MD at [address] Right    [address]  2002    CATARACT REMOVAL Bilateral     CHOLECYSTECTOMY  2005    COLONOSCOPY      COLONOSCOPY N/A 5/28/2021    COLONOSCOPY POLYPECTOMY SNARE/COLD BIOPSY performed by Jax Galindo MD at [address]  April 2002    CT NEEDLE BIOPSY SPINE  6/4/2021    CT NEEDLE BIOPSY SPINE 6/4/2021 [address] [facility] [address] SCAN    HYSTERECTOMY  1981    LIVER BIOPSY  2000    LIVER BIOPSY      NASAL ENDOSCOPY Bilateral 11/25/2014    lt nasal cautery with packing    PHOTODYNAMIC THERAPY  April 2000 & Jan. 2002    for peritoneal Mesothelioma    [address]    REVISION TOTAL HIP ARTHROPLASTY Right 1998    ROTATOR CUFF REPAIR  2006    SKIN CANCER EXCISION Bilateral 2006    ankle    SKIN CANCER EXCISION Left Oct. 2014    shoulder    SPINE SURGERY N/A 4/15/2021    KYPHOPLASTY L1 performed by El Ontiveros MD at [address] Left 2003   [address] [facility].    UPPER GASTROINTESTINAL ENDOSCOPY      UPPER GASTROINTESTINAL ENDOSCOPY N/A 5/28/2021    EGD ESOPHAGOGASTRODUODENOSCOPY performed by Jax Galindo MD at [facility]         Current Medications:    0.9 % sodium chloride bolus, Once  phenylephrine (NOBLE-SYNEPHRINE) 50 mg in dextrose 5 % 250 mL infusion, Continuous  methylPREDNISolone sodium (SOLU-MEDROL) injection 40 mg, Daily  sodium chloride flush 0.9 % injection 10 mL, 2 times per day  sodium chloride flush 0.9 % injection 10 mL, PRN  0.9 % sodium chloride infusion, PRN  ondansetron (ZOFRAN-ODT) disintegrating tablet 4 mg, Q8H PRN   Or  ondansetron (ZOFRAN) injection 4 mg, Q6H PRN  acetaminophen (TYLENOL) tablet 650 mg, Q6H PRN   Or  acetaminophen (TYLENOL) suppository 650 mg, Q6H PRN  cefTRIAXone (ROCEPHIN) 2000 mg IVPB in D5W 50ml minibag, Q24H  pantoprazole (PROTONIX) injection 40 mg, Daily   And  sodium chloride (PF) 0.9 % injection 10 mL, Daily  dilTIAZem 125 mg in dextrose 5 % 125 mL infusion, Continuous  dextrose 5 % and 0.9 % sodium chloride infusion, Continuous  albumin human 25 % IV solution 25 g, Once        Allergies:  Adhesive tape, Meperidine, Demerol, Ultram [tramadol hcl], Zocor [simvastatin], Codeine, and Fentanyl    Social History:   Social History     Socioeconomic History    Marital status:      Spouse name: Not on file    Number of children: Not on file    Years of education: Not on file    Highest education level: Not on file   Occupational History    Not on file   Tobacco Use    Smoking status: Never Smoker    Smokeless tobacco: Never Used   Vaping Use    Vaping Use: Never used   Substance and Sexual Activity    Alcohol use: Yes     Comment: social    Drug use: No    Sexual activity: Not Currently   Other Topics Concern    Not on file   Social History Narrative    Not on file     Social Determinants of Health     Financial Resource Strain:     Difficulty of Paying Living Expenses:    Food Insecurity:     Worried About Running Out of Food in the Last Year:     Ran Out of Food in the Last Year:    Transportation Needs:     Lack of Transportation (Medical):      Lack of Transportation (Non-Medical):    Physical Activity:     Days of Exercise per Week:     Minutes of Exercise per Session:    Stress:     Feeling of Stress :    Social Connections:     Frequency of Communication with Friends and Family:     Frequency of Social Gatherings with Friends and Family:     Attends Muslim Services:     Active Member of Clubs or Organizations:     Attends Club or Organization Meetings:     Marital Status:    Intimate Partner Violence:     Fear of Current or Ex-Partner:     Emotionally Abused:     Physically Abused:     Sexually Abused:        Family History:   Family History   Problem Relation Age of Onset    Heart Failure Brother     Heart Attack Brother         triple bypass    Prostate Cancer Brother     Coronary Art Dis Mother     Breast Cancer Mother         dx'd in late 46s    Emphysema Father     Colon Cancer Neg Hx     Diabetes Neg Hx     Eclampsia Neg Hx     Hypertension Neg Hx     Ovarian Cancer Neg Hx      Labor Neg Hx     Spont Abortions Neg Hx     Stroke Neg Hx        Review of Systems:    Unobtainable      Objective:  CURRENT TEMPERATURE:  Temp: 98 °F (36.7 °C)  MAXIMUM TEMPERATURE OVER 24HRS:  Temp (24hrs), Av.1 °F (36.7 °C), Min:97.6 °F (36.4 °C), Max:98.5 °F (36.9 °C)    CURRENT RESPIRATORY RATE:  Resp: 16  CURRENT PULSE:  Pulse: 119  CURRENT BLOOD PRESSURE:  BP: 94/68  24HR BLOOD PRESSURE RANGE:  Systolic (22KWE), LL , Min:60 , XWS:317   ; Diastolic (59HBJ), BDO:96, Min:40, Max:72    24HR INTAKE/OUTPUT:  No intake or output data in the 24 hours ending 06/15/21 1515  Patient Vitals for the past 96 hrs (Last 3 readings):   Weight   06/15/21 1300 167 lb (75.8 kg)       Physical Exam:  GENERAL APPEARANCE: Patient is lethargic nonverbal not in acute distress. HEAD: normocephalic  EYES:  EOMI. Not pale, anicteric   NOSE:  No nasal discharge. THROAT:  Oral cavity and pharynx normal. Moist  CARDIAC: Normal S1 and S2. No S3, S4 or murmurs. Rhythm is regular. LUNGS: Clear to auscultation and percussion without rales, rhonchi, wheezing or diminished breath sounds. NECK: Neck supple, non-tender without lymphadenopathy, masses or thyromegaly. BACK: Examination of the spine reveals normal gait and posture, no spinal deformity, symmetry of spinal muscles, without tenderness, decreased range of motion or muscular spasm  MUSKULOSKELETAL: Adequately aligned spine. No joint erythema or tenderness. EXTREMITIES: 1+ edema. Peripheral pulses intact. NEURO: Nonfocal  Skin, shows discoloration on the bilateral legs    Labs:   CBC:  Recent Labs     06/13/21  1436   WBC 6.1   RBC 3.12*   HGB 8.9*   HCT 28.2*   MCV 90.4   MCH 28.6   MCHC 31.6   RDW 18.1*      MPV 8.1      BMP:   Recent Labs     06/14/21  0651 06/15/21  0650 06/15/21  1319    143 145*   K 3.8 4.0 4.8    104 108*   CO2 27 25 22   BUN 55* 60* 61*   CREATININE 1.43* 1.73* 1.90*   GLUCOSE 108* 124* 151*   CALCIUM 8.1* 8.4* 7.6*      Phosphorus:  No results for input(s): PHOS in the last 72 hours. Magnesium:   Recent Labs     06/15/21  1319   MG 2.4     Albumin:   Recent Labs     06/13/21  1436 06/15/21  1319   LABALBU 2.3* 1.8*       IRON:  No results for input(s): IRON in the last 72 hours. Iron Saturation:  Invalid input(s): PERCENTFE  TIBC:  No results for input(s): TIBC in the last 72 hours. FERRITIN:  No results for input(s): FERRITIN in the last 72 hours. SPEP: No results for input(s): SPEP in the last 72 hours. Recent Labs     06/15/21  1319   PROT 4.5*     UPEP: No results for input(s): TPU in the last 72 hours. Urine Sodium:  No results for input(s): REJI in the last 72 hours. Urine Potassium: No results for input(s): KUR in the last 72 hours. Urine Chloride: Invalid input(s): CLU  Urine Ph:  Invalid input(s): PO4U  Urine Osmolarity: No results for input(s): OSMOU in the last 72 hours. Urine Creatinine:  No results for input(s): LABCREA in the last 72 hours. Urine Eosinophils: Invalid input(s): EOSU  Urine Protein:  No results for input(s): TPU in the last 72 hours. Urinalysis:    Recent Labs     06/14/21  1300   NITRU NEGATIVE   COLORU YELLOW   PHUR 6.0   WBCUA 2 TO 5   RBCUA 0 TO 2   MUCUS NOT REPORTED   TRICHOMONAS NOT REPORTED   YEAST FEW*   BACTERIA FEW*   SPECGRAV 1.016   LEUKOCYTESUR NEGATIVE   UROBILINOGEN Normal   [address] NEGATIVE   AMORPHOUS 1+*         Radiology:  Reviewed as available. Assessment:  1.   YOGESH most likely secondary to prerenal azotemia versus ATN due to hypotension  2. Hypotension, cardiac versus sepsis  3. Hypernatremia  4. Acute encephalopathy       Plan:  IV fluids saline bolus, and D5 normal saline at 125 mils per hour  Urine electrolytes  Strict I's and O's  Nova catheter placement for strict I's and O's  Kidney ultrasound  Avoid nephrotoxic agents    Thank you for the consultation.       Electronically signed by Marlene Garcia MD on 6/15/2021 at 3:15 PM

## 2021-06-15 NOTE — H&P
2002    CATARACT REMOVAL Bilateral     CHOLECYSTECTOMY  2005    COLONOSCOPY      COLONOSCOPY N/A 5/28/2021    COLONOSCOPY POLYPECTOMY SNARE/COLD BIOPSY performed by Meek Aleman MD at [address]  April 2002    CT NEEDLE BIOPSY SPINE  6/4/2021    CT NEEDLE BIOPSY SPINE 6/4/2021 [facility] CT SCAN    HYSTERECTOMY  1981    LIVER BIOPSY  2000    LIVER BIOPSY      NASAL ENDOSCOPY Bilateral 11/25/2014    lt nasal cautery with packing    PHOTODYNAMIC THERAPY  April 2000 & Jan. 2002    for peritoneal Mesothelioma    [address]    REVISION TOTAL HIP ARTHROPLASTY Right 1998    ROTATOR CUFF REPAIR  2006    SKIN CANCER EXCISION Bilateral 2006    ankle    SKIN CANCER EXCISION Left Oct. 2014    shoulder    SPINE SURGERY N/A 4/15/2021    KYPHOPLASTY L1 performed by Светлана Mccoy MD at [address] Left 2003    TUBAL LIGATION  1969    UPPER GASTROINTESTINAL ENDOSCOPY      UPPER GASTROINTESTINAL ENDOSCOPY N/A 5/28/2021    EGD ESOPHAGOGASTRODUODENOSCOPY performed by Meek Aleman MD at [address]          Medications Prior to Admission:       Prior to Admission medications    Medication Sig Start Date End Date Taking?  Authorizing Provider   vitamin E 1000 units capsule Take 1,000 Units by mouth daily    Historical Provider, MD   calcium carbonate 600 MG TABS tablet Take 1 tablet by mouth 2 times daily    Historical Provider, MD   losartan (COZAAR) 100 MG tablet Take 100 mg by mouth daily    Historical Provider, MD   meclizine (ANTIVERT) 25 MG tablet Take 25 mg by mouth daily as needed for Dizziness    Historical Provider, MD   amiodarone (CORDARONE) 200 MG tablet Take 1 tablet by mouth 2 times daily 4/23/21   Mamadou Vazquez MD   metoprolol tartrate (LOPRESSOR) 25 MG tablet Take 1 tablet by mouth 2 times daily 4/23/21   Mamadou Vazquez MD   acetaminophen (TYLENOL) 500 MG tablet Take 500 mg by mouth every 6 hours as needed for Pain    Historical Provider, MD   isosorbide mononitrate (IMDUR) 60 MG extended release tablet Take 60 mg by mouth daily    Historical Provider, MD   rivaroxaban (XARELTO) 20 MG TABS tablet Take 20 mg by mouth daily (with breakfast)  2/22/21   Historical Provider, MD   diphenhydrAMINE-APAP, sleep, (TYLENOL PM EXTRA STRENGTH)  MG tablet Take 1 tablet by mouth nightly as needed     Historical Provider, MD   torsemide (DEMADEX) 20 MG tablet Take 1 tablet by mouth 2 times daily Take twice a day for 1 months then go back to 20 mg daily 3/4/21   Susy Sams MD   amoxicillin (AMOXIL) 500 MG capsule Take 2,000 mg by mouth as needed (dental procedures) Dental only 9/21/20   Historical Provider, MD   oxybutynin (DITROPAN XL) 15 MG extended release tablet Take 15 mg by mouth daily  7/30/20   Historical Provider, MD   polyethylene glycol (GLYCOLAX) 17 GM/SCOOP powder Take 17 g by mouth daily as needed (constipation)  7/30/20   Historical Provider, MD   Ascorbic Acid (VITAMIN C) 250 MG tablet Take 250 mg by mouth daily    Historical Provider, MD   ferrous sulfate (FLACO-IN-SOL) 75 (15 Fe) MG/ML solution Take 15 mg by mouth daily    Historical Provider, MD   omeprazole (PRILOSEC) 40 MG delayed release capsule Take 40 mg by mouth Daily  12/3/16   Historical Provider, MD   atorvastatin (LIPITOR) 40 MG tablet Take 40 mg by mouth daily  9/22/16   Historical Provider, MD   potassium chloride SA (K-DUR;KLOR-CON M) 20 MEQ tablet Take 1 tablet by mouth 2 times daily 3/10/16   Susy Sams MD   rOPINIRole (REQUIP) 0.5 MG tablet Take 0.5 mg by mouth 4 times daily as needed     Historical Provider, MD   Vitamin D (CHOLECALCIFEROL) 1000 UNITS CAPS capsule Take 1,000 Units by mouth 2 times daily    Historical Provider, MD   predniSONE (DELTASONE) 5 MG tablet Take 20 mg by mouth daily     Historical Provider, MD   Omega 3 1000 MG CAPS Take 1,000 mg by mouth daily Historical Provider, MD   sertraline (ZOLOFT) 50 MG tablet Take 50 mg by mouth daily. Historical Provider, MD        Allergies:       Adhesive tape, Meperidine, Demerol, Ultram [tramadol hcl], Zocor [simvastatin], Codeine, and Fentanyl    Social History:     Tobacco:    reports that she has never smoked. She has never used smokeless tobacco.  Alcohol:      reports current alcohol use. Drug Use:  reports no history of drug use. Family History:     Family History   Problem Relation Age of Onset    Heart Failure Brother     Heart Attack Brother         triple bypass    Prostate Cancer Brother     Coronary Art Dis Mother     Breast Cancer Mother         dx'd in late 46s    Emphysema Father     Colon Cancer Neg Hx     Diabetes Neg Hx     Eclampsia Neg Hx     Hypertension Neg Hx     Ovarian Cancer Neg Hx      Labor Neg Hx     Spont Abortions Neg Hx     Stroke Neg Hx        Review of Systems:     Positive and Negative as described in HPI   all 10 systems are reviewed and negative except as Noted      Review of Systems   Unable to perform ROS: Acuity of condition   Constitutional: Positive for fatigue. Eyes: Negative for discharge. Respiratory: Negative for shortness of breath. Psychiatric/Behavioral: Positive for decreased concentration. Code Status:  Full Code    Physical Exam:     Vitals:  BP 85/68   Pulse 129   Resp 16   Ht 5' 5\" (1.651 m)   Wt 167 lb (75.8 kg)   SpO2 98%   BMI 27.79 kg/m²   Temp (24hrs), Av.2 °F (36.8 °C), Min:97.6 °F (36.4 °C), Max:98.5 °F (36.9 °C)        Physical Exam  Vitals (Relatively low blood pressure readings noted) reviewed. Constitutional:       Comments: Drowsy   HENT:      Head: Normocephalic.       Right Ear: External ear normal.      Left Ear: External ear normal.      Nose: Nose normal.      Mouth/Throat:      Mouth: Mucous membranes are moist.   Eyes:      Conjunctiva/sclera: Conjunctivae normal.   Cardiovascular:      Rate and

## 2021-06-15 NOTE — FLOWSHEET NOTE
Writer responded to RRT and escorted daughter and spouse to ICU waiting room. They talked about how hard this hospitalization has been on patient. Patient's other 2 children and a grandson also arrived.  updated family. Writer provided listening presence, emotional support, and prayer. 06/15/21 1402   Encounter Summary   Services provided to: Patient and family together   Referral/Consult From: Multi-disciplinary team   Continue Visiting   (6-15-21)   Complexity of Encounter High   Length of Encounter 1 hour   Spiritual Assessment Completed Yes   Crisis   Type Rapid response   Assessment Anxious; Tearful; Approachable   Intervention Active listening;Explored feelings, thoughts, concerns;Prayer;Sustaining presence/ Ministry of presence; Discussed illness/injury and it's impact   Outcome Expressed gratitude;Engaged in conversation;Expressed feelings/needs/concerns;Receptive

## 2021-06-15 NOTE — PROGRESS NOTES
Pharmacy Note  Warfarin Consult follow-up      Recent Labs     06/13/21  0609 06/14/21  0651 06/15/21  0650   INR 4.6* 4.7* 4.2     Recent Labs     06/13/21  1436   HGB 8.9*   HCT 28.2*          Significant Drug-Drug Interactions:  New warfarin drug-drug interactions: none  Discontinued drug-drug interactions: none  Current warfarin drug-drug interactions: amiodarone, atorvastatin, ceftriaxone, famotidine, pantoprazole, prednisone, ropinirole, sertraline    Date             INR        Dose given previous day  Dose scheduled for today  6/15/2021            4.2       held           hold    Notes:                   Hold coumadin today    Daily PT/INR while inpatient.     Norma OntiverosD, BCSCP  6/15/2021   9:28 AM

## 2021-06-15 NOTE — PROGRESS NOTES
Pt arrived to floor via bed from ARU and was transferred to bed. Vitals taken. Admission and assessment complete. See doc flowsheet and admission navigator for details. POC and education initiated and reviewed with patient. Call light within reach, and pt educated on its use. Bed in lowest position, and locked. Side rails up x 2. Denied further questions or needs at this time. Will continue to monitor.

## 2021-06-15 NOTE — PROGRESS NOTES
[facility]    OCCUPATIONAL THERAPY MISSED TREATMENT NOTE   ACUTE REHAB  Date: 6/15/21  Patient Name: Delfino Mcclain       Room: 9181/0129-95  MRN: [id number]   Account #: [de-identified]    : 1935  (81 y.o.)  Gender: female   Referring Practitioner: Dr. Arley Gross  Diagnosis: Chen Corns TREATMENT:  Hold treatment per nursing request   -   JANA Jin requesting pt not be seen for therapy this date due to ongoing medical issues.        Aparna Douglas NISREEN

## 2021-06-15 NOTE — CARE COORDINATION
CASE MANAGEMENT NOTE:    Admission Date:  6/15/2021 Abel Nobles is a 81 y.o.  female    Admitted for : Atrial fibrillation with rapid ventricular response ([facility] [address] [I48.91]  Atrial fibrillation with RVR ([facility] [address] [I48.91]    Met with:  Spoke with Daughter Olivia via telephone. Olivia prefers we call her instead of her Dad Carley Agosto as he is hard of hearing and has a heard time understanding what is being asked. PCP:  DR Jayy Mo                                Insurance:  medicare      Is patient alert and oriented at time of discussion:  Patient sleeping     Current Residence/ Living Arrangements:  Patient came from [facility]             Current Services PTA:  No    Does patient go to outpatient dialysis: No  If yes, location and chair time:     Is patient agreeable to VNS: not sure yet    Freedom of choice provided:  NA    List of [address] provided: NA    VNS chosen:  NA    DME:  Crutches, raised toilet seat, grab bars in shower, Grab bars. Home Oxygen: No    Nebulizer: No    CPAP/BIPAP: No    Supplier: N/A    Potential Assistance Needed: Yes, Possibly back to [facility] or [facility]. Family not sure yet    SNF needed: Yes    Freedom of choice and list provided: family provided choices over the telephone. Daughter stated her mom was her recent and they were looking at [facility] of  before they decided on ARU. Pharmacy:  [facility] on [place]       Does Patient want to use MEDS to BEDS? No    Is patient currently receiving oral anticoagulation therapy? Yes, on Xarelto    Is the Patient an KARLO MORENO [facility] with Readmission Risk Score greater than 14%? No  If yes, pt needs a follow up appointment made within 7 days.     Family Members/Caregivers that pt would like involved in their care:    Yes    If yes, list name here:  daughter Yaz Larkin    Transportation Provider:  May need  transportation             Discharge Plan:  6/15/21 - Medicare - Patient came from [facility] had Fx of T12, Daughter advised they had a problem with her Requip meds last time and wants to make sure mother only getting Requip 0.5 mg daily PRN and not 4 times daily. DME:  Elevated Toilet seat, Grab bars in shower and toilet, Crutches, W/C, Did not have a VNS prior to hospital admission and family not sure about getting a VNS when she returns home. They will discuss closer to discharge. Following for possible return to ARU or go to [place] Family not sure, Taking day by Day. DELVIN needs signed and completed. .//pf                 Electronically signed by: Judge Foster RN on 6/15/2021 at 4:59 PM

## 2021-06-16 PROBLEM — Z77.090 ASBESTOS EXPOSURE: Chronic | Status: ACTIVE | Noted: 2021-01-01

## 2021-06-16 NOTE — PROGRESS NOTES
Physical Therapy        Physical Therapy Cancel Note      DATE: 2021    NAME: Amanda Wood  MRN: [id number]   : 1935      Patient not seen this date for Physical Therapy due to: Other: Pt lethargic and not following commands. PT evaluation deferred in consultation with JANA Marroquin and pt's family. Will check back as able.    -   1020      Electronically signed by Jazlyn Weiss PT on 2021 at 10:37 AM

## 2021-06-16 NOTE — FLOWSHEET NOTE
Family in ICU waiting room while staff attending to patient. They provided medical update, and writer led us in prayer. They are grateful for care and support patient and family receiving here. They also have support from their  and Congregational.      06/16/21 1145   Encounter Summary   Services provided to: Family   Referral/Consult From: Family   Continue Visiting   (6-16-21)   Complexity of Encounter Moderate   Length of Encounter 30 minutes   Spiritual Assessment Completed Yes   Spiritual/Zoroastrian   Type Spiritual support   Grief and Life Adjustment   Type Adjustment to illness   Assessment Tearful; Anxious; Approachable   Intervention Active listening;Explored feelings, thoughts, concerns;Prayer;Sustaining presence/ Ministry of presence; Discussed illness/injury and it's impact   Outcome Expressed gratitude;Engaged in conversation;Expressed feelings/needs/concerns;Receptive; Hopeful

## 2021-06-16 NOTE — PROGRESS NOTES
NEPHROLOGY PROGRESS NOTE    Patient :  Emelyn Kunz; 81 y.o. MRN# [id number]  Location:  2002/2002-01  Attending:  Sharmaine Shay MD  Admit Date:  6/15/2021   Hospital Day: 1    Reason for consultation: Management of acute kidney injury. Consulting physician: Sharmaine Shay MD.    Interval history: Patient has been on Noble-Synephrine drip for hypotension since transferred to the ICU from acute rehabilitation unit on 6/15/2021. At approximately 5 AM this morning onwards, she developed decreased responsiveness and lethargy and became more stuporous. She was moved to room 2 in the ICU to be closer to the nurses station and venous blood gases were pH 7.46, PCO2 32.8 consistent with respiratory alkalosis. Chest x-ray yesterday shows scattered pulmonary opacities, cardiomegaly and left pleural effusion which may be reflective of pulmonary edema. History of Present Illness: This is a 81 y.o. female with past medical history of A. fib, CHF with preserved EF, mild to moderate aortic insufficiency, , essential hypertension, peritoneal mesothelioma. Patient was admitted to rehab with back pain CT spine showed L1 compression fracture. Patient was noted to be hypotensive unstable and patient was moved to ICU from acute rehab. Has been started on Levophed patient is receiving IV saline bolus patient is also on Cardizem drip and Noble-Synephrine drip  Labs showed increase in serum creatinine.   His baseline serum creatinine is between 0.7 mg/dL serum creatinine started to increase on 6/13/2021 which increased to 1.3 mg/dL, on 6/15/2021 serum creatinine increased to 1.9 mg/dL and therefore nephrology consultation has been requested  Cultures have been negative    Current Medications:    albumin human 25 % IV solution 50 g, BID  digoxin (LANOXIN) injection 125 mcg, Once  lactulose enema, BID  0.9 % sodium chloride infusion, PRN  linezolid (ZYVOX) IVPB 600 mg, Q12H  phenylephrine (NOBLE-SYNEPHRINE) 50 mg in dextrose also panculture. 4.  Acute encephalopathy - rule out toxic/metabolic causes. Doubt acute intracranial event at this time. Prognosis is guarded. CODE STATUS is DNR CCA.     Electronically signed by Jaquelin Morales MD on 6/16/2021 at 9:13 AM

## 2021-06-16 NOTE — PROGRESS NOTES
Physician Progress Note      Manasa Thrasher  CSN #:                  [id number]  :                       1935  ADMIT DATE:       6/15/2021 12:33 PM  [address] DATE:  RESPONDING  PROVIDER #:        Taqueria Bhakta MD          QUERY TEXT:    Pt admitted with Afib with RVR. Pt noted to have SIRS and hypotension. If   possible, please document in the progress notes and discharge summary if you   are evaluating and/or treating any of the following: The medical record reflects the following:  Risk Factors: age, prolong hospitalization, comorbidities, ongoing treatment   of infection  Clinical Indicators: profound hypotension requiring crystalloid fluid bolus's. documentation by Cardiologist stating pt has \"mottling over both feet\". BUN   increased to 61 from 30, Creat increased to 1.9 from 0.91. CRP: 21.5, TROP 30   (23 )  documented hemodynamic instability with BP'S of 76/58, 78/56, 83/59, 82/66. from 6/15 1235- 6/15 1700 the pts sbp was consistently documented in the low   80's to mid 90's with a dbp in the 50's. pts mentation was documented as \"   drowsy\". Treatment: rapid response team for stabilization and transport to ICU. urgent   administration of 25g Albumin, initiation of Vasoactive infusion   (Phenylephrine, Levophed), IV infusion of Calcium channel Blocker dilTIAZem   for rate control, antiarrythmic Digoxin. Options provided:  -- Cardiogenic Shock  -- Septic Shock  -- Hypovolemic Shock  -- Hypovolemia without Shock  -- Hypotension without Shock  -- Other - I will add my own diagnosis  -- Disagree - Not applicable / Not valid  -- Disagree - Clinically unable to determine / Unknown  -- Refer to Clinical Documentation Reviewer    PROVIDER RESPONSE TEXT:    This patient has hypovolemia without shock.     Query created by: Ayden Gutierrez on 6/15/2021 5:34 PM      Electronically signed by:  Taqueria Bhakta MD 6/15/2021 9:17 PM

## 2021-06-16 NOTE — FLOWSHEET NOTE
06/16/21 1516   Encounter Summary   Services provided to: Patient   Referral/Consult From: Rounding   Complexity of Encounter Low   Length of Encounter 15 minutes   Spiritual/Congregation   Type Spiritual support   Assessment Unable to respond   Intervention Prayer

## 2021-06-16 NOTE — DISCHARGE SUMMARY
Physical Medicine & Rehabilitation  Discharge Summary     Patient Identification:  Kay Butler  : 1935  Admit date: 2021  Discharge date: 21  Attending provider: Janet Kang MD  Primary care provider: Byron Torres DO     Discharge Diagnoses:   T12-L1 discitis/osteomyelitis  Multiple spine compression fractures  Acute encephalopathy  Atrial fibrillation with RVR  Chronic diastolic heart failure  YOGESH on CKD  Supratherapeutic INR  CAD  HTN  HLD  Chronic respiratory failure  Thrush - resolved  Iron deficiency anemia  Overactive bladder  Restless leg syndrome  Depression    Discharge Functional Status:    Physical therapy:  Bed Mobility: Rolling: Minimal assistance  Supine to Sit: Unable to assess  Sit to Supine: Moderate assistance  Scooting: Moderate assistance  Transfers: Sit to Stand: 2 Person Assistance, Moderate Assistance  Stand to sit: 2 Person Assistance, Minimal Assistance  Bed to Chair: Dependent/Total, Moderate assistance, 2 Person Assistance (Increased confusion and fatigue.), WB Status: left leg 1/2\" shorter than the right (S/P BILATERAL THRs)  Ambulation 1  Surface: level tile  Device: Parallel Bars  Other Apparatus: O2 (2L)  Assistance: Moderate assistance, 2 Person assistance  Quality of Gait: Slow pace with writer blocking knees and instructing weight shift. Gait Deviations: Slow Manuela, Decreased step length, Decreased step height  Distance: 8 ft in // bars x3,    Mobility:  , PT Equipment Recommendations  Equipment Needed: No, Assessment: Pt present with genralized weakness, has increased back pain with mobility, will conitnue POC to maximize reahb potential for safe DC home. Occupational therapy:  ,  , Assessment: Pts  present in PM session. Speech therapy:         Inpatient Rehabilitation Course:   Francisco Javier Max is a 81 y.o. female admitted to inpatient rehabilitation on 2021 for rehab.       She was originally admitted to [facility] on 5/19/21 for worsening back pain over the previous 10 days. She had recent L1 kyphoplasty prior to presentation. Caudal epidural steroid injection was done on 5/24/21 by Dr. Jahaira Alonzo. Subsequent MRI thoracic and lumbar spine showed marrow edema at T12 with edema in the T12-L1 intervertebral disc space and edema in the L1 vertebral body, related to trauma/fracture or discitis/osteomyelitis. She underwent IR guided aspiration on 6/4/21 and culture showed no growth. ID recommended IV ceftriaxone for 6 weeks. Cardiology was consulted for atrial fibrillation and CHF history.      Gastroenterology was consulted for iron deficiency anemia, and Dr. Alin Cobb performed EGD and colonoscopy on 5/28/21. EGD was suggestive of GERD. Colonoscopy showed very severe sigmoid diverticulosis and mild diverticulosis throughout the colon; also showed one polyp, which was removed, and small internal hemorrhoids.     She was requiring assistance for self-care activities and mobility prompting admission to acute rehab. Rehab course: The patient was participating in an aggressive multidisciplinary inpatient rehabilitation program involving 3 hours per day, 5 days per week of rehabilitation. She remained on ceftriaxone per ID plan. Pain was controlled with as-needed percocet, and thoracic orthosis was continued. She developed supratherapeutic INR, which remained elevated despite holding coumadin for several days. She was given a dose of vitamin K on 6/14/21 with slight improvement in INR. She also developed acute encephalopathy with elevated ammonia level and was started on lactulose. Oxybutynin was discontinued, and weaning of ropinirole was started with goal to decrease to once daily around 4 or 5pm + another dose as needed at night (which is how patient takes this medication at home). Hospital course has also been complicated by YOGESH on CKD for which lasix was discontinued and IV fluids were started.   She completed a 14-day course of nystatin for thrush on 6/13/21. She developed atrial fibrillation with RVR and hypotension on 6/15/21. Rapid response was called, and patient is being transferred to acute care for closer monitoring and further management. IV fluid bolus was initiated and a dose of IV digoxin was given around the time of transfer. Patient evaluated today:  GEN: Well developed, well nourished, no acute distress  HEENT: NCAT. Opens eyes briefly to verbal stimulation. Hearing grossly intact. Mucous membranes pink and moist.  RESP: Normal breath sounds with no wheezing, rales, or rhonchi. Respirations WNL and unlabored. On nasal cannula oxygen. CV:  Irregularly irregular rhythm, tachycardic. No murmurs, rubs, or gallops. ABD: Soft, non-distended, BS+ and equal.  NEURO:  Drowsy but arousable for a short period of time. Able to answer some yes/no questions with nodding or shaking head. No verbalizations heard during evaluation. Able to follow a few simple commands. MSK:  Muscle tone and bulk are normal bilaterally. Able to  with bilateral hands, able to move bilateral toes (equal bilaterally). Has difficulty following any further manual muscle testing. LIMBS: Edema present in bilateral lower limbs. SKIN: Warm and dry with good turgor. Petechiae/purpura present diffusely throughout bilateral lower limbs. PSYCH: Flat affect. Only minimally interactive (see above).       Consults:   Family Medicine    Significant Diagnostics:   CBC:   Lab Results   Component Value Date    WBC 11.1 06/16/2021    RBC 2.50 06/16/2021    RBC 4.17 02/08/2012    HGB 7.2 06/16/2021    HCT 22.1 06/16/2021    MCV 88.5 06/16/2021    MCH 28.8 06/16/2021    MCHC 32.5 06/16/2021    RDW 18.6 06/16/2021     06/16/2021     02/08/2012    MPV 8.6 06/16/2021     CMP:    Lab Results   Component Value Date     06/16/2021    K 3.9 06/16/2021     06/16/2021    CO2 23 06/16/2021    BUN 61 06/16/2021 CREATININE 1.91 06/16/2021    GFRAA 30 06/16/2021    LABGLOM 25 06/16/2021    GLUCOSE 225 06/16/2021    GLUCOSE 135 02/08/2012    PROT 5.0 06/16/2021    LABALBU 2.8 06/16/2021    LABALBU 4.1 01/11/2012    CALCIUM 7.6 06/16/2021    BILITOT 0.71 06/16/2021    ALKPHOS 180 06/16/2021    AST 37 06/16/2021    ALT 32 06/16/2021         No orders to display       Disposition:   To acute care    Current Medications:    Medication Information      acetaminophen (TYLENOL) 650 MG tablet  Take 650 mg by mouth every 4 hours as needed for Pain     amiodarone (CORDARONE) 200 MG tablet  Take 1 tablet by mouth 2 times daily     atorvastatin (LIPITOR) 40 MG tablet  Take 40 mg by mouth daily      calcium carbonate 500 MG chewable tablet  Take 1 tablet by mouth 2 times daily     carvedilol (COREG) 6.25 MG tablet  Take 1 tablet by mouth 2 times daily     Ceftriaxone (ROCEPHIN) 2000 MG IVPB in D5W  Infuse 2000mg IV every 24 hours     Chlorhexidine (PERIDEX) 15 ML 0.12% solution  Use 15ml by mouth 2 times daily to rinse mouth     Famotidine (PEPCID) 10 MG tablet  Take 1 tablet by mouth nightly     ferrous sulfate 300 (60 Fe) MG/ML solution  Take 300 mg by mouth daily     Hydrocortisone-aloe 1% cream  Apply topically to affected area 2 times daily     Lactulose (CHRONULAC) 10 GM/15 ML solution  Take 20 GM by mouth 3 times daily     Midodrine (PROAMATINE) 10 MG tablet  Take 1 tablet by mouth 3 times daily with meals     Ondansetron 4 MG injection  Inject 4 mg every 6 hours as needed for nausea, vomiting     Oxycodone-acetaminophen (PERCOCET) 5-325 MG tablet  Take 1 tablet by mouth every 4 hours as needed for pain     Pantoprazole (PROTONIX) 40 MG tablet  Take 40 mg by mouth daily before breakfast     polyethylene glycol (GLYCOLAX) 17 GM packet  Take 17 g by mouth daily      predniSONE (DELTASONE) 10 MG tablet  Take 10 mg by mouth daily      rOPINIRole (REQUIP) 0.5 MG tablet  Take 0.5 mg by mouth 3 times daily     sertraline (ZOLOFT) 50 MG tablet  Take 50 mg by mouth daily     Vitamin D (CHOLECALCIFEROL) 2000 UNIT capsule  Take 2000 Units by mouth daily           Aleyda oMore MD

## 2021-06-16 NOTE — CONSULTS
General Surgery Consult      Pt Name: Emelyn Kunz  MRN: [id number]  YOB: 1935  Date of evaluation: 6/16/2021  Primary Care Physician: Teetee Owen DO   Patient evaluated at the request of  Dr. Dominique Barclay  Reason for evaluation: Possible colovesical fistula    SUBJECTIVE:   History of Chief Complaint:    Emelyn Kunz is a 81 y.o. female who presents with respiratory distress and unconscious. Patient with back issues. Patient had a colonoscopy done EGD done by GI physician few days ago when she was on progressive care unit. Patient was having diarrhea. She has a Nova catheter in place. When the fecal management system was placed the color in the urine changed to green. Output looks feculent. I was asked to evaluate the patient regarding possible colovesical fistula. Patient is currently on BiPAP. Not responsive. Previous colonoscopy results noted. Discussed with nurse taking care of the patient. Past Medical History   has a past medical history of Asbestos exposure, Atrial fibrillation ([address] Basal cell carcinoma of nose, CAD (coronary artery disease), CKD (chronic kidney disease) stage 2, GFR 60-89 ml/min, Hyperlipidemia, Hypertension, MDRO (multiple drug resistant organisms) resistance, Nephrolithiasis, Osteoarthritis, Ovarian cyst, Oxygen dependent, Palpitations, Peritoneal mesothelioma ([address] PONV (postoperative nausea and vomiting), Prolonged emergence from general anesthesia, and SOB (shortness of breath) on exertion. Past Surgical History   has a past surgical history that includes Tubal ligation (1969); Hysterectomy (1981); Colonoscopy; Total hip arthroplasty (Right, 1990); Rectocele repair (1997); liver biopsy (2000); Upper gastrointestinal endoscopy; Photodynamic Therapy (April 2000 & Jan. 2002); Total hip arthroplasty (Left, 2003); Revision total hip arthroplasty (Right, 1998); Rotator cuff repair (2006); Ankle fracture surgery (Right, 2013);  Cataract removal (Bilateral); Cardiac catheterization (2002); liver biopsy; Coronary angioplasty with stent (April 2002); Belt tooth extraction; Appendectomy (1964); Cholecystectomy (2005); Skin cancer excision (Bilateral, 2006); Skin cancer excision (Left, Oct. 2014); nasal endoscopy (Bilateral, 11/25/2014); BIOPSY / LIGATION TEMPORAL ARTERY (Bilateral, 2/12/2021); Breast biopsy (Right); Spine surgery (N/A, 4/15/2021); Upper gastrointestinal endoscopy (N/A, 5/28/2021); Colonoscopy (N/A, 5/28/2021); and CT NEEDLE BIOPSY SPINE (6/4/2021). Medications  Prior to Admission medications    Medication Sig Start Date End Date Taking?  Authorizing Provider   vitamin E 1000 units capsule Take 1,000 Units by mouth daily    Historical Provider, MD   calcium carbonate 600 MG TABS tablet Take 1 tablet by mouth 2 times daily    Historical Provider, MD   losartan (COZAAR) 100 MG tablet Take 100 mg by mouth daily    Historical Provider, MD   meclizine (ANTIVERT) 25 MG tablet Take 25 mg by mouth daily as needed for Dizziness    Historical Provider, MD   amiodarone (CORDARONE) 200 MG tablet Take 1 tablet by mouth 2 times daily 4/23/21   Odell Nelson MD   metoprolol tartrate (LOPRESSOR) 25 MG tablet Take 1 tablet by mouth 2 times daily 4/23/21   Odell Nelson MD   acetaminophen (TYLENOL) 500 MG tablet Take 500 mg by mouth every 6 hours as needed for Pain    Historical Provider, MD   isosorbide mononitrate (IMDUR) 60 MG extended release tablet Take 60 mg by mouth daily    Historical Provider, MD   rivaroxaban (XARELTO) 20 MG TABS tablet Take 20 mg by mouth daily (with breakfast)  2/22/21   Historical Provider, MD   diphenhydrAMINE-APAP, sleep, (TYLENOL PM EXTRA STRENGTH)  MG tablet Take 1 tablet by mouth nightly as needed     Historical Provider, MD   torsemide (DEMADEX) 20 MG tablet Take 1 tablet by mouth 2 times daily Take twice a day for 1 months then go back to 20 mg daily 3/4/21   Mely Talamantes MD   amoxicillin (AMOXIL) 500 MG capsule Take 2,000 mg by mouth as needed (dental procedures) Dental only 9/21/20   Historical Provider, MD   oxybutynin (DITROPAN XL) 15 MG extended release tablet Take 15 mg by mouth daily  7/30/20   Historical Provider, MD   polyethylene glycol (GLYCOLAX) 17 GM/SCOOP powder Take 17 g by mouth daily as needed (constipation)  7/30/20   Historical Provider, MD   Ascorbic Acid (VITAMIN C) 250 MG tablet Take 250 mg by mouth daily    Historical Provider, MD   ferrous sulfate (FLACO-IN-SOL) 75 (15 Fe) MG/ML solution Take 15 mg by mouth daily    Historical Provider, MD   omeprazole (PRILOSEC) 40 MG delayed release capsule Take 40 mg by mouth Daily  12/3/16   Historical Provider, MD   atorvastatin (LIPITOR) 40 MG tablet Take 40 mg by mouth daily  9/22/16   Historical Provider, MD   potassium chloride SA (K-DUR;KLOR-CON M) 20 MEQ tablet Take 1 tablet by mouth 2 times daily 3/10/16   Lance Parks MD   rOPINIRole (REQUIP) 0.5 MG tablet Take 0.5 mg by mouth 4 times daily as needed     Historical Provider, MD   Vitamin D (CHOLECALCIFEROL) 1000 UNITS CAPS capsule Take 1,000 Units by mouth 2 times daily    Historical Provider, MD   predniSONE (DELTASONE) 5 MG tablet Take 20 mg by mouth daily     Historical Provider, MD   Omega 3 1000 MG CAPS Take 1,000 mg by mouth daily     Historical Provider, MD   sertraline (ZOLOFT) 50 MG tablet Take 50 mg by mouth daily. Historical Provider, MD     Allergies  is allergic to adhesive tape, meperidine, demerol, ultram [tramadol hcl], zocor [simvastatin], codeine, and fentanyl. Family History  family history includes Breast Cancer in her mother; Coronary Art Dis in her mother; Emphysema in her father; Heart Attack in her brother; Heart Failure in her brother; Prostate Cancer in her brother. Social History   reports that she has never smoked. She has never used smokeless tobacco. She reports current alcohol use. She reports that she does not use drugs.     Review of GFRAA 30 06/16/2021    LABGLOM 25 06/16/2021    GLUCOSE 225 06/16/2021    GLUCOSE 135 02/08/2012     Hepatic Function Panel:    Lab Results   Component Value Date    ALKPHOS 180 06/16/2021    ALT 32 06/16/2021    AST 37 06/16/2021    PROT 5.0 06/16/2021    BILITOT 0.71 06/16/2021    BILIDIR 0.18 05/26/2021    IBILI 0.64 09/03/2014    LABALBU 2.8 06/16/2021    LABALBU 4.1 01/11/2012     Calcium:    Lab Results   Component Value Date    CALCIUM 7.6 06/16/2021     Magnesium:    Lab Results   Component Value Date    MG 2.4 06/15/2021     Phosphorus:    Lab Results   Component Value Date    PHOS 3.1 02/25/2021     PT/INR:    Lab Results   Component Value Date    PROTIME 33.0 06/16/2021    INR 3.3 06/16/2021     ABG:  No results found for: PHART, PH, VRG0CTH, PCO2, PO2ART, PO2, UKC5BMK, HCO3, BEART, BE, THGBART, THB, BMC7NOX, I1KNNXXJ, O2SAT  Urine Culture:  No components found for: CURINE  Blood Culture:  No components found for: CBLOOD, CFUNGUSBL  Stool Culture:  No components found for: CSTOOL    RADIOLOGY:   I have personally reviewed the following films:  US RENAL COMPLETE    Result Date: 6/16/2021  EXAMINATION: RETROPERITONEAL ULTRASOUND OF THE KIDNEYS 6/16/2021 COMPARISON: CT abdomen and pelvis dated 05/14/2021 HISTORY: ORDERING SYSTEM PROVIDED HISTORY: laury TECHNOLOGIST PROVIDED HISTORY: laury Acuity: Acute Type of Exam: Initial FINDINGS: Kidneys: The right kidney measures 9.5 cm in length and the left kidney measures 9.7 cm in length. There is diffusely increased echogenicity of the bilateral renal cortex. No hydronephrosis. Small bilateral renal cysts are noted, measuring 2.2 x 2.2 x 1.9 cm at the lower right kidney and up to 3 cm at the lower left kidney. 1.  Diffusely increased renal cortical echogenicity, which can be seen with medical renal disease. 2.  No hydronephrosis. 3.  Small bilateral renal cysts.      XR CHEST PORTABLE    Result Date: 6/16/2021  EXAMINATION: ONE XRAY VIEW OF THE CHEST 6/16/2021 8:54 am COMPARISON: 06/15/2021 HISTORY: ORDERING SYSTEM PROVIDED HISTORY: wheezing TECHNOLOGIST PROVIDED HISTORY: wheezing Reason for Exam: wheezing Acuity: Acute Type of Exam: Initial FINDINGS: Right arm PICC line tip remains in the inferior right atrium. Stable cardiomegaly with an ectatic thoracic aorta. Lobular prominence of the upper mediastinum likely due to ectatic vessels. Again shown are patchy bilateral infiltrates, increased in the right upper lobe and infrahilar region since the prior study. Lung volumes are somewhat low. Monitor leads overlie the chest.  Metallic anchor right humeral head. Increasing multifocal bilateral infiltrates, particularly in the right perihilar and upper lobe regions since the prior study. Findings were discussed with Herson Hutson at 9:29 am on 6/16/2021. XR CHEST PORTABLE    Result Date: 6/15/2021  EXAMINATION: ONE XRAY VIEW OF THE CHEST 6/15/2021 1:20 pm COMPARISON: 31 May 2021 HISTORY: ORDERING SYSTEM PROVIDED HISTORY: tachycardia, hypotension TECHNOLOGIST PROVIDED HISTORY: tachycardia, hypotension Reason for Exam: tachycardia, hypotension Acuity: Acute Type of Exam: Initial Additional signs and symptoms: tachycardia, hypotension FINDINGS: AP portable view of the chest time stamped at 1333 hours demonstrates overlying cardiac monitoring electrodes. Stable cardiac size is noted. Mediastinum is unchanged with fullness in the superior mediastinum, right greater than left. Left pleural effusion is noted. Vascularity appears slightly prominent. There is scattered pulmonary opacities which may be on the basis of interstitial edema or airspace disease. No extrapleural air is noted. [place] screw is present in the right shoulder. Scattered pulmonary opacities, cardiomegaly and left effusion are noted which may be on the basis of pulmonary edema with multifocal airspace disease not excluded. IMPRESSION:   1. Possible colovesicle fistula.   2. Multiple other medical issues. 3. Acute respiratory distress on BiPAP. Unresponsive. 4. Previous colonoscopy noted. does not have any pertinent problems on file. PLAN:   1. Urology consult for possible cystoscopy to rule out colovesical fistula. Clinically very suspicious for colovesical fistula. 2. Patient is not medically stable for any invasive procedures at this time. Will discuss with urology. Discussed with nurse taking care of the patient. Continue medical management and management per critical care medicine at this time. Overall prognosis poor. Thank you for this interesting consult and for allowing us to participate in the care of this patient. If you have any questions please don't hesitate to call.           Electronically signed by Juanjose Dejesus MD  on 6/16/2021 at 4:36 PM

## 2021-06-16 NOTE — PROGRESS NOTES
Dr. Santos Brooms rounding. New orders placed. Pass on in report for patient to continue IV rocephin after IV merrem is completed until 07/21/2021.

## 2021-06-16 NOTE — PLAN OF CARE
Problem: Skin Integrity:  Goal: Will show no infection signs and symptoms  Description: Will show no infection signs and symptoms  6/16/2021 1552 by Hayde Ridley RN  Outcome: Ongoing  6/16/2021 0354 by Alma Rosa Braun RN  Outcome: Ongoing  Goal: Absence of new skin breakdown  Description: Absence of new skin breakdown  6/16/2021 0354 by Alma Rosa Braun RN  Outcome: Ongoing     Problem: Falls - Risk of:  Goal: Will remain free from falls  Description: Will remain free from falls  6/16/2021 1552 by Hayde Ridley RN  Outcome: Met This Shift  Note: Bed in lowest position. Side Rails x2 up.   6/16/2021 0354 by Alma Rosa Braun RN  Outcome: Ongoing  Note: No falls during this shift. Call light is within reach. Side rails up x2 with bed in lowest position. Patient safety maintained. Goal: Absence of physical injury  Description: Absence of physical injury  6/16/2021 0354 by Alma Rosa Braun RN  Outcome: Ongoing  Note: No injury this shift. Patient safety maintained.       Problem: Cardiac Output - Decreased:  Goal: Hemodynamic stability will improve  Description: Hemodynamic stability will improve  6/16/2021 1552 by Hayde Ridley RN  Outcome: Ongoing  6/16/2021 0354 by Alma Rosa Braun RN  Outcome: Ongoing     Problem: Discharge Planning:  Goal: Discharged to appropriate level of care  Description: Discharged to appropriate level of care  6/16/2021 1552 by Hayde Ridley RN  Outcome: Ongoing  6/16/2021 0354 by Alma Rosa Braun RN  Outcome: Ongoing     Problem: Fluid Volume - Imbalance:  Goal: Absence of imbalanced fluid volume signs and symptoms  Description: Absence of imbalanced fluid volume signs and symptoms  6/16/2021 0354 by Alma Rosa Braun RN  Outcome: Ongoing     Problem: Tissue Perfusion, Altered:  Goal: Circulatory function within specified parameters  Description: Circulatory function within specified parameters  6/16/2021 1552 by Hayde Ridley RN  Outcome: Ongoing  6/16/2021 0354 by Alma Rosa Braun RN  Outcome: Ongoing Problem: Tissue Perfusion - Cardiopulmonary, Altered:  Goal: Hemodynamic stability will improve  Description: Hemodynamic stability will improve  6/16/2021 1552 by Francis Nassar RN  Outcome: Ongoing  6/16/2021 0354 by Nadine Plascencia RN  Outcome: Ongoing  Goal: Absence of angina  Description: Absence of angina  6/16/2021 0354 by Nadine Plascencia RN  Outcome: Ongoing     Problem: Pain:  Goal: Pain level will decrease  Description: Pain level will decrease  6/16/2021 1552 by Francis Nassar RN  Outcome: Ongoing  6/16/2021 0354 by Nadine Plascencia RN  Outcome: Ongoing  Note: No complaints of pain or discomfort this shift.    Goal: Control of acute pain  Description: Control of acute pain  6/16/2021 0354 by Nadine Plascencia RN  Outcome: Ongoing  Goal: Control of chronic pain  Description: Control of chronic pain  6/16/2021 0354 by Nadine Plascencia RN  Outcome: Ongoing

## 2021-06-16 NOTE — PROGRESS NOTES
Spoke with Dr. Rima Chowdhury regarding patient update and morning lab results. Notified of ammonia level of 137 and hgb of 7.2   New orders received include CXR and Pro Calcitonin, IV albumin 50g BID, 0.5mL of IV digoxin one time, Lactulose enemas BID, transfuse 1 unit of PRBC and a critical care consult. Order also received to discontinue continuous IV fluids.

## 2021-06-16 NOTE — CONSULTS
ammonia level has been steadily climbing it was 76 on June 14 it is 137 this morning.     Has a history of polymyalgia rheumatica    ALLERGIES:  Allergies   Allergen Reactions    Adhesive Tape Rash    Meperidine Nausea Only    Demerol Nausea Only    Ultram [Tramadol Hcl]      Hot flashes    Zocor [Simvastatin]      Muscle aches    Codeine      \"out of it\"    Fentanyl Rash       HOME MEDICATIONS:  Medications Prior to Admission: vitamin E 1000 units capsule, Take 1,000 Units by mouth daily  calcium carbonate 600 MG TABS tablet, Take 1 tablet by mouth 2 times daily  losartan (COZAAR) 100 MG tablet, Take 100 mg by mouth daily  meclizine (ANTIVERT) 25 MG tablet, Take 25 mg by mouth daily as needed for Dizziness  amiodarone (CORDARONE) 200 MG tablet, Take 1 tablet by mouth 2 times daily  metoprolol tartrate (LOPRESSOR) 25 MG tablet, Take 1 tablet by mouth 2 times daily  acetaminophen (TYLENOL) 500 MG tablet, Take 500 mg by mouth every 6 hours as needed for Pain  isosorbide mononitrate (IMDUR) 60 MG extended release tablet, Take 60 mg by mouth daily  rivaroxaban (XARELTO) 20 MG TABS tablet, Take 20 mg by mouth daily (with breakfast)   diphenhydrAMINE-APAP, sleep, (TYLENOL PM EXTRA STRENGTH)  MG tablet, Take 1 tablet by mouth nightly as needed   torsemide (DEMADEX) 20 MG tablet, Take 1 tablet by mouth 2 times daily Take twice a day for 1 months then go back to 20 mg daily  amoxicillin (AMOXIL) 500 MG capsule, Take 2,000 mg by mouth as needed (dental procedures) Dental only  oxybutynin (DITROPAN XL) 15 MG extended release tablet, Take 15 mg by mouth daily   polyethylene glycol (GLYCOLAX) 17 GM/SCOOP powder, Take 17 g by mouth daily as needed (constipation)   Ascorbic Acid (VITAMIN C) 250 MG tablet, Take 250 mg by mouth daily  ferrous sulfate (FLACO-IN-SOL) 75 (15 Fe) MG/ML solution, Take 15 mg by mouth daily  omeprazole (PRILOSEC) 40 MG delayed release capsule, Take 40 mg by mouth Daily   atorvastatin (LIPITOR) 40 BIOPSY  2000    LIVER BIOPSY      NASAL ENDOSCOPY Bilateral 11/25/2014    lt nasal cautery with packing    PHOTODYNAMIC THERAPY  April 2000 & Jan. 2002    for peritoneal Mesothelioma    [address]    REVISION TOTAL HIP ARTHROPLASTY Right 1998    ROTATOR CUFF REPAIR  2006    SKIN CANCER EXCISION Bilateral 2006    ankle    SKIN CANCER EXCISION Left Oct. 2014    shoulder    SPINE SURGERY N/A 4/15/2021    KYPHOPLASTY L1 performed by Erik Zarate MD at [address] [place] [address] Right 1990   [facility] ARTHROPLASTY Left 2003    TUBAL LIGATION  1969    UPPER GASTROINTESTINAL ENDOSCOPY      UPPER GASTROINTESTINAL ENDOSCOPY N/A 5/28/2021    EGD ESOPHAGOGASTRODUODENOSCOPY performed by Pilo Brady MD at [facility]            SOCIAL HISTORY:  Social History     Socioeconomic History    Marital status:      Spouse name: Not on file    Number of children: Not on file    Years of education: Not on file    Highest education level: Not on file   Occupational History    Not on file   Tobacco Use    Smoking status: Never Smoker    Smokeless tobacco: Never Used   Vaping Use    Vaping Use: Never used   Substance and Sexual Activity    Alcohol use: Yes     Comment: social    Drug use: No    Sexual activity: Not Currently   Other Topics Concern    Not on file   Social History Narrative    Not on file     Social Determinants of Health     Financial Resource Strain:     Difficulty of Paying Living Expenses:    Food Insecurity:     Worried About [address] [employer] in the Last Year:     [facility] in the Last Year:    Transportation Needs:     Lack of Transportation (Medical):      Lack of Transportation (Non-Medical):    Physical Activity:     Days of Exercise per Week:     Minutes of Exercise per Session:    Stress:     Feeling of Stress :    Social Connections:     Frequency of Communication with Friends and Family:     Frequency of 11.1 06/16/2021    RBC 2.50 06/16/2021    RBC 4.17 02/08/2012    HGB 7.2 06/16/2021    HCT 22.1 06/16/2021     06/16/2021     02/08/2012    MCV 88.5 06/16/2021    MCH 28.8 06/16/2021    MCHC 32.5 06/16/2021    RDW 18.6 06/16/2021    LYMPHOPCT 4 06/16/2021    MONOPCT 3 06/16/2021    BASOPCT 0 06/16/2021    MONOSABS 0.33 06/16/2021    LYMPHSABS 0.44 06/16/2021    EOSABS 0.00 06/16/2021    BASOSABS 0.00 06/16/2021    DIFFTYPE NOT REPORTED 06/16/2021       BMP   Lab Results   Component Value Date     06/16/2021    K 3.9 06/16/2021     06/16/2021    CO2 23 06/16/2021    BUN 61 06/16/2021    CREATININE 1.91 06/16/2021    GLUCOSE 225 06/16/2021    GLUCOSE 135 02/08/2012    CALCIUM 7.6 06/16/2021       LFTS  Lab Results   Component Value Date    ALKPHOS 180 06/16/2021    ALT 32 06/16/2021    AST 37 06/16/2021    PROT 5.0 06/16/2021    BILITOT 0.71 06/16/2021    BILIDIR 0.18 05/26/2021    IBILI 0.64 09/03/2014    LABALBU 2.8 06/16/2021    LABALBU 4.1 01/11/2012       INR  Recent Labs     06/14/21  0651 06/15/21  0650 06/16/21  0503   PROTIME 43.1* 39.4* 33.0*   INR 4.7* 4.2 3.3       APTT  No results for input(s): APTT in the last 72 hours. Lactic Acid  Lab Results   Component Value Date    LACTA 1.9 06/15/2021        PRO-BNP   No results for input(s): PROBNP in the last 72 hours.         ABGs: No results found for: PHART, PO2ART, BTX7PBJ    No results found for: IFIO2, MODE, SETTIDVOL, SETPEEP      Impression    Acute mental status changes most likely due to elevated ammonia level  Acute on chronic hypoxic respiratory failure  Hypotension, combination of possible sepsis with hypovolemia  Atrial fibrillation with rapid ventricular response  Acute kidney injury  Elevated ammonia level  Type 2 diabetes  FRANKLYN (clinical diagnosis)  Chronic hypoxic respiratory failure  Osteomyelitis-T12/L1  Asbestos exposure with peritoneal mesothelioma  Anemia due to chronic disease, no active bleeding    Plan: ICU status  Continue pressors (phenylephrine for now due to patient's A. fib with RVR) if needed will start vasopressin  Check lactic acid and procalcitonin level  Check stat chest x-ray  Check ABG  Keep oxygen saturations greater than 88%  Does not have COPD/asthma, do not think that respiratory treatments will help her. She does not need steroids from a pulmonary standpoint, suspect they were placed for inflammation due to back pain  GI prophylaxis  Hold anticoagulation due to low hemoglobin  Broaden coverage and she has been in the hospital, will add Zyvox due to renal insufficiency and to cover MRSA  Due to blood shortage and the patient is hemodynamic improvement, we will not transfuse packed red blood cells  She is a full code and will be reaching out to family, I recall conversations with her where she do not want to be on the ventilator. Addendum: I have reached out both to her daughter Rosa Bhatt and her . They confirm that she is a DNR CCA no intubation and I have made the change in [address] [place] [address].   Critical care time 45 minutes

## 2021-06-16 NOTE — PROGRESS NOTES
Progress Note    6/16/2021   12:27 PM    Name:  Emelyn Kunz  MRN:    [id number]     Acct:     [de-identified]   Room:  2002/2002-01  IP Day: 1     Admit Date: 6/15/2021 12:33 PM  PCP: Teetee Owen DO    Subjective:     C/C: No chief complaint on file. Interval History: Status: not changed. Patient is 3 months of vasopressin. Patient is off of Cardizem drip. Blood pressure stable heart rate in upper 90s. Patient is on 3 L of oxygen via nasal cannula. Recent labs reviewed creatinine 1.91, lactic acid 4.1, hemoglobin 7.2    ROS:   all 10 systems reviewed and are negative except as noted    Review of Systems   Unable to perform ROS: Acuity of condition   Respiratory: Positive for shortness of breath. Cardiovascular: Positive for leg swelling. Gastrointestinal: Positive for vomiting. Medications: Allergies:    Allergies   Allergen Reactions    Adhesive Tape Rash    Meperidine Nausea Only    Demerol Nausea Only    Ultram [Tramadol Hcl]      Hot flashes    Zocor [Simvastatin]      Muscle aches    Codeine      \"out of it\"    Fentanyl Rash       Current Meds: albumin human 25 % IV solution 50 g, BID  lactulose enema, BID  0.9 % sodium chloride infusion, PRN  linezolid (ZYVOX) IVPB 600 mg, Q12H  lactated ringers infusion, Continuous  vasopressin 20 Units in dextrose 5 % 100 mL infusion, Continuous  morphine (PF) injection 1 mg, Q4H PRN  levalbuterol (XOPENEX) nebulizer solution 1.25 mg, Q8H PRN  sodium chloride nebulizer 0.9 % solution 3 mL, Q4H PRN  0.9 % sodium chloride bolus, Once  methylPREDNISolone sodium (SOLU-MEDROL) injection 40 mg, Daily  sodium chloride flush 0.9 % injection 10 mL, 2 times per day  sodium chloride flush 0.9 % injection 10 mL, PRN  0.9 % sodium chloride infusion, PRN  ondansetron (ZOFRAN-ODT) disintegrating tablet 4 mg, Q8H PRN   Or  ondansetron (ZOFRAN) injection 4 mg, Q6H PRN  acetaminophen (TYLENOL) tablet 650 mg, Q6H PRN   Or  acetaminophen (TYLENOL) suppository 650 mg, Q6H PRN  pantoprazole (PROTONIX) injection 40 mg, Daily   And  sodium chloride (PF) 0.9 % injection 10 mL, Daily  dilTIAZem 125 mg in dextrose 5 % 125 mL infusion, Continuous        Data:     Code Status:  DNR-CCA    Family History   Problem Relation Age of Onset    Heart Failure Brother     Heart Attack Brother         triple bypass    Prostate Cancer Brother     Coronary Art Dis Mother     Breast Cancer Mother         dx'd in late 46s    Emphysema Father     Colon Cancer Neg Hx     Diabetes Neg Hx     Eclampsia Neg Hx     Hypertension Neg Hx     Ovarian Cancer Neg Hx      Labor Neg Hx     Spont Abortions Neg Hx     Stroke Neg Hx        Social History     Socioeconomic History    Marital status:      Spouse name: Not on file    Number of children: Not on file    Years of education: Not on file    Highest education level: Not on file   Occupational History    Not on file   Tobacco Use    Smoking status: Never Smoker    Smokeless tobacco: Never Used   Vaping Use    Vaping Use: Never used   Substance and Sexual Activity    Alcohol use: Yes     Comment: social    Drug use: No    Sexual activity: Not Currently   Other Topics Concern    Not on file   Social History Narrative    Not on file     Social Determinants of Health     Financial Resource Strain:     Difficulty of Paying Living Expenses:    Food Insecurity:     Worried About Running Out of Food in the Last Year:     Ran Out of Food in the Last Year:    Transportation Needs:     Lack of Transportation (Medical):      Lack of Transportation (Non-Medical):    Physical Activity:     Days of Exercise per Week:     Minutes of Exercise per Session:    Stress:     Feeling of Stress :    Social Connections:     Frequency of Communication with Friends and Family:     Frequency of Social Gatherings with Friends and Family:     Attends Taoist Services:     Active Member of Clubs or Organizations:     Result Value Ref Range    Magnesium 2.4 1.6 - 2.6 mg/dL   CK    Collection Time: 06/15/21  1:19 PM   Result Value Ref Range    Total CK 22 (L) 26 - 192 U/L   SODIUM, URINE, RANDOM    Collection Time: 06/15/21  2:30 PM   Result Value Ref Range    Sodium,Ur <20 mmol/L   Creatinine, Random Urine    Collection Time: 06/15/21  2:30 PM   Result Value Ref Range    Creatinine, Ur 60.1 28.0 - 217.0 mg/dL   Culture, Blood 1    Collection Time: 06/15/21  6:05 PM    Specimen: Blood   Result Value Ref Range    Specimen Description . BLOOD  GRN 7ML ORG 3 ML NO SITE     Special Requests NOT REPORTED     Culture NO GROWTH 13 HOURS    Culture, Blood 1    Collection Time: 06/15/21  6:05 PM    Specimen: Blood   Result Value Ref Range    Specimen Description . BLOOD  GRN 6ML ORG 3ML NO SITE     Special Requests NOT REPORTED     Culture NO GROWTH 13 HOURS    Lactic Acid    Collection Time: 06/15/21  6:06 PM   Result Value Ref Range    Lactic Acid 1.9 0.5 - 2.2 mmol/L   Comprehensive Metabolic Panel w/ Reflex to MG    Collection Time: 06/16/21  5:03 AM   Result Value Ref Range    Glucose 225 (H) 70 - 99 mg/dL    BUN 61 (H) 8 - 23 mg/dL    CREATININE 1.91 (H) 0.50 - 0.90 mg/dL    Bun/Cre Ratio NOT REPORTED 9 - 20    Calcium 7.6 (L) 8.6 - 10.4 mg/dL    Sodium 141 135 - 144 mmol/L    Potassium 3.9 3.7 - 5.3 mmol/L    Chloride 106 98 - 107 mmol/L    CO2 23 20 - 31 mmol/L    Anion Gap 12 9 - 17 mmol/L    Alkaline Phosphatase 180 (H) 35 - 104 U/L    ALT 32 5 - 33 U/L    AST 37 (H) <32 U/L    Total Bilirubin 0.71 0.3 - 1.2 mg/dL    Total Protein 5.0 (L) 6.4 - 8.3 g/dL    Albumin 2.8 (L) 3.5 - 5.2 g/dL    Albumin/Globulin Ratio NOT REPORTED 1.0 - 2.5    GFR Non- 25 (L) >60 mL/min    GFR African American 30 (L) >60 mL/min    GFR Comment          GFR Staging NOT REPORTED    CBC auto differential    Collection Time: 06/16/21  5:03 AM   Result Value Ref Range    WBC 11.1 (H) 3.5 - 11.0 k/uL    RBC 2.50 (L) 4.0 - 5.2 m/uL Hemoglobin 7.2 (L) 12.0 - 16.0 g/dL    Hematocrit 22.1 (L) 36 - 46 %    MCV 88.5 80 - 100 fL    MCH 28.8 26 - 34 pg    MCHC 32.5 31 - 37 g/dL    RDW 18.6 (H) 11.5 - 14.9 %    Platelets 440 857 - 832 k/uL    MPV 8.6 6.0 - 12.0 fL    NRBC Automated NOT REPORTED per 100 WBC    Differential Type NOT REPORTED     Immature Granulocytes NOT REPORTED 0 %    Absolute Immature Granulocyte NOT REPORTED 0.00 - 0.30 k/uL    WBC Morphology NOT REPORTED     RBC Morphology NOT REPORTED     Platelet Estimate NOT REPORTED     Seg Neutrophils 70 (H) 36 - 66 %    Lymphocytes 4 (L) 24 - 44 %    Monocytes 3 1 - 7 %    Eosinophils % 0 0 - 4 %    Basophils 0 0 - 2 %    Bands 23 (H) 0 - 10 %    Segs Absolute 7.78 1.3 - 9.1 k/uL    Absolute Lymph # 0.44 (L) 1.0 - 4.8 k/uL    Absolute Mono # 0.33 0.1 - 1.3 k/uL    Absolute Eos # 0.00 0.0 - 0.4 k/uL    Basophils Absolute 0.00 0.0 - 0.2 k/uL    Absolute Bands # 2.55 (H) 0.0 - 1.0 k/uL    Morphology ANISOCYTOSIS PRESENT     Morphology HYPOCHROMIA PRESENT     Morphology FEW ELLIPTOCYTES     Morphology MICROCYTOSIS PRESENT    PROTIME-INR    Collection Time: 06/16/21  5:03 AM   Result Value Ref Range    Protime 33.0 (H) 11.8 - 14.6 sec    INR 3.3    Ammonia    Collection Time: 06/16/21  5:03 AM   Result Value Ref Range    Ammonia 137 (HH) 11 - 51 umol/L   Troponin    Collection Time: 06/16/21  5:03 AM   Result Value Ref Range    Troponin, High Sensitivity 30 (H) 0 - 14 ng/L    Troponin T NOT REPORTED <0.03 ng/mL    Troponin Interp NOT REPORTED    Procalcitonin    Collection Time: 06/16/21  7:49 AM   Result Value Ref Range    Procalcitonin 5.47 (H) <0.09 ng/mL   TYPE AND SCREEN    Collection Time: 06/16/21  7:49 AM   Result Value Ref Range    Expiration Date 06/19/2021,2359     Arm Band Number [id number]     ABO/Rh O POSITIVE     Antibody Screen NEGATIVE    Brain Natriuretic Peptide    Collection Time: 06/16/21  7:49 AM   Result Value Ref Range    Pro-BNP 5,761 (H) <300 pg/mL    BNP Interpretation membranes are moist.      Pharynx: Oropharynx is clear. Eyes:      Conjunctiva/sclera: Conjunctivae normal.   Cardiovascular:      Rate and Rhythm: Normal rate and regular rhythm. Pulses: Normal pulses. Heart sounds: Normal heart sounds. Pulmonary:      Effort: Pulmonary effort is normal.      Breath sounds: Examination of the right-lower field reveals wheezing and rales. Examination of the left-lower field reveals wheezing and rales. Wheezing and rales present. Abdominal:      General: Bowel sounds are normal. There is no distension. Palpations: Abdomen is soft. Tenderness: There is no abdominal tenderness. Musculoskeletal:         General: No deformity. Cervical back: Normal range of motion and neck supple. Right lower leg: Edema (1+) present. Left lower leg: Edema (1+) present. Skin:     General: Skin is warm. Capillary Refill: Capillary refill takes less than 2 seconds. Coloration: Skin is not jaundiced. Comments: Petechiae bilateral lower extremities   Neurological:      Comments: Sleepy         Assessment:        Primary Problem  Atrial fibrillation with RVR (HCC)     Principal Problem:    Atrial fibrillation with RVR (HCC)  Active Problems:    Chronic diastolic heart failure (HCC)    Chronic respiratory failure (HCC)    Gastroesophageal reflux disease without esophagitis    Pulmonary hypertension (HCC)    YOGESH (acute kidney injury) (HCC)    Iron deficiency anemia    Hyperammonemia (HCC)    Asbestos exposure  Resolved Problems:    * No resolved hospital problems. *      Past Medical History:   Diagnosis Date    Asbestos exposure     Atrial fibrillation ([facility] [address]     takes xarelto    Basal cell carcinoma of nose     CAD (coronary artery disease)     CKD (chronic kidney disease) stage 2, GFR 60-89 ml/min     Hyperlipidemia     Hypertension     MDRO (multiple drug resistant organisms) resistance 2/19/2014    E.  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    [facility] at [facility] PONV (postoperative nausea and vomiting)     Prolonged emergence from general anesthesia     SOB (shortness of breath) on exertion         Plan:        1. LR at 100 mL/h  2. Lasix 20 mg IV now  3. Normal saline to 50 mg bolus now  4. Transfuse packed RBCs to keep hemoglobin above 7  5. Recheck lactic acid in 4 hours  6. Xopenex nebulizer every 6 hours as needed for shortness of breath and wheezing  7. Albumin 25 g twice daily  8. Morphine 1 mg IV every 4 hours as needed for moderate to severe pain  9. On Zyvox and IV meropenem  10. Discussed with nephrologist Dr. Angela Guillen on the floor  11. Lactulose enema twice daily  12. Monitor lactate level in a.m.  13. Discussed with family members in the waiting room  1. DVT Prophylaxis no pharmacological DVT prophylaxis due to low hemoglobin  2. CBC, CMP  3. Monitor ammonia level  4. EPCs  5. PT/OT to evaluate and treat  6. Pain control  7. Replace electrolytes as per sliding scale  8. Home medications reviewed and appropriate medications continued  9.  Reviewed labs and imaging studies from last 24 hours and results explained to patient      Electronically signed by Elda Hall MD

## 2021-06-16 NOTE — PROGRESS NOTES
[facility]    OCCUPATIONAL THERAPY MISSED TREATMENT NOTE   INPATIENT   Date: 21  Patient Name: Teena Waterman       Room:   MRN: [id number]   Account #: [de-identified]    : 1935  (81 y.o.)  Gender: female     REASON FOR MISSED TREATMENT:  Pt lethargic and not following commands. OT evaluation deferred in consultation with RN and pt's family. Will check back as able.    -   Gilda Waite

## 2021-06-16 NOTE — PROGRESS NOTES
Date:   6/16/2021  Patient name: Justa Gomez  Date of admission:  6/15/2021 12:33 PM  MRN:   [id number]  YOB: 1935  PCP: Lacey Murguia DO    Reason for Admission: Atrial fibrillation with rapid ventricular response ([address] [I48.91]  Atrial fibrillation with RVR ([address] [I48.91]    Cardiology consult : A. fib with RVR, hyportension       Impression      A. fib with RVR, hypotension  Sepsis, respiratory failure, encephalopathy, elevated ammonia level  Acute kidney injury    Diastolic CHF  H/O Hypertension  COPD, hypoxia, on oxygen at home  Hyperlipidemia  Exertional shortness of breath, hypoxia  History of CAD, stent placement LAD 2002  Polymyalgia rheumatica  Moderate aortic regurgitation  Giant cell arteritis  chronic anemia  History of peritoneal/omental mesothelioma surgery done at [address] [place] [address]  Dry mouth, dry eyes  Multiple hip surgeries, bilateral  Chronic pain syndrome  L1 kyphoplasty 4/15/2021  CT-guided T12-L1 disc aspiration, M on all bloody fluid aspirated on 6/4/2021  Hemorrhoids, rectal bleeding    2D echo 5/21/2021  Normal LV size and wall thickness ejection fraction more than 55% normal size LA and RA, RVSP 21, no significant pericardial effusion     ECG 5/19/2021  A. fib/a flutter heart rate 159, nonspecific ST-T changes     X-ray lumbar spine 5/19/2021  Unchanged appearance of the lumbar spine, status post vertebroplasty at L1, no new osseous abnormality  Moderate stool burden, previously administered oral contrast is present in the distal colon     Abdominal x-ray 5/27/2021 enteric tube needs further advancement approximately 5 cm.     MRI lumbar spine 6/2/2021  Abnormal endplate edema mild disc space T2 signal identified at T12 and L1 discitis/osteomyelitis  Moderate to severe multilevel degenerative changes     Lab work 6/15/2021  Sodium 143, potassium 4.0, BUN 60, creatinine 1.73, glucose 146  Hemoglobin 8.9, platelets 014 and WBC 6.1 on 6/13/2021 6/16/2021  Sodium 141, potassium 3.9, BUN 61, creatinine 1.91, GFR 25, procalcitonin 5.47, , proBNP 5761, albumin 2.8, alkaline phosphatase 180,  Serum ammonia level 137, glucose 225  Hemoglobin 7.2, WBC 11.1, platelets 244 INR 3.3    History of present illness  50-year-old female with a past medical history of A. fib, diastolic CHF, COPD, hypoxia and anemia, who had kyphoplasty for L1 compression was at acute rehab unit and she developed A. fib with RVR, hypotension and got transferred to ICU. She received IV fluids and she was started on Spike-Synephrine and she also received IV digoxin. She has been on Coumadin and her INR was noted 4.2 and she also had marked mottling both lower extremities with the peripheral edema. She is afebrile.   She has deterioration of the renal function.     Current evaluation  Patient seen and examined in ICU  Very frail and pale looking female she was on BiPAP she did not open eyes to verbal command  She was on Spike-Synephrine, systolic blood pressure 99, oxygen saturation 99%  ECG monitor A. fib heart rate   Mottling of the skin/petechial rash lower extremities    Medications:   Scheduled Meds:   albumin human  50 g Intravenous BID    lactulose enema   Rectal BID    linezolid  600 mg Intravenous Q12H    methylPREDNISolone  40 mg Intravenous Daily    sodium chloride flush  10 mL Intravenous 2 times per day    pantoprazole  40 mg Intravenous Daily    And    sodium chloride (PF)  10 mL Intravenous Daily     Continuous Infusions:   sodium chloride      lactated ringers 100 mL/hr at 06/16/21 0948    vasopressin (Septic Shock) infusion Stopped (06/16/21 1511)    sodium chloride      dilTIAZem (CARDIZEM) 125 mg in dextrose 5% 125 mL infusion Stopped (06/15/21 1403)     CBC:   Recent Labs     06/16/21  0503   WBC 11.1*   HGB 7.2*        BMP:    Recent Labs     06/15/21  0650 06/15/21  1319 06/16/21  0503    145* 141   K 4.0 4.8 3.9    108* 106   CO2 25 22 23   BUN 60* 61* 61*   CREATININE 1.73* 1.90* 1.91*   GLUCOSE 124* 151* 225*     Hepatic:   Recent Labs     06/15/21  1319 06/16/21  0503   AST 35* 37*   ALT 26 32   BILITOT 0.63 0.71   ALKPHOS 154* 180*     Troponin: No results for input(s): TROPONINI in the last 72 hours. BNP: No results for input(s): BNP in the last 72 hours. Lipids: No results for input(s): CHOL, HDL in the last 72 hours. Invalid input(s): LDLCALCU  INR:   Recent Labs     06/14/21  0651 06/15/21  0650 06/16/21  0503   INR 4.7* 4.2 3.3       Objective:   Vitals: /67   Pulse 94   Temp 98.6 °F (37 °C) (Axillary)   Resp 13   Ht 5' 5\" (1.651 m)   Wt 157 lb 3 oz (71.3 kg)   SpO2 99%   BMI 26.16 kg/m²   General appearance: Very frail and pale looking female on BiPAP  HEENT: Facial edema noted  Neck: Difficult to assess neck veins  Lungs: diminished breath sounds bilaterally  Heart: irregularly irregular rhythm  Abdomen: Obese  Extremities: Edema all extremities, petechial rash/mottling of the skin  Neurologic: Mental status: Stuporous    EKG: A. fib heart rate . ECHO: reviewed.    Ejection fraction: 55%, mild to moderate AR          Assessment / Acute Cardiac Problems:     A. fib with RVR heart rate slightly better  Hypotension on Spike-Synephrine  Sepsis  Encephalopathy  Respiratory failure on BiPAP  Acute kidney injury  Severe anemia    Patient Active Problem List:     Hypertension     Nephrolithiasis     Mixed hyperlipidemia     Osteoarthritis     Peritoneal mesothelioma ([facility] [address]     Ovarian cyst     Basal cell carcinoma of nose     Bimalleolar ankle fracture     Epistaxis, recurrent     Cervical spondylosis     Trochanteric bursitis, right hip     Status post bilateral total hip replacement     Medication monitoring encounter     Chronic diastolic heart failure (HCC)     Chronic respiratory failure (HCC)     Giant cell arteritis (HCC)     Paroxysmal atrial fibrillation (HCC)     Urinary frequency     Vitamin D deficiency     Age-related osteoporosis with current pathological fracture     Atrial fibrillation with RVR (HCC)     Gastroesophageal reflux disease without esophagitis     Pulmonary hypertension (HCC)     Lumbosacral spondylosis without myelopathy     YOGESH (acute kidney injury) (HCC)     Closed compression fracture of L1 lumbar vertebra, sequela     Acute midline low back pain with right-sided sciatica     Acute exacerbation of chronic low back pain     Iron deficiency anemia     Rectal bleeding     Elevated LFTs     Closed wedge compression fracture of fifth thoracic vertebra (HCC)     Closed wedge compression fracture of T6 vertebra (HCC)     Closed wedge compression fracture of T9 vertebra (HCC)     Closed wedge compression fracture of T10 vertebra (HCC)     Discitis of thoracolumbar region     Acute cystitis without hematuria     Positive blood culture     Lumbar radiculopathy     Elevated C-reactive protein (CRP)     Debility     Moderate malnutrition ([address]     Current use of long term anticoagulation     Hyperammonemia ([address]     Asbestos exposure      Plan of Treatment:   Medication checked  Pulmonology, nephrology and general surgery notes reviewed  Agree with current management continue with the Spike-Synephrine infusion  Agree with albumin infusion and lactulose  Prognosis very poor  CODE STATUS DNR CCA    Electronically signed by Horace Retana MD on 6/16/2021 at 6:05 PM

## 2021-06-16 NOTE — PROGRESS NOTES
Nutrition Note    Consult for TPN noted. Labs ordered. Plan to initiate TPN tomorrow. Pt has PICC line. Garrett Gan R.D., L.AMY.   Phone: [phone]

## 2021-06-16 NOTE — PROGRESS NOTES
SW is following for possible return to ARU or [facility].

## 2021-06-16 NOTE — PROGRESS NOTES
Writer in room with previous RN. Noted wheezing throughout lung fields. Responsive to pain. Critical care updated for new consult and current condition.

## 2021-06-16 NOTE — PLAN OF CARE
Problem: Falls - Risk of:  Goal: Will remain free from falls  Description: Will remain free from falls  6/16/2021 0354 by Carmine Lopez RN  Outcome: Ongoing  Note: No falls during this shift. Call light is within reach. Side rails up x2 with bed in lowest position. Patient safety maintained. 6/15/2021 1804 by Oscar Britton RN  Outcome: Met This Shift  Note: The patient remained free from falls this shift, call light within reach, bed in locked and lowest position. Side rails up x2. Continue to monitor closely. Problem: Falls - Risk of:  Goal: Absence of physical injury  Description: Absence of physical injury  Outcome: Ongoing  Note: No injury this shift. Patient safety maintained. Problem: Pain:  Description: Pain management should include both nonpharmacologic and pharmacologic interventions. Goal: Pain level will decrease  Description: Pain level will decrease  6/16/2021 0354 by Carmine Lopez RN  Outcome: Ongoing  Note: No complaints of pain or discomfort this shift. 6/15/2021 1804 by Oscar Britton RN  Outcome: Met This Shift  Note: Patient's pain has been well controlled throughout the entire shift, please see MAR.

## 2021-06-16 NOTE — PROGRESS NOTES
Spoke with Dr. Jose J Lynn of concerns for rectal fistula. New orders received to consult dr. Tayla Kelly.

## 2021-06-16 NOTE — PROGRESS NOTES
Pulmonary/critical care follow-up note      Patient reevaluated, chest x-ray shows extensive new infiltrate in right lung. Suspect possible aspiration. Lactic acid level, ammonia level also significantly elevated as is procalcitonin level. Suspect septic shock. Will discontinue phenylephrine and if needed use of vasopressin (increased calorie and patient with sepsis when using phenylephrine compared with other pressors). Will not initiate sepsis bundle IV fluids due to her underlying cardiac status and generalized edema. Follow-up lactic acid level. Switch to Merrem, continue Zyvox. Updated  and daughters. DNR CCA status confirmed. They know she is in critical condition.  Additional 25 minutes critical care time spent at bedside in reviewing x-rays with patient's family

## 2021-06-16 NOTE — PROGRESS NOTES
Dr. Codie Acevedo at bedside. New orders received stat ABGs and Stat chest xray. Would like to hold off on blood due to blood shortage.

## 2021-06-16 NOTE — PROGRESS NOTES
Writer at beside with Joann Saldaña, day shift RN. New onset of expiratory wheezing noted at this time. Patient repositioned and remains on 2 liters of oxygen via nasal cannula. Chest Xray ordered and to be obtained.

## 2021-06-16 NOTE — PROGRESS NOTES
Spoke with Dr. Jalyn Olivares about breathing and using accessory muscles to breath.  Would like to use bipap 12/6 and to keep sats above 88%

## 2021-06-17 PROBLEM — A41.9 SEPSIS (HCC): Status: ACTIVE | Noted: 2021-01-01

## 2021-06-17 NOTE — PROGRESS NOTES
NEPHROLOGY PROGRESS NOTE    Patient :  Oscar [place]; 81 y.o. MRN# [id number]  Location:  2002/2002-01  Attending:  Danny Hou MD  Admit Date:  6/15/2021   Hospital Day: 2    Reason for consultation: Management of acute kidney injury. Consulting physician: Danny Hou MD.    Interval history: Patient was seen and examined today in the intensive care unit. She remains acutely ill looking and is currently on a BiPAP mask. Systolic blood pressure is in the 90 mmHg range at this encounter and she is on vasopressin drip, Cardizem drip as well as lactated Ringer's infusion at 100 mL/h. She is very lethargic and difficult to arouse. Laboratory studies were reviewed and remarkable today for hemoglobin 6.4 g/dL. History of Present Illness: This is a 81 y.o. female with past medical history of A. fib, CHF with preserved EF, mild to moderate aortic insufficiency, , essential hypertension, peritoneal mesothelioma. Patient was admitted to rehab with back pain CT spine showed L1 compression fracture. Patient was noted to be hypotensive unstable and patient was moved to ICU from acute rehab. Has been started on Levophed patient is receiving IV saline bolus patient is also on Cardizem drip and Spike-Synephrine drip  Labs showed increase in serum creatinine.   His baseline serum creatinine is between 0.7 mg/dL serum creatinine started to increase on 6/13/2021 which increased to 1.3 mg/dL, on 6/15/2021 serum creatinine increased to 1.9 mg/dL and therefore nephrology consultation has been requested  Cultures have been negative    Current Medications:    albumin human 25 % IV solution 50 g, BID  lactulose enema, BID  0.9 % sodium chloride infusion, PRN  linezolid (ZYVOX) IVPB 600 mg, Q12H  lactated ringers infusion, Continuous  vasopressin 20 Units in dextrose 5 % 100 mL infusion, Continuous  morphine (PF) injection 1 mg, Q4H PRN  levalbuterol (XOPENEX) nebulizer solution 1.25 mg, Q8H PRN  sodium chloride 06/16/21  0503 06/17/21  0443   WBC 11.1* 5.9   RBC 2.50* 2.22*   HGB 7.2* 6.4*   HCT 22.1* 19.9*   MCV 88.5 89.8   MCH 28.8 29.0   MCHC 32.5 32.3   RDW 18.6* 18.3*    132*   MPV 8.6 7.9      BMP:   Recent Labs     06/15/21  1319 06/16/21  0503 06/17/21  0443   * 141 143   K 4.8 3.9 3.8   * 106 106   CO2 22 23 23   BUN 61* 61* 61*   CREATININE 1.90* 1.91* 1.77*   GLUCOSE 151* 225* 122*   CALCIUM 7.6* 7.6* 7.9*      Magnesium:   Recent Labs     06/15/21  1319 06/17/21  0443   MG 2.4 2.4     Albumin:   Recent Labs     06/15/21  1319 06/16/21  0503 06/17/21  0443   LABALBU 1.8* 2.8* 3.6     Recent Labs     06/17/21  0443   PROT 5.3*     Urine Sodium:    Recent Labs     06/15/21  1430   REJI <20      Urine Creatinine:    Recent Labs     06/15/21  1430   LABCREA 60.1     Urinalysis:    Recent Labs     06/14/21  1300   NITRU NEGATIVE   COLORU YELLOW   PHUR 6.0   WBCUA 2 TO 5   RBCUA 0 TO 2   MUCUS NOT REPORTED   TRICHOMONAS NOT REPORTED   YEAST FEW*   BACTERIA FEW*   SPECGRAV 1.016   LEUKOCYTESUR NEGATIVE   UROBILINOGEN Normal   BILIRUBINUR SMALL*   GLUCOSEU NEGATIVE   KETUA NEGATIVE   AMORPHOUS 1+*     Assessment/plan:    1. Acute kidney injury - most consistent with ischemic acute tubular necrosis from hypotension. Random urine sodium less than 20 mmol/L suggests component of prerenal azotemia although this could also be seen in acute glomerulonephritis. 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 Patient is not a candidate for kidney biopsy at this time and she is already on intravenous steroids. Plasma free light chain ratio is within normal range. LEXI and ANCA are pending. Titrate pressors to keep MAP greater than 65 mmHg. Monitor urine output closely. Avoid nephrotoxic agents. Increase Solu-Medrol to 40 mg IV every 12 hours. 2.  Petechial rash - suspicious of vasculitis. Evaluation is ongoing.     3.  Hypotension - Suspect systemic inflammatory response syndrome and decreased effective arterial volume. Blood cultures are negative. 4.  Acute encephalopathy - Likely due to toxic/metabolic encephalopathy. 5.  Acute on chronic anemia - will suggest transfusion of 1 unit of packed red blood cells given associated hypotension. Prognosis is guarded. CODE STATUS is DNR CCA.     Electronically signed by Saeed Frost MD on 6/17/2021 at 8:34 AM

## 2021-06-17 NOTE — PROGRESS NOTES
Dr William Patterson at bedside. Discussed pts current condition and treatment plan of care. Dr William Patterson would like pt taken off bipap and placed on venti mask at this time. No TPN at this time. Blood bank to be called regarding availability of packed RBCs. Dr William Patterson to discuss goals of care with family before starting TPN.

## 2021-06-17 NOTE — FLOWSHEET NOTE
Pt restless and reaching arms. Pt tachypneic and tachycardic. Morphine given per Dr Trent Acevedo order for comfort.

## 2021-06-17 NOTE — PROGRESS NOTES
Detail Level: Zone Transfer report given to Jayden Vicente over the phone. Discussed admission and assessment findings. RN informed pt DNR-CC and hospice meeting at [phone]. All questions and concerned addressed.

## 2021-06-17 NOTE — PROGRESS NOTES
Dr. Brisa Del Cid spoke with patient's daughter regarding patient's condition and goals of care. Patient's spouse is with daughter. At this time TPN to be held. Family considering code status change, daughter to come in and see patient.

## 2021-06-17 NOTE — PROGRESS NOTES
Physical Therapy        Physical Therapy Cancel Note      DATE: 2021    NAME: Jessica Stanley  MRN: [id number]   : 1935      Patient not seen this date for Physical Therapy due to:    eval canceled per RN due to medical issues      Electronically signed by Klever Guevara PT on 2021 at 8:34 AM

## 2021-06-17 NOTE — PROGRESS NOTES
Pulmonary/critical care follow-up note      Hemoglobin came back at 6. I contacted the patient's daughter J Luis Chavez to see if they wanted her transfused. Olivia says that she has spoken with all the siblings and they have decided to make their mother just comfortable. They realize at some of the issues may be reversible but I feel that she has been declining significantly over the last several months and she would not want aggressive or additional care\". They are requesting DNR CC status and eventual hospice. Fernando Romero, ICU charge nurse witness conversation.   CODE STATUS changed we will not be transfusing her nor giving her any blood/TPN etc. additional critical care time spent 20 minutes

## 2021-06-17 NOTE — PROGRESS NOTES
Dr. Taya Mello notified of hgb 6.4.  Holding off on transfusion d/t hospital shortage and stable BP's currently 115/95 with MAP of 100

## 2021-06-17 NOTE — FLOWSHEET NOTE
06/17/21 1453   Encounter Summary   Services provided to: Patient   Referral/Consult From: Rounding   Complexity of Encounter Low   Length of Encounter 15 minutes   Spiritual/Buddhist   Type Spiritual support   Assessment Unable to respond   Intervention Prayer;Sustaining presence/ Ministry of presence

## 2021-06-17 NOTE — PROGRESS NOTES
ICU Progress Note (Non-Vent)  [facility]    Patient - Janet Berry,  Age - 81 y.o.    - 1935      Room Number -    N -  [id number]   Acct # - [de-identified]  Date of Admission -  6/15/2021 12:33 PM    Events of Past 24 Hours   Intermittent agitation, tachypnea, does not follow any commands    Vitals    height is 5' 5\" (1.651 m) and weight is 157 lb 3 oz (71.3 kg). Her axillary temperature is 97.7 °F (36.5 °C). Her blood pressure is 118/66 and her pulse is 119. Her respiration is 20 and oxygen saturation is 90%.        Temperature Range: Temp: 97.7 °F (36.5 °C) Temp  Av.3 °F (36.8 °C)  Min: 97.2 °F (36.2 °C)  Max: 98.8 °F (37.1 °C)  BP Range:  Systolic (31QFJ), HP , Min:79 , ZTI:620     Diastolic (13GZM), PJF:26, Min:44, Max:113    Pulse Range: Pulse  Av.3  Min: 89  Max: 120  Respiration Range: Resp  Av.9  Min: 11  Max: 36  Current Pulse Ox[de-identified]  SpO2: 90 %  24HR Pulse Ox Range:  SpO2  Av.1 %  Min: 85 %  Max: 99 %  Oxygen Amount and Delivery: O2 Flow Rate (L/min): 3 L/min    Wt Readings from Last 3 Encounters:   21 157 lb 3 oz (71.3 kg)   21 167 lb 1.7 oz (75.8 kg)   21 167 lb 1.7 oz (75.8 kg)     I/O       Intake/Output Summary (Last 24 hours) at 2021 0906  Last data filed at 2021 0454  Gross per 24 hour   Intake 5556.78 ml   Output 1350 ml   Net 4206.78 ml     DRAIN/TUBE OUTPUT       Invasive Lines   ICP PRESSURE RANGE  No data recorded  CVP PRESSURE RANGE  No data recorded      Medications      methylPREDNISolone  40 mg Intravenous Q12H    albumin human  50 g Intravenous BID    lactulose enema   Rectal BID    linezolid  600 mg Intravenous Q12H    sodium chloride flush  10 mL Intravenous 2 times per day    pantoprazole  40 mg Intravenous Daily    And    sodium chloride (PF)  10 mL Intravenous Daily     sodium chloride, morphine, 05/26/2021    IBILI 0.64 09/03/2014    LABALBU 3.6 06/17/2021    LABALBU 4.1 01/11/2012       ABG ABGs: No results found for: PHART, PO2ART, HPI9POJ    Lab Results   Component Value Date    MODE NOT REPORTED 06/16/2021         INR  Recent Labs     06/15/21  0650 06/16/21  0503 06/17/21  0443   PROTIME 39.4* 33.0* 25.9*   INR 4.2 3.3 2.4       APTT  No results for input(s): APTT in the last 72 hours. Lactic Acid  Lab Results   Component Value Date    LACTA 2.0 06/17/2021    LACTA 2.8 06/16/2021    LACTA 3.7 06/16/2021        BNP   No results for input(s): BNP in the last 72 hours. Cultures       Radiology     CXR      CT Scans    (See actual reports for details)      SYSTEMS ASSESSMENT    Multifocal pneumonia, aspiration  Acute on chronic hypoxic respiratory failure  Hypovolemic and septic shock-resolved  Acute liver failure/injury  Acute kidney injury  Type 2 diabetes  FRANKLYN (clinical diagnosis)  Chronic hypoxic respiratory failure  Osteomyelitis-T12/L1  Asbestos exposure with peritoneal mesothelioma  Anemia due to chronic disease, no active bleeding      Neuro   Despite ammonia level improving and renal function improving, mental status has worsened    Respiratory   Wean oxygen as tolerated.  Keep O2 sat > 88%  BiPAP as needed  Does not have COPD or asthma      Cardiovascular   Shock has resolved, not on any pressors  Lactic acidosis improved with fluid hydration  Her calcitonin yesterday was significantly elevated  Gastrointestinal   Keep n.p.o. for now  Not a candidate for TPN due to liver function worsening  Additionally, I anticipate will cause issues with volume status  There is no data that TPN use less than 7 days will improve mortality, morbidity, or nutritional status  Lactulose enemas have helped normalized ammonia level  Renal   Nephrology note appreciated  Has been on Solu-Medrol for possible vasculitis    Infectious Disease   Continue to treat aspiration pneumonia with Coreen Ortiz    Hematology/Oncology Hemoglobin has dropped, will recheck  Given critical blood shortage, since her blood pressure is reasonable and there is no obvious active bleeding, I am not going to transfuse her immediately    Endocrine   Blood sugars monitored, suspect will climb if TPN started    Social/Spiritual/DNR/Disposition/Other   Extensive discussion with patient's daughter Cliff Stafford. Explained to her current situation. Her father is ill today and has low blood pressure may be coming to the hospital.  She and her sisters do not want to see their mother suffer.   They are considering making her DNR CC and hospice  They are going to see her and then make a final decision    Critical Care Time   45 min    Electronically signed by Lizandro Ortega MD on 6/17/2021 at 9:06 AM

## 2021-06-17 NOTE — PROGRESS NOTES
Progress Note    6/17/2021   1:24 PM    Name:  Tyesha Rosales  MRN:    [id number]     [place]:     [de-identified]   Room:  2002/2002-01   Day: 2     Admit Date: 6/15/2021 12:33 PM  PCP: Jase Corea DO    Subjective:     C/C: No chief complaint on file. Interval History: Status: not changed. Patient is agitated with respiratory distress. Continuously open eyes does not follow verbal command. Vital signs reviewed patient heart rate is in 110s . Patient is on Ventimask. Blood pressure readings reviewed. Recent labs reviewed ammonia level decreased to 54. Hemoglobin 6. ROS:   all 10 systems reviewed and are negative except as noted    Review of Systems   Unable to perform ROS: Acuity of condition       Medications: Allergies:    Allergies   Allergen Reactions    Adhesive Tape Rash    Meperidine Nausea Only    Demerol Nausea Only    Ultram [Tramadol Hcl]      Hot flashes    Zocor [Simvastatin]      Muscle aches    Codeine      \"out of it\"    Fentanyl Rash       Current Meds: methylPREDNISolone sodium (SOLU-MEDROL) injection 40 mg, Q12H  morphine (PF) injection 2 mg, Q1H PRN  glycopyrrolate (ROBINUL) injection 0.1 mg, Q6H  LORazepam (ATIVAN) injection 1 mg, Q1H PRN  albumin human 25 % IV solution 50 g, BID  lactulose enema, BID  0.9 % sodium chloride infusion, PRN  linezolid (ZYVOX) IVPB 600 mg, Q12H  lactated ringers infusion, Continuous  vasopressin 20 Units in dextrose 5 % 100 mL infusion, Continuous  morphine (PF) injection 1 mg, Q4H PRN  levalbuterol (XOPENEX) nebulizer solution 1.25 mg, Q8H PRN  sodium chloride nebulizer 0.9 % solution 3 mL, Q4H PRN  sodium chloride flush 0.9 % injection 10 mL, 2 times per day  sodium chloride flush 0.9 % injection 10 mL, PRN  0.9 % sodium chloride infusion, PRN  ondansetron (ZOFRAN-ODT) disintegrating tablet 4 mg, Q8H PRN   Or  ondansetron (ZOFRAN) injection 4 mg, Q6H PRN  acetaminophen (TYLENOL) tablet 650 mg, Q6H PRN   Or  acetaminophen (TYLENOL) suppository 650 mg, Q6H PRN  pantoprazole (PROTONIX) injection 40 mg, Daily   And  sodium chloride (PF) 0.9 % injection 10 mL, Daily  dilTIAZem 125 mg in dextrose 5 % 125 mL infusion, Continuous        Data:     Code Status:  DNR-CC    Family History   Problem Relation Age of Onset    Heart Failure Brother     Heart Attack Brother         triple bypass    Prostate Cancer Brother     Coronary Art Dis Mother     Breast Cancer Mother         dx'd in late 46s    Emphysema Father     Colon Cancer Neg Hx     Diabetes Neg Hx     Eclampsia Neg Hx     Hypertension Neg Hx     Ovarian Cancer Neg Hx      Labor Neg Hx     Spont Abortions Neg Hx     Stroke Neg Hx        Social History     Socioeconomic History    Marital status:      Spouse name: Not on file    Number of children: Not on file    Years of education: Not on file    Highest education level: Not on file   Occupational History    Not on file   Tobacco Use    Smoking status: Never Smoker    Smokeless tobacco: Never Used   Vaping Use    Vaping Use: Never used   Substance and Sexual Activity    Alcohol use: Yes     Comment: social    Drug use: No    Sexual activity: Not Currently   Other Topics Concern    Not on file   Social History Narrative    Not on file     Social Determinants of Health     Financial Resource Strain:     Difficulty of Paying Living Expenses:    Food Insecurity:     Worried About Running Out of Food in the Last Year:     Ran Out of Food in the Last Year:    Transportation Needs:     Lack of Transportation (Medical):      Lack of Transportation (Non-Medical):    Physical Activity:     Days of Exercise per Week:     Minutes of Exercise per Session:    Stress:     Feeling of Stress :    Social Connections:     Frequency of Communication with Friends and Family:     Frequency of Social Gatherings with Friends and Family:     Attends Hoahaoism Services:     Active Member of Clubs or Organizations:     Attends Club or Organization Meetings:     Marital Status:    Intimate Partner Violence:     Fear of Current or Ex-Partner:     Emotionally Abused:     Physically Abused:     Sexually Abused:        I/O (24Hr): Intake/Output Summary (Last 24 hours) at 6/17/2021 1324  Last data filed at 6/17/2021 0454  Gross per 24 hour   Intake 5556.78 ml   Output 1350 ml   Net 4206.78 ml     Radiology:  US RENAL COMPLETE    Result Date: 6/16/2021  1. Diffusely increased renal cortical echogenicity, which can be seen with medical renal disease. 2.  No hydronephrosis. 3.  Small bilateral renal cysts. XR CHEST PORTABLE    Result Date: 6/16/2021  Increasing multifocal bilateral infiltrates, particularly in the right perihilar and upper lobe regions since the prior study. Findings were discussed with Moshe Azul at 9:29 am on 6/16/2021. XR CHEST PORTABLE    Result Date: 6/15/2021  Scattered pulmonary opacities, cardiomegaly and left effusion are noted which may be on the basis of pulmonary edema with multifocal airspace disease not excluded.        Labs:  Recent Results (from the past 24 hour(s))   Lactic Acid    Collection Time: 06/16/21  5:29 PM   Result Value Ref Range    Lactic Acid 2.8 (H) 0.5 - 2.2 mmol/L   Comprehensive Metabolic Panel w/ Reflex to MG    Collection Time: 06/17/21  4:43 AM   Result Value Ref Range    Glucose 122 (H) 70 - 99 mg/dL    BUN 61 (H) 8 - 23 mg/dL    CREATININE 1.77 (H) 0.50 - 0.90 mg/dL    Bun/Cre Ratio NOT REPORTED 9 - 20    Calcium 7.9 (L) 8.6 - 10.4 mg/dL    Sodium 143 135 - 144 mmol/L    Potassium 3.8 3.7 - 5.3 mmol/L    Chloride 106 98 - 107 mmol/L    CO2 23 20 - 31 mmol/L    Anion Gap 14 9 - 17 mmol/L    Alkaline Phosphatase 384 (H) 35 - 104 U/L    ALT 59 (H) 5 - 33 U/L    AST 90 (H) <32 U/L    Total Bilirubin 1.34 (H) 0.3 - 1.2 mg/dL    Total Protein 5.3 (L) 6.4 - 8.3 g/dL    Albumin 3.6 3.5 - 5.2 g/dL    Albumin/Globulin Ratio NOT REPORTED 1.0 - 2.5    GFR Non- American 27 (L) >60 mL/min    GFR  33 (L) >60 mL/min    GFR Comment          GFR Staging NOT REPORTED    CBC auto differential    Collection Time: 06/17/21  4:43 AM   Result Value Ref Range    WBC 5.9 3.5 - 11.0 k/uL    RBC 2.22 (L) 4.0 - 5.2 m/uL    Hemoglobin 6.4 (LL) 12.0 - 16.0 g/dL    Hematocrit 19.9 (L) 36 - 46 %    MCV 89.8 80 - 100 fL    MCH 29.0 26 - 34 pg    MCHC 32.3 31 - 37 g/dL    RDW 18.3 (H) 11.5 - 14.9 %    Platelets 688 (L) 522 - 450 k/uL    MPV 7.9 6.0 - 12.0 fL    NRBC Automated NOT REPORTED per 100 WBC    Differential Type NOT REPORTED     Immature Granulocytes NOT REPORTED 0 %    Absolute Immature Granulocyte NOT REPORTED 0.00 - 0.30 k/uL    WBC Morphology NOT REPORTED     RBC Morphology NOT REPORTED     Platelet Estimate NOT REPORTED     Seg Neutrophils 71 (H) 36 - 66 %    Lymphocytes 9 (L) 24 - 44 %    Monocytes 3 1 - 7 %    Eosinophils % 0 0 - 4 %    Basophils 0 0 - 2 %    Bands 15 (H) 0 - 10 %    Metamyelocytes 1 (H) 0 %    Myelocytes 1 (H) 0 %    Segs Absolute 4.18 1.3 - 9.1 k/uL    Absolute Lymph # 0.53 (L) 1.0 - 4.8 k/uL    Absolute Mono # 0.18 0.1 - 1.3 k/uL    Absolute Eos # 0.00 0.0 - 0.4 k/uL    Basophils Absolute 0.00 0.0 - 0.2 k/uL    Absolute Bands # 0.89 0.0 - 1.0 k/uL    Metamyelocytes Absolute 0.06 (H) 0 k/uL    Myelocytes Absolute 0.06 (H) 0 k/uL    Morphology ANISOCYTOSIS PRESENT     Morphology HYPOCHROMIA PRESENT     Morphology FEW ELLIPTOCYTES    PROTIME-INR    Collection Time: 06/17/21  4:43 AM   Result Value Ref Range    Protime 25.9 (H) 11.8 - 14.6 sec    INR 2.4    Ammonia    Collection Time: 06/17/21  4:43 AM   Result Value Ref Range    Ammonia 54 (H) 11 - 51 umol/L   Lactic Acid    Collection Time: 06/17/21  4:43 AM   Result Value Ref Range    Lactic Acid 2.0 0.5 - 2.2 mmol/L   Magnesium    Collection Time: 06/17/21  4:43 AM   Result Value Ref Range    Magnesium 2.4 1.6 - 2.6 mg/dL   PHOSPHORUS    Collection Time: 06/17/21  4:43 AM   Result Value Ref Range    Phosphorus 3.8 2.6 - 4.5 mg/dL   Triglycerides    Collection Time: 21  4:43 AM   Result Value Ref Range    Triglycerides 112 <150 mg/dL   Hemoglobin and Hematocrit, Blood    Collection Time: 21  9:58 AM   Result Value Ref Range    Hemoglobin 6.0 (LL) 12.0 - 16.0 g/dL    Hematocrit 18.9 (L) 36 - 46 %       Physical Examination:        Vitals:  BP (!) 82/51   Pulse 116   Temp 97.5 °F (36.4 °C) (Axillary)   Resp 14   Ht 5' 5\" (1.651 m)   Wt 157 lb 3 oz (71.3 kg)   SpO2 (!) 72%   BMI 26.16 kg/m²   Temp (24hrs), Av.1 °F (36.7 °C), Min:97.2 °F (36.2 °C), Max:98.8 °F (37.1 °C)    Recent Labs     06/15/21  1152   POCGLU 146*         Physical Exam  Pulmonary:      Effort: Respiratory distress present. Breath sounds: Rales present. Musculoskeletal:      Right lower leg: Edema (1+) present. Left lower leg: Edema (1+) present. Assessment:        Primary Problem  Atrial fibrillation with RVR (HCC)     Principal Problem:    Atrial fibrillation with RVR (HCC)  Active Problems:    Chronic diastolic heart failure (HCC)    Chronic respiratory failure (HCC)    Gastroesophageal reflux disease without esophagitis    Pulmonary hypertension (HCC)    YOGESH (acute kidney injury) ([address]    Iron deficiency anemia    Hyperammonemia ([address]    Asbestos exposure    Sepsis ([address]  Resolved Problems:    * No resolved hospital problems. *      Past Medical History:   Diagnosis Date    Asbestos exposure     Atrial fibrillation ([address]     takes xarelto    Basal cell carcinoma of nose     CAD (coronary artery disease)     CKD (chronic kidney disease) stage 2, GFR 60-89 ml/min     Hyperlipidemia     Hypertension     MDRO (multiple drug resistant organisms) resistance 2014    E.  Coli urine    Nephrolithiasis     Osteoarthritis     Ovarian cyst     removed    Oxygen dependent     2 LNC mainly at night, during the day as needed    Palpitations     Peritoneal mesothelioma ([address]     [facility] at [facility] PONV (postoperative nausea and vomiting)     Prolonged emergence from general anesthesia     SOB (shortness of breath) on exertion         Plan:        1. Ativan 1 mg every hour for agitation/anxiety  2. [facility]  3. Morphine 2 mg every hour as needed for moderate pain. 4. General surgery but noted for possible colovesicle fistula  5. Robinul 0.1 mg IV every 6 hours  6. Hospice meeting today at 4 PM  7. Discussed with family in  the family waiting room  8. Discussed with pulmonologist Dr. Nasreen Colbert  1.  Reviewed labs and imaging studies from last 24 hours and results explained to family      Electronically signed by Luis Alberto Silva MD

## 2021-06-17 NOTE — CARE COORDINATION
Writer spoke with the pt at bedside. They requested hospice of [facility]. Writer called hospice and gave them info. They will call writer with time for a meeting.

## 2021-06-17 NOTE — FLOWSHEET NOTE
06/17/21 1015   Encounter Summary   Services provided to: Patient   Referral/Consult From: Rounding   Complexity of Encounter Low   Length of Encounter 15 minutes   Spiritual/Yazidi   Type Spiritual support   Assessment Sleeping   Intervention Prayer   Outcome Did not respond

## 2021-06-17 NOTE — FLOWSHEET NOTE
06/17/21 1451   Encounter Summary   Services provided to: Family   Referral/Consult From: [facility]   (6-17-21)   Complexity of Encounter High   Length of Encounter 15 minutes   Spiritual/Worship   Type Spiritual support   Grief and Life Adjustment   Type End of life   Assessment Approachable;Tearful; Anticipatory grief   Intervention Active listening;Explored feelings, thoughts, concerns;Sustaining presence/ Ministry of presence   Outcome Expressed gratitude;Expressed feelings/needs/concerns;Receptive

## 2021-06-17 NOTE — DISCHARGE SUMMARY
Discharge Summary      Patient ID: Jon Sheppard    MRN: [id number]     Acct:  [de-identified]       Patient's PCP: Magali Weeks DO    Admit Date: 6/15/2021     Discharge Date: 6/17/2021 6/17/2021    Admitting Physician: Javad Manuel MD    Discharge Physician: Adrián Tovar MD     Discharge Diagnoses:    Primary Problem  Atrial fibrillation with RVR [facility])    Principal Problem:    Atrial fibrillation with RVR ([address]  Active Problems:    Chronic diastolic heart failure (HCC)    Chronic respiratory failure (HCC)    Gastroesophageal reflux disease without esophagitis    Pulmonary hypertension ([address]    YOGESH (acute kidney injury) ([address]    Iron deficiency anemia    Hyperammonemia ([address]    Asbestos exposure    Sepsis ([address]  Resolved Problems:    * No resolved hospital problems. *    Past Medical History:   Diagnosis Date    Asbestos exposure     Atrial fibrillation ([address]     takes xarelto    Basal cell carcinoma of nose     CAD (coronary artery disease)     CKD (chronic kidney disease) stage 2, GFR 60-89 ml/min     Hyperlipidemia     Hypertension     MDRO (multiple drug resistant organisms) resistance 2/19/2014    E. 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    treated at [facility] PONV (postoperative nausea and vomiting)     Prolonged emergence from general anesthesia     SOB (shortness of breath) on exertion          Code Status:  [facility] Critical Care Hospital Course:   H&P Reviewed. Patient with a past medical history of PAF on Coumadin, T12-L1 vertebral osteomyelitis on 2 g of IV Rocephin, chronic respiratory failure on home O2, iron deficiency anemia was admitted from ARU with A. fib RVR, sepsis and YOGESH. Patient was started on IV antibiotics and IV vasopressor support.   Pulmonology cardiology, nephrology services were tartrate (LOPRESSOR) 25 MG tablet  Take 1 tablet by mouth 2 times daily             Omega 3 1000 MG CAPS  Take 1,000 mg by mouth daily              omeprazole (PRILOSEC) 40 MG delayed release capsule  Take 40 mg by mouth Daily              oxybutynin (DITROPAN XL) 15 MG extended release tablet  Take 15 mg by mouth daily              polyethylene glycol (GLYCOLAX) 17 GM/SCOOP powder  Take 17 g by mouth daily as needed (constipation)              potassium chloride SA (K-DUR;KLOR-CON M) 20 MEQ tablet  Take 1 tablet by mouth 2 times daily             predniSONE (DELTASONE) 5 MG tablet  Take 20 mg by mouth daily              rivaroxaban (XARELTO) 20 MG TABS tablet  Take 20 mg by mouth daily (with breakfast)              rOPINIRole (REQUIP) 0.5 MG tablet  Take 0.5 mg by mouth 4 times daily as needed              sertraline (ZOLOFT) 50 MG tablet  Take 50 mg by mouth daily. torsemide (DEMADEX) 20 MG tablet  Take 1 tablet by mouth 2 times daily Take twice a day for 1 months then go back to 20 mg daily             Vitamin D (CHOLECALCIFEROL) 1000 UNITS CAPS capsule  Take 1,000 Units by mouth 2 times daily             vitamin E 1000 units capsule  Take 1,000 Units by mouth daily                          Time Spent on discharge is more than  35 min in the examination, evaluation, counseling and review of medications and discharge plan.       Electronically signed by Mo Horta MD     Copy sent to Dr. Mara Chase DO

## 2021-06-17 NOTE — FLOWSHEET NOTE
Patient was moved out of ICU with expectation of converting to hospice. Writer provided ministry of presence with family--children and grandchildren--during hospice consultation. Family is spending time reminiscing and looking at pictures and also visiting their dad, who was admitted earlier today. Family at peace and supportive of each other and their parents. 06/17/21 6522   Encounter Summary   Services provided to: Patient and family together   Referral/Consult From: Hospice   Continue Visiting   (6-17-21)   Complexity of Encounter Moderate   Length of Encounter 1 hour   Spiritual/Evangelical   Type Spiritual support   Grief and Life Adjustment   Type End of life; Hospice   Assessment Calm; Anticipatory grief   Intervention Active listening;Explored feelings, thoughts, concerns;Prayer;Sustaining presence/ Ministry of presence; End of life care   Outcome Expressed gratitude;Engaged in conversation;Expressed feelings/needs/concerns; Shared life review;Coping;Tearful;Receptive

## 2021-06-17 NOTE — PROGRESS NOTES
Bipap removed and NC 3L placed for oral care. Attempted oral care but patient kept teeth clenched tight. SpO2 dropped down to 88%. Bipap placed back on.

## 2021-06-17 NOTE — PROGRESS NOTES
Patient transferred on oxygen per bed to room 2042 from 2002. Bed in low position, wheels locked, siderails x2 up, and family present. Updated report given to [facility], KHARI, RN. Patient brought with all belongings and meds.

## 2021-06-17 NOTE — PROGRESS NOTES
[address]   OCCUPATIONAL THERAPY MISSED TREATMENT NOTE   INPATIENT   Date: 21  Patient Name: Kiara Kennedy       Room:   MRN: [id number]   Account #: [de-identified]    : 1935  (81 y.o.)  Gender: female     REASON FOR MISSED TREATMENT:  Hold treatment per nursing request   -   [place]

## 2021-06-17 NOTE — PROGRESS NOTES
present on admission  -- Aspiration PNA and/or Colovesical Fistula present on admission without   sepsis  -- Other - I will add my own diagnosis  -- Disagree - Not applicable / Not valid  -- Disagree - Clinically unable to determine / Unknown  -- Refer to Clinical Documentation Reviewer    PROVIDER RESPONSE TEXT:    This patient has Sepsis with organ dysfunction r/t Aspiration PNA and/ or   Colovesical Fistula, which was present on admission from ARU.     Query created by: Hope Tellez on 6/17/2021 2:59 PM      Electronically signed by:  Joaquin Mace MD 6/17/2021 3:03 PM

## 2021-06-17 NOTE — PLAN OF CARE
parameters  6/17/2021 0351 by Scottie Moore RN  Outcome: Ongoing  6/16/2021 1552 by Zachariah Almeida RN  Outcome: Ongoing     Problem: Tissue Perfusion - Cardiopulmonary, Altered:  Goal: Hemodynamic stability will improve  Description: Hemodynamic stability will improve  6/17/2021 0351 by Scottie Moore RN  Outcome: Ongoing  Note: Patient remains off pressors with rate controlled afib throughout shift. Will continue to monitor and initiate intervention as needed   6/16/2021 1552 by Zachariah Almeida RN  Outcome: Ongoing  Goal: Absence of angina  Description: Absence of angina  Outcome: Ongoing     Problem: Pain:  Goal: Pain level will decrease  Description: Pain level will decrease  6/17/2021 0351 by Scottie Moore RN  Outcome: Ongoing  Note: Pain assessed at regular intervals. Medications administered as requested for comfort. Pain remains at a tolerable level.    6/16/2021 1552 by Zachariah Almeida RN  Outcome: Ongoing  Goal: Control of acute pain  Description: Control of acute pain  Outcome: Ongoing  Goal: Control of chronic pain  Description: Control of chronic pain  Outcome: Ongoing

## 2021-06-17 NOTE — PROGRESS NOTES
[facility]                 PATIENT NAME: Giovanni Butler     TODAY'S DATE: 6/17/2021, 8:51 AM    SUBJECTIVE:    Pt seen and examined. Afebrile, VSS. Leukocytosis improved, hemoglobin 6.4. Patient will not receive transfusion as vitals are stable and due to hospital shortage. Patient remains on BiPAP. Bowels are moving; 600mL liquid stool output in FMS. Urine output 350mL however appears feculent. Urology to see patient today. OBJECTIVE:   VITALS:  BP 92/67   Pulse 96   Temp 97.7 °F (36.5 °C) (Rectal)   Resp 28   Ht 5' 5\" (1.651 m)   Wt 157 lb 3 oz (71.3 kg)   SpO2 94%   BMI 26.16 kg/m²      INTAKE/OUTPUT:      Intake/Output Summary (Last 24 hours) at 6/17/2021 0851  Last data filed at 6/17/2021 0454  Gross per 24 hour   Intake 5556.78 ml   Output 1350 ml   Net 4206.78 ml                 CONSTITUTIONAL:  awake but lethargic, restless.   Mild distress  HEART:   RRR  LUNGS:   On BiPAP  ABDOMEN:   Abdomen soft, non-tender, non-distended  EXTREMITIES:   Significant pedal edema b/l with skin mottling     Data:  CBC:   Lab Results   Component Value Date    WBC 5.9 06/17/2021    RBC 2.22 06/17/2021    RBC 4.17 02/08/2012    HGB 6.4 06/17/2021    HCT 19.9 06/17/2021    MCV 89.8 06/17/2021    MCH 29.0 06/17/2021    MCHC 32.3 06/17/2021    RDW 18.3 06/17/2021     06/17/2021     02/08/2012    MPV 7.9 06/17/2021     BMP:    Lab Results   Component Value Date     06/17/2021    K 3.8 06/17/2021     06/17/2021    CO2 23 06/17/2021    BUN 61 06/17/2021    LABALBU 3.6 06/17/2021    LABALBU 4.1 01/11/2012    CREATININE 1.77 06/17/2021    CALCIUM 7.9 06/17/2021    GFRAA 33 06/17/2021    LABGLOM 27 06/17/2021    GLUCOSE 122 06/17/2021    GLUCOSE 135 02/08/2012       Radiology Review:  No new images to review     ASSESSMENT     Principal Problem:    Atrial fibrillation with RVR (HCC)  Active Problems:    Chronic diastolic heart failure (HCC)    Chronic respiratory failure (HCC)    Gastroesophageal reflux disease without esophagitis    Pulmonary hypertension (HCC)    YOGESH (acute kidney injury) ([facility]    Iron deficiency anemia    Hyperammonemia ([facility]    Asbestos exposure  Resolved Problems:    * No resolved hospital problems. *      Plan  1. NPO, TPN to be started today  2. Monitor FMS and rosario output  3. Patient is poor surgical candidate  4. Urology to see patient today  5. Continue medical management  6. Overall prognosis poor   7. Hospice consulted.       Electronically signed by Eryn Gee PA-C  [address] [address]

## 2021-06-17 NOTE — PROGRESS NOTES
Pt. Arrived to room 2042. Pt. Resting comfortably in bed and shows no signs of distress. Assessment completed. Will continue to monitor.

## 2021-06-17 NOTE — FLOWSHEET NOTE
Pt minimally responsive but does not follow commands. Pt does not open eyes. Pt restless. Pt has non purposeful movement of all extremities.

## 2021-06-17 NOTE — PROGRESS NOTES
Nutrition Note    Consult for TPN was received. Pt is now [facility]. Discussed with nursing. No need for nutrition support at this time.     Electronically signed by Dev Haley RD, HALLEY on 6/17/21 at 11:58 AM EDT    Contact: [phone]

## 2021-06-18 PROBLEM — I50.33 ACUTE ON CHRONIC DIASTOLIC CONGESTIVE HEART FAILURE (HCC): Status: ACTIVE | Noted: 2021-01-01

## 2021-06-18 PROBLEM — J96.21 ACUTE ON CHRONIC RESPIRATORY FAILURE WITH HYPOXIA (HCC): Status: ACTIVE | Noted: 2021-01-01

## 2021-06-18 NOTE — FLOWSHEET NOTE
Writer checked on family at end of writer's shift. They are keeping rodriguez; writer explained that a  is on call for during the night if needed. Writer prayed a prayer of release and anointed patient. Family grateful for support.      06/17/21 2021   Encounter Summary   Services provided to: Patient and family together   Referral/Consult From: Hospice   Continue Visiting   (6-17-21)   Complexity of Encounter Moderate   Length of Encounter 15 minutes   Spiritual/Episcopal   Type Spiritual support   Assessment Calm   Intervention Active listening;Prayer;Sustaining presence/ Ministry of presence; End of life care   Outcome Expressed gratitude;Engaged in conversation;Expressed feelings/needs/concerns;Receptive

## 2021-06-18 NOTE — CARE COORDINATION
DISCHARGE PLANNING NOTE:    Bed converted to Hospice Inpatient - No plans to move patient to the inpatient Hospice of [address] [place] [address] at this time due to stability/patient condition.      Electronically signed by Natan Ho RN on 6/18/2021 at 8:48 AM

## 2021-06-18 NOTE — FLOWSHEET NOTE
Helped move family from [address] to  2038 so that they could be close to pt and have privacy as they continue to hold rodriguez       06/18/21 1938   Encounter Summary   Services provided to: Family   Referral/Consult From: Nursing Supervisor/Manager   Support System Family members   Complexity of Encounter Moderate   Length of Encounter 15 minutes   Routine   Type Follow up   Assessment Calm; Approachable;Coping;Peaceful   Intervention Active listening;Explored feelings, thoughts, concerns;Sustaining presence/ Ministry of presence   Outcome Comfort;Expressed gratitude;Coping;Receptive

## 2021-06-18 NOTE — CARE COORDINATION
DISCHARGE PLANNING NOTE:    I spoke with Marcella Azar and she stated that if this patient is still with us and the the patients  is still inpatient in this hospital receiving treatment, that come Monday the patient will be expected to transfer to Inpatient Hospice of [address] [place] [address]    The family was notified of this information and agreeable to the plan.      Electronically signed by Ravindra Ferrara RN on 6/18/2021 at 12:36 PM

## 2021-06-18 NOTE — FLOWSHEET NOTE
meet family they are keeping rodriguez between [address] they have been  58 years,family seems to be coping. Chaplains remain available for spiritual or emotional support as needed.       06/18/21 1037   Encounter Summary   Services provided to: Patient and family together   Referral/Consult From: [facility] Family members   Continue Visiting   (6/18/2021)   Complexity of Encounter Moderate   Length of Encounter 15 minutes   Grief and Life Adjustment   Type End of life   Assessment Anticipatory grief   Intervention Active listening;Sustaining presence/ Ministry of presence   Outcome Expressed gratitude

## 2021-06-18 NOTE — FLOWSHEET NOTE
Writer provided emotional and spiritual support to family throughout the day; prayed at beside of patient with children and in laws; listening presence as family shared life review and talked about patient;     06/18/21 1537   Encounter Summary   Services provided to: Patient and family together   Referral/Consult From: Jessica 48; Children;Family members   Continue Visiting   (6/18/21)   Complexity of Encounter Moderate   Length of Encounter 45 minutes   Spiritual Assessment Completed Yes   Grief and Life Adjustment   Type Hospice; End of life   Assessment Approachable;Tearful;Grieving; Hopeful;Coping;Helplessness; Concerns with suffering; Anticipatory grief   Intervention Active listening;Explored feelings, thoughts, concerns;Prayer;Nurtured hope;Sustaining presence/ Ministry of presence; Discussed illness/injury and it's impact; Discussed belief system/Samaritan practices/rhian;Discussed afterlife; Discussed death; End of life care;Grief care   Outcome Comfort;Expressed gratitude;Engaged in conversation;Expressed feelings/needs/concerns;Coping;Tearful; Hopeful;Receptive

## 2021-06-18 NOTE — H&P
History and Physical      Name: Awa Ashley  MRN: [id number]     Acct: [de-identified]  Room: 2042/2042-01    Admit Date: 6/17/2021  PCP: Mary Acosta DO      Chief Complaint:     Shortness of breath    History Obtained From:     patient, electronic medical record    History of Present Illness:      Awa Ashley is a  81 y.o.  female with PAF, CHF, acute on chronic respiratory failure sepsis hypotension was transferred out of ICU yesterday after CODE STATUS changed to DNR CC and inpatient hospice status. Patient is not responsive to verbal commands. Patient is on 6 L of oxygen via nasal cannula with an oxygen saturation of 88% relatively low blood pressure readings noted. Afebrile. Past Medical History:     Past Medical History:   Diagnosis Date    Asbestos exposure     Atrial fibrillation ([address]     takes xarelto    Basal cell carcinoma of nose     CAD (coronary artery disease)     CKD (chronic kidney disease) stage 2, GFR 60-89 ml/min     Hyperlipidemia     Hypertension     MDRO (multiple drug resistant organisms) resistance 2/19/2014    E.  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    treated at [facility] PONV (postoperative nausea and vomiting)     Prolonged emergence from general anesthesia     SOB (shortness of breath) on exertion         Past Surgical History:     Past Surgical History:   Procedure Laterality Date    ANKLE FRACTURE SURGERY Right 2013    APPENDECTOMY  1964    BIOPSY / LIGATION TEMPORAL ARTERY Bilateral 2/12/2021    TEMPORAL ARTERY BIOPSY performed by [facility] Krause [facility] at [facility] Right    [address]  2002    CATARACT REMOVAL Bilateral     CHOLECYSTECTOMY  2005    COLONOSCOPY      COLONOSCOPY N/A 5/28/2021    COLONOSCOPY POLYPECTOMY SNARE/COLD BIOPSY performed by Vivienne Garg MD at [facility]  April 2002    CT NEEDLE BIOPSY SPINE  6/4/2021    CT NEEDLE BIOPSY SPINE 6/4/2021 [facility] CT SCAN    HYSTERECTOMY  1981    LIVER BIOPSY  2000    LIVER BIOPSY      NASAL ENDOSCOPY Bilateral 11/25/2014    lt nasal cautery with packing    PHOTODYNAMIC THERAPY  April 2000 & Jan. 2002    for peritoneal Mesothelioma    [address]    REVISION TOTAL HIP ARTHROPLASTY Right 1998    ROTATOR CUFF REPAIR  2006    SKIN CANCER EXCISION Bilateral 2006    ankle    SKIN CANCER EXCISION Left Oct. 2014    shoulder    SPINE SURGERY N/A 4/15/2021    KYPHOPLASTY L1 performed by Martha Edmondson MD at [address] [place] [address] ARTHROPLASTY Left 2003    TUBAL LIGATION  1969    UPPER GASTROINTESTINAL ENDOSCOPY      UPPER GASTROINTESTINAL ENDOSCOPY N/A 5/28/2021    EGD ESOPHAGOGASTRODUODENOSCOPY performed by Vivienne Garg MD at [address]          Medications Prior to Admission:       Prior to Admission medications    Medication Sig Start Date End Date Taking?  Authorizing Provider   vitamin E 1000 units capsule Take 1,000 Units by mouth daily    Historical Provider, MD   calcium carbonate 600 MG TABS tablet Take 1 tablet by mouth 2 times daily    Historical Provider, MD   losartan (COZAAR) 100 MG tablet Take 100 mg by mouth daily    Historical Provider, MD   meclizine (ANTIVERT) 25 MG tablet Take 25 mg by mouth daily as needed for Dizziness    Historical Provider, MD   amiodarone (CORDARONE) 200 MG tablet Take 1 tablet by mouth 2 times daily 4/23/21   Anita Paredes MD   metoprolol tartrate (LOPRESSOR) 25 MG tablet Take 1 tablet by mouth 2 times daily 4/23/21   Anita Paredes MD   acetaminophen (TYLENOL) 500 MG tablet Take 500 mg by mouth every 6 hours as needed for Pain    Historical Provider, MD   isosorbide mononitrate (IMDUR) 60 MG extended release tablet Take 60 mg by mouth daily    Historical Provider, MD   rivaroxaban (XARELTO) 20 MG TABS tablet Take 20 mg by mouth daily (with breakfast)  2/22/21   Historical Provider, MD   diphenhydrAMINE-APAP, sleep, (TYLENOL PM EXTRA STRENGTH)  MG tablet Take 1 tablet by mouth nightly as needed     Historical Provider, MD   torsemide (DEMADEX) 20 MG tablet Take 1 tablet by mouth 2 times daily Take twice a day for 1 months then go back to 20 mg daily 3/4/21   Sukh Nayak MD   amoxicillin (AMOXIL) 500 MG capsule Take 2,000 mg by mouth as needed (dental procedures) Dental only 9/21/20   Historical Provider, MD   oxybutynin (DITROPAN XL) 15 MG extended release tablet Take 15 mg by mouth daily  7/30/20   Historical Provider, MD   polyethylene glycol (GLYCOLAX) 17 GM/SCOOP powder Take 17 g by mouth daily as needed (constipation)  7/30/20   Historical Provider, MD   Ascorbic Acid (VITAMIN C) 250 MG tablet Take 250 mg by mouth daily    Historical Provider, MD   ferrous sulfate (FLACO-IN-SOL) 75 (15 Fe) MG/ML solution Take 15 mg by mouth daily    Historical Provider, MD   omeprazole (PRILOSEC) 40 MG delayed release capsule Take 40 mg by mouth Daily  12/3/16   Historical Provider, MD   atorvastatin (LIPITOR) 40 MG tablet Take 40 mg by mouth daily  9/22/16   Historical Provider, MD   potassium chloride SA (K-DUR;KLOR-CON M) 20 MEQ tablet Take 1 tablet by mouth 2 times daily 3/10/16   Sukh Nayak MD   rOPINIRole (REQUIP) 0.5 MG tablet Take 0.5 mg by mouth 4 times daily as needed     Historical Provider, MD   Vitamin D (CHOLECALCIFEROL) 1000 UNITS CAPS capsule Take 1,000 Units by mouth 2 times daily    Historical Provider, MD   predniSONE (DELTASONE) 5 MG tablet Take 20 mg by mouth daily     Historical Provider, MD   Omega 3 1000 MG CAPS Take 1,000 mg by mouth daily     Historical Provider, MD   sertraline (ZOLOFT) 50 MG tablet Take 50 mg by mouth daily.       Historical Provider, MD        Allergies: Adhesive tape, Meperidine, Demerol, Ultram [tramadol hcl], Zocor [simvastatin], Codeine, and Fentanyl    Social History:     Tobacco:    reports that she has never smoked. She has never used smokeless tobacco.  Alcohol:      reports current alcohol use. Drug Use:  reports no history of drug use. Family History:     Family History   Problem Relation Age of Onset    Heart Failure Brother     Heart Attack Brother         triple bypass    Prostate Cancer Brother     Coronary Art Dis Mother     Breast Cancer Mother         dx'd in late 46s    Emphysema Father     Colon Cancer Neg Hx     Diabetes Neg Hx     Eclampsia Neg Hx     Hypertension Neg Hx     Ovarian Cancer Neg Hx      Labor Neg Hx     Spont Abortions Neg Hx     Stroke Neg Hx        Review of Systems:     Positive and Negative as described in HPI   all 10 systems are reviewed and negative except as Noted      Review of Systems   Unable to perform ROS: Acuity of condition       Code Status:  DNR-CC    Physical Exam:     Vitals:  BP (!) 78/67   Pulse 99   Temp 94.5 °F (34.7 °C) (Axillary)   Resp 14   SpO2 (!) 88%   Temp (24hrs), Av.4 °F (35.8 °C), Min:94.5 °F (34.7 °C), Max:97.4 °F (36.3 °C)        Physical Exam  Vitals reviewed. HENT:      Head: Normocephalic. Right Ear: External ear normal.      Left Ear: External ear normal.      Nose: Nose normal.      Mouth/Throat:      Pharynx: Oropharynx is clear. Cardiovascular:      Rate and Rhythm: Normal rate and regular rhythm. Pulses: Normal pulses. Heart sounds: Normal heart sounds. Pulmonary:      Effort: Pulmonary effort is normal.      Breath sounds: Examination of the right-lower field reveals rales. Examination of the left-lower field reveals rales. Rales present. Abdominal:      General: Bowel sounds are normal.      Palpations: Abdomen is soft. Musculoskeletal:         General: No deformity. Cervical back: Neck supple.       Right lower leg: Edema (1+) present. Left lower leg: Edema (1+) present. Skin:     General: Skin is warm. Capillary Refill: Capillary refill takes less than 2 seconds. Coloration: Skin is not jaundiced. Data:     No results found for this or any previous visit (from the past 24 hour(s)). Assesment:     Primary Problem  Acute on chronic respiratory failure with hypoxia (HCC)    Principal Problem:    Acute on chronic respiratory failure with hypoxia (HCC)  Active Problems:    Acute on chronic diastolic congestive heart failure (HCC)    Paroxysmal atrial fibrillation (HCC)    YOGESH (acute kidney injury) ([facility]    Iron deficiency anemia    Bacteremia    Sepsis ([facility]  Resolved Problems:    * No resolved hospital problems. *      Plan:     1. Morphine 1 mg IV every hour as needed for moderate pain  2. Robinul 0.1 mg every 2 hours as needed for respiratory secretions  3. Ativan 1 mg IV every hour as needed for anxiety  4. Discussed with family members at bedside  5. Discussed with inpatient hospice Representative Cheo Madrigal on the floor  6.  [facility]        Electronically signed by La Nena Veloz MD     Copy sent to Dr. Matt Ann DO

## 2021-06-18 NOTE — FLOWSHEET NOTE
Follow up visit with patient and her family; patient unresponsive; [place] visit with children and grandchildren; medical update provided; family continues to hold bedside rodriguez and shared stories about patient; listening presence and support;     06/18/21 1343   Encounter Summary   Services provided to: Patient and family together   Referral/Consult From: [address] [place] [address] Children;Family members   Continue Visiting   (6/18/21)   Complexity of Encounter Moderate   Length of Encounter 45 minutes   Spiritual Assessment Completed Yes   Grief and Life Adjustment   Type Hospice; End of life   Assessment Approachable;Tearful;Grieving; Hopeful;Coping;Helplessness; Concerns with suffering   Intervention Active listening;Explored feelings, thoughts, concerns;Sustaining presence/ Ministry of presence; Discussed illness/injury and it's impact; End of life care;Grief care; Discussed death;Discussed afterlife; Discussed belief system/Restorationist practices/rhina   Outcome Comfort;Expressed gratitude;Engaged in conversation;Expressed feelings/needs/concerns;Coping;Tearful; Hopeful;Receptive

## 2021-06-19 NOTE — FLOWSHEET NOTE
06/19/21 1148   Encounter Summary   Services provided to: Patient   Referral/Consult From: Hospice   Continue Visiting   (6-19-21)   Complexity of Encounter Low   Length of Encounter 15 minutes   Spiritual/Gnosticism   Type Spiritual support   Intervention Prayer

## 2021-06-19 NOTE — FLOWSHEET NOTE
Family hopeful patient won't go to hospice on Monday; praying for patient to [facility] peacefully. \"      06/19/21 1149   Encounter Summary   Services provided to: Family   Referral/Consult From: [facility]   (6-16-21)   Complexity of Encounter Low   Length of Encounter 15 minutes   Spiritual/Rastafarian   Type Spiritual support   Grief and Life Adjustment   Type Hospice; End of life   Assessment Calm; Approachable  (Exhausted)   Intervention Active listening;Explored feelings, thoughts, concerns;Sustaining presence/ Ministry of presence   Outcome Expressed gratitude;Comfort;Engaged in conversation;Expressed feelings/needs/concerns;Receptive

## 2021-06-19 NOTE — PLAN OF CARE
Problem: Skin Integrity:  Goal: Will show no infection signs and symptoms  Description: Will show no infection signs and symptoms  6/19/2021 0143 by Deborah Solo RN  Outcome: Ongoing  6/18/2021 1841 by Davidson Montalvo RN  Outcome: Ongoing  Goal: Absence of new skin breakdown  Description: Absence of new skin breakdown  6/19/2021 0143 by Deborah Solo RN  Outcome: Ongoing  6/18/2021 1841 by Davidson Montalvo RN  Outcome: Ongoing  Note: Skin assessment as charted. No new areas of breakdown.

## 2021-06-19 NOTE — CARE COORDINATION
DISCHARGE PLANNING NOTE:    I spoke with Carter Rees and she stated that if this patient is still with us and the the patients  is still inpatient in this hospital receiving treatment, that come Monday the patient will be expected to transfer to [facility]     The family was notified of this information and agreeable to the plan.      Electronically signed by Ilan Roche RN on 6/19/2021 at 9:53 AM

## 2021-06-19 NOTE — PROGRESS NOTES
Progress Note    2021   3:22 PM    Name:  Abel Nobles  MRN:    [id number]     Acct:     [de-identified]   Room:    IP Day: 2     Admit Date: 2021  6:24 PM  PCP: Montserrat Herman DO    Subjective:     C/C: No chief complaint on file. Interval History: Status: not changed. Patient is of oxygen via nasal cannula with an oxygen saturation of 86%. Patient not responding or opening eyes to verbal commands. Blood pressure 95/71 heart rate 11 temperature 97.3    ROS:   all 10 systems reviewed and are negative except as noted    Review of Systems   Unable to perform ROS: Acuity of condition       Medications: Allergies:    Allergies   Allergen Reactions    Adhesive Tape Rash    Meperidine Nausea Only    Demerol Nausea Only    Ultram [Tramadol Hcl]      Hot flashes    Zocor [Simvastatin]      Muscle aches    Codeine      \"out of it\"    Fentanyl Rash       Current Meds: acetaminophen (TYLENOL) tablet 650 mg, Q6H PRN   Or  acetaminophen (TYLENOL) suppository 650 mg, Q6H PRN  glycopyrrolate (ROBINUL) injection 0.1 mg, Q2H PRN  LORazepam (ATIVAN) injection 1 mg, Q1H PRN  morphine (PF) injection 1 mg, Q1H PRN        Data:     Code Status:  DNR-CC    Family History   Problem Relation Age of Onset    Heart Failure Brother     Heart Attack Brother         triple bypass    Prostate Cancer Brother     Coronary Art Dis Mother     Breast Cancer Mother         dx'd in late 46s    Emphysema Father     Colon Cancer Neg Hx     Diabetes Neg Hx     Eclampsia Neg Hx     Hypertension Neg Hx     Ovarian Cancer Neg Hx      Labor Neg Hx     Spont Abortions Neg Hx     Stroke Neg Hx        Social History     Socioeconomic History    Marital status:      Spouse name: Not on file    Number of children: Not on file    Years of education: Not on file    Highest education level: Not on file   Occupational History    Not on file   Tobacco Use    Smoking status: Never Smoker    Smokeless tobacco: Never Used   Vaping Use    Vaping Use: Never used   Substance and Sexual Activity    Alcohol use: Yes     Comment: social    Drug use: No    Sexual activity: Not Currently   Other Topics Concern    Not on file   Social History Narrative    Not on file     Social Determinants of Health     Financial Resource Strain:     Difficulty of Paying Living Expenses:    Food Insecurity:     Worried About Running Out of Food in the Last Year:     [address] in the Last Year:    Transportation Needs:     Lack of Transportation (Medical):  Lack of Transportation (Non-Medical):    Physical Activity:     Days of Exercise per Week:     Minutes of Exercise per Session:    Stress:     Feeling of Stress :    Social Connections:     Frequency of Communication with Friends and Family:     Frequency of Social Gatherings with Friends and Family:     Attends Holiness Services:     Active Member of Clubs or Organizations:     Attends Club or Organization Meetings:     Marital Status:    Intimate Partner Violence:     Fear of Current or Ex-Partner:     Emotionally Abused:     Physically Abused:     Sexually Abused:        I/O (24Hr): Intake/Output Summary (Last 24 hours) at 6/19/2021 1522  Last data filed at 6/18/2021 1658  Gross per 24 hour   Intake --   Output 850 ml   Net -850 ml     Radiology:  US RENAL COMPLETE    Result Date: 6/16/2021  1. Diffusely increased renal cortical echogenicity, which can be seen with medical renal disease. 2.  No hydronephrosis. 3.  Small bilateral renal cysts. XR CHEST PORTABLE    Result Date: 6/16/2021  Increasing multifocal bilateral infiltrates, particularly in the right perihilar and upper lobe regions since the prior study. Findings were discussed with Kelli Lan at 9:29 am on 6/16/2021.      XR CHEST PORTABLE    Result Date: 6/15/2021  Scattered pulmonary opacities, cardiomegaly and left effusion are noted which may be on the basis of pulmonary edema with multifocal airspace disease not excluded. Labs:  No results found for this or any previous visit (from the past 24 hour(s)). Physical Examination:        Vitals:  BP 95/71   Pulse 92   Temp 97.3 °F (36.3 °C) (Axillary)   Resp 11   SpO2 (!) 87%   Temp (24hrs), Av.9 °F (36.1 °C), Min:96.5 °F (35.8 °C), Max:97.3 °F (36.3 °C)    No results for input(s): POCGLU in the last 72 hours. Physical Exam  Constitutional:       Comments: Not responding to verbal commands. Cardiovascular:      Rate and Rhythm: Normal rate and regular rhythm. Pulses: Normal pulses. Heart sounds: Normal heart sounds. Pulmonary:      Effort: Pulmonary effort is normal. No respiratory distress. Breath sounds: Normal breath sounds. No stridor. Skin:     General: Skin is warm. Assessment:        Primary Problem  Acute on chronic respiratory failure with hypoxia (HCC)     Principal Problem:    Acute on chronic respiratory failure with hypoxia (HCC)  Active Problems:    Acute on chronic diastolic congestive heart failure (HCC)    Paroxysmal atrial fibrillation (HCC)    YOGESH (acute kidney injury) ([address]    Iron deficiency anemia    Bacteremia    Sepsis ([address]  Resolved Problems:    * No resolved hospital problems. *      Past Medical History:   Diagnosis Date    Asbestos exposure     Atrial fibrillation ([address]     takes xarelto    Basal cell carcinoma of nose     CAD (coronary artery disease)     CKD (chronic kidney disease) stage 2, GFR 60-89 ml/min     Hyperlipidemia     Hypertension     MDRO (multiple drug resistant organisms) resistance 2014    E.  Coli urine    Nephrolithiasis     Osteoarthritis     Ovarian cyst     removed    Oxygen dependent     2 LNC mainly at night, during the day as needed    Palpitations     Peritoneal mesothelioma ([address]     treated at [facility] PONV (postoperative nausea and vomiting)     Prolonged emergence from general anesthesia  SOB (shortness of breath) on exertion         Plan:        1. IV morphine 1 mg every hour as needed for pain/difficulty breathing  2. Robinul 0.1 mg every 2 hours as needed for respiratory secretions  3. IV Ativan 1 mg every hour as needed for anxiety  4. Discussed with family member at bedside  5.  [facility]    Electronically signed by Ronaldo Haq MD

## 2021-06-20 NOTE — FLOWSHEET NOTE
06/20/21 0900   Encounter Summary   Services provided to: Patient   Referral/Consult From: Hospice   Complexity of Encounter Low   Length of Encounter 15 minutes   Spiritual/Gnosticist   Type Spiritual support   Assessment Unable to respond   Intervention Prayer

## 2021-06-20 NOTE — FLOWSHEET NOTE
Spiritual and emotional support provided to family throughout the day as they continue to hold bedside rodriguez; patient unresponsive; welcomed prayer; listening presence and support;     06/20/21 1521   Encounter Summary   Services provided to: Patient and family together   Referral/Consult From: [facility] 49; Children;Family members   Continue Visiting   (6/20/21)   Complexity of Encounter Moderate   Length of Encounter 1 hour;30 minutes   Spiritual Assessment Completed Yes   Grief and Life Adjustment   Type Hospice; End of life   Assessment Approachable;Tearful;Coping;Helplessness; Hopeful; Anticipatory grief;Concerns with suffering   Intervention Active listening;Explored feelings, thoughts, concerns;Prayer;Sustaining presence/ Ministry of presence; Discussed illness/injury and it's impact   Outcome Comfort;Expressed gratitude;Engaged in conversation;Expressed feelings/needs/concerns;Coping; Hopeful;Receptive

## 2021-06-20 NOTE — CARE COORDINATION
DISCHARGE PLANNING NOTE:    I spoke with Latricia Jennings and she stated that if this patient is still with us and the the patients  is still inpatient in this hospital receiving treatment, that come Monday the patient will be expected to transfer to [facility] of [address] [place] [address]     The family was notified of this information and agreeable to the plan.     Electronically signed by Olga Larios RN on 6/20/2021 at 9:41 AM

## 2021-06-20 NOTE — PROGRESS NOTES
Progress Note    2021   3:12 PM    Name:  Tyesha Rosales  MRN:    [id number]     Acct:     [de-identified]   Room:     Day: 3     Admit Date: 2021  6:24 PM  PCP: Jase Corea DO    Subjective:     C/C: No chief complaint on file. Interval History: Status: not changed. Patient is not responding to verbal commands. Patient oxygen saturation is 87% on suggestive of oxygen via nasal cannula. Blood pressure and heart rate readings noted. Patient 96.8. ROS:   all 10 systems reviewed and are negative except as noted    Review of Systems   Unable to perform ROS: Acuity of condition       Medications: Allergies:    Allergies   Allergen Reactions    Adhesive Tape Rash    Meperidine Nausea Only    Demerol Nausea Only    Ultram [Tramadol Hcl]      Hot flashes    Zocor [Simvastatin]      Muscle aches    Codeine      \"out of it\"    Fentanyl Rash       Current Meds: acetaminophen (TYLENOL) tablet 650 mg, Q6H PRN   Or  acetaminophen (TYLENOL) suppository 650 mg, Q6H PRN  glycopyrrolate (ROBINUL) injection 0.1 mg, Q2H PRN  LORazepam (ATIVAN) injection 1 mg, Q1H PRN  morphine (PF) injection 1 mg, Q1H PRN        Data:     Code Status:  DNR-CC    Family History   Problem Relation Age of Onset    Heart Failure Brother     Heart Attack Brother         triple bypass    Prostate Cancer Brother     Coronary Art Dis Mother     Breast Cancer Mother         dx'd in late 46s    Emphysema Father     Colon Cancer Neg Hx     Diabetes Neg Hx     Eclampsia Neg Hx     Hypertension Neg Hx     Ovarian Cancer Neg Hx      Labor Neg Hx     Spont Abortions Neg Hx     Stroke Neg Hx        Social History     Socioeconomic History    Marital status:      Spouse name: Not on file    Number of children: Not on file    Years of education: Not on file    Highest education level: Not on file   Occupational History    Not on file   Tobacco Use    Smoking status: Never Smoker    Smokeless tobacco: Never Used   Vaping Use    Vaping Use: Never used   Substance and Sexual Activity    Alcohol use: Yes     Comment: social    Drug use: No    Sexual activity: Not Currently   Other Topics Concern    Not on file   Social History Narrative    Not on file     Social Determinants of Health     Financial Resource Strain:     Difficulty of Paying Living Expenses:    Food Insecurity:     Worried About Running Out of Food in the Last Year:     [address] in the Last Year:    Transportation Needs:     Lack of Transportation (Medical):  Lack of Transportation (Non-Medical):    Physical Activity:     Days of Exercise per Week:     Minutes of Exercise per Session:    Stress:     Feeling of Stress :    Social Connections:     Frequency of Communication with Friends and Family:     Frequency of Social Gatherings with Friends and Family:     Attends Episcopal Services:     Active Member of Clubs or Organizations:     Attends Club or Organization Meetings:     Marital Status:    Intimate Partner Violence:     Fear of Current or Ex-Partner:     Emotionally Abused:     Physically Abused:     Sexually Abused:        I/O (24Hr): No intake or output data in the 24 hours ending 06/20/21 1512  Radiology:  US RENAL COMPLETE    Result Date: 6/16/2021  1. Diffusely increased renal cortical echogenicity, which can be seen with medical renal disease. 2.  No hydronephrosis. 3.  Small bilateral renal cysts. XR CHEST PORTABLE    Result Date: 6/16/2021  Increasing multifocal bilateral infiltrates, particularly in the right perihilar and upper lobe regions since the prior study. Findings were discussed with Sydni Ontiveros at 9:29 am on 6/16/2021. XR CHEST PORTABLE    Result Date: 6/15/2021  Scattered pulmonary opacities, cardiomegaly and left effusion are noted which may be on the basis of pulmonary edema with multifocal airspace disease not excluded.        Labs:  No results found for this or any previous visit (from the past 24 hour(s)). Physical Examination:        Vitals:  BP 93/71   Pulse 99   Temp 96.8 °F (36 °C) (Axillary)   Resp 12   SpO2 (!) 78%   Temp (24hrs), Av °F (36.1 °C), Min:96.8 °F (36 °C), Max:97.3 °F (36.3 °C)    No results for input(s): POCGLU in the last 72 hours. Physical Exam  Constitutional:       Comments: Patient not responding to verbal commands   HENT:      Mouth/Throat:      Pharynx: Oropharynx is clear. Eyes:      Conjunctiva/sclera: Conjunctivae normal.   Cardiovascular:      Rate and Rhythm: Normal rate and regular rhythm. Pulmonary:      Breath sounds: Rales present. Musculoskeletal:      Right lower leg: Edema present. Left lower leg: Edema present. Skin:     General: Skin is warm. Assessment:        Primary Problem  Acute on chronic respiratory failure with hypoxia (HCC)     Principal Problem:    Acute on chronic respiratory failure with hypoxia (HCC)  Active Problems:    Acute on chronic diastolic congestive heart failure (HCC)    Paroxysmal atrial fibrillation (HCC)    YOGESH (acute kidney injury) ([address]    Iron deficiency anemia    Bacteremia    Sepsis ([address]  Resolved Problems:    * No resolved hospital problems. *      Past Medical History:   Diagnosis Date    Asbestos exposure     Atrial fibrillation ([address]     takes xarelto    Basal cell carcinoma of nose     CAD (coronary artery disease)     CKD (chronic kidney disease) stage 2, GFR 60-89 ml/min     Hyperlipidemia     Hypertension     MDRO (multiple drug resistant organisms) resistance 2014    E.  Coli urine    Nephrolithiasis     Osteoarthritis     Ovarian cyst     removed    Oxygen dependent     2 LNC mainly at night, during the day as needed    Palpitations     Peritoneal mesothelioma ([address]     [facility] at [facility] PONV (postoperative nausea and vomiting)     Prolonged emergence from general anesthesia     SOB (shortness of breath) on exertion         Plan:        1. IV Ativan 1 mg every hour as needed for anxiety. 2. IV morphine 1 mg every hour as needed for pain shortness of breath. 3. Robinul 0.1 mg every 2 hours as needed for respiratory secretions. 4. Discussed with family members at bedside.       Electronically signed by Michael Sexton MD

## 2021-06-20 NOTE — PLAN OF CARE
Problem: Skin Integrity:  Goal: Absence of new skin breakdown  Description: Absence of new skin breakdown  6/20/2021 0456 by Kvng Linder RN  Outcome: Ongoing     Problem: Skin Integrity:  Goal: Will show no infection signs and symptoms  Description: Will show no infection signs and symptoms  6/20/2021 1513 by Ho Wei RN  Outcome: Completed  6/20/2021 0456 by Kvng Linder RN  Outcome: Ongoing  Note: Skin assessment complete. Waffle mattress in place. Area kept free from moisture.

## 2021-06-20 NOTE — PLAN OF CARE
Problem: Skin Integrity:  Goal: Will show no infection signs and symptoms  Description: Will show no infection signs and symptoms  6/20/2021 0456 by Angelica Rodríguez RN  Outcome: Ongoing  Note: Skin assessment complete. Waffle mattress in place. Area kept free from moisture.      Problem: Skin Integrity:  Goal: Absence of new skin breakdown  Description: Absence of new skin breakdown  6/20/2021 0456 by Angelica Rodríguez RN  Outcome: Ongoing

## 2021-06-21 NOTE — PLAN OF CARE
Problem: Skin Integrity:  Goal: Absence of new skin breakdown  Description: Absence of new skin breakdown  6/21/2021 0437 by Stefanie Schreiber RN  Outcome: Ongoing  Note: No new occurrence of skin breakdown noted during this shift. Problem: Pain:  Goal: Control of acute pain  Description: Control of acute pain  Outcome: Ongoing  Note: Patient rests peacefully following administration of prn pain medication.

## 2021-06-21 NOTE — PLAN OF CARE
Problem: Skin Integrity:  Goal: Absence of new skin breakdown  Description: Absence of new skin breakdown  Outcome: Ongoing  Note: No signs of increased skin or tissue breakdown is noted. See Head to Toe/LDA assessments in flowsheets. Problem: Pain:  Goal: Pain level will decrease  Description: Pain level will decrease  Outcome: Ongoing  Note: Patient's pain is well controlled with current regimen. See MAR.      Goal: Control of acute pain  Description: Control of acute pain  Outcome: Ongoing  Goal: Control of chronic pain  Description: Control of chronic pain  Outcome: Ongoing

## 2021-06-21 NOTE — PROGRESS NOTES
Progress Note    2021   1:58 PM    Name:  Bayron Hardin  MRN:    [id number]     Acct:     [de-identified]   Room:    IP Day: 4     Admit Date: 2021  6:24 PM  PCP: Robert Mays DO    Subjective:     C/C: No chief complaint on file. Interval History: Status: not changed. Patient not responding to verbal commands. Patient oxygen saturation is 88% on 6 L of oxygen via nasal cannula, temp 97.3, pressure 88/52    ROS:   all 10 systems reviewed and are negative except as noted    Review of Systems   Unable to perform ROS: Acuity of condition       Medications: Allergies:    Allergies   Allergen Reactions    Adhesive Tape Rash    Meperidine Nausea Only    Demerol Nausea Only    Ultram [Tramadol Hcl]      Hot flashes    Zocor [Simvastatin]      Muscle aches    Codeine      \"out of it\"    Fentanyl Rash       Current Meds: acetaminophen (TYLENOL) tablet 650 mg, Q6H PRN   Or  acetaminophen (TYLENOL) suppository 650 mg, Q6H PRN  glycopyrrolate (ROBINUL) injection 0.1 mg, Q2H PRN  LORazepam (ATIVAN) injection 1 mg, Q1H PRN  morphine (PF) injection 1 mg, Q1H PRN        Data:     Code Status:  DNR-CC    Family History   Problem Relation Age of Onset    Heart Failure Brother     Heart Attack Brother         triple bypass    Prostate Cancer Brother     Coronary Art Dis Mother     Breast Cancer Mother         dx'd in late 46s    Emphysema Father     Colon Cancer Neg Hx     Diabetes Neg Hx     Eclampsia Neg Hx     Hypertension Neg Hx     Ovarian Cancer Neg Hx      Labor Neg Hx     Spont Abortions Neg Hx     Stroke Neg Hx        Social History     Socioeconomic History    Marital status:      Spouse name: Not on file    Number of children: Not on file    Years of education: Not on file    Highest education level: Not on file   Occupational History    Not on file   Tobacco Use    Smoking status: Never Smoker    Smokeless tobacco: Never Used   Vaping Use    Vaping Use: Never used   Substance and Sexual Activity    Alcohol use: Yes     Comment: social    Drug use: No    Sexual activity: Not Currently   Other Topics Concern    Not on file   Social History Narrative    Not on file     Social Determinants of Health     Financial Resource Strain:     Difficulty of Paying Living Expenses:    Food Insecurity:     Worried About Running Out of Food in the Last Year:     [address] in the Last Year:    Transportation Needs:     Lack of Transportation (Medical):  Lack of Transportation (Non-Medical):    Physical Activity:     Days of Exercise per Week:     Minutes of Exercise per Session:    Stress:     Feeling of Stress :    Social Connections:     Frequency of Communication with Friends and Family:     Frequency of Social Gatherings with Friends and Family:     Attends Jewish Services:     Active Member of Clubs or Organizations:     Attends Club or Organization Meetings:     Marital Status:    Intimate Partner Violence:     Fear of Current or Ex-Partner:     Emotionally Abused:     Physically Abused:     Sexually Abused:        I/O (24Hr): No intake or output data in the 24 hours ending 06/21/21 1358  Radiology:  US RENAL COMPLETE    Result Date: 6/16/2021  1. Diffusely increased renal cortical echogenicity, which can be seen with medical renal disease. 2.  No hydronephrosis. 3.  Small bilateral renal cysts. XR CHEST PORTABLE    Result Date: 6/16/2021  Increasing multifocal bilateral infiltrates, particularly in the right perihilar and upper lobe regions since the prior study. Findings were discussed with Dane Rivera at 9:29 am on 6/16/2021. XR CHEST PORTABLE    Result Date: 6/15/2021  Scattered pulmonary opacities, cardiomegaly and left effusion are noted which may be on the basis of pulmonary edema with multifocal airspace disease not excluded.        Labs:  No results found for this or any previous visit (from the past 24 hour(s)). Physical Examination:        Vitals:  BP (!) 88/52   Pulse 100   Temp 97.3 °F (36.3 °C) (Axillary)   Resp 10   SpO2 (!) 88%   Temp (24hrs), Av.8 °F (36.6 °C), Min:97.3 °F (36.3 °C), Max:98.1 °F (36.7 °C)    No results for input(s): POCGLU in the last 72 hours. Physical Exam  HENT:      Mouth/Throat:      Mouth: Mucous membranes are dry. Cardiovascular:      Rate and Rhythm: Normal rate and regular rhythm. Pulmonary:      Effort: No respiratory distress. Breath sounds: Examination of the right-lower field reveals rales. Examination of the left-lower field reveals rales. Rales present. No rhonchi. Abdominal:      General: Bowel sounds are decreased. Musculoskeletal:      Right lower leg: Edema (1+) present. Left lower leg: Edema (1+) present. Skin:     General: Skin is warm. Neurological:      Comments: Patient responding to verbal commands         Assessment:        Primary Problem  Acute on chronic respiratory failure with hypoxia (HCC)     Principal Problem:    Acute on chronic respiratory failure with hypoxia (HCC)  Active Problems:    Acute on chronic diastolic congestive heart failure (HCC)    Paroxysmal atrial fibrillation (HCC)    YOGESH (acute kidney injury) ([address]    Iron deficiency anemia    Bacteremia    Sepsis ([address]  Resolved Problems:    * No resolved hospital problems. *      Past Medical History:   Diagnosis Date    Asbestos exposure     Atrial fibrillation ([address]     takes xarelto    Basal cell carcinoma of nose     CAD (coronary artery disease)     CKD (chronic kidney disease) stage 2, GFR 60-89 ml/min     Hyperlipidemia     Hypertension     MDRO (multiple drug resistant organisms) resistance 2014    E.  Coli urine    Nephrolithiasis     Osteoarthritis     Ovarian cyst     removed    Oxygen dependent     2 LNC mainly at night, during the day as needed    Palpitations     Peritoneal mesothelioma ([address]     treated at [address] [place] [address] (postoperative nausea and vomiting)     Prolonged emergence from general anesthesia     SOB (shortness of breath) on exertion         Plan:        1. IV morphine 1 mg every hour as needed for pain/shortness of breath   2. IV Ativan 1 mg every hour as needed for anxiety  3. Robinul 0.1 mg every 2 hours as needed for respiratory secretions  4.   Discussed with family members at bedside        Electronically signed by Esperanza Dietrich MD

## 2021-06-22 NOTE — PLAN OF CARE
Problem: Skin Integrity:  Goal: Absence of new skin breakdown  Description: Absence of new skin breakdown  6/22/2021 0445 by Dariela Wilkins RN  Outcome: Ongoing  6/21/2021 1928 by Chencho Swenson RN  Outcome: Ongoing  Note: No signs of increased skin or tissue breakdown is noted. See Head to Toe/LDA assessments in flowsheets. Problem: Pain:  Goal: Pain level will decrease  Description: Pain level will decrease  6/22/2021 0445 by Dariela Wilkins RN  Outcome: Ongoing  6/21/2021 1928 by Chencho Swenson RN  Outcome: Ongoing  Note: Patient's pain is well controlled with current regimen. See MAR.      Goal: Control of acute pain  Description: Control of acute pain  6/22/2021 0445 by Dariela Wilkins RN  Outcome: Ongoing  6/21/2021 1928 by Chencho Swenson RN  Outcome: Ongoing  Goal: Control of chronic pain  Description: Control of chronic pain  6/22/2021 0445 by Dariela Wilkins RN  Outcome: Ongoing  6/21/2021 1928 by Chencho Swenson RN  Outcome: Ongoing

## 2021-06-22 NOTE — PLAN OF CARE
Problem: Skin Integrity:  Goal: Absence of new skin breakdown  Description: Absence of new skin breakdown  6/22/2021 1253 by Kathy Childs RN  Outcome: Ongoing, Family at bed side requested not to reposition patient so often. Education provided regarding tissue integrity. Problem: Pain:  Goal: Pain level will decrease  Description: Pain level will decrease  6/22/2021 1253 by Kathy Childs RN  Outcome: Ongoing, patient is actively dying, Non verbal scale. Signs and symptoms of pain and restless watched.

## 2021-06-22 NOTE — FLOWSHEET NOTE
Patient was peaceful with no real change. Daughter Emily Correa was present along with family's . 06/22/21 1220   Encounter Summary   Services provided to: Patient and family together   Referral/Consult From: Hospice   Continue Visiting   (6-22-21)   Complexity of Encounter Moderate   Length of Encounter 15 minutes   Spiritual/Catholic   Type Spiritual support   Grief and Life Adjustment   Type End of life; Hospice   Assessment Calm; Approachable   Intervention Active listening;Explored feelings, thoughts, concerns;Sustaining presence/ Ministry of presence; Discussed illness/injury and it's impact   Outcome Expressed gratitude;Engaged in conversation;Expressed feelings/needs/concerns;Receptive

## 2021-06-22 NOTE — DISCHARGE INSTR - COC
Continuity of Care Form    Patient Name: Kiara Kennedy   :  1935  MRN:  [id number]    Admit date:  2021  Discharge date:  ***    Code Status Order: DNR-CC   Advance Directives:     Admitting Physician:  Ivonne Méndez MD  PCP: Francisca Rose DO    Discharging Nurse: [facility] Unit/Room#: /-01  Discharging Unit Phone Number: ***    Emergency Contact:   Extended Emergency Contact Information  Primary Emergency Contact:  [address] of [address] [place] [address] Phone: [phone]  Mobile Phone: [phone]  Relation: Child  Secondary Emergency Contact: Jim Butler  Address: [address], [address] [address] Phone: [phone]  Work Phone: [phone]  Mobile Phone: [phone]  Relation: Spouse    Past Surgical History:  Past Surgical History:   Procedure Laterality Date    ANKLE FRACTURE SURGERY Right 2013    APPENDECTOMY  1964    BIOPSY / LIGATION TEMPORAL ARTERY Bilateral 2021    TEMPORAL ARTERY BIOPSY performed by Cecilia Mckinley MD at [address] Right    [address]      CATARACT REMOVAL Bilateral     CHOLECYSTECTOMY  2005    COLONOSCOPY      COLONOSCOPY N/A 2021    COLONOSCOPY POLYPECTOMY SNARE/COLD BIOPSY performed by Debo Vaughn MD at [address]  2002    CT NEEDLE BIOPSY SPINE  2021    CT NEEDLE BIOPSY SPINE 2021 [facility] CT SCAN    HYSTERECTOMY  1981    LIVER BIOPSY      LIVER BIOPSY      NASAL ENDOSCOPY Bilateral 2014    lt nasal cautery with packing    PHOTODYNAMIC THERAPY  2000 & 2002    for peritoneal Mesothelioma    [address]    REVISION TOTAL HIP ARTHROPLASTY Right     ROTATOR CUFF REPAIR  2006    SKIN CANCER EXCISION Bilateral 2006    ankle    SKIN CANCER EXCISION Left Oct. 2014    shoulder    SPINE SURGERY N/A 4/15/2021 KYPHOPLASTY L1 performed by Sreedhar Jennings MD at [address] Left 2003    TUBAL LIGATION  1969    UPPER GASTROINTESTINAL ENDOSCOPY      UPPER GASTROINTESTINAL ENDOSCOPY N/A 5/28/2021    EGD ESOPHAGOGASTRODUODENOSCOPY performed by Everette Spangler MD at [facility]         Immunization History:   Immunization History   Administered Date(s) Administered    COVID-19, Moderna, PF, 100mcg/0.5mL 01/08/2021, 02/05/2021    Influenza Virus Vaccine 10/03/2017, 11/01/2017, 11/01/2020    Influenza, High Dose (Fluzone 65 yrs and older) 10/12/2016    PPD Test 03/01/2021    Pneumococcal Conjugate 13-valent (Arthuro Roxi) 08/16/2016, 08/16/2016, 04/01/2017    Pneumococcal Polysaccharide ([id number]) 10/23/2017    Td (Adult), 5 Lf Tetanus Toxoid, Pf (Tenivac, Decavac) 05/06/2009    Tdap (Boostrix, Adacel) 05/14/2019       Active Problems:  Patient Active Problem List   Diagnosis Code    Hypertension I10    Nephrolithiasis N20.0    Mixed hyperlipidemia E78.2    Osteoarthritis M19.90    Peritoneal mesothelioma ([facility] C45.1    Ovarian cyst N83.209    Basal cell carcinoma of nose C44.311    Bimalleolar ankle fracture S82.843A    Epistaxis, recurrent R04.0    Cervical spondylosis M47.812    Trochanteric bursitis, right hip M70.61    Status post bilateral total hip replacement Z96.643    Medication monitoring encounter Z51.81    Acute on chronic diastolic congestive heart failure (HCC) I50.33    Acute on chronic respiratory failure with hypoxia (HCC) J96.21    Giant cell arteritis (HCC) M31.6    Paroxysmal atrial fibrillation ([facility]) I48.0    Urinary frequency R35.0    Vitamin D deficiency E55.9    Age-related osteoporosis with current pathological fracture M80.00XA    Atrial fibrillation with RVR ([facility]) I48.91    Gastroesophageal reflux disease without esophagitis K21.9    Pulmonary hypertension ([facility]) I27.20    kg/m²     Last documented pain score (0-10 scale): Pain Level: 5  Last Weight:   Wt Readings from Last 1 Encounters:   21 174 lb 9.7 oz (79.2 kg)     Mental Status:  {IP PT MENTAL STATUS:62562}    IV Access:  { DELVIN IV ACCESS:606663293}    Nursing Mobility/ADLs:  Walking   {CHP DME MFSE:912594703}  Transfer  {P DME JWFC:558721840}  Bathing  {P DME EWON:423148181}  Dressing  {CHP DME NPQI:126754183}  Toileting  {CHP DME RTAO:957040079}  Feeding  {[facility] DME JNQX:131924509}  Med Admin  {[facility] DME VPSS:131132526}  Med Delivery   { DELVIN MED Delivery:518047211}    Wound Care Documentation and Therapy:        Elimination:  Continence:   · Bowel: {YES / XY:55963}  · Bladder: {YES / OT:18698}  Urinary Catheter: {Urinary Catheter:949873326}   Colostomy/Ileostomy/Ileal Conduit: {YES / TF:20877}  Fecal Management System-Stool Color: Brown    Date of Last BM: ***  No intake or output data in the 24 hours ending 21 0820  No intake/output data recorded.     Safety Concerns:     [employer] Safety Concerns:877472976}    Impairments/Disabilities:      [employer] Impairments/Disabilities:788035047}    Nutrition Therapy:  Current Nutrition Therapy:   [employer] Diet List:504890919}    Routes of Feeding: {[facility] DME Other Feedings:411174948}  Liquids: {Slp liquid thickness:19454}  Daily Fluid Restriction: {CHP DME Yes amt example:288679733}  Last Modified Barium Swallow with Video (Video Swallowing Test): {Done Not Done EQLK:702782244}    Treatments at the Time of Hospital Discharge:   Respiratory Treatments: ***  Oxygen Therapy:  {Therapy; copd oxygen:83324}  Ventilator:    {[facility] Vent QZXF:446316981}    Rehab Therapies: {THERAPEUTIC INTERVENTION:0636241686}  Weight Bearing Status/Restrictions: [employer]  Weight Bearin}  Other Medical Equipment (for information only, NOT a DME order):  {EQUIPMENT:272960222}  Other Treatments: ***    Patient's personal belongings (please select all that are sent with patient):  {[facility] DME Belongings:222005802}    RN SIGNATURE:  {Esignature:557860763}    CASE MANAGEMENT/SOCIAL WORK SECTION    Inpatient Status Date: ***    Readmission Risk Assessment Score:  Readmission Risk              Risk of Unplanned Readmission:  28           Discharging to Facility/ Agency   · Name:   · Address:  · Phone:  · Fax:    Dialysis Facility (if applicable)   · Name:  · Address:  · Dialysis Schedule:  · Phone:  · Fax:    / signature: {Esignature:419663803}    PHYSICIAN SECTION    Prognosis: {Prognosis:6577276479}    Condition at Discharge: [address] [place] [address] Patient Condition:419674196}    Rehab Potential (if transferring to Rehab): {Prognosis:9683815878}    Recommended Labs or Other Treatments After Discharge: ***    Physician Certification: I certify the above information and transfer of Amanda Wood  is necessary for the continuing treatment of the diagnosis listed and that she requires {Admit to Appropriate Level of Care:42439} for {GREATER/LESS:447420812} 30 days.      Update Admission H&P: {CHP DME Changes in LJINN:787381854}    PHYSICIAN SIGNATURE:  {Esignature:580179234}

## 2021-06-22 NOTE — PROGRESS NOTES
Progress Note    2021   2:36 PM    Name:  Gold Parr  MRN:    [id number]     Acct:     [de-identified]   Room:    IP Day: 5     Admit Date: 2021  6:24 PM  PCP: Ewa Ibarra DO    Subjective:     C/C: No chief complaint on file. Interval History: Status: not changed. Patient is now responding to verbal commands. Patient receiving IV morphine and IV morphine. Patient oxygen saturation is 78% on 6 L of oxygen via nasal cannula pulse is 115 blood pressure 70/33 and temp is 97.6. ROS:   all 10 systems reviewed and are negative except as noted    Review of Systems   Unable to perform ROS: Acuity of condition       Medications: Allergies:    Allergies   Allergen Reactions    Adhesive Tape Rash    Meperidine Nausea Only    Demerol Nausea Only    Ultram [Tramadol Hcl]      Hot flashes    Zocor [Simvastatin]      Muscle aches    Codeine      \"out of it\"    Fentanyl Rash       Current Meds: morphine (PF) injection 2 mg, Q2H PRN  acetaminophen (TYLENOL) tablet 650 mg, Q6H PRN   Or  acetaminophen (TYLENOL) suppository 650 mg, Q6H PRN  glycopyrrolate (ROBINUL) injection 0.1 mg, Q2H PRN  LORazepam (ATIVAN) injection 1 mg, Q1H PRN        Data:     Code Status:  DNR-CC    Family History   Problem Relation Age of Onset    Heart Failure Brother     Heart Attack Brother         triple bypass    Prostate Cancer Brother     Coronary Art Dis Mother     Breast Cancer Mother         dx'd in late 46s    Emphysema Father     Colon Cancer Neg Hx     Diabetes Neg Hx     Eclampsia Neg Hx     Hypertension Neg Hx     Ovarian Cancer Neg Hx      Labor Neg Hx     Spont Abortions Neg Hx     Stroke Neg Hx        Social History     Socioeconomic History    Marital status:      Spouse name: Not on file    Number of children: Not on file    Years of education: Not on file    Highest education level: Not on file   Occupational History    Not on file   Tobacco Use    Smoking status: Never Smoker    Smokeless tobacco: Never Used   Vaping Use    Vaping Use: Never used   Substance and Sexual Activity    Alcohol use: Yes     Comment: social    Drug use: No    Sexual activity: Not Currently   Other Topics Concern    Not on file   Social History Narrative    Not on file     Social Determinants of Health     Financial Resource Strain:     Difficulty of Paying Living Expenses:    Food Insecurity:     Worried About Running Out of Food in the Last Year:     [address] in the Last Year:    Transportation Needs:     Lack of Transportation (Medical):  Lack of Transportation (Non-Medical):    Physical Activity:     Days of Exercise per Week:     Minutes of Exercise per Session:    Stress:     Feeling of Stress :    Social Connections:     Frequency of Communication with Friends and Family:     Frequency of Social Gatherings with Friends and Family:     Attends Restoration Services:     Active Member of Clubs or Organizations:     Attends Club or Organization Meetings:     Marital Status:    Intimate Partner Violence:     Fear of Current or Ex-Partner:     Emotionally Abused:     Physically Abused:     Sexually Abused:        I/O (24Hr): No intake or output data in the 24 hours ending 06/22/21 1436  Radiology:  US RENAL COMPLETE    Result Date: 6/16/2021  1. Diffusely increased renal cortical echogenicity, which can be seen with medical renal disease. 2.  No hydronephrosis. 3.  Small bilateral renal cysts. XR CHEST PORTABLE    Result Date: 6/16/2021  Increasing multifocal bilateral infiltrates, particularly in the right perihilar and upper lobe regions since the prior study. Findings were discussed with John Pink at 9:29 am on 6/16/2021. XR CHEST PORTABLE    Result Date: 6/15/2021  Scattered pulmonary opacities, cardiomegaly and left effusion are noted which may be on the basis of pulmonary edema with multifocal airspace disease not excluded. E. 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    treated at [facility] (postoperative nausea and vomiting)     Prolonged emergence from general anesthesia     SOB (shortness of breath) on exertion         Plan:        1. Ativan IV 1 mg every hour as needed for anxiety  2. Increase IV morphine 2 mg every 2 hours as needed for pain/shortness of breath per recommendation of inpatient hospice nurse  3. Lovenox 0.1 mg every 2 hours as needed for respiratory secretions.   4. Discussed with family members at bedside      Electronically signed by Naima Pacheco MD

## 2021-06-22 NOTE — DISCHARGE SUMMARY
Discharge Summary      Patient ID: Janet Hernandez    MRN: [id number]     Acct:  [de-identified]       Patient's PCP: Gaston Montilla DO    Admit Date: 6/17/2021     Discharge Date:   6/22/2021    Admitting Physician: Laura Castano MD    Discharge Physician: Bk Porter MD     Discharge Diagnoses:    Primary Problem  Acute on chronic respiratory failure with hypoxia [facility])    Principal Problem:    Acute on chronic respiratory failure with hypoxia [facility])  Active Problems:    Acute on chronic diastolic congestive heart failure (HCC)    Paroxysmal atrial fibrillation ([address]    YOGESH (acute kidney injury) ([facility]    Iron deficiency anemia    Bacteremia    Sepsis ([facility]  Resolved Problems:    * No resolved hospital problems. *    Past Medical History:   Diagnosis Date    Asbestos exposure     Atrial fibrillation ([facility]     takes xarelto    Basal cell carcinoma of nose     CAD (coronary artery disease)     CKD (chronic kidney disease) stage 2, GFR 60-89 ml/min     Hyperlipidemia     Hypertension     MDRO (multiple drug resistant organisms) resistance 2/19/2014    E. 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    treated at [facility] (postoperative nausea and vomiting)     Prolonged emergence from general anesthesia     SOB (shortness of breath) on exertion        Code Status:  [facility] Course:   H&P Reviewed. H&P Reviewed. Patient with a past medical history of PAF on Coumadin, chronic respiratory failure on home O2, T12-L1 vertebral osteomyelitis on 2 g of IV Rocephin, chronic respiratory failure on home O2, iron deficiency anemia was admitted from ARU with A. fib RVR, sepsis and YOGESH. Patient was started on IV antibiotics, BiPAP and IV vasopressor support.   Pulmonology cardiology, nephrology services were consulted. 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 General surgery was consulted. Patient hemoglobin dropped to 6 during hospital stay. patient had a poor prognosis. After family meeting patient's CODE STATUS was changed to DNR CC. Hospice was consulted. Patient was treated with IV morphine IV Ativan IV Robinul in consultation with hospice. Patient  on 2021 at 6:33 p.m.         Consults:  IP CONSULT TO HOSPICE    Significant Diagnostic Studies: as above, and as follows:    Treatments: as above    Disposition:     Discharged Condition:      PCP:  Glendy Garay DO in       Time Spent on discharge is more than  35 min in the examination, evaluation, counseling and review of medications and discharge plan.       Electronically signed by Jonnie Story MD     Copy sent to Dr. Glendy Garay DO

## 2021-06-22 NOTE — PROGRESS NOTES
SAMMY spoke to Hospice RN Reno Claude; she spoke to the Hospice MD who said pt is not stable for transfer. Reno Claude will reevaluate in the morning.

## 2021-06-23 NOTE — PROGRESS NOTES
Luna from [employer] notified of patient passing away, not a candidate for donation. Referral # [id number].

## 2021-06-23 NOTE — PROGRESS NOTES
Patient was cleansed and all LDAs were removed. This RN called [employer] in [place] to notify of patient expiring and ready to be picked up. Patient taken down to the [facility] per [facility]. Security called about body going to [facility].

## 2021-06-23 NOTE — FLOWSHEET NOTE
[facility]     Patient Death Note                                      DEATH                Room # 2042/-01   Name: Abel Nobles            Age: 81 y.o. Gender: female          Jehovah's witness: 23 Ano [address]ou[employer] of Anglican: Ena Hutson Date & Time: 2021  6:24 PM        Actual date of death: 2021   TOD:       SITUATION AT DEATH:  Patient is a 81 y.o. female Patient surrounded by her  Luz Barrera for 61 years, daughters Asia Medina, and Alex Tabor and one son Chika Caputo, and other family members.  Luz Barrera who also is in the hospital was seating in wheeled chair very tearful. This is a very loving and supported family. Very thankful for kindness and support shown to them by the staff. This is not a 's case. SPIRITUAL/EMOTIONAL INTERVENTION: This family has a strong rhina in [address], Their  visited earlier today. Family was grateful to have prayers offered by writer this evening. Family Received Grief Packet?  HOME:  Name: [address]: [place], [place]  Phone Number: [phone]    NEXT OF KIN:  Name: Pete Villa  Relationship: Son  Street Address: [address] [place] [address] Drive: [place]  Zip code: [address]   Phone Number: [phone]    Fanta Rodriguez PTA, Chaplain  2021 10:09 PM

## 2021-10-09 NOTE — PROGRESS NOTES
Progress Note    5/24/2021   12:56 PM    Name:  Dionisio Stallings  MRN:    [id number]     Acct:     [de-identified]   Room:  2115/2115-01   Day: 5     Admit Date: 5/19/2021  9:49 AM  PCP: Leti Lane DO    Subjective:     C/C:   Chief Complaint   Patient presents with    Back Pain       Interval History: Status: not changed. Patient denies chest pain shortness of breath. Vital signs reviewed and stable. Recent labs reviewed hemoglobin 9.3 potassium 5.4    ROS:   all 10 systems reviewed and are negative except as noted    Review of Systems   Constitutional: Negative for chills and fatigue. HENT: Negative for drooling, mouth sores, sneezing and trouble swallowing. Eyes: Negative for discharge, redness and itching. Respiratory: Negative for cough, chest tightness and shortness of breath. Cardiovascular: Negative for chest pain, palpitations and leg swelling. Gastrointestinal: Negative for abdominal pain, blood in stool, nausea and vomiting. Endocrine: Negative for heat intolerance and polyphagia. Genitourinary: Negative for difficulty urinating, flank pain and pelvic pain. Musculoskeletal: Positive for back pain (Lumbar). Negative for arthralgias, joint swelling and neck stiffness. Skin: Negative for color change and pallor. Allergic/Immunologic: Negative for food allergies. Neurological: Negative for dizziness, seizures and headaches. Hematological: Does not bruise/bleed easily. Psychiatric/Behavioral: Negative for agitation, behavioral problems and suicidal ideas. The patient is not hyperactive. Medications: Allergies:    Allergies   Allergen Reactions    Adhesive Tape Rash    Meperidine Nausea Only    Demerol Nausea Only    Ultram [Tramadol Hcl]      Hot flashes    Zocor [Simvastatin]      Muscle aches    Codeine      \"out of it\"    Fentanyl Rash       Current Meds: baclofen (LIORESAL) tablet 5 mg, TID PRN  polyethylene glycol (GLYCOLAX) packet 17 g, Daily  heparin (porcine) injection 4,000 Units, PRN  heparin (porcine) injection 2,000 Units, PRN  heparin 25,000 units in dextrose 5% 250 mL (premix) infusion, Continuous  perflutren lipid microspheres (DEFINITY) injection 2.2 mg, ONCE PRN  sodium chloride flush 0.9 % injection 5-40 mL, 2 times per day  sodium chloride flush 0.9 % injection 5-40 mL, PRN  0.9 % sodium chloride infusion, PRN  acetaminophen (TYLENOL) tablet 650 mg, Q4H PRN  carvedilol (COREG) tablet 6.25 mg, BID  naloxone (NARCAN) injection 0.4 mg, PRN  morphine PCA 1 mg/mL, Continuous  atorvastatin (LIPITOR) tablet 40 mg, Daily  ferrous sulfate 300 (60 Fe) MG/5ML syrup 300 mg, Daily  pantoprazole (PROTONIX) tablet 40 mg, QAM AC  oxybutynin (DITROPAN-XL) extended release tablet 15 mg, Daily  polyethylene glycol (GLYCOLAX) packet 17 g, Daily PRN  potassium chloride (KLOR-CON M) extended release tablet 20 mEq, BID  predniSONE (DELTASONE) tablet 20 mg, Daily  rOPINIRole (REQUIP) tablet 0.5 mg, 4x Daily  sertraline (ZOLOFT) tablet 50 mg, Daily  amiodarone (CORDARONE) tablet 100 mg, BID        Data:     Code Status:  Full Code    Family History   Problem Relation Age of Onset    Heart Failure Brother     Heart Attack Brother         triple bypass    Prostate Cancer Brother     Coronary Art Dis Mother     Breast Cancer Mother         dx'd in late 46s   [facility] Emphysema Father     Colon Cancer Neg Hx     Diabetes Neg Hx     Eclampsia Neg Hx     Hypertension Neg Hx     Ovarian Cancer Neg Hx      Labor Neg Hx     Spont Abortions Neg Hx     Stroke Neg Hx        Social History     Socioeconomic History    Marital status:      Spouse name: Not on file    Number of children: Not on file    Years of education: Not on file    Highest education level: Not on file   Occupational History    Not on file   Tobacco Use    Smoking status: Never Smoker    Smokeless tobacco: Never Used   Vaping Use    Vaping Use: Never used   Substance and Sexual Activity    Alcohol use: Yes     Comment: social    Drug use: No    Sexual activity: Not Currently   Other Topics Concern    Not on file   Social History Narrative    Not on file     Social Determinants of Health     Financial Resource Strain:     Difficulty of Paying Living Expenses:    Food Insecurity:     Worried About Running Out of Food in the Last Year:     [address] in the Last Year:    Transportation Needs:     Lack of Transportation (Medical):  Lack of Transportation (Non-Medical):    Physical Activity:     Days of Exercise per Week:     Minutes of Exercise per Session:    Stress:     Feeling of Stress :    Social Connections:     Frequency of Communication with Friends and Family:     Frequency of Social Gatherings with Friends and Family:     Attends Anglican Services:     Active Member of Clubs or Organizations:     Attends Club or Organization Meetings:     Marital Status:    Intimate Partner Violence:     Fear of Current or Ex-Partner:     Emotionally Abused:     Physically Abused:     Sexually Abused:        I/O (24Hr): Intake/Output Summary (Last 24 hours) at 5/24/2021 1256  Last data filed at 5/24/2021 0851  Gross per 24 hour   Intake 400 ml   Output 750 ml   Net -350 ml     Radiology:  XR THORACIC SPINE (3 VIEWS)    Result Date: 5/19/2021  Osteopenia without thoracic compression, fannie or retrolisthesis. Decrease in disc height at all thoracic disc levels. Post vertebroplasty changes involve L1. No change has occurred from the lumbar spine series dated 04/19/2021. XR LUMBAR SPINE (2-3 VIEWS)    Result Date: 5/19/2021  1. Unchanged appearance of the lumbar spine. Status post vertebroplasty at L1. No new osseous abnormality identified. 2.  Moderate stool burden. Previously administered oral contrast is present in the distal colon. MRI THORACIC SPINE WO CONTRAST    Result Date: 5/19/2021  1. No acute abnormality of the thoracic spine.  2. Chronic mild compression deformity of the L1 vertebral body status post prior vertebroplasty. 3. Mild degenerative changes in the lower cervical and upper thoracic spine. 4. Small layering left pleural effusion. 5. Incidental subcentimeter left thyroid nodule. No further follow-up is warranted per guidelines below. RECOMMENDATIONS: Subcentimeter incidental left thyroid nodule. No follow-up imaging is recommended. Reference: J Am Trell Radiol. 2015 Feb;12(2): 143-50     MRI LUMBAR SPINE WO CONTRAST    Result Date: 5/19/2021  . 1. L1 vertebroplasty in association with remote vertebral body compression fracture with approximately 50% height loss. 2. Associated retropulsion of posterior L1 cortex with associated borderline central canal stenosis. 3. L1/L2 mild, L2/L3 moderate, L3/L4 moderate central canal stenosis secondary to encroachment by posterior disc osteophyte complex. 4. L1/L2 moderate bilateral, L2/L3 mild bilateral, L3/L4 moderate bilateral, L4/L5 moderate bilateral neural foraminal stenosis secondary to encroachment by disc osteophyte complex. 5. L3/L4-L5/S1 mild bilateral facet degenerative arthrosis.        Labs:  Recent Results (from the past 24 hour(s))   CBC with DIFF    Collection Time: 05/24/21  6:04 AM   Result Value Ref Range    WBC 6.3 3.5 - 11.0 k/uL    RBC 3.13 (L) 4.0 - 5.2 m/uL    Hemoglobin 9.3 (L) 12.0 - 16.0 g/dL    Hematocrit 28.8 (L) 36 - 46 %    MCV 92.0 80 - 100 fL    MCH 29.8 26 - 34 pg    MCHC 32.4 31 - 37 g/dL    RDW 17.8 (H) 11.5 - 14.9 %    Platelets 122 217 - 748 k/uL    MPV 8.2 6.0 - 12.0 fL    NRBC Automated NOT REPORTED per 100 WBC    Differential Type NOT REPORTED     Seg Neutrophils 82 (H) 36 - 66 %    Lymphocytes 11 (L) 24 - 44 %    Monocytes 7 1 - 7 %    Eosinophils % 0 0 - 4 %    Basophils 0 0 - 2 %    Immature Granulocytes NOT REPORTED 0 %    Segs Absolute 5.10 1.3 - 9.1 k/uL    Absolute Lymph # 0.70 (L) 1.0 - 4.8 k/uL    Absolute Mono # 0.40 0.1 - 1.3 k/uL    Absolute Eos # 0.00 0.0 - 0.4 k/uL    Basophils Absolute 0.00 0.0 - 0.2 k/uL    Absolute Immature Granulocyte NOT REPORTED 0.00 - 0.30 k/uL    WBC Morphology NOT REPORTED     RBC Morphology NOT REPORTED     Platelet Estimate NOT REPORTED    Comprehensive Metabolic Panel w/ Reflex to MG    Collection Time: 21  6:04 AM   Result Value Ref Range    Glucose 144 (H) 70 - 99 mg/dL    BUN 19 8 - 23 mg/dL    CREATININE 0.76 0.50 - 0.90 mg/dL    Bun/Cre Ratio NOT REPORTED 9 - 20    Calcium 8.8 8.6 - 10.4 mg/dL    Sodium 137 135 - 144 mmol/L    Potassium 5.4 (H) 3.7 - 5.3 mmol/L    Chloride 108 (H) 98 - 107 mmol/L    CO2 25 20 - 31 mmol/L    Anion Gap 4 (L) 9 - 17 mmol/L    Alkaline Phosphatase 119 (H) 35 - 104 U/L    ALT 28 5 - 33 U/L    AST 25 <32 U/L    Total Bilirubin 0.36 0.3 - 1.2 mg/dL    Total Protein 4.8 (L) 6.4 - 8.3 g/dL    Albumin 2.8 (L) 3.5 - 5.2 g/dL    Albumin/Globulin Ratio NOT REPORTED 1.0 - 2.5    GFR Non-African American >60 >60 mL/min    GFR African American >60 >60 mL/min    GFR Comment          GFR Staging NOT REPORTED    Anti-Xa Unfract Heparin    Collection Time: 21  6:04 AM   Result Value Ref Range    Anti-XA Unfrac Heparin 0.41 0.30 - 0.70 IU/L       Physical Examination:        Vitals:  BP 99/63   Pulse 74   Temp 97.9 °F (36.6 °C) (Oral)   Resp 20   Ht 5' 2\" (1.575 m)   Wt 161 lb 9.6 oz (73.3 kg)   SpO2 96%   BMI 29.56 kg/m²   Temp (24hrs), Av.1 °F (36.7 °C), Min:97.5 °F (36.4 °C), Max:98.6 °F (37 °C)    No results for input(s): POCGLU in the last 72 hours. Physical Exam  Vitals reviewed. HENT:      Head: Normocephalic. Right Ear: External ear normal.      Left Ear: External ear normal.      Nose: Nose normal.      Mouth/Throat:      Mouth: Mucous membranes are moist.      Pharynx: Oropharynx is clear. Eyes:      Conjunctiva/sclera: Conjunctivae normal.   Cardiovascular:      Rate and Rhythm: Normal rate and regular rhythm. Pulses: Normal pulses.       Heart sounds: Normal heart sounds. Pulmonary:      Effort: Pulmonary effort is normal.      Breath sounds: Normal breath sounds. Abdominal:      General: Bowel sounds are normal.      Palpations: Abdomen is soft. Musculoskeletal:      Cervical back: Normal range of motion and neck supple. Right lower leg: No edema. Left lower leg: No edema. Comments: Lumbar and thoracic vertebral exam: Tenderness lower thoracic and lumbar vertebra. No saddle anesthesia   Skin:     General: Skin is warm. Capillary Refill: Capillary refill takes less than 2 seconds. Coloration: Skin is not jaundiced. Neurological:      General: No focal deficit present. Mental Status: She is alert. Mental status is at baseline. Psychiatric:         Mood and Affect: Mood normal.         Behavior: Behavior normal.         Assessment:        Primary Problem  Atrial fibrillation with RVR (HCC)     Principal Problem:    Atrial fibrillation with RVR (HCC)  Active Problems:    Hypertension    Mixed hyperlipidemia    Chronic diastolic heart failure (HCC)    Chronic respiratory failure (HCC)    Gastroesophageal reflux disease without esophagitis    Pulmonary hypertension (HCC)    YOGESH (acute kidney injury) (HCC)    Closed compression fracture of L1 lumbar vertebra, sequela    Acute midline low back pain with right-sided sciatica    Acute exacerbation of chronic low back pain  Resolved Problems:    * No resolved hospital problems. *      Past Medical History:   Diagnosis Date    Asbestos exposure     Atrial fibrillation ([facility] [address]     takes xarelto    Basal cell carcinoma of nose     CAD (coronary artery disease)     CKD (chronic kidney disease) stage 2, GFR 60-89 ml/min     Hyperlipidemia     Hypertension     MDRO (multiple drug resistant organisms) resistance 2/19/2014    E.  Coli urine    Nephrolithiasis     Osteoarthritis     Ovarian cyst     removed    Oxygen dependent     2 LNC mainly at night, during the day as needed    Palpitations  Peritoneal mesothelioma ([facility] [address] 1999    treated at [facility] PONV (postoperative nausea and vomiting)     Prolonged emergence from general anesthesia     SOB (shortness of breath) on exertion         Plan:        1. Epidural today for pain management  1. Amiodarone 100 mg p.o. twice daily  2. Coreg 6.25 mg p.o. twice daily  3. CBC, CMP  4. Recheck potassium now  5. Continue ferrous sulfate on current dose  6. Prednisone 20 mg p.o. daily  7. DVT Prophylaxis IV heparin drip  8. Protonix 40 mg p.o. daily  9. EPCs  10. PT/OT to evaluate and treat  11. Pain control  12. Replace electrolytes as per sliding scale  13. Home medications reviewed and appropriate medications continued  14.  Reviewed labs and imaging studies from last 24 hours and results explained to patient      Electronically signed by Mele Bryson MD Never smoker

## 2021-10-18 NOTE — PROGRESS NOTES
Physical Therapy  Facility/Department: [facility]  Daily Treatment Note  NAME: Camila Rodriguez  : 1935  MRN: [id number]    Date of Service: 2021    Discharge Recommendations:  Patient would benefit from continued therapy after discharge        Assessment   Specific instructions for Next Treatment: Log roll with bed mobility, don corset prior to bed mobility  PT Education: Precautions; Equipment;Transfer Training;General Safety  Patient Education: Corset fit/ adjustment/ precautions  Activity Tolerance  Activity Tolerance: Patient limited by pain; Patient limited by endurance  Activity Tolerance: requests O2 during session     Patient Diagnosis(es): The primary encounter diagnosis was Acute exacerbation of chronic low back pain. Diagnoses of Lumbar radiculopathy, Lumbar spondylosis, Age-related osteoporosis with current pathological fracture, sequela, Paroxysmal atrial fibrillation (HCC), Nontraumatic compression fracture of T5 vertebra, initial encounter ([facility] Non-traumatic compression fracture of T6 thoracic vertebra, initial encounter [facility]), Non-traumatic compression fracture of T9 thoracic vertebra, initial encounter ([facility] and Non-traumatic compression fracture of T10 thoracic vertebra, initial encounter [facility]) were also pertinent to this visit. has a past medical history of Asbestos exposure, Atrial fibrillation ([facility] [address] Basal cell carcinoma of nose, CAD (coronary artery disease), CKD (chronic kidney disease) stage 2, GFR 60-89 ml/min, Hyperlipidemia, Hypertension, MDRO (multiple drug resistant organisms) resistance, Nephrolithiasis, Osteoarthritis, Ovarian cyst, Oxygen dependent, Palpitations, Peritoneal mesothelioma ([address] PONV (postoperative nausea and vomiting), Prolonged emergence from general anesthesia, and SOB (shortness of breath) on exertion. has a past surgical history that includes Tubal ligation (); Hysterectomy (); Colonoscopy;  Total hip arthroplasty (Right, ); Pt has sent messages.  During the oxymorphone is too expensive as she pays out-of-pocket, will go back to oxycodone   complex. L1/L2 moderate bilateral, L2/L3 mild bilateral, L3/L4 moderate bilateral, L4/L5 moderate bilateral neuroforaminal stenosis secondary to encroachment by disc osteophyte complex. L3/L4 to L5/S1 mild bilateral facet degenerative arthrosis. Was found to have A. fib on presentation with shortness of breath and palpitationsPatient has a past medical history significant for A. fib on Xarelto, CHF, basal cell carcinoma, chronic back pain with kyphoplasty at L1 on 4/15/2021. She had recent caudal epidural straight injection 5/24 by our service with Dr. Juan Alberto Pena. Pt underwent IR guided aspiration T12-L1 on 6/4/21 d/t discitis and osteomyelitis. Response To Previous Treatment: Patient with no complaints from previous session. Family / Caregiver Present: Yes (spouse present)  Subjective  Subjective: Pt is resting in bed and agreeable to PT session. States she is unable to stand with RW d/t severe LE weakness and back pain. General Comment  Comments: Shasha Luis from [facility] present when PT entered. Nataliia BATES ok's session. PT assists with brace fit/ positioning and education. Pain Screening  Patient Currently in Pain: Yes  Pain Assessment  Patient's Stated Pain Goal: No pain  Pain Type: Acute pain  Pain Location: Back;Buttocks  Pain Orientation: Lower  Pain Descriptors: Aching;Spasm; Shooting  Pain Frequency: Continuous  Pain Onset: On-going  Clinical Progression: Not changed  Functional Pain Assessment: Prevents or interferes with many active not passive activities  Non-Pharmaceutical Pain Intervention(s): Ambulation/Increased Activity;Repositioned  Multiple Pain Sites: No  Vital Signs  Patient Currently in Pain: Yes       Orientation  Orientation  Overall Orientation Status: Within Functional Limits  Cognition      Objective   Bed mobility  Rolling to Left: Moderate assistance  Rolling to Right: Minimal assistance  Supine to Sit: 2 Person assistance; Moderate assistance  Sit to Supine: 2 Person assistance;Maximum assistance  Scootin Person assistance  Comment: Pt needs max cues for log rolling technique to prevent twisting during bed mobility. Pt repeatedly rolls to don thoracic corset prior to sitting up  Transfers  Sit to Stand: 2 Person Assistance; Moderate Assistance  Stand to sit: Minimal Assistance  Comment: Pt needs Mod A x2 to stand upright enough to position henrietta steady paddles. Pt can then perform sit to stand using UEs to pull on henrietta stedy frame with CGA. Pt demo's ability to stand upright in henrietta steady CGA for 3-4 mins  Ambulation  Ambulation?: No     Balance  Posture: Fair  Sitting - Static: Fair;-  Sitting - Dynamic: Fair;-  Standing - Static: Fair;-  Standing - Dynamic: Fair;-  Comments: post lean while sitting d/t back pain and spasms.                            G-Code     OutComes Score                                                     AM-PAC Score             Goals  Short term goals  Time Frame for Short term goals: 5-7 treatments/ week  Short term goal 1: pt to increase LE strength to [facility] to maximize mobility  Short term goal 2: pt to demonstrate good technique for log rolling, balance and LE ROM and strengthening exercises as tolerated  Short term goal 3: pt to demo good sitting balance and fair (+) standing balance with least restrictive device to increase safety  Short term goal 4: pt to demonstrate improved bed mobility with log rolling using rail w/ min A x1  Short term goal 5: pt to demo sit<>stand transfer with least restrictive device min A x1  Short term goal 6: pt to transfer bed<>chair with henrietta stedy min A x1  Patient Goals   Patient goals : control pain    Plan    Plan  Times per week: 6-7 treatments/ week  Times per day: Daily  Specific instructions for Next Treatment: Log roll with bed mobility, don corset prior to bed mobility  Current Treatment Recommendations: Strengthening, Functional Mobility Training, Safety Education & Training, Transfer Training, ROM, Balance Training, Endurance Training, Patient/Caregiver Education & Training, Positioning, Equipment Evaluation, Education, & procurement, Home Exercise Program  Safety Devices  Type of devices: Bed alarm in place, Left in bed, Call light within reach, Gait belt, Nurse notified     Therapy Time   Individual Concurrent Group Co-treatment   Time In 1304         Time Out 1338         Minutes  Jus Martin, [place]
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
## 2022-09-20 NOTE — PROGRESS NOTES
Page out to Dr. Kim Burger. Detail Level: Simple Instructions: This plan will send the code FBSD to the PM system.  DO NOT or CHANGE the price. Price (Do Not Change): 0.00

## 2023-09-14 NOTE — PROGRESS NOTES
[facility]                 PATIENT NAME: Radha Butler     TODAY'S DATE: 6/2/2021, 2:36 PM    SUBJECTIVE:    Pt seen and examined. Afebrile, VSS. No leukocytosis, hemoglobin stable. Patient states she is doing well. Denies abdominal pain. Passing a lot of flatus, no BM yet. No rectal bleeding that she has noticed. Tolerating diet however states her appetite is less than normal. No N/V. XR thoracic spine showing compression fractures of T5, T6, T9 and T10. OBJECTIVE:   VITALS:  /70   Pulse 107   Temp 98.6 °F (37 °C) (Oral)   Resp 18   Ht 5' 1.5\" (1.562 m)   Wt 156 lb 8.4 oz (71 kg)   SpO2 90%   BMI 29.10 kg/m²      INTAKE/OUTPUT:      Intake/Output Summary (Last 24 hours) at 6/2/2021 1436  Last data filed at 6/2/2021 0502  Gross per 24 hour   Intake 150 ml   Output 100 ml   Net 50 ml                 CONSTITUTIONAL:  awake and alert.   No acute distress  HEART:   RRR  LUNGS:   Decreased air entry at bases, no wheezing   ABDOMEN:   Abdomen soft, non-tender, non-distended  EXTREMITIES:   Trace pedal edema     Data:  CBC:   Lab Results   Component Value Date    WBC 8.3 06/02/2021    RBC 2.99 06/02/2021    RBC 4.17 02/08/2012    HGB 8.8 06/02/2021    HCT 27.1 06/02/2021    MCV 90.6 06/02/2021    MCH 29.4 06/02/2021    MCHC 32.5 06/02/2021    RDW 17.1 06/02/2021     06/02/2021     02/08/2012    MPV 8.3 06/02/2021     BMP:    Lab Results   Component Value Date     06/02/2021    K 4.7 06/02/2021     06/02/2021    CO2 29 06/02/2021    BUN 20 06/02/2021    LABALBU 2.8 06/02/2021    LABALBU 4.1 01/11/2012    CREATININE 0.74 06/02/2021    CALCIUM 8.7 06/02/2021    GFRAA >60 06/02/2021    LABGLOM >60 06/02/2021    GLUCOSE 128 06/02/2021    GLUCOSE 135 02/08/2012       Radiology Review:       XR THORACIC SPINE (3 VIEWS) [[id number]] Collected: 06/01/21 1558     Order Status: Completed Updated: 06/01/21 1613     Narrative:       EXAMINATION: Tao Centeno was seen and treated in our emergency department on 9/14/2023.  He may return to work on 09/18/2023.       If you have any questions or concerns, please don't hesitate to call.      Brea Simon PA-C fracture of fifth thoracic vertebra (HCC)    Closed wedge compression fracture of T6 vertebra (HCC)    Closed wedge compression fracture of T9 vertebra (HCC)    Closed wedge compression fracture of T10 vertebra (HCC)  Resolved Problems:    * No resolved hospital problems. *      Plan  1. Diet as tolerated  2. Prune juice, Miralax daily  3. Surgically stable  4. MRI thoracic/lumbar spine ordered per pain management   5. Continue medical management   6. Patient was seen and examined. Still no BM. Patient's hemoglobin is stable at 8.8. Potassium is normal.  Creatinine is normal.  MRI of the spine today and possible kyphoplasty per pain management physician. 7. Continue stool softeners prune juice. Await bowel movement. No signs of obstruction at this time.       Electronically signed by Jennifer Romero PA-C  [id number] [address]

## (undated) DEVICE — GOWN,AURORA,NONREINFORCED,LARGE: Brand: MEDLINE

## (undated) DEVICE — SPONGE LAP W18XL18IN WHT COT 4 PLY FLD STRUNG RADPQ DISP ST

## (undated) DEVICE — GEL US 20GM NONIRRITATING OVERWRAPPED FILE PCH TRNSMIT

## (undated) DEVICE — SNARE ENDOSCP AD SM L240CM LOOP W1.3CM SHTH DIA2.4MM POLYP

## (undated) DEVICE — SYRINGE MED 10ML LUERLOCK TIP W/O SFTY DISP

## (undated) DEVICE — C-ARM: Brand: UNBRANDED

## (undated) DEVICE — TOWEL,OR,DSP,ST,BLUE,STD,6/PK,12PK/CS: Brand: MEDLINE

## (undated) DEVICE — ELECTRODE ELECSURG NDL 2.8 INX7.2 CM COAT INSUL EDGE

## (undated) DEVICE — TUBING, SUCTION, 9/32" X 20', STRAIGHT: Brand: MEDLINE INDUSTRIES, INC.

## (undated) DEVICE — APPLICATOR MEDICATED 26 CC SOLUTION HI LT ORNG CHLORAPREP

## (undated) DEVICE — GAUZE,SPONGE,4"X4",16PLY,XRAY,STRL,LF: Brand: MEDLINE

## (undated) DEVICE — Z INACTIVE USE 2653177 SPONGE GZ W2XL2IN NONWOVEN 4 PLY FASTER WICKING ABIL AVANT

## (undated) DEVICE — NEEDLE HYPO 25GA L1.5IN BLU POLYPR HUB S STL REG BVL STR

## (undated) DEVICE — GLOVE SURG SZ 7 CRM LTX FREE POLYISOPRENE POLYMER BEAD ANTI

## (undated) DEVICE — SUTURE VCRL + SZ 4-0 L27IN ABSRB UD L26MM SH 1/2 CIR VCP415H

## (undated) DEVICE — POUCH INSTR W6.75XL11.5IN FRST 2 PKT ADH FOR ORTH AND

## (undated) DEVICE — COVER LT HNDL BLU PLAS

## (undated) DEVICE — DEFENDO AIR WATER SUCTION AND BIOPSY VALVE KIT FOR  OLYMPUS: Brand: DEFENDO AIR/WATER/SUCTION AND BIOPSY VALVE

## (undated) DEVICE — TOWEL,OR,DSP,ST,NATURAL,DLX,4/PK,20PK/CS: Brand: MEDLINE

## (undated) DEVICE — JACKSON / MODULAR SPINAL TABLE STYLE POSITIONING KIT - STANDARD: Brand: SOULE MEDICAL

## (undated) DEVICE — ERBE NESSY® OMEGA PLATE USA (85+23)CM² , WITH CABLE 3 M: Brand: ERBE

## (undated) DEVICE — COVER,MAYO STAND,STERILE: Brand: MEDLINE

## (undated) DEVICE — INTENDED FOR TISSUE SEPARATION, AND OTHER PROCEDURES THAT REQUIRE A SHARP SURGICAL BLADE TO PUNCTURE OR CUT.: Brand: BARD-PARKER ® CARBON RIB-BACK BLADES

## (undated) DEVICE — PAD,NON-ADHERENT,3X8,STERILE,LF,1/PK: Brand: MEDLINE

## (undated) DEVICE — COUNTER NDL 10 COUNT HLD 20 FOAM BLK SGL MAG

## (undated) DEVICE — BONE BIOPSY DEVICE F05A BBD SIZE 3: Brand: MEDTRONIC REUSABLE INSTRUMENTS

## (undated) DEVICE — ENDO KIT W/SYRINGE: Brand: MEDLINE INDUSTRIES, INC.

## (undated) DEVICE — GOWN,SIRUS,POLYRNF,XLN/3XL,18/CS: Brand: MEDLINE

## (undated) DEVICE — NEEDLE SPNL 22GA L3.5IN BLK WHTACR PNCL PNT HI FLO DISP

## (undated) DEVICE — SNAP KOVER: Brand: UNBRANDED

## (undated) DEVICE — GLOVE SURG SZ 75 CRM LTX FREE POLYISOPRENE POLYMER BEAD ANTI

## (undated) DEVICE — PAD N ADH W3XL4IN POLY COT SFT PERF FLM EASILY CUT ABSRB

## (undated) DEVICE — DRAPE,LAP,CHOLE,W/TROUGHS,STERILE: Brand: MEDLINE

## (undated) DEVICE — GOWN,SIRUS,NONRNF,SETINSLV,XL,20/CS: Brand: MEDLINE

## (undated) DEVICE — MARKER,SKIN,WI/RULER AND LABELS: Brand: MEDLINE

## (undated) DEVICE — MIXER A07A CEMENT

## (undated) DEVICE — SHEET, ORTHO, SPLIT, STERILE: Brand: MEDLINE

## (undated) DEVICE — COVER,TABLE,60X90,STERILE: Brand: MEDLINE

## (undated) DEVICE — SUTURE PERMA-HAND SZ 3-0 L18IN 17 STRND NONABSORBABLE BLK SA64H

## (undated) DEVICE — PENCIL ES L3M BTTN SWCH HOLSTER W/ BLDE ELECTRD EDGE

## (undated) DEVICE — GLOVE SURG SZ 8 L12IN FNGR THK11.8MIL KHAKI LTX BEAD CUF LT

## (undated) DEVICE — DRAPE,MEDI-SLUSH,STERILE: Brand: MEDLINE

## (undated) DEVICE — 3M™ IOBAN™ 2 ANTIMICROBIAL INCISE DRAPE 6650EZ: Brand: IOBAN™ 2

## (undated) DEVICE — ADHESIVE SKIN CLSR 0.7ML TOP DERMBND ADV

## (undated) DEVICE — STANDARD HYPODERMIC NEEDLE,POLYPROPYLENE HUB: Brand: MONOJECT

## (undated) DEVICE — CURETTE A13A SIZE 2 T-TIP: Brand: KYPHON® EXPRESS ™ CURETTE

## (undated) DEVICE — CANNULA NSL AD L2IN ETCO2 SAMP SFT CRUSH RESIST FEM AIRLFE

## (undated) DEVICE — GLOVE ORANGE PI 7 1/2   MSG9075

## (undated) DEVICE — TOWEL,OR,DSP,ST,BLUE,STD,4/PK,20PK/CS: Brand: MEDLINE

## (undated) DEVICE — SUTURE PERMAHAND SZ 3-0 L18IN NONABSORBABLE BLK SILK BRAID A184H

## (undated) DEVICE — COVER,LIGHT HANDLE,FLX,2/PK: Brand: MEDLINE INDUSTRIES, INC.

## (undated) DEVICE — BLANKET WRM W40.2XL55.9IN IORT LO BODY + MISTRAL AIR

## (undated) DEVICE — POLYP TRAP: Brand: TRAPEASE®

## (undated) DEVICE — DRAPE,REIN 53X77,STERILE: Brand: MEDLINE

## (undated) DEVICE — STRIP,CLOSURE,WOUND,MEDI-STRIP,1/2X4: Brand: MEDLINE

## (undated) DEVICE — DRESSING TRNSPAR W2XL2.75IN FLM SHT SEMIPERMEABLE WIND

## (undated) DEVICE — 1840 FOAM BLOCK NEEDLE COUNTER: Brand: DEVON

## (undated) DEVICE — BITEBLOCK 54FR W/ DENT RIM BLOX

## (undated) DEVICE — SUTURE VCRL + SZ 2 0 L27IN ABSRB UD CT 1 L36MM 1 2 CIR TAPR VCPP42D

## (undated) DEVICE — 3M™ STERI-STRIP™ COMPOUND BENZOIN TINCTURE 40 BAGS/CARTON 4 CARTONS/CASE C1544: Brand: 3M™ STERI-STRIP™

## (undated) DEVICE — NEEDLE SPINAL 22GA L3.5IN SPINOCAN

## (undated) DEVICE — COUNTER NDL 40 COUNT HLD 70 FOAM BLK ADH W/ MAG

## (undated) DEVICE — BONE TAMP KIT KPX203PB FF X2 20/3 1 STP: Brand: KYPHOPAK™ TRAY